# Patient Record
Sex: MALE | Race: WHITE | NOT HISPANIC OR LATINO | Employment: OTHER | URBAN - METROPOLITAN AREA
[De-identification: names, ages, dates, MRNs, and addresses within clinical notes are randomized per-mention and may not be internally consistent; named-entity substitution may affect disease eponyms.]

---

## 2017-02-09 ENCOUNTER — HOSPITAL ENCOUNTER (OUTPATIENT)
Dept: CARDIOLOGY | Facility: HOSPITAL | Age: 77
Discharge: HOME | End: 2017-02-09
Admitting: INTERNAL MEDICINE

## 2017-02-09 ENCOUNTER — OFFICE VISIT (OUTPATIENT)
Dept: CARDIOLOGY | Facility: CLINIC | Age: 77
End: 2017-02-09

## 2017-02-09 VITALS
HEART RATE: 59 BPM | DIASTOLIC BLOOD PRESSURE: 84 MMHG | SYSTOLIC BLOOD PRESSURE: 130 MMHG | WEIGHT: 148 LBS | BODY MASS INDEX: 20.05 KG/M2 | HEIGHT: 72 IN

## 2017-02-09 DIAGNOSIS — I48.0 PAROXYSMAL ATRIAL FIBRILLATION (HCC): Primary | ICD-10-CM

## 2017-02-09 DIAGNOSIS — Z79.01 ANTICOAGULATED: ICD-10-CM

## 2017-02-09 DIAGNOSIS — I10 ESSENTIAL HYPERTENSION: ICD-10-CM

## 2017-02-09 DIAGNOSIS — I71.40 ABDOMINAL AORTIC ANEURYSM (AAA) WITHOUT RUPTURE (HCC): ICD-10-CM

## 2017-02-09 DIAGNOSIS — I25.10 CHRONIC CORONARY ARTERY DISEASE: Primary | ICD-10-CM

## 2017-02-09 DIAGNOSIS — E78.5 HYPERLIPIDEMIA, UNSPECIFIED HYPERLIPIDEMIA TYPE: ICD-10-CM

## 2017-02-09 PROCEDURE — 99214 OFFICE O/P EST MOD 30 MIN: CPT | Performed by: INTERNAL MEDICINE

## 2017-02-09 PROCEDURE — 85610 PROTHROMBIN TIME: CPT | Performed by: INTERNAL MEDICINE

## 2017-02-09 RX ORDER — NEBIVOLOL HYDROCHLORIDE 10 MG/1
10 TABLET ORAL DAILY
Status: ON HOLD | COMMUNITY
Start: 2016-11-15 | End: 2017-08-28 | Stop reason: SDUPTHER

## 2017-02-09 NOTE — PROGRESS NOTES
Subjective:       David Comer is a 76 y.o. male who here for follow up    CC  Yearly follow-up  HPI  76-year-old white male with known history of coronary artery disease, essential arterial hypertension, as well as hyperlipidemia and ascending aneurysm here for the follow-up patient also has been anticoagulated    Patient denies any chest pains or tightness in chest     Problem List Items Addressed This Visit        Cardiovascular and Mediastinum    Chronic coronary artery disease - Primary    Relevant Medications    BYSTOLIC 10 MG tablet    Abdominal aortic aneurysm    Hypertension    Relevant Medications    BYSTOLIC 10 MG tablet    Hyperlipidemia       Other    Anticoagulated        Previous treatments/evaluations include: ASA and beta blocker. Cardiac risk factors: advanced age (older than 55 for men, 65 for women) and hypertension.    The following portions of the patient's history were reviewed and updated as appropriate: allergies, current medications, past family history, past medical history, past social history, past surgical history and problem list.    Past Medical History   Diagnosis Date   • AAA (abdominal aortic aneurysm)    • Bradycardia    • Chest pain    • Coronary artery disease    • DJD (degenerative joint disease)    • GERD (gastroesophageal reflux disease)    • Hyperlipidemia    • Hypertension    • Hypogonadism in male    • Hypothyroidism    • PAF (paroxysmal atrial fibrillation)    • Vitamin D deficiency     reports that he has never smoked. He has never used smokeless tobacco. He reports that he does not drink alcohol or use illicit drugs.History reviewed. No pertinent family history.    Review of Systems  Constitutional: No wt loss, fever, fatigue  Gastrointestinal: No nausea, abdominal pain  Behavioral/Psych: No insomnia or anxiety   Cardiovascular no chest pains or tightness in chest  Objective:       Physical Exam             Physical Exam  Visit Vitals   • /84 (BP Location: Right  "arm)   • Pulse 59   • Ht 72\" (182.9 cm)   • Wt 148 lb (67.1 kg)   • BMI 20.07 kg/m2       General appearance: NAD, conversant   Eyes: anicteric sclerae, moist conjunctivae; no lid-lag; PERRLA   HENT: Atraumatic; oropharynx clear with moist mucous membranes and no mucosal ulcerations;  normal hard and soft palate   Neck: Trachea midline; FROM, supple, no thyromegaly or lymphadenopathy   Lungs: CTA, with normal respiratory effort and no intercostal retractions   CV: S1-S2 regular, no murmurs, no rub, no gallop   Abdomen: Soft, non-tender; no masses or HSM   Extremities: No peripheral edema or extremity lymphadenopathy  Skin: Normal temperature, turgor and texture; no rash, ulcers or subcutaneous nodules   Psych: Appropriate affect, alert and oriented to person, place and time           Cardiographics  @Procedures    Echocardiogram:    Conclusions  The study quality is adequate.   The left ventricular chamber size is normal.  The estimated ejection fraction is 60-65%.   There is a trace of aortic regurgitation.  There is mild mitral regurgitation.   There is mild tricuspid regurgitation.  There is no pericardial effusion.    Current Outpatient Prescriptions:   •  amLODIPine (NORVASC) 5 MG tablet, Take 1 tablet by mouth daily., Disp: 90 tablet, Rfl: 3  •  atorvastatin (LIPITOR) 20 MG tablet, Take 1 tablet by mouth Daily., Disp: 90 tablet, Rfl: 0  •  BYSTOLIC 10 MG tablet, , Disp: , Rfl:   •  doxazosin (CARDURA) 2 MG tablet, Take  by mouth daily., Disp: , Rfl:   •  esomeprazole (NEXIUM) 40 MG capsule, Take  by mouth., Disp: , Rfl:   •  finasteride (PROSCAR) 5 MG tablet, Take  by mouth daily., Disp: , Rfl:   •  isosorbide mononitrate (IMDUR) 30 MG 24 hr tablet, Take  by mouth daily., Disp: , Rfl:   •  levothyroxine (SYNTHROID) 112 MCG tablet, Take  by mouth daily., Disp: , Rfl:   •  nitroglycerin (NITROSTAT) 0.4 MG SL tablet, Place  under the tongue., Disp: , Rfl:   •  olmesartan-hydrochlorothiazide (BENICAR HCT) 40-25 MG " per tablet, Take  by mouth daily., Disp: , Rfl:   •  warfarin (COUMADIN) 7.5 MG tablet, Take 7.5 mg by mouth daily., Disp: , Rfl:    Assessment:        Patient Active Problem List   Diagnosis   • Chronic coronary artery disease   • Abdominal aortic aneurysm   • Paroxysmal atrial fibrillation   • Gastroesophageal reflux disease   • Hypertension   • Hyperlipidemia   • Hypogonadism in male   • Hypothyroidism   • Osteoarthritis of spine   • Vitamin D deficiency     IMPRESSION OF HEART CATHETERIZATION:    1. Normal left main coronary artery.  2. Normal left anterior descending artery and its branches.  3. Normal left circumflex artery and its branches.  4. Normal right coronary artery proximal spasm relieved with sublingual   nitroglycerin  5. Normal left ventricular systolic function.  LVEDP 10 mmHg.  Ejection   fraction 60%.    RECOMMENDATION:  Considering nonsignificant coronary artery disease   patient be treated medically.          Plan:       Clinically no signs and symptoms of angina or chf, no side effects with current meds,.    Continue current meds and treatment.    Importance of controlling hypertension and blood pressure  checkup on the regular basis has been explained  Hypertension as a silent killer has been discussed  Risk reduction of the weight and regular exercises to control the hypertension has been explained    Risk of the hyperlipidemia, importance of the treatment has been explained    Pros and cons of the statins has been explained    Regular blood workup as well as side effects including the liver failure, myelopathy death has been explained            ICD-10-CM ICD-9-CM   1. Chronic coronary artery disease I25.10 414.00   2. Abdominal aortic aneurysm (AAA) without rupture I71.4 441.4   3. Essential hypertension I10 401.9   4. Hyperlipidemia, unspecified hyperlipidemia type E78.5 272.4     Needs ct  Chest for ascending aneurysm    Needs FLP/ CMP      WILL PROCEED WITH LEXISCAN DUE TO ANEURYSM AND  ECHO    SEE US 2-4 WKS    CHECK PT  COUNSELING:    David Jones was given to patient for the following topics: diagnostic results, risk factor reductions, impressions, risks and benefits of treatment options and importance of treatment compliance .       SMOKING COUNSELING:    Counseling given: Not Answered      EMR Dragon/Transcription disclaimer:   Much of this encounter note is an electronic transcription/translation of spoken language to printed text. The electronic translation of spoken language may permit erroneous, or at times, nonsensical words or phrases to be inadvertently transcribed; Although I have reviewed the note for such errors, some may still exist.

## 2017-02-13 RX ORDER — WARFARIN SODIUM 7.5 MG/1
7.5 TABLET ORAL DAILY
Qty: 90 TABLET | Refills: 1 | Status: ON HOLD | OUTPATIENT
Start: 2017-02-13 | End: 2017-06-02

## 2017-02-14 ENCOUNTER — HOSPITAL ENCOUNTER (OUTPATIENT)
Dept: CARDIOLOGY | Facility: HOSPITAL | Age: 77
Discharge: HOME | End: 2017-02-14
Admitting: INTERNAL MEDICINE

## 2017-02-14 DIAGNOSIS — R07.2 PRECORDIAL PAIN: Primary | ICD-10-CM

## 2017-02-14 DIAGNOSIS — I71.40 ABDOMINAL AORTIC ANEURYSM (AAA) WITHOUT RUPTURE (HCC): ICD-10-CM

## 2017-02-14 DIAGNOSIS — I25.10 CHRONIC CORONARY ARTERY DISEASE: ICD-10-CM

## 2017-02-14 DIAGNOSIS — R06.02 SOB (SHORTNESS OF BREATH): ICD-10-CM

## 2017-02-14 DIAGNOSIS — I48.0 PAROXYSMAL ATRIAL FIBRILLATION (HCC): ICD-10-CM

## 2017-02-14 DIAGNOSIS — E78.5 HYPERLIPIDEMIA, UNSPECIFIED HYPERLIPIDEMIA TYPE: Primary | ICD-10-CM

## 2017-02-14 DIAGNOSIS — I10 ESSENTIAL HYPERTENSION: ICD-10-CM

## 2017-02-14 PROCEDURE — 85610 PROTHROMBIN TIME: CPT | Performed by: INTERNAL MEDICINE

## 2017-02-20 ENCOUNTER — HOSPITAL ENCOUNTER (OUTPATIENT)
Dept: CT IMAGING | Facility: HOSPITAL | Age: 77
Discharge: HOME | End: 2017-02-20
Attending: INTERNAL MEDICINE | Admitting: INTERNAL MEDICINE

## 2017-02-20 DIAGNOSIS — I48.0 PAROXYSMAL ATRIAL FIBRILLATION (HCC): ICD-10-CM

## 2017-02-20 DIAGNOSIS — I10 ESSENTIAL HYPERTENSION: ICD-10-CM

## 2017-02-20 DIAGNOSIS — I71.40 ABDOMINAL AORTIC ANEURYSM (AAA) WITHOUT RUPTURE (HCC): ICD-10-CM

## 2017-02-20 DIAGNOSIS — I25.10 CHRONIC CORONARY ARTERY DISEASE: ICD-10-CM

## 2017-02-20 DIAGNOSIS — R07.2 PRECORDIAL PAIN: ICD-10-CM

## 2017-02-20 DIAGNOSIS — R06.02 SOB (SHORTNESS OF BREATH): ICD-10-CM

## 2017-02-20 LAB — CREAT BLDA-MCNC: 1 MG/DL (ref 0.6–1.3)

## 2017-02-20 PROCEDURE — 0 IOPAMIDOL 61 % SOLUTION: Performed by: INTERNAL MEDICINE

## 2017-02-20 PROCEDURE — 71260 CT THORAX DX C+: CPT

## 2017-02-20 PROCEDURE — 82565 ASSAY OF CREATININE: CPT

## 2017-02-20 RX ADMIN — IOPAMIDOL 75 ML: 612 INJECTION, SOLUTION INTRAVENOUS at 08:35

## 2017-02-21 ENCOUNTER — HOSPITAL ENCOUNTER (OUTPATIENT)
Dept: CARDIOLOGY | Facility: HOSPITAL | Age: 77
Discharge: HOME | End: 2017-02-21
Admitting: INTERNAL MEDICINE

## 2017-02-21 PROCEDURE — 85610 PROTHROMBIN TIME: CPT | Performed by: INTERNAL MEDICINE

## 2017-03-13 ENCOUNTER — HOSPITAL ENCOUNTER (OUTPATIENT)
Dept: CARDIOLOGY | Facility: HOSPITAL | Age: 77
Discharge: HOME | End: 2017-03-13
Attending: INTERNAL MEDICINE

## 2017-03-13 ENCOUNTER — HOSPITAL ENCOUNTER (OUTPATIENT)
Dept: CARDIOLOGY | Facility: HOSPITAL | Age: 77
Discharge: HOME | End: 2017-03-13
Attending: INTERNAL MEDICINE | Admitting: INTERNAL MEDICINE

## 2017-03-13 VITALS
DIASTOLIC BLOOD PRESSURE: 84 MMHG | BODY MASS INDEX: 28.44 KG/M2 | WEIGHT: 210 LBS | HEIGHT: 72 IN | SYSTOLIC BLOOD PRESSURE: 130 MMHG

## 2017-03-13 VITALS — HEART RATE: 64 BPM | DIASTOLIC BLOOD PRESSURE: 70 MMHG | SYSTOLIC BLOOD PRESSURE: 105 MMHG

## 2017-03-13 DIAGNOSIS — I10 ESSENTIAL HYPERTENSION: ICD-10-CM

## 2017-03-13 DIAGNOSIS — I71.40 ABDOMINAL AORTIC ANEURYSM (AAA) WITHOUT RUPTURE (HCC): ICD-10-CM

## 2017-03-13 DIAGNOSIS — I48.0 PAROXYSMAL ATRIAL FIBRILLATION (HCC): ICD-10-CM

## 2017-03-13 DIAGNOSIS — I25.10 CHRONIC CORONARY ARTERY DISEASE: ICD-10-CM

## 2017-03-13 DIAGNOSIS — R06.02 SOB (SHORTNESS OF BREATH): ICD-10-CM

## 2017-03-13 DIAGNOSIS — R07.2 PRECORDIAL PAIN: ICD-10-CM

## 2017-03-13 LAB
BH CV ECHO MEAS - ACS: 2.2 CM
BH CV ECHO MEAS - AO MAX PG (FULL): 6 MMHG
BH CV ECHO MEAS - AO MAX PG: 9.1 MMHG
BH CV ECHO MEAS - AO MEAN PG (FULL): 3 MMHG
BH CV ECHO MEAS - AO MEAN PG: 4 MMHG
BH CV ECHO MEAS - AO ROOT AREA (BSA CORRECTED): 1.9
BH CV ECHO MEAS - AO ROOT AREA: 13.2 CM^2
BH CV ECHO MEAS - AO ROOT DIAM: 4.1 CM
BH CV ECHO MEAS - AO V2 MAX: 151 CM/SEC
BH CV ECHO MEAS - AO V2 MEAN: 85.5 CM/SEC
BH CV ECHO MEAS - AO V2 VTI: 30.9 CM
BH CV ECHO MEAS - AVA(I,A): 3 CM^2
BH CV ECHO MEAS - AVA(I,D): 3 CM^2
BH CV ECHO MEAS - AVA(V,A): 2.7 CM^2
BH CV ECHO MEAS - AVA(V,D): 2.7 CM^2
BH CV ECHO MEAS - BSA(HAYCOCK): 2.2 M^2
BH CV ECHO MEAS - BSA: 2.2 M^2
BH CV ECHO MEAS - BZI_BMI: 28.5 KILOGRAMS/M^2
BH CV ECHO MEAS - BZI_METRIC_HEIGHT: 182.9 CM
BH CV ECHO MEAS - BZI_METRIC_WEIGHT: 95.3 KG
BH CV ECHO MEAS - CONTRAST EF 4CH: 57.4 ML/M^2
BH CV ECHO MEAS - EDV(CUBED): 68.9 ML
BH CV ECHO MEAS - EDV(MOD-SP4): 108 ML
BH CV ECHO MEAS - EDV(TEICH): 74.2 ML
BH CV ECHO MEAS - EF(CUBED): 60.8 %
BH CV ECHO MEAS - EF(MOD-SP4): 57.4 %
BH CV ECHO MEAS - EF(TEICH): 52.8 %
BH CV ECHO MEAS - ESV(CUBED): 27 ML
BH CV ECHO MEAS - ESV(MOD-SP4): 46 ML
BH CV ECHO MEAS - ESV(TEICH): 35 ML
BH CV ECHO MEAS - FS: 26.8 %
BH CV ECHO MEAS - IVS/LVPW: 1.2
BH CV ECHO MEAS - IVSD: 1.3 CM
BH CV ECHO MEAS - LA DIMENSION: 3.9 CM
BH CV ECHO MEAS - LA/AO: 0.95
BH CV ECHO MEAS - LAT PEAK E' VEL: 11 CM/SEC
BH CV ECHO MEAS - LV DIASTOLIC VOL/BSA (35-75): 49.7 ML/M^2
BH CV ECHO MEAS - LV MASS(C)D: 171.7 GRAMS
BH CV ECHO MEAS - LV MASS(C)DI: 79 GRAMS/M^2
BH CV ECHO MEAS - LV MAX PG: 3.1 MMHG
BH CV ECHO MEAS - LV MEAN PG: 1 MMHG
BH CV ECHO MEAS - LV SYSTOLIC VOL/BSA (12-30): 21.1 ML/M^2
BH CV ECHO MEAS - LV V1 MAX: 88.7 CM/SEC
BH CV ECHO MEAS - LV V1 MEAN: 55.3 CM/SEC
BH CV ECHO MEAS - LV V1 VTI: 20.6 CM
BH CV ECHO MEAS - LVIDD: 4.1 CM
BH CV ECHO MEAS - LVIDS: 3 CM
BH CV ECHO MEAS - LVLD AP4: 8 CM
BH CV ECHO MEAS - LVLS AP4: 6.9 CM
BH CV ECHO MEAS - LVOT AREA (M): 4.5 CM^2
BH CV ECHO MEAS - LVOT AREA: 4.5 CM^2
BH CV ECHO MEAS - LVOT DIAM: 2.4 CM
BH CV ECHO MEAS - LVPWD: 1.1 CM
BH CV ECHO MEAS - MED PEAK E' VEL: 6 CM/SEC
BH CV ECHO MEAS - MR MAX PG: 96.8 MMHG
BH CV ECHO MEAS - MR MAX VEL: 492 CM/SEC
BH CV ECHO MEAS - MV A DUR: 0.19 SEC
BH CV ECHO MEAS - MV A MAX VEL: 78.6 CM/SEC
BH CV ECHO MEAS - MV DEC SLOPE: 257 CM/SEC^2
BH CV ECHO MEAS - MV DEC TIME: 0.26 SEC
BH CV ECHO MEAS - MV E MAX VEL: 68.4 CM/SEC
BH CV ECHO MEAS - MV E/A: 0.87
BH CV ECHO MEAS - MV MAX PG: 2.6 MMHG
BH CV ECHO MEAS - MV MEAN PG: 1 MMHG
BH CV ECHO MEAS - MV P1/2T MAX VEL: 67.4 CM/SEC
BH CV ECHO MEAS - MV P1/2T: 76.8 MSEC
BH CV ECHO MEAS - MV V2 MAX: 80.5 CM/SEC
BH CV ECHO MEAS - MV V2 MEAN: 43.5 CM/SEC
BH CV ECHO MEAS - MV V2 VTI: 22.6 CM
BH CV ECHO MEAS - MVA P1/2T LCG: 3.3 CM^2
BH CV ECHO MEAS - MVA(P1/2T): 2.9 CM^2
BH CV ECHO MEAS - MVA(VTI): 4.1 CM^2
BH CV ECHO MEAS - PA MAX PG (FULL): 2.8 MMHG
BH CV ECHO MEAS - PA MAX PG: 4.5 MMHG
BH CV ECHO MEAS - PA V2 MAX: 106 CM/SEC
BH CV ECHO MEAS - PULM A REVS DUR: 0.12 SEC
BH CV ECHO MEAS - PULM A REVS VEL: 27.9 CM/SEC
BH CV ECHO MEAS - PULM DIAS VEL: 40.3 CM/SEC
BH CV ECHO MEAS - PULM S/D: 1.2
BH CV ECHO MEAS - PULM SYS VEL: 47.9 CM/SEC
BH CV ECHO MEAS - PVA(V,A): 2.8 CM^2
BH CV ECHO MEAS - PVA(V,D): 2.8 CM^2
BH CV ECHO MEAS - QP/QS: 0.9
BH CV ECHO MEAS - RAP SYSTOLE: 10 MMHG
BH CV ECHO MEAS - RV MAX PG: 1.7 MMHG
BH CV ECHO MEAS - RV MEAN PG: 1 MMHG
BH CV ECHO MEAS - RV V1 MAX: 65 CM/SEC
BH CV ECHO MEAS - RV V1 MEAN: 49.5 CM/SEC
BH CV ECHO MEAS - RV V1 VTI: 18.5 CM
BH CV ECHO MEAS - RVDD: 2.5 CM
BH CV ECHO MEAS - RVOT AREA: 4.5 CM^2
BH CV ECHO MEAS - RVOT DIAM: 2.4 CM
BH CV ECHO MEAS - RVSP: 47.5 MMHG
BH CV ECHO MEAS - SI(AO): 187.6 ML/M^2
BH CV ECHO MEAS - SI(CUBED): 19.3 ML/M^2
BH CV ECHO MEAS - SI(LVOT): 42.8 ML/M^2
BH CV ECHO MEAS - SI(MOD-SP4): 28.5 ML/M^2
BH CV ECHO MEAS - SI(TEICH): 18 ML/M^2
BH CV ECHO MEAS - SV(AO): 408 ML
BH CV ECHO MEAS - SV(CUBED): 41.9 ML
BH CV ECHO MEAS - SV(LVOT): 93.2 ML
BH CV ECHO MEAS - SV(MOD-SP4): 62 ML
BH CV ECHO MEAS - SV(RVOT): 83.7 ML
BH CV ECHO MEAS - SV(TEICH): 39.2 ML
BH CV ECHO MEAS - TAPSE (>1.6): 1.9 CM2
BH CV ECHO MEAS - TR MAX VEL: 306 CM/SEC
BH CV XLRA - RV BASE: 4.2 CM
BH CV XLRA - RV LENGTH: 6.7 CM
BH CV XLRA - RV MID: 2.8 CM
BH CV XLRA - TDI S': 15.1 CM/SEC
LEFT ATRIUM VOLUME INDEX: 45.9 ML/M2

## 2017-03-13 PROCEDURE — 93018 CV STRESS TEST I&R ONLY: CPT | Performed by: INTERNAL MEDICINE

## 2017-03-13 PROCEDURE — A9500 TC99M SESTAMIBI: HCPCS | Performed by: INTERNAL MEDICINE

## 2017-03-13 PROCEDURE — 78452 HT MUSCLE IMAGE SPECT MULT: CPT

## 2017-03-13 PROCEDURE — 0399T ADULT TRANSTHORACIC ECHO COMPLETE: CPT | Performed by: INTERNAL MEDICINE

## 2017-03-13 PROCEDURE — 93306 TTE W/DOPPLER COMPLETE: CPT | Performed by: INTERNAL MEDICINE

## 2017-03-13 PROCEDURE — 93016 CV STRESS TEST SUPVJ ONLY: CPT | Performed by: INTERNAL MEDICINE

## 2017-03-13 PROCEDURE — 78452 HT MUSCLE IMAGE SPECT MULT: CPT | Performed by: INTERNAL MEDICINE

## 2017-03-13 PROCEDURE — 0 TECHNETIUM SESTAMIBI: Performed by: INTERNAL MEDICINE

## 2017-03-13 PROCEDURE — 93017 CV STRESS TEST TRACING ONLY: CPT

## 2017-03-13 PROCEDURE — 0399T HC MYOCARDL STRAIN IMAG QUAN ASSMT PER SESS: CPT

## 2017-03-13 PROCEDURE — 93306 TTE W/DOPPLER COMPLETE: CPT

## 2017-03-13 PROCEDURE — 25010000002 REGADENOSON 0.4 MG/5ML SOLUTION: Performed by: INTERNAL MEDICINE

## 2017-03-13 RX ADMIN — Medication 1 DOSE: at 07:18

## 2017-03-13 RX ADMIN — Medication 1 DOSE: at 09:48

## 2017-03-13 RX ADMIN — REGADENOSON 0.4 MG: 0.08 INJECTION, SOLUTION INTRAVENOUS at 09:48

## 2017-03-15 LAB
BH CV STRESS BP STAGE 1: NORMAL
BH CV STRESS COMMENTS STAGE 1: NORMAL
BH CV STRESS DOSE REGADENOSON STAGE 1: 0.4
BH CV STRESS DURATION MIN STAGE 1: 0
BH CV STRESS DURATION SEC STAGE 1: 15
BH CV STRESS HR STAGE 1: 92
BH CV STRESS PROTOCOL 1: NORMAL
BH CV STRESS RECOVERY BP: NORMAL MMHG
BH CV STRESS RECOVERY HR: 76 BPM
BH CV STRESS STAGE 1: 1
LV EF NUC BP: 58 %
MAXIMAL PREDICTED HEART RATE: 144 BPM
PERCENT MAX PREDICTED HR: 63.89 %
STRESS BASELINE BP: NORMAL MMHG
STRESS BASELINE HR: 64 BPM
STRESS PERCENT HR: 75 %
STRESS POST ESTIMATED WORKLOAD: 1 METS
STRESS POST EXERCISE DUR MIN: 0 MIN
STRESS POST EXERCISE DUR SEC: 0 SEC
STRESS POST PEAK BP: NORMAL MMHG
STRESS POST PEAK HR: 92 BPM
STRESS TARGET HR: 122 BPM

## 2017-03-16 ENCOUNTER — HOSPITAL ENCOUNTER (OUTPATIENT)
Dept: CARDIOLOGY | Facility: HOSPITAL | Age: 77
Discharge: HOME | End: 2017-03-16
Admitting: FAMILY MEDICINE

## 2017-03-16 ENCOUNTER — OFFICE VISIT (OUTPATIENT)
Dept: CARDIOLOGY | Facility: CLINIC | Age: 77
End: 2017-03-16

## 2017-03-16 VITALS
HEIGHT: 72 IN | DIASTOLIC BLOOD PRESSURE: 74 MMHG | BODY MASS INDEX: 28.44 KG/M2 | WEIGHT: 210 LBS | HEART RATE: 59 BPM | SYSTOLIC BLOOD PRESSURE: 114 MMHG

## 2017-03-16 DIAGNOSIS — E78.5 HYPERLIPIDEMIA, UNSPECIFIED HYPERLIPIDEMIA TYPE: ICD-10-CM

## 2017-03-16 DIAGNOSIS — I25.10 CHRONIC CORONARY ARTERY DISEASE: Primary | ICD-10-CM

## 2017-03-16 DIAGNOSIS — I48.0 PAROXYSMAL ATRIAL FIBRILLATION (HCC): ICD-10-CM

## 2017-03-16 DIAGNOSIS — I10 ESSENTIAL HYPERTENSION: ICD-10-CM

## 2017-03-16 PROCEDURE — 99213 OFFICE O/P EST LOW 20 MIN: CPT | Performed by: INTERNAL MEDICINE

## 2017-03-16 PROCEDURE — 85610 PROTHROMBIN TIME: CPT | Performed by: INTERNAL MEDICINE

## 2017-03-16 NOTE — PROGRESS NOTES
Subjective:       David Comer is a 77 y.o. male who here for follow up    CC  Follow-up after the stress test as well as echocardiogram and abnormal CT scan  HPI  77 his old white male with known history of aneurysm, abnormal EKG, chest pains and coronary artery disease here for the follow-up after the stress test echo and CT scan done last month    Patient has abnormal CT of the chest, aneurysm remains stable     Problem List Items Addressed This Visit        Cardiovascular and Mediastinum    Chronic coronary artery disease - Primary    Paroxysmal atrial fibrillation    Hypertension    Hyperlipidemia        Previous treatments/evaluations include: ASA. Cardiac risk factors: advanced age (older than 55 for men, 65 for women).    The following portions of the patient's history were reviewed and updated as appropriate: allergies, current medications, past family history, past medical history, past social history, past surgical history and problem list.    Past Medical History   Diagnosis Date   • AAA (abdominal aortic aneurysm)    • AAA (abdominal aortic aneurysm) without rupture      CT performed on 2/20/17   • Abnormal ECG    • Bradycardia    • Chest pain    • Coronary artery disease    • DJD (degenerative joint disease)    • GERD (gastroesophageal reflux disease)    • Hyperlipidemia    • Hypertension    • Hypogonadism in male    • Hypothyroidism    • PAF (paroxysmal atrial fibrillation)    • Vitamin D deficiency     reports that he has never smoked. He has never used smokeless tobacco. He reports that he does not drink alcohol or use illicit drugs.No family history on file.    Review of Systems  Constitutional: No wt loss, fever, fatigue  Gastrointestinal: No nausea, abdominal pain  Behavioral/Psych: No insomnia or anxiety   Cardiovascular no chest pains or tightness in chest  Objective:       Physical Exam             Physical Exam  Visit Vitals   • /74 (BP Location: Left arm, Patient Position: Sitting)   •  "Pulse 59   • Ht 72\" (182.9 cm)   • Wt 210 lb (95.3 kg)   • BMI 28.48 kg/m2       General appearance: NAD, conversant   Eyes: anicteric sclerae, moist conjunctivae; no lid-lag; PERRLA   HENT: Atraumatic; oropharynx clear with moist mucous membranes and no mucosal ulcerations;  normal hard and soft palate   Neck: Trachea midline; FROM, supple, no thyromegaly or lymphadenopathy   Lungs: CTA, with normal respiratory effort and no intercostal retractions   CV: S1-S2 regular, no murmurs, no rub, no gallop   Abdomen: Soft, non-tender; no masses or HSM   Extremities: No peripheral edema or extremity lymphadenopathy  Skin: Normal temperature, turgor and texture; no rash, ulcers or subcutaneous nodules   Psych: Appropriate affect, alert and oriented to person, place and time           Cardiographics  @Procedures    Echocardiogram:        Current Outpatient Prescriptions:   •  amLODIPine (NORVASC) 5 MG tablet, Take 1 tablet by mouth daily., Disp: 90 tablet, Rfl: 3  •  atorvastatin (LIPITOR) 20 MG tablet, Take 1 tablet by mouth Daily., Disp: 90 tablet, Rfl: 0  •  BYSTOLIC 10 MG tablet, , Disp: , Rfl:   •  doxazosin (CARDURA) 2 MG tablet, Take  by mouth daily., Disp: , Rfl:   •  esomeprazole (NEXIUM) 40 MG capsule, Take  by mouth., Disp: , Rfl:   •  finasteride (PROSCAR) 5 MG tablet, Take  by mouth daily., Disp: , Rfl:   •  isosorbide mononitrate (IMDUR) 30 MG 24 hr tablet, Take  by mouth daily., Disp: , Rfl:   •  levothyroxine (SYNTHROID) 112 MCG tablet, Take  by mouth daily., Disp: , Rfl:   •  nitroglycerin (NITROSTAT) 0.4 MG SL tablet, Place  under the tongue., Disp: , Rfl:   •  olmesartan-hydrochlorothiazide (BENICAR HCT) 40-25 MG per tablet, Take  by mouth daily., Disp: , Rfl:   •  warfarin (COUMADIN) 7.5 MG tablet, Take 1 tablet by mouth Daily., Disp: 90 tablet, Rfl: 1   Assessment:        Patient Active Problem List   Diagnosis   • Chronic coronary artery disease   • Abdominal aortic aneurysm   • Paroxysmal atrial " fibrillation   • Gastroesophageal reflux disease   • Hypertension   • Hyperlipidemia   • Hypogonadism in male   • Hypothyroidism   • Osteoarthritis of spine   • Vitamin D deficiency   • Anticoagulated   Interpretation Summary   · Findings consistent with an equivocal ECG stress test.  · Left ventricular ejection fraction is normal (Calculated EF = 58%).  · Myocardial perfusion imaging indicates a normal myocardial perfusion study with no evidence of ischemia.  · Impressions are consistent with a low risk study.     Interpretation Summary   · Global strain normal -21  · Left atrial cavity size is mildly dilated.  · Mild mitral valve regurgitation is present  · Mild tricuspid valve regurgitation is present.  · Left Ventricle: Calculated EF = 57.4%.  · There is no evidence of pericardial effusion       IMPRESSION:  Dilated ascending thoracic aorta measuring up to 4.5 cm in  diameter. Solitary tiny noncalcified pulmonary nodule in the left upper  lobe. Comparison with previous CT scans of the chest would be helpful if  these exist. Otherwise I recommend a follow-up CT scan of the chest in  approximately 6 months. Moderate-sized hiatal hernia. Otherwise  unremarkable CT scan of the chest.          Plan:       aneurysm remained stable at 4.5 cm      ICD-10-CM ICD-9-CM   1. Chronic coronary artery disease I25.10 414.00   2. Paroxysmal atrial fibrillation I48.0 427.31   3. Essential hypertension I10 401.9   4. Hyperlipidemia, unspecified hyperlipidemia type E78.5 272.4     Abnormal  CT OF CHEST, with the lung nodule patient has been referred to the pulmonary    Stress test is negative    Specificity and sensitivity of the stress test has been explained. Pt has been explained if  Symptoms continue please go to ER, and further w/p will be required.    Blood pressure well-controlled    SEE US 1 YR  WITH CT OF CHEST  COUNSELING:    David Jones was given to patient for the following topics: diagnostic results, risk  factor reductions, impressions, risks and benefits of treatment options and importance of treatment compliance .       SMOKING COUNSELING:    Counseling given: Not Answered      EMR Dragon/Transcription disclaimer:   Much of this encounter note is an electronic transcription/translation of spoken language to printed text. The electronic translation of spoken language may permit erroneous, or at times, nonsensical words or phrases to be inadvertently transcribed; Although I have reviewed the note for such errors, some may still exist.

## 2017-03-21 RX ORDER — ISOSORBIDE MONONITRATE 30 MG/1
TABLET, EXTENDED RELEASE ORAL
Qty: 90 TABLET | Refills: 0 | Status: SHIPPED | OUTPATIENT
Start: 2017-03-21 | End: 2017-06-02 | Stop reason: HOSPADM

## 2017-04-14 RX ORDER — OLMESARTAN MEDOXOMIL AND HYDROCHLOROTHIAZIDE 40/25 40; 25 MG/1; MG/1
1 TABLET ORAL DAILY
Qty: 90 TABLET | Refills: 2 | Status: SHIPPED | OUTPATIENT
Start: 2017-04-14 | End: 2017-08-30 | Stop reason: HOSPADM

## 2017-05-30 ENCOUNTER — APPOINTMENT (OUTPATIENT)
Dept: CT IMAGING | Facility: HOSPITAL | Age: 77
End: 2017-05-30

## 2017-05-30 ENCOUNTER — HOSPITAL ENCOUNTER (INPATIENT)
Facility: HOSPITAL | Age: 77
LOS: 3 days | Discharge: HOME | End: 2017-06-02
Attending: EMERGENCY MEDICINE | Admitting: INTERNAL MEDICINE

## 2017-05-30 ENCOUNTER — APPOINTMENT (OUTPATIENT)
Dept: GENERAL RADIOLOGY | Facility: HOSPITAL | Age: 77
End: 2017-05-30

## 2017-05-30 DIAGNOSIS — R93.0 ABNORMAL CT SCAN OF HEAD: ICD-10-CM

## 2017-05-30 DIAGNOSIS — I48.91 ATRIAL FIBRILLATION, RAPID (HCC): Primary | ICD-10-CM

## 2017-05-30 LAB
ALBUMIN SERPL-MCNC: 4 G/DL (ref 3.5–5.2)
ALBUMIN/GLOB SERPL: 1.3 G/DL
ALP SERPL-CCNC: 53 U/L (ref 39–117)
ALT SERPL W P-5'-P-CCNC: 17 U/L (ref 1–41)
ANION GAP SERPL CALCULATED.3IONS-SCNC: 13.5 MMOL/L
AST SERPL-CCNC: 13 U/L (ref 1–40)
BASOPHILS # BLD AUTO: 0.03 10*3/MM3 (ref 0–0.2)
BASOPHILS NFR BLD AUTO: 0.3 % (ref 0–1.5)
BILIRUB SERPL-MCNC: 0.3 MG/DL (ref 0.1–1.2)
BILIRUB UR QL STRIP: NEGATIVE
BUN BLD-MCNC: 30 MG/DL (ref 8–23)
BUN/CREAT SERPL: 22.1 (ref 7–25)
CALCIUM SPEC-SCNC: 9.6 MG/DL (ref 8.6–10.5)
CHLORIDE SERPL-SCNC: 100 MMOL/L (ref 98–107)
CLARITY UR: CLEAR
CO2 SERPL-SCNC: 23.5 MMOL/L (ref 22–29)
COLOR UR: YELLOW
CREAT BLD-MCNC: 1.36 MG/DL (ref 0.76–1.27)
DEPRECATED RDW RBC AUTO: 45.5 FL (ref 37–54)
EOSINOPHIL # BLD AUTO: 0.2 10*3/MM3 (ref 0–0.7)
EOSINOPHIL NFR BLD AUTO: 2.2 % (ref 0.3–6.2)
ERYTHROCYTE [DISTWIDTH] IN BLOOD BY AUTOMATED COUNT: 13.2 % (ref 11.5–14.5)
GFR SERPL CREATININE-BSD FRML MDRD: 51 ML/MIN/1.73
GLOBULIN UR ELPH-MCNC: 3 GM/DL
GLUCOSE BLD-MCNC: 139 MG/DL (ref 65–99)
GLUCOSE UR STRIP-MCNC: NEGATIVE MG/DL
HCT VFR BLD AUTO: 41 % (ref 40.4–52.2)
HGB BLD-MCNC: 13.9 G/DL (ref 13.7–17.6)
HGB UR QL STRIP.AUTO: NEGATIVE
HOLD SPECIMEN: NORMAL
IMM GRANULOCYTES # BLD: 0.03 10*3/MM3 (ref 0–0.03)
IMM GRANULOCYTES NFR BLD: 0.3 % (ref 0–0.5)
INR PPP: 2.21 (ref 0.9–1.1)
KETONES UR QL STRIP: ABNORMAL
LEUKOCYTE ESTERASE UR QL STRIP.AUTO: NEGATIVE
LYMPHOCYTES # BLD AUTO: 1.66 10*3/MM3 (ref 0.9–4.8)
LYMPHOCYTES NFR BLD AUTO: 18.2 % (ref 19.6–45.3)
MAGNESIUM SERPL-MCNC: 1.7 MG/DL (ref 1.6–2.4)
MCH RBC QN AUTO: 32.6 PG (ref 27–32.7)
MCHC RBC AUTO-ENTMCNC: 33.9 G/DL (ref 32.6–36.4)
MCV RBC AUTO: 96 FL (ref 79.8–96.2)
MONOCYTES # BLD AUTO: 0.51 10*3/MM3 (ref 0.2–1.2)
MONOCYTES NFR BLD AUTO: 5.6 % (ref 5–12)
NEUTROPHILS # BLD AUTO: 6.7 10*3/MM3 (ref 1.9–8.1)
NEUTROPHILS NFR BLD AUTO: 73.4 % (ref 42.7–76)
NITRITE UR QL STRIP: NEGATIVE
PH UR STRIP.AUTO: 5.5 [PH] (ref 5–8)
PLATELET # BLD AUTO: 205 10*3/MM3 (ref 140–500)
PMV BLD AUTO: 11.8 FL (ref 6–12)
POTASSIUM BLD-SCNC: 3.8 MMOL/L (ref 3.5–5.2)
PROT SERPL-MCNC: 7 G/DL (ref 6–8.5)
PROT UR QL STRIP: NEGATIVE
PROTHROMBIN TIME: 23.8 SECONDS (ref 11.7–14.2)
RBC # BLD AUTO: 4.27 10*6/MM3 (ref 4.6–6)
SODIUM BLD-SCNC: 137 MMOL/L (ref 136–145)
SP GR UR STRIP: 1.02 (ref 1–1.03)
T4 FREE SERPL-MCNC: 1.43 NG/DL (ref 0.93–1.7)
TROPONIN T SERPL-MCNC: <0.01 NG/ML (ref 0–0.03)
TSH SERPL DL<=0.05 MIU/L-ACNC: 2.26 MIU/ML (ref 0.27–4.2)
UROBILINOGEN UR QL STRIP: ABNORMAL
WBC NRBC COR # BLD: 9.13 10*3/MM3 (ref 4.5–10.7)

## 2017-05-30 PROCEDURE — 36415 COLL VENOUS BLD VENIPUNCTURE: CPT | Performed by: EMERGENCY MEDICINE

## 2017-05-30 PROCEDURE — 70450 CT HEAD/BRAIN W/O DYE: CPT

## 2017-05-30 PROCEDURE — 80053 COMPREHEN METABOLIC PANEL: CPT | Performed by: EMERGENCY MEDICINE

## 2017-05-30 PROCEDURE — 85610 PROTHROMBIN TIME: CPT | Performed by: EMERGENCY MEDICINE

## 2017-05-30 PROCEDURE — 84443 ASSAY THYROID STIM HORMONE: CPT | Performed by: EMERGENCY MEDICINE

## 2017-05-30 PROCEDURE — 71020 HC CHEST PA AND LATERAL: CPT

## 2017-05-30 PROCEDURE — 93005 ELECTROCARDIOGRAM TRACING: CPT

## 2017-05-30 PROCEDURE — 81003 URINALYSIS AUTO W/O SCOPE: CPT | Performed by: EMERGENCY MEDICINE

## 2017-05-30 PROCEDURE — 83735 ASSAY OF MAGNESIUM: CPT | Performed by: EMERGENCY MEDICINE

## 2017-05-30 PROCEDURE — 84484 ASSAY OF TROPONIN QUANT: CPT | Performed by: EMERGENCY MEDICINE

## 2017-05-30 PROCEDURE — 85025 COMPLETE CBC W/AUTO DIFF WBC: CPT | Performed by: EMERGENCY MEDICINE

## 2017-05-30 PROCEDURE — 99285 EMERGENCY DEPT VISIT HI MDM: CPT

## 2017-05-30 PROCEDURE — 84439 ASSAY OF FREE THYROXINE: CPT | Performed by: EMERGENCY MEDICINE

## 2017-05-30 PROCEDURE — 93010 ELECTROCARDIOGRAM REPORT: CPT | Performed by: INTERNAL MEDICINE

## 2017-05-30 RX ORDER — ONDANSETRON 2 MG/ML
4 INJECTION INTRAMUSCULAR; INTRAVENOUS ONCE
Status: DISCONTINUED | OUTPATIENT
Start: 2017-05-30 | End: 2017-05-30

## 2017-05-30 RX ORDER — SODIUM CHLORIDE 0.9 % (FLUSH) 0.9 %
10 SYRINGE (ML) INJECTION AS NEEDED
Status: DISCONTINUED | OUTPATIENT
Start: 2017-05-30 | End: 2017-06-02 | Stop reason: HOSPADM

## 2017-05-30 RX ORDER — ISOSORBIDE MONONITRATE 30 MG/1
30 TABLET, EXTENDED RELEASE ORAL DAILY
Status: DISCONTINUED | OUTPATIENT
Start: 2017-05-30 | End: 2017-06-02 | Stop reason: HOSPADM

## 2017-05-30 RX ORDER — LEVOTHYROXINE SODIUM 0.1 MG/1
100 TABLET ORAL
Status: DISCONTINUED | OUTPATIENT
Start: 2017-05-31 | End: 2017-06-02 | Stop reason: HOSPADM

## 2017-05-30 RX ORDER — AMLODIPINE BESYLATE 5 MG/1
5 TABLET ORAL DAILY
Status: DISCONTINUED | OUTPATIENT
Start: 2017-05-30 | End: 2017-06-02 | Stop reason: HOSPADM

## 2017-05-30 RX ORDER — ATORVASTATIN CALCIUM 20 MG/1
20 TABLET, FILM COATED ORAL DAILY
Status: DISCONTINUED | OUTPATIENT
Start: 2017-05-30 | End: 2017-06-02 | Stop reason: HOSPADM

## 2017-05-30 RX ORDER — PANTOPRAZOLE SODIUM 40 MG/1
40 TABLET, DELAYED RELEASE ORAL EVERY MORNING
Status: DISCONTINUED | OUTPATIENT
Start: 2017-05-31 | End: 2017-06-02 | Stop reason: HOSPADM

## 2017-05-30 RX ORDER — HYDROCHLOROTHIAZIDE 25 MG/1
25 TABLET ORAL DAILY
Status: DISCONTINUED | OUTPATIENT
Start: 2017-05-30 | End: 2017-06-02 | Stop reason: HOSPADM

## 2017-05-30 RX ORDER — NEBIVOLOL 10 MG/1
10 TABLET ORAL
Status: DISCONTINUED | OUTPATIENT
Start: 2017-05-30 | End: 2017-06-02 | Stop reason: HOSPADM

## 2017-05-30 RX ORDER — FINASTERIDE 5 MG/1
5 TABLET, FILM COATED ORAL DAILY
Status: DISCONTINUED | OUTPATIENT
Start: 2017-05-30 | End: 2017-06-02 | Stop reason: HOSPADM

## 2017-05-30 RX ORDER — WARFARIN SODIUM 7.5 MG/1
7.5 TABLET ORAL DAILY
Status: DISCONTINUED | OUTPATIENT
Start: 2017-05-30 | End: 2017-05-31

## 2017-05-30 RX ORDER — LOSARTAN POTASSIUM 50 MG/1
100 TABLET ORAL DAILY
Status: DISCONTINUED | OUTPATIENT
Start: 2017-05-30 | End: 2017-06-02 | Stop reason: HOSPADM

## 2017-05-30 RX ORDER — OLMESARTAN MEDOXOMIL AND HYDROCHLOROTHIAZIDE 40/25 40; 25 MG/1; MG/1
1 TABLET ORAL DAILY
Status: DISCONTINUED | OUTPATIENT
Start: 2017-05-30 | End: 2017-05-30 | Stop reason: CLARIF

## 2017-05-30 RX ORDER — SODIUM CHLORIDE 0.9 % (FLUSH) 0.9 %
1-10 SYRINGE (ML) INJECTION AS NEEDED
Status: DISCONTINUED | OUTPATIENT
Start: 2017-05-30 | End: 2017-06-02 | Stop reason: HOSPADM

## 2017-05-30 RX ORDER — NITROGLYCERIN 0.4 MG/1
0.4 TABLET SUBLINGUAL
Status: DISCONTINUED | OUTPATIENT
Start: 2017-05-30 | End: 2017-06-02 | Stop reason: HOSPADM

## 2017-05-30 RX ADMIN — WARFARIN SODIUM 7.5 MG: 7.5 TABLET ORAL at 21:05

## 2017-05-31 ENCOUNTER — APPOINTMENT (OUTPATIENT)
Dept: CARDIOLOGY | Facility: HOSPITAL | Age: 77
End: 2017-05-31
Attending: INTERNAL MEDICINE

## 2017-05-31 ENCOUNTER — TELEPHONE (OUTPATIENT)
Dept: CARDIOLOGY | Facility: CLINIC | Age: 77
End: 2017-05-31

## 2017-05-31 DIAGNOSIS — I48.19 PERSISTENT ATRIAL FIBRILLATION (HCC): Primary | ICD-10-CM

## 2017-05-31 LAB
BH CV ECHO MEAS - BSA(HAYCOCK): 2.3 M^2
BH CV ECHO MEAS - BSA: 2.2 M^2
BH CV ECHO MEAS - BZI_BMI: 30.4 KILOGRAMS/M^2
BH CV ECHO MEAS - BZI_METRIC_HEIGHT: 180.3 CM
BH CV ECHO MEAS - BZI_METRIC_WEIGHT: 98.9 KG
BH CV ECHO MEAS - CONTRAST EF (2CH): 61.3 ML/M^2
BH CV ECHO MEAS - CONTRAST EF 4CH: 60.4 ML/M^2
BH CV ECHO MEAS - EDV(CUBED): 74.1 ML
BH CV ECHO MEAS - EDV(MOD-SP2): 80 ML
BH CV ECHO MEAS - EDV(MOD-SP4): 91 ML
BH CV ECHO MEAS - EDV(TEICH): 78.6 ML
BH CV ECHO MEAS - EF(CUBED): 73.4 %
BH CV ECHO MEAS - EF(MOD-SP2): 61.3 %
BH CV ECHO MEAS - EF(MOD-SP4): 60.4 %
BH CV ECHO MEAS - EF(TEICH): 65.6 %
BH CV ECHO MEAS - ESV(CUBED): 19.7 ML
BH CV ECHO MEAS - ESV(MOD-SP2): 31 ML
BH CV ECHO MEAS - ESV(MOD-SP4): 36 ML
BH CV ECHO MEAS - ESV(TEICH): 27 ML
BH CV ECHO MEAS - FS: 35.7 %
BH CV ECHO MEAS - LV DIASTOLIC VOL/BSA (35-75): 41.6 ML/M^2
BH CV ECHO MEAS - LV SYSTOLIC VOL/BSA (12-30): 16.5 ML/M^2
BH CV ECHO MEAS - LVIDD: 4.2 CM
BH CV ECHO MEAS - LVIDS: 2.7 CM
BH CV ECHO MEAS - LVLD AP2: 7.7 CM
BH CV ECHO MEAS - LVLD AP4: 7.5 CM
BH CV ECHO MEAS - LVLS AP2: 6.3 CM
BH CV ECHO MEAS - LVLS AP4: 7.1 CM
BH CV ECHO MEAS - SI(CUBED): 24.9 ML/M^2
BH CV ECHO MEAS - SI(MOD-SP2): 22.4 ML/M^2
BH CV ECHO MEAS - SI(MOD-SP4): 25.1 ML/M^2
BH CV ECHO MEAS - SI(TEICH): 23.6 ML/M^2
BH CV ECHO MEAS - SV(CUBED): 54.4 ML
BH CV ECHO MEAS - SV(MOD-SP2): 49 ML
BH CV ECHO MEAS - SV(MOD-SP4): 55 ML
BH CV ECHO MEAS - SV(TEICH): 51.6 ML
BH CV VAS BP RIGHT ARM: NORMAL MMHG
INR PPP: 2.5 (ref 0.9–1.1)
PROTHROMBIN TIME: 26.2 SECONDS (ref 11.7–14.2)

## 2017-05-31 PROCEDURE — 25010000002 PERFLUTREN (DEFINITY) 8.476 MG IN SODIUM CHLORIDE 10 ML INJECTION: Performed by: INTERNAL MEDICINE

## 2017-05-31 PROCEDURE — 25010000002 AMIODARONE IN DEXTROSE 5% 360-4.14 MG/200ML-% SOLUTION: Performed by: INTERNAL MEDICINE

## 2017-05-31 PROCEDURE — 85610 PROTHROMBIN TIME: CPT | Performed by: INTERNAL MEDICINE

## 2017-05-31 PROCEDURE — 99222 1ST HOSP IP/OBS MODERATE 55: CPT | Performed by: INTERNAL MEDICINE

## 2017-05-31 PROCEDURE — 93308 TTE F-UP OR LMTD: CPT | Performed by: INTERNAL MEDICINE

## 2017-05-31 PROCEDURE — 25010000002 AMIODARONE IN DEXTROSE 5% 150-4.21 MG/100ML-% SOLUTION: Performed by: INTERNAL MEDICINE

## 2017-05-31 PROCEDURE — C8924 2D TTE W OR W/O FOL W/CON,FU: HCPCS

## 2017-05-31 PROCEDURE — 25010000002 ENOXAPARIN PER 10 MG: Performed by: INTERNAL MEDICINE

## 2017-05-31 RX ORDER — WARFARIN SODIUM 7.5 MG/1
7.5 TABLET ORAL DAILY
Status: DISCONTINUED | OUTPATIENT
Start: 2017-05-31 | End: 2017-06-02 | Stop reason: HOSPADM

## 2017-05-31 RX ADMIN — LEVOTHYROXINE SODIUM 100 MCG: 100 TABLET ORAL at 06:14

## 2017-05-31 RX ADMIN — NEBIVOLOL HYDROCHLORIDE 10 MG: 10 TABLET ORAL at 09:02

## 2017-05-31 RX ADMIN — ISOSORBIDE MONONITRATE 30 MG: 30 TABLET ORAL at 09:02

## 2017-05-31 RX ADMIN — AMLODIPINE BESYLATE 5 MG: 5 TABLET ORAL at 09:02

## 2017-05-31 RX ADMIN — WARFARIN SODIUM 7.5 MG: 7.5 TABLET ORAL at 18:09

## 2017-05-31 RX ADMIN — PERFLUTREN 2 ML: 6.52 INJECTION, SUSPENSION INTRAVENOUS at 14:07

## 2017-05-31 RX ADMIN — FINASTERIDE 5 MG: 5 TABLET, FILM COATED ORAL at 09:01

## 2017-05-31 RX ADMIN — ENOXAPARIN SODIUM 40 MG: 40 INJECTION SUBCUTANEOUS at 09:01

## 2017-05-31 RX ADMIN — LOSARTAN POTASSIUM 100 MG: 50 TABLET, FILM COATED ORAL at 09:02

## 2017-05-31 RX ADMIN — PANTOPRAZOLE SODIUM 40 MG: 40 TABLET, DELAYED RELEASE ORAL at 06:14

## 2017-05-31 RX ADMIN — AMIODARONE HYDROCHLORIDE 0.5 MG/MIN: 1.8 INJECTION, SOLUTION INTRAVENOUS at 16:25

## 2017-05-31 RX ADMIN — AMIODARONE HYDROCHLORIDE 150 MG: 1.5 INJECTION, SOLUTION INTRAVENOUS at 10:05

## 2017-05-31 RX ADMIN — HYDROCHLOROTHIAZIDE 25 MG: 25 TABLET ORAL at 09:02

## 2017-05-31 RX ADMIN — AMIODARONE HYDROCHLORIDE 1 MG/MIN: 1.8 INJECTION, SOLUTION INTRAVENOUS at 10:20

## 2017-06-01 ENCOUNTER — HOSPITAL ENCOUNTER (INPATIENT)
Dept: POSTOP/PACU | Facility: HOSPITAL | Age: 77
Discharge: HOME | End: 2017-06-01
Attending: INTERNAL MEDICINE

## 2017-06-01 ENCOUNTER — ANESTHESIA (OUTPATIENT)
Dept: POSTOP/PACU | Facility: HOSPITAL | Age: 77
End: 2017-06-01

## 2017-06-01 ENCOUNTER — ANESTHESIA EVENT (OUTPATIENT)
Dept: POSTOP/PACU | Facility: HOSPITAL | Age: 77
End: 2017-06-01

## 2017-06-01 ENCOUNTER — APPOINTMENT (OUTPATIENT)
Dept: POSTOP/PACU | Facility: HOSPITAL | Age: 77
End: 2017-06-01
Attending: INTERNAL MEDICINE

## 2017-06-01 LAB
INR PPP: 2.24 (ref 0.9–1.1)
POTASSIUM BLD-SCNC: 3.7 MMOL/L (ref 3.5–5.2)
PROTHROMBIN TIME: 24 SECONDS (ref 11.7–14.2)

## 2017-06-01 PROCEDURE — 93010 ELECTROCARDIOGRAM REPORT: CPT | Performed by: INTERNAL MEDICINE

## 2017-06-01 PROCEDURE — 92960 CARDIOVERSION ELECTRIC EXT: CPT

## 2017-06-01 PROCEDURE — 25010000002 PROPOFOL 10 MG/ML EMULSION: Performed by: ANESTHESIOLOGY

## 2017-06-01 PROCEDURE — 85610 PROTHROMBIN TIME: CPT | Performed by: INTERNAL MEDICINE

## 2017-06-01 PROCEDURE — 25010000002 AMIODARONE IN DEXTROSE 5% 360-4.14 MG/200ML-% SOLUTION: Performed by: INTERNAL MEDICINE

## 2017-06-01 PROCEDURE — 93005 ELECTROCARDIOGRAM TRACING: CPT | Performed by: INTERNAL MEDICINE

## 2017-06-01 PROCEDURE — 92960 CARDIOVERSION ELECTRIC EXT: CPT | Performed by: INTERNAL MEDICINE

## 2017-06-01 PROCEDURE — 84132 ASSAY OF SERUM POTASSIUM: CPT | Performed by: INTERNAL MEDICINE

## 2017-06-01 RX ORDER — SODIUM CHLORIDE, SODIUM LACTATE, POTASSIUM CHLORIDE, CALCIUM CHLORIDE 600; 310; 30; 20 MG/100ML; MG/100ML; MG/100ML; MG/100ML
9 INJECTION, SOLUTION INTRAVENOUS CONTINUOUS
Status: CANCELLED | OUTPATIENT
Start: 2017-06-01

## 2017-06-01 RX ORDER — AMIODARONE HYDROCHLORIDE 200 MG/1
200 TABLET ORAL 2 TIMES DAILY
Status: DISCONTINUED | OUTPATIENT
Start: 2017-06-01 | End: 2017-06-02 | Stop reason: HOSPADM

## 2017-06-01 RX ORDER — PROPOFOL 10 MG/ML
VIAL (ML) INTRAVENOUS AS NEEDED
Status: DISCONTINUED | OUTPATIENT
Start: 2017-06-01 | End: 2017-06-01 | Stop reason: SURG

## 2017-06-01 RX ORDER — SODIUM CHLORIDE 0.9 % (FLUSH) 0.9 %
1-10 SYRINGE (ML) INJECTION AS NEEDED
Status: CANCELLED | OUTPATIENT
Start: 2017-06-01

## 2017-06-01 RX ORDER — FAMOTIDINE 10 MG/ML
20 INJECTION, SOLUTION INTRAVENOUS ONCE
Status: CANCELLED | OUTPATIENT
Start: 2017-06-01 | End: 2017-06-01

## 2017-06-01 RX ORDER — SODIUM CHLORIDE, SODIUM LACTATE, POTASSIUM CHLORIDE, CALCIUM CHLORIDE 600; 310; 30; 20 MG/100ML; MG/100ML; MG/100ML; MG/100ML
INJECTION, SOLUTION INTRAVENOUS CONTINUOUS PRN
Status: DISCONTINUED | OUTPATIENT
Start: 2017-06-01 | End: 2017-06-01 | Stop reason: SURG

## 2017-06-01 RX ADMIN — AMIODARONE HYDROCHLORIDE 0.5 MG/MIN: 1.8 INJECTION, SOLUTION INTRAVENOUS at 09:49

## 2017-06-01 RX ADMIN — WARFARIN SODIUM 7.5 MG: 7.5 TABLET ORAL at 17:32

## 2017-06-01 RX ADMIN — LOSARTAN POTASSIUM 100 MG: 50 TABLET, FILM COATED ORAL at 11:17

## 2017-06-01 RX ADMIN — AMIODARONE HYDROCHLORIDE 200 MG: 200 TABLET ORAL at 15:01

## 2017-06-01 RX ADMIN — PROPOFOL 150 MG: 10 INJECTION, EMULSION INTRAVENOUS at 09:25

## 2017-06-01 RX ADMIN — PANTOPRAZOLE SODIUM 40 MG: 40 TABLET, DELAYED RELEASE ORAL at 11:21

## 2017-06-01 RX ADMIN — ISOSORBIDE MONONITRATE 30 MG: 30 TABLET ORAL at 11:17

## 2017-06-01 RX ADMIN — AMLODIPINE BESYLATE 5 MG: 5 TABLET ORAL at 11:17

## 2017-06-01 RX ADMIN — FINASTERIDE 5 MG: 5 TABLET, FILM COATED ORAL at 11:18

## 2017-06-01 RX ADMIN — AMIODARONE HYDROCHLORIDE 200 MG: 200 TABLET ORAL at 21:43

## 2017-06-01 RX ADMIN — NEBIVOLOL HYDROCHLORIDE 10 MG: 10 TABLET ORAL at 11:17

## 2017-06-01 RX ADMIN — LEVOTHYROXINE SODIUM 100 MCG: 100 TABLET ORAL at 11:18

## 2017-06-01 RX ADMIN — HYDROCHLOROTHIAZIDE 25 MG: 25 TABLET ORAL at 11:17

## 2017-06-01 RX ADMIN — SODIUM CHLORIDE, POTASSIUM CHLORIDE, SODIUM LACTATE AND CALCIUM CHLORIDE: 600; 310; 30; 20 INJECTION, SOLUTION INTRAVENOUS at 09:29

## 2017-06-01 NOTE — PLAN OF CARE
Problem: Patient Care Overview (Adult)  Goal: Plan of Care Review  Outcome: Ongoing (interventions implemented as appropriate)    06/01/17 4500   Coping/Psychosocial Response Interventions   Plan Of Care Reviewed With patient   Patient Care Overview   Progress improving   Outcome Evaluation   Outcome Summary/Follow up Plan VSS, still in SR 60's-70's.

## 2017-06-01 NOTE — ANESTHESIA POSTPROCEDURE EVALUATION
Patient: David KHAN Age Sr.    Procedure Summary     Date Anesthesia Start Anesthesia Stop Room / Location    06/01/17 0923 0939 [facility] PACU       Procedure Diagnosis Scheduled Providers Provider    [facility] (afib)  Rodger Parker MD          Anesthesia Type: MAC  Last vitals  BP      Temp      Pulse     Resp      SpO2        Post Anesthesia Care and Evaluation    Patient location during evaluation: PACU  Patient participation: complete - patient participated  Level of consciousness: awake and alert  Pain management: adequate  Airway patency: patent  Anesthetic complications: No anesthetic complications  PONV Status: none  Cardiovascular status: acceptable  Respiratory status: acceptable  Hydration status: acceptable

## 2017-06-01 NOTE — ANESTHESIA PREPROCEDURE EVALUATION
Anesthesia Evaluation     NPO Solid Status: > 8 hours  NPO Liquid Status: > 8 hours     Airway   Mallampati: II  TM distance: >3 FB  Neck ROM: full  no difficulty expected  Dental      Comment: All caps except front 4 lower teeth    Pulmonary - normal exam   (-) not a smoker  Cardiovascular     Rhythm: irregular    (+) hypertension, CAD, dysrhythmias Atrial Fib, PVD, hyperlipidemia  (-) cardiac stents, CABG      Neuro/Psych  (+) dizziness/light headedness,    GI/Hepatic/Renal/Endo    (+)  GERD, hypothyroidism,     Musculoskeletal     Abdominal    Substance History      OB/GYN          Other   (+) arthritis                                     Anesthesia Plan    ASA 3     MAC     intravenous induction   Anesthetic plan and risks discussed with patient.

## 2017-06-01 NOTE — PLAN OF CARE
Problem: Patient Care Overview (Adult)  Goal: Plan of Care Review  Outcome: Ongoing (interventions implemented as appropriate)    06/01/17 0135   Coping/Psychosocial Response Interventions   Plan Of Care Reviewed With patient   Outcome Evaluation   Outcome Summary/Follow up Plan remains afib. vss. amiodarone gtt as ordered. continue to monitor       Goal: Adult Individualization and Mutuality  Outcome: Ongoing (interventions implemented as appropriate)    06/01/17 0135   Individualization   Patient Specific Interventions pt states md mentioned possible cardioversion in am. pt npo after midnight       Goal: Discharge Needs Assessment  Outcome: Ongoing (interventions implemented as appropriate)    Problem: Pain, Acute (Adult)  Goal: Acceptable Pain Control/Comfort Level  Outcome: Ongoing (interventions implemented as appropriate)    Problem: Cardiac Output, Decreased (Adult)  Goal: Adequate Cardiac Output/Effective Tissue Perfusion  Outcome: Ongoing (interventions implemented as appropriate)    Problem: Fall Risk (Adult)  Goal: Absence of Falls  Outcome: Ongoing (interventions implemented as appropriate)

## 2017-06-02 VITALS
BODY MASS INDEX: 30 KG/M2 | TEMPERATURE: 98 F | WEIGHT: 214.3 LBS | HEIGHT: 71 IN | DIASTOLIC BLOOD PRESSURE: 92 MMHG | SYSTOLIC BLOOD PRESSURE: 116 MMHG | OXYGEN SATURATION: 94 % | HEART RATE: 65 BPM | RESPIRATION RATE: 17 BRPM

## 2017-06-02 LAB
INR PPP: 2.07 (ref 0.9–1.1)
PROTHROMBIN TIME: 22.6 SECONDS (ref 11.7–14.2)

## 2017-06-02 PROCEDURE — 99238 HOSP IP/OBS DSCHRG MGMT 30/<: CPT | Performed by: INTERNAL MEDICINE

## 2017-06-02 PROCEDURE — 85610 PROTHROMBIN TIME: CPT | Performed by: INTERNAL MEDICINE

## 2017-06-02 RX ORDER — AMIODARONE HYDROCHLORIDE 200 MG/1
200 TABLET ORAL DAILY
Qty: 30 TABLET | Refills: 2 | Status: ON HOLD | OUTPATIENT
Start: 2017-06-09 | End: 2017-08-30

## 2017-06-02 RX ORDER — AMIODARONE HYDROCHLORIDE 200 MG/1
200 TABLET ORAL 2 TIMES DAILY
Qty: 12 TABLET | Refills: 0 | Status: SHIPPED | OUTPATIENT
Start: 2017-06-02 | End: 2021-11-15 | Stop reason: SDUPTHER

## 2017-06-02 RX ORDER — WARFARIN SODIUM 7.5 MG/1
7.5 TABLET ORAL DAILY
Qty: 90 TABLET | Refills: 1 | Status: ON HOLD | OUTPATIENT
Start: 2017-06-02 | End: 2017-08-30

## 2017-06-02 RX ADMIN — NEBIVOLOL HYDROCHLORIDE 10 MG: 10 TABLET ORAL at 08:41

## 2017-06-02 RX ADMIN — LOSARTAN POTASSIUM 100 MG: 50 TABLET, FILM COATED ORAL at 08:41

## 2017-06-02 RX ADMIN — PANTOPRAZOLE SODIUM 40 MG: 40 TABLET, DELAYED RELEASE ORAL at 08:24

## 2017-06-02 RX ADMIN — FINASTERIDE 5 MG: 5 TABLET, FILM COATED ORAL at 08:41

## 2017-06-02 RX ADMIN — ISOSORBIDE MONONITRATE 30 MG: 30 TABLET ORAL at 08:41

## 2017-06-02 RX ADMIN — LEVOTHYROXINE SODIUM 100 MCG: 100 TABLET ORAL at 08:24

## 2017-06-02 RX ADMIN — AMLODIPINE BESYLATE 5 MG: 5 TABLET ORAL at 08:41

## 2017-06-02 RX ADMIN — AMIODARONE HYDROCHLORIDE 200 MG: 200 TABLET ORAL at 08:41

## 2017-06-02 RX ADMIN — HYDROCHLOROTHIAZIDE 25 MG: 25 TABLET ORAL at 08:41

## 2017-06-02 NOTE — DISCHARGE SUMMARY
[facility]  Physician Discharge Summary    Patient Identification:  Name: David Comer Sr.  Age: 77 y.o.  Sex: male  :  1940  MRN: [id number]    Admit date: 2017    Discharge date and time:  2017    Admitting Physician: Harlan Downing MD     Discharge Physician:  Dr Downing    Discharge Diagnoses:   - Paroxysmal A. fib with RVR  - s/p successful cardioversion, initiation of Amiodarone  - HTN  - Dyslipidemia -?myalgia from Lipitor        Discharged Condition: Stable    Hospital Course:   This patient was admitted for treatment of symptomatic paroxysmal atrial fibrillation with RVR.  He had already been adequately anticoagulated on Coumadin.  He had no complaints of chest pain.  Cardiac enzyme, electrolytes, LFTs and TFTs were within normal limits.  He was started on amiodarone and underwent successful cardioversion.  He tolerated the procedure without complications.    On postprocedure day #1, patient's awake, alert ambulating ad sanjiv. on room air without difficulty.  He denies any shortness of breath, dyspnea on exertion, weakness, palpitations, dizziness, lightheadedness, chest pain or pressure.  He is maintaining saturations of greater than 93% on room air.  Telemetry shows a regular sinus rhythm.  He has been seen and evaluated and cleared for discharge home.    I reviewed changes to his home medications-specifically the initiation of Amiodarone, decrease in his normal dose of Coumadin and need for follow-up lab with the patient.  He has been scheduled to have his INR checked on 17 with further adjustments to dose as needed to maintain target INR of 2.0-3.0 range.  I reviewed with him that while patient is taking Amiodarone, yearly eye exams, thyroid function studies, liver function studies and chest x-ray are recommended.  He has been advised to stay off of his Lipitor until he is seen in follow-up on  at which time his reports of myalgia will be reevaluated.   All questions were answered.  He voiced understanding and agreement with treatment plan            Consults:   IP CONSULT TO CARDIOLOGY    Discharge Exam:  General: Awake, alert sitting up at bedside.  Appears in no acute distress   Skin: Warm and dry, no diaphoresis noted   EYES: PERTL   HEENT: external ear and nose normal; oral mucosa moist   Neck:  no JVD   Heart: S1S2 regular rate and rhythm;  no gallop appreciated   Pulmonary: Respirations regular, unlabored at rest, bilateral breath sounds have good air entry throughout all lung fields; no crackles or wheezes auscultated.     GI: Soft, non-tender, non-distended, positive bowel sounds   Extremities: Bilateral lower extremities have no pre-tibial pitting edema;   Neurological: Alert and oriented x 3; CN II-XII grossly intact. TORRES x4  no neuro deficits      Tele: SR 60s, no ectopy or pauses     LABS:      Results from last 7 days  Lab Units 06/01/17  0424 05/30/17  1638   SODIUM mmol/L  --  137   POTASSIUM mmol/L 3.7 3.8   BUN mg/dL  --  30*   CREATININE mg/dL  --  1.36*   CALCIUM mg/dL  --  9.6       Results from last 7 days  Lab Units 06/02/17  0303 06/01/17  0424 05/31/17  1026   INR  2.07* 2.24* 2.50*         Disposition:  home    Discharge Medications:    David Comer Sr.   Home Medication Instructions GARTH:[id number]    Printed on:06/02/17 4800   Medication Information                      amiodarone (PACERONE) 200 MG tablet  Take 1 tablet by mouth 2 (Two) Times a Day for 12 doses.             amiodarone (PACERONE) 200 MG tablet  Take 1 tablet by mouth Daily Starting 6-9-2017             amLODIPine (NORVASC) 5 MG tablet  Take 1 tablet by mouth daily.             BYSTOLIC 10 MG tablet  10 mg Daily.             doxazosin (CARDURA) 2 MG tablet  Take  by mouth daily.             esomeprazole (NEXIUM) 40 MG capsule  Take  by mouth.             finasteride (PROSCAR) 5 MG tablet  Take  by mouth daily.             isosorbide mononitrate (IMDUR) 30 MG 24 hr  "tablet  Take  by mouth daily.             levothyroxine (SYNTHROID) 112 MCG tablet  Take  by mouth daily.             nitroglycerin (NITROSTAT) 0.4 MG SL tablet  Place  under the tongue.             olmesartan-hydrochlorothiazide (BENICAR HCT) 40-25 MG per tablet  Take 1 tablet by mouth Daily.             warfarin (COUMADIN) 7.5 MG tablet  Take 1 tablet by mouth Daily.                   Discharge Home Instructions:  1. Return to ER for any symptoms including dizziness, lightheadedness, palpitations, near syncope or syncope  2.  INR to be checked on 6-6-2017.  Dose to maintain target INR 2.0-3.0 range  3.  Follow-up with PCP 7-10 days for ongoing medical care including management of hypothyroidism. While patient is taking Amiodarone,   yearly eye exams, thyroid function studies, liver function studies and chest x-ray are recommended.  4.  Follow up with Dr Downing at the [facility] office on June 26 2:30 PM at which time we'll discuss resuming statin (reported myalgia on Lipitor)                I reviewed the patient's new clinical results, discharge treatments, medications and follow up with Dr Downing. I personally viewed and interpreted the patient's EKG/Telemetry data  Signed:  Harlan Downing MD  6/2/2017  11:33 AM      EMR Dragon/Transcription:   \"Dictated utilizing Dragon dictation\".     "

## 2017-06-02 NOTE — PLAN OF CARE
Problem: Patient Care Overview (Adult)  Goal: Plan of Care Review  Outcome: Ongoing (interventions implemented as appropriate)    06/02/17 0324   Outcome Evaluation   Outcome Summary/Follow up Plan remains SR, sleeping well. hopeful for dc in am       Goal: Adult Individualization and Mutuality  Outcome: Ongoing (interventions implemented as appropriate)  Goal: Discharge Needs Assessment  Outcome: Ongoing (interventions implemented as appropriate)    Problem: Pain, Acute (Adult)  Goal: Acceptable Pain Control/Comfort Level  Outcome: Ongoing (interventions implemented as appropriate)    Problem: Cardiac Output, Decreased (Adult)  Goal: Adequate Cardiac Output/Effective Tissue Perfusion  Outcome: Ongoing (interventions implemented as appropriate)

## 2017-06-29 RX ORDER — ISOSORBIDE MONONITRATE 30 MG/1
TABLET, EXTENDED RELEASE ORAL
Qty: 90 TABLET | Refills: 0 | Status: ON HOLD | OUTPATIENT
Start: 2017-06-29 | End: 2017-08-28

## 2017-08-28 ENCOUNTER — APPOINTMENT (OUTPATIENT)
Dept: GENERAL RADIOLOGY | Facility: HOSPITAL | Age: 77
End: 2017-08-28

## 2017-08-28 ENCOUNTER — APPOINTMENT (OUTPATIENT)
Dept: CARDIOLOGY | Facility: HOSPITAL | Age: 77
End: 2017-08-28

## 2017-08-28 ENCOUNTER — HOSPITAL ENCOUNTER (INPATIENT)
Facility: HOSPITAL | Age: 77
LOS: 2 days | Discharge: HOME | End: 2017-08-30
Attending: EMERGENCY MEDICINE | Admitting: INTERNAL MEDICINE

## 2017-08-28 DIAGNOSIS — I95.1 ORTHOSTATIC HYPOTENSION: ICD-10-CM

## 2017-08-28 DIAGNOSIS — R53.1 GENERALIZED WEAKNESS: ICD-10-CM

## 2017-08-28 DIAGNOSIS — J18.9 PNEUMONIA OF RIGHT UPPER LOBE DUE TO INFECTIOUS ORGANISM: ICD-10-CM

## 2017-08-28 DIAGNOSIS — I48.0 PAROXYSMAL ATRIAL FIBRILLATION (HCC): ICD-10-CM

## 2017-08-28 DIAGNOSIS — R55 SYNCOPE AND COLLAPSE: Primary | ICD-10-CM

## 2017-08-28 PROBLEM — R07.9 CHEST PAIN: Status: ACTIVE | Noted: 2017-08-28

## 2017-08-28 LAB
ALBUMIN SERPL-MCNC: 4.4 G/DL (ref 3.5–5.2)
ALBUMIN/GLOB SERPL: 1.4 G/DL
ALP SERPL-CCNC: 56 U/L (ref 39–117)
ALT SERPL W P-5'-P-CCNC: 23 U/L (ref 1–41)
ANION GAP SERPL CALCULATED.3IONS-SCNC: 16.2 MMOL/L
AST SERPL-CCNC: 20 U/L (ref 1–40)
B PERT DNA SPEC QL NAA+PROBE: NOT DETECTED
BASOPHILS # BLD AUTO: 0.01 10*3/MM3 (ref 0–0.2)
BASOPHILS NFR BLD AUTO: 0.1 % (ref 0–1.5)
BILIRUB SERPL-MCNC: 0.6 MG/DL (ref 0.1–1.2)
BILIRUB UR QL STRIP: NEGATIVE
BUN BLD-MCNC: 24 MG/DL (ref 8–23)
BUN/CREAT SERPL: 23.1 (ref 7–25)
C PNEUM DNA NPH QL NAA+NON-PROBE: NOT DETECTED
CALCIUM SPEC-SCNC: 9.2 MG/DL (ref 8.6–10.5)
CHLORIDE SERPL-SCNC: 101 MMOL/L (ref 98–107)
CK SERPL-CCNC: 117 U/L (ref 20–200)
CLARITY UR: CLEAR
CO2 SERPL-SCNC: 25.8 MMOL/L (ref 22–29)
COLOR UR: YELLOW
CREAT BLD-MCNC: 1.04 MG/DL (ref 0.76–1.27)
D-LACTATE SERPL-SCNC: 1.6 MMOL/L (ref 0.5–2)
DEPRECATED RDW RBC AUTO: 44.6 FL (ref 37–54)
EOSINOPHIL # BLD AUTO: 0.06 10*3/MM3 (ref 0–0.7)
EOSINOPHIL NFR BLD AUTO: 0.5 % (ref 0.3–6.2)
ERYTHROCYTE [DISTWIDTH] IN BLOOD BY AUTOMATED COUNT: 12.6 % (ref 11.5–14.5)
FLUAV H1 2009 PAND RNA NPH QL NAA+PROBE: NOT DETECTED
FLUAV H1 HA GENE NPH QL NAA+PROBE: NOT DETECTED
FLUAV H3 RNA NPH QL NAA+PROBE: NOT DETECTED
FLUAV SUBTYP SPEC NAA+PROBE: NOT DETECTED
FLUBV RNA ISLT QL NAA+PROBE: NOT DETECTED
GFR SERPL CREATININE-BSD FRML MDRD: 69 ML/MIN/1.73
GLOBULIN UR ELPH-MCNC: 3.1 GM/DL
GLUCOSE BLD-MCNC: 128 MG/DL (ref 65–99)
GLUCOSE UR STRIP-MCNC: NEGATIVE MG/DL
HADV DNA SPEC NAA+PROBE: NOT DETECTED
HCOV 229E RNA SPEC QL NAA+PROBE: NOT DETECTED
HCOV HKU1 RNA SPEC QL NAA+PROBE: NOT DETECTED
HCOV NL63 RNA SPEC QL NAA+PROBE: NOT DETECTED
HCOV OC43 RNA SPEC QL NAA+PROBE: NOT DETECTED
HCT VFR BLD AUTO: 45.7 % (ref 40.4–52.2)
HGB BLD-MCNC: 15.4 G/DL (ref 13.7–17.6)
HGB UR QL STRIP.AUTO: NEGATIVE
HMPV RNA NPH QL NAA+NON-PROBE: NOT DETECTED
HOLD SPECIMEN: NORMAL
HOLD SPECIMEN: NORMAL
HPIV1 RNA SPEC QL NAA+PROBE: NOT DETECTED
HPIV2 RNA SPEC QL NAA+PROBE: NOT DETECTED
HPIV3 RNA NPH QL NAA+PROBE: NOT DETECTED
HPIV4 P GENE NPH QL NAA+PROBE: NOT DETECTED
IMM GRANULOCYTES # BLD: 0.03 10*3/MM3 (ref 0–0.03)
IMM GRANULOCYTES NFR BLD: 0.2 % (ref 0–0.5)
INR PPP: 2.12 (ref 0.9–1.1)
KETONES UR QL STRIP: ABNORMAL
LEUKOCYTE ESTERASE UR QL STRIP.AUTO: NEGATIVE
LYMPHOCYTES # BLD AUTO: 0.57 10*3/MM3 (ref 0.9–4.8)
LYMPHOCYTES NFR BLD AUTO: 4.6 % (ref 19.6–45.3)
M PNEUMO IGG SER IA-ACNC: NOT DETECTED
MCH RBC QN AUTO: 32.5 PG (ref 27–32.7)
MCHC RBC AUTO-ENTMCNC: 33.7 G/DL (ref 32.6–36.4)
MCV RBC AUTO: 96.4 FL (ref 79.8–96.2)
MONOCYTES # BLD AUTO: 0.45 10*3/MM3 (ref 0.2–1.2)
MONOCYTES NFR BLD AUTO: 3.7 % (ref 5–12)
NEUTROPHILS # BLD AUTO: 11.17 10*3/MM3 (ref 1.9–8.1)
NEUTROPHILS NFR BLD AUTO: 90.9 % (ref 42.7–76)
NITRITE UR QL STRIP: NEGATIVE
PH UR STRIP.AUTO: 6 [PH] (ref 5–8)
PLATELET # BLD AUTO: 200 10*3/MM3 (ref 140–500)
PMV BLD AUTO: 12.2 FL (ref 6–12)
POTASSIUM BLD-SCNC: 3.6 MMOL/L (ref 3.5–5.2)
PROT SERPL-MCNC: 7.5 G/DL (ref 6–8.5)
PROT UR QL STRIP: NEGATIVE
PROTHROMBIN TIME: 23 SECONDS (ref 11.7–14.2)
RBC # BLD AUTO: 4.74 10*6/MM3 (ref 4.6–6)
RHINOVIRUS RNA SPEC NAA+PROBE: NOT DETECTED
RSV RNA NPH QL NAA+NON-PROBE: NOT DETECTED
SODIUM BLD-SCNC: 143 MMOL/L (ref 136–145)
SP GR UR STRIP: 1.02 (ref 1–1.03)
TROPONIN T SERPL-MCNC: <0.01 NG/ML (ref 0–0.03)
TROPONIN T SERPL-MCNC: <0.01 NG/ML (ref 0–0.03)
UROBILINOGEN UR QL STRIP: ABNORMAL
WBC NRBC COR # BLD: 12.29 10*3/MM3 (ref 4.5–10.7)
WHOLE BLOOD HOLD SPECIMEN: NORMAL
WHOLE BLOOD HOLD SPECIMEN: NORMAL

## 2017-08-28 PROCEDURE — 25010000002 CEFTRIAXONE PER 250 MG: Performed by: INTERNAL MEDICINE

## 2017-08-28 PROCEDURE — 87798 DETECT AGENT NOS DNA AMP: CPT | Performed by: INTERNAL MEDICINE

## 2017-08-28 PROCEDURE — 93225 XTRNL ECG REC<48 HRS REC: CPT

## 2017-08-28 PROCEDURE — 81003 URINALYSIS AUTO W/O SCOPE: CPT | Performed by: EMERGENCY MEDICINE

## 2017-08-28 PROCEDURE — 82550 ASSAY OF CK (CPK): CPT | Performed by: EMERGENCY MEDICINE

## 2017-08-28 PROCEDURE — 87581 M.PNEUMON DNA AMP PROBE: CPT | Performed by: INTERNAL MEDICINE

## 2017-08-28 PROCEDURE — 83605 ASSAY OF LACTIC ACID: CPT | Performed by: EMERGENCY MEDICINE

## 2017-08-28 PROCEDURE — 87633 RESP VIRUS 12-25 TARGETS: CPT | Performed by: INTERNAL MEDICINE

## 2017-08-28 PROCEDURE — 25010000003 CEFTRIAXONE PER 250 MG: Performed by: EMERGENCY MEDICINE

## 2017-08-28 PROCEDURE — 87040 BLOOD CULTURE FOR BACTERIA: CPT | Performed by: EMERGENCY MEDICINE

## 2017-08-28 PROCEDURE — 99285 EMERGENCY DEPT VISIT HI MDM: CPT

## 2017-08-28 PROCEDURE — 87486 CHLMYD PNEUM DNA AMP PROBE: CPT | Performed by: INTERNAL MEDICINE

## 2017-08-28 PROCEDURE — 87040 BLOOD CULTURE FOR BACTERIA: CPT | Performed by: INTERNAL MEDICINE

## 2017-08-28 PROCEDURE — 71010 HC CHEST PA OR AP: CPT

## 2017-08-28 PROCEDURE — 85025 COMPLETE CBC W/AUTO DIFF WBC: CPT | Performed by: EMERGENCY MEDICINE

## 2017-08-28 PROCEDURE — 25010000002 ONDANSETRON PER 1 MG

## 2017-08-28 PROCEDURE — 93005 ELECTROCARDIOGRAM TRACING: CPT

## 2017-08-28 PROCEDURE — 80053 COMPREHEN METABOLIC PANEL: CPT | Performed by: EMERGENCY MEDICINE

## 2017-08-28 PROCEDURE — 84484 ASSAY OF TROPONIN QUANT: CPT | Performed by: INTERNAL MEDICINE

## 2017-08-28 PROCEDURE — 93226 XTRNL ECG REC<48 HR SCAN A/R: CPT

## 2017-08-28 PROCEDURE — 85610 PROTHROMBIN TIME: CPT | Performed by: EMERGENCY MEDICINE

## 2017-08-28 PROCEDURE — 93010 ELECTROCARDIOGRAM REPORT: CPT | Performed by: INTERNAL MEDICINE

## 2017-08-28 PROCEDURE — 84484 ASSAY OF TROPONIN QUANT: CPT | Performed by: EMERGENCY MEDICINE

## 2017-08-28 RX ORDER — LEVOTHYROXINE SODIUM 112 UG/1
112 TABLET ORAL
Status: DISCONTINUED | OUTPATIENT
Start: 2017-08-29 | End: 2017-08-30 | Stop reason: HOSPADM

## 2017-08-28 RX ORDER — AMIODARONE HYDROCHLORIDE 200 MG/1
200 TABLET ORAL
Status: DISCONTINUED | OUTPATIENT
Start: 2017-08-28 | End: 2017-08-30 | Stop reason: HOSPADM

## 2017-08-28 RX ORDER — HYDROCHLOROTHIAZIDE 25 MG/1
25 TABLET ORAL DAILY
Status: DISCONTINUED | OUTPATIENT
Start: 2017-08-28 | End: 2017-08-28

## 2017-08-28 RX ORDER — SODIUM CHLORIDE 9 MG/ML
125 INJECTION, SOLUTION INTRAVENOUS CONTINUOUS
Status: DISCONTINUED | OUTPATIENT
Start: 2017-08-28 | End: 2017-08-29

## 2017-08-28 RX ORDER — ONDANSETRON 2 MG/ML
4 INJECTION INTRAMUSCULAR; INTRAVENOUS ONCE
Status: COMPLETED | OUTPATIENT
Start: 2017-08-28 | End: 2017-08-28

## 2017-08-28 RX ORDER — NEBIVOLOL 10 MG/1
5 TABLET ORAL DAILY
COMMUNITY
End: 2017-08-30 | Stop reason: HOSPADM

## 2017-08-28 RX ORDER — OLMESARTAN MEDOXOMIL AND HYDROCHLOROTHIAZIDE 40/25 40; 25 MG/1; MG/1
1 TABLET ORAL DAILY
Status: DISCONTINUED | OUTPATIENT
Start: 2017-08-28 | End: 2017-08-28 | Stop reason: CLARIF

## 2017-08-28 RX ORDER — HYDROCODONE BITARTRATE AND ACETAMINOPHEN 5; 325 MG/1; MG/1
1 TABLET ORAL EVERY 4 HOURS PRN
Status: DISCONTINUED | OUTPATIENT
Start: 2017-08-28 | End: 2017-08-30 | Stop reason: HOSPADM

## 2017-08-28 RX ORDER — LOSARTAN POTASSIUM 50 MG/1
100 TABLET ORAL
Status: DISCONTINUED | OUTPATIENT
Start: 2017-08-28 | End: 2017-08-28

## 2017-08-28 RX ORDER — SACCHAROMYCES BOULARDII 250 MG
250 CAPSULE ORAL 2 TIMES DAILY
Status: DISCONTINUED | OUTPATIENT
Start: 2017-08-28 | End: 2017-08-30 | Stop reason: HOSPADM

## 2017-08-28 RX ORDER — SODIUM CHLORIDE 0.9 % (FLUSH) 0.9 %
10 SYRINGE (ML) INJECTION AS NEEDED
Status: DISCONTINUED | OUTPATIENT
Start: 2017-08-28 | End: 2017-08-30 | Stop reason: HOSPADM

## 2017-08-28 RX ORDER — WARFARIN SODIUM 5 MG/1
5 TABLET ORAL
Status: DISCONTINUED | OUTPATIENT
Start: 2017-08-29 | End: 2017-08-30 | Stop reason: HOSPADM

## 2017-08-28 RX ORDER — CEFTRIAXONE SODIUM 1 G/50ML
1 INJECTION, SOLUTION INTRAVENOUS EVERY 24 HOURS
Status: DISCONTINUED | OUTPATIENT
Start: 2017-08-28 | End: 2017-08-30

## 2017-08-28 RX ORDER — SODIUM CHLORIDE 0.9 % (FLUSH) 0.9 %
1-10 SYRINGE (ML) INJECTION AS NEEDED
Status: DISCONTINUED | OUTPATIENT
Start: 2017-08-28 | End: 2017-08-30 | Stop reason: HOSPADM

## 2017-08-28 RX ORDER — ASPIRIN 81 MG/1
81 TABLET ORAL DAILY
Status: DISCONTINUED | OUTPATIENT
Start: 2017-08-28 | End: 2017-08-30 | Stop reason: HOSPADM

## 2017-08-28 RX ORDER — FINASTERIDE 5 MG/1
5 TABLET, FILM COATED ORAL DAILY
Status: DISCONTINUED | OUTPATIENT
Start: 2017-08-28 | End: 2017-08-30 | Stop reason: HOSPADM

## 2017-08-28 RX ORDER — ONDANSETRON 2 MG/ML
4 INJECTION INTRAMUSCULAR; INTRAVENOUS EVERY 6 HOURS PRN
Status: DISCONTINUED | OUTPATIENT
Start: 2017-08-28 | End: 2017-08-30 | Stop reason: HOSPADM

## 2017-08-28 RX ORDER — NEBIVOLOL 10 MG/1
10 TABLET ORAL
Status: DISCONTINUED | OUTPATIENT
Start: 2017-08-28 | End: 2017-08-28

## 2017-08-28 RX ORDER — ONDANSETRON 4 MG/1
4 TABLET, ORALLY DISINTEGRATING ORAL EVERY 6 HOURS PRN
Status: DISCONTINUED | OUTPATIENT
Start: 2017-08-28 | End: 2017-08-30 | Stop reason: HOSPADM

## 2017-08-28 RX ORDER — ASPIRIN 325 MG
325 TABLET ORAL ONCE
Status: DISCONTINUED | OUTPATIENT
Start: 2017-08-28 | End: 2017-08-28

## 2017-08-28 RX ORDER — CEFTRIAXONE SODIUM 2 G/50ML
2 INJECTION, SOLUTION INTRAVENOUS ONCE
Status: COMPLETED | OUTPATIENT
Start: 2017-08-28 | End: 2017-08-28

## 2017-08-28 RX ORDER — WARFARIN SODIUM 7.5 MG/1
7.5 TABLET ORAL
Status: DISCONTINUED | OUTPATIENT
Start: 2017-08-28 | End: 2017-08-28

## 2017-08-28 RX ORDER — ONDANSETRON 2 MG/ML
INJECTION INTRAMUSCULAR; INTRAVENOUS
Status: COMPLETED
Start: 2017-08-28 | End: 2017-08-28

## 2017-08-28 RX ORDER — NEBIVOLOL 5 MG/1
5 TABLET ORAL
Status: DISCONTINUED | OUTPATIENT
Start: 2017-08-29 | End: 2017-08-28

## 2017-08-28 RX ORDER — TERAZOSIN 2 MG/1
2 CAPSULE ORAL NIGHTLY
Status: DISCONTINUED | OUTPATIENT
Start: 2017-08-28 | End: 2017-08-30 | Stop reason: HOSPADM

## 2017-08-28 RX ORDER — WARFARIN SODIUM 7.5 MG/1
11.25 TABLET ORAL
Status: DISCONTINUED | OUTPATIENT
Start: 2017-08-31 | End: 2017-08-28

## 2017-08-28 RX ORDER — ACETAMINOPHEN 325 MG/1
650 TABLET ORAL EVERY 4 HOURS PRN
Status: DISCONTINUED | OUTPATIENT
Start: 2017-08-28 | End: 2017-08-30 | Stop reason: HOSPADM

## 2017-08-28 RX ORDER — ISOSORBIDE MONONITRATE 30 MG/1
30 TABLET, EXTENDED RELEASE ORAL
Status: DISCONTINUED | OUTPATIENT
Start: 2017-08-28 | End: 2017-08-30 | Stop reason: HOSPADM

## 2017-08-28 RX ORDER — ONDANSETRON 4 MG/1
4 TABLET, FILM COATED ORAL EVERY 6 HOURS PRN
Status: DISCONTINUED | OUTPATIENT
Start: 2017-08-28 | End: 2017-08-30 | Stop reason: HOSPADM

## 2017-08-28 RX ORDER — PANTOPRAZOLE SODIUM 40 MG/1
40 TABLET, DELAYED RELEASE ORAL EVERY MORNING
Status: DISCONTINUED | OUTPATIENT
Start: 2017-08-29 | End: 2017-08-30 | Stop reason: HOSPADM

## 2017-08-28 RX ORDER — NITROGLYCERIN 0.4 MG/1
0.4 TABLET SUBLINGUAL
Status: DISCONTINUED | OUTPATIENT
Start: 2017-08-28 | End: 2017-08-30 | Stop reason: HOSPADM

## 2017-08-28 RX ADMIN — ASPIRIN 81 MG: 81 TABLET ORAL at 16:17

## 2017-08-28 RX ADMIN — ONDANSETRON 4 MG: 2 INJECTION INTRAMUSCULAR; INTRAVENOUS at 07:05

## 2017-08-28 RX ADMIN — TERAZOSIN HYDROCHLORIDE 2 MG: 2 CAPSULE ORAL at 20:45

## 2017-08-28 RX ADMIN — CEFTRIAXONE SODIUM 2 G: 2 INJECTION, SOLUTION INTRAVENOUS at 12:13

## 2017-08-28 RX ADMIN — Medication 250 MG: at 17:21

## 2017-08-28 RX ADMIN — SODIUM CHLORIDE 125 ML/HR: 9 INJECTION, SOLUTION INTRAVENOUS at 16:00

## 2017-08-28 RX ADMIN — SODIUM CHLORIDE 1000 ML: 9 INJECTION, SOLUTION INTRAVENOUS at 08:06

## 2017-08-28 RX ADMIN — ISOSORBIDE MONONITRATE 30 MG: 30 TABLET ORAL at 16:16

## 2017-08-28 RX ADMIN — SODIUM CHLORIDE 125 ML/HR: 9 INJECTION, SOLUTION INTRAVENOUS at 08:06

## 2017-08-28 RX ADMIN — AMIODARONE HYDROCHLORIDE 200 MG: 200 TABLET ORAL at 21:49

## 2017-08-28 RX ADMIN — FINASTERIDE 5 MG: 5 TABLET, FILM COATED ORAL at 17:21

## 2017-08-28 RX ADMIN — WARFARIN SODIUM 7.5 MG: 7.5 TABLET ORAL at 17:21

## 2017-08-28 RX ADMIN — DOXYCYCLINE 100 MG: 100 INJECTION, POWDER, LYOPHILIZED, FOR SOLUTION INTRAVENOUS at 12:46

## 2017-08-28 RX ADMIN — NEBIVOLOL HYDROCHLORIDE 10 MG: 10 TABLET ORAL at 16:17

## 2017-08-29 LAB
ANION GAP SERPL CALCULATED.3IONS-SCNC: 9.7 MMOL/L
BASOPHILS # BLD AUTO: 0.02 10*3/MM3 (ref 0–0.2)
BASOPHILS NFR BLD AUTO: 0.3 % (ref 0–1.5)
BUN BLD-MCNC: 17 MG/DL (ref 8–23)
BUN/CREAT SERPL: 20.7 (ref 7–25)
C DIFF TOX GENS STL QL NAA+PROBE: NEGATIVE
CALCIUM SPEC-SCNC: 7.6 MG/DL (ref 8.6–10.5)
CHLORIDE SERPL-SCNC: 109 MMOL/L (ref 98–107)
CO2 SERPL-SCNC: 23.3 MMOL/L (ref 22–29)
CREAT BLD-MCNC: 0.82 MG/DL (ref 0.76–1.27)
DEPRECATED RDW RBC AUTO: 46.4 FL (ref 37–54)
EOSINOPHIL # BLD AUTO: 0.19 10*3/MM3 (ref 0–0.7)
EOSINOPHIL NFR BLD AUTO: 2.7 % (ref 0.3–6.2)
ERYTHROCYTE [DISTWIDTH] IN BLOOD BY AUTOMATED COUNT: 12.7 % (ref 11.5–14.5)
GFR SERPL CREATININE-BSD FRML MDRD: 91 ML/MIN/1.73
GLUCOSE BLD-MCNC: 90 MG/DL (ref 65–99)
HCT VFR BLD AUTO: 30.2 % (ref 40.4–52.2)
HGB BLD-MCNC: 10.1 G/DL (ref 13.7–17.6)
IMM GRANULOCYTES # BLD: 0 10*3/MM3 (ref 0–0.03)
IMM GRANULOCYTES NFR BLD: 0 % (ref 0–0.5)
INR PPP: 2.46 (ref 0.9–1.1)
LYMPHOCYTES # BLD AUTO: 1.02 10*3/MM3 (ref 0.9–4.8)
LYMPHOCYTES NFR BLD AUTO: 14.7 % (ref 19.6–45.3)
MAGNESIUM SERPL-MCNC: 1.5 MG/DL (ref 1.6–2.4)
MCH RBC QN AUTO: 33 PG (ref 27–32.7)
MCHC RBC AUTO-ENTMCNC: 33.4 G/DL (ref 32.6–36.4)
MCV RBC AUTO: 98.7 FL (ref 79.8–96.2)
MONOCYTES # BLD AUTO: 0.43 10*3/MM3 (ref 0.2–1.2)
MONOCYTES NFR BLD AUTO: 6.2 % (ref 5–12)
NEUTROPHILS # BLD AUTO: 5.26 10*3/MM3 (ref 1.9–8.1)
NEUTROPHILS NFR BLD AUTO: 76.1 % (ref 42.7–76)
NT-PROBNP SERPL-MCNC: 104.7 PG/ML (ref 0–1800)
PLATELET # BLD AUTO: 143 10*3/MM3 (ref 140–500)
PMV BLD AUTO: 11.9 FL (ref 6–12)
POTASSIUM BLD-SCNC: 3.8 MMOL/L (ref 3.5–5.2)
PROCALCITONIN SERPL-MCNC: 0.17 NG/ML (ref 0.1–0.25)
PROTHROMBIN TIME: 25.9 SECONDS (ref 11.7–14.2)
RBC # BLD AUTO: 3.06 10*6/MM3 (ref 4.6–6)
SODIUM BLD-SCNC: 142 MMOL/L (ref 136–145)
TROPONIN T SERPL-MCNC: <0.01 NG/ML (ref 0–0.03)
TROPONIN T SERPL-MCNC: <0.01 NG/ML (ref 0–0.03)
WBC NRBC COR # BLD: 6.92 10*3/MM3 (ref 4.5–10.7)

## 2017-08-29 PROCEDURE — 85025 COMPLETE CBC W/AUTO DIFF WBC: CPT | Performed by: INTERNAL MEDICINE

## 2017-08-29 PROCEDURE — 84484 ASSAY OF TROPONIN QUANT: CPT | Performed by: INTERNAL MEDICINE

## 2017-08-29 PROCEDURE — 87493 C DIFF AMPLIFIED PROBE: CPT | Performed by: INTERNAL MEDICINE

## 2017-08-29 PROCEDURE — 84145 PROCALCITONIN (PCT): CPT | Performed by: INTERNAL MEDICINE

## 2017-08-29 PROCEDURE — 80048 BASIC METABOLIC PNL TOTAL CA: CPT | Performed by: INTERNAL MEDICINE

## 2017-08-29 PROCEDURE — 99221 1ST HOSP IP/OBS SF/LOW 40: CPT | Performed by: INTERNAL MEDICINE

## 2017-08-29 PROCEDURE — 83735 ASSAY OF MAGNESIUM: CPT | Performed by: INTERNAL MEDICINE

## 2017-08-29 PROCEDURE — 25010000002 MAGNESIUM SULFATE 2 GM/50ML SOLUTION: Performed by: INTERNAL MEDICINE

## 2017-08-29 PROCEDURE — 25010000002 CEFTRIAXONE PER 250 MG: Performed by: INTERNAL MEDICINE

## 2017-08-29 PROCEDURE — 97110 THERAPEUTIC EXERCISES: CPT

## 2017-08-29 PROCEDURE — 83880 ASSAY OF NATRIURETIC PEPTIDE: CPT | Performed by: INTERNAL MEDICINE

## 2017-08-29 PROCEDURE — 97161 PT EVAL LOW COMPLEX 20 MIN: CPT

## 2017-08-29 PROCEDURE — 85610 PROTHROMBIN TIME: CPT | Performed by: INTERNAL MEDICINE

## 2017-08-29 RX ORDER — MAGNESIUM SULFATE HEPTAHYDRATE 40 MG/ML
2 INJECTION, SOLUTION INTRAVENOUS AS NEEDED
Status: DISCONTINUED | OUTPATIENT
Start: 2017-08-29 | End: 2017-08-30 | Stop reason: HOSPADM

## 2017-08-29 RX ORDER — MAGNESIUM SULFATE HEPTAHYDRATE 40 MG/ML
4 INJECTION, SOLUTION INTRAVENOUS AS NEEDED
Status: DISCONTINUED | OUTPATIENT
Start: 2017-08-29 | End: 2017-08-30 | Stop reason: HOSPADM

## 2017-08-29 RX ADMIN — ISOSORBIDE MONONITRATE 30 MG: 30 TABLET ORAL at 11:54

## 2017-08-29 RX ADMIN — PANTOPRAZOLE SODIUM 40 MG: 40 TABLET, DELAYED RELEASE ORAL at 06:36

## 2017-08-29 RX ADMIN — TERAZOSIN HYDROCHLORIDE 2 MG: 2 CAPSULE ORAL at 21:19

## 2017-08-29 RX ADMIN — LEVOTHYROXINE SODIUM 112 MCG: 112 TABLET ORAL at 06:36

## 2017-08-29 RX ADMIN — FINASTERIDE 5 MG: 5 TABLET, FILM COATED ORAL at 11:55

## 2017-08-29 RX ADMIN — MAGNESIUM SULFATE HEPTAHYDRATE 2 G: 40 INJECTION, SOLUTION INTRAVENOUS at 21:20

## 2017-08-29 RX ADMIN — ASPIRIN 81 MG: 81 TABLET ORAL at 08:44

## 2017-08-29 RX ADMIN — Medication 250 MG: at 08:44

## 2017-08-29 RX ADMIN — MAGNESIUM SULFATE HEPTAHYDRATE 2 G: 40 INJECTION, SOLUTION INTRAVENOUS at 17:13

## 2017-08-29 RX ADMIN — Medication 250 MG: at 17:13

## 2017-08-29 RX ADMIN — DOXYCYCLINE 100 MG: 100 INJECTION, POWDER, LYOPHILIZED, FOR SOLUTION INTRAVENOUS at 15:42

## 2017-08-29 RX ADMIN — CEFTRIAXONE SODIUM 1 G: 1 INJECTION, SOLUTION INTRAVENOUS at 11:55

## 2017-08-29 RX ADMIN — MAGNESIUM SULFATE HEPTAHYDRATE 2 G: 40 INJECTION, SOLUTION INTRAVENOUS at 18:53

## 2017-08-29 RX ADMIN — AMIODARONE HYDROCHLORIDE 200 MG: 200 TABLET ORAL at 11:54

## 2017-08-29 RX ADMIN — DOXYCYCLINE 100 MG: 100 INJECTION, POWDER, LYOPHILIZED, FOR SOLUTION INTRAVENOUS at 03:54

## 2017-08-29 RX ADMIN — SODIUM CHLORIDE 125 ML/HR: 9 INJECTION, SOLUTION INTRAVENOUS at 03:54

## 2017-08-29 RX ADMIN — WARFARIN SODIUM 5 MG: 5 TABLET ORAL at 17:13

## 2017-08-29 RX ADMIN — SODIUM CHLORIDE 125 ML/HR: 9 INJECTION, SOLUTION INTRAVENOUS at 11:13

## 2017-08-30 VITALS
OXYGEN SATURATION: 94 % | WEIGHT: 207 LBS | DIASTOLIC BLOOD PRESSURE: 75 MMHG | HEIGHT: 71 IN | SYSTOLIC BLOOD PRESSURE: 123 MMHG | RESPIRATION RATE: 18 BRPM | HEART RATE: 64 BPM | TEMPERATURE: 97.1 F | BODY MASS INDEX: 28.98 KG/M2

## 2017-08-30 LAB
ANION GAP SERPL CALCULATED.3IONS-SCNC: 11.7 MMOL/L
BUN BLD-MCNC: 11 MG/DL (ref 8–23)
BUN/CREAT SERPL: 15.1 (ref 7–25)
CALCIUM SPEC-SCNC: 8.2 MG/DL (ref 8.6–10.5)
CHLORIDE SERPL-SCNC: 109 MMOL/L (ref 98–107)
CO2 SERPL-SCNC: 23.3 MMOL/L (ref 22–29)
CREAT BLD-MCNC: 0.73 MG/DL (ref 0.76–1.27)
DEPRECATED RDW RBC AUTO: 44.2 FL (ref 37–54)
ERYTHROCYTE [DISTWIDTH] IN BLOOD BY AUTOMATED COUNT: 12.5 % (ref 11.5–14.5)
GFR SERPL CREATININE-BSD FRML MDRD: 104 ML/MIN/1.73
GLUCOSE BLD-MCNC: 94 MG/DL (ref 65–99)
HCT VFR BLD AUTO: 31.9 % (ref 40.4–52.2)
HGB BLD-MCNC: 10.5 G/DL (ref 13.7–17.6)
INR PPP: 2.09 (ref 0.9–1.1)
MAGNESIUM SERPL-MCNC: 2.5 MG/DL (ref 1.6–2.4)
MCH RBC QN AUTO: 31.9 PG (ref 27–32.7)
MCHC RBC AUTO-ENTMCNC: 32.9 G/DL (ref 32.6–36.4)
MCV RBC AUTO: 97 FL (ref 79.8–96.2)
PLATELET # BLD AUTO: 158 10*3/MM3 (ref 140–500)
PMV BLD AUTO: 11.9 FL (ref 6–12)
POTASSIUM BLD-SCNC: 3.8 MMOL/L (ref 3.5–5.2)
PROTHROMBIN TIME: 22.7 SECONDS (ref 11.7–14.2)
RBC # BLD AUTO: 3.29 10*6/MM3 (ref 4.6–6)
SODIUM BLD-SCNC: 144 MMOL/L (ref 136–145)
WBC NRBC COR # BLD: 5.71 10*3/MM3 (ref 4.5–10.7)

## 2017-08-30 PROCEDURE — 85027 COMPLETE CBC AUTOMATED: CPT | Performed by: INTERNAL MEDICINE

## 2017-08-30 PROCEDURE — 85610 PROTHROMBIN TIME: CPT | Performed by: INTERNAL MEDICINE

## 2017-08-30 PROCEDURE — 93227 XTRNL ECG REC<48 HR R&I: CPT | Performed by: INTERNAL MEDICINE

## 2017-08-30 PROCEDURE — 80048 BASIC METABOLIC PNL TOTAL CA: CPT | Performed by: INTERNAL MEDICINE

## 2017-08-30 PROCEDURE — 97110 THERAPEUTIC EXERCISES: CPT

## 2017-08-30 PROCEDURE — 83735 ASSAY OF MAGNESIUM: CPT | Performed by: INTERNAL MEDICINE

## 2017-08-30 RX ORDER — DOXYCYCLINE 100 MG/1
100 CAPSULE ORAL EVERY 12 HOURS SCHEDULED
Status: DISCONTINUED | OUTPATIENT
Start: 2017-08-30 | End: 2017-08-30 | Stop reason: HOSPADM

## 2017-08-30 RX ORDER — WARFARIN SODIUM 5 MG/1
5 TABLET ORAL DAILY
Qty: 30 TABLET | Refills: 0 | Status: SHIPPED | OUTPATIENT
Start: 2017-08-30 | End: 2018-01-29 | Stop reason: SDUPTHER

## 2017-08-30 RX ORDER — DOXYCYCLINE 100 MG/1
100 CAPSULE ORAL 2 TIMES DAILY
Qty: 16 CAPSULE | Refills: 0 | Status: SHIPPED | OUTPATIENT
Start: 2017-08-30 | End: 2017-09-07

## 2017-08-30 RX ORDER — SACCHAROMYCES BOULARDII 250 MG
250 CAPSULE ORAL 2 TIMES DAILY
Qty: 16 CAPSULE | Refills: 0 | Status: SHIPPED | OUTPATIENT
Start: 2017-08-30 | End: 2019-05-10

## 2017-08-30 RX ORDER — AMIODARONE HYDROCHLORIDE 200 MG/1
200 TABLET ORAL DAILY
Qty: 30 TABLET | Refills: 0 | Status: SHIPPED | OUTPATIENT
Start: 2017-08-30 | End: 2017-09-28 | Stop reason: SDUPTHER

## 2017-08-30 RX ADMIN — DOXYCYCLINE 100 MG: 100 CAPSULE ORAL at 15:01

## 2017-08-30 RX ADMIN — PANTOPRAZOLE SODIUM 40 MG: 40 TABLET, DELAYED RELEASE ORAL at 06:36

## 2017-08-30 RX ADMIN — Medication 10 ML: at 03:43

## 2017-08-30 RX ADMIN — ISOSORBIDE MONONITRATE 30 MG: 30 TABLET ORAL at 09:58

## 2017-08-30 RX ADMIN — DOXYCYCLINE 100 MG: 100 INJECTION, POWDER, LYOPHILIZED, FOR SOLUTION INTRAVENOUS at 03:43

## 2017-08-30 RX ADMIN — ASPIRIN 81 MG: 81 TABLET ORAL at 09:58

## 2017-08-30 RX ADMIN — LEVOTHYROXINE SODIUM 112 MCG: 112 TABLET ORAL at 06:36

## 2017-08-30 RX ADMIN — Medication 250 MG: at 09:58

## 2017-08-30 RX ADMIN — FINASTERIDE 5 MG: 5 TABLET, FILM COATED ORAL at 09:58

## 2017-08-30 RX ADMIN — AMIODARONE HYDROCHLORIDE 200 MG: 200 TABLET ORAL at 09:58

## 2017-09-02 LAB
BACTERIA SPEC AEROBE CULT: NORMAL

## 2017-09-08 ENCOUNTER — TELEPHONE (OUTPATIENT)
Dept: CARDIOLOGY | Facility: CLINIC | Age: 77
End: 2017-09-08

## 2017-09-08 NOTE — TELEPHONE ENCOUNTER
Mr jurado was discharged from hospital on 8/30/17 was told to f/u with office for INR check. Mr Jurado has been noncompliant with coumadin management. He has not been in office for INR check since March 2017. Instructed Mr Jurado that he will need to agree to have INR checked weekly or bi-weekly as needed for INR management. He did not agree to this. Said that he cannot make it in often because of his job. I explained that he will need to agree to have warfarin management as specified. He agreed and appt made.

## 2017-09-15 ENCOUNTER — HOSPITAL ENCOUNTER (OUTPATIENT)
Dept: CARDIOLOGY | Facility: HOSPITAL | Age: 77
Discharge: HOME | End: 2017-09-15
Admitting: INTERNAL MEDICINE

## 2017-09-15 ENCOUNTER — ANTICOAGULATION VISIT (OUTPATIENT)
Dept: CARDIOLOGY | Facility: CLINIC | Age: 77
End: 2017-09-15

## 2017-09-15 LAB — INR PPP: 1.1

## 2017-09-15 PROCEDURE — 85610 PROTHROMBIN TIME: CPT | Performed by: INTERNAL MEDICINE

## 2017-09-15 NOTE — PATIENT INSTRUCTIONS
Resume your normal doage of 11.25 on sun/thur  7.5mg all other days  Recheck in office on 9/28 with you ov appt with Dr SAMUELS

## 2017-09-26 RX ORDER — AMLODIPINE BESYLATE 5 MG/1
TABLET ORAL
Qty: 90 TABLET | Refills: 1 | Status: SHIPPED | OUTPATIENT
Start: 2017-09-26 | End: 2018-01-29 | Stop reason: SDUPTHER

## 2017-09-28 ENCOUNTER — TELEPHONE (OUTPATIENT)
Dept: CARDIOLOGY | Facility: CLINIC | Age: 77
End: 2017-09-28

## 2017-09-28 ENCOUNTER — HOSPITAL ENCOUNTER (OUTPATIENT)
Dept: CARDIOLOGY | Facility: HOSPITAL | Age: 77
Discharge: HOME | End: 2017-09-28
Admitting: INTERNAL MEDICINE

## 2017-09-28 ENCOUNTER — OFFICE VISIT (OUTPATIENT)
Dept: CARDIOLOGY | Facility: CLINIC | Age: 77
End: 2017-09-28

## 2017-09-28 ENCOUNTER — ANTICOAGULATION VISIT (OUTPATIENT)
Dept: CARDIOLOGY | Facility: CLINIC | Age: 77
End: 2017-09-28

## 2017-09-28 VITALS
DIASTOLIC BLOOD PRESSURE: 79 MMHG | BODY MASS INDEX: 29.54 KG/M2 | HEIGHT: 71 IN | WEIGHT: 211 LBS | SYSTOLIC BLOOD PRESSURE: 111 MMHG | HEART RATE: 63 BPM

## 2017-09-28 DIAGNOSIS — Z79.01 ANTICOAGULATED: ICD-10-CM

## 2017-09-28 DIAGNOSIS — I10 ESSENTIAL HYPERTENSION: ICD-10-CM

## 2017-09-28 DIAGNOSIS — E78.5 HYPERLIPIDEMIA, UNSPECIFIED HYPERLIPIDEMIA TYPE: ICD-10-CM

## 2017-09-28 DIAGNOSIS — I25.10 CHRONIC CORONARY ARTERY DISEASE: Primary | ICD-10-CM

## 2017-09-28 DIAGNOSIS — I71.40 ABDOMINAL AORTIC ANEURYSM (AAA) WITHOUT RUPTURE (HCC): ICD-10-CM

## 2017-09-28 DIAGNOSIS — Z78.9 STATIN INTOLERANCE: ICD-10-CM

## 2017-09-28 LAB — INR PPP: 1.8

## 2017-09-28 PROCEDURE — 99214 OFFICE O/P EST MOD 30 MIN: CPT | Performed by: INTERNAL MEDICINE

## 2017-09-28 PROCEDURE — 93000 ELECTROCARDIOGRAM COMPLETE: CPT | Performed by: INTERNAL MEDICINE

## 2017-09-28 PROCEDURE — 85610 PROTHROMBIN TIME: CPT | Performed by: INTERNAL MEDICINE

## 2017-09-28 RX ORDER — AMIODARONE HYDROCHLORIDE 200 MG/1
100 TABLET ORAL DAILY
Qty: 30 TABLET | Refills: 0 | Status: SHIPPED | OUTPATIENT
Start: 2017-09-28 | End: 2019-03-15 | Stop reason: SDUPTHER

## 2017-09-28 NOTE — TELEPHONE ENCOUNTER
----- Message from Delicia Wu sent at 9/28/2017  9:51 AM EDT -----  Regarding: DRUG INTERACTION- AMIODARONE AND WARFARIN  [facility] [phone]      Dr Downing is aware

## 2017-09-28 NOTE — PROGRESS NOTES
Subjective:       David Comer Sr. is a 77 y.o. male who here for follow up      Hospital follow-up  HPI  77 his old male recently discharged from the hospital because of atypical chest pain as well as a pneumonia along with a history of hypertension and on anticoagulation here for the follow-up, also says after stopping the Lipitor patient has markedly improved prior to that patient was having significant leg cramps    Patient denies any chest pains or tightness in chest with no heaviness with a pressure sensation    Active Hospital Problems (** Indicates Principal Problem)     Diagnosis Date Noted   • **Syncope and collapse [R55] 08/28/2017   • Chest pain [R07.9] 08/28/2017   • Pneumonia due to infectious organism [J18.9] 08/28/2017   • Anticoagulated [Z79.01] 02/09/2017   • Essential hypertension [I10] 10/18/2016   • Gastroesophageal reflux disease [K21.9] 10/18/2016   • Paroxysmal atrial fibrillation             Problem List Items Addressed This Visit        Cardiovascular and Mediastinum    Chronic coronary artery disease - Primary    Abdominal aortic aneurysm    Essential hypertension    Hyperlipidemia       Other    Anticoagulated        .    The following portions of the patient's history were reviewed and updated as appropriate: allergies, current medications, past family history, past medical history, past social history, past surgical history and problem list.    Past Medical History:   Diagnosis Date   • AAA (abdominal aortic aneurysm)    • AAA (abdominal aortic aneurysm) without rupture     CT performed on 2/20/17   • Abnormal ECG    • Atrial fibrillation    • Bradycardia    • Chest pain    • Coronary artery disease    • DJD (degenerative joint disease)    • GERD (gastroesophageal reflux disease)    • Hyperlipidemia    • Hypertension    • Hypogonadism in male    • Hypothyroidism    • PAF (paroxysmal atrial fibrillation)    • Vitamin D deficiency     reports that he has never smoked. He has never used  "smokeless tobacco. He reports that he does not drink alcohol or use illicit drugs.  Family History   Problem Relation Age of Onset   • Hypertension Father    • Heart attack Father    • Heart attack Brother    • Hypertension Paternal Grandmother    • Hypertension Paternal Grandfather        Review of Systems  Constitutional: No wt loss, fever, fatigue  Gastrointestinal: No nausea, abdominal pain  Behavioral/Psych: No insomnia or anxiety   Cardiovascular No chest pains or tightness in the chest  Objective:       Physical Exam             Physical Exam  /79  Pulse 63  Ht 71\" (180.3 cm)  Wt 211 lb (95.7 kg)  BMI 29.43 kg/m2    General appearance: NAD, conversant   Eyes: anicteric sclerae, moist conjunctivae; no lid-lag; PERRLA   HENT: Atraumatic; oropharynx clear with moist mucous membranes and no mucosal ulcerations;  normal hard and soft palate   Neck: Trachea midline; FROM, supple, no thyromegaly or lymphadenopathy   Lungs: CTA, with normal respiratory effort and no intercostal retractions   CV: S1-S2 regular, sys murmurs, no rub, no gallop   Abdomen: Soft, non-tender; no masses or HSM   Extremities: No peripheral edema or extremity lymphadenopathy  Skin: Normal temperature, turgor and texture; no rash, ulcers or subcutaneous nodules   Psych: Appropriate affect, alert and oriented to person, place and time           Cardiographics  @  ECG 12 Lead  Date/Time: 9/28/2017 9:33 AM  Performed by: NIHARIKA RIVERS  Authorized by: NIHARIKA RIVERS   Comparison: compared with previous ECG   Similar to previous ECG  Rhythm: sinus rhythm  Clinical impression: normal ECG          Interpretation Summary   · Findings consistent with an equivocal ECG stress test.  · Left ventricular ejection fraction is normal (Calculated EF = 58%).  · Myocardial perfusion imaging indicates a normal myocardial perfusion study with no evidence of ischemia.  · Impressions are consistent with a low risk study.       Echocardiogram: "        Current Outpatient Prescriptions:   •  amiodarone (PACERONE) 200 MG tablet, Take 1 tablet by mouth Daily., Disp: 30 tablet, Rfl: 0  •  amLODIPine (NORVASC) 5 MG tablet, TAKE ONE TABLET BY MOUTH ONCE DAILY, Disp: 90 tablet, Rfl: 1  •  aspirin 81 MG tablet, Take 81 mg by mouth Daily., Disp: , Rfl:   •  doxazosin (CARDURA) 2 MG tablet, Take 2 mg by mouth Daily., Disp: , Rfl:   •  esomeprazole (NEXIUM) 40 MG capsule, Take 40 mg by mouth Every Morning Before Breakfast., Disp: , Rfl:   •  finasteride (PROSCAR) 5 MG tablet, Take 5 mg by mouth Daily., Disp: , Rfl:   •  isosorbide mononitrate (IMDUR) 30 MG 24 hr tablet, Take 30 mg by mouth Daily., Disp: , Rfl:   •  levothyroxine (SYNTHROID) 112 MCG tablet, Take 112 mcg by mouth Daily., Disp: , Rfl:   •  nitroglycerin (NITROSTAT) 0.4 MG SL tablet, Place 0.4 mg under the tongue Every 5 (Five) Minutes As Needed. Doesn't take, Disp: , Rfl:   •  saccharomyces boulardii (FLORASTOR) 250 MG capsule, Take 1 capsule by mouth 2 (Two) Times a Day., Disp: 16 capsule, Rfl: 0  •  warfarin (COUMADIN) 5 MG tablet, Take 1 tablet by mouth Daily., Disp: 30 tablet, Rfl: 0   Assessment:        Patient Active Problem List   Diagnosis   • Chronic coronary artery disease   • Abdominal aortic aneurysm   • Paroxysmal atrial fibrillation   • Gastroesophageal reflux disease   • Essential hypertension   • Hyperlipidemia   • Hypogonadism in male   • Hypothyroidism   • Osteoarthritis of spine   • Vitamin D deficiency   • Anticoagulated   • Atrial fibrillation, rapid   • Syncope and collapse   • Chest pain   • Pneumonia due to infectious organism               Plan:            ICD-10-CM ICD-9-CM   1. Chronic coronary artery disease I25.10 414.00   2. Abdominal aortic aneurysm (AAA) without rupture I71.4 441.4   3. Essential hypertension I10 401.9   4. Hyperlipidemia, unspecified hyperlipidemia type E78.5 272.4   5. Anticoagulated Z79.01 V58.61   6. Statin intolerance Z78.9 995.27     1. Chronic  coronary artery disease  David KHAN Age Sr. with coronary artery disease has no angina pectoris    Risk reduction for the coronary artery disease, controlling the blood pressure, blood sugar management, cholesterol management, exercise, stress management, and proper compliance with medications and follow-up has been discussed    - ECG 12 Lead  - Comprehensive Metabolic Panel  - Lipid Panel    2. Abdominal aortic aneurysm (AAA) without rupture    - Comprehensive Metabolic Panel  - Lipid Panel    3. Essential hypertension  Blood pressure well-controlled  - Comprehensive Metabolic Panel  - Lipid Panel    4. Hyperlipidemia, unspecified hyperlipidemia type  Counseling has been done  - Comprehensive Metabolic Panel  - Lipid Panel    5. Anticoagulated  Pros and cons as well as indication of the anticoagulation has been explained to the patient in detail    There are no obvious complications at this stage    Risk of  the bleedings has been explained    Need for the regular blood workup and adjust the dose has been explained    Need for proper follow-up on anticoagulation also has been explained    - Comprehensive Metabolic Panel  - Lipid Panel    6. Statin intolerance  Patient had significant leg cramps with the Lipitor at this point patient has been off of the anticoagulation     Off the lipitor due to sever cramps    rtc 2 wks with labs    Next 6 months he will need ct of chest abdoman, amio check  COUNSELING:    David KHAN Age Sr.Counseling was given to patient for the following topics: diagnostic results, risk factor reductions, impressions, risks and benefits of treatment options and importance of treatment compliance .       SMOKING COUNSELING:    Counseling given: Not Answered      EMR Dragon/Transcription disclaimer:   Much of this encounter note is an electronic transcription/translation of spoken language to printed text. The electronic translation of spoken language may permit erroneous, or at times, nonsensical words or  phrases to be inadvertently transcribed; Although I have reviewed the note for such errors, some may still exist.

## 2017-10-06 RX ORDER — ISOSORBIDE MONONITRATE 30 MG/1
TABLET, EXTENDED RELEASE ORAL
Qty: 90 TABLET | Refills: 1 | Status: SHIPPED | OUTPATIENT
Start: 2017-10-06 | End: 2018-01-29 | Stop reason: SDUPTHER

## 2017-10-13 ENCOUNTER — HOSPITAL ENCOUNTER (OUTPATIENT)
Dept: CARDIOLOGY | Facility: HOSPITAL | Age: 77
Discharge: HOME | End: 2017-10-13
Admitting: INTERNAL MEDICINE

## 2017-10-13 ENCOUNTER — ANTICOAGULATION VISIT (OUTPATIENT)
Dept: CARDIOLOGY | Facility: CLINIC | Age: 77
End: 2017-10-13

## 2017-10-13 LAB
ALBUMIN SERPL-MCNC: 4.1 G/DL (ref 3.5–5.2)
ALBUMIN/GLOB SERPL: 1.4 G/DL
ALP SERPL-CCNC: 60 U/L (ref 39–117)
ALT SERPL W P-5'-P-CCNC: 19 U/L (ref 1–41)
ANION GAP SERPL CALCULATED.3IONS-SCNC: 12.7 MMOL/L
AST SERPL-CCNC: 19 U/L (ref 1–40)
BILIRUB SERPL-MCNC: 0.6 MG/DL (ref 0.1–1.2)
BUN BLD-MCNC: 18 MG/DL (ref 8–23)
BUN/CREAT SERPL: 17.6 (ref 7–25)
CALCIUM SPEC-SCNC: 9.6 MG/DL (ref 8.6–10.5)
CHLORIDE SERPL-SCNC: 101 MMOL/L (ref 98–107)
CHOLEST SERPL-MCNC: 210 MG/DL (ref 0–200)
CO2 SERPL-SCNC: 27.3 MMOL/L (ref 22–29)
CREAT BLD-MCNC: 1.02 MG/DL (ref 0.76–1.27)
GFR SERPL CREATININE-BSD FRML MDRD: 71 ML/MIN/1.73
GLOBULIN UR ELPH-MCNC: 3 GM/DL
GLUCOSE BLD-MCNC: 94 MG/DL (ref 65–99)
HDLC SERPL-MCNC: 49 MG/DL (ref 40–60)
INR PPP: 3.8
LDLC SERPL CALC-MCNC: 121 MG/DL (ref 0–100)
LDLC/HDLC SERPL: 2.47 {RATIO}
POTASSIUM BLD-SCNC: 4.5 MMOL/L (ref 3.5–5.2)
PROT SERPL-MCNC: 7.1 G/DL (ref 6–8.5)
SODIUM BLD-SCNC: 141 MMOL/L (ref 136–145)
TRIGL SERPL-MCNC: 199 MG/DL (ref 0–150)
VLDLC SERPL-MCNC: 39.8 MG/DL (ref 5–40)

## 2017-10-13 PROCEDURE — 80053 COMPREHEN METABOLIC PANEL: CPT | Performed by: INTERNAL MEDICINE

## 2017-10-13 PROCEDURE — 80061 LIPID PANEL: CPT | Performed by: INTERNAL MEDICINE

## 2017-10-13 PROCEDURE — 36415 COLL VENOUS BLD VENIPUNCTURE: CPT | Performed by: INTERNAL MEDICINE

## 2017-10-13 PROCEDURE — 85610 PROTHROMBIN TIME: CPT | Performed by: INTERNAL MEDICINE

## 2017-10-17 ENCOUNTER — ANTICOAGULATION VISIT (OUTPATIENT)
Dept: CARDIOLOGY | Facility: CLINIC | Age: 77
End: 2017-10-17

## 2017-10-17 ENCOUNTER — OFFICE VISIT (OUTPATIENT)
Dept: CARDIOLOGY | Facility: CLINIC | Age: 77
End: 2017-10-17

## 2017-10-17 ENCOUNTER — HOSPITAL ENCOUNTER (OUTPATIENT)
Dept: CARDIOLOGY | Facility: HOSPITAL | Age: 77
Discharge: HOME | End: 2017-10-17
Admitting: INTERNAL MEDICINE

## 2017-10-17 VITALS — HEART RATE: 48 BPM | HEIGHT: 71 IN | SYSTOLIC BLOOD PRESSURE: 147 MMHG | DIASTOLIC BLOOD PRESSURE: 92 MMHG

## 2017-10-17 DIAGNOSIS — Z79.01 ANTICOAGULATED: ICD-10-CM

## 2017-10-17 DIAGNOSIS — I25.10 CHRONIC CORONARY ARTERY DISEASE: ICD-10-CM

## 2017-10-17 DIAGNOSIS — Z78.9 STATIN INTOLERANCE: Primary | ICD-10-CM

## 2017-10-17 LAB — INR PPP: 2.5

## 2017-10-17 PROCEDURE — 85610 PROTHROMBIN TIME: CPT | Performed by: INTERNAL MEDICINE

## 2017-10-17 PROCEDURE — 99214 OFFICE O/P EST MOD 30 MIN: CPT | Performed by: INTERNAL MEDICINE

## 2017-10-17 RX ORDER — ROSUVASTATIN CALCIUM 5 MG/1
5 TABLET, COATED ORAL DAILY
Qty: 90 TABLET | Refills: 3 | Status: SHIPPED | OUTPATIENT
Start: 2017-10-17 | End: 2018-11-20 | Stop reason: SDUPTHER

## 2017-10-17 RX ORDER — NEBIVOLOL HYDROCHLORIDE 10 MG/1
5 TABLET ORAL
COMMUNITY
Start: 2017-10-09 | End: 2017-12-19 | Stop reason: ALTCHOICE

## 2017-10-17 NOTE — PROGRESS NOTES
Subjective:       David Comer Sr. is a 77 y.o. male who here for follow up    CC  Follow-up for the coronary artery disease as well as anticoagulation and hyperlipidemia  HPI  77 his old male with a known history of benign essential arterial hypertension and anticoagulation as well as a hyperlipidemia with a history of the paroxysmal atrial fibrillation has significantly improved since the patient has been taken off of the Lipitor denies any chest pains or tightness in chest     Problem List Items Addressed This Visit        Cardiovascular and Mediastinum    Chronic coronary artery disease    Relevant Medications    BYSTOLIC 10 MG tablet    Other Relevant Orders    Lipid Panel    Comprehensive Metabolic Panel       Other    Statin intolerance - Primary    Relevant Orders    Lipid Panel    Comprehensive Metabolic Panel        .    The following portions of the patient's history were reviewed and updated as appropriate: allergies, current medications, past family history, past medical history, past social history, past surgical history and problem list.    Past Medical History:   Diagnosis Date   • AAA (abdominal aortic aneurysm)    • AAA (abdominal aortic aneurysm) without rupture     CT performed on 2/20/17   • Abnormal ECG    • Atrial fibrillation    • Bradycardia    • Chest pain    • Coronary artery disease    • DJD (degenerative joint disease)    • GERD (gastroesophageal reflux disease)    • Hyperlipidemia    • Hypertension    • Hypogonadism in male    • Hypothyroidism    • PAF (paroxysmal atrial fibrillation)    • Vitamin D deficiency     reports that he has never smoked. He has never used smokeless tobacco. He reports that he does not drink alcohol or use illicit drugs.  Family History   Problem Relation Age of Onset   • Hypertension Father    • Heart attack Father    • Heart attack Brother    • Hypertension Paternal Grandmother    • Hypertension Paternal Grandfather        Review of Systems  Constitutional: No  "wt loss, fever, fatigue  Gastrointestinal: No nausea, abdominal pain  Behavioral/Psych: No insomnia or anxiety   Cardiovascular No chest pains or tightness in chest  Objective:       Physical Exam             Physical Exam  /92  Pulse (!) 48  Ht 71\" (180.3 cm)    General appearance: NAD, conversant   Eyes: anicteric sclerae, moist conjunctivae; no lid-lag; PERRLA   HENT: Atraumatic; oropharynx clear with moist mucous membranes and no mucosal ulcerations;  normal hard and soft palate   Neck: Trachea midline; FROM, supple, no thyromegaly or lymphadenopathy   Lungs: CTA, with normal respiratory effort and no intercostal retractions   CV: S1-S2 regular, no murmurs, no rub, no gallop   Abdomen: Soft, non-tender; no masses or HSM   Extremities: No peripheral edema or extremity lymphadenopathy  Skin: Normal temperature, turgor and texture; no rash, ulcers or subcutaneous nodules   Psych: Appropriate affect, alert and oriented to person, place and time           Cardiographics  @Procedures    Echocardiogram:        Current Outpatient Prescriptions:   •  amiodarone (PACERONE) 200 MG tablet, Take 0.5 tablets by mouth Daily., Disp: 30 tablet, Rfl: 0  •  amLODIPine (NORVASC) 5 MG tablet, TAKE ONE TABLET BY MOUTH ONCE DAILY, Disp: 90 tablet, Rfl: 1  •  aspirin 81 MG tablet, Take 81 mg by mouth Daily., Disp: , Rfl:   •  BYSTOLIC 10 MG tablet, 5 mg., Disp: , Rfl:   •  doxazosin (CARDURA) 2 MG tablet, Take 2 mg by mouth Daily., Disp: , Rfl:   •  esomeprazole (NEXIUM) 40 MG capsule, Take 40 mg by mouth Every Morning Before Breakfast., Disp: , Rfl:   •  finasteride (PROSCAR) 5 MG tablet, Take 5 mg by mouth Daily., Disp: , Rfl:   •  isosorbide mononitrate (IMDUR) 30 MG 24 hr tablet, TAKE ONE TABLET BY MOUTH ONCE DAILY, Disp: 90 tablet, Rfl: 1  •  levothyroxine (SYNTHROID) 112 MCG tablet, Take 112 mcg by mouth Daily., Disp: , Rfl:   •  nitroglycerin (NITROSTAT) 0.4 MG SL tablet, Place 0.4 mg under the tongue Every 5 (Five) " Minutes As Needed. Doesn't take, Disp: , Rfl:   •  saccharomyces boulardii (FLORASTOR) 250 MG capsule, Take 1 capsule by mouth 2 (Two) Times a Day., Disp: 16 capsule, Rfl: 0  •  warfarin (COUMADIN) 5 MG tablet, Take 1 tablet by mouth Daily., Disp: 30 tablet, Rfl: 0   Assessment:        Patient Active Problem List   Diagnosis   • Chronic coronary artery disease   • Abdominal aortic aneurysm   • Paroxysmal atrial fibrillation   • Gastroesophageal reflux disease   • Essential hypertension   • Hyperlipidemia   • Hypogonadism in male   • Hypothyroidism   • Osteoarthritis of spine   • Vitamin D deficiency   • Anticoagulated   • Atrial fibrillation, rapid   • Syncope and collapse   • Chest pain   • Pneumonia due to infectious organism   • Statin intolerance     Total Cholesterol 0 - 200 mg/dL 210 (H)   Triglycerides 0 - 150 mg/dL 199 (H)   HDL Cholesterol 40 - 60 mg/dL 49   LDL Cholesterol  0 - 100 mg/dL 121 (H)   VLDL Cholesterol 5 - 40 mg/dL 39.8   LDL/HDL Ratio  2.47               Plan:            ICD-10-CM ICD-9-CM   1. Statin intolerance Z78.9 995.27   2. Chronic coronary artery disease I25.10 414.00   3. Anticoagulated Z79.01 V58.61     1. Statin intolerance  We'll start the Crestor patient is intolerant Repatha will be considered  - Lipid Panel  - Comprehensive Metabolic Panel    2. Chronic coronary artery disease  Asymptomatic  - Lipid Panel  - Comprehensive Metabolic Panel    3. Anticoagulated  Pros and cons as well as indication of the anticoagulation has been explained to the patient in detail    There are no obvious complications at this stage    Risk of  the bleedings has been explained    Need for the regular blood workup and adjust the dose has been explained    Need for proper follow-up on anticoagulation also has been explained       See in 2 months with labs    Start crestor 5 mg po daily    Pros and cons of this new medication has been expended the patient    Possible side effects has been  explained    Associated need of the blood  Work has been explained    Need for the compliance of the medication has been explained      COUNSELING:    David Comer Sr.Counseling was given to patient for the following topics: diagnostic results, risk factor reductions, impressions, risks and benefits of treatment options and importance of treatment compliance .       SMOKING COUNSELING:    Counseling given: Not Answered      EMR Dragon/Transcription disclaimer:   Much of this encounter note is an electronic transcription/translation of spoken language to printed text. The electronic translation of spoken language may permit erroneous, or at times, nonsensical words or phrases to be inadvertently transcribed; Although I have reviewed the note for such errors, some may still exist.

## 2017-10-17 NOTE — PATIENT INSTRUCTIONS
Continue 11.5 mg Thur and Sun, 7.5 mg All other Days     Pt leaving town for month will recheck 11/17/17

## 2017-12-15 ENCOUNTER — ANTICOAGULATION VISIT (OUTPATIENT)
Dept: CARDIOLOGY | Facility: CLINIC | Age: 77
End: 2017-12-15

## 2017-12-15 ENCOUNTER — HOSPITAL ENCOUNTER (OUTPATIENT)
Dept: CARDIOLOGY | Facility: HOSPITAL | Age: 77
Discharge: HOME | End: 2017-12-15
Admitting: INTERNAL MEDICINE

## 2017-12-15 DIAGNOSIS — Z78.9 STATIN INTOLERANCE: ICD-10-CM

## 2017-12-15 DIAGNOSIS — I25.10 CHRONIC CORONARY ARTERY DISEASE: ICD-10-CM

## 2017-12-15 DIAGNOSIS — Z79.01 ANTICOAGULATED: ICD-10-CM

## 2017-12-15 LAB
ALBUMIN SERPL-MCNC: 4.1 G/DL (ref 3.5–5.2)
ALBUMIN/GLOB SERPL: 1.5 G/DL
ALP SERPL-CCNC: 53 U/L (ref 39–117)
ALT SERPL W P-5'-P-CCNC: 17 U/L (ref 1–41)
ANION GAP SERPL CALCULATED.3IONS-SCNC: 13.9 MMOL/L
AST SERPL-CCNC: 18 U/L (ref 1–40)
BILIRUB SERPL-MCNC: 0.3 MG/DL (ref 0.1–1.2)
BUN BLD-MCNC: 19 MG/DL (ref 8–23)
BUN/CREAT SERPL: 15.1 (ref 7–25)
CALCIUM SPEC-SCNC: 9.2 MG/DL (ref 8.6–10.5)
CHLORIDE SERPL-SCNC: 107 MMOL/L (ref 98–107)
CHOLEST SERPL-MCNC: 133 MG/DL (ref 0–200)
CO2 SERPL-SCNC: 24.1 MMOL/L (ref 22–29)
CREAT BLD-MCNC: 1.26 MG/DL (ref 0.76–1.27)
GFR SERPL CREATININE-BSD FRML MDRD: 56 ML/MIN/1.73
GLOBULIN UR ELPH-MCNC: 2.8 GM/DL
GLUCOSE BLD-MCNC: 80 MG/DL (ref 65–99)
HDLC SERPL-MCNC: 54 MG/DL (ref 40–60)
INR PPP: 2.5
LDLC SERPL CALC-MCNC: 38 MG/DL (ref 0–100)
LDLC/HDLC SERPL: 0.71 {RATIO}
POTASSIUM BLD-SCNC: 4 MMOL/L (ref 3.5–5.2)
PROT SERPL-MCNC: 6.9 G/DL (ref 6–8.5)
SODIUM BLD-SCNC: 145 MMOL/L (ref 136–145)
TRIGL SERPL-MCNC: 204 MG/DL (ref 0–150)
VLDLC SERPL-MCNC: 40.8 MG/DL (ref 5–40)

## 2017-12-15 PROCEDURE — 80061 LIPID PANEL: CPT | Performed by: INTERNAL MEDICINE

## 2017-12-15 PROCEDURE — 85610 PROTHROMBIN TIME: CPT | Performed by: INTERNAL MEDICINE

## 2017-12-15 PROCEDURE — 80053 COMPREHEN METABOLIC PANEL: CPT | Performed by: INTERNAL MEDICINE

## 2017-12-15 PROCEDURE — 36415 COLL VENOUS BLD VENIPUNCTURE: CPT | Performed by: INTERNAL MEDICINE

## 2017-12-19 ENCOUNTER — OFFICE VISIT (OUTPATIENT)
Dept: CARDIOLOGY | Facility: CLINIC | Age: 77
End: 2017-12-19

## 2017-12-19 VITALS
BODY MASS INDEX: 32.34 KG/M2 | WEIGHT: 231 LBS | HEIGHT: 71 IN | DIASTOLIC BLOOD PRESSURE: 91 MMHG | HEART RATE: 56 BPM | SYSTOLIC BLOOD PRESSURE: 157 MMHG

## 2017-12-19 DIAGNOSIS — I48.0 PAROXYSMAL ATRIAL FIBRILLATION (HCC): ICD-10-CM

## 2017-12-19 DIAGNOSIS — I71.40 ABDOMINAL AORTIC ANEURYSM (AAA) WITHOUT RUPTURE (HCC): ICD-10-CM

## 2017-12-19 DIAGNOSIS — I25.10 CHRONIC CORONARY ARTERY DISEASE: Primary | ICD-10-CM

## 2017-12-19 DIAGNOSIS — E78.5 HYPERLIPIDEMIA, UNSPECIFIED HYPERLIPIDEMIA TYPE: ICD-10-CM

## 2017-12-19 DIAGNOSIS — I10 ESSENTIAL HYPERTENSION: ICD-10-CM

## 2017-12-19 PROCEDURE — 99213 OFFICE O/P EST LOW 20 MIN: CPT | Performed by: INTERNAL MEDICINE

## 2017-12-19 RX ORDER — OLMESARTAN MEDOXOMIL AND HYDROCHLOROTHIAZIDE 40/25 40; 25 MG/1; MG/1
1 TABLET ORAL DAILY
COMMUNITY
End: 2017-12-19 | Stop reason: SDUPTHER

## 2017-12-19 RX ORDER — OLMESARTAN MEDOXOMIL AND HYDROCHLOROTHIAZIDE 40/25 40; 25 MG/1; MG/1
1 TABLET ORAL DAILY
Qty: 90 TABLET | Refills: 3 | Status: SHIPPED | OUTPATIENT
Start: 2017-12-19 | End: 2018-03-19 | Stop reason: SDUPTHER

## 2017-12-30 ENCOUNTER — APPOINTMENT (OUTPATIENT)
Dept: CT IMAGING | Facility: HOSPITAL | Age: 77
End: 2017-12-30

## 2017-12-30 ENCOUNTER — HOSPITAL ENCOUNTER (EMERGENCY)
Facility: HOSPITAL | Age: 77
Discharge: HOME | End: 2017-12-30
Attending: EMERGENCY MEDICINE | Admitting: EMERGENCY MEDICINE

## 2017-12-30 VITALS
SYSTOLIC BLOOD PRESSURE: 117 MMHG | HEIGHT: 71 IN | HEART RATE: 67 BPM | DIASTOLIC BLOOD PRESSURE: 70 MMHG | WEIGHT: 220 LBS | OXYGEN SATURATION: 97 % | BODY MASS INDEX: 30.8 KG/M2 | RESPIRATION RATE: 18 BRPM | TEMPERATURE: 97.9 F

## 2017-12-30 DIAGNOSIS — M54.2 CERVICALGIA: Primary | ICD-10-CM

## 2017-12-30 LAB
ALBUMIN SERPL-MCNC: 4.2 G/DL (ref 3.5–5.2)
ALBUMIN/GLOB SERPL: 1.2 G/DL
ALP SERPL-CCNC: 64 U/L (ref 39–117)
ALT SERPL W P-5'-P-CCNC: 20 U/L (ref 1–41)
ANION GAP SERPL CALCULATED.3IONS-SCNC: 16.4 MMOL/L
AST SERPL-CCNC: 16 U/L (ref 1–40)
BASOPHILS # BLD AUTO: 0.02 10*3/MM3 (ref 0–0.2)
BASOPHILS NFR BLD AUTO: 0.2 % (ref 0–1.5)
BILIRUB SERPL-MCNC: 0.6 MG/DL (ref 0.1–1.2)
BUN BLD-MCNC: 19 MG/DL (ref 8–23)
BUN/CREAT SERPL: 17.9 (ref 7–25)
CALCIUM SPEC-SCNC: 9.2 MG/DL (ref 8.6–10.5)
CHLORIDE SERPL-SCNC: 100 MMOL/L (ref 98–107)
CO2 SERPL-SCNC: 25.6 MMOL/L (ref 22–29)
CREAT BLD-MCNC: 1.06 MG/DL (ref 0.76–1.27)
DEPRECATED RDW RBC AUTO: 45.5 FL (ref 37–54)
EOSINOPHIL # BLD AUTO: 0.03 10*3/MM3 (ref 0–0.7)
EOSINOPHIL NFR BLD AUTO: 0.3 % (ref 0.3–6.2)
ERYTHROCYTE [DISTWIDTH] IN BLOOD BY AUTOMATED COUNT: 12.7 % (ref 11.5–14.5)
GFR SERPL CREATININE-BSD FRML MDRD: 68 ML/MIN/1.73
GLOBULIN UR ELPH-MCNC: 3.5 GM/DL
GLUCOSE BLD-MCNC: 92 MG/DL (ref 65–99)
HCT VFR BLD AUTO: 42.3 % (ref 40.4–52.2)
HGB BLD-MCNC: 14.1 G/DL (ref 13.7–17.6)
HOLD SPECIMEN: NORMAL
HOLD SPECIMEN: NORMAL
IMM GRANULOCYTES # BLD: 0 10*3/MM3 (ref 0–0.03)
IMM GRANULOCYTES NFR BLD: 0 % (ref 0–0.5)
INR PPP: 2.78 (ref 0.9–1.1)
LYMPHOCYTES # BLD AUTO: 0.96 10*3/MM3 (ref 0.9–4.8)
LYMPHOCYTES NFR BLD AUTO: 9.2 % (ref 19.6–45.3)
MCH RBC QN AUTO: 32.6 PG (ref 27–32.7)
MCHC RBC AUTO-ENTMCNC: 33.3 G/DL (ref 32.6–36.4)
MCV RBC AUTO: 97.9 FL (ref 79.8–96.2)
MONOCYTES # BLD AUTO: 0.72 10*3/MM3 (ref 0.2–1.2)
MONOCYTES NFR BLD AUTO: 6.9 % (ref 5–12)
NEUTROPHILS # BLD AUTO: 8.69 10*3/MM3 (ref 1.9–8.1)
NEUTROPHILS NFR BLD AUTO: 83.4 % (ref 42.7–76)
NT-PROBNP SERPL-MCNC: 50.3 PG/ML (ref 0–1800)
PLATELET # BLD AUTO: 202 10*3/MM3 (ref 140–500)
PMV BLD AUTO: 12.2 FL (ref 6–12)
POTASSIUM BLD-SCNC: 3.8 MMOL/L (ref 3.5–5.2)
PROT SERPL-MCNC: 7.7 G/DL (ref 6–8.5)
PROTHROMBIN TIME: 28.4 SECONDS (ref 11.7–14.2)
RBC # BLD AUTO: 4.32 10*6/MM3 (ref 4.6–6)
SODIUM BLD-SCNC: 142 MMOL/L (ref 136–145)
TROPONIN T SERPL-MCNC: <0.01 NG/ML (ref 0–0.03)
WBC NRBC COR # BLD: 10.42 10*3/MM3 (ref 4.5–10.7)
WHOLE BLOOD HOLD SPECIMEN: NORMAL
WHOLE BLOOD HOLD SPECIMEN: NORMAL

## 2017-12-30 PROCEDURE — 93005 ELECTROCARDIOGRAM TRACING: CPT

## 2017-12-30 PROCEDURE — 99284 EMERGENCY DEPT VISIT MOD MDM: CPT

## 2017-12-30 PROCEDURE — 0 IOPAMIDOL PER 1 ML: Performed by: EMERGENCY MEDICINE

## 2017-12-30 PROCEDURE — 83880 ASSAY OF NATRIURETIC PEPTIDE: CPT | Performed by: EMERGENCY MEDICINE

## 2017-12-30 PROCEDURE — 85610 PROTHROMBIN TIME: CPT | Performed by: EMERGENCY MEDICINE

## 2017-12-30 PROCEDURE — 85025 COMPLETE CBC W/AUTO DIFF WBC: CPT | Performed by: EMERGENCY MEDICINE

## 2017-12-30 PROCEDURE — 93010 ELECTROCARDIOGRAM REPORT: CPT | Performed by: INTERNAL MEDICINE

## 2017-12-30 PROCEDURE — 80053 COMPREHEN METABOLIC PANEL: CPT | Performed by: EMERGENCY MEDICINE

## 2017-12-30 PROCEDURE — 84484 ASSAY OF TROPONIN QUANT: CPT | Performed by: EMERGENCY MEDICINE

## 2017-12-30 PROCEDURE — 71275 CT ANGIOGRAPHY CHEST: CPT

## 2017-12-30 PROCEDURE — 93005 ELECTROCARDIOGRAM TRACING: CPT | Performed by: EMERGENCY MEDICINE

## 2017-12-30 RX ORDER — CYCLOBENZAPRINE HCL 10 MG
10 TABLET ORAL 3 TIMES DAILY PRN
Qty: 30 TABLET | Refills: 0 | Status: SHIPPED | OUTPATIENT
Start: 2017-12-30 | End: 2018-02-02 | Stop reason: SDUPTHER

## 2017-12-30 RX ORDER — HYDROCODONE BITARTRATE AND ACETAMINOPHEN 5; 325 MG/1; MG/1
1 TABLET ORAL EVERY 6 HOURS PRN
Qty: 10 TABLET | Refills: 0 | Status: SHIPPED | OUTPATIENT
Start: 2017-12-30 | End: 2018-01-29

## 2017-12-30 RX ORDER — SODIUM CHLORIDE 0.9 % (FLUSH) 0.9 %
10 SYRINGE (ML) INJECTION AS NEEDED
Status: DISCONTINUED | OUTPATIENT
Start: 2017-12-30 | End: 2017-12-30 | Stop reason: HOSPADM

## 2017-12-30 RX ADMIN — IOPAMIDOL 100 ML: 755 INJECTION, SOLUTION INTRAVENOUS at 19:16

## 2018-01-29 ENCOUNTER — HOSPITAL ENCOUNTER (OUTPATIENT)
Facility: HOSPITAL | Age: 78
Setting detail: OBSERVATION
LOS: 2 days | Discharge: HOME | End: 2018-01-31
Attending: EMERGENCY MEDICINE | Admitting: INTERNAL MEDICINE

## 2018-01-29 ENCOUNTER — APPOINTMENT (OUTPATIENT)
Dept: CT IMAGING | Facility: HOSPITAL | Age: 78
End: 2018-01-29

## 2018-01-29 DIAGNOSIS — I48.0 PAROXYSMAL ATRIAL FIBRILLATION (HCC): ICD-10-CM

## 2018-01-29 DIAGNOSIS — R31.0 GROSS HEMATURIA: Primary | ICD-10-CM

## 2018-01-29 DIAGNOSIS — Z79.01 ANTICOAGULATED: ICD-10-CM

## 2018-01-29 DIAGNOSIS — R79.1 SUPRATHERAPEUTIC INR: ICD-10-CM

## 2018-01-29 PROBLEM — T45.511A COUMADIN TOXICITY: Status: ACTIVE | Noted: 2018-01-29

## 2018-01-29 LAB
ABO GROUP BLD: NORMAL
ALBUMIN SERPL-MCNC: 4.2 G/DL (ref 3.5–5.2)
ALBUMIN/GLOB SERPL: 1.3 G/DL
ALP SERPL-CCNC: 56 U/L (ref 39–117)
ALT SERPL W P-5'-P-CCNC: 10 U/L (ref 1–41)
ANION GAP SERPL CALCULATED.3IONS-SCNC: 13.4 MMOL/L
AST SERPL-CCNC: 15 U/L (ref 1–40)
BACTERIA UR QL AUTO: ABNORMAL /HPF
BASOPHILS # BLD AUTO: 0.02 10*3/MM3 (ref 0–0.2)
BASOPHILS NFR BLD AUTO: 0.3 % (ref 0–1.5)
BILIRUB SERPL-MCNC: 0.3 MG/DL (ref 0.1–1.2)
BILIRUB UR QL STRIP: NEGATIVE
BLD GP AB SCN SERPL QL: NEGATIVE
BUN BLD-MCNC: 23 MG/DL (ref 8–23)
BUN/CREAT SERPL: 20.7 (ref 7–25)
CALCIUM SPEC-SCNC: 9.4 MG/DL (ref 8.6–10.5)
CHLORIDE SERPL-SCNC: 100 MMOL/L (ref 98–107)
CLARITY UR: CLEAR
CO2 SERPL-SCNC: 26.6 MMOL/L (ref 22–29)
COLOR UR: ABNORMAL
CREAT BLD-MCNC: 1.11 MG/DL (ref 0.76–1.27)
DEPRECATED RDW RBC AUTO: 44.2 FL (ref 37–54)
EOSINOPHIL # BLD AUTO: 0.14 10*3/MM3 (ref 0–0.7)
EOSINOPHIL NFR BLD AUTO: 1.9 % (ref 0.3–6.2)
ERYTHROCYTE [DISTWIDTH] IN BLOOD BY AUTOMATED COUNT: 12.7 % (ref 11.5–14.5)
GFR SERPL CREATININE-BSD FRML MDRD: 64 ML/MIN/1.73
GLOBULIN UR ELPH-MCNC: 3.2 GM/DL
GLUCOSE BLD-MCNC: 95 MG/DL (ref 65–99)
GLUCOSE UR STRIP-MCNC: NEGATIVE MG/DL
HCT VFR BLD AUTO: 40.8 % (ref 40.4–52.2)
HGB BLD-MCNC: 13.3 G/DL (ref 13.7–17.6)
HGB UR QL STRIP.AUTO: ABNORMAL
HOLD SPECIMEN: NORMAL
HOLD SPECIMEN: NORMAL
HYALINE CASTS UR QL AUTO: ABNORMAL /LPF
IMM GRANULOCYTES # BLD: 0 10*3/MM3 (ref 0–0.03)
IMM GRANULOCYTES NFR BLD: 0 % (ref 0–0.5)
INR PPP: 5.52 (ref 0.9–1.1)
KETONES UR QL STRIP: NEGATIVE
LEUKOCYTE ESTERASE UR QL STRIP.AUTO: ABNORMAL
LYMPHOCYTES # BLD AUTO: 1.08 10*3/MM3 (ref 0.9–4.8)
LYMPHOCYTES NFR BLD AUTO: 15 % (ref 19.6–45.3)
MCH RBC QN AUTO: 31.4 PG (ref 27–32.7)
MCHC RBC AUTO-ENTMCNC: 32.6 G/DL (ref 32.6–36.4)
MCV RBC AUTO: 96.2 FL (ref 79.8–96.2)
MONOCYTES # BLD AUTO: 0.48 10*3/MM3 (ref 0.2–1.2)
MONOCYTES NFR BLD AUTO: 6.7 % (ref 5–12)
NEUTROPHILS # BLD AUTO: 5.47 10*3/MM3 (ref 1.9–8.1)
NEUTROPHILS NFR BLD AUTO: 76.1 % (ref 42.7–76)
NITRITE UR QL STRIP: NEGATIVE
NRBC BLD MANUAL-RTO: 0 /100 WBC (ref 0–0)
PH UR STRIP.AUTO: 7 [PH] (ref 5–8)
PLATELET # BLD AUTO: 167 10*3/MM3 (ref 140–500)
PMV BLD AUTO: 12.8 FL (ref 6–12)
POTASSIUM BLD-SCNC: 4.7 MMOL/L (ref 3.5–5.2)
PROT SERPL-MCNC: 7.4 G/DL (ref 6–8.5)
PROT UR QL STRIP: ABNORMAL
PROTHROMBIN TIME: 48.6 SECONDS (ref 11.7–14.2)
RBC # BLD AUTO: 4.24 10*6/MM3 (ref 4.6–6)
RBC # UR: ABNORMAL /HPF
REF LAB TEST METHOD: ABNORMAL
RH BLD: NEGATIVE
SODIUM BLD-SCNC: 140 MMOL/L (ref 136–145)
SP GR UR STRIP: 1.02 (ref 1–1.03)
SQUAMOUS #/AREA URNS HPF: ABNORMAL /HPF
UROBILINOGEN UR QL STRIP: ABNORMAL
WBC NRBC COR # BLD: 7.19 10*3/MM3 (ref 4.5–10.7)
WBC UR QL AUTO: ABNORMAL /HPF
WHOLE BLOOD HOLD SPECIMEN: NORMAL
WHOLE BLOOD HOLD SPECIMEN: NORMAL

## 2018-01-29 PROCEDURE — 36430 TRANSFUSION BLD/BLD COMPNT: CPT

## 2018-01-29 PROCEDURE — 86927 PLASMA FRESH FROZEN: CPT

## 2018-01-29 PROCEDURE — 86900 BLOOD TYPING SEROLOGIC ABO: CPT | Performed by: PHYSICIAN ASSISTANT

## 2018-01-29 PROCEDURE — P9017 PLASMA 1 DONOR FRZ W/IN 8 HR: HCPCS

## 2018-01-29 PROCEDURE — 86901 BLOOD TYPING SEROLOGIC RH(D): CPT | Performed by: PHYSICIAN ASSISTANT

## 2018-01-29 PROCEDURE — 80053 COMPREHEN METABOLIC PANEL: CPT | Performed by: PHYSICIAN ASSISTANT

## 2018-01-29 PROCEDURE — 99284 EMERGENCY DEPT VISIT MOD MDM: CPT

## 2018-01-29 PROCEDURE — 74176 CT ABD & PELVIS W/O CONTRAST: CPT

## 2018-01-29 PROCEDURE — 85025 COMPLETE CBC W/AUTO DIFF WBC: CPT | Performed by: PHYSICIAN ASSISTANT

## 2018-01-29 PROCEDURE — 81001 URINALYSIS AUTO W/SCOPE: CPT | Performed by: EMERGENCY MEDICINE

## 2018-01-29 PROCEDURE — 25010000002 VITAMIN K1 PER 1 MG: Performed by: PHYSICIAN ASSISTANT

## 2018-01-29 PROCEDURE — 86850 RBC ANTIBODY SCREEN: CPT | Performed by: PHYSICIAN ASSISTANT

## 2018-01-29 PROCEDURE — 96365 THER/PROPH/DIAG IV INF INIT: CPT

## 2018-01-29 PROCEDURE — 36415 COLL VENOUS BLD VENIPUNCTURE: CPT | Performed by: EMERGENCY MEDICINE

## 2018-01-29 PROCEDURE — 85610 PROTHROMBIN TIME: CPT | Performed by: EMERGENCY MEDICINE

## 2018-01-29 RX ORDER — ONDANSETRON 4 MG/1
4 TABLET, FILM COATED ORAL EVERY 6 HOURS PRN
Status: DISCONTINUED | OUTPATIENT
Start: 2018-01-29 | End: 2018-01-31 | Stop reason: HOSPADM

## 2018-01-29 RX ORDER — ACETAMINOPHEN 325 MG/1
650 TABLET ORAL EVERY 4 HOURS PRN
Status: DISCONTINUED | OUTPATIENT
Start: 2018-01-29 | End: 2018-01-31 | Stop reason: HOSPADM

## 2018-01-29 RX ORDER — WARFARIN SODIUM 7.5 MG/1
7.5 TABLET ORAL SEE ADMIN INSTRUCTIONS
COMMUNITY
End: 2018-01-31 | Stop reason: HOSPADM

## 2018-01-29 RX ORDER — AMLODIPINE BESYLATE 5 MG/1
5 TABLET ORAL DAILY
COMMUNITY
End: 2018-01-31 | Stop reason: HOSPADM

## 2018-01-29 RX ORDER — ISOSORBIDE MONONITRATE 30 MG/1
30 TABLET, EXTENDED RELEASE ORAL DAILY
COMMUNITY
End: 2018-11-20 | Stop reason: SDUPTHER

## 2018-01-29 RX ORDER — ONDANSETRON 2 MG/ML
4 INJECTION INTRAMUSCULAR; INTRAVENOUS EVERY 6 HOURS PRN
Status: DISCONTINUED | OUTPATIENT
Start: 2018-01-29 | End: 2018-01-31 | Stop reason: HOSPADM

## 2018-01-29 RX ORDER — ALUMINA, MAGNESIA, AND SIMETHICONE 2400; 2400; 240 MG/30ML; MG/30ML; MG/30ML
15 SUSPENSION ORAL EVERY 6 HOURS PRN
Status: DISCONTINUED | OUTPATIENT
Start: 2018-01-29 | End: 2018-01-31 | Stop reason: HOSPADM

## 2018-01-29 RX ORDER — DOCUSATE SODIUM 100 MG/1
100 CAPSULE, LIQUID FILLED ORAL 2 TIMES DAILY
Status: DISCONTINUED | OUTPATIENT
Start: 2018-01-29 | End: 2018-01-31 | Stop reason: HOSPADM

## 2018-01-29 RX ORDER — BISACODYL 5 MG/1
5 TABLET, DELAYED RELEASE ORAL DAILY PRN
Status: DISCONTINUED | OUTPATIENT
Start: 2018-01-29 | End: 2018-01-31 | Stop reason: HOSPADM

## 2018-01-29 RX ORDER — SODIUM CHLORIDE 0.9 % (FLUSH) 0.9 %
10 SYRINGE (ML) INJECTION AS NEEDED
Status: DISCONTINUED | OUTPATIENT
Start: 2018-01-29 | End: 2018-01-31 | Stop reason: HOSPADM

## 2018-01-29 RX ORDER — ONDANSETRON 4 MG/1
4 TABLET, ORALLY DISINTEGRATING ORAL EVERY 6 HOURS PRN
Status: DISCONTINUED | OUTPATIENT
Start: 2018-01-29 | End: 2018-01-31 | Stop reason: HOSPADM

## 2018-01-29 RX ADMIN — PHYTONADIONE 10 MG: 10 INJECTION, EMULSION INTRAMUSCULAR; INTRAVENOUS; SUBCUTANEOUS at 18:58

## 2018-01-29 RX ADMIN — LIDOCAINE HYDROCHLORIDE: 20 JELLY TOPICAL at 19:37

## 2018-01-30 LAB
ABO + RH BLD: NORMAL
ABO + RH BLD: NORMAL
ANION GAP SERPL CALCULATED.3IONS-SCNC: 14.9 MMOL/L
BASOPHILS # BLD AUTO: 0.02 10*3/MM3 (ref 0–0.2)
BASOPHILS NFR BLD AUTO: 0.4 % (ref 0–1.5)
BH BB BLOOD EXPIRATION DATE: NORMAL
BH BB BLOOD EXPIRATION DATE: NORMAL
BH BB BLOOD TYPE BARCODE: 600
BH BB BLOOD TYPE BARCODE: 6200
BH BB DISPENSE STATUS: NORMAL
BH BB DISPENSE STATUS: NORMAL
BH BB PRODUCT CODE: NORMAL
BH BB PRODUCT CODE: NORMAL
BH BB UNIT NUMBER: NORMAL
BH BB UNIT NUMBER: NORMAL
BUN BLD-MCNC: 18 MG/DL (ref 8–23)
BUN/CREAT SERPL: 20.7 (ref 7–25)
CALCIUM SPEC-SCNC: 9.2 MG/DL (ref 8.6–10.5)
CHLORIDE SERPL-SCNC: 101 MMOL/L (ref 98–107)
CO2 SERPL-SCNC: 26.1 MMOL/L (ref 22–29)
CREAT BLD-MCNC: 0.87 MG/DL (ref 0.76–1.27)
DEPRECATED RDW RBC AUTO: 43.6 FL (ref 37–54)
EOSINOPHIL # BLD AUTO: 0.14 10*3/MM3 (ref 0–0.7)
EOSINOPHIL NFR BLD AUTO: 2.5 % (ref 0.3–6.2)
ERYTHROCYTE [DISTWIDTH] IN BLOOD BY AUTOMATED COUNT: 12.5 % (ref 11.5–14.5)
GFR SERPL CREATININE-BSD FRML MDRD: 85 ML/MIN/1.73
GLUCOSE BLD-MCNC: 72 MG/DL (ref 65–99)
HCT VFR BLD AUTO: 34.2 % (ref 40.4–52.2)
HGB BLD-MCNC: 11.3 G/DL (ref 13.7–17.6)
IMM GRANULOCYTES # BLD: 0 10*3/MM3 (ref 0–0.03)
IMM GRANULOCYTES NFR BLD: 0 % (ref 0–0.5)
INR PPP: 1.37 (ref 0.9–1.1)
LYMPHOCYTES # BLD AUTO: 0.84 10*3/MM3 (ref 0.9–4.8)
LYMPHOCYTES NFR BLD AUTO: 15.2 % (ref 19.6–45.3)
MCH RBC QN AUTO: 31.7 PG (ref 27–32.7)
MCHC RBC AUTO-ENTMCNC: 33 G/DL (ref 32.6–36.4)
MCV RBC AUTO: 95.8 FL (ref 79.8–96.2)
MONOCYTES # BLD AUTO: 0.5 10*3/MM3 (ref 0.2–1.2)
MONOCYTES NFR BLD AUTO: 9.1 % (ref 5–12)
NEUTROPHILS # BLD AUTO: 4.02 10*3/MM3 (ref 1.9–8.1)
NEUTROPHILS NFR BLD AUTO: 72.8 % (ref 42.7–76)
PLATELET # BLD AUTO: 135 10*3/MM3 (ref 140–500)
PMV BLD AUTO: 12.5 FL (ref 6–12)
POTASSIUM BLD-SCNC: 3.8 MMOL/L (ref 3.5–5.2)
PROTHROMBIN TIME: 16.4 SECONDS (ref 11.7–14.2)
RBC # BLD AUTO: 3.57 10*6/MM3 (ref 4.6–6)
SODIUM BLD-SCNC: 142 MMOL/L (ref 136–145)
UNIT  ABO: NORMAL
UNIT  ABO: NORMAL
UNIT  RH: NORMAL
UNIT  RH: NORMAL
WBC NRBC COR # BLD: 5.52 10*3/MM3 (ref 4.5–10.7)

## 2018-01-30 PROCEDURE — 85610 PROTHROMBIN TIME: CPT | Performed by: INTERNAL MEDICINE

## 2018-01-30 PROCEDURE — 85025 COMPLETE CBC W/AUTO DIFF WBC: CPT | Performed by: INTERNAL MEDICINE

## 2018-01-30 PROCEDURE — 86927 PLASMA FRESH FROZEN: CPT

## 2018-01-30 PROCEDURE — 80048 BASIC METABOLIC PNL TOTAL CA: CPT | Performed by: INTERNAL MEDICINE

## 2018-01-30 PROCEDURE — P9017 PLASMA 1 DONOR FRZ W/IN 8 HR: HCPCS

## 2018-01-30 PROCEDURE — 36430 TRANSFUSION BLD/BLD COMPNT: CPT

## 2018-01-30 RX ORDER — AMIODARONE HYDROCHLORIDE 200 MG/1
100 TABLET ORAL DAILY
Status: DISCONTINUED | OUTPATIENT
Start: 2018-01-30 | End: 2018-01-31 | Stop reason: HOSPADM

## 2018-01-30 RX ORDER — AMLODIPINE BESYLATE 5 MG/1
5 TABLET ORAL DAILY
Status: DISCONTINUED | OUTPATIENT
Start: 2018-01-30 | End: 2018-01-31 | Stop reason: HOSPADM

## 2018-01-30 RX ORDER — TERAZOSIN 2 MG/1
2 CAPSULE ORAL NIGHTLY
Status: DISCONTINUED | OUTPATIENT
Start: 2018-01-30 | End: 2018-01-31 | Stop reason: HOSPADM

## 2018-01-30 RX ORDER — ASPIRIN 81 MG/1
81 TABLET ORAL DAILY
Status: DISCONTINUED | OUTPATIENT
Start: 2018-01-30 | End: 2018-01-30

## 2018-01-30 RX ORDER — CYCLOBENZAPRINE HCL 10 MG
10 TABLET ORAL 3 TIMES DAILY PRN
Status: DISCONTINUED | OUTPATIENT
Start: 2018-01-30 | End: 2018-01-31 | Stop reason: HOSPADM

## 2018-01-30 RX ORDER — SACCHAROMYCES BOULARDII 250 MG
250 CAPSULE ORAL 2 TIMES DAILY
Status: DISCONTINUED | OUTPATIENT
Start: 2018-01-30 | End: 2018-01-31 | Stop reason: HOSPADM

## 2018-01-30 RX ORDER — ROSUVASTATIN CALCIUM 5 MG/1
5 TABLET, COATED ORAL DAILY
Status: DISCONTINUED | OUTPATIENT
Start: 2018-01-30 | End: 2018-01-31 | Stop reason: HOSPADM

## 2018-01-30 RX ORDER — ISOSORBIDE MONONITRATE 30 MG/1
30 TABLET, EXTENDED RELEASE ORAL DAILY
Status: DISCONTINUED | OUTPATIENT
Start: 2018-01-30 | End: 2018-01-31 | Stop reason: HOSPADM

## 2018-01-30 RX ORDER — VALSARTAN 160 MG/1
160 TABLET ORAL
Status: DISCONTINUED | OUTPATIENT
Start: 2018-01-30 | End: 2018-01-30

## 2018-01-30 RX ORDER — FINASTERIDE 5 MG/1
5 TABLET, FILM COATED ORAL DAILY
Status: DISCONTINUED | OUTPATIENT
Start: 2018-01-30 | End: 2018-01-31 | Stop reason: HOSPADM

## 2018-01-30 RX ORDER — PANTOPRAZOLE SODIUM 40 MG/1
40 TABLET, DELAYED RELEASE ORAL EVERY MORNING
Status: DISCONTINUED | OUTPATIENT
Start: 2018-01-30 | End: 2018-01-31 | Stop reason: HOSPADM

## 2018-01-30 RX ORDER — LEVOTHYROXINE SODIUM 112 UG/1
112 TABLET ORAL DAILY
Status: DISCONTINUED | OUTPATIENT
Start: 2018-01-30 | End: 2018-01-31 | Stop reason: HOSPADM

## 2018-01-30 RX ADMIN — DOCUSATE SODIUM 100 MG: 100 CAPSULE ORAL at 05:34

## 2018-01-30 RX ADMIN — FINASTERIDE 5 MG: 5 TABLET, FILM COATED ORAL at 09:56

## 2018-01-30 RX ADMIN — Medication 250 MG: at 21:23

## 2018-01-30 RX ADMIN — DOCUSATE SODIUM 100 MG: 100 CAPSULE ORAL at 21:23

## 2018-01-30 RX ADMIN — TERAZOSIN HYDROCHLORIDE 2 MG: 2 CAPSULE ORAL at 21:23

## 2018-01-30 RX ADMIN — ROSUVASTATIN CALCIUM 5 MG: 5 TABLET, FILM COATED ORAL at 09:59

## 2018-01-30 RX ADMIN — Medication 250 MG: at 09:56

## 2018-01-30 RX ADMIN — AMIODARONE HYDROCHLORIDE 100 MG: 200 TABLET ORAL at 09:58

## 2018-01-30 RX ADMIN — DOCUSATE SODIUM 100 MG: 100 CAPSULE ORAL at 09:58

## 2018-01-30 RX ADMIN — ISOSORBIDE MONONITRATE 30 MG: 30 TABLET ORAL at 09:56

## 2018-01-30 RX ADMIN — TERAZOSIN HYDROCHLORIDE 2 MG: 2 CAPSULE ORAL at 05:30

## 2018-01-30 RX ADMIN — LEVOTHYROXINE SODIUM 112 MCG: 112 TABLET ORAL at 09:58

## 2018-01-30 RX ADMIN — AMLODIPINE BESYLATE 5 MG: 5 TABLET ORAL at 09:56

## 2018-01-31 VITALS
WEIGHT: 209.6 LBS | BODY MASS INDEX: 29.34 KG/M2 | HEART RATE: 70 BPM | OXYGEN SATURATION: 93 % | SYSTOLIC BLOOD PRESSURE: 104 MMHG | DIASTOLIC BLOOD PRESSURE: 72 MMHG | TEMPERATURE: 97.8 F | HEIGHT: 71 IN | RESPIRATION RATE: 20 BRPM

## 2018-01-31 LAB
ANION GAP SERPL CALCULATED.3IONS-SCNC: 11.8 MMOL/L
BASOPHILS # BLD AUTO: 0.02 10*3/MM3 (ref 0–0.2)
BASOPHILS NFR BLD AUTO: 0.3 % (ref 0–1.5)
BUN BLD-MCNC: 18 MG/DL (ref 8–23)
BUN/CREAT SERPL: 17.3 (ref 7–25)
CALCIUM SPEC-SCNC: 8.8 MG/DL (ref 8.6–10.5)
CHLORIDE SERPL-SCNC: 101 MMOL/L (ref 98–107)
CO2 SERPL-SCNC: 25.2 MMOL/L (ref 22–29)
CREAT BLD-MCNC: 1.04 MG/DL (ref 0.76–1.27)
DEPRECATED RDW RBC AUTO: 43.7 FL (ref 37–54)
EOSINOPHIL # BLD AUTO: 0.2 10*3/MM3 (ref 0–0.7)
EOSINOPHIL NFR BLD AUTO: 3.3 % (ref 0.3–6.2)
ERYTHROCYTE [DISTWIDTH] IN BLOOD BY AUTOMATED COUNT: 12.4 % (ref 11.5–14.5)
GFR SERPL CREATININE-BSD FRML MDRD: 69 ML/MIN/1.73
GLUCOSE BLD-MCNC: 80 MG/DL (ref 65–99)
HCT VFR BLD AUTO: 34.3 % (ref 40.4–52.2)
HGB BLD-MCNC: 11.1 G/DL (ref 13.7–17.6)
IMM GRANULOCYTES # BLD: 0 10*3/MM3 (ref 0–0.03)
IMM GRANULOCYTES NFR BLD: 0 % (ref 0–0.5)
INR PPP: 1.28 (ref 0.9–1.1)
LYMPHOCYTES # BLD AUTO: 1.04 10*3/MM3 (ref 0.9–4.8)
LYMPHOCYTES NFR BLD AUTO: 17 % (ref 19.6–45.3)
MCH RBC QN AUTO: 31.3 PG (ref 27–32.7)
MCHC RBC AUTO-ENTMCNC: 32.4 G/DL (ref 32.6–36.4)
MCV RBC AUTO: 96.6 FL (ref 79.8–96.2)
MONOCYTES # BLD AUTO: 0.6 10*3/MM3 (ref 0.2–1.2)
MONOCYTES NFR BLD AUTO: 9.8 % (ref 5–12)
NEUTROPHILS # BLD AUTO: 4.25 10*3/MM3 (ref 1.9–8.1)
NEUTROPHILS NFR BLD AUTO: 69.6 % (ref 42.7–76)
PLATELET # BLD AUTO: 145 10*3/MM3 (ref 140–500)
PMV BLD AUTO: 12 FL (ref 6–12)
POTASSIUM BLD-SCNC: 3.5 MMOL/L (ref 3.5–5.2)
PROTHROMBIN TIME: 15.5 SECONDS (ref 11.7–14.2)
RBC # BLD AUTO: 3.55 10*6/MM3 (ref 4.6–6)
SODIUM BLD-SCNC: 138 MMOL/L (ref 136–145)
WBC NRBC COR # BLD: 6.11 10*3/MM3 (ref 4.5–10.7)

## 2018-01-31 PROCEDURE — G0378 HOSPITAL OBSERVATION PER HR: HCPCS

## 2018-01-31 PROCEDURE — 36415 COLL VENOUS BLD VENIPUNCTURE: CPT | Performed by: INTERNAL MEDICINE

## 2018-01-31 PROCEDURE — 80048 BASIC METABOLIC PNL TOTAL CA: CPT | Performed by: INTERNAL MEDICINE

## 2018-01-31 PROCEDURE — 85610 PROTHROMBIN TIME: CPT | Performed by: INTERNAL MEDICINE

## 2018-01-31 PROCEDURE — 85025 COMPLETE CBC W/AUTO DIFF WBC: CPT | Performed by: INTERNAL MEDICINE

## 2018-01-31 RX ADMIN — PANTOPRAZOLE SODIUM 40 MG: 40 TABLET, DELAYED RELEASE ORAL at 06:35

## 2018-01-31 RX ADMIN — AMIODARONE HYDROCHLORIDE 100 MG: 200 TABLET ORAL at 08:12

## 2018-01-31 RX ADMIN — LEVOTHYROXINE SODIUM 112 MCG: 112 TABLET ORAL at 08:12

## 2018-01-31 RX ADMIN — ROSUVASTATIN CALCIUM 5 MG: 5 TABLET, FILM COATED ORAL at 08:13

## 2018-01-31 RX ADMIN — FINASTERIDE 5 MG: 5 TABLET, FILM COATED ORAL at 08:12

## 2018-01-31 RX ADMIN — Medication 250 MG: at 08:13

## 2018-01-31 RX ADMIN — AMLODIPINE BESYLATE 5 MG: 5 TABLET ORAL at 08:12

## 2018-01-31 RX ADMIN — ISOSORBIDE MONONITRATE 30 MG: 30 TABLET ORAL at 08:13

## 2018-02-02 ENCOUNTER — APPOINTMENT (OUTPATIENT)
Dept: GENERAL RADIOLOGY | Facility: HOSPITAL | Age: 78
End: 2018-02-02

## 2018-02-02 ENCOUNTER — TELEPHONE (OUTPATIENT)
Dept: CARDIOLOGY | Facility: CLINIC | Age: 78
End: 2018-02-02

## 2018-02-02 ENCOUNTER — HOSPITAL ENCOUNTER (INPATIENT)
Facility: HOSPITAL | Age: 78
LOS: 3 days | Discharge: HOME | End: 2018-02-05
Attending: EMERGENCY MEDICINE | Admitting: INTERNAL MEDICINE

## 2018-02-02 DIAGNOSIS — A41.9 SEPSIS, DUE TO UNSPECIFIED ORGANISM: Primary | ICD-10-CM

## 2018-02-02 LAB
ALBUMIN SERPL-MCNC: 3.7 G/DL (ref 3.5–5.2)
ALBUMIN/GLOB SERPL: 1.2 G/DL
ALP SERPL-CCNC: 52 U/L (ref 39–117)
ALT SERPL W P-5'-P-CCNC: 14 U/L (ref 1–41)
ANION GAP SERPL CALCULATED.3IONS-SCNC: 14.9 MMOL/L
APTT PPP: 27.8 SECONDS (ref 22.7–35.4)
AST SERPL-CCNC: 12 U/L (ref 1–40)
BACTERIA UR QL AUTO: ABNORMAL /HPF
BASOPHILS # BLD AUTO: 0.01 10*3/MM3 (ref 0–0.2)
BASOPHILS NFR BLD AUTO: 0 % (ref 0–1.5)
BILIRUB SERPL-MCNC: 1 MG/DL (ref 0.1–1.2)
BILIRUB UR QL STRIP: NEGATIVE
BUN BLD-MCNC: 24 MG/DL (ref 8–23)
BUN/CREAT SERPL: 19.7 (ref 7–25)
CALCIUM SPEC-SCNC: 9.4 MG/DL (ref 8.6–10.5)
CHLORIDE SERPL-SCNC: 97 MMOL/L (ref 98–107)
CLARITY UR: CLEAR
CO2 SERPL-SCNC: 26.1 MMOL/L (ref 22–29)
COLOR UR: YELLOW
CREAT BLD-MCNC: 1.22 MG/DL (ref 0.76–1.27)
D-LACTATE SERPL-SCNC: 1.9 MMOL/L (ref 0.5–2)
DEPRECATED RDW RBC AUTO: 43.1 FL (ref 37–54)
EOSINOPHIL # BLD AUTO: 0 10*3/MM3 (ref 0–0.7)
EOSINOPHIL NFR BLD AUTO: 0 % (ref 0.3–6.2)
ERYTHROCYTE [DISTWIDTH] IN BLOOD BY AUTOMATED COUNT: 12.4 % (ref 11.5–14.5)
FLUAV AG NPH QL: NEGATIVE
FLUBV AG NPH QL IA: NEGATIVE
GFR SERPL CREATININE-BSD FRML MDRD: 58 ML/MIN/1.73
GLOBULIN UR ELPH-MCNC: 3.1 GM/DL
GLUCOSE BLD-MCNC: 139 MG/DL (ref 65–99)
GLUCOSE BLDC GLUCOMTR-MCNC: 113 MG/DL (ref 70–130)
GLUCOSE UR STRIP-MCNC: NEGATIVE MG/DL
HCT VFR BLD AUTO: 36.1 % (ref 40.4–52.2)
HGB BLD-MCNC: 11.8 G/DL (ref 13.7–17.6)
HGB UR QL STRIP.AUTO: ABNORMAL
HOLD SPECIMEN: NORMAL
HOLD SPECIMEN: NORMAL
HYALINE CASTS UR QL AUTO: ABNORMAL /LPF
IMM GRANULOCYTES # BLD: 0.07 10*3/MM3 (ref 0–0.03)
IMM GRANULOCYTES NFR BLD: 0.3 % (ref 0–0.5)
INR PPP: 1.34 (ref 0.9–1.1)
KETONES UR QL STRIP: ABNORMAL
LEUKOCYTE ESTERASE UR QL STRIP.AUTO: ABNORMAL
LYMPHOCYTES # BLD AUTO: 0.2 10*3/MM3 (ref 0.9–4.8)
LYMPHOCYTES NFR BLD AUTO: 1 % (ref 19.6–45.3)
MCH RBC QN AUTO: 31.4 PG (ref 27–32.7)
MCHC RBC AUTO-ENTMCNC: 32.7 G/DL (ref 32.6–36.4)
MCV RBC AUTO: 96 FL (ref 79.8–96.2)
MONOCYTES # BLD AUTO: 0.63 10*3/MM3 (ref 0.2–1.2)
MONOCYTES NFR BLD AUTO: 3.1 % (ref 5–12)
NEUTROPHILS # BLD AUTO: 19.24 10*3/MM3 (ref 1.9–8.1)
NEUTROPHILS NFR BLD AUTO: 95.6 % (ref 42.7–76)
NITRITE UR QL STRIP: NEGATIVE
NRBC BLD MANUAL-RTO: 0 /100 WBC (ref 0–0)
PH UR STRIP.AUTO: 5.5 [PH] (ref 5–8)
PLATELET # BLD AUTO: 163 10*3/MM3 (ref 140–500)
PMV BLD AUTO: 11.4 FL (ref 6–12)
POTASSIUM BLD-SCNC: 3 MMOL/L (ref 3.5–5.2)
PROCALCITONIN SERPL-MCNC: 22.76 NG/ML (ref 0.1–0.25)
PROT SERPL-MCNC: 6.8 G/DL (ref 6–8.5)
PROT UR QL STRIP: ABNORMAL
PROTHROMBIN TIME: 16.1 SECONDS (ref 11.7–14.2)
RBC # BLD AUTO: 3.76 10*6/MM3 (ref 4.6–6)
RBC # UR: ABNORMAL /HPF
REF LAB TEST METHOD: ABNORMAL
SODIUM BLD-SCNC: 138 MMOL/L (ref 136–145)
SP GR UR STRIP: 1.02 (ref 1–1.03)
SQUAMOUS #/AREA URNS HPF: ABNORMAL /HPF
UROBILINOGEN UR QL STRIP: ABNORMAL
WBC NRBC COR # BLD: 20.15 10*3/MM3 (ref 4.5–10.7)
WBC UR QL AUTO: ABNORMAL /HPF
WHOLE BLOOD HOLD SPECIMEN: NORMAL
WHOLE BLOOD HOLD SPECIMEN: NORMAL

## 2018-02-02 PROCEDURE — 85025 COMPLETE CBC W/AUTO DIFF WBC: CPT | Performed by: EMERGENCY MEDICINE

## 2018-02-02 PROCEDURE — 81001 URINALYSIS AUTO W/SCOPE: CPT | Performed by: EMERGENCY MEDICINE

## 2018-02-02 PROCEDURE — 80053 COMPREHEN METABOLIC PANEL: CPT | Performed by: EMERGENCY MEDICINE

## 2018-02-02 PROCEDURE — 93010 ELECTROCARDIOGRAM REPORT: CPT | Performed by: INTERNAL MEDICINE

## 2018-02-02 PROCEDURE — 93005 ELECTROCARDIOGRAM TRACING: CPT | Performed by: EMERGENCY MEDICINE

## 2018-02-02 PROCEDURE — 71046 X-RAY EXAM CHEST 2 VIEWS: CPT

## 2018-02-02 PROCEDURE — 99285 EMERGENCY DEPT VISIT HI MDM: CPT

## 2018-02-02 PROCEDURE — 25010000002 PIPERACILLIN SOD-TAZOBACTAM PER 1 G: Performed by: INTERNAL MEDICINE

## 2018-02-02 PROCEDURE — 25010000002 VANCOMYCIN 10 G RECONSTITUTED SOLUTION: Performed by: EMERGENCY MEDICINE

## 2018-02-02 PROCEDURE — 87040 BLOOD CULTURE FOR BACTERIA: CPT | Performed by: EMERGENCY MEDICINE

## 2018-02-02 PROCEDURE — 87804 INFLUENZA ASSAY W/OPTIC: CPT | Performed by: EMERGENCY MEDICINE

## 2018-02-02 PROCEDURE — 36415 COLL VENOUS BLD VENIPUNCTURE: CPT

## 2018-02-02 PROCEDURE — 87086 URINE CULTURE/COLONY COUNT: CPT | Performed by: EMERGENCY MEDICINE

## 2018-02-02 PROCEDURE — 85730 THROMBOPLASTIN TIME PARTIAL: CPT | Performed by: EMERGENCY MEDICINE

## 2018-02-02 PROCEDURE — 85610 PROTHROMBIN TIME: CPT | Performed by: EMERGENCY MEDICINE

## 2018-02-02 PROCEDURE — 84145 PROCALCITONIN (PCT): CPT | Performed by: EMERGENCY MEDICINE

## 2018-02-02 PROCEDURE — 83605 ASSAY OF LACTIC ACID: CPT | Performed by: EMERGENCY MEDICINE

## 2018-02-02 PROCEDURE — 25010000002 VANCOMYCIN 10 G RECONSTITUTED SOLUTION: Performed by: INTERNAL MEDICINE

## 2018-02-02 PROCEDURE — 87186 SC STD MICRODIL/AGAR DIL: CPT | Performed by: EMERGENCY MEDICINE

## 2018-02-02 PROCEDURE — 25010000002 PIPERACILLIN SOD-TAZOBACTAM PER 1 G: Performed by: EMERGENCY MEDICINE

## 2018-02-02 PROCEDURE — 87077 CULTURE AEROBIC IDENTIFY: CPT | Performed by: EMERGENCY MEDICINE

## 2018-02-02 PROCEDURE — 87150 DNA/RNA AMPLIFIED PROBE: CPT | Performed by: EMERGENCY MEDICINE

## 2018-02-02 PROCEDURE — 82962 GLUCOSE BLOOD TEST: CPT

## 2018-02-02 RX ORDER — FINASTERIDE 5 MG/1
5 TABLET, FILM COATED ORAL DAILY
Status: DISCONTINUED | OUTPATIENT
Start: 2018-02-02 | End: 2018-02-05 | Stop reason: HOSPADM

## 2018-02-02 RX ORDER — ROSUVASTATIN CALCIUM 5 MG/1
5 TABLET, COATED ORAL DAILY
Status: DISCONTINUED | OUTPATIENT
Start: 2018-02-02 | End: 2018-02-05 | Stop reason: HOSPADM

## 2018-02-02 RX ORDER — SODIUM CHLORIDE 9 MG/ML
100 INJECTION, SOLUTION INTRAVENOUS CONTINUOUS
Status: DISCONTINUED | OUTPATIENT
Start: 2018-02-02 | End: 2018-02-03

## 2018-02-02 RX ORDER — FAMOTIDINE 10 MG/ML
20 INJECTION, SOLUTION INTRAVENOUS EVERY 12 HOURS SCHEDULED
Status: DISCONTINUED | OUTPATIENT
Start: 2018-02-02 | End: 2018-02-05 | Stop reason: CLARIF

## 2018-02-02 RX ORDER — NEBIVOLOL 10 MG/1
5 TABLET ORAL DAILY
COMMUNITY
End: 2019-07-11 | Stop reason: SDUPTHER

## 2018-02-02 RX ORDER — SODIUM CHLORIDE 0.9 % (FLUSH) 0.9 %
10 SYRINGE (ML) INJECTION AS NEEDED
Status: DISCONTINUED | OUTPATIENT
Start: 2018-02-02 | End: 2018-02-05 | Stop reason: HOSPADM

## 2018-02-02 RX ORDER — PANTOPRAZOLE SODIUM 40 MG/1
40 TABLET, DELAYED RELEASE ORAL EVERY MORNING
Status: DISCONTINUED | OUTPATIENT
Start: 2018-02-03 | End: 2018-02-05 | Stop reason: HOSPADM

## 2018-02-02 RX ORDER — AMIODARONE HYDROCHLORIDE 200 MG/1
100 TABLET ORAL DAILY
Status: DISCONTINUED | OUTPATIENT
Start: 2018-02-02 | End: 2018-02-05

## 2018-02-02 RX ORDER — LEVOTHYROXINE SODIUM 112 UG/1
112 TABLET ORAL EVERY MORNING
Status: DISCONTINUED | OUTPATIENT
Start: 2018-02-03 | End: 2018-02-05 | Stop reason: HOSPADM

## 2018-02-02 RX ORDER — CYCLOBENZAPRINE HCL 10 MG
10 TABLET ORAL 2 TIMES DAILY PRN
COMMUNITY
End: 2019-05-10

## 2018-02-02 RX ORDER — SODIUM CHLORIDE 9 MG/ML
125 INJECTION, SOLUTION INTRAVENOUS CONTINUOUS
Status: DISCONTINUED | OUTPATIENT
Start: 2018-02-02 | End: 2018-02-03

## 2018-02-02 RX ORDER — ONDANSETRON 2 MG/ML
4 INJECTION INTRAMUSCULAR; INTRAVENOUS EVERY 6 HOURS PRN
Status: DISCONTINUED | OUTPATIENT
Start: 2018-02-02 | End: 2018-02-05

## 2018-02-02 RX ORDER — ISOSORBIDE MONONITRATE 30 MG/1
30 TABLET, EXTENDED RELEASE ORAL DAILY
Status: DISCONTINUED | OUTPATIENT
Start: 2018-02-02 | End: 2018-02-05 | Stop reason: HOSPADM

## 2018-02-02 RX ADMIN — SODIUM CHLORIDE 125 ML/HR: 9 INJECTION, SOLUTION INTRAVENOUS at 12:18

## 2018-02-02 RX ADMIN — VANCOMYCIN HYDROCHLORIDE 1750 MG: 10 INJECTION, POWDER, LYOPHILIZED, FOR SOLUTION INTRAVENOUS at 12:42

## 2018-02-02 RX ADMIN — ISOSORBIDE MONONITRATE 30 MG: 30 TABLET ORAL at 20:07

## 2018-02-02 RX ADMIN — AMIODARONE HYDROCHLORIDE 100 MG: 200 TABLET ORAL at 20:07

## 2018-02-02 RX ADMIN — TAZOBACTAM SODIUM AND PIPERACILLIN SODIUM 3.38 G: 375; 3 INJECTION, SOLUTION INTRAVENOUS at 20:06

## 2018-02-02 RX ADMIN — SODIUM CHLORIDE 1000 ML: 9 INJECTION, SOLUTION INTRAVENOUS at 11:57

## 2018-02-02 RX ADMIN — VANCOMYCIN HYDROCHLORIDE 1250 MG: 10 INJECTION, POWDER, LYOPHILIZED, FOR SOLUTION INTRAVENOUS at 21:47

## 2018-02-02 RX ADMIN — TAZOBACTAM SODIUM AND PIPERACILLIN SODIUM 3.38 G: 375; 3 INJECTION, SOLUTION INTRAVENOUS at 12:15

## 2018-02-02 RX ADMIN — SODIUM CHLORIDE 200 ML/HR: 9 INJECTION, SOLUTION INTRAVENOUS at 21:47

## 2018-02-02 RX ADMIN — SODIUM CHLORIDE 1000 ML: 9 INJECTION, SOLUTION INTRAVENOUS at 11:05

## 2018-02-02 RX ADMIN — FAMOTIDINE 20 MG: 10 INJECTION INTRAVENOUS at 20:06

## 2018-02-02 RX ADMIN — SODIUM CHLORIDE 750 ML: 9 INJECTION, SOLUTION INTRAVENOUS at 13:45

## 2018-02-02 RX ADMIN — Medication 0.02 MCG/KG/MIN: at 22:49

## 2018-02-02 RX ADMIN — ROSUVASTATIN CALCIUM 5 MG: 5 TABLET, FILM COATED ORAL at 20:07

## 2018-02-02 NOTE — TELEPHONE ENCOUNTER
Mr Nahomi's daughter called and states that he is too weak to come in today to get INR checked. He was admitted DCd from hospital on 1/31/18 after coumadin toxicity. He was to come in today to get instructions. Daughter states that there is no way he can come today. I instructed that if he is that weak and having other symptoms he needs to be seen in ER. She verbalized understanding-ap

## 2018-02-03 ENCOUNTER — APPOINTMENT (OUTPATIENT)
Dept: CARDIOLOGY | Facility: HOSPITAL | Age: 78
End: 2018-02-03
Attending: INTERNAL MEDICINE

## 2018-02-03 LAB
ANION GAP SERPL CALCULATED.3IONS-SCNC: 10.3 MMOL/L
AORTIC ARCH: 2.9 CM
ASCENDING AORTA: 3.1 CM
BASOPHILS # BLD AUTO: 0.02 10*3/MM3 (ref 0–0.2)
BASOPHILS NFR BLD AUTO: 0.1 % (ref 0–1.5)
BH CV ECHO MEAS - ACS: 2.2 CM
BH CV ECHO MEAS - AO MEAN PG (FULL): 2 MMHG
BH CV ECHO MEAS - AO MEAN PG: 4 MMHG
BH CV ECHO MEAS - AO ROOT AREA (BSA CORRECTED): 1.6
BH CV ECHO MEAS - AO ROOT AREA: 9.6 CM^2
BH CV ECHO MEAS - AO ROOT DIAM: 3.5 CM
BH CV ECHO MEAS - AO V2 MAX: 139 CM/SEC
BH CV ECHO MEAS - AO V2 MEAN: 98.8 CM/SEC
BH CV ECHO MEAS - AO V2 VTI: 27.4 CM
BH CV ECHO MEAS - ASC AORTA: 3.1 CM
BH CV ECHO MEAS - AVA(I,A): 2.6 CM^2
BH CV ECHO MEAS - AVA(I,D): 2.6 CM^2
BH CV ECHO MEAS - BSA(HAYCOCK): 2.2 M^2
BH CV ECHO MEAS - BSA: 2.2 M^2
BH CV ECHO MEAS - BZI_BMI: 30.1 KILOGRAMS/M^2
BH CV ECHO MEAS - BZI_METRIC_HEIGHT: 180.3 CM
BH CV ECHO MEAS - BZI_METRIC_WEIGHT: 98 KG
BH CV ECHO MEAS - CONTRAST EF (2CH): 53.3 ML/M^2
BH CV ECHO MEAS - CONTRAST EF 4CH: 52.3 ML/M^2
BH CV ECHO MEAS - EDV(CUBED): 74.1 ML
BH CV ECHO MEAS - EDV(MOD-SP2): 60 ML
BH CV ECHO MEAS - EDV(MOD-SP4): 88 ML
BH CV ECHO MEAS - EDV(TEICH): 78.6 ML
BH CV ECHO MEAS - EF(CUBED): 63.6 %
BH CV ECHO MEAS - EF(MOD-SP2): 53.3 %
BH CV ECHO MEAS - EF(MOD-SP4): 52.3 %
BH CV ECHO MEAS - EF(TEICH): 55.5 %
BH CV ECHO MEAS - ESV(CUBED): 27 ML
BH CV ECHO MEAS - ESV(MOD-SP2): 28 ML
BH CV ECHO MEAS - ESV(MOD-SP4): 42 ML
BH CV ECHO MEAS - ESV(TEICH): 35 ML
BH CV ECHO MEAS - FS: 28.6 %
BH CV ECHO MEAS - IVS/LVPW: 1.1
BH CV ECHO MEAS - IVSD: 1.3 CM
BH CV ECHO MEAS - LV DIASTOLIC VOL/BSA (35-75): 40.4 ML/M^2
BH CV ECHO MEAS - LV MASS(C)D: 189.2 GRAMS
BH CV ECHO MEAS - LV MASS(C)DI: 86.8 GRAMS/M^2
BH CV ECHO MEAS - LV MEAN PG: 2 MMHG
BH CV ECHO MEAS - LV SYSTOLIC VOL/BSA (12-30): 19.3 ML/M^2
BH CV ECHO MEAS - LV V1 MAX: 89 CM/SEC
BH CV ECHO MEAS - LV V1 MEAN: 65.9 CM/SEC
BH CV ECHO MEAS - LV V1 VTI: 16.9 CM
BH CV ECHO MEAS - LVIDD: 4.2 CM
BH CV ECHO MEAS - LVIDS: 3 CM
BH CV ECHO MEAS - LVLD AP2: 6.1 CM
BH CV ECHO MEAS - LVLD AP4: 7.1 CM
BH CV ECHO MEAS - LVLS AP2: 5.6 CM
BH CV ECHO MEAS - LVLS AP4: 5.6 CM
BH CV ECHO MEAS - LVOT AREA (M): 4.2 CM^2
BH CV ECHO MEAS - LVOT AREA: 4.2 CM^2
BH CV ECHO MEAS - LVOT DIAM: 2.3 CM
BH CV ECHO MEAS - LVPWD: 1.2 CM
BH CV ECHO MEAS - MED PEAK E' VEL: 8 CM/SEC
BH CV ECHO MEAS - MV DEC SLOPE: 847 CM/SEC^2
BH CV ECHO MEAS - MV DEC TIME: 0.18 SEC
BH CV ECHO MEAS - MV E MAX VEL: 86.1 CM/SEC
BH CV ECHO MEAS - MV MEAN PG: 3 MMHG
BH CV ECHO MEAS - MV P1/2T MAX VEL: 122 CM/SEC
BH CV ECHO MEAS - MV P1/2T: 42.2 MSEC
BH CV ECHO MEAS - MV V2 MEAN: 75.3 CM/SEC
BH CV ECHO MEAS - MV V2 VTI: 17.7 CM
BH CV ECHO MEAS - MVA P1/2T LCG: 1.8 CM^2
BH CV ECHO MEAS - MVA(P1/2T): 5.2 CM^2
BH CV ECHO MEAS - MVA(VTI): 4 CM^2
BH CV ECHO MEAS - PA ACC SLOPE: 0 CM/SEC^2
BH CV ECHO MEAS - PA ACC TIME: 0.07 SEC
BH CV ECHO MEAS - PA MAX PG (FULL): 2 MMHG
BH CV ECHO MEAS - PA MAX PG: 4.9 MMHG
BH CV ECHO MEAS - PA PR(ACCEL): 47.5 MMHG
BH CV ECHO MEAS - PA V2 MAX: 111 CM/SEC
BH CV ECHO MEAS - PULM A REVS DUR: 0.07 SEC
BH CV ECHO MEAS - PULM A REVS VEL: 50.2 CM/SEC
BH CV ECHO MEAS - PULM DIAS VEL: 65.8 CM/SEC
BH CV ECHO MEAS - PULM S/D: 0.62
BH CV ECHO MEAS - PULM SYS VEL: 40.7 CM/SEC
BH CV ECHO MEAS - PVA(V,A): 2.2 CM^2
BH CV ECHO MEAS - PVA(V,D): 2.2 CM^2
BH CV ECHO MEAS - QP/QS: 0.75
BH CV ECHO MEAS - RAP SYSTOLE: 3 MMHG
BH CV ECHO MEAS - RV MAX PG: 2.9 MMHG
BH CV ECHO MEAS - RV MEAN PG: 2 MMHG
BH CV ECHO MEAS - RV V1 MAX: 85.2 CM/SEC
BH CV ECHO MEAS - RV V1 MEAN: 59.9 CM/SEC
BH CV ECHO MEAS - RV V1 VTI: 18.5 CM
BH CV ECHO MEAS - RVOT AREA: 2.8 CM^2
BH CV ECHO MEAS - RVOT DIAM: 1.9 CM
BH CV ECHO MEAS - RVSP: 28 MMHG
BH CV ECHO MEAS - SI(AO): 121 ML/M^2
BH CV ECHO MEAS - SI(CUBED): 21.6 ML/M^2
BH CV ECHO MEAS - SI(LVOT): 32.2 ML/M^2
BH CV ECHO MEAS - SI(MOD-SP2): 14.7 ML/M^2
BH CV ECHO MEAS - SI(MOD-SP4): 21.1 ML/M^2
BH CV ECHO MEAS - SI(TEICH): 20 ML/M^2
BH CV ECHO MEAS - SV(AO): 263.6 ML
BH CV ECHO MEAS - SV(CUBED): 47.1 ML
BH CV ECHO MEAS - SV(LVOT): 70.2 ML
BH CV ECHO MEAS - SV(MOD-SP2): 32 ML
BH CV ECHO MEAS - SV(MOD-SP4): 46 ML
BH CV ECHO MEAS - SV(RVOT): 52.5 ML
BH CV ECHO MEAS - SV(TEICH): 43.6 ML
BH CV ECHO MEAS - TAPSE (>1.6): 2.2 CM2
BH CV ECHO MEAS - TR MAX VEL: 248 CM/SEC
BH CV VAS BP RIGHT ARM: NORMAL MMHG
BH CV XLRA - RV BASE: 3.8 CM
BH CV XLRA - TDI S': 13 CM/SEC
BUN BLD-MCNC: 22 MG/DL (ref 8–23)
BUN/CREAT SERPL: 26.2 (ref 7–25)
CALCIUM SPEC-SCNC: 7.1 MG/DL (ref 8.6–10.5)
CHLORIDE SERPL-SCNC: 110 MMOL/L (ref 98–107)
CO2 SERPL-SCNC: 22.7 MMOL/L (ref 22–29)
CREAT BLD-MCNC: 0.84 MG/DL (ref 0.76–1.27)
DEPRECATED RDW RBC AUTO: 45.6 FL (ref 37–54)
EOSINOPHIL # BLD AUTO: 0.04 10*3/MM3 (ref 0–0.7)
EOSINOPHIL NFR BLD AUTO: 0.3 % (ref 0.3–6.2)
ERYTHROCYTE [DISTWIDTH] IN BLOOD BY AUTOMATED COUNT: 12.9 % (ref 11.5–14.5)
GFR SERPL CREATININE-BSD FRML MDRD: 89 ML/MIN/1.73
GLUCOSE BLD-MCNC: 157 MG/DL (ref 65–99)
HCT VFR BLD AUTO: 28 % (ref 40.4–52.2)
HGB BLD-MCNC: 9 G/DL (ref 13.7–17.6)
IMM GRANULOCYTES # BLD: 0.02 10*3/MM3 (ref 0–0.03)
IMM GRANULOCYTES NFR BLD: 0.1 % (ref 0–0.5)
LEFT ATRIUM VOLUME INDEX: 37 ML/M2
LV EF 2D ECHO EST: 52 %
LYMPHOCYTES # BLD AUTO: 0.54 10*3/MM3 (ref 0.9–4.8)
LYMPHOCYTES NFR BLD AUTO: 4 % (ref 19.6–45.3)
MAGNESIUM SERPL-MCNC: 1.6 MG/DL (ref 1.6–2.4)
MAXIMAL PREDICTED HEART RATE: 144 BPM
MCH RBC QN AUTO: 31.4 PG (ref 27–32.7)
MCHC RBC AUTO-ENTMCNC: 32.1 G/DL (ref 32.6–36.4)
MCV RBC AUTO: 97.6 FL (ref 79.8–96.2)
MONOCYTES # BLD AUTO: 0.72 10*3/MM3 (ref 0.2–1.2)
MONOCYTES NFR BLD AUTO: 5.4 % (ref 5–12)
NEUTROPHILS # BLD AUTO: 12.01 10*3/MM3 (ref 1.9–8.1)
NEUTROPHILS NFR BLD AUTO: 90.1 % (ref 42.7–76)
PLATELET # BLD AUTO: 138 10*3/MM3 (ref 140–500)
PMV BLD AUTO: 11.3 FL (ref 6–12)
POTASSIUM BLD-SCNC: 2.9 MMOL/L (ref 3.5–5.2)
POTASSIUM BLD-SCNC: 4.1 MMOL/L (ref 3.5–5.2)
PROCALCITONIN SERPL-MCNC: 19.52 NG/ML (ref 0.1–0.25)
RBC # BLD AUTO: 2.87 10*6/MM3 (ref 4.6–6)
SODIUM BLD-SCNC: 143 MMOL/L (ref 136–145)
STRESS TARGET HR: 122 BPM
WBC NRBC COR # BLD: 13.35 10*3/MM3 (ref 4.5–10.7)

## 2018-02-03 PROCEDURE — 25010000002 VANCOMYCIN 10 G RECONSTITUTED SOLUTION: Performed by: INTERNAL MEDICINE

## 2018-02-03 PROCEDURE — 93306 TTE W/DOPPLER COMPLETE: CPT

## 2018-02-03 PROCEDURE — 93005 ELECTROCARDIOGRAM TRACING: CPT | Performed by: INTERNAL MEDICINE

## 2018-02-03 PROCEDURE — 84145 PROCALCITONIN (PCT): CPT | Performed by: INTERNAL MEDICINE

## 2018-02-03 PROCEDURE — 84132 ASSAY OF SERUM POTASSIUM: CPT | Performed by: INTERNAL MEDICINE

## 2018-02-03 PROCEDURE — 80048 BASIC METABOLIC PNL TOTAL CA: CPT | Performed by: INTERNAL MEDICINE

## 2018-02-03 PROCEDURE — 93010 ELECTROCARDIOGRAM REPORT: CPT | Performed by: INTERNAL MEDICINE

## 2018-02-03 PROCEDURE — 93306 TTE W/DOPPLER COMPLETE: CPT | Performed by: INTERNAL MEDICINE

## 2018-02-03 PROCEDURE — 25010000002 PIPERACILLIN SOD-TAZOBACTAM PER 1 G: Performed by: INTERNAL MEDICINE

## 2018-02-03 PROCEDURE — 83735 ASSAY OF MAGNESIUM: CPT | Performed by: INTERNAL MEDICINE

## 2018-02-03 PROCEDURE — 25010000002 AMIODARONE IN DEXTROSE 5% 150-4.21 MG/100ML-% SOLUTION: Performed by: INTERNAL MEDICINE

## 2018-02-03 PROCEDURE — 94799 UNLISTED PULMONARY SVC/PX: CPT

## 2018-02-03 PROCEDURE — 85025 COMPLETE CBC W/AUTO DIFF WBC: CPT | Performed by: INTERNAL MEDICINE

## 2018-02-03 PROCEDURE — 99222 1ST HOSP IP/OBS MODERATE 55: CPT | Performed by: INTERNAL MEDICINE

## 2018-02-03 RX ORDER — POTASSIUM CHLORIDE 1.5 G/1.77G
40 POWDER, FOR SOLUTION ORAL AS NEEDED
Status: DISCONTINUED | OUTPATIENT
Start: 2018-02-03 | End: 2018-02-05 | Stop reason: HOSPADM

## 2018-02-03 RX ORDER — POTASSIUM CHLORIDE 750 MG/1
40 CAPSULE, EXTENDED RELEASE ORAL AS NEEDED
Status: DISCONTINUED | OUTPATIENT
Start: 2018-02-03 | End: 2018-02-05 | Stop reason: HOSPADM

## 2018-02-03 RX ADMIN — LEVOTHYROXINE SODIUM 112 MCG: 112 TABLET ORAL at 06:46

## 2018-02-03 RX ADMIN — PANTOPRAZOLE SODIUM 40 MG: 40 TABLET, DELAYED RELEASE ORAL at 06:46

## 2018-02-03 RX ADMIN — SODIUM CHLORIDE 200 ML/HR: 9 INJECTION, SOLUTION INTRAVENOUS at 08:19

## 2018-02-03 RX ADMIN — FAMOTIDINE 20 MG: 10 INJECTION INTRAVENOUS at 08:50

## 2018-02-03 RX ADMIN — ROSUVASTATIN CALCIUM 5 MG: 5 TABLET, FILM COATED ORAL at 08:49

## 2018-02-03 RX ADMIN — VANCOMYCIN HYDROCHLORIDE 1250 MG: 10 INJECTION, POWDER, LYOPHILIZED, FOR SOLUTION INTRAVENOUS at 21:50

## 2018-02-03 RX ADMIN — TAZOBACTAM SODIUM AND PIPERACILLIN SODIUM 3.38 G: 375; 3 INJECTION, SOLUTION INTRAVENOUS at 04:22

## 2018-02-03 RX ADMIN — AMIODARONE HYDROCHLORIDE 150 MG: 1.5 INJECTION, SOLUTION INTRAVENOUS at 10:32

## 2018-02-03 RX ADMIN — TAZOBACTAM SODIUM AND PIPERACILLIN SODIUM 3.38 G: 375; 3 INJECTION, SOLUTION INTRAVENOUS at 12:32

## 2018-02-03 RX ADMIN — TAZOBACTAM SODIUM AND PIPERACILLIN SODIUM 3.38 G: 375; 3 INJECTION, SOLUTION INTRAVENOUS at 20:11

## 2018-02-03 RX ADMIN — VANCOMYCIN HYDROCHLORIDE 1250 MG: 10 INJECTION, POWDER, LYOPHILIZED, FOR SOLUTION INTRAVENOUS at 09:42

## 2018-02-03 RX ADMIN — POTASSIUM CHLORIDE 40 MEQ: 750 CAPSULE, EXTENDED RELEASE ORAL at 06:46

## 2018-02-03 RX ADMIN — FAMOTIDINE 20 MG: 10 INJECTION INTRAVENOUS at 20:11

## 2018-02-03 RX ADMIN — AMIODARONE HYDROCHLORIDE 100 MG: 200 TABLET ORAL at 08:49

## 2018-02-03 RX ADMIN — SODIUM CHLORIDE 200 ML/HR: 9 INJECTION, SOLUTION INTRAVENOUS at 04:22

## 2018-02-03 RX ADMIN — FINASTERIDE 5 MG: 5 TABLET, FILM COATED ORAL at 08:50

## 2018-02-03 RX ADMIN — POTASSIUM CHLORIDE 40 MEQ: 750 CAPSULE, EXTENDED RELEASE ORAL at 09:42

## 2018-02-03 RX ADMIN — POTASSIUM CHLORIDE 40 MEQ: 750 CAPSULE, EXTENDED RELEASE ORAL at 16:53

## 2018-02-04 ENCOUNTER — APPOINTMENT (OUTPATIENT)
Dept: CT IMAGING | Facility: HOSPITAL | Age: 78
End: 2018-02-04

## 2018-02-04 LAB
ANION GAP SERPL CALCULATED.3IONS-SCNC: 6.4 MMOL/L
BACTERIA SPEC AEROBE CULT: ABNORMAL
BUN BLD-MCNC: 15 MG/DL (ref 8–23)
BUN/CREAT SERPL: 16 (ref 7–25)
CALCIUM SPEC-SCNC: 7.8 MG/DL (ref 8.6–10.5)
CHLORIDE SERPL-SCNC: 111 MMOL/L (ref 98–107)
CO2 SERPL-SCNC: 26.6 MMOL/L (ref 22–29)
CREAT BLD-MCNC: 0.94 MG/DL (ref 0.76–1.27)
DEPRECATED RDW RBC AUTO: 45.8 FL (ref 37–54)
ERYTHROCYTE [DISTWIDTH] IN BLOOD BY AUTOMATED COUNT: 13 % (ref 11.5–14.5)
GFR SERPL CREATININE-BSD FRML MDRD: 78 ML/MIN/1.73
GLUCOSE BLD-MCNC: 133 MG/DL (ref 65–99)
HCT VFR BLD AUTO: 32.6 % (ref 40.4–52.2)
HGB BLD-MCNC: 10.6 G/DL (ref 13.7–17.6)
INR PPP: 1.2 (ref 0.9–1.1)
MCH RBC QN AUTO: 31.5 PG (ref 27–32.7)
MCHC RBC AUTO-ENTMCNC: 32.5 G/DL (ref 32.6–36.4)
MCV RBC AUTO: 97 FL (ref 79.8–96.2)
PLATELET # BLD AUTO: 155 10*3/MM3 (ref 140–500)
PMV BLD AUTO: 11.7 FL (ref 6–12)
POTASSIUM BLD-SCNC: 4.2 MMOL/L (ref 3.5–5.2)
PROTHROMBIN TIME: 14.7 SECONDS (ref 11.7–14.2)
RBC # BLD AUTO: 3.36 10*6/MM3 (ref 4.6–6)
SODIUM BLD-SCNC: 144 MMOL/L (ref 136–145)
TROPONIN T SERPL-MCNC: <0.01 NG/ML (ref 0–0.03)
VANCOMYCIN TROUGH SERPL-MCNC: 12.3 MCG/ML (ref 5–20)
WBC NRBC COR # BLD: 7.53 10*3/MM3 (ref 4.5–10.7)

## 2018-02-04 PROCEDURE — 84484 ASSAY OF TROPONIN QUANT: CPT | Performed by: INTERNAL MEDICINE

## 2018-02-04 PROCEDURE — 25010000002 AMIODARONE IN DEXTROSE 5% 150-4.21 MG/100ML-% SOLUTION: Performed by: INTERNAL MEDICINE

## 2018-02-04 PROCEDURE — 25010000002 PIPERACILLIN SOD-TAZOBACTAM PER 1 G: Performed by: INTERNAL MEDICINE

## 2018-02-04 PROCEDURE — 80048 BASIC METABOLIC PNL TOTAL CA: CPT | Performed by: INTERNAL MEDICINE

## 2018-02-04 PROCEDURE — 99232 SBSQ HOSP IP/OBS MODERATE 35: CPT | Performed by: INTERNAL MEDICINE

## 2018-02-04 PROCEDURE — 85027 COMPLETE CBC AUTOMATED: CPT | Performed by: INTERNAL MEDICINE

## 2018-02-04 PROCEDURE — 85610 PROTHROMBIN TIME: CPT | Performed by: INTERNAL MEDICINE

## 2018-02-04 PROCEDURE — 0 IOPAMIDOL 61 % SOLUTION: Performed by: INTERNAL MEDICINE

## 2018-02-04 PROCEDURE — 74178 CT ABD&PLV WO CNTR FLWD CNTR: CPT

## 2018-02-04 PROCEDURE — 80202 ASSAY OF VANCOMYCIN: CPT | Performed by: INTERNAL MEDICINE

## 2018-02-04 RX ORDER — WARFARIN SODIUM 10 MG/1
10 TABLET ORAL
Status: COMPLETED | OUTPATIENT
Start: 2018-02-04 | End: 2018-02-04

## 2018-02-04 RX ADMIN — AMIODARONE HYDROCHLORIDE 100 MG: 200 TABLET ORAL at 09:32

## 2018-02-04 RX ADMIN — TAZOBACTAM SODIUM AND PIPERACILLIN SODIUM 3.38 G: 375; 3 INJECTION, SOLUTION INTRAVENOUS at 20:42

## 2018-02-04 RX ADMIN — TAZOBACTAM SODIUM AND PIPERACILLIN SODIUM 3.38 G: 375; 3 INJECTION, SOLUTION INTRAVENOUS at 12:32

## 2018-02-04 RX ADMIN — FAMOTIDINE 20 MG: 10 INJECTION INTRAVENOUS at 20:42

## 2018-02-04 RX ADMIN — PANTOPRAZOLE SODIUM 40 MG: 40 TABLET, DELAYED RELEASE ORAL at 06:59

## 2018-02-04 RX ADMIN — AMIODARONE HYDROCHLORIDE 150 MG: 1.5 INJECTION, SOLUTION INTRAVENOUS at 17:23

## 2018-02-04 RX ADMIN — ROSUVASTATIN CALCIUM 5 MG: 5 TABLET, FILM COATED ORAL at 09:32

## 2018-02-04 RX ADMIN — LEVOTHYROXINE SODIUM 112 MCG: 112 TABLET ORAL at 06:59

## 2018-02-04 RX ADMIN — FINASTERIDE 5 MG: 5 TABLET, FILM COATED ORAL at 09:32

## 2018-02-04 RX ADMIN — WARFARIN SODIUM 10 MG: 10 TABLET ORAL at 17:54

## 2018-02-04 RX ADMIN — TAZOBACTAM SODIUM AND PIPERACILLIN SODIUM 3.38 G: 375; 3 INJECTION, SOLUTION INTRAVENOUS at 04:21

## 2018-02-04 RX ADMIN — IOPAMIDOL 85 ML: 612 INJECTION, SOLUTION INTRAVENOUS at 09:00

## 2018-02-04 RX ADMIN — ISOSORBIDE MONONITRATE 30 MG: 30 TABLET ORAL at 09:32

## 2018-02-04 RX ADMIN — FAMOTIDINE 20 MG: 10 INJECTION INTRAVENOUS at 09:32

## 2018-02-05 VITALS
SYSTOLIC BLOOD PRESSURE: 157 MMHG | OXYGEN SATURATION: 95 % | TEMPERATURE: 98 F | HEART RATE: 65 BPM | RESPIRATION RATE: 19 BRPM | DIASTOLIC BLOOD PRESSURE: 104 MMHG | BODY MASS INDEX: 30.09 KG/M2 | HEIGHT: 71 IN | WEIGHT: 214.9 LBS

## 2018-02-05 LAB
DEPRECATED RDW RBC AUTO: 43.9 FL (ref 37–54)
ERYTHROCYTE [DISTWIDTH] IN BLOOD BY AUTOMATED COUNT: 12.6 % (ref 11.5–14.5)
HCT VFR BLD AUTO: 34.3 % (ref 40.4–52.2)
HGB BLD-MCNC: 11.3 G/DL (ref 13.7–17.6)
INR PPP: 1.1 (ref 0.9–1.1)
MCH RBC QN AUTO: 31.3 PG (ref 27–32.7)
MCHC RBC AUTO-ENTMCNC: 32.9 G/DL (ref 32.6–36.4)
MCV RBC AUTO: 95 FL (ref 79.8–96.2)
PLATELET # BLD AUTO: 188 10*3/MM3 (ref 140–500)
PMV BLD AUTO: 11.1 FL (ref 6–12)
PROTHROMBIN TIME: 13.8 SECONDS (ref 11.7–14.2)
RBC # BLD AUTO: 3.61 10*6/MM3 (ref 4.6–6)
WBC NRBC COR # BLD: 6.95 10*3/MM3 (ref 4.5–10.7)

## 2018-02-05 PROCEDURE — 99232 SBSQ HOSP IP/OBS MODERATE 35: CPT | Performed by: INTERNAL MEDICINE

## 2018-02-05 PROCEDURE — 85027 COMPLETE CBC AUTOMATED: CPT | Performed by: INTERNAL MEDICINE

## 2018-02-05 PROCEDURE — 25010000002 PIPERACILLIN SOD-TAZOBACTAM PER 1 G: Performed by: INTERNAL MEDICINE

## 2018-02-05 PROCEDURE — 85610 PROTHROMBIN TIME: CPT | Performed by: INTERNAL MEDICINE

## 2018-02-05 RX ORDER — AMIODARONE HYDROCHLORIDE 200 MG/1
200 TABLET ORAL DAILY
Status: DISCONTINUED | OUTPATIENT
Start: 2018-02-06 | End: 2018-02-05 | Stop reason: HOSPADM

## 2018-02-05 RX ORDER — FAMOTIDINE 20 MG/1
20 TABLET, FILM COATED ORAL EVERY 12 HOURS SCHEDULED
Status: DISCONTINUED | OUTPATIENT
Start: 2018-02-05 | End: 2018-02-05 | Stop reason: HOSPADM

## 2018-02-05 RX ORDER — NEBIVOLOL 5 MG/1
5 TABLET ORAL
Status: DISCONTINUED | OUTPATIENT
Start: 2018-02-05 | End: 2018-02-05 | Stop reason: HOSPADM

## 2018-02-05 RX ORDER — WARFARIN SODIUM 10 MG/1
10 TABLET ORAL
Status: DISCONTINUED | OUTPATIENT
Start: 2018-02-05 | End: 2018-02-05 | Stop reason: HOSPADM

## 2018-02-05 RX ORDER — AMOXICILLIN AND CLAVULANATE POTASSIUM 500; 125 MG/1; MG/1
1 TABLET, FILM COATED ORAL 3 TIMES DAILY
Qty: 4 TABLET | Refills: 0 | Status: SHIPPED | OUTPATIENT
Start: 2018-02-05 | End: 2018-02-07

## 2018-02-05 RX ORDER — AMOXICILLIN AND CLAVULANATE POTASSIUM 500; 125 MG/1; MG/1
1 TABLET, FILM COATED ORAL EVERY 8 HOURS SCHEDULED
Status: DISCONTINUED | OUTPATIENT
Start: 2018-02-05 | End: 2018-02-05 | Stop reason: HOSPADM

## 2018-02-05 RX ADMIN — TAZOBACTAM SODIUM AND PIPERACILLIN SODIUM 3.38 G: 375; 3 INJECTION, SOLUTION INTRAVENOUS at 03:53

## 2018-02-05 RX ADMIN — LEVOTHYROXINE SODIUM 112 MCG: 112 TABLET ORAL at 06:51

## 2018-02-05 RX ADMIN — AMOXICILLIN AND CLAVULANATE POTASSIUM 500 MG: 500; 125 TABLET, FILM COATED ORAL at 16:52

## 2018-02-05 RX ADMIN — FINASTERIDE 5 MG: 5 TABLET, FILM COATED ORAL at 08:15

## 2018-02-05 RX ADMIN — ISOSORBIDE MONONITRATE 30 MG: 30 TABLET ORAL at 08:16

## 2018-02-05 RX ADMIN — AMIODARONE HYDROCHLORIDE 100 MG: 200 TABLET ORAL at 08:15

## 2018-02-05 RX ADMIN — PANTOPRAZOLE SODIUM 40 MG: 40 TABLET, DELAYED RELEASE ORAL at 06:13

## 2018-02-05 RX ADMIN — ROSUVASTATIN CALCIUM 5 MG: 5 TABLET, FILM COATED ORAL at 08:15

## 2018-02-05 RX ADMIN — TAZOBACTAM SODIUM AND PIPERACILLIN SODIUM 3.38 G: 375; 3 INJECTION, SOLUTION INTRAVENOUS at 12:29

## 2018-02-07 LAB
BACTERIA BLD CULT: ABNORMAL
BACTERIA SPEC AEROBE CULT: NORMAL

## 2018-02-09 ENCOUNTER — ANTICOAGULATION VISIT (OUTPATIENT)
Dept: CARDIOLOGY | Facility: CLINIC | Age: 78
End: 2018-02-09

## 2018-02-09 ENCOUNTER — HOSPITAL ENCOUNTER (OUTPATIENT)
Dept: CARDIOLOGY | Facility: HOSPITAL | Age: 78
Discharge: HOME | End: 2018-02-09
Admitting: INTERNAL MEDICINE

## 2018-02-09 LAB
BACTERIA SPEC AEROBE CULT: ABNORMAL
BACTERIA SPEC AEROBE CULT: ABNORMAL
GRAM STN SPEC: ABNORMAL
INR PPP: 1.2

## 2018-02-09 PROCEDURE — 85610 PROTHROMBIN TIME: CPT | Performed by: INTERNAL MEDICINE

## 2018-02-09 NOTE — PROGRESS NOTES
Lab and found the patient's blood cultures positive for enterococcus.  I called and discussed with patient.  Currently he feels fine with no fever chills or urinary symptoms.  At this time patient earlier she could come and was 2/11/18 at 8:00 in the morning.  I have scheduled patient to have 2 sets of blood cultures done and if negative no further management will be done.  I gave patient my cell phone number.

## 2018-02-11 ENCOUNTER — LAB REQUISITION (OUTPATIENT)
Dept: LAB | Facility: HOSPITAL | Age: 78
End: 2018-02-11

## 2018-02-11 DIAGNOSIS — Z00.00 ENCOUNTER FOR GENERAL ADULT MEDICAL EXAMINATION WITHOUT ABNORMAL FINDINGS: ICD-10-CM

## 2018-02-11 PROCEDURE — 87040 BLOOD CULTURE FOR BACTERIA: CPT | Performed by: INTERNAL MEDICINE

## 2018-02-12 RX ORDER — AMIODARONE HYDROCHLORIDE 200 MG/1
TABLET ORAL
Qty: 30 TABLET | Refills: 1 | Status: SHIPPED | OUTPATIENT
Start: 2018-02-12 | End: 2018-03-19 | Stop reason: DRUGHIGH

## 2018-02-16 ENCOUNTER — ANTICOAGULATION VISIT (OUTPATIENT)
Dept: CARDIOLOGY | Facility: CLINIC | Age: 78
End: 2018-02-16

## 2018-02-16 ENCOUNTER — HOSPITAL ENCOUNTER (OUTPATIENT)
Dept: CARDIOLOGY | Facility: HOSPITAL | Age: 78
Discharge: HOME | End: 2018-02-16
Admitting: INTERNAL MEDICINE

## 2018-02-16 LAB
BACTERIA SPEC AEROBE CULT: NORMAL
BACTERIA SPEC AEROBE CULT: NORMAL
INR PPP: 3.2

## 2018-02-16 PROCEDURE — 85610 PROTHROMBIN TIME: CPT | Performed by: INTERNAL MEDICINE

## 2018-02-22 RX ORDER — OLMESARTAN MEDOXOMIL AND HYDROCHLOROTHIAZIDE 40/25 40; 25 MG/1; MG/1
TABLET ORAL
Qty: 90 TABLET | Refills: 0 | Status: SHIPPED | OUTPATIENT
Start: 2018-02-22 | End: 2019-03-15 | Stop reason: SDUPTHER

## 2018-02-27 ENCOUNTER — HOSPITAL ENCOUNTER (OUTPATIENT)
Dept: CARDIOLOGY | Facility: HOSPITAL | Age: 78
Discharge: HOME | End: 2018-02-27
Admitting: INTERNAL MEDICINE

## 2018-02-27 ENCOUNTER — ANTICOAGULATION VISIT (OUTPATIENT)
Dept: CARDIOLOGY | Facility: CLINIC | Age: 78
End: 2018-02-27

## 2018-02-27 LAB — INR PPP: 4

## 2018-02-27 PROCEDURE — 85610 PROTHROMBIN TIME: CPT | Performed by: INTERNAL MEDICINE

## 2018-03-06 ENCOUNTER — ANTICOAGULATION VISIT (OUTPATIENT)
Dept: CARDIOLOGY | Facility: CLINIC | Age: 78
End: 2018-03-06

## 2018-03-06 ENCOUNTER — HOSPITAL ENCOUNTER (OUTPATIENT)
Dept: CARDIOLOGY | Facility: HOSPITAL | Age: 78
Discharge: HOME | End: 2018-03-06
Admitting: INTERNAL MEDICINE

## 2018-03-06 LAB — INR PPP: 1.9

## 2018-03-06 PROCEDURE — 85610 PROTHROMBIN TIME: CPT | Performed by: INTERNAL MEDICINE

## 2018-03-07 RX ORDER — WARFARIN SODIUM 7.5 MG/1
TABLET ORAL
Qty: 90 TABLET | Refills: 1 | Status: SHIPPED | OUTPATIENT
Start: 2018-03-07 | End: 2018-09-25 | Stop reason: SDUPTHER

## 2018-03-19 ENCOUNTER — OFFICE VISIT (OUTPATIENT)
Dept: CARDIOLOGY | Facility: CLINIC | Age: 78
End: 2018-03-19

## 2018-03-19 ENCOUNTER — TELEPHONE (OUTPATIENT)
Dept: CARDIOLOGY | Facility: CLINIC | Age: 78
End: 2018-03-19

## 2018-03-19 VITALS
DIASTOLIC BLOOD PRESSURE: 81 MMHG | SYSTOLIC BLOOD PRESSURE: 125 MMHG | HEART RATE: 67 BPM | BODY MASS INDEX: 31.64 KG/M2 | WEIGHT: 226 LBS | HEIGHT: 71 IN

## 2018-03-19 DIAGNOSIS — E78.5 HYPERLIPIDEMIA, UNSPECIFIED HYPERLIPIDEMIA TYPE: ICD-10-CM

## 2018-03-19 DIAGNOSIS — I25.10 CHRONIC CORONARY ARTERY DISEASE: Primary | ICD-10-CM

## 2018-03-19 DIAGNOSIS — I48.0 PAROXYSMAL ATRIAL FIBRILLATION (HCC): ICD-10-CM

## 2018-03-19 DIAGNOSIS — I10 ESSENTIAL HYPERTENSION: ICD-10-CM

## 2018-03-19 PROCEDURE — 99213 OFFICE O/P EST LOW 20 MIN: CPT | Performed by: INTERNAL MEDICINE

## 2018-03-19 PROCEDURE — 93000 ELECTROCARDIOGRAM COMPLETE: CPT | Performed by: INTERNAL MEDICINE

## 2018-03-19 NOTE — TELEPHONE ENCOUNTER
PATIENT IS TAKING IMDUR -IS WANTING TO GET VIAGRA BUT CAN NOT TAKE IT WHILE  ON THIS.-HE WANTS TO KNOW DOES HE REALLY NEED IT AND IF SO IS THERE SOMETHING ELSE HE CAN USE .        PATIENT RETURNED CALL -I EXPLAINED TO HIM ABOUT STOPPING IMDUR AND THAT HE CAN USE NITRO FOR CHEST PAIN HOWEVER HE CAN NOT TAKE THE NITRO 24 HR PRIOR/ 24 HR FTER USING VIAGRA. PATIENT UNDERSTOOD AND AGREED . SAID HE WILL LET US KNOW WHAT HE DECIDES

## 2018-03-19 NOTE — PROGRESS NOTES
Subjective:       David Comer Sr. is a 78 y.o. male who here for follow up    CC  The hospital follow-up  HPI    78-year-old male recently discharged from the hospital with the following diagnosis    Discharge Diagnoses:Active Problems:    Sepsis   Sepsis with septic shock source due to UTI  Recent gross hematuria now resolved  Paroxysmal atrial fibrillation Coumadin held due to above  History of coronary artery disease  Hypertension     Problem List Items Addressed This Visit        Cardiovascular and Mediastinum    Chronic coronary artery disease - Primary    Paroxysmal atrial fibrillation    Essential hypertension    Hyperlipidemia      Other Visit Diagnoses    None.       .    The following portions of the patient's history were reviewed and updated as appropriate: allergies, current medications, past family history, past medical history, past social history, past surgical history and problem list.    Past Medical History:   Diagnosis Date   • AAA (abdominal aortic aneurysm) without rupture     CT performed on 2/20/17 (this is actually a THORACIC aortic aneurysm)   • Abnormal ECG    • Bradycardia    • Chest pain    • Coronary artery disease    • DJD (degenerative joint disease)    • GERD (gastroesophageal reflux disease)    • Hyperlipidemia    • Hypertension    • Hypogonadism in male    • Hypothyroidism    • PAF (paroxysmal atrial fibrillation)    • Vitamin D deficiency     reports that he has never smoked. He has never used smokeless tobacco. He reports that he does not drink alcohol or use drugs.  Family History   Problem Relation Age of Onset   • Hypertension Father    • Heart attack Father    • Heart attack Brother    • Hypertension Paternal Grandmother    • Hypertension Paternal Grandfather        Review of Systems  Constitutional: No wt loss, fever, fatigue  Gastrointestinal: No nausea, abdominal pain  Behavioral/Psych: No insomnia or anxiety   Cardiovascular No chest pains or tightness in  "chest  Objective:       Physical Exam           Physical Exam  /81   Pulse 67   Ht 180.3 cm (71\")   Wt 103 kg (226 lb)   BMI 31.52 kg/m²     General appearance: NAD, conversant   Eyes: anicteric sclerae, moist conjunctivae; no lid-lag; PERRLA   HENT: Atraumatic; oropharynx clear with moist mucous membranes and no mucosal ulcerations;  normal hard and soft palate   Neck: Trachea midline; FROM, supple, no thyromegaly or lymphadenopathy   Lungs: CTA, with normal respiratory effort and no intercostal retractions   CV: S1-S2 regular, no murmurs, no rub, no gallop   Abdomen: Soft, non-tender; no masses or HSM   Extremities: No peripheral edema or extremity lymphadenopathy  Skin: Normal temperature, turgor and texture; no rash, ulcers or subcutaneous nodules   Psych: Appropriate affect, alert and oriented to person, place and time           Cardiographics  @  ECG 12 Lead  Date/Time: 3/19/2018 10:39 AM  Performed by: NIHARIKA RIVERS  Authorized by: NIHARIKA RIVERS   Comparison: compared with previous ECG   Similar to previous ECG  Rhythm: sinus rhythm  ST Flattening: all  Clinical impression: non-specific ECG            Echocardiogram:        Current Outpatient Prescriptions:   •  amiodarone (PACERONE) 200 MG tablet, Take 0.5 tablets by mouth Daily., Disp: 30 tablet, Rfl: 0  •  aspirin 81 MG tablet, Take 81 mg by mouth Daily., Disp: , Rfl:   •  cyclobenzaprine (FLEXERIL) 10 MG tablet, Take 10 mg by mouth 2 (Two) Times a Day As Needed for Muscle Spasms., Disp: , Rfl:   •  esomeprazole (NEXIUM) 40 MG capsule, Take 20 mg by mouth Every Morning Before Breakfast., Disp: , Rfl:   •  finasteride (PROSCAR) 5 MG tablet, Take 5 mg by mouth Daily., Disp: , Rfl:   •  Glucosamine HCl (GLUCOSAMINE PO), Take 2,000 mg by mouth Daily., Disp: , Rfl:   •  isosorbide mononitrate (IMDUR) 30 MG 24 hr tablet, Take 30 mg by mouth Daily., Disp: , Rfl:   •  levothyroxine (SYNTHROID) 112 MCG tablet, Take 112 mcg by mouth Daily., " Disp: , Rfl:   •  nebivolol (BYSTOLIC) 10 MG tablet, Take 5 mg by mouth Daily., Disp: , Rfl:   •  olmesartan-hydrochlorothiazide (BENICAR HCT) 40-25 MG per tablet, TAKE ONE TABLET BY MOUTH ONCE DAILY, Disp: 90 tablet, Rfl: 0  •  rosuvastatin (CRESTOR) 5 MG tablet, Take 1 tablet by mouth Daily., Disp: 90 tablet, Rfl: 3  •  warfarin (COUMADIN) 7.5 MG tablet, TAKE ONE TABLET BY MOUTH ONCE DAILY, Disp: 90 tablet, Rfl: 1  •  doxazosin (CARDURA) 2 MG tablet, Take 2 mg by mouth Daily., Disp: , Rfl:   •  nitroglycerin (NITROSTAT) 0.4 MG SL tablet, Place 0.4 mg under the tongue Every 5 (Five) Minutes As Needed. Doesn't take, Disp: , Rfl:   •  saccharomyces boulardii (FLORASTOR) 250 MG capsule, Take 1 capsule by mouth 2 (Two) Times a Day., Disp: 16 capsule, Rfl: 0   Assessment:        Patient Active Problem List   Diagnosis   • Chronic coronary artery disease   • Abdominal aortic aneurysm   • Paroxysmal atrial fibrillation   • Gastroesophageal reflux disease   • Essential hypertension   • Hyperlipidemia   • Hypogonadism in male   • Hypothyroidism   • Osteoarthritis of spine   • Vitamin D deficiency   • Anticoagulated   • Atrial fibrillation, rapid   • Syncope and collapse   • Chest pain   • Pneumonia due to infectious organism   • Statin intolerance   • Gross hematuria   • Coumadin toxicity   • Sepsis               Plan:            ICD-10-CM ICD-9-CM   1. Chronic coronary artery disease I25.10 414.00   2. Paroxysmal atrial fibrillation I48.0 427.31   3. Essential hypertension I10 401.9   4. Hyperlipidemia, unspecified hyperlipidemia type E78.5 272.4     1. Chronic coronary artery disease  No angina pectoralis    2. Paroxysmal atrial fibrillation  Atrial fibrillation undercontrolled    3. Essential hypertension  Blood pressure under control with current medications    4. Hyperlipidemia, unspecified hyperlipidemia type  Risk of the hyperlipidemia, importance of the treatment has been explained    Pros and cons of the statins  has been explained    Regular blood workup as well as side effects including the liver failure, myelopathy death has been explained             cv stable    See us 6 months  COUNSELING:    David KHAN Age Sr.Counseling was given to patient for the following topics: diagnostic results, risk factor reductions, impressions, risks and benefits of treatment options and importance of treatment compliance .       SMOKING COUNSELING:    Counseling given: Not Answered      EMR Dragon/Transcription disclaimer:   Much of this encounter note is an electronic transcription/translation of spoken language to printed text. The electronic translation of spoken language may permit erroneous, or at times, nonsensical words or phrases to be inadvertently transcribed; Although I have reviewed the note for such errors, some may still exist.

## 2018-03-21 NOTE — TELEPHONE ENCOUNTER
Per Dr Downing pt to DC Imdur if chooses to. There is no other medication that we can give in its place

## 2018-04-02 ENCOUNTER — TELEPHONE (OUTPATIENT)
Dept: CARDIOLOGY | Facility: CLINIC | Age: 78
End: 2018-04-02

## 2018-04-02 NOTE — TELEPHONE ENCOUNTER
Mr Age did not show up for INR appt scheduled 4/2/18. LVM to call and reschedule as soon as possible.

## 2018-04-21 ENCOUNTER — HOSPITAL ENCOUNTER (EMERGENCY)
Facility: HOSPITAL | Age: 78
Discharge: HOME | End: 2018-04-21
Attending: EMERGENCY MEDICINE | Admitting: EMERGENCY MEDICINE

## 2018-04-21 ENCOUNTER — APPOINTMENT (OUTPATIENT)
Dept: GENERAL RADIOLOGY | Facility: HOSPITAL | Age: 78
End: 2018-04-21

## 2018-04-21 VITALS
RESPIRATION RATE: 16 BRPM | DIASTOLIC BLOOD PRESSURE: 84 MMHG | SYSTOLIC BLOOD PRESSURE: 140 MMHG | WEIGHT: 220 LBS | TEMPERATURE: 98.6 F | HEIGHT: 71 IN | OXYGEN SATURATION: 100 % | HEART RATE: 75 BPM | BODY MASS INDEX: 30.8 KG/M2

## 2018-04-21 DIAGNOSIS — M77.8 FOREARM TENDONITIS: Primary | ICD-10-CM

## 2018-04-21 PROCEDURE — 99282 EMERGENCY DEPT VISIT SF MDM: CPT

## 2018-04-21 PROCEDURE — 73090 X-RAY EXAM OF FOREARM: CPT

## 2018-04-21 NOTE — ED PROVIDER NOTES
EMERGENCY DEPARTMENT ENCOUNTER    CHIEF COMPLAINT  Chief Complaint: Arm pain  History given by: patient  History limited by: none  Room Number: 46/46  PMD: Chacha Pineda MD      HPI:  Pt is a 78 y.o. male who presents complaining of L forearm pain that began this morning. Pain is exacerbated by pronation of the forearm but not to touch. Pt denies any injury.     Duration: Began this morning  Onset: Gradual  Timing: Constant  Location: L forearm  Radiation: None  Intensity/Severity: Moderate  Progression: Unchanged  Associated Symptoms: None  Aggravating Factors: Pain exacerbated by pronation of forearm.  Previous Episodes: None  Treatment before arrival: None    PAST MEDICAL HISTORY  Active Ambulatory Problems     Diagnosis Date Noted   • Chronic coronary artery disease 10/18/2016   • Abdominal aortic aneurysm 10/18/2016   • Paroxysmal atrial fibrillation 10/18/2016   • Gastroesophageal reflux disease 10/18/2016   • Essential hypertension 10/18/2016   • Hyperlipidemia 10/18/2016   • Hypogonadism in male 10/18/2016   • Hypothyroidism 10/18/2016   • Osteoarthritis of spine 03/01/2016   • Vitamin D deficiency 10/18/2016   • Anticoagulated 02/09/2017   • Atrial fibrillation, rapid 05/30/2017   • Syncope and collapse 08/28/2017   • Chest pain 08/28/2017   • Pneumonia due to infectious organism 08/28/2017   • Statin intolerance 09/28/2017   • Gross hematuria 01/29/2018   • Coumadin toxicity 01/29/2018   • Sepsis 02/02/2018     Resolved Ambulatory Problems     Diagnosis Date Noted   • No Resolved Ambulatory Problems     Past Medical History:   Diagnosis Date   • AAA (abdominal aortic aneurysm) without rupture    • Abnormal ECG    • Bradycardia    • Chest pain    • Coronary artery disease    • DJD (degenerative joint disease)    • GERD (gastroesophageal reflux disease)    • Hyperlipidemia    • Hypertension    • Hypogonadism in male    • Hypothyroidism    • PAF (paroxysmal atrial fibrillation)    • Vitamin D deficiency         PAST SURGICAL HISTORY  Past Surgical History:   Procedure Laterality Date   • ANKLE FUSION     • CARDIAC ABLATION     • HERNIA REPAIR     • JOINT REPLACEMENT     • KNEE SURGERY     • SHOULDER SURGERY         FAMILY HISTORY  Family History   Problem Relation Age of Onset   • Hypertension Father    • Heart attack Father    • Heart attack Brother    • Hypertension Paternal Grandmother    • Hypertension Paternal Grandfather        SOCIAL HISTORY  Social History     Social History   • Marital status:      Spouse name: N/A   • Number of children: N/A   • Years of education: N/A     Occupational History   • Not on file.     Social History Main Topics   • Smoking status: Never Smoker   • Smokeless tobacco: Never Used   • Alcohol use No   • Drug use: No   • Sexual activity: Defer     Other Topics Concern   • Not on file     Social History Narrative   • No narrative on file       ALLERGIES  Review of patient's allergies indicates no known allergies.    REVIEW OF SYSTEMS  Review of Systems   Constitutional: Negative for activity change, appetite change and fever.   HENT: Negative for congestion and sore throat.    Eyes: Negative.    Respiratory: Negative for cough and shortness of breath.    Cardiovascular: Negative for chest pain and leg swelling.   Gastrointestinal: Negative for abdominal pain, diarrhea and vomiting.   Endocrine: Negative.    Genitourinary: Negative for decreased urine volume and dysuria.   Musculoskeletal: Positive for myalgias (L forearm). Negative for neck pain.   Skin: Negative for rash and wound.   Allergic/Immunologic: Negative.    Neurological: Negative for weakness, numbness and headaches.   Hematological: Negative.    Psychiatric/Behavioral: Negative.    All other systems reviewed and are negative.      PHYSICAL EXAM  ED Triage Vitals   Temp Heart Rate Resp BP SpO2   04/21/18 1504 04/21/18 1504 04/21/18 1504 04/21/18 1523 04/21/18 1504   98.6 °F (37 °C) 87 16 151/74 97 %      Temp src  Heart Rate Source Patient Position BP Location FiO2 (%)   04/21/18 1504 -- -- -- --   Tympanic           Physical Exam   Constitutional: He is oriented to person, place, and time. No distress.   HENT:   Head: Normocephalic and atraumatic.   Eyes: EOM are normal.   Neck: Normal range of motion.   Pulmonary/Chest: No respiratory distress.   Abdominal: There is no tenderness.   Musculoskeletal: He exhibits no edema or tenderness (Soft tissue).   Pt has acute pain from pronation of forearm. Pain is distal to radial head.   Neurological: He is alert and oriented to person, place, and time.   Skin: Skin is warm and dry.   Nursing note and vitals reviewed.    RADIOLOGY  XR Forearm 2 View Left    (Results Pending)        I ordered the above noted radiological studies. Interpreted by radiologist. Reviewed by me in PACS.       PROCEDURES  Procedures      PROGRESS AND CONSULTS  ED Course   1535 Ordered XR Forearm for further evaluation.    1548 Rechecked with pt and discussed no acute abnormalities found in XR Forearm. Plan to discharge. Pt understands and agrees with the plan, all questions answered.    MEDICAL DECISION MAKING  Results were reviewed/discussed with the patient and they were also made aware of online access. Pt also made aware that some labs, such as cultures, will not be resulted during ER visit and follow up with PMD is necessary.     MDM  Number of Diagnoses or Management Options  Forearm tendonitis:      Amount and/or Complexity of Data Reviewed  Tests in the radiology section of CPT®: ordered and reviewed (XR Forearm shows no acute abnormalities.)    Patient Progress  Patient progress: stable         DIAGNOSIS  Final diagnoses:   Forearm tendonitis       DISPOSITION  DISCHARGE    Patient discharged in stable condition.    Reviewed implications of results, diagnosis, meds, responsibility to follow up, warning signs and symptoms of possible worsening, potential complications and reasons to return to ER,  including new or worsening sxs.    Patient/Family voiced understanding of above instructions.    Discussed plan for discharge, as there is no emergent indication for admission. Patient referred to primary care provider for BP management due to today's BP. Pt/family is agreeable and understands need for follow up and repeat testing.  Pt is aware that discharge does not mean that nothing is wrong but it indicates no emergency is present that requires admission and they must continue care with follow-up as given below or physician of their choice.     FOLLOW-UP  Chacha Pineda MD  [address] [facility] [address] [phone]    Call            Medication List      No changes were made to your prescriptions during this visit.         Latest Documented Vital Signs:  As of 4:10 PM  BP- 140/84 HR- 75 Temp- 98.6 °F (37 °C) (Tympanic) O2 sat- 100%    --  Documentation assistance provided by gold Wiley for Dr. Barrios.  Information recorded by the scribe was done at my direction and has been verified and validated by me.          Vernell Wiley  04/21/18 1610       Corby Barrios MD  04/21/18 [id number]

## 2018-05-01 ENCOUNTER — HOSPITAL ENCOUNTER (OUTPATIENT)
Dept: CARDIOLOGY | Facility: HOSPITAL | Age: 78
Discharge: HOME | End: 2018-05-01
Admitting: INTERNAL MEDICINE

## 2018-05-01 ENCOUNTER — ANTICOAGULATION VISIT (OUTPATIENT)
Dept: CARDIOLOGY | Facility: CLINIC | Age: 78
End: 2018-05-01

## 2018-05-01 LAB — INR PPP: 3

## 2018-05-01 PROCEDURE — 85610 PROTHROMBIN TIME: CPT | Performed by: INTERNAL MEDICINE

## 2018-06-01 DIAGNOSIS — I48.0 PAROXYSMAL ATRIAL FIBRILLATION (HCC): ICD-10-CM

## 2018-06-01 DIAGNOSIS — Z79.01 ANTICOAGULATED: Primary | ICD-10-CM

## 2018-06-05 ENCOUNTER — HOSPITAL ENCOUNTER (OUTPATIENT)
Dept: CARDIOLOGY | Facility: HOSPITAL | Age: 78
Discharge: HOME | End: 2018-06-05
Admitting: INTERNAL MEDICINE

## 2018-06-05 ENCOUNTER — ANTICOAGULATION VISIT (OUTPATIENT)
Dept: CARDIOLOGY | Facility: CLINIC | Age: 78
End: 2018-06-05

## 2018-06-05 LAB — INR PPP: 2.5

## 2018-06-05 PROCEDURE — 85610 PROTHROMBIN TIME: CPT | Performed by: INTERNAL MEDICINE

## 2018-06-25 RX ORDER — AMIODARONE HYDROCHLORIDE 200 MG/1
TABLET ORAL
Qty: 30 TABLET | Refills: 1 | Status: SHIPPED | OUTPATIENT
Start: 2018-06-25 | End: 2018-09-17 | Stop reason: ALTCHOICE

## 2018-07-10 ENCOUNTER — ANTICOAGULATION VISIT (OUTPATIENT)
Dept: CARDIOLOGY | Facility: CLINIC | Age: 78
End: 2018-07-10

## 2018-07-10 ENCOUNTER — HOSPITAL ENCOUNTER (OUTPATIENT)
Dept: CARDIOLOGY | Facility: HOSPITAL | Age: 78
Discharge: HOME | End: 2018-07-10

## 2018-07-10 LAB — INR PPP: 3.2

## 2018-07-24 ENCOUNTER — HOSPITAL ENCOUNTER (EMERGENCY)
Facility: HOSPITAL | Age: 78
Discharge: HOME | End: 2018-07-24
Attending: EMERGENCY MEDICINE | Admitting: EMERGENCY MEDICINE

## 2018-07-24 VITALS
OXYGEN SATURATION: 94 % | HEIGHT: 72 IN | BODY MASS INDEX: 28.85 KG/M2 | TEMPERATURE: 96.2 F | WEIGHT: 213 LBS | RESPIRATION RATE: 18 BRPM | HEART RATE: 66 BPM | DIASTOLIC BLOOD PRESSURE: 86 MMHG | SYSTOLIC BLOOD PRESSURE: 113 MMHG

## 2018-07-24 DIAGNOSIS — A09 INFECTIOUS DIARRHEA: ICD-10-CM

## 2018-07-24 DIAGNOSIS — E86.0 DEHYDRATION: Primary | ICD-10-CM

## 2018-07-24 LAB
ALBUMIN SERPL-MCNC: 4.1 G/DL (ref 3.5–5.2)
ALBUMIN/GLOB SERPL: 1.4 G/DL
ALP SERPL-CCNC: 63 U/L (ref 39–117)
ALT SERPL W P-5'-P-CCNC: 22 U/L (ref 1–41)
ANION GAP SERPL CALCULATED.3IONS-SCNC: 11.6 MMOL/L
AST SERPL-CCNC: 17 U/L (ref 1–40)
BASOPHILS # BLD AUTO: 0.02 10*3/MM3 (ref 0–0.2)
BASOPHILS NFR BLD AUTO: 0.2 % (ref 0–1.5)
BILIRUB SERPL-MCNC: 0.4 MG/DL (ref 0.1–1.2)
BILIRUB UR QL STRIP: NEGATIVE
BUN BLD-MCNC: 25 MG/DL (ref 8–23)
BUN/CREAT SERPL: 16.7 (ref 7–25)
C DIFF TOX GENS STL QL NAA+PROBE: NEGATIVE
CALCIUM SPEC-SCNC: 8.9 MG/DL (ref 8.6–10.5)
CHLORIDE SERPL-SCNC: 101 MMOL/L (ref 98–107)
CLARITY UR: CLEAR
CO2 SERPL-SCNC: 22.4 MMOL/L (ref 22–29)
COLOR UR: YELLOW
CREAT BLD-MCNC: 1.5 MG/DL (ref 0.76–1.27)
DEPRECATED RDW RBC AUTO: 44.9 FL (ref 37–54)
EOSINOPHIL # BLD AUTO: 0.23 10*3/MM3 (ref 0–0.7)
EOSINOPHIL NFR BLD AUTO: 2.7 % (ref 0.3–6.2)
ERYTHROCYTE [DISTWIDTH] IN BLOOD BY AUTOMATED COUNT: 12.8 % (ref 11.5–14.5)
GFR SERPL CREATININE-BSD FRML MDRD: 45 ML/MIN/1.73
GLOBULIN UR ELPH-MCNC: 3 GM/DL
GLUCOSE BLD-MCNC: 127 MG/DL (ref 65–99)
GLUCOSE UR STRIP-MCNC: NEGATIVE MG/DL
HCT VFR BLD AUTO: 41.4 % (ref 40.4–52.2)
HGB BLD-MCNC: 13.4 G/DL (ref 13.7–17.6)
HGB UR QL STRIP.AUTO: NEGATIVE
IMM GRANULOCYTES # BLD: 0 10*3/MM3 (ref 0–0.03)
IMM GRANULOCYTES NFR BLD: 0 % (ref 0–0.5)
INR PPP: 2.51 (ref 0.9–1.1)
KETONES UR QL STRIP: ABNORMAL
LACTOFERRIN STL QL LA: POSITIVE
LEUKOCYTE ESTERASE UR QL STRIP.AUTO: NEGATIVE
LIPASE SERPL-CCNC: 16 U/L (ref 13–60)
LYMPHOCYTES # BLD AUTO: 1.14 10*3/MM3 (ref 0.9–4.8)
LYMPHOCYTES NFR BLD AUTO: 13.6 % (ref 19.6–45.3)
MCH RBC QN AUTO: 31.3 PG (ref 27–32.7)
MCHC RBC AUTO-ENTMCNC: 32.4 G/DL (ref 32.6–36.4)
MCV RBC AUTO: 96.7 FL (ref 79.8–96.2)
MONOCYTES # BLD AUTO: 0.39 10*3/MM3 (ref 0.2–1.2)
MONOCYTES NFR BLD AUTO: 4.6 % (ref 5–12)
NEUTROPHILS # BLD AUTO: 6.62 10*3/MM3 (ref 1.9–8.1)
NEUTROPHILS NFR BLD AUTO: 78.9 % (ref 42.7–76)
NITRITE UR QL STRIP: NEGATIVE
PH UR STRIP.AUTO: <=5 [PH] (ref 5–8)
PLATELET # BLD AUTO: 193 10*3/MM3 (ref 140–500)
PMV BLD AUTO: 11.4 FL (ref 6–12)
POTASSIUM BLD-SCNC: 3.3 MMOL/L (ref 3.5–5.2)
PROT SERPL-MCNC: 7.1 G/DL (ref 6–8.5)
PROT UR QL STRIP: ABNORMAL
PROTHROMBIN TIME: 26.7 SECONDS (ref 11.7–14.2)
RBC # BLD AUTO: 4.28 10*6/MM3 (ref 4.6–6)
RV AG STL QL IA: NEGATIVE
SODIUM BLD-SCNC: 135 MMOL/L (ref 136–145)
SP GR UR STRIP: 1.02 (ref 1–1.03)
TROPONIN T SERPL-MCNC: <0.01 NG/ML (ref 0–0.03)
UROBILINOGEN UR QL STRIP: ABNORMAL
WBC NRBC COR # BLD: 8.4 10*3/MM3 (ref 4.5–10.7)

## 2018-07-24 PROCEDURE — 80053 COMPREHEN METABOLIC PANEL: CPT | Performed by: EMERGENCY MEDICINE

## 2018-07-24 PROCEDURE — 87425 ROTAVIRUS AG IA: CPT | Performed by: EMERGENCY MEDICINE

## 2018-07-24 PROCEDURE — 87493 C DIFF AMPLIFIED PROBE: CPT | Performed by: EMERGENCY MEDICINE

## 2018-07-24 PROCEDURE — 93005 ELECTROCARDIOGRAM TRACING: CPT | Performed by: EMERGENCY MEDICINE

## 2018-07-24 PROCEDURE — 83690 ASSAY OF LIPASE: CPT | Performed by: EMERGENCY MEDICINE

## 2018-07-24 PROCEDURE — 87046 STOOL CULTR AEROBIC BACT EA: CPT | Performed by: EMERGENCY MEDICINE

## 2018-07-24 PROCEDURE — 84484 ASSAY OF TROPONIN QUANT: CPT | Performed by: EMERGENCY MEDICINE

## 2018-07-24 PROCEDURE — 87045 FECES CULTURE AEROBIC BACT: CPT | Performed by: EMERGENCY MEDICINE

## 2018-07-24 PROCEDURE — 87899 AGENT NOS ASSAY W/OPTIC: CPT | Performed by: EMERGENCY MEDICINE

## 2018-07-24 PROCEDURE — 93010 ELECTROCARDIOGRAM REPORT: CPT | Performed by: INTERNAL MEDICINE

## 2018-07-24 PROCEDURE — 96361 HYDRATE IV INFUSION ADD-ON: CPT

## 2018-07-24 PROCEDURE — 93005 ELECTROCARDIOGRAM TRACING: CPT

## 2018-07-24 PROCEDURE — 99284 EMERGENCY DEPT VISIT MOD MDM: CPT

## 2018-07-24 PROCEDURE — 85025 COMPLETE CBC W/AUTO DIFF WBC: CPT | Performed by: EMERGENCY MEDICINE

## 2018-07-24 PROCEDURE — 85610 PROTHROMBIN TIME: CPT | Performed by: EMERGENCY MEDICINE

## 2018-07-24 PROCEDURE — 81003 URINALYSIS AUTO W/O SCOPE: CPT | Performed by: EMERGENCY MEDICINE

## 2018-07-24 PROCEDURE — 83631 LACTOFERRIN FECAL (QUANT): CPT | Performed by: EMERGENCY MEDICINE

## 2018-07-24 PROCEDURE — 96360 HYDRATION IV INFUSION INIT: CPT

## 2018-07-24 RX ORDER — POTASSIUM CHLORIDE 750 MG/1
40 CAPSULE, EXTENDED RELEASE ORAL ONCE
Status: COMPLETED | OUTPATIENT
Start: 2018-07-24 | End: 2018-07-24

## 2018-07-24 RX ORDER — SODIUM CHLORIDE 9 MG/ML
125 INJECTION, SOLUTION INTRAVENOUS CONTINUOUS
Status: DISCONTINUED | OUTPATIENT
Start: 2018-07-24 | End: 2018-07-24 | Stop reason: HOSPADM

## 2018-07-24 RX ORDER — ONDANSETRON 4 MG/1
4 TABLET, FILM COATED ORAL EVERY 6 HOURS PRN
Qty: 10 TABLET | Refills: 0 | Status: SHIPPED | OUTPATIENT
Start: 2018-07-24 | End: 2019-05-10

## 2018-07-24 RX ORDER — METRONIDAZOLE 500 MG/1
500 TABLET ORAL 3 TIMES DAILY
Qty: 21 TABLET | Refills: 0 | Status: SHIPPED | OUTPATIENT
Start: 2018-07-24 | End: 2018-08-05

## 2018-07-24 RX ADMIN — SODIUM CHLORIDE 125 ML/HR: 9 INJECTION, SOLUTION INTRAVENOUS at 10:11

## 2018-07-24 RX ADMIN — SODIUM CHLORIDE 1000 ML: 9 INJECTION, SOLUTION INTRAVENOUS at 09:24

## 2018-07-24 RX ADMIN — POTASSIUM CHLORIDE 40 MEQ: 750 CAPSULE, EXTENDED RELEASE ORAL at 10:11

## 2018-07-26 LAB
BACTERIA SPEC AEROBE CULT: NORMAL
BACTERIA SPEC AEROBE CULT: NORMAL

## 2018-08-05 ENCOUNTER — APPOINTMENT (OUTPATIENT)
Dept: GENERAL RADIOLOGY | Facility: HOSPITAL | Age: 78
End: 2018-08-05

## 2018-08-05 ENCOUNTER — HOSPITAL ENCOUNTER (EMERGENCY)
Facility: HOSPITAL | Age: 78
Discharge: HOME | End: 2018-08-05
Attending: EMERGENCY MEDICINE | Admitting: EMERGENCY MEDICINE

## 2018-08-05 VITALS
SYSTOLIC BLOOD PRESSURE: 105 MMHG | OXYGEN SATURATION: 98 % | DIASTOLIC BLOOD PRESSURE: 61 MMHG | RESPIRATION RATE: 18 BRPM | WEIGHT: 220 LBS | HEART RATE: 78 BPM | TEMPERATURE: 97.9 F | BODY MASS INDEX: 29.8 KG/M2 | HEIGHT: 72 IN

## 2018-08-05 DIAGNOSIS — S96.911A STRAIN OF RIGHT FOOT, INITIAL ENCOUNTER: Primary | ICD-10-CM

## 2018-08-05 DIAGNOSIS — R79.1 SUPRATHERAPEUTIC INR: ICD-10-CM

## 2018-08-05 LAB
INR PPP: 3.28 (ref 0.9–1.1)
PROTHROMBIN TIME: 32.9 SECONDS (ref 11.7–14.2)

## 2018-08-05 PROCEDURE — 73630 X-RAY EXAM OF FOOT: CPT

## 2018-08-05 PROCEDURE — 99284 EMERGENCY DEPT VISIT MOD MDM: CPT

## 2018-08-05 PROCEDURE — 85610 PROTHROMBIN TIME: CPT | Performed by: EMERGENCY MEDICINE

## 2018-08-05 RX ORDER — HYDROCODONE BITARTRATE AND ACETAMINOPHEN 5; 325 MG/1; MG/1
1 TABLET ORAL EVERY 6 HOURS PRN
Qty: 12 TABLET | Refills: 0 | Status: SHIPPED | OUTPATIENT
Start: 2018-08-05 | End: 2019-05-10

## 2018-08-05 RX ORDER — HYDROCODONE BITARTRATE AND ACETAMINOPHEN 7.5; 325 MG/1; MG/1
1 TABLET ORAL ONCE
Status: COMPLETED | OUTPATIENT
Start: 2018-08-05 | End: 2018-08-05

## 2018-08-05 RX ADMIN — HYDROCODONE BITARTRATE AND ACETAMINOPHEN 1 TABLET: 7.5; 325 TABLET ORAL at 16:20

## 2018-08-05 NOTE — ED PROVIDER NOTES
EMERGENCY DEPARTMENT ENCOUNTER    CHIEF COMPLAINT  Chief Complaint: foot pain  History given by: patient  History limited by: nothing  Room Number: 37/37  PMD: Chacha Pineda MD  Cardiologist- Dr. Downing    HPI:  Pt is a 78 y.o. male who presents complaining of right foot swelling and pain for the last 2 days. Pt states that his pain is worse with ambulation. Pt denies fever, CP, SOA, abd pain, cough, epistaxis, N/V/D, urinary symptoms, similar symptoms previously, recent injury or recent bleeding/bruising or increase in activity level. Pt states that he generally develops mild BLE edema at the end of the day but that it will resolve after elevating his legs at night. Pt is on coumadin for a-fib.    Duration:  2 days  Onset: gradual  Timing: constant  Location: right foot and ankle  Radiation: none  Quality: stinging  Intensity/Severity: moderate  Progression: worsening  Associated Symptoms: none  Aggravating Factors: ambulation  Alleviating Factors: none  Previous Episodes: Pt denies having a similar symptoms previously.  Treatment before arrival: none    PAST MEDICAL HISTORY  Active Ambulatory Problems     Diagnosis Date Noted   • Chronic coronary artery disease 10/18/2016   • Abdominal aortic aneurysm (CMS/HCC) 10/18/2016   • Paroxysmal atrial fibrillation (CMS/HCC) 10/18/2016   • Gastroesophageal reflux disease 10/18/2016   • Essential hypertension 10/18/2016   • Hyperlipidemia 10/18/2016   • Hypogonadism in male 10/18/2016   • Hypothyroidism 10/18/2016   • Osteoarthritis of spine 03/01/2016   • Vitamin D deficiency 10/18/2016   • Anticoagulated 02/09/2017   • Atrial fibrillation, rapid (CMS/HCC) 05/30/2017   • Syncope and collapse 08/28/2017   • Chest pain 08/28/2017   • Pneumonia due to infectious organism 08/28/2017   • Statin intolerance 09/28/2017   • Gross hematuria 01/29/2018   • Coumadin toxicity 01/29/2018   • Sepsis (CMS/HCC) 02/02/2018     Resolved Ambulatory Problems     Diagnosis Date  Noted   • No Resolved Ambulatory Problems     Past Medical History:   Diagnosis Date   • AAA (abdominal aortic aneurysm) without rupture (CMS/[facility])    • Abnormal ECG    • Bradycardia    • Chest pain    • Coronary artery disease    • DJD (degenerative joint disease)    • GERD (gastroesophageal reflux disease)    • Hyperlipidemia    • Hypertension    • Hypogonadism in male    • Hypothyroidism    • PAF (paroxysmal atrial fibrillation) (CMS/[facility])    • Vitamin D deficiency        PAST SURGICAL HISTORY  Past Surgical History:   Procedure Laterality Date   • ANKLE FUSION     • CARDIAC ABLATION     • HERNIA REPAIR     • JOINT REPLACEMENT     • KNEE SURGERY     • SHOULDER SURGERY         FAMILY HISTORY  Family History   Problem Relation Age of Onset   • Hypertension Father    • Heart attack Father    • Heart attack Brother    • Hypertension Paternal Grandmother    • Hypertension Paternal Grandfather        SOCIAL HISTORY  Social History     Social History   • Marital status:      Spouse name: N/A   • Number of children: N/A   • Years of education: N/A     Occupational History   • Not on file.     Social History Main Topics   • Smoking status: Never Smoker   • Smokeless tobacco: Never Used   • Alcohol use No   • Drug use: No   • Sexual activity: Defer     Other Topics Concern   • Not on file     Social History Narrative   • No narrative on file       ALLERGIES  Patient has no known allergies.    REVIEW OF SYSTEMS  Review of Systems   Constitutional: Negative for chills and fever.   HENT: Negative for sore throat and trouble swallowing.    Eyes: Negative for visual disturbance.   Respiratory: Negative for cough and shortness of breath.    Cardiovascular: Positive for leg swelling (R foot and ankle). Negative for chest pain.   Gastrointestinal: Negative for abdominal pain, diarrhea and vomiting.   Endocrine: Negative.    Genitourinary: Negative for decreased urine volume and frequency.   Musculoskeletal: Positive for  arthralgias (R foot and ankle). Negative for neck pain.   Skin: Negative for rash.   Allergic/Immunologic: Negative.    Neurological: Negative for weakness and numbness.   Hematological: Negative.    Psychiatric/Behavioral: Negative.    All other systems reviewed and are negative.      PHYSICAL EXAM  ED Triage Vitals   Temp Heart Rate Resp BP SpO2   08/05/18 1438 08/05/18 1438 08/05/18 1438 08/05/18 1450 08/05/18 1438   98.9 °F (37.2 °C) 88 16 127/83 98 %      Temp src Heart Rate Source Patient Position BP Location FiO2 (%)   08/05/18 1438 -- -- -- --   Tympanic           Physical Exam   Constitutional: He is oriented to person, place, and time. He appears distressed.   HENT:   Head: Normocephalic and atraumatic.   Eyes: EOM are normal.   Neck: Normal range of motion.   Cardiovascular: Normal rate, regular rhythm and normal heart sounds.    No murmur heard.  Pulses:       Dorsalis pedis pulses are 1+ on the right side, and 2+ on the left side.   Pulmonary/Chest: Effort normal and breath sounds normal. No respiratory distress. He has no wheezes.   Abdominal: Soft. Bowel sounds are normal. There is no tenderness. There is no rebound and no guarding.   Musculoskeletal: Normal range of motion. He exhibits edema (right forefoot, mild).   No calf tenderness. Tenderness along the dorsum and arch of the right foot. No erythema or warmth. No R ankle tendenress   Neurological: He is alert and oriented to person, place, and time.   Skin: Skin is warm and dry. No erythema.   Psychiatric: Affect normal.   Nursing note and vitals reviewed.      LAB RESULTS  Lab Results (last 24 hours)     Procedure Component Value Units Date/Time    Protime-INR [[id number]]  (Abnormal) Collected:  08/05/18 1543    Specimen:  Blood Updated:  08/05/18 1646     Protime 32.9 (H) Seconds      INR 3.28 (H)          I ordered the above labs and reviewed the results    RADIOLOGY  XR Foot 3+ View Right   Final Result         R foot XR shows chronic changes  but nothing acute.    I ordered the above noted radiological studies. Interpreted by radiologist. Reviewed by me in PACS.       PROCEDURES  Procedures  Splint Application:  Splint Type: post-op shoe  Indication: right foot strain  Splint placed by ERT  Post splint application:   1) neurovascularly intact   2) good position  Discussed splint care with patient  Discussed PMD/orthopedic follow up      PROGRESS AND CONSULTS     1532- Discussed the plan to order lab work and a R foot XR for further evaluation. D/w pt that I am not inclined to believe that the pt has gout or cellulitis based on his exam. Pt understands and agrees with the plan, all questions answered.    1537- Ordered PT with INR and R foot XR for further evaluation.    1602- Ordered Norco for pain.    1716- Rechecked pt. Pt is resting comfortably. Notified pt of his unremarkable imaging results and his supratherapeutic INR. D/w pt that he could have a stress fracture that is not seen on an initial XR. Discussed the plan to discharge the pt home with prescriptions for pain medication. I warned the pt to not drive, operate machinery or work at heights while taking pain medication. I instructed the pt hold one days dose of coumadin and to follow up with orthopedic surgery if his pain persists. Pt understands and agrees with the plan, all questions answered.      MEDICAL DECISION MAKING  Results were reviewed/discussed with the patient and they were also made aware of online access. Pt also made aware that some labs, such as cultures, will not be resulted during ER visit and follow up with PMD is necessary.     MDM  Number of Diagnoses or Management Options   Strain of right foot, initial encounter:    Supratherapeutic INR:      Amount and/or Complexity of Data Reviewed  Clinical lab tests: ordered and reviewed (INR=3.28)  Tests in the radiology section of CPT®:  ordered and reviewed (R foot XR shows nothing acute)  Decide to obtain previous medical records  or to obtain history from someone other than the patient: yes   Review and summarize past medical records: yes (Pt was last seen in the ED on 7/24/18 for dehydration and infectious diarrhea. Pt was discharged home with a prescription for Flagyl at that time.)  Independent visualization of images, tracings, or specimens: yes    Patient Progress  Patient progress: stable         DIAGNOSIS  Final diagnoses:   Strain of right foot, initial encounter   Supratherapeutic INR       DISPOSITION  DISCHARGE    Patient discharged in stable condition.    Reviewed implications of results, diagnosis, meds, responsibility to follow up, warning signs and symptoms of possible worsening, potential complications and reasons to return to ER, including fever, worsening pain or any concerns.    Patient/Family voiced understanding of above instructions.    Discussed plan for discharge, as there is no emergent indication for admission. Patient referred to primary care provider for BP management due to today's BP. Pt/family is agreeable and understands need for follow up and repeat testing.  Pt is aware that discharge does not mean that nothing is wrong but it indicates no emergency is present that requires admission and they must continue care with follow-up as given below or physician of their choice.     FOLLOW-UP  Chacha Pineda MD  [address] [facility] [address]  [phone]    Call in 3 days  for follow up    Harlan Downing MD  [address]  [facility] [id number] [phone]    Call in 2 days  to notify him that you had your INR drawn today and it was slightly higher than therapeutic    Dinesh Soto MD  [address] [facility]  [address]  [facility] [address]  [phone]    Schedule an appointment as soon as possible for a visit in 1 week  As needed for persistent pain         Medication List      New Prescriptions    HYDROcodone-acetaminophen 5-325 MG per tablet  Commonly known as:  NORCO  Take 1 tablet by  mouth Every 6 (Six) Hours As Needed for Moderate Pain .        Stop    metroNIDAZOLE 500 MG tablet  Commonly known as:  FLAGYL              Latest Documented Vital Signs:  As of 5:20 PM  BP- 105/61 HR- 88 Temp- 98.9 °F (37.2 °C) (Tympanic) O2 sat- 96%    --  Documentation assistance provided by gold Stringer for Dr. Lilly.  Information recorded by the scribe was done at my direction and has been verified and validated by me.    Lisa Stringer  08/05/18 1721    Gerri Lilly MD  08/08/18 [id number]

## 2018-08-05 NOTE — DISCHARGE INSTRUCTIONS
You are advised to follow closely with your Dr Downing in 2-3 days and Dr Soto or podiatrist of your choice in 1-2 weeks for persistent pain for recheck, final results of lab work and imaging testing, and further testing/treatment as needed.    Wear supportive shoes or po9st op shoe  Crutches as needed   Do not drive/operate machinery, work at heights while taking lortab  You may skip one day of coumadin dosing then resume regular dosing routine    Please return to the emergency department immediately with chest pain different than usual for you, shortness of air, abdominal pain, persistent vomiting/fever, blood in emesis or stool, lightheadedness/fainting, problems with speech, one sided weakness/numbness, new incontinence, problems with vision, trouble ambulating, increased redness, increased pain, increased swelling or for worsening of symptoms or other concerns.

## 2018-08-07 ENCOUNTER — APPOINTMENT (OUTPATIENT)
Dept: CARDIOLOGY | Facility: HOSPITAL | Age: 78
End: 2018-08-07

## 2018-08-08 ENCOUNTER — ANTICOAGULATION VISIT (OUTPATIENT)
Dept: CARDIOLOGY | Facility: CLINIC | Age: 78
End: 2018-08-08

## 2018-09-04 ENCOUNTER — ANTICOAGULATION VISIT (OUTPATIENT)
Dept: CARDIOLOGY | Facility: CLINIC | Age: 78
End: 2018-09-04

## 2018-09-04 ENCOUNTER — HOSPITAL ENCOUNTER (OUTPATIENT)
Dept: CARDIOLOGY | Facility: HOSPITAL | Age: 78
Discharge: HOME | End: 2018-09-04
Admitting: INTERNAL MEDICINE

## 2018-09-04 LAB — INR PPP: 5.5

## 2018-09-04 PROCEDURE — 85610 PROTHROMBIN TIME: CPT | Performed by: INTERNAL MEDICINE

## 2018-09-17 ENCOUNTER — OFFICE VISIT (OUTPATIENT)
Dept: CARDIOLOGY | Facility: CLINIC | Age: 78
End: 2018-09-17

## 2018-09-17 ENCOUNTER — HOSPITAL ENCOUNTER (OUTPATIENT)
Dept: CARDIOLOGY | Facility: HOSPITAL | Age: 78
Discharge: HOME | End: 2018-09-17
Admitting: INTERNAL MEDICINE

## 2018-09-17 ENCOUNTER — ANTICOAGULATION VISIT (OUTPATIENT)
Dept: CARDIOLOGY | Facility: CLINIC | Age: 78
End: 2018-09-17

## 2018-09-17 VITALS
HEIGHT: 72 IN | SYSTOLIC BLOOD PRESSURE: 133 MMHG | DIASTOLIC BLOOD PRESSURE: 84 MMHG | WEIGHT: 229 LBS | BODY MASS INDEX: 31.02 KG/M2 | HEART RATE: 68 BPM

## 2018-09-17 DIAGNOSIS — Z79.01 ANTICOAGULATED: ICD-10-CM

## 2018-09-17 DIAGNOSIS — Z92.29 H/O AMIODARONE THERAPY: ICD-10-CM

## 2018-09-17 DIAGNOSIS — I10 ESSENTIAL HYPERTENSION: ICD-10-CM

## 2018-09-17 DIAGNOSIS — I48.0 PAROXYSMAL ATRIAL FIBRILLATION (HCC): ICD-10-CM

## 2018-09-17 DIAGNOSIS — I71.21 ASCENDING AORTIC ANEURYSM (HCC): Primary | ICD-10-CM

## 2018-09-17 DIAGNOSIS — E78.5 HYPERLIPIDEMIA, UNSPECIFIED HYPERLIPIDEMIA TYPE: ICD-10-CM

## 2018-09-17 LAB — INR PPP: 2.2

## 2018-09-17 PROCEDURE — 85610 PROTHROMBIN TIME: CPT | Performed by: INTERNAL MEDICINE

## 2018-09-17 PROCEDURE — 99213 OFFICE O/P EST LOW 20 MIN: CPT | Performed by: INTERNAL MEDICINE

## 2018-09-17 PROCEDURE — 93000 ELECTROCARDIOGRAM COMPLETE: CPT | Performed by: INTERNAL MEDICINE

## 2018-09-17 NOTE — PROGRESS NOTES
Subjective:       David Comer Sr. is a 78 y.o. male who here for follow up    CC  The follow-up for the atrial fibrillation  HPI  78-year-old male with known history of the atrial fibrillation, hypertension and hyperlipidemia on chronic anticoagulation here for the follow-up    David Comer Sr.  here for follow up with no complaints of chest pain, sob, palpitation, syncope, near syncope  No side effects with current meds  No pnd, orthopnea       Problem List Items Addressed This Visit        Cardiovascular and Mediastinum    Paroxysmal atrial fibrillation (CMS/HCC)    Relevant Orders    Comprehensive Metabolic Panel    Thyroid Panel With TSH    XR Chest 2 View    Essential hypertension    Relevant Orders    Comprehensive Metabolic Panel    Thyroid Panel With TSH    XR Chest 2 View    Hyperlipidemia       Other    Anticoagulated    Relevant Orders    Comprehensive Metabolic Panel    Thyroid Panel With TSH    XR Chest 2 View    H/O amiodarone therapy - Primary    Relevant Orders    Comprehensive Metabolic Panel    Thyroid Panel With TSH    XR Chest 2 View        .    The following portions of the patient's history were reviewed and updated as appropriate: allergies, current medications, past family history, past medical history, past social history, past surgical history and problem list.    Past Medical History:   Diagnosis Date   • AAA (abdominal aortic aneurysm) without rupture (CMS/HCC)     CT performed on 2/20/17 (this is actually a THORACIC aortic aneurysm)   • Abnormal ECG    • Bradycardia    • Chest pain    • Coronary artery disease    • DJD (degenerative joint disease)    • GERD (gastroesophageal reflux disease)    • Hyperlipidemia    • Hypertension    • Hypogonadism in male    • Hypothyroidism    • PAF (paroxysmal atrial fibrillation) (CMS/HCC)    • Vitamin D deficiency     reports that he has never smoked. He has never used smokeless tobacco. He reports that he does not drink alcohol or use drugs.  Family  "History   Problem Relation Age of Onset   • Hypertension Father    • Heart attack Father    • Heart attack Brother    • Hypertension Paternal Grandmother    • Hypertension Paternal Grandfather        Review of Systems  Constitutional: No wt loss, fever, fatigue  Gastrointestinal: No nausea, abdominal pain  Behavioral/Psych: No insomnia or anxiety   Cardiovascular no chest pains or tightness in chest  Objective:       Physical Exam           Physical Exam  /84 (BP Location: Left arm, Patient Position: Sitting)   Pulse 68   Ht 182.9 cm (72\")   Wt 104 kg (229 lb)   BMI 31.06 kg/m²     General appearance: NAD, conversant   Eyes: anicteric sclerae, moist conjunctivae; no lid-lag; PERRLA   HENT: Atraumatic; oropharynx clear with moist mucous membranes and no mucosal ulcerations;  normal hard and soft palate   Neck: Trachea midline; FROM, supple, no thyromegaly or lymphadenopathy   Lungs: CTA, with normal respiratory effort and no intercostal retractions   CV: S1-S2 regular, no murmurs, no rub, no gallop   Abdomen: Soft, non-tender; no masses or HSM   Extremities: No peripheral edema or extremity lymphadenopathy  Skin: Normal temperature, turgor and texture; no rash, ulcers or subcutaneous nodules   Psych: Appropriate affect, alert and oriented to person, place and time           Cardiographics  @  ECG 12 Lead  Date/Time: 9/17/2018 11:58 AM  Performed by: NIHARIKA RIVERS  Authorized by: NIHARIKA RIVERS   Comparison: compared with previous ECG   Similar to previous ECG  Rhythm: sinus rhythm  ST Flattening: all  Clinical impression: non-specific ECG            Echocardiogram:        Current Outpatient Prescriptions:   •  amiodarone (PACERONE) 200 MG tablet, Take 0.5 tablets by mouth Daily., Disp: 30 tablet, Rfl: 0  •  aspirin 81 MG tablet, Take 81 mg by mouth Daily., Disp: , Rfl:   •  cyclobenzaprine (FLEXERIL) 10 MG tablet, Take 10 mg by mouth 2 (Two) Times a Day As Needed for Muscle Spasms., Disp: , " Rfl:   •  doxazosin (CARDURA) 2 MG tablet, Take 2 mg by mouth Daily., Disp: , Rfl:   •  esomeprazole (NEXIUM) 40 MG capsule, Take 20 mg by mouth Every Morning Before Breakfast., Disp: , Rfl:   •  finasteride (PROSCAR) 5 MG tablet, Take 5 mg by mouth Daily., Disp: , Rfl:   •  Glucosamine HCl (GLUCOSAMINE PO), Take 2,000 mg by mouth Daily., Disp: , Rfl:   •  HYDROcodone-acetaminophen (NORCO) 5-325 MG per tablet, Take 1 tablet by mouth Every 6 (Six) Hours As Needed for Moderate Pain ., Disp: 12 tablet, Rfl: 0  •  isosorbide mononitrate (IMDUR) 30 MG 24 hr tablet, Take 30 mg by mouth Daily., Disp: , Rfl:   •  levothyroxine (SYNTHROID) 112 MCG tablet, Take 112 mcg by mouth Daily., Disp: , Rfl:   •  nebivolol (BYSTOLIC) 10 MG tablet, Take 5 mg by mouth Daily., Disp: , Rfl:   •  nitroglycerin (NITROSTAT) 0.4 MG SL tablet, Place 0.4 mg under the tongue Every 5 (Five) Minutes As Needed. Doesn't take, Disp: , Rfl:   •  olmesartan-hydrochlorothiazide (BENICAR HCT) 40-25 MG per tablet, TAKE ONE TABLET BY MOUTH ONCE DAILY, Disp: 90 tablet, Rfl: 0  •  ondansetron (ZOFRAN) 4 MG tablet, Take 1 tablet by mouth Every 6 (Six) Hours As Needed for Nausea or Vomiting., Disp: 10 tablet, Rfl: 0  •  rosuvastatin (CRESTOR) 5 MG tablet, Take 1 tablet by mouth Daily., Disp: 90 tablet, Rfl: 3  •  saccharomyces boulardii (FLORASTOR) 250 MG capsule, Take 1 capsule by mouth 2 (Two) Times a Day., Disp: 16 capsule, Rfl: 0  •  warfarin (COUMADIN) 7.5 MG tablet, TAKE ONE TABLET BY MOUTH ONCE DAILY, Disp: 90 tablet, Rfl: 1   Assessment:        Patient Active Problem List   Diagnosis   • Chronic coronary artery disease   • Abdominal aortic aneurysm (CMS/HCC)   • Paroxysmal atrial fibrillation (CMS/HCC)   • Gastroesophageal reflux disease   • Essential hypertension   • Hyperlipidemia   • Hypogonadism in male   • Hypothyroidism   • Osteoarthritis of spine   • Vitamin D deficiency   • Anticoagulated   • Atrial fibrillation, rapid (CMS/HCC)   • Syncope and  collapse   • Chest pain   • Pneumonia due to infectious organism   • Statin intolerance   • Gross hematuria   • Coumadin toxicity   • Sepsis (CMS/HCC)               Plan:            ICD-10-CM ICD-9-CM   1. H/O amiodarone therapy Z92.29 V87.49   2. Paroxysmal atrial fibrillation (CMS/HCC) I48.0 427.31   3. Essential hypertension I10 401.9   4. Anticoagulated Z79.01 V58.61   5. Hyperlipidemia, unspecified hyperlipidemia type E78.5 272.4     1. Paroxysmal atrial fibrillation (CMS/HCC)  Now in normal sinus rhythm  - Comprehensive Metabolic Panel; Future  - Thyroid Panel With TSH; Future  - XR Chest 2 View; Future    2. Essential hypertension  Blood pressure under control  - Comprehensive Metabolic Panel; Future  - Thyroid Panel With TSH; Future  - XR Chest 2 View; Future    3. Anticoagulated  Counseling has been done  - Comprehensive Metabolic Panel; Future  - Thyroid Panel With TSH; Future  - XR Chest 2 View; Future    4. H/O amiodarone therapy  David KHAN Age Sr. IS ON AMIODARONE,   Significant side effects associated with this medication has been explained     Pros and cons of the medications has been discussed, decision has been to continue the medication   6 months to yearly checkup for eye examination, thyroid function test, chest x-ray and liver function test has been advised    Patient understands well    - Comprehensive Metabolic Panel; Future  - Thyroid Panel With TSH; Future  - XR Chest 2 View; Future    5. Hyperlipidemia, unspecified hyperlipidemia type  Risk of the hyperlipidemia, importance of the treatment has been explained    Pros and cons of the antilipemic drugs has been explained    Regular blood workup as well as side effects including the liver failure, myelopathy death has been explained        6 MONTHS WITH AMIO check  COUNSELING:    David KHAN Age Sr.Counseling was given to patient for the following topics: diagnostic results, risk factor reductions, impressions, risks and benefits of treatment options  and importance of treatment compliance .       SMOKING COUNSELING:    Counseling given: Not Answered      EMR Dragon/Transcription disclaimer:   Much of this encounter note is an electronic transcription/translation of spoken language to printed text. The electronic translation of spoken language may permit erroneous, or at times, nonsensical words or phrases to be inadvertently transcribed; Although I have reviewed the note for such errors, some may still exist.

## 2018-09-25 RX ORDER — WARFARIN SODIUM 7.5 MG/1
7.5 TABLET ORAL DAILY
Qty: 90 TABLET | Refills: 1 | Status: SHIPPED | OUTPATIENT
Start: 2018-09-25 | End: 2019-03-21 | Stop reason: SDUPTHER

## 2018-11-12 RX ORDER — AMIODARONE HYDROCHLORIDE 200 MG/1
100 TABLET ORAL DAILY
Qty: 30 TABLET | Refills: 1 | Status: SHIPPED | OUTPATIENT
Start: 2018-11-12 | End: 2019-03-13 | Stop reason: SDUPTHER

## 2018-11-21 RX ORDER — ROSUVASTATIN CALCIUM 5 MG/1
TABLET, COATED ORAL
Qty: 90 TABLET | Refills: 3 | Status: SHIPPED | OUTPATIENT
Start: 2018-11-21 | End: 2019-12-02 | Stop reason: SDUPTHER

## 2018-11-21 RX ORDER — ISOSORBIDE MONONITRATE 30 MG/1
TABLET, EXTENDED RELEASE ORAL
Qty: 90 TABLET | Refills: 1 | Status: SHIPPED | OUTPATIENT
Start: 2018-11-21 | End: 2019-05-30 | Stop reason: SDUPTHER

## 2018-12-12 ENCOUNTER — TELEPHONE (OUTPATIENT)
Dept: CARDIOLOGY | Facility: CLINIC | Age: 78
End: 2018-12-12

## 2018-12-18 ENCOUNTER — HOSPITAL ENCOUNTER (OUTPATIENT)
Dept: CARDIOLOGY | Facility: HOSPITAL | Age: 78
Discharge: HOME | End: 2018-12-18
Admitting: INTERNAL MEDICINE

## 2018-12-18 ENCOUNTER — ANTICOAGULATION VISIT (OUTPATIENT)
Dept: CARDIOLOGY | Facility: HOSPITAL | Age: 78
End: 2018-12-18

## 2018-12-18 LAB — INR PPP: 2.7

## 2018-12-18 PROCEDURE — 85610 PROTHROMBIN TIME: CPT | Performed by: INTERNAL MEDICINE

## 2018-12-18 NOTE — PATIENT INSTRUCTIONS
Follow instructions on the calender for coumadin dose.  Return for your follow- up appointment on : 01/11/2019 at 0845 am

## 2019-03-15 ENCOUNTER — TELEPHONE (OUTPATIENT)
Dept: CARDIOLOGY | Facility: CLINIC | Age: 79
End: 2019-03-15

## 2019-03-15 ENCOUNTER — ANTICOAGULATION VISIT (OUTPATIENT)
Dept: CARDIOLOGY | Facility: CLINIC | Age: 79
End: 2019-03-15

## 2019-03-15 ENCOUNTER — HOSPITAL ENCOUNTER (OUTPATIENT)
Dept: CARDIOLOGY | Facility: HOSPITAL | Age: 79
Discharge: HOME | End: 2019-03-15
Admitting: INTERNAL MEDICINE

## 2019-03-15 DIAGNOSIS — I10 ESSENTIAL HYPERTENSION: ICD-10-CM

## 2019-03-15 DIAGNOSIS — Z92.29 H/O AMIODARONE THERAPY: ICD-10-CM

## 2019-03-15 DIAGNOSIS — I48.0 PAROXYSMAL ATRIAL FIBRILLATION (HCC): ICD-10-CM

## 2019-03-15 DIAGNOSIS — Z79.01 ANTICOAGULATED: ICD-10-CM

## 2019-03-15 LAB
ALBUMIN SERPL-MCNC: 4 G/DL (ref 3.5–5.2)
ALBUMIN/GLOB SERPL: 1.6 G/DL
ALP SERPL-CCNC: 47 U/L (ref 39–117)
ALT SERPL W P-5'-P-CCNC: 24 U/L (ref 1–41)
ANION GAP SERPL CALCULATED.3IONS-SCNC: 10.1 MMOL/L
AST SERPL-CCNC: 16 U/L (ref 1–40)
BILIRUB SERPL-MCNC: 0.5 MG/DL (ref 0.1–1.2)
BUN BLD-MCNC: 21 MG/DL (ref 8–23)
BUN/CREAT SERPL: 20.8 (ref 7–25)
CALCIUM SPEC-SCNC: 9.1 MG/DL (ref 8.6–10.5)
CHLORIDE SERPL-SCNC: 104 MMOL/L (ref 98–107)
CO2 SERPL-SCNC: 26.9 MMOL/L (ref 22–29)
CREAT BLD-MCNC: 1.01 MG/DL (ref 0.76–1.27)
GFR SERPL CREATININE-BSD FRML MDRD: 71 ML/MIN/1.73
GLOBULIN UR ELPH-MCNC: 2.5 GM/DL
GLUCOSE BLD-MCNC: 108 MG/DL (ref 65–99)
INR PPP: 2.5
POTASSIUM BLD-SCNC: 4 MMOL/L (ref 3.5–5.2)
PROT SERPL-MCNC: 6.5 G/DL (ref 6–8.5)
SODIUM BLD-SCNC: 141 MMOL/L (ref 136–145)
T-UPTAKE NFR SERPL: 1.04 TBI (ref 0.8–1.3)
T4 SERPL-MCNC: 8.37 MCG/DL (ref 4.5–11.7)
TSH SERPL DL<=0.05 MIU/L-ACNC: 4.31 MIU/ML (ref 0.27–4.2)

## 2019-03-15 PROCEDURE — 84436 ASSAY OF TOTAL THYROXINE: CPT | Performed by: INTERNAL MEDICINE

## 2019-03-15 PROCEDURE — 80053 COMPREHEN METABOLIC PANEL: CPT | Performed by: INTERNAL MEDICINE

## 2019-03-15 PROCEDURE — 84479 ASSAY OF THYROID (T3 OR T4): CPT | Performed by: INTERNAL MEDICINE

## 2019-03-15 PROCEDURE — 85610 PROTHROMBIN TIME: CPT | Performed by: INTERNAL MEDICINE

## 2019-03-15 PROCEDURE — 36415 COLL VENOUS BLD VENIPUNCTURE: CPT | Performed by: INTERNAL MEDICINE

## 2019-03-15 PROCEDURE — 84443 ASSAY THYROID STIM HORMONE: CPT | Performed by: INTERNAL MEDICINE

## 2019-03-15 RX ORDER — AMIODARONE HYDROCHLORIDE 200 MG/1
100 TABLET ORAL DAILY
Qty: 30 TABLET | Refills: 5 | Status: SHIPPED | OUTPATIENT
Start: 2019-03-15 | End: 2020-03-26

## 2019-03-15 RX ORDER — OLMESARTAN MEDOXOMIL AND HYDROCHLOROTHIAZIDE 40/25 40; 25 MG/1; MG/1
1 TABLET ORAL DAILY
Qty: 90 TABLET | Refills: 5 | Status: SHIPPED | OUTPATIENT
Start: 2019-03-15 | End: 2019-06-16 | Stop reason: SDUPTHER

## 2019-03-15 NOTE — TELEPHONE ENCOUNTER
PATIENT IS HAVING A CT OF CHEST ON 4/24 HE IS ALSO DUE  CXR FOR AMIODARONE .. IS CXR STILL NEEDED IF A CT IS BEING DONE.     [phone]

## 2019-03-18 RX ORDER — OLMESARTAN MEDOXOMIL AND HYDROCHLOROTHIAZIDE 40/25 40; 25 MG/1; MG/1
TABLET ORAL
Qty: 90 TABLET | Refills: 2 | OUTPATIENT
Start: 2019-03-18

## 2019-03-18 RX ORDER — AMIODARONE HYDROCHLORIDE 200 MG/1
TABLET ORAL
Qty: 30 TABLET | Refills: 1 | Status: SHIPPED | OUTPATIENT
Start: 2019-03-18 | End: 2019-05-10 | Stop reason: SDUPTHER

## 2019-03-22 RX ORDER — WARFARIN SODIUM 7.5 MG/1
TABLET ORAL
Qty: 90 TABLET | Refills: 1 | Status: SHIPPED | OUTPATIENT
Start: 2019-03-22 | End: 2019-09-25 | Stop reason: SDUPTHER

## 2019-04-24 ENCOUNTER — APPOINTMENT (OUTPATIENT)
Dept: CT IMAGING | Facility: HOSPITAL | Age: 79
End: 2019-04-24

## 2019-04-26 ENCOUNTER — APPOINTMENT (OUTPATIENT)
Dept: CARDIOLOGY | Facility: HOSPITAL | Age: 79
End: 2019-04-26

## 2019-05-01 ENCOUNTER — HOSPITAL ENCOUNTER (OUTPATIENT)
Dept: CT IMAGING | Facility: HOSPITAL | Age: 79
Discharge: HOME | End: 2019-05-01
Admitting: INTERNAL MEDICINE

## 2019-05-01 ENCOUNTER — HOSPITAL ENCOUNTER (OUTPATIENT)
Dept: GENERAL RADIOLOGY | Facility: HOSPITAL | Age: 79
Discharge: HOME | End: 2019-05-01

## 2019-05-01 DIAGNOSIS — I10 ESSENTIAL HYPERTENSION: ICD-10-CM

## 2019-05-01 DIAGNOSIS — Z79.01 ANTICOAGULATED: ICD-10-CM

## 2019-05-01 DIAGNOSIS — Z92.29 H/O AMIODARONE THERAPY: ICD-10-CM

## 2019-05-01 DIAGNOSIS — I48.0 PAROXYSMAL ATRIAL FIBRILLATION (HCC): ICD-10-CM

## 2019-05-01 DIAGNOSIS — I71.21 ASCENDING AORTIC ANEURYSM (HCC): ICD-10-CM

## 2019-05-01 LAB — CREAT BLDA-MCNC: 1.3 MG/DL (ref 0.6–1.3)

## 2019-05-01 PROCEDURE — 25010000002 IOPAMIDOL 61 % SOLUTION: Performed by: INTERNAL MEDICINE

## 2019-05-01 PROCEDURE — 71260 CT THORAX DX C+: CPT

## 2019-05-01 PROCEDURE — 82565 ASSAY OF CREATININE: CPT

## 2019-05-01 PROCEDURE — 71046 X-RAY EXAM CHEST 2 VIEWS: CPT

## 2019-05-01 RX ADMIN — IOPAMIDOL 75 ML: 612 INJECTION, SOLUTION INTRAVENOUS at 10:49

## 2019-05-10 ENCOUNTER — ANTICOAGULATION VISIT (OUTPATIENT)
Dept: CARDIOLOGY | Facility: CLINIC | Age: 79
End: 2019-05-10

## 2019-05-10 ENCOUNTER — HOSPITAL ENCOUNTER (OUTPATIENT)
Dept: CARDIOLOGY | Facility: HOSPITAL | Age: 79
Discharge: HOME | End: 2019-05-10
Admitting: INTERNAL MEDICINE

## 2019-05-10 ENCOUNTER — OFFICE VISIT (OUTPATIENT)
Dept: CARDIOLOGY | Facility: CLINIC | Age: 79
End: 2019-05-10

## 2019-05-10 VITALS
SYSTOLIC BLOOD PRESSURE: 125 MMHG | HEART RATE: 63 BPM | HEIGHT: 72 IN | DIASTOLIC BLOOD PRESSURE: 79 MMHG | BODY MASS INDEX: 30.07 KG/M2 | WEIGHT: 222 LBS

## 2019-05-10 DIAGNOSIS — Z92.29 H/O AMIODARONE THERAPY: Primary | ICD-10-CM

## 2019-05-10 DIAGNOSIS — I48.0 PAROXYSMAL ATRIAL FIBRILLATION (HCC): ICD-10-CM

## 2019-05-10 DIAGNOSIS — I71.40 ABDOMINAL AORTIC ANEURYSM (AAA) WITHOUT RUPTURE (HCC): ICD-10-CM

## 2019-05-10 DIAGNOSIS — I10 ESSENTIAL HYPERTENSION: ICD-10-CM

## 2019-05-10 DIAGNOSIS — Z79.01 ANTICOAGULATED: ICD-10-CM

## 2019-05-10 DIAGNOSIS — E78.5 HYPERLIPIDEMIA, UNSPECIFIED HYPERLIPIDEMIA TYPE: ICD-10-CM

## 2019-05-10 LAB — INR PPP: 2

## 2019-05-10 PROCEDURE — 93000 ELECTROCARDIOGRAM COMPLETE: CPT | Performed by: NURSE PRACTITIONER

## 2019-05-10 PROCEDURE — 85610 PROTHROMBIN TIME: CPT | Performed by: INTERNAL MEDICINE

## 2019-05-10 PROCEDURE — 99212 OFFICE O/P EST SF 10 MIN: CPT | Performed by: NURSE PRACTITIONER

## 2019-05-10 NOTE — PROGRESS NOTES
Subjective:        David Comer Sr. is a 79 y.o. male who here for follow up    Chief Complaint   Patient presents with   • Coronary Artery Disease       HPI   Overall H is a 79-year-old male, who is new to me.  Is a history of AAA, bradycardia, CAD, GERD, DJD, hyperlipidemia, hypertension, hypo-tone and is MML, hypothyroidism, and PAF.    CT of chest showed maximum transverse diameter at the mid ascending thoracic aortic remained stable at 4.5 cm the aortic arch is normal in caliber at 2.7 cm mid descending is normal at 2.8 cm no aortic dissection.    CT of chest on 5/1/2019  CONCLUSION:  1. Stable dilatation of the ascending thoracic aorta measuring 4.5 cm.  2. Hiatal hernia.  3. Within the left upper lobe there is a reticular nodular infiltrate as  well as an infrahilar area of peribronchial interstitial thickening  (bronchitis) and associated minimal infiltrate.  4. Gallstone and left renal calculus.     Echo on 2/3/2018 showed LV wall thickness is consistent with mild concentric hypertrophy, LV systolic function is normal EF 52.  L AC size is mildly dilated, mild mitral valve regurgitation is present, mild to moderate tricuspid valve regurgitation is present calculated right ventricle systolic pressure from tricuspid regurgitation is 28 mmHg.      The following portions of the patient's history were reviewed and updated as appropriate: allergies, current medications, past family history, past medical history, past social history, past surgical history and problem list.    Past Medical History:   Diagnosis Date   • AAA (abdominal aortic aneurysm) without rupture (CMS/HCC)     CT performed on 2/20/17 (this is actually a THORACIC aortic aneurysm)   • Abnormal ECG    • Bradycardia    • Chest pain    • Coronary artery disease    • DJD (degenerative joint disease)    • GERD (gastroesophageal reflux disease)    • Hyperlipidemia    • Hypertension    • Hypogonadism in male    • Hypothyroidism    • PAF (paroxysmal atrial  fibrillation) (CMS/HCC)    • Vitamin D deficiency          reports that he has never smoked. He has never used smokeless tobacco. He reports that he does not drink alcohol or use drugs.     Family History   Problem Relation Age of Onset   • Hypertension Father    • Heart attack Father    • Heart attack Brother    • Hypertension Paternal Grandmother    • Hypertension Paternal Grandfather        ROS     Review of Systems  Constitutional: No wt loss, fever, fatigue  Gastrointestinal: No nausea, abdominal pain  Behavioral/Psych: No insomnia or anxiety  Cardiovascular: Denies shortness of breath, chest pain and palpitations      Objective:           Physical Exam   Constitutional: He is oriented to person, place, and time. He appears well-developed and well-nourished.   HENT:   Head: Normocephalic.   Right Ear: External ear normal.   Left Ear: External ear normal.   Eyes: EOM are normal.   Neck: Normal range of motion. No JVD present.   Cardiovascular: Normal rate, regular rhythm, normal heart sounds and intact distal pulses. Exam reveals no gallop and no friction rub.   No murmur heard.  Pulmonary/Chest: Effort normal and breath sounds normal. No stridor. No respiratory distress. He has no rales.   Abdominal: Soft. Bowel sounds are normal. He exhibits no distension. There is no tenderness. There is no guarding.   Musculoskeletal: Normal range of motion. He exhibits no edema or tenderness.   Neurological: He is alert and oriented to person, place, and time. He has normal reflexes.   Skin: Skin is warm.   Psychiatric: He has a normal mood and affect. Judgment normal.   Nursing note and vitals reviewed.        ECG 12 Lead  Date/Time: 5/10/2019 10:59 AM  Performed by: Angeles Delvalle APRN  Authorized by: Angeles Delvalle APRN   Comparison: compared with previous ECG from 4/19/2018  Rhythm: sinus rhythm    Clinical impression: non-specific ECG                 Current Outpatient Medications:   •  amiodarone  (PACERONE) 200 MG tablet, Take 0.5 tablets by mouth Daily., Disp: 30 tablet, Rfl: 5  •  aspirin 81 MG tablet, Take 81 mg by mouth Daily., Disp: , Rfl:   •  doxazosin (CARDURA) 2 MG tablet, Take 2 mg by mouth Daily., Disp: , Rfl:   •  esomeprazole (NEXIUM) 40 MG capsule, Take 20 mg by mouth Every Morning Before Breakfast., Disp: , Rfl:   •  isosorbide mononitrate (IMDUR) 30 MG 24 hr tablet, TAKE ONE TABLET BY MOUTH ONCE DAILY, Disp: 90 tablet, Rfl: 1  •  levothyroxine (SYNTHROID) 112 MCG tablet, Take 112 mcg by mouth Daily., Disp: , Rfl:   •  nebivolol (BYSTOLIC) 10 MG tablet, Take 5 mg by mouth Daily., Disp: , Rfl:   •  olmesartan-hydrochlorothiazide (BENICAR HCT) 40-25 MG per tablet, Take 1 tablet by mouth Daily., Disp: 90 tablet, Rfl: 5  •  rosuvastatin (CRESTOR) 5 MG tablet, TAKE ONE TABLET BY MOUTH ONCE DAILY, Disp: 90 tablet, Rfl: 3  •  warfarin (COUMADIN) 7.5 MG tablet, TAKE 1 TABLET BY MOUTH ONCE DAILY, Disp: 90 tablet, Rfl: 1  •  nitroglycerin (NITROSTAT) 0.4 MG SL tablet, Place 0.4 mg under the tongue Every 5 (Five) Minutes As Needed. Doesn't take, Disp: , Rfl:      Assessment:        Patient Active Problem List   Diagnosis   • Chronic coronary artery disease   • Abdominal aortic aneurysm (CMS/HCC)   • Paroxysmal atrial fibrillation (CMS/HCC)   • Gastroesophageal reflux disease   • Essential hypertension   • Hyperlipidemia   • Hypogonadism in male   • Hypothyroidism   • Osteoarthritis of spine   • Vitamin D deficiency   • Anticoagulated   • Atrial fibrillation, rapid (CMS/HCC)   • Syncope and collapse   • Chest pain   • Pneumonia due to infectious organism   • Statin intolerance   • Gross hematuria   • Coumadin toxicity   • Sepsis (CMS/HCC)   • H/O amiodarone therapy               Plan:   1.  Amiodarone therapy: Currently in sinus rhythm. While patient is taking Amiodarone, yearly eye exams, thyroid function studies, liver function studies and chest x-ray are recommended    2.  Paradoxical atrial  fibrillation: Currently in sinus rhythm.  Anticoagulated, with Coumadin.    3.  Essential hypertension: Blood Pressure stable on current medications.Importance of controlling hypertension and blood pressure  checkup on the regular basis has been explained. Hypertension as a silent killer has been discussed. Risk reduction of the weight and regular exercises to control the hypertension has been explained.    4.  Anticoagulated: On Coumadin.Pros and cons as well as indication of the anticoagulation has been explained to the patient in detail. There are no obvious complications at this stage. Risk of  the bleedings has been explained. Need for the regular blood workup and adjust the dose has been explained. Need for proper follow-up on anticoagulation also has been explained.    5.  Hyperlipidemia: Currently on Crestor.  Continue current medications    6. AAA: CT of chest on 5/1/2019 showed maximum transverse diameter at the mid ascending thoracic aortic remained stable at 4.5 cm the aortic arch is normal in caliber at 2.7 cm mid descending is normal at 2.8 cm no aortic dissection.     No diagnosis found.    There are no diagnoses linked to this encounter.    COUNSELING:    David Comer Sr.Counseling was given to patient for the following topics: diagnostic results, risk factor reductions, impressions, risks and benefits of treatment options and importance of treatment compliance .       SMOKING COUNSELING:    Counseling given: Not Answered    Will return in 3 months to see Dr. Downing, unless he needs to be seen sooner.     Sincerely,   ALVA Saunders  [facility]  05/10/19  10:44 AM

## 2019-05-30 RX ORDER — ISOSORBIDE MONONITRATE 30 MG/1
TABLET, EXTENDED RELEASE ORAL
Qty: 90 TABLET | Refills: 1 | Status: SHIPPED | OUTPATIENT
Start: 2019-05-30 | End: 2020-01-13 | Stop reason: SDUPTHER

## 2019-06-18 RX ORDER — OLMESARTAN MEDOXOMIL AND HYDROCHLOROTHIAZIDE 40/25 40; 25 MG/1; MG/1
TABLET ORAL
Qty: 90 TABLET | Refills: 0 | Status: SHIPPED | OUTPATIENT
Start: 2019-06-18 | End: 2019-09-17 | Stop reason: SDUPTHER

## 2019-07-11 ENCOUNTER — TELEPHONE (OUTPATIENT)
Dept: OTHER | Facility: OTHER | Age: 79
End: 2019-07-11

## 2019-07-11 DIAGNOSIS — I10 ESSENTIAL HYPERTENSION: Primary | ICD-10-CM

## 2019-07-11 RX ORDER — NEBIVOLOL 10 MG/1
5 TABLET ORAL DAILY
Qty: 30 TABLET | Refills: 1 | Status: SHIPPED | OUTPATIENT
Start: 2019-07-11 | End: 2019-11-02 | Stop reason: SDUPTHER

## 2019-08-26 ENCOUNTER — HOSPITAL ENCOUNTER (EMERGENCY)
Facility: HOSPITAL | Age: 79
Discharge: LEFT BEFORE DISCHARGE | End: 2019-08-26

## 2019-08-26 VITALS — HEART RATE: 79 BPM | RESPIRATION RATE: 16 BRPM | TEMPERATURE: 97.9 F | OXYGEN SATURATION: 97 %

## 2019-09-03 RX ORDER — AMIODARONE HYDROCHLORIDE 200 MG/1
TABLET ORAL
Qty: 45 TABLET | Refills: 1 | Status: SHIPPED | OUTPATIENT
Start: 2019-09-03 | End: 2019-11-27 | Stop reason: HOSPADM

## 2019-09-10 RX ORDER — DOXAZOSIN 2 MG/1
2 TABLET ORAL DAILY
Qty: 90 TABLET | Refills: 0 | Status: SHIPPED | OUTPATIENT
Start: 2019-09-10 | End: 2019-09-12 | Stop reason: SDUPTHER

## 2019-09-10 RX ORDER — LEVOTHYROXINE SODIUM 112 UG/1
112 TABLET ORAL DAILY
Qty: 90 TABLET | Refills: 0 | Status: SHIPPED | OUTPATIENT
Start: 2019-09-10 | End: 2019-09-12 | Stop reason: SDUPTHER

## 2019-09-10 NOTE — TELEPHONE ENCOUNTER
Patient states that the pharmacy has been faxing over requests for a week. States that he is out of his medications. Confirmed pharmacy on file. States that he does a 90 day supply of the levothyroxine

## 2019-09-12 RX ORDER — DOXAZOSIN 2 MG/1
2 TABLET ORAL DAILY
Qty: 90 TABLET | Refills: 0 | Status: SHIPPED | OUTPATIENT
Start: 2019-09-12 | End: 2019-12-09 | Stop reason: SDUPTHER

## 2019-09-12 RX ORDER — LEVOTHYROXINE SODIUM 112 UG/1
112 TABLET ORAL DAILY
Qty: 90 TABLET | Refills: 0 | Status: SHIPPED | OUTPATIENT
Start: 2019-09-12 | End: 2019-12-26

## 2019-09-17 RX ORDER — OLMESARTAN MEDOXOMIL AND HYDROCHLOROTHIAZIDE 40/25 40; 25 MG/1; MG/1
TABLET ORAL
Qty: 90 TABLET | Refills: 0 | Status: SHIPPED | OUTPATIENT
Start: 2019-09-17 | End: 2019-09-24 | Stop reason: SDUPTHER

## 2019-09-19 ENCOUNTER — HOSPITAL ENCOUNTER (EMERGENCY)
Facility: HOSPITAL | Age: 79
Discharge: HOME | End: 2019-09-19
Attending: EMERGENCY MEDICINE | Admitting: EMERGENCY MEDICINE

## 2019-09-19 VITALS
RESPIRATION RATE: 16 BRPM | DIASTOLIC BLOOD PRESSURE: 70 MMHG | HEART RATE: 52 BPM | TEMPERATURE: 97.3 F | HEIGHT: 72 IN | OXYGEN SATURATION: 96 % | WEIGHT: 242 LBS | SYSTOLIC BLOOD PRESSURE: 103 MMHG | BODY MASS INDEX: 32.78 KG/M2

## 2019-09-19 DIAGNOSIS — N17.9 ACUTE RENAL FAILURE, UNSPECIFIED ACUTE RENAL FAILURE TYPE (HCC): ICD-10-CM

## 2019-09-19 DIAGNOSIS — I95.2 HYPOTENSION DUE TO DRUGS: Primary | ICD-10-CM

## 2019-09-19 DIAGNOSIS — R53.1 GENERALIZED WEAKNESS: ICD-10-CM

## 2019-09-19 LAB
ALBUMIN SERPL-MCNC: 4 G/DL (ref 3.5–5.2)
ALBUMIN/GLOB SERPL: 1.4 G/DL
ALP SERPL-CCNC: 59 U/L (ref 39–117)
ALT SERPL W P-5'-P-CCNC: 20 U/L (ref 1–41)
ANION GAP SERPL CALCULATED.3IONS-SCNC: 12.6 MMOL/L (ref 5–15)
AST SERPL-CCNC: 16 U/L (ref 1–40)
BASOPHILS # BLD AUTO: 0.03 10*3/MM3 (ref 0–0.2)
BASOPHILS NFR BLD AUTO: 0.3 % (ref 0–1.5)
BILIRUB SERPL-MCNC: 0.8 MG/DL (ref 0.2–1.2)
BUN BLD-MCNC: 26 MG/DL (ref 8–23)
BUN/CREAT SERPL: 13.9 (ref 7–25)
CALCIUM SPEC-SCNC: 8.9 MG/DL (ref 8.6–10.5)
CHLORIDE SERPL-SCNC: 99 MMOL/L (ref 98–107)
CO2 SERPL-SCNC: 25.4 MMOL/L (ref 22–29)
CREAT BLD-MCNC: 1.87 MG/DL (ref 0.76–1.27)
D-LACTATE SERPL-SCNC: 1.2 MMOL/L (ref 0.5–2)
DEPRECATED RDW RBC AUTO: 43.1 FL (ref 37–54)
EOSINOPHIL # BLD AUTO: 0.08 10*3/MM3 (ref 0–0.4)
EOSINOPHIL NFR BLD AUTO: 0.8 % (ref 0.3–6.2)
ERYTHROCYTE [DISTWIDTH] IN BLOOD BY AUTOMATED COUNT: 12.1 % (ref 12.3–15.4)
GFR SERPL CREATININE-BSD FRML MDRD: 35 ML/MIN/1.73
GLOBULIN UR ELPH-MCNC: 2.9 GM/DL
GLUCOSE BLD-MCNC: 98 MG/DL (ref 65–99)
HCT VFR BLD AUTO: 37.4 % (ref 37.5–51)
HGB BLD-MCNC: 12.6 G/DL (ref 13–17.7)
HOLD SPECIMEN: NORMAL
HOLD SPECIMEN: NORMAL
IMM GRANULOCYTES # BLD AUTO: 0.05 10*3/MM3 (ref 0–0.05)
IMM GRANULOCYTES NFR BLD AUTO: 0.5 % (ref 0–0.5)
INR PPP: 2.32 (ref 0.9–1.1)
LYMPHOCYTES # BLD AUTO: 0.9 10*3/MM3 (ref 0.7–3.1)
LYMPHOCYTES NFR BLD AUTO: 8.6 % (ref 19.6–45.3)
MCH RBC QN AUTO: 32.6 PG (ref 26.6–33)
MCHC RBC AUTO-ENTMCNC: 33.7 G/DL (ref 31.5–35.7)
MCV RBC AUTO: 96.6 FL (ref 79–97)
MONOCYTES # BLD AUTO: 0.8 10*3/MM3 (ref 0.1–0.9)
MONOCYTES NFR BLD AUTO: 7.6 % (ref 5–12)
NEUTROPHILS # BLD AUTO: 8.66 10*3/MM3 (ref 1.7–7)
NEUTROPHILS NFR BLD AUTO: 82.2 % (ref 42.7–76)
NRBC BLD AUTO-RTO: 0 /100 WBC (ref 0–0.2)
PLATELET # BLD AUTO: 167 10*3/MM3 (ref 140–450)
PMV BLD AUTO: 11.9 FL (ref 6–12)
POTASSIUM BLD-SCNC: 4.8 MMOL/L (ref 3.5–5.2)
PROCALCITONIN SERPL-MCNC: 0.07 NG/ML (ref 0.1–0.25)
PROT SERPL-MCNC: 6.9 G/DL (ref 6–8.5)
PROTHROMBIN TIME: 25.2 SECONDS (ref 11.7–14.2)
RBC # BLD AUTO: 3.87 10*6/MM3 (ref 4.14–5.8)
SODIUM BLD-SCNC: 137 MMOL/L (ref 136–145)
TROPONIN T SERPL-MCNC: <0.01 NG/ML (ref 0–0.03)
WBC NRBC COR # BLD: 10.52 10*3/MM3 (ref 3.4–10.8)
WHOLE BLOOD HOLD SPECIMEN: NORMAL
WHOLE BLOOD HOLD SPECIMEN: NORMAL

## 2019-09-19 PROCEDURE — 93005 ELECTROCARDIOGRAM TRACING: CPT | Performed by: EMERGENCY MEDICINE

## 2019-09-19 PROCEDURE — 84145 PROCALCITONIN (PCT): CPT | Performed by: EMERGENCY MEDICINE

## 2019-09-19 PROCEDURE — 80053 COMPREHEN METABOLIC PANEL: CPT | Performed by: EMERGENCY MEDICINE

## 2019-09-19 PROCEDURE — 85610 PROTHROMBIN TIME: CPT | Performed by: EMERGENCY MEDICINE

## 2019-09-19 PROCEDURE — 99284 EMERGENCY DEPT VISIT MOD MDM: CPT

## 2019-09-19 PROCEDURE — 93005 ELECTROCARDIOGRAM TRACING: CPT

## 2019-09-19 PROCEDURE — 83605 ASSAY OF LACTIC ACID: CPT | Performed by: EMERGENCY MEDICINE

## 2019-09-19 PROCEDURE — 36415 COLL VENOUS BLD VENIPUNCTURE: CPT

## 2019-09-19 PROCEDURE — 93010 ELECTROCARDIOGRAM REPORT: CPT | Performed by: INTERNAL MEDICINE

## 2019-09-19 PROCEDURE — 87040 BLOOD CULTURE FOR BACTERIA: CPT | Performed by: EMERGENCY MEDICINE

## 2019-09-19 PROCEDURE — 84484 ASSAY OF TROPONIN QUANT: CPT | Performed by: EMERGENCY MEDICINE

## 2019-09-19 PROCEDURE — 85025 COMPLETE CBC W/AUTO DIFF WBC: CPT | Performed by: EMERGENCY MEDICINE

## 2019-09-19 RX ORDER — SODIUM CHLORIDE 0.9 % (FLUSH) 0.9 %
10 SYRINGE (ML) INJECTION AS NEEDED
Status: DISCONTINUED | OUTPATIENT
Start: 2019-09-19 | End: 2019-09-19 | Stop reason: HOSPADM

## 2019-09-19 RX ADMIN — SODIUM CHLORIDE 500 ML: 9 INJECTION, SOLUTION INTRAVENOUS at 18:14

## 2019-09-19 RX ADMIN — SODIUM CHLORIDE 1000 ML: 9 INJECTION, SOLUTION INTRAVENOUS at 16:02

## 2019-09-19 NOTE — DISCHARGE INSTRUCTIONS
Drink plenty of fluids, avoid overheating.  Do not take your Benicar/HCT if systolic blood pressure is less than 110.  Please check BP daily for 5 days and share with your MD.

## 2019-09-19 NOTE — ED PROVIDER NOTES
EMERGENCY DEPARTMENT ENCOUNTER    Room Number:  12/12  Date of encounter:  9/19/2019  PCP: Chacha Pineda MD  Historian: Patient      HPI:  Chief Complaint: Blurry vision, weakness  A complete HPI/ROS/PMH/PSH/SH/FH are unobtainable due to: None    Context: David Comer Sr. is a 79 y.o. male who presents to the ED c/o blurry vision off and on today.  Patient states her symptoms began around 8 AM with bilateral blurry vision which persisted until around 1:00 and has been somewhat better since then.  He is also had generalized weakness.  He describes his symptoms is currently mild or moderate earlier.  Nothing tends to make it better or worse.  Patient was able to drive himself here despite the weakness and blurry vision.  He denies any chest pain or trouble breathing.  He denies having prior symptoms in the past that he can recall.  I have reviewed his old records and notes he was hospitalized in February with urosepsis.  Patient does not routinely check his blood pressure and is not checked it in about 2 weeks.      PAST MEDICAL HISTORY  Active Ambulatory Problems     Diagnosis Date Noted   • Chronic coronary artery disease 10/18/2016   • Abdominal aortic aneurysm (CMS/HCC) 10/18/2016   • Paroxysmal atrial fibrillation (CMS/HCC) 10/18/2016   • Gastroesophageal reflux disease 10/18/2016   • Essential hypertension 10/18/2016   • Hyperlipidemia 10/18/2016   • Hypogonadism in male 10/18/2016   • Hypothyroidism 10/18/2016   • Osteoarthritis of spine 03/01/2016   • Vitamin D deficiency 10/18/2016   • Anticoagulated 02/09/2017   • Atrial fibrillation, rapid (CMS/HCC) 05/30/2017   • Syncope and collapse 08/28/2017   • Chest pain 08/28/2017   • Pneumonia due to infectious organism 08/28/2017   • Statin intolerance 09/28/2017   • Gross hematuria 01/29/2018   • Coumadin toxicity 01/29/2018   • Sepsis (CMS/HCC) 02/02/2018   • H/O amiodarone therapy 09/17/2018     Resolved Ambulatory Problems     Diagnosis Date Noted   • No  Resolved Ambulatory Problems     Past Medical History:   Diagnosis Date   • AAA (abdominal aortic aneurysm) without rupture (CMS/[facility])    • Abnormal ECG    • Bradycardia    • Chest pain    • Coronary artery disease    • DJD (degenerative joint disease)    • GERD (gastroesophageal reflux disease)    • Hyperlipidemia    • Hypertension    • Hypogonadism in male    • Hypothyroidism    • PAF (paroxysmal atrial fibrillation) (CMS/[facility])    • Vitamin D deficiency          PAST SURGICAL HISTORY  Past Surgical History:   Procedure Laterality Date   • ANKLE FUSION     • CARDIAC ABLATION     • HERNIA REPAIR     • JOINT REPLACEMENT     • KNEE SURGERY     • SHOULDER SURGERY           FAMILY HISTORY  Family History   Problem Relation Age of Onset   • Hypertension Father    • Heart attack Father    • Heart attack Brother    • Hypertension Paternal Grandmother    • Hypertension Paternal Grandfather          SOCIAL HISTORY  Social History     Socioeconomic History   • Marital status:      Spouse name: Not on file   • Number of children: Not on file   • Years of education: Not on file   • Highest education level: Not on file   Tobacco Use   • Smoking status: Never Smoker   • Smokeless tobacco: Never Used   Substance and Sexual Activity   • Alcohol use: No   • Drug use: No   • Sexual activity: Defer         ALLERGIES  Patient has no known allergies.        REVIEW OF SYSTEMS  Review of Systems   Constitutional: Negative for fever.   HENT: Negative.  Negative for sore throat.    Eyes: Negative.    Respiratory: Negative.  Negative for cough.    Cardiovascular: Negative.  Negative for chest pain.   Gastrointestinal: Negative.    Genitourinary: Negative.  Negative for dysuria.   Musculoskeletal: Negative.  Negative for back pain.   Skin: Negative.  Negative for rash.   Neurological: Positive for weakness (Mild generalized, no focal). Negative for headaches.   All other systems reviewed and are negative.       PHYSICAL EXAM    I have  reviewed the triage vital signs and nursing notes.    ED Triage Vitals   Temp Heart Rate Resp BP SpO2   09/19/19 1504 09/19/19 1504 09/19/19 1504 09/19/19 1548 09/19/19 1504   97.3 °F (36.3 °C) 79 18 (!) 89/59 94 %      Temp src Heart Rate Source Patient Position BP Location FiO2 (%)   -- -- 09/19/19 1548 09/19/19 1548 --     Lying Right arm        Physical Exam  GENERAL: not distressed, well-appearing  HENT: nares patent  EYES: no scleral icterus  CV: regular rhythm, regular rate  RESPIRATORY: normal effort  ABDOMEN: soft nondistended  MUSCULOSKELETAL: no deformity  NEURO: alert, moves all extremities, follows commands, strength is grossly normal in all extremities.  Vision is grossly intact  SKIN: warm, dry        LAB RESULTS  Recent Results (from the past 24 hour(s))   Light Blue Top    Collection Time: 09/19/19  3:54 PM   Result Value Ref Range    Extra Tube hold for add-on    Green Top (Gel)    Collection Time: 09/19/19  3:54 PM   Result Value Ref Range    Extra Tube Hold for add-ons.    Lavender Top    Collection Time: 09/19/19  3:54 PM   Result Value Ref Range    Extra Tube hold for add-on    Gold Top - SST    Collection Time: 09/19/19  3:54 PM   Result Value Ref Range    Extra Tube Hold for add-ons.    Comprehensive Metabolic Panel    Collection Time: 09/19/19  3:54 PM   Result Value Ref Range    Glucose 98 65 - 99 mg/dL    BUN 26 (H) 8 - 23 mg/dL    Creatinine 1.87 (H) 0.76 - 1.27 mg/dL    Sodium 137 136 - 145 mmol/L    Potassium 4.8 3.5 - 5.2 mmol/L    Chloride 99 98 - 107 mmol/L    CO2 25.4 22.0 - 29.0 mmol/L    Calcium 8.9 8.6 - 10.5 mg/dL    Total Protein 6.9 6.0 - 8.5 g/dL    Albumin 4.00 3.50 - 5.20 g/dL    ALT (SGPT) 20 1 - 41 U/L    AST (SGOT) 16 1 - 40 U/L    Alkaline Phosphatase 59 39 - 117 U/L    Total Bilirubin 0.8 0.2 - 1.2 mg/dL    eGFR Non African Amer 35 (L) >60 mL/min/1.73    Globulin 2.9 gm/dL    A/G Ratio 1.4 g/dL    BUN/Creatinine Ratio 13.9 7.0 - 25.0    Anion Gap 12.6 5.0 - 15.0 mmol/L    Protime-INR    Collection Time: 09/19/19  3:54 PM   Result Value Ref Range    Protime 25.2 (H) 11.7 - 14.2 Seconds    INR 2.32 (H) 0.90 - 1.10   Troponin    Collection Time: 09/19/19  3:54 PM   Result Value Ref Range    Troponin T <0.010 0.000 - 0.030 ng/mL   Procalcitonin    Collection Time: 09/19/19  3:54 PM   Result Value Ref Range    Procalcitonin 0.07 (L) 0.10 - 0.25 ng/mL   CBC Auto Differential    Collection Time: 09/19/19  3:54 PM   Result Value Ref Range    WBC 10.52 3.40 - 10.80 10*3/mm3    RBC 3.87 (L) 4.14 - 5.80 10*6/mm3    Hemoglobin 12.6 (L) 13.0 - 17.7 g/dL    Hematocrit 37.4 (L) 37.5 - 51.0 %    MCV 96.6 79.0 - 97.0 fL    MCH 32.6 26.6 - 33.0 pg    MCHC 33.7 31.5 - 35.7 g/dL    RDW 12.1 (L) 12.3 - 15.4 %    RDW-SD 43.1 37.0 - 54.0 fl    MPV 11.9 6.0 - 12.0 fL    Platelets 167 140 - 450 10*3/mm3    Neutrophil % 82.2 (H) 42.7 - 76.0 %    Lymphocyte % 8.6 (L) 19.6 - 45.3 %    Monocyte % 7.6 5.0 - 12.0 %    Eosinophil % 0.8 0.3 - 6.2 %    Basophil % 0.3 0.0 - 1.5 %    Immature Grans % 0.5 0.0 - 0.5 %    Neutrophils, Absolute 8.66 (H) 1.70 - 7.00 10*3/mm3    Lymphocytes, Absolute 0.90 0.70 - 3.10 10*3/mm3    Monocytes, Absolute 0.80 0.10 - 0.90 10*3/mm3    Eosinophils, Absolute 0.08 0.00 - 0.40 10*3/mm3    Basophils, Absolute 0.03 0.00 - 0.20 10*3/mm3    Immature Grans, Absolute 0.05 0.00 - 0.05 10*3/mm3    nRBC 0.0 0.0 - 0.2 /100 WBC   Lactic Acid, Plasma    Collection Time: 09/19/19  4:23 PM   Result Value Ref Range    Lactate 1.2 0.5 - 2.0 mmol/L       Ordered the above labs and independently reviewed the results.        RADIOLOGY  No Radiology Exams Resulted Within Past 24 Hours    I ordered the above noted radiological studies. Reviewed by me and discussed with radiologist.  See dictation for official radiology interpretation.      PROCEDURES    Procedures      MEDICATIONS GIVEN IN ER    Medications   sodium chloride 0.9 % flush 10 mL (not administered)   sodium chloride 0.9 % flush 10 mL (not  administered)   sodium chloride 0.9 % bolus 1,000 mL (0 mL Intravenous Stopped 9/19/19 1702)   sodium chloride 0.9 % bolus 500 mL (500 mL Intravenous New Bag 9/19/19 1814)         PROGRESS, DATA ANALYSIS, CONSULTS, AND MEDICAL DECISION MAKING    All labs have been independently reviewed by me.  All radiology studies have been reviewed by me and discussed with radiologist dictating the report.   EKG's independently viewed and interpreted by me.  Discussion below represents my analysis of pertinent findings related to patient's condition, differential diagnosis, treatment plan and final disposition.      ED Course as of Sep 19 1915   Thu Sep 19, 2019   1601 I reviewed patient's prior medical records including admission in February of this year for urosepsis.  [DB]   1614 Patient noted to be fairly hypotensive in the 80s over 50s.  Neuro exam is unremarkable with an NIH of 0, so he is not a TPA candidate.  I suspect patient's symptoms are due to hypotension.  The etiology of hypotension is not clear at this point.  [DB]   1615 EKG          EKG time: 1506  Rhythm/Rate: Sinus 62  P waves and DC: Normal P waves and DC intervals  QRS, axis: Indeterminate axis, IVCD  ST and T waves: Unremarkable    Interpreted Contemporaneously by me, independently viewed  Not significantly changed compared to prior       [DB]   1807 Been back to the bedside to reassess this patient.  He is feeling much better after 1 L of normal saline.  Blood pressure is running better in the 90s over 60s.  Patient has been out in the heat a good bit and he has been taking his Benicar/HCTZ.  I believe his symptoms are due to hypotension related to mild dehydration in the setting of diuretic usage.  My plan would be to give another 500 cc of normal saline and watch for another 30 to 60 minutes.  If patient continues to feel well and have blood pressure is greater than 90 I will DC him home but have him hold his Benicar/HCTZ until systolic blood pressures  are running greater than 110.    [DB]   1909 Patient feeling much better after IV fluids.  Repeat blood pressure in the room 103/74.  He is able to stand and walk without having symptoms.  I will discharge home and have him hold his Benicar/HCT if systolic blood pressure is less than 110.  [DB]      ED Course User Index  [DB] Charles Hanson MD       AS OF 7:15 PM VITALS:    BP - 102/59  HR - 53  TEMP - 97.3 °F (36.3 °C)  02 SATS - 95%        DIAGNOSIS  Final diagnoses:   Hypotension due to drugs   Acute renal failure, unspecified acute renal failure type (CMS/[facility])   Generalized weakness         DISPOSITION  DISCHARGE    Patient discharged in stable condition.    Reviewed implications of results, diagnosis, meds, responsibility to follow up, warning signs and symptoms of possible worsening, potential complications and reasons to return to ER, including chest pain, shortness of breath or as needed.    Patient/Family voiced understanding of above instructions.    Discussed plan for discharge, as there is no emergent indication for admission. Patient referred to primary care provider for BP management due to today's BP. Pt/family is agreeable and understands need for follow up and repeat testing.  Pt is aware that discharge does not mean that nothing is wrong but it indicates no emergency is present that requires admission and they must continue care with follow-up as given below or physician of their choice.     FOLLOW-UP  Chacha Pineda MD  [id number] [facility] [address]  [facility] [address] [phone]    In 3 days  If Not Better         Medication List      No changes were made to your prescriptions during this visit.                Charles Hanson MD  09/19/19 1911

## 2019-09-19 NOTE — ED TRIAGE NOTES
Pt complains of loss of vision today in both eyes, off and on. States he was driving and was having trouble seeing car ahead of him.  States he also had a episode of chest tightness.    Currently vision is normal per patient

## 2019-09-23 NOTE — PROGRESS NOTES
ER Follow up for hypotension    Subjective   David Comer Sr. is a 79 y.o. male.     History of Present Illness   David Comer Sr. is a 79 y.o. male who presents to the ED c/o blurry vision off and on today.  Patient states her symptoms began around 8 AM with bilateral blurry vision which persisted until around 1:00 and has been somewhat better since then.  He is also had generalized weakness.  He describes his symptoms is currently mild or moderate earlier.  Nothing tends to make it better or worse.  Patient was able to drive himself here despite the weakness and blurry vision.  He denies any chest pain or trouble breathing.  He denies having prior symptoms in the past that he can recall.  I have reviewed his old records and notes he was hospitalized in February with urosepsis.  Patient does not routinely check his blood pressure and is not checked it in about 2 weeks.  He was dx with hypotension, acute renal failure and generalized weakness.  He states he feels better now and stopped the medication for a day and now is back to normal.  He states his BP has been normal.    No CP, HA, or blurred vision or SOB.  He was given fluids in the hospital as well.    Hypertension-no chest pain, blurry vision, headaches or palpitations.  Hyperlipidemia- No myalgia.  No Complaints.  Stable.  Hypothyroidism- Stable and No significant weight change.  Denies heat or cold intolerance.  No palpitations.        The following portions of the patient's history were reviewed and updated as appropriate: allergies, current medications, past family history, past medical history, past social history, past surgical history and problem list.    Review of Systems   Constitutional: Negative for fatigue.   Eyes: Negative for blurred vision.   Respiratory: Negative for cough, chest tightness and shortness of breath.    Cardiovascular: Negative for chest pain, palpitations and leg swelling.   Neurological: Negative for dizziness, light-headedness  and headache.       Patient Active Problem List   Diagnosis   • Chronic coronary artery disease   • Abdominal aortic aneurysm (CMS/HCC)   • Paroxysmal atrial fibrillation (CMS/HCC)   • Gastroesophageal reflux disease   • Essential hypertension   • Hyperlipidemia   • Hypogonadism in male   • Hypothyroidism   • Osteoarthritis of spine   • Vitamin D deficiency   • Anticoagulated   • Atrial fibrillation, rapid (CMS/HCC)   • Syncope and collapse   • Chest pain   • Pneumonia due to infectious organism   • Statin intolerance   • Gross hematuria   • Coumadin toxicity   • Sepsis (CMS/HCC)   • H/O amiodarone therapy   • Dehydration   • Renal insufficiency       No Known Allergies      Current Outpatient Medications:   •  amiodarone (PACERONE) 200 MG tablet, Take 0.5 tablets by mouth Daily., Disp: 30 tablet, Rfl: 5  •  amiodarone (PACERONE) 200 MG tablet, TAKE 1/2 (ONE-HALF) TABLET BY MOUTH ONCE DAILY, Disp: 45 tablet, Rfl: 1  •  aspirin 81 MG tablet, Take 81 mg by mouth Daily., Disp: , Rfl:   •  doxazosin (CARDURA) 2 MG tablet, Take 1 tablet by mouth Daily., Disp: 90 tablet, Rfl: 0  •  esomeprazole (NEXIUM) 40 MG capsule, Take 20 mg by mouth Every Morning Before Breakfast., Disp: , Rfl:   •  isosorbide mononitrate (IMDUR) 30 MG 24 hr tablet, TAKE 1 TABLET BY MOUTH ONCE DAILY, Disp: 90 tablet, Rfl: 1  •  levothyroxine (SYNTHROID) 112 MCG tablet, Take 1 tablet by mouth Daily., Disp: 90 tablet, Rfl: 0  •  nebivolol (BYSTOLIC) 10 MG tablet, Take 0.5 tablets by mouth Daily., Disp: 30 tablet, Rfl: 1  •  nitroglycerin (NITROSTAT) 0.4 MG SL tablet, Place 0.4 mg under the tongue Every 5 (Five) Minutes As Needed. Doesn't take, Disp: , Rfl:   •  olmesartan-hydrochlorothiazide (BENICAR HCT) 40-25 MG per tablet, Take 1 tablet by mouth Daily., Disp: 90 tablet, Rfl: 0  •  rosuvastatin (CRESTOR) 5 MG tablet, TAKE ONE TABLET BY MOUTH ONCE DAILY, Disp: 90 tablet, Rfl: 3  •  warfarin (COUMADIN) 7.5 MG tablet, TAKE 1 TABLET BY MOUTH ONCE DAILY,  Disp: 90 tablet, Rfl: 1    Past Medical History:   Diagnosis Date   • AAA (abdominal aortic aneurysm) without rupture (CMS/HCC)     CT performed on 2/20/17 (this is actually a THORACIC aortic aneurysm)   • Abnormal ECG    • Acute bronchitis    • Acute low back pain    • Ankle swelling    • Anticoagulated on warfarin    • Arthritis    • Atrial fibrillation (CMS/HCC)    • BPH (benign prostatic hyperplasia)    • Bradycardia    • Chest pain    • Chronic lumbar pain    • Coronary artery disease    • Cough    • Degenerative arthritis of lumbar spine    • Degenerative cervical disc    • Dehydration    • Diarrhea    • DJD (degenerative joint disease)    • Elevated rheumatoid factor    • Esophageal reflux    • GERD (gastroesophageal reflux disease)    • Hematuria, gross    • Hyperlipidemia    • Hypogonadism in male    • Hypokalemia    • Hypothyroidism    • Inguinal hernia    • Kidney stone    • Melena    • Myalgia    • Myositis    • PAF (paroxysmal atrial fibrillation) (CMS/HCC)    • Refused influenza vaccine    • Right hip pain    • Right leg swelling    • Scoliosis    • Sepsis (CMS/HCC)    • Thoracic aortic aneurysm (CMS/HCC)     Thoracic Aneurysm   • Vertebral compression fracture (CMS/HCC)    • Vitamin D deficiency    • Wheezing        Past Surgical History:   Procedure Laterality Date   • ANKLE FUSION     • CARDIAC ABLATION     • FOOT SURGERY     • INGUINAL HERNIA REPAIR     • KNEE SURGERY     • REPLACEMENT TOTAL KNEE     • SHOULDER SURGERY      Rotator cuff x 2   • VASECTOMY         Family History   Problem Relation Age of Onset   • Hypertension Father    • Heart attack Father    • Heart attack Brother    • Alcohol abuse Brother    • Hypertension Brother    • Hypertension Paternal Grandmother    • Hypertension Paternal Grandfather    • Anemia Mother    • Arthritis Mother    • Hypertension Mother    • Hypertension Maternal Grandmother    • Heart disease Other         FH in males before age 55       Social History  "    Tobacco Use   • Smoking status: Never Smoker   • Smokeless tobacco: Never Used   Substance Use Topics   • Alcohol use: No            Objective     Visit Vitals  /78   Pulse 78   Temp 97.8 °F (36.6 °C)   Ht 182.9 cm (72\")   Wt 104 kg (230 lb)   SpO2 97%   BMI 31.19 kg/m²       Physical Exam   Constitutional: He appears well-developed. No distress.   Eyes: Conjunctivae and lids are normal.   Neck: Trachea normal. Carotid bruit is not present. No thyroid mass and no thyromegaly present.   Cardiovascular: Normal rate, regular rhythm and normal heart sounds.   Pulmonary/Chest: Effort normal and breath sounds normal.   Lymphadenopathy:     He has no cervical adenopathy.   Neurological: He is alert. He is not disoriented.   Skin: Skin is warm and dry.   Psychiatric: He has a normal mood and affect. His speech is normal and behavior is normal. He is attentive.       Lab Results   Component Value Date    GLUCOSE 98 09/19/2019    BUN 26 (H) 09/19/2019    CREATININE 1.87 (H) 09/19/2019    EGFRIFNONA 35 (L) 09/19/2019    BCR 13.9 09/19/2019    K 4.8 09/19/2019    CO2 25.4 09/19/2019    CALCIUM 8.9 09/19/2019    ALBUMIN 4.00 09/19/2019    AST 16 09/19/2019    ALT 20 09/19/2019       WBC   Date Value Ref Range Status   09/19/2019 10.52 3.40 - 10.80 10*3/mm3 Final     RBC   Date Value Ref Range Status   09/19/2019 3.87 (L) 4.14 - 5.80 10*6/mm3 Final     Hemoglobin   Date Value Ref Range Status   09/19/2019 12.6 (L) 13.0 - 17.7 g/dL Final     Hematocrit   Date Value Ref Range Status   09/19/2019 37.4 (L) 37.5 - 51.0 % Final     MCV   Date Value Ref Range Status   09/19/2019 96.6 79.0 - 97.0 fL Final     MCH   Date Value Ref Range Status   09/19/2019 32.6 26.6 - 33.0 pg Final     MCHC   Date Value Ref Range Status   09/19/2019 33.7 31.5 - 35.7 g/dL Final     RDW   Date Value Ref Range Status   09/19/2019 12.1 (L) 12.3 - 15.4 % Final     RDW-SD   Date Value Ref Range Status   09/19/2019 43.1 37.0 - 54.0 fl Final     MPV "   Date Value Ref Range Status   09/19/2019 11.9 6.0 - 12.0 fL Final     Platelets   Date Value Ref Range Status   09/19/2019 167 140 - 450 10*3/mm3 Final     Neutrophil %   Date Value Ref Range Status   09/19/2019 82.2 (H) 42.7 - 76.0 % Final     Lymphocyte %   Date Value Ref Range Status   09/19/2019 8.6 (L) 19.6 - 45.3 % Final     Monocyte %   Date Value Ref Range Status   09/19/2019 7.6 5.0 - 12.0 % Final     Eosinophil %   Date Value Ref Range Status   09/19/2019 0.8 0.3 - 6.2 % Final     Basophil %   Date Value Ref Range Status   09/19/2019 0.3 0.0 - 1.5 % Final     Immature Grans %   Date Value Ref Range Status   09/19/2019 0.5 0.0 - 0.5 % Final     Neutrophils, Absolute   Date Value Ref Range Status   09/19/2019 8.66 (H) 1.70 - 7.00 10*3/mm3 Final     Lymphocytes, Absolute   Date Value Ref Range Status   09/19/2019 0.90 0.70 - 3.10 10*3/mm3 Final     Monocytes, Absolute   Date Value Ref Range Status   09/19/2019 0.80 0.10 - 0.90 10*3/mm3 Final     Eosinophils, Absolute   Date Value Ref Range Status   09/19/2019 0.08 0.00 - 0.40 10*3/mm3 Final     Basophils, Absolute   Date Value Ref Range Status   09/19/2019 0.03 0.00 - 0.20 10*3/mm3 Final     Immature Grans, Absolute   Date Value Ref Range Status   09/19/2019 0.05 0.00 - 0.05 10*3/mm3 Final     nRBC   Date Value Ref Range Status   09/19/2019 0.0 0.0 - 0.2 /100 WBC Final       No results found for: HGBA1C    No results found for: VLQGTEPD58    TSH   Date Value Ref Range Status   03/15/2019 4.310 (H) 0.270 - 4.200 mIU/mL Final       Lab Results   Component Value Date    CHOL 133 12/15/2017     Lab Results   Component Value Date    TRIG 204 (H) 12/15/2017     Lab Results   Component Value Date    HDL 54 12/15/2017     Lab Results   Component Value Date    LDL 38 12/15/2017     Lab Results   Component Value Date    VLDL 40.8 (H) 12/15/2017     Lab Results   Component Value Date    LDLHDL 0.71 12/15/2017         Procedures    Assessment/Plan   Problems Addressed  this Visit        Cardiovascular and Mediastinum    Essential hypertension - Primary    Relevant Medications    olmesartan-hydrochlorothiazide (BENICAR HCT) 40-25 MG per tablet    Other Relevant Orders    Basic Metabolic Panel    Hyperlipidemia       Genitourinary    Renal insufficiency    Relevant Orders    Basic Metabolic Panel       Other    Dehydration          Orders Placed This Encounter   Procedures   • Basic Metabolic Panel       Current Outpatient Medications   Medication Sig Dispense Refill   • amiodarone (PACERONE) 200 MG tablet Take 0.5 tablets by mouth Daily. 30 tablet 5   • amiodarone (PACERONE) 200 MG tablet TAKE 1/2 (ONE-HALF) TABLET BY MOUTH ONCE DAILY 45 tablet 1   • aspirin 81 MG tablet Take 81 mg by mouth Daily.     • doxazosin (CARDURA) 2 MG tablet Take 1 tablet by mouth Daily. 90 tablet 0   • esomeprazole (NEXIUM) 40 MG capsule Take 20 mg by mouth Every Morning Before Breakfast.     • isosorbide mononitrate (IMDUR) 30 MG 24 hr tablet TAKE 1 TABLET BY MOUTH ONCE DAILY 90 tablet 1   • levothyroxine (SYNTHROID) 112 MCG tablet Take 1 tablet by mouth Daily. 90 tablet 0   • nebivolol (BYSTOLIC) 10 MG tablet Take 0.5 tablets by mouth Daily. 30 tablet 1   • nitroglycerin (NITROSTAT) 0.4 MG SL tablet Place 0.4 mg under the tongue Every 5 (Five) Minutes As Needed. Doesn't take     • olmesartan-hydrochlorothiazide (BENICAR HCT) 40-25 MG per tablet Take 1 tablet by mouth Daily. 90 tablet 0   • rosuvastatin (CRESTOR) 5 MG tablet TAKE ONE TABLET BY MOUTH ONCE DAILY 90 tablet 3   • warfarin (COUMADIN) 7.5 MG tablet TAKE 1 TABLET BY MOUTH ONCE DAILY 90 tablet 1     No current facility-administered medications for this visit.      I reviewed labs and records with pt and pt has restarted his bp meds with no issues.      No Follow-up on file.    There are no Patient Instructions on file for this visit.

## 2019-09-24 ENCOUNTER — OFFICE VISIT (OUTPATIENT)
Dept: FAMILY MEDICINE CLINIC | Facility: CLINIC | Age: 79
End: 2019-09-24

## 2019-09-24 VITALS
HEIGHT: 72 IN | OXYGEN SATURATION: 97 % | WEIGHT: 230 LBS | DIASTOLIC BLOOD PRESSURE: 78 MMHG | BODY MASS INDEX: 31.15 KG/M2 | HEART RATE: 78 BPM | TEMPERATURE: 97.8 F | SYSTOLIC BLOOD PRESSURE: 112 MMHG

## 2019-09-24 DIAGNOSIS — E78.2 MIXED HYPERLIPIDEMIA: ICD-10-CM

## 2019-09-24 DIAGNOSIS — I10 ESSENTIAL HYPERTENSION: Primary | ICD-10-CM

## 2019-09-24 DIAGNOSIS — N28.9 RENAL INSUFFICIENCY: ICD-10-CM

## 2019-09-24 DIAGNOSIS — E86.0 DEHYDRATION: ICD-10-CM

## 2019-09-24 LAB
BACTERIA SPEC AEROBE CULT: NORMAL
BACTERIA SPEC AEROBE CULT: NORMAL

## 2019-09-24 PROCEDURE — 99214 OFFICE O/P EST MOD 30 MIN: CPT | Performed by: FAMILY MEDICINE

## 2019-09-24 RX ORDER — OLMESARTAN MEDOXOMIL AND HYDROCHLOROTHIAZIDE 40/25 40; 25 MG/1; MG/1
1 TABLET ORAL DAILY
Qty: 90 TABLET | Refills: 0 | Status: SHIPPED | OUTPATIENT
Start: 2019-09-24 | End: 2019-12-26

## 2019-09-25 LAB
BUN SERPL-MCNC: 22 MG/DL (ref 8–23)
BUN/CREAT SERPL: 19.5 (ref 7–25)
CALCIUM SERPL-MCNC: 9.2 MG/DL (ref 8.6–10.5)
CHLORIDE SERPL-SCNC: 100 MMOL/L (ref 98–107)
CO2 SERPL-SCNC: 28 MMOL/L (ref 22–29)
CREAT SERPL-MCNC: 1.13 MG/DL (ref 0.76–1.27)
GLUCOSE SERPL-MCNC: 94 MG/DL (ref 65–99)
POTASSIUM SERPL-SCNC: 4.3 MMOL/L (ref 3.5–5.2)
SODIUM SERPL-SCNC: 142 MMOL/L (ref 136–145)

## 2019-09-26 RX ORDER — WARFARIN SODIUM 7.5 MG/1
TABLET ORAL
Qty: 90 TABLET | Refills: 1 | Status: SHIPPED | OUTPATIENT
Start: 2019-09-26 | End: 2020-03-30 | Stop reason: SDUPTHER

## 2019-11-02 DIAGNOSIS — I10 ESSENTIAL HYPERTENSION: ICD-10-CM

## 2019-11-04 RX ORDER — NEBIVOLOL HYDROCHLORIDE 10 MG/1
TABLET ORAL
Qty: 30 TABLET | Refills: 1 | Status: SHIPPED | OUTPATIENT
Start: 2019-11-04 | End: 2020-03-24

## 2019-11-18 ENCOUNTER — OFFICE VISIT (OUTPATIENT)
Dept: CARDIOLOGY | Facility: CLINIC | Age: 79
End: 2019-11-18

## 2019-11-18 VITALS
WEIGHT: 230 LBS | SYSTOLIC BLOOD PRESSURE: 126 MMHG | BODY MASS INDEX: 31.15 KG/M2 | HEART RATE: 60 BPM | HEIGHT: 72 IN | DIASTOLIC BLOOD PRESSURE: 85 MMHG

## 2019-11-18 DIAGNOSIS — Z92.29 H/O AMIODARONE THERAPY: ICD-10-CM

## 2019-11-18 DIAGNOSIS — E78.2 MIXED HYPERLIPIDEMIA: ICD-10-CM

## 2019-11-18 DIAGNOSIS — I71.20 THORACIC AORTIC ANEURYSM WITHOUT RUPTURE (HCC): Primary | ICD-10-CM

## 2019-11-18 DIAGNOSIS — I48.0 PAROXYSMAL ATRIAL FIBRILLATION (HCC): ICD-10-CM

## 2019-11-18 DIAGNOSIS — I10 ESSENTIAL HYPERTENSION: ICD-10-CM

## 2019-11-18 DIAGNOSIS — Z79.01 ANTICOAGULATED: ICD-10-CM

## 2019-11-18 DIAGNOSIS — I25.10 CHRONIC CORONARY ARTERY DISEASE: ICD-10-CM

## 2019-11-18 PROCEDURE — 99213 OFFICE O/P EST LOW 20 MIN: CPT | Performed by: INTERNAL MEDICINE

## 2019-11-18 PROCEDURE — 93000 ELECTROCARDIOGRAM COMPLETE: CPT | Performed by: INTERNAL MEDICINE

## 2019-11-18 NOTE — PROGRESS NOTES
Subjective:        David Comer Sr. is a 79 y.o. male who here for follow up    CC  Follow-up for the coronary artery disease aneurysm  HPI  79-year-old male with known history of the coronary artery disease, paroxysmal atrial fibrillation, benign essential arterial hypertension as well as hyperlipidemia recently underwent a CT scan of the chest which showed possible aneurysm aneurysm is a stable     Problem List Items Addressed This Visit        Cardiovascular and Mediastinum    Chronic coronary artery disease    Paroxysmal atrial fibrillation (CMS/HCC)    Relevant Orders    CT Chest With Contrast    Essential hypertension    Relevant Orders    CT Chest With Contrast    Hyperlipidemia    Relevant Orders    CT Chest With Contrast    Thoracic aortic aneurysm without rupture (CMS/HCC) - Primary    Relevant Orders    CT Chest With Contrast       Other    Anticoagulated    Relevant Orders    CT Chest With Contrast    H/O amiodarone therapy    Relevant Orders    CT Chest With Contrast        .    The following portions of the patient's history were reviewed and updated as appropriate: allergies, current medications, past family history, past medical history, past social history, past surgical history and problem list.    Past Medical History:   Diagnosis Date   • AAA (abdominal aortic aneurysm) without rupture (CMS/HCC)     CT performed on 2/20/17 (this is actually a THORACIC aortic aneurysm)   • Abnormal ECG    • Acute bronchitis    • Acute low back pain    • Ankle swelling    • Anticoagulated on warfarin    • Arthritis    • Atrial fibrillation (CMS/HCC)    • BPH (benign prostatic hyperplasia)    • Bradycardia    • Chest pain    • Chronic lumbar pain    • Coronary artery disease    • Cough    • Degenerative arthritis of lumbar spine    • Degenerative cervical disc    • Dehydration    • Diarrhea    • DJD (degenerative joint disease)    • Elevated rheumatoid factor    • Esophageal reflux    • GERD (gastroesophageal reflux  "disease)    • Hematuria, gross    • Hyperlipidemia    • Hypogonadism in male    • Hypokalemia    • Hypothyroidism    • Inguinal hernia    • Kidney stone    • Melena    • Myalgia    • Myositis    • PAF (paroxysmal atrial fibrillation) (CMS/HCC)    • Refused influenza vaccine    • Right hip pain    • Right leg swelling    • Scoliosis    • Sepsis (CMS/HCC)    • Thoracic aortic aneurysm (CMS/HCC)     Thoracic Aneurysm   • Vertebral compression fracture (CMS/HCC)    • Vitamin D deficiency    • Wheezing      reports that he has never smoked. He has never used smokeless tobacco. He reports that he does not drink alcohol or use drugs.   Family History   Problem Relation Age of Onset   • Hypertension Father    • Heart attack Father    • Heart attack Brother    • Alcohol abuse Brother    • Hypertension Brother    • Hypertension Paternal Grandmother    • Hypertension Paternal Grandfather    • Anemia Mother    • Arthritis Mother    • Hypertension Mother    • Hypertension Maternal Grandmother    • Heart disease Other         FH in males before age 55       Review of Systems  Constitutional: No wt loss, fever, fatigue  Gastrointestinal: No nausea, abdominal pain  Behavioral/Psych: No insomnia or anxiety   Cardiovascular no chest pains or tightness no chest  Objective:       Physical Exam  /85   Pulse 60   Ht 182.9 cm (72\")   Wt 104 kg (230 lb)   BMI 31.19 kg/m²   General appearance: No acute changes   Neck: Trachea midline; NECK, supple, no thyromegaly or lymphadenopathy   Lungs: Normal size and shape, normal breath sounds, equal distribution of air, no rales and rhonchi   CV: S1-S2 regular, no murmurs, no rub, no gallop   Abdomen: Soft, non-tender; no masses , no abnormal abdominal sounds   Extremities: No deformity , normal color , no peripheral edema   Skin: Normal temperature, turgor and texture; no rash, ulcers            ECG 12 Lead  Date/Time: 11/18/2019 11:00 AM  Performed by: Harlan Downing, " MD  Authorized by: Harlan Downing MD   Comparison: compared with previous ECG   Similar to previous ECG  Rhythm: sinus rhythm    Clinical impression: non-specific ECG          CONCLUSION:  1. Stable dilatation of the ascending thoracic aorta measuring 4.5 cm.  2. Hiatal hernia.  3. Within the left upper lobe there is a reticular nodular infiltrate as  well as an infrahilar area of peribronchial interstitial thickening  (bronchitis) and associated minimal infiltrate.  4. Gallstone and left renal calculus.         Echocardiogram:        Current Outpatient Medications:   •  amiodarone (PACERONE) 200 MG tablet, Take 0.5 tablets by mouth Daily., Disp: 30 tablet, Rfl: 5  •  amiodarone (PACERONE) 200 MG tablet, TAKE 1/2 (ONE-HALF) TABLET BY MOUTH ONCE DAILY, Disp: 45 tablet, Rfl: 1  •  aspirin 81 MG tablet, Take 81 mg by mouth Daily., Disp: , Rfl:   •  BYSTOLIC 10 MG tablet, TAKE 1/2 (ONE-HALF) TABLET BY MOUTH ONCE DAILY, Disp: 30 tablet, Rfl: 1  •  doxazosin (CARDURA) 2 MG tablet, Take 1 tablet by mouth Daily., Disp: 90 tablet, Rfl: 0  •  esomeprazole (NEXIUM) 40 MG capsule, Take 20 mg by mouth Every Morning Before Breakfast., Disp: , Rfl:   •  isosorbide mononitrate (IMDUR) 30 MG 24 hr tablet, TAKE 1 TABLET BY MOUTH ONCE DAILY, Disp: 90 tablet, Rfl: 1  •  levothyroxine (SYNTHROID) 112 MCG tablet, Take 1 tablet by mouth Daily., Disp: 90 tablet, Rfl: 0  •  olmesartan-hydrochlorothiazide (BENICAR HCT) 40-25 MG per tablet, Take 1 tablet by mouth Daily., Disp: 90 tablet, Rfl: 0  •  rosuvastatin (CRESTOR) 5 MG tablet, TAKE ONE TABLET BY MOUTH ONCE DAILY, Disp: 90 tablet, Rfl: 3  •  warfarin (COUMADIN) 7.5 MG tablet, TAKE 1 TABLET BY MOUTH ONCE DAILY, Disp: 90 tablet, Rfl: 1  •  nitroglycerin (NITROSTAT) 0.4 MG SL tablet, Place 0.4 mg under the tongue Every 5 (Five) Minutes As Needed. Doesn't take, Disp: , Rfl:    Assessment:        Patient Active Problem List   Diagnosis   • Chronic coronary artery disease   •  Abdominal aortic aneurysm (CMS/HCC)   • Paroxysmal atrial fibrillation (CMS/HCC)   • Gastroesophageal reflux disease   • Essential hypertension   • Hyperlipidemia   • Hypogonadism in male   • Hypothyroidism   • Osteoarthritis of spine   • Vitamin D deficiency   • Anticoagulated   • Atrial fibrillation, rapid (CMS/HCC)   • Syncope and collapse   • Chest pain   • Pneumonia due to infectious organism   • Statin intolerance   • Gross hematuria   • Coumadin toxicity   • Sepsis (CMS/HCC)   • H/O amiodarone therapy   • Dehydration   • Renal insufficiency               Plan:            ICD-10-CM ICD-9-CM   1. Thoracic aortic aneurysm without rupture (CMS/HCC) I71.2 441.2   2. Paroxysmal atrial fibrillation (CMS/HCC) I48.0 427.31   3. Mixed hyperlipidemia E78.2 272.2   4. Essential hypertension I10 401.9   5. H/O amiodarone therapy Z92.29 V87.49   6. Anticoagulated Z79.01 V58.61     1. Paroxysmal atrial fibrillation (CMS/HCC)  controlled  - CT Chest With Contrast; Future    2. Mixed hyperlipidemia  Risk of the hyperlipidemia, importance of the treatment has been explained    Pros and cons of the antilipemic drugs has been explained    Regular blood workup as well as side effects including the liver failure, myelopathy death has been explained        - CT Chest With Contrast; Future    3. Essential hypertension  Blood pressure controlled  - CT Chest With Contrast; Future    4. H/O amiodarone therapy  David L Age Sr. IS ON AMIODARONE,   Significant side effects associated with this medication has been explained     Pros and cons of the medications has been discussed, decision has been to continue the medication   6 months to yearly checkup for eye examination, thyroid function test, chest x-ray and liver function test has been advised    Patient understands well    - CT Chest With Contrast; Future    5. Anticoagulated  Pros and cons as well as indication of the anticoagulation has been explained to the patient in  detail    There are no obvious complications at this stage    Risk of  the bleedings has been explained    Need for the regular blood workup and adjust the dose has been explained    Need for proper follow-up on anticoagulation also has been explained    - CT Chest With Contrast; Future    6. Thoracic aortic aneurysm without rupture (CMS/HCC)    - CT Chest With Contrast; Future       See in 6 months with ct of chest  COUNSELING:    David Comer Sr.Counseling was given to patient for the following topics: diagnostic results, risk factor reductions, impressions, risks and benefits of treatment options and importance of treatment compliance .       SMOKING COUNSELING:    [unfilled]    Dictated using Dragon dictation

## 2019-11-27 ENCOUNTER — APPOINTMENT (OUTPATIENT)
Dept: CARDIOLOGY | Facility: HOSPITAL | Age: 79
End: 2019-11-27

## 2019-11-27 ENCOUNTER — APPOINTMENT (OUTPATIENT)
Dept: NUCLEAR MEDICINE | Facility: HOSPITAL | Age: 79
End: 2019-11-27

## 2019-11-27 ENCOUNTER — APPOINTMENT (OUTPATIENT)
Dept: CT IMAGING | Facility: HOSPITAL | Age: 79
End: 2019-11-27

## 2019-11-27 ENCOUNTER — APPOINTMENT (OUTPATIENT)
Dept: GENERAL RADIOLOGY | Facility: HOSPITAL | Age: 79
End: 2019-11-27

## 2019-11-27 ENCOUNTER — HOSPITAL ENCOUNTER (OUTPATIENT)
Facility: HOSPITAL | Age: 79
Setting detail: OBSERVATION
LOS: 1 days | Discharge: HOME | End: 2019-11-27
Attending: EMERGENCY MEDICINE | Admitting: INTERNAL MEDICINE

## 2019-11-27 VITALS
RESPIRATION RATE: 16 BRPM | HEART RATE: 53 BPM | BODY MASS INDEX: 31.95 KG/M2 | OXYGEN SATURATION: 96 % | DIASTOLIC BLOOD PRESSURE: 80 MMHG | WEIGHT: 235.89 LBS | SYSTOLIC BLOOD PRESSURE: 154 MMHG | TEMPERATURE: 97.5 F | HEIGHT: 72 IN

## 2019-11-27 DIAGNOSIS — R07.2 PRECORDIAL PAIN: Primary | ICD-10-CM

## 2019-11-27 LAB
ALBUMIN SERPL-MCNC: 4.2 G/DL (ref 3.5–5.2)
ALBUMIN/GLOB SERPL: 1.6 G/DL
ALP SERPL-CCNC: 54 U/L (ref 39–117)
ALT SERPL W P-5'-P-CCNC: 24 U/L (ref 1–41)
ANION GAP SERPL CALCULATED.3IONS-SCNC: 13.9 MMOL/L (ref 5–15)
AORTIC DIMENSIONLESS INDEX: 0.7 (DI)
AST SERPL-CCNC: 18 U/L (ref 1–40)
BASOPHILS # BLD AUTO: 0.05 10*3/MM3 (ref 0–0.2)
BASOPHILS NFR BLD AUTO: 0.6 % (ref 0–1.5)
BH CV ECHO MEAS - AO MAX PG (FULL): 3 MMHG
BH CV ECHO MEAS - AO MAX PG: 8 MMHG
BH CV ECHO MEAS - AO MEAN PG (FULL): 2 MMHG
BH CV ECHO MEAS - AO MEAN PG: 4 MMHG
BH CV ECHO MEAS - AO V2 MAX: 141 CM/SEC
BH CV ECHO MEAS - AO V2 MEAN: 97.3 CM/SEC
BH CV ECHO MEAS - AO V2 VTI: 35.4 CM
BH CV ECHO MEAS - AVA(I,A): 2.5 CM^2
BH CV ECHO MEAS - AVA(I,D): 2.5 CM^2
BH CV ECHO MEAS - AVA(V,A): 2.7 CM^2
BH CV ECHO MEAS - AVA(V,D): 2.7 CM^2
BH CV ECHO MEAS - BSA(HAYCOCK): 2.4 M^2
BH CV ECHO MEAS - BSA: 2.3 M^2
BH CV ECHO MEAS - BZI_BMI: 32 KILOGRAMS/M^2
BH CV ECHO MEAS - BZI_METRIC_HEIGHT: 182.9 CM
BH CV ECHO MEAS - BZI_METRIC_WEIGHT: 107.1 KG
BH CV ECHO MEAS - EDV(CUBED): 59.3 ML
BH CV ECHO MEAS - EDV(MOD-SP2): 77 ML
BH CV ECHO MEAS - EDV(MOD-SP4): 92 ML
BH CV ECHO MEAS - EDV(TEICH): 65.9 ML
BH CV ECHO MEAS - EF(CUBED): 58.9 %
BH CV ECHO MEAS - EF(MOD-BP): 57 %
BH CV ECHO MEAS - EF(MOD-SP2): 59.7 %
BH CV ECHO MEAS - EF(MOD-SP4): 56.5 %
BH CV ECHO MEAS - EF(TEICH): 51.1 %
BH CV ECHO MEAS - ESV(CUBED): 24.4 ML
BH CV ECHO MEAS - ESV(MOD-SP2): 31 ML
BH CV ECHO MEAS - ESV(MOD-SP4): 40 ML
BH CV ECHO MEAS - ESV(TEICH): 32.2 ML
BH CV ECHO MEAS - FS: 25.6 %
BH CV ECHO MEAS - IVS/LVPW: 1
BH CV ECHO MEAS - IVSD: 1.2 CM
BH CV ECHO MEAS - LAT PEAK E' VEL: 9.6 CM/SEC
BH CV ECHO MEAS - LV DIASTOLIC VOL/BSA (35-75): 40.3 ML/M^2
BH CV ECHO MEAS - LV MASS(C)D: 159.3 GRAMS
BH CV ECHO MEAS - LV MASS(C)DI: 69.7 GRAMS/M^2
BH CV ECHO MEAS - LV MAX PG: 4.9 MMHG
BH CV ECHO MEAS - LV MEAN PG: 2 MMHG
BH CV ECHO MEAS - LV SYSTOLIC VOL/BSA (12-30): 17.5 ML/M^2
BH CV ECHO MEAS - LV V1 MAX: 111 CM/SEC
BH CV ECHO MEAS - LV V1 MEAN: 71.4 CM/SEC
BH CV ECHO MEAS - LV V1 VTI: 25.1 CM
BH CV ECHO MEAS - LVIDD: 3.9 CM
BH CV ECHO MEAS - LVIDS: 2.9 CM
BH CV ECHO MEAS - LVLD AP2: 7.5 CM
BH CV ECHO MEAS - LVLD AP4: 7.2 CM
BH CV ECHO MEAS - LVLS AP2: 6.5 CM
BH CV ECHO MEAS - LVLS AP4: 5.9 CM
BH CV ECHO MEAS - LVOT AREA (M): 3.5 CM^2
BH CV ECHO MEAS - LVOT AREA: 3.5 CM^2
BH CV ECHO MEAS - LVOT DIAM: 2.1 CM
BH CV ECHO MEAS - LVPWD: 1.2 CM
BH CV ECHO MEAS - MED PEAK E' VEL: 6.71 CM/SEC
BH CV ECHO MEAS - MV A DUR: 195 SEC
BH CV ECHO MEAS - MV A MAX VEL: 84.4 CM/SEC
BH CV ECHO MEAS - MV DEC SLOPE: 539 CM/SEC^2
BH CV ECHO MEAS - MV DEC TIME: 280 SEC
BH CV ECHO MEAS - MV E MAX VEL: 67.4 CM/SEC
BH CV ECHO MEAS - MV E/A: 0.8
BH CV ECHO MEAS - MV MAX PG: 3.2 MMHG
BH CV ECHO MEAS - MV MEAN PG: 1 MMHG
BH CV ECHO MEAS - MV P1/2T MAX VEL: 92.3 CM/SEC
BH CV ECHO MEAS - MV P1/2T: 50.2 MSEC
BH CV ECHO MEAS - MV V2 MAX: 88.9 CM/SEC
BH CV ECHO MEAS - MV V2 MEAN: 44.9 CM/SEC
BH CV ECHO MEAS - MV V2 VTI: 35.3 CM
BH CV ECHO MEAS - MVA P1/2T LCG: 2.4 CM^2
BH CV ECHO MEAS - MVA(P1/2T): 4.4 CM^2
BH CV ECHO MEAS - MVA(VTI): 2.5 CM^2
BH CV ECHO MEAS - PA ACC TIME: 0.09 SEC
BH CV ECHO MEAS - PA MAX PG (FULL): 1.9 MMHG
BH CV ECHO MEAS - PA MAX PG: 3.5 MMHG
BH CV ECHO MEAS - PA PR(ACCEL): 37.6 MMHG
BH CV ECHO MEAS - PA V2 MAX: 93.9 CM/SEC
BH CV ECHO MEAS - PULM A REVS DUR: 0.13 SEC
BH CV ECHO MEAS - PULM A REVS VEL: 29 CM/SEC
BH CV ECHO MEAS - PULM DIAS VEL: 49.5 CM/SEC
BH CV ECHO MEAS - PULM S/D: 0.83
BH CV ECHO MEAS - PULM SYS VEL: 40.9 CM/SEC
BH CV ECHO MEAS - PVA(V,A): 1.9 CM^2
BH CV ECHO MEAS - PVA(V,D): 1.9 CM^2
BH CV ECHO MEAS - QP/QS: 0.38
BH CV ECHO MEAS - RAP SYSTOLE: 8 MMHG
BH CV ECHO MEAS - RV MAX PG: 1.7 MMHG
BH CV ECHO MEAS - RV MEAN PG: 1 MMHG
BH CV ECHO MEAS - RV V1 MAX: 64.3 CM/SEC
BH CV ECHO MEAS - RV V1 MEAN: 36.7 CM/SEC
BH CV ECHO MEAS - RV V1 VTI: 11.7 CM
BH CV ECHO MEAS - RVOT AREA: 2.8 CM^2
BH CV ECHO MEAS - RVOT DIAM: 1.9 CM
BH CV ECHO MEAS - RVSP: 43 MMHG
BH CV ECHO MEAS - SI(CUBED): 15.3 ML/M^2
BH CV ECHO MEAS - SI(LVOT): 38 ML/M^2
BH CV ECHO MEAS - SI(MOD-SP2): 20.1 ML/M^2
BH CV ECHO MEAS - SI(MOD-SP4): 22.8 ML/M^2
BH CV ECHO MEAS - SI(TEICH): 14.7 ML/M^2
BH CV ECHO MEAS - SV(CUBED): 34.9 ML
BH CV ECHO MEAS - SV(LVOT): 86.9 ML
BH CV ECHO MEAS - SV(MOD-SP2): 46 ML
BH CV ECHO MEAS - SV(MOD-SP4): 52 ML
BH CV ECHO MEAS - SV(RVOT): 33.2 ML
BH CV ECHO MEAS - SV(TEICH): 33.7 ML
BH CV ECHO MEAS - TAPSE (>1.6): 2.8 CM
BH CV ECHO MEAS - TR MAX PG: 36
BH CV ECHO MEAS - TR MAX VEL: 299 CM/SEC
BH CV ECHO MEASUREMENTS AVERAGE E/E' RATIO: 8.26
BH CV STRESS COMMENTS STAGE 1: NORMAL
BH CV STRESS DOSE REGADENOSON STAGE 1: 0.4
BH CV STRESS DURATION MIN STAGE 1: 0
BH CV STRESS DURATION SEC STAGE 1: 10
BH CV STRESS PROTOCOL 1: NORMAL
BH CV STRESS RECOVERY BP: NORMAL MMHG
BH CV STRESS RECOVERY HR: 61 BPM
BH CV STRESS STAGE 1: 1
BH CV XLRA - RV BASE: 3.14 CM
BH CV XLRA - TDI S': 11.7 CM/SEC
BILIRUB SERPL-MCNC: 0.2 MG/DL (ref 0.2–1.2)
BUN BLD-MCNC: 22 MG/DL (ref 8–23)
BUN/CREAT SERPL: 18.3 (ref 7–25)
CALCIUM SPEC-SCNC: 9.3 MG/DL (ref 8.6–10.5)
CHLORIDE SERPL-SCNC: 102 MMOL/L (ref 98–107)
CO2 SERPL-SCNC: 24.1 MMOL/L (ref 22–29)
CREAT BLD-MCNC: 1.2 MG/DL (ref 0.76–1.27)
DEPRECATED RDW RBC AUTO: 43.7 FL (ref 37–54)
EOSINOPHIL # BLD AUTO: 0.24 10*3/MM3 (ref 0–0.4)
EOSINOPHIL NFR BLD AUTO: 2.9 % (ref 0.3–6.2)
ERYTHROCYTE [DISTWIDTH] IN BLOOD BY AUTOMATED COUNT: 12.1 % (ref 12.3–15.4)
GFR SERPL CREATININE-BSD FRML MDRD: 59 ML/MIN/1.73
GLOBULIN UR ELPH-MCNC: 2.7 GM/DL
GLUCOSE BLD-MCNC: 109 MG/DL (ref 65–99)
HCT VFR BLD AUTO: 37.8 % (ref 37.5–51)
HGB BLD-MCNC: 12.8 G/DL (ref 13–17.7)
HOLD SPECIMEN: NORMAL
HOLD SPECIMEN: NORMAL
IMM GRANULOCYTES # BLD AUTO: 0.02 10*3/MM3 (ref 0–0.05)
IMM GRANULOCYTES NFR BLD AUTO: 0.2 % (ref 0–0.5)
INR PPP: 1.67 (ref 0.9–1.1)
INR PPP: 1.8 (ref 0.9–1.1)
LEFT ATRIUM VOLUME INDEX: 41 ML/M2
LV EF NUC BP: 60 %
LYMPHOCYTES # BLD AUTO: 1.54 10*3/MM3 (ref 0.7–3.1)
LYMPHOCYTES NFR BLD AUTO: 18.4 % (ref 19.6–45.3)
MAXIMAL PREDICTED HEART RATE: 142 BPM
MCH RBC QN AUTO: 32.9 PG (ref 26.6–33)
MCHC RBC AUTO-ENTMCNC: 33.9 G/DL (ref 31.5–35.7)
MCV RBC AUTO: 97.2 FL (ref 79–97)
MONOCYTES # BLD AUTO: 0.7 10*3/MM3 (ref 0.1–0.9)
MONOCYTES NFR BLD AUTO: 8.4 % (ref 5–12)
NEUTROPHILS # BLD AUTO: 5.8 10*3/MM3 (ref 1.7–7)
NEUTROPHILS NFR BLD AUTO: 69.5 % (ref 42.7–76)
NRBC BLD AUTO-RTO: 0 /100 WBC (ref 0–0.2)
PERCENT MAX PREDICTED HR: 50.7 %
PLATELET # BLD AUTO: 169 10*3/MM3 (ref 140–450)
PMV BLD AUTO: 11.7 FL (ref 6–12)
POTASSIUM BLD-SCNC: 3.9 MMOL/L (ref 3.5–5.2)
PROT SERPL-MCNC: 6.9 G/DL (ref 6–8.5)
PROTHROMBIN TIME: 19.4 SECONDS (ref 11.7–14.2)
PROTHROMBIN TIME: 20.5 SECONDS (ref 11.7–14.2)
RBC # BLD AUTO: 3.89 10*6/MM3 (ref 4.14–5.8)
SODIUM BLD-SCNC: 140 MMOL/L (ref 136–145)
STRESS BASELINE BP: NORMAL MMHG
STRESS BASELINE HR: 55 BPM
STRESS PERCENT HR: 60 %
STRESS POST PEAK BP: NORMAL MMHG
STRESS POST PEAK HR: 72 BPM
STRESS TARGET HR: 121 BPM
TROPONIN T SERPL-MCNC: <0.01 NG/ML (ref 0–0.03)
WBC NRBC COR # BLD: 8.35 10*3/MM3 (ref 3.4–10.8)
WHOLE BLOOD HOLD SPECIMEN: NORMAL
WHOLE BLOOD HOLD SPECIMEN: NORMAL

## 2019-11-27 PROCEDURE — 80053 COMPREHEN METABOLIC PANEL: CPT | Performed by: EMERGENCY MEDICINE

## 2019-11-27 PROCEDURE — 85610 PROTHROMBIN TIME: CPT | Performed by: EMERGENCY MEDICINE

## 2019-11-27 PROCEDURE — 25010000002 REGADENOSON 0.4 MG/5ML SOLUTION: Performed by: INTERNAL MEDICINE

## 2019-11-27 PROCEDURE — 78452 HT MUSCLE IMAGE SPECT MULT: CPT

## 2019-11-27 PROCEDURE — 84484 ASSAY OF TROPONIN QUANT: CPT | Performed by: EMERGENCY MEDICINE

## 2019-11-27 PROCEDURE — 36415 COLL VENOUS BLD VENIPUNCTURE: CPT | Performed by: EMERGENCY MEDICINE

## 2019-11-27 PROCEDURE — 0 IOPAMIDOL PER 1 ML: Performed by: EMERGENCY MEDICINE

## 2019-11-27 PROCEDURE — G0378 HOSPITAL OBSERVATION PER HR: HCPCS

## 2019-11-27 PROCEDURE — 25010000002 ONDANSETRON PER 1 MG: Performed by: EMERGENCY MEDICINE

## 2019-11-27 PROCEDURE — 85610 PROTHROMBIN TIME: CPT | Performed by: NURSE PRACTITIONER

## 2019-11-27 PROCEDURE — 78452 HT MUSCLE IMAGE SPECT MULT: CPT | Performed by: INTERNAL MEDICINE

## 2019-11-27 PROCEDURE — 93017 CV STRESS TEST TRACING ONLY: CPT

## 2019-11-27 PROCEDURE — 71275 CT ANGIOGRAPHY CHEST: CPT

## 2019-11-27 PROCEDURE — A9500 TC99M SESTAMIBI: HCPCS | Performed by: INTERNAL MEDICINE

## 2019-11-27 PROCEDURE — 93306 TTE W/DOPPLER COMPLETE: CPT

## 2019-11-27 PROCEDURE — 93005 ELECTROCARDIOGRAM TRACING: CPT

## 2019-11-27 PROCEDURE — 85025 COMPLETE CBC W/AUTO DIFF WBC: CPT | Performed by: EMERGENCY MEDICINE

## 2019-11-27 PROCEDURE — 93018 CV STRESS TEST I&R ONLY: CPT | Performed by: INTERNAL MEDICINE

## 2019-11-27 PROCEDURE — 93010 ELECTROCARDIOGRAM REPORT: CPT | Performed by: INTERNAL MEDICINE

## 2019-11-27 PROCEDURE — 93005 ELECTROCARDIOGRAM TRACING: CPT | Performed by: EMERGENCY MEDICINE

## 2019-11-27 PROCEDURE — 25010000002 MORPHINE PER 10 MG: Performed by: EMERGENCY MEDICINE

## 2019-11-27 PROCEDURE — 93306 TTE W/DOPPLER COMPLETE: CPT | Performed by: INTERNAL MEDICINE

## 2019-11-27 PROCEDURE — 96374 THER/PROPH/DIAG INJ IV PUSH: CPT

## 2019-11-27 PROCEDURE — 25010000002 PERFLUTREN (DEFINITY) 8.476 MG IN SODIUM CHLORIDE 0.9 % 10 ML INJECTION: Performed by: INTERNAL MEDICINE

## 2019-11-27 PROCEDURE — 71046 X-RAY EXAM CHEST 2 VIEWS: CPT

## 2019-11-27 PROCEDURE — 0 TECHNETIUM SESTAMIBI: Performed by: INTERNAL MEDICINE

## 2019-11-27 PROCEDURE — 99235 HOSP IP/OBS SAME DATE MOD 70: CPT | Performed by: NURSE PRACTITIONER

## 2019-11-27 PROCEDURE — 99285 EMERGENCY DEPT VISIT HI MDM: CPT

## 2019-11-27 PROCEDURE — 96375 TX/PRO/DX INJ NEW DRUG ADDON: CPT

## 2019-11-27 RX ORDER — MORPHINE SULFATE 2 MG/ML
4 INJECTION, SOLUTION INTRAMUSCULAR; INTRAVENOUS ONCE
Status: DISCONTINUED | OUTPATIENT
Start: 2019-11-27 | End: 2019-11-27 | Stop reason: HOSPADM

## 2019-11-27 RX ORDER — WARFARIN SODIUM 10 MG/1
10 TABLET ORAL
Status: DISCONTINUED | OUTPATIENT
Start: 2019-11-27 | End: 2019-11-27 | Stop reason: HOSPADM

## 2019-11-27 RX ORDER — NEBIVOLOL 5 MG/1
5 TABLET ORAL DAILY
Status: DISCONTINUED | OUTPATIENT
Start: 2019-11-27 | End: 2019-11-27 | Stop reason: HOSPADM

## 2019-11-27 RX ORDER — SODIUM CHLORIDE 0.9 % (FLUSH) 0.9 %
10 SYRINGE (ML) INJECTION EVERY 12 HOURS SCHEDULED
Status: DISCONTINUED | OUTPATIENT
Start: 2019-11-27 | End: 2019-11-27 | Stop reason: HOSPADM

## 2019-11-27 RX ORDER — SODIUM CHLORIDE 0.9 % (FLUSH) 0.9 %
10 SYRINGE (ML) INJECTION AS NEEDED
Status: DISCONTINUED | OUTPATIENT
Start: 2019-11-27 | End: 2019-11-27 | Stop reason: HOSPADM

## 2019-11-27 RX ORDER — AMIODARONE HYDROCHLORIDE 200 MG/1
100 TABLET ORAL DAILY
Status: DISCONTINUED | OUTPATIENT
Start: 2019-11-27 | End: 2019-11-27 | Stop reason: HOSPADM

## 2019-11-27 RX ORDER — PANTOPRAZOLE SODIUM 40 MG/1
40 TABLET, DELAYED RELEASE ORAL EVERY MORNING
Status: DISCONTINUED | OUTPATIENT
Start: 2019-11-27 | End: 2019-11-27 | Stop reason: HOSPADM

## 2019-11-27 RX ORDER — WARFARIN SODIUM 7.5 MG/1
7.5 TABLET ORAL
Status: DISCONTINUED | OUTPATIENT
Start: 2019-11-28 | End: 2019-11-27 | Stop reason: HOSPADM

## 2019-11-27 RX ORDER — ISOSORBIDE MONONITRATE 30 MG/1
30 TABLET, EXTENDED RELEASE ORAL DAILY
Status: DISCONTINUED | OUTPATIENT
Start: 2019-11-27 | End: 2019-11-27 | Stop reason: HOSPADM

## 2019-11-27 RX ORDER — MORPHINE SULFATE 2 MG/ML
4 INJECTION, SOLUTION INTRAMUSCULAR; INTRAVENOUS ONCE
Status: COMPLETED | OUTPATIENT
Start: 2019-11-27 | End: 2019-11-27

## 2019-11-27 RX ORDER — ROSUVASTATIN CALCIUM 5 MG/1
5 TABLET, COATED ORAL DAILY
Status: DISCONTINUED | OUTPATIENT
Start: 2019-11-27 | End: 2019-11-27 | Stop reason: HOSPADM

## 2019-11-27 RX ORDER — NITROGLYCERIN 0.4 MG/1
0.4 TABLET SUBLINGUAL
Status: DISCONTINUED | OUTPATIENT
Start: 2019-11-27 | End: 2019-11-27 | Stop reason: HOSPADM

## 2019-11-27 RX ORDER — ACETAMINOPHEN 325 MG/1
650 TABLET ORAL EVERY 6 HOURS PRN
Status: DISCONTINUED | OUTPATIENT
Start: 2019-11-27 | End: 2019-11-27 | Stop reason: HOSPADM

## 2019-11-27 RX ORDER — LEVOTHYROXINE SODIUM 112 UG/1
112 TABLET ORAL DAILY
Status: DISCONTINUED | OUTPATIENT
Start: 2019-11-27 | End: 2019-11-27 | Stop reason: HOSPADM

## 2019-11-27 RX ORDER — ASPIRIN 325 MG
325 TABLET ORAL ONCE
Status: COMPLETED | OUTPATIENT
Start: 2019-11-27 | End: 2019-11-27

## 2019-11-27 RX ORDER — ONDANSETRON 2 MG/ML
4 INJECTION INTRAMUSCULAR; INTRAVENOUS ONCE
Status: COMPLETED | OUTPATIENT
Start: 2019-11-27 | End: 2019-11-27

## 2019-11-27 RX ORDER — TERAZOSIN 2 MG/1
2 CAPSULE ORAL NIGHTLY
Status: DISCONTINUED | OUTPATIENT
Start: 2019-11-27 | End: 2019-11-27 | Stop reason: HOSPADM

## 2019-11-27 RX ADMIN — NITROGLYCERIN 1 INCH: 20 OINTMENT TOPICAL at 02:21

## 2019-11-27 RX ADMIN — TECHNETIUM TC 99M SESTAMIBI 1 DOSE: 1 INJECTION INTRAVENOUS at 12:45

## 2019-11-27 RX ADMIN — REGADENOSON 0.4 MG: 0.08 INJECTION, SOLUTION INTRAVENOUS at 12:45

## 2019-11-27 RX ADMIN — PERFLUTREN 2 ML: 6.52 INJECTION, SUSPENSION INTRAVENOUS at 14:40

## 2019-11-27 RX ADMIN — TECHNETIUM TC 99M SESTAMIBI 1 DOSE: 1 INJECTION INTRAVENOUS at 10:20

## 2019-11-27 RX ADMIN — SODIUM CHLORIDE 500 ML: 9 INJECTION, SOLUTION INTRAVENOUS at 05:15

## 2019-11-27 RX ADMIN — SODIUM CHLORIDE 500 ML: 9 INJECTION, SOLUTION INTRAVENOUS at 04:29

## 2019-11-27 RX ADMIN — IOPAMIDOL 95 ML: 755 INJECTION, SOLUTION INTRAVENOUS at 03:42

## 2019-11-27 RX ADMIN — ONDANSETRON 4 MG: 2 INJECTION INTRAMUSCULAR; INTRAVENOUS at 02:21

## 2019-11-27 RX ADMIN — ASPIRIN 325 MG: 325 TABLET ORAL at 02:21

## 2019-11-27 RX ADMIN — MORPHINE SULFATE 4 MG: 2 INJECTION, SOLUTION INTRAMUSCULAR; INTRAVENOUS at 02:21

## 2019-12-02 RX ORDER — ROSUVASTATIN CALCIUM 5 MG/1
TABLET, COATED ORAL
Qty: 90 TABLET | Refills: 3 | Status: SHIPPED | OUTPATIENT
Start: 2019-12-02 | End: 2020-12-14

## 2019-12-09 RX ORDER — DOXAZOSIN 2 MG/1
TABLET ORAL
Qty: 90 TABLET | Refills: 0 | Status: SHIPPED | OUTPATIENT
Start: 2019-12-09 | End: 2020-03-10 | Stop reason: SDUPTHER

## 2019-12-20 PROBLEM — Z28.21 REFUSED INFLUENZA VACCINE: Status: ACTIVE | Noted: 2019-12-20

## 2019-12-25 DIAGNOSIS — I10 ESSENTIAL HYPERTENSION: ICD-10-CM

## 2019-12-26 RX ORDER — LEVOTHYROXINE SODIUM 112 UG/1
TABLET ORAL
Qty: 90 TABLET | Refills: 0 | Status: SHIPPED | OUTPATIENT
Start: 2019-12-26 | End: 2020-03-24

## 2019-12-26 RX ORDER — OLMESARTAN MEDOXOMIL AND HYDROCHLOROTHIAZIDE 40/25 40; 25 MG/1; MG/1
TABLET ORAL
Qty: 90 TABLET | Refills: 0 | Status: SHIPPED | OUTPATIENT
Start: 2019-12-26 | End: 2020-03-31

## 2020-01-06 ENCOUNTER — OFFICE VISIT (OUTPATIENT)
Dept: FAMILY MEDICINE CLINIC | Facility: CLINIC | Age: 80
End: 2020-01-06

## 2020-01-06 VITALS
SYSTOLIC BLOOD PRESSURE: 128 MMHG | BODY MASS INDEX: 31.97 KG/M2 | HEART RATE: 79 BPM | OXYGEN SATURATION: 97 % | TEMPERATURE: 96.9 F | WEIGHT: 236 LBS | HEIGHT: 72 IN | DIASTOLIC BLOOD PRESSURE: 74 MMHG

## 2020-01-06 DIAGNOSIS — I10 ESSENTIAL HYPERTENSION: ICD-10-CM

## 2020-01-06 DIAGNOSIS — E03.9 ACQUIRED HYPOTHYROIDISM: Primary | ICD-10-CM

## 2020-01-06 DIAGNOSIS — E78.2 MIXED HYPERLIPIDEMIA: ICD-10-CM

## 2020-01-06 DIAGNOSIS — Z00.00 MEDICARE ANNUAL WELLNESS VISIT, SUBSEQUENT: ICD-10-CM

## 2020-01-06 DIAGNOSIS — I48.0 PAROXYSMAL ATRIAL FIBRILLATION (HCC): ICD-10-CM

## 2020-01-06 PROBLEM — N40.0 BPH WITHOUT OBSTRUCTION/LOWER URINARY TRACT SYMPTOMS: Status: ACTIVE | Noted: 2020-01-06

## 2020-01-06 PROCEDURE — 99214 OFFICE O/P EST MOD 30 MIN: CPT | Performed by: FAMILY MEDICINE

## 2020-01-06 PROCEDURE — G0439 PPPS, SUBSEQ VISIT: HCPCS | Performed by: FAMILY MEDICINE

## 2020-01-06 NOTE — PROGRESS NOTES
No chief complaint on file.      Noemi Comer Sr. is a 79 y.o. male.     History of Present Illness   PT is here for follow up and recent hospital stay at  [facility] from 12-17-19 thru 12-19-19 with a discharge dx of RLL Pneumonia, Sepsis, hypoxia, A Fib, and HTN.  He presented with moderate cough, chills that had worsened over past few days.  Improved on rest and worsened with activity.  He was started on abx and continued to improve.  He was sent home on ceftin and azithromycin  The following portions of the patient's history were reviewed and updated as appropriate: allergies, current medications, past family history, past medical history, past social history, past surgical history and problem list.    Review of Systems    Patient Active Problem List   Diagnosis   • Chronic coronary artery disease   • Abdominal aortic aneurysm (CMS/HCC)   • Paroxysmal atrial fibrillation (CMS/HCC)   • Gastroesophageal reflux disease   • Essential hypertension   • Mixed hyperlipidemia   • Hypogonadism in male   • Acquired hypothyroidism   • Osteoarthritis of spine   • Vitamin D deficiency   • Anticoagulated   • Atrial fibrillation, rapid (CMS/HCC)   • Syncope and collapse   • Chest pain   • Pneumonia due to infectious organism   • Statin intolerance   • Gross hematuria   • Coumadin toxicity   • Sepsis (CMS/HCC)   • H/O amiodarone therapy   • Dehydration   • Renal insufficiency   • Thoracic aortic aneurysm without rupture (CMS/HCC)   • Refused influenza vaccine       No Known Allergies      Current Outpatient Medications:   •  amiodarone (PACERONE) 200 MG tablet, Take 0.5 tablets by mouth Daily., Disp: 30 tablet, Rfl: 5  •  aspirin 81 MG tablet, Take 81 mg by mouth Daily., Disp: , Rfl:   •  BYSTOLIC 10 MG tablet, TAKE 1/2 (ONE-HALF) TABLET BY MOUTH ONCE DAILY, Disp: 30 tablet, Rfl: 1  •  doxazosin (CARDURA) 2 MG tablet, TAKE 1 TABLET BY MOUTH ONCE DAILY, Disp: 90 tablet, Rfl: 0  •  esomeprazole (NEXIUM) 40 MG  capsule, Take 20 mg by mouth Every Morning Before Breakfast., Disp: , Rfl:   •  isosorbide mononitrate (IMDUR) 30 MG 24 hr tablet, TAKE 1 TABLET BY MOUTH ONCE DAILY, Disp: 90 tablet, Rfl: 1  •  levothyroxine (SYNTHROID, LEVOTHROID) 112 MCG tablet, TAKE 1 TABLET BY MOUTH ONCE DAILY, Disp: 90 tablet, Rfl: 0  •  nitroglycerin (NITROSTAT) 0.4 MG SL tablet, Place 0.4 mg under the tongue Every 5 (Five) Minutes As Needed. Doesn't take, Disp: , Rfl:   •  olmesartan-hydrochlorothiazide (BENICAR HCT) 40-25 MG per tablet, TAKE ONE TABLET BY MOUTH DAILY, Disp: 90 tablet, Rfl: 0  •  rosuvastatin (CRESTOR) 5 MG tablet, TAKE 1 TABLET BY MOUTH ONCE DAILY, Disp: 90 tablet, Rfl: 3  •  warfarin (COUMADIN) 7.5 MG tablet, TAKE 1 TABLET BY MOUTH ONCE DAILY, Disp: 90 tablet, Rfl: 1    Past Medical History:   Diagnosis Date   • AAA (abdominal aortic aneurysm) without rupture (CMS/HCC)     CT performed on 2/20/17 (this is actually a THORACIC aortic aneurysm)   • Abnormal ECG    • Acute bronchitis    • Acute low back pain    • Ankle swelling    • Anticoagulated on warfarin    • Arthritis    • Atrial fibrillation (CMS/HCC)    • BPH (benign prostatic hyperplasia)    • Bradycardia    • Chest pain    • Chronic lumbar pain    • Coronary artery disease    • Cough    • Degenerative arthritis of lumbar spine    • Degenerative cervical disc    • Dehydration    • Diarrhea    • DJD (degenerative joint disease)    • Elevated rheumatoid factor    • Esophageal reflux    • GERD (gastroesophageal reflux disease)    • Hematuria, gross    • Hypogonadism in male    • Hypokalemia    • Inguinal hernia    • Kidney stone    • Melena    • Myalgia    • Myositis    • PAF (paroxysmal atrial fibrillation) (CMS/HCC)    • Refused influenza vaccine    • Right hip pain    • Right leg swelling    • Scoliosis    • Sepsis (CMS/HCC)    • Thoracic aortic aneurysm (CMS/HCC)     Thoracic Aneurysm   • Vertebral compression fracture (CMS/HCC)    • Vitamin D deficiency    • Wheezing         Past Surgical History:   Procedure Laterality Date   • ANKLE FUSION     • CARDIAC ABLATION     • FOOT SURGERY     • INGUINAL HERNIA REPAIR     • KNEE SURGERY     • REPLACEMENT TOTAL KNEE     • SHOULDER SURGERY      Rotator cuff x 2   • VASECTOMY         Family History   Problem Relation Age of Onset   • Hypertension Father    • Heart attack Father    • Heart attack Brother    • Alcohol abuse Brother    • Hypertension Brother    • Hypertension Paternal Grandmother    • Hypertension Paternal Grandfather    • Anemia Mother    • Arthritis Mother    • Hypertension Mother    • Hypertension Maternal Grandmother    • Heart disease Other         FH in males before age 55       Social History     Tobacco Use   • Smoking status: Never Smoker   • Smokeless tobacco: Never Used   Substance Use Topics   • Alcohol use: No            Objective     There were no vitals taken for this visit.    Physical Exam    Lab Results   Component Value Date    GLUCOSE 109 (H) 11/27/2019    BUN 22 11/27/2019    CREATININE 1.20 11/27/2019    EGFRIFNONA 59 (L) 11/27/2019    EGFRIFAFRI 76 09/24/2019    BCR 18.3 11/27/2019    K 3.9 11/27/2019    CO2 24.1 11/27/2019    CALCIUM 9.3 11/27/2019    ALBUMIN 4.20 11/27/2019    AST 18 11/27/2019    ALT 24 11/27/2019       WBC   Date Value Ref Range Status   11/27/2019 8.35 3.40 - 10.80 10*3/mm3 Final     RBC   Date Value Ref Range Status   11/27/2019 3.89 (L) 4.14 - 5.80 10*6/mm3 Final     Hemoglobin   Date Value Ref Range Status   11/27/2019 12.8 (L) 13.0 - 17.7 g/dL Final     Hematocrit   Date Value Ref Range Status   11/27/2019 37.8 37.5 - 51.0 % Final     MCV   Date Value Ref Range Status   11/27/2019 97.2 (H) 79.0 - 97.0 fL Final     MCH   Date Value Ref Range Status   11/27/2019 32.9 26.6 - 33.0 pg Final     MCHC   Date Value Ref Range Status   11/27/2019 33.9 31.5 - 35.7 g/dL Final     RDW   Date Value Ref Range Status   11/27/2019 12.1 (L) 12.3 - 15.4 % Final     RDW-SD   Date Value Ref Range  Status   11/27/2019 43.7 37.0 - 54.0 fl Final     MPV   Date Value Ref Range Status   11/27/2019 11.7 6.0 - 12.0 fL Final     Platelets   Date Value Ref Range Status   11/27/2019 169 140 - 450 10*3/mm3 Final     Neutrophil %   Date Value Ref Range Status   11/27/2019 69.5 42.7 - 76.0 % Final     Lymphocyte %   Date Value Ref Range Status   11/27/2019 18.4 (L) 19.6 - 45.3 % Final     Monocyte %   Date Value Ref Range Status   11/27/2019 8.4 5.0 - 12.0 % Final     Eosinophil %   Date Value Ref Range Status   11/27/2019 2.9 0.3 - 6.2 % Final     Basophil %   Date Value Ref Range Status   11/27/2019 0.6 0.0 - 1.5 % Final     Immature Grans %   Date Value Ref Range Status   11/27/2019 0.2 0.0 - 0.5 % Final     Neutrophils, Absolute   Date Value Ref Range Status   11/27/2019 5.80 1.70 - 7.00 10*3/mm3 Final     Lymphocytes, Absolute   Date Value Ref Range Status   11/27/2019 1.54 0.70 - 3.10 10*3/mm3 Final     Monocytes, Absolute   Date Value Ref Range Status   11/27/2019 0.70 0.10 - 0.90 10*3/mm3 Final     Eosinophils, Absolute   Date Value Ref Range Status   11/27/2019 0.24 0.00 - 0.40 10*3/mm3 Final     Basophils, Absolute   Date Value Ref Range Status   11/27/2019 0.05 0.00 - 0.20 10*3/mm3 Final     Immature Grans, Absolute   Date Value Ref Range Status   11/27/2019 0.02 0.00 - 0.05 10*3/mm3 Final     nRBC   Date Value Ref Range Status   11/27/2019 0.0 0.0 - 0.2 /100 WBC Final       No results found for: HGBA1C    No results found for: [id number]    TSH   Date Value Ref Range Status   03/15/2019 4.310 (H) 0.270 - 4.200 mIU/mL Final       Lab Results   Component Value Date    CHOL 133 12/15/2017     Lab Results   Component Value Date    TRIG 204 (H) 12/15/2017     Lab Results   Component Value Date    HDL 54 12/15/2017     Lab Results   Component Value Date    LDL 38 12/15/2017     Lab Results   Component Value Date    VLDL 40.8 (H) 12/15/2017     Lab Results   Component Value Date    LDLHDL 0.71 12/15/2017          Procedures    Assessment/Plan   Problems Addressed this Visit     None          No orders of the defined types were placed in this encounter.      Current Outpatient Medications   Medication Sig Dispense Refill   • amiodarone (PACERONE) 200 MG tablet Take 0.5 tablets by mouth Daily. 30 tablet 5   • aspirin 81 MG tablet Take 81 mg by mouth Daily.     • BYSTOLIC 10 MG tablet TAKE 1/2 (ONE-HALF) TABLET BY MOUTH ONCE DAILY 30 tablet 1   • doxazosin (CARDURA) 2 MG tablet TAKE 1 TABLET BY MOUTH ONCE DAILY 90 tablet 0   • esomeprazole (NEXIUM) 40 MG capsule Take 20 mg by mouth Every Morning Before Breakfast.     • isosorbide mononitrate (IMDUR) 30 MG 24 hr tablet TAKE 1 TABLET BY MOUTH ONCE DAILY 90 tablet 1   • levothyroxine (SYNTHROID, LEVOTHROID) 112 MCG tablet TAKE 1 TABLET BY MOUTH ONCE DAILY 90 tablet 0   • nitroglycerin (NITROSTAT) 0.4 MG SL tablet Place 0.4 mg under the tongue Every 5 (Five) Minutes As Needed. Doesn't take     • olmesartan-hydrochlorothiazide (BENICAR HCT) 40-25 MG per tablet TAKE ONE TABLET BY MOUTH DAILY 90 tablet 0   • rosuvastatin (CRESTOR) 5 MG tablet TAKE 1 TABLET BY MOUTH ONCE DAILY 90 tablet 3   • warfarin (COUMADIN) 7.5 MG tablet TAKE 1 TABLET BY MOUTH ONCE DAILY 90 tablet 1     No current facility-administered medications for this visit.        No follow-ups on file.    There are no Patient Instructions on file for this visit.

## 2020-01-06 NOTE — PROGRESS NOTES
The ABCs of the Annual Wellness Visit  Subsequent Medicare Wellness Visit    Chief Complaint   Patient presents with   • Pneumonia     Pt is here to follow up from hospital stay for pneumonia        Subjective   History of Present Illness:  David Comer Sr. is a 79 y.o. male who presents for a Subsequent Medicare Wellness Visit and    PT is here for follow up and recent hospital stay at  [facility] from 12-17-19 thru 12-19-19 with a discharge dx of RLL Pneumonia, Sepsis, hypoxia, A Fib, and HTN.  He presented with moderate cough, chills that had worsened over past few days.  Improved on rest and worsened with activity.  He was started on abx and continued to improve.  He was sent home on ceftin and azithromycin.  He currently feels fine and has no SOB, wheezing, or fatigue or fever or chills.  Hypothyroidism- Stable and No significant weight change.  Denies heat or cold intolerance.  No palpitations.  Hypertension-no chest pain, blurry vision, headaches or palpitations..  Hyperlipidemia- No myalgia.  No Complaints.  Stable.    HEALTH RISK ASSESSMENT    Recent Hospitalizations:  Recently treated at the following:  Other: [facility]    Current Medical Providers:  Patient Care Team:  Chacha Pineda MD as PCP - General (Family Medicine)    Smoking Status:  Social History     Tobacco Use   Smoking Status Never Smoker   Smokeless Tobacco Never Used       Alcohol Consumption:  Social History     Substance and Sexual Activity   Alcohol Use No       Depression Screen:   PHQ-2/PHQ-9 Depression Screening 1/6/2020   Little interest or pleasure in doing things 0   Feeling down, depressed, or hopeless 0   Total Score 0       Fall Risk Screen:  STEADI Fall Risk Assessment has not been completed.    Health Habits and Functional and Cognitive Screening:  Functional & Cognitive Status 1/6/2020   Do you have difficulty preparing food and eating? No   Do you have difficulty bathing yourself, getting dressed or  grooming yourself? No   Do you have difficulty using the toilet? No   Do you have difficulty moving around from place to place? No   Do you have trouble with steps or getting out of a bed or a chair? No   Current Diet Well Balanced Diet   Dental Exam Up to date   Eye Exam Up to date   Exercise (times per week) 2 times per week   Current Exercise Activities Include Aerobics   Do you need help using the phone?  No   Are you deaf or do you have serious difficulty hearing?  No   Do you need help with transportation? No   Do you need help shopping? No   Do you need help preparing meals?  No   Do you need help with housework?  No   Do you need help with laundry? No   Do you need help taking your medications? No   Do you need help managing money? No   Do you ever drive or ride in a car without wearing a seat belt? No   Have you felt unusual stress, anger or loneliness in the last month? No   Who do you live with? Alone   If you need help, do you have trouble finding someone available to you? No   Have you been bothered in the last four weeks by sexual problems? No   Do you have difficulty concentrating, remembering or making decisions? No         Does the patient have evidence of cognitive impairment? No    Asprin use counseling:Taking ASA appropriately as indicated    Age-appropriate Screening Schedule:  Refer to the list below for future screening recommendations based on patient's age, sex and/or medical conditions. Orders for these recommended tests are listed in the plan section. The patient has been provided with a written plan.    Health Maintenance   Topic Date Due   • TDAP/TD VACCINES (1 - Tdap) 03/12/1951   • ZOSTER VACCINE (1 of 2) 03/12/1991   • PNEUMOCOCCAL VACCINE (65+ HIGH RISK) (2 of 2 - PCV13) 09/04/2014   • COLONOSCOPY  04/14/2017   • LIPID PANEL  12/15/2018   • INFLUENZA VACCINE  Discontinued          The following portions of the patient's history were reviewed and updated as appropriate: allergies,  current medications, past family history, past medical history, past social history, past surgical history and problem list.    Outpatient Medications Prior to Visit   Medication Sig Dispense Refill   • amiodarone (PACERONE) 200 MG tablet Take 0.5 tablets by mouth Daily. 30 tablet 5   • aspirin 81 MG tablet Take 81 mg by mouth Daily.     • BYSTOLIC 10 MG tablet TAKE 1/2 (ONE-HALF) TABLET BY MOUTH ONCE DAILY 30 tablet 1   • doxazosin (CARDURA) 2 MG tablet TAKE 1 TABLET BY MOUTH ONCE DAILY 90 tablet 0   • esomeprazole (NEXIUM) 40 MG capsule Take 20 mg by mouth Every Morning Before Breakfast.     • isosorbide mononitrate (IMDUR) 30 MG 24 hr tablet TAKE 1 TABLET BY MOUTH ONCE DAILY 90 tablet 1   • levothyroxine (SYNTHROID, LEVOTHROID) 112 MCG tablet TAKE 1 TABLET BY MOUTH ONCE DAILY 90 tablet 0   • nitroglycerin (NITROSTAT) 0.4 MG SL tablet Place 0.4 mg under the tongue Every 5 (Five) Minutes As Needed. Doesn't take     • olmesartan-hydrochlorothiazide (BENICAR HCT) 40-25 MG per tablet TAKE ONE TABLET BY MOUTH DAILY 90 tablet 0   • rosuvastatin (CRESTOR) 5 MG tablet TAKE 1 TABLET BY MOUTH ONCE DAILY 90 tablet 3   • warfarin (COUMADIN) 7.5 MG tablet TAKE 1 TABLET BY MOUTH ONCE DAILY 90 tablet 1     No facility-administered medications prior to visit.        Patient Active Problem List   Diagnosis   • Chronic coronary artery disease   • Abdominal aortic aneurysm (CMS/HCC)   • Paroxysmal atrial fibrillation (CMS/HCC)   • Gastroesophageal reflux disease   • Essential hypertension   • Mixed hyperlipidemia   • Hypogonadism in male   • Acquired hypothyroidism   • Osteoarthritis of spine   • Vitamin D deficiency   • Anticoagulated   • Atrial fibrillation, rapid (CMS/HCC)   • Syncope and collapse   • Chest pain   • Statin intolerance   • Gross hematuria   • Coumadin toxicity   • Sepsis (CMS/HCC)   • H/O amiodarone therapy   • Dehydration   • Renal insufficiency   • Thoracic aortic aneurysm without rupture (CMS/HCC)   • Refused  "influenza vaccine   • Medicare annual wellness visit, subsequent   • BPH without obstruction/lower urinary tract symptoms   • Thoracic aortic aneurysm (CMS/HCC)   • Kidney stone       Advanced Care Planning:  Patient has an advance directive - a copy has not been provided. Have asked the patient to send this to us to add to record    Review of Systems   All other systems reviewed and are negative.      Compared to one year ago, the patient feels his physical health is the same.  Compared to one year ago, the patient feels his mental health is the same.    Reviewed chart for potential of high risk medication in the elderly: yes  Reviewed chart for potential of harmful drug interactions in the elderly:yes    Objective         Vitals:    01/06/20 0949   BP: 128/74   Pulse: 79   Temp: 96.9 °F (36.1 °C)   SpO2: 97%   Weight: 107 kg (236 lb)   Height: 182 cm (71.65\")       Body mass index is 32.32 kg/m².  Discussed the patient's BMI with him. The BMI is above average; BMI management plan is completed.    Physical Exam   Constitutional: He is oriented to person, place, and time. He appears well-developed and well-nourished.   Neck: Normal range of motion. Neck supple.   Cardiovascular: Normal rate, regular rhythm and normal heart sounds. Exam reveals no friction rub.   No murmur heard.  Pulmonary/Chest: Effort normal and breath sounds normal. No stridor. No respiratory distress. He has no wheezes.   Neurological: He is alert and oriented to person, place, and time.   Nursing note and vitals reviewed.            Assessment/Plan   Medicare Risks and Personalized Health Plan  CMS Preventative Services Quick Reference  Cardiovascular risk    The above risks/problems have been discussed with the patient.  Pertinent information has been shared with the patient in the After Visit Summary.  Follow up plans and orders are seen below in the Assessment/Plan Section.    Diagnoses and all orders for this visit:    1. Acquired " hypothyroidism (Primary)    2. Medicare annual wellness visit, subsequent    3. Paroxysmal atrial fibrillation (CMS/HCC)    4. Essential hypertension    5. Mixed hyperlipidemia      Follow Up:  Return in about 3 months (around 4/6/2020).     An After Visit Summary and PPPS were given to the patient.    MWE done and patient to work on diet and exercise for Preventive Counseling.    Reviewed records with pt.

## 2020-01-13 RX ORDER — ISOSORBIDE MONONITRATE 30 MG/1
30 TABLET, EXTENDED RELEASE ORAL DAILY
Qty: 90 TABLET | Refills: 1 | Status: SHIPPED | OUTPATIENT
Start: 2020-01-13 | End: 2020-07-29

## 2020-01-18 ENCOUNTER — HOSPITAL ENCOUNTER (EMERGENCY)
Facility: HOSPITAL | Age: 80
Discharge: HOME | End: 2020-01-18
Attending: EMERGENCY MEDICINE | Admitting: EMERGENCY MEDICINE

## 2020-01-18 ENCOUNTER — APPOINTMENT (OUTPATIENT)
Dept: CT IMAGING | Facility: HOSPITAL | Age: 80
End: 2020-01-18

## 2020-01-18 VITALS
RESPIRATION RATE: 18 BRPM | WEIGHT: 235.4 LBS | SYSTOLIC BLOOD PRESSURE: 126 MMHG | TEMPERATURE: 97.7 F | HEIGHT: 70 IN | BODY MASS INDEX: 33.7 KG/M2 | DIASTOLIC BLOOD PRESSURE: 92 MMHG | HEART RATE: 90 BPM | OXYGEN SATURATION: 95 %

## 2020-01-18 DIAGNOSIS — Z79.01 CHRONIC ANTICOAGULATION: ICD-10-CM

## 2020-01-18 DIAGNOSIS — R31.0 GROSS HEMATURIA: Primary | ICD-10-CM

## 2020-01-18 LAB
ANION GAP SERPL CALCULATED.3IONS-SCNC: 15.7 MMOL/L (ref 5–15)
BACTERIA UR QL AUTO: ABNORMAL /HPF
BASOPHILS # BLD AUTO: 0.03 10*3/MM3 (ref 0–0.2)
BASOPHILS NFR BLD AUTO: 0.4 % (ref 0–1.5)
BILIRUB UR QL STRIP: NEGATIVE
BUN BLD-MCNC: 20 MG/DL (ref 8–23)
BUN/CREAT SERPL: 14.9 (ref 7–25)
CALCIUM SPEC-SCNC: 9.3 MG/DL (ref 8.6–10.5)
CHLORIDE SERPL-SCNC: 101 MMOL/L (ref 98–107)
CLARITY UR: ABNORMAL
CO2 SERPL-SCNC: 22.3 MMOL/L (ref 22–29)
COLOR UR: ABNORMAL
CREAT BLD-MCNC: 1.34 MG/DL (ref 0.76–1.27)
DEPRECATED RDW RBC AUTO: 43.5 FL (ref 37–54)
EOSINOPHIL # BLD AUTO: 0.1 10*3/MM3 (ref 0–0.4)
EOSINOPHIL NFR BLD AUTO: 1.3 % (ref 0.3–6.2)
ERYTHROCYTE [DISTWIDTH] IN BLOOD BY AUTOMATED COUNT: 12.5 % (ref 12.3–15.4)
GFR SERPL CREATININE-BSD FRML MDRD: 52 ML/MIN/1.73
GLUCOSE BLD-MCNC: 162 MG/DL (ref 65–99)
GLUCOSE UR STRIP-MCNC: NEGATIVE MG/DL
HCT VFR BLD AUTO: 40.2 % (ref 37.5–51)
HGB BLD-MCNC: 13.4 G/DL (ref 13–17.7)
HGB UR QL STRIP.AUTO: ABNORMAL
HYALINE CASTS UR QL AUTO: ABNORMAL /LPF
IMM GRANULOCYTES # BLD AUTO: 0.01 10*3/MM3 (ref 0–0.05)
IMM GRANULOCYTES NFR BLD AUTO: 0.1 % (ref 0–0.5)
INR PPP: 2.41 (ref 0.9–1.1)
KETONES UR QL STRIP: ABNORMAL
LEUKOCYTE ESTERASE UR QL STRIP.AUTO: NEGATIVE
LYMPHOCYTES # BLD AUTO: 1.26 10*3/MM3 (ref 0.7–3.1)
LYMPHOCYTES NFR BLD AUTO: 16.9 % (ref 19.6–45.3)
MCH RBC QN AUTO: 31.5 PG (ref 26.6–33)
MCHC RBC AUTO-ENTMCNC: 33.3 G/DL (ref 31.5–35.7)
MCV RBC AUTO: 94.6 FL (ref 79–97)
MONOCYTES # BLD AUTO: 0.46 10*3/MM3 (ref 0.1–0.9)
MONOCYTES NFR BLD AUTO: 6.2 % (ref 5–12)
NEUTROPHILS # BLD AUTO: 5.58 10*3/MM3 (ref 1.7–7)
NEUTROPHILS NFR BLD AUTO: 75.1 % (ref 42.7–76)
NITRITE UR QL STRIP: NEGATIVE
NRBC BLD AUTO-RTO: 0 /100 WBC (ref 0–0.2)
PH UR STRIP.AUTO: 6 [PH] (ref 5–8)
PLATELET # BLD AUTO: 159 10*3/MM3 (ref 140–450)
PMV BLD AUTO: 11.8 FL (ref 6–12)
POTASSIUM BLD-SCNC: 3.8 MMOL/L (ref 3.5–5.2)
PROT UR QL STRIP: ABNORMAL
PROTHROMBIN TIME: 25.9 SECONDS (ref 11.7–14.2)
RBC # BLD AUTO: 4.25 10*6/MM3 (ref 4.14–5.8)
RBC # UR: ABNORMAL /HPF
REF LAB TEST METHOD: ABNORMAL
SODIUM BLD-SCNC: 139 MMOL/L (ref 136–145)
SP GR UR STRIP: 1.02 (ref 1–1.03)
SQUAMOUS #/AREA URNS HPF: ABNORMAL /HPF
UROBILINOGEN UR QL STRIP: ABNORMAL
WBC NRBC COR # BLD: 7.44 10*3/MM3 (ref 3.4–10.8)
WBC UR QL AUTO: ABNORMAL /HPF

## 2020-01-18 PROCEDURE — 80048 BASIC METABOLIC PNL TOTAL CA: CPT | Performed by: EMERGENCY MEDICINE

## 2020-01-18 PROCEDURE — 81001 URINALYSIS AUTO W/SCOPE: CPT | Performed by: EMERGENCY MEDICINE

## 2020-01-18 PROCEDURE — 85610 PROTHROMBIN TIME: CPT | Performed by: EMERGENCY MEDICINE

## 2020-01-18 PROCEDURE — 25010000002 IOPAMIDOL 61 % SOLUTION: Performed by: EMERGENCY MEDICINE

## 2020-01-18 PROCEDURE — 99283 EMERGENCY DEPT VISIT LOW MDM: CPT

## 2020-01-18 PROCEDURE — 85025 COMPLETE CBC W/AUTO DIFF WBC: CPT | Performed by: EMERGENCY MEDICINE

## 2020-01-18 PROCEDURE — 74177 CT ABD & PELVIS W/CONTRAST: CPT

## 2020-01-18 RX ADMIN — SODIUM CHLORIDE 500 ML: 9 INJECTION, SOLUTION INTRAVENOUS at 17:22

## 2020-01-18 RX ADMIN — IOPAMIDOL 85 ML: 612 INJECTION, SOLUTION INTRAVENOUS at 18:19

## 2020-01-18 NOTE — ED PROVIDER NOTES
EMERGENCY DEPARTMENT ENCOUNTER    Room Number:  32/32  Date of encounter:  1/18/2020  PCP: Chacha Pineda MD  Historian: Patient      HPI:  Chief Complaint: Gross hematuria  A complete HPI/ROS/PMH/PSH/SH/FH are unobtainable due to: None    Context: David Comer Sr. is a 79 y.o. male who presents to the ED c/o acute onset of blood in his urine that started earlier today, has had multiple episodes.  Has not had any specific dysuria or difficulty urinating, but states that he is on Coumadin and was concerned with the new bleeding.  States that he has had this happen before and had a urology follow-up at that time that did not show any concerning abnormalities.  Was recently hospitalized for pneumonia back in December at [facility] and was sent home on Ceftin and azithromycin.  Patient denies any abdominal pains, nausea, vomiting, diarrhea, bloody stools, dizziness, lightheadedness, chest pain, shortness of breath.  States that he takes 7.5 mg of Coumadin on a daily basis with no recent dosing changes.    PAST MEDICAL HISTORY  Active Ambulatory Problems     Diagnosis Date Noted   • Chronic coronary artery disease 10/18/2016   • Abdominal aortic aneurysm (CMS/HCC) 10/18/2016   • Paroxysmal atrial fibrillation (CMS/HCC) 10/18/2016   • Gastroesophageal reflux disease 10/18/2016   • Essential hypertension 10/18/2016   • Mixed hyperlipidemia 10/18/2016   • Hypogonadism in male 10/18/2016   • Acquired hypothyroidism 10/18/2016   • Osteoarthritis of spine 03/01/2016   • Vitamin D deficiency 10/18/2016   • Anticoagulated 02/09/2017   • Atrial fibrillation, rapid (CMS/HCC) 05/30/2017   • Syncope and collapse 08/28/2017   • Chest pain 08/28/2017   • Statin intolerance 09/28/2017   • Gross hematuria 01/29/2018   • Coumadin toxicity 01/29/2018   • Sepsis (CMS/HCC) 02/02/2018   • H/O amiodarone therapy 09/17/2018   • Dehydration 09/24/2019   • Renal insufficiency 09/24/2019   • Thoracic aortic aneurysm without rupture  (CMS/[facility]) 11/18/2019   • Refused influenza vaccine 12/20/2019   • Medicare annual wellness visit, subsequent    • BPH without obstruction/lower urinary tract symptoms 01/06/2020   • Thoracic aortic aneurysm (CMS/[facility])    • Kidney stone      Resolved Ambulatory Problems     Diagnosis Date Noted   • No Resolved Ambulatory Problems     Past Medical History:   Diagnosis Date   • AAA (abdominal aortic aneurysm) without rupture (CMS/[facility])    • Abnormal ECG    • Ankle swelling    • Anticoagulated on warfarin    • Arthritis    • BPH (benign prostatic hyperplasia)    • Bradycardia    • Chronic lumbar pain    • Coronary artery disease    • Degenerative arthritis of lumbar spine    • Degenerative cervical disc    • DJD (degenerative joint disease)    • Elevated rheumatoid factor    • Hematuria, gross    • Inguinal hernia    • Melena    • Right hip pain    • Scoliosis    • Vertebral compression fracture (CMS/[facility])          PAST SURGICAL HISTORY  Past Surgical History:   Procedure Laterality Date   • ANKLE FUSION     • CARDIAC ABLATION     • FOOT SURGERY     • INGUINAL HERNIA REPAIR     • KNEE SURGERY     • REPLACEMENT TOTAL KNEE     • SHOULDER SURGERY      Rotator cuff x 2   • VASECTOMY           FAMILY HISTORY  Family History   Problem Relation Age of Onset   • Hypertension Father    • Heart attack Father    • Heart attack Brother    • Alcohol abuse Brother    • Hypertension Brother    • Hypertension Paternal Grandmother    • Hypertension Paternal Grandfather    • Anemia Mother    • Arthritis Mother    • Hypertension Mother    • Hypertension Maternal Grandmother    • Heart disease Other         FH in males before age 55         SOCIAL HISTORY  Social History     Socioeconomic History   • Marital status:      Spouse name: Not on file   • Number of children: Not on file   • Years of education: Not on file   • Highest education level: Not on file   Tobacco Use   • Smoking status: Never Smoker   • Smokeless tobacco: Never Used    Substance and Sexual Activity   • Alcohol use: No   • Drug use: No   • Sexual activity: Defer         ALLERGIES  Patient has no known allergies.        REVIEW OF SYSTEMS  Review of Systems     All systems reviewed and negative except for those discussed in HPI.       PHYSICAL EXAM    I have reviewed the triage vital signs and nursing notes.    ED Triage Vitals [01/18/20 1652]   Temp Heart Rate Resp BP SpO2   97.7 °F (36.5 °C) 96 15 -- 97 %      Temp src Heart Rate Source Patient Position BP Location FiO2 (%)   -- -- -- -- --       Physical Exam   Constitutional: He is oriented to person, place, and time and well-developed, well-nourished, and in no distress. No distress.   HENT:   Head: Normocephalic.   Mouth/Throat: Mucous membranes are normal.   Eyes: Conjunctivae and EOM are normal.   Neck: Normal range of motion.   Cardiovascular: Normal rate, regular rhythm and intact distal pulses. Exam reveals no gallop and no friction rub.   No murmur heard.  Pulmonary/Chest: Effort normal. No respiratory distress. He has no wheezes. He has no rhonchi. He has no rales.   Abdominal: Soft. He exhibits no distension. There is no tenderness. There is no rebound and no guarding.   Musculoskeletal: Normal range of motion. He exhibits no deformity.   Neurological: He is alert and oriented to person, place, and time. GCS score is 15.   moving all extremities, no focal deficits   Skin: Skin is warm and dry. He is not diaphoretic.   Psychiatric:   Calm, cooperative   Nursing note and vitals reviewed.            LAB RESULTS  Recent Results (from the past 24 hour(s))   Urinalysis With Microscopic If Indicated (No Culture) - Urine, Clean Catch    Collection Time: 01/18/20  5:09 PM   Result Value Ref Range    Color, UA Red (A) Yellow, Straw    Appearance, UA Turbid (A) Clear    pH, UA 6.0 5.0 - 8.0    Specific Gravity, UA 1.025 1.005 - 1.030    Glucose, UA Negative Negative    Ketones, UA Trace (A) Negative    Bilirubin, UA Negative  Negative    Blood, UA Large (3+) (A) Negative    Protein, UA >=300 mg/dL (3+) (A) Negative    Leuk Esterase, UA Negative Negative    Nitrite, UA Negative Negative    Urobilinogen, UA 0.2 E.U./dL 0.2 - 1.0 E.U./dL   Urinalysis, Microscopic Only - Urine, Clean Catch    Collection Time: 01/18/20  5:09 PM   Result Value Ref Range    RBC, UA Too Numerous to Count (A) None Seen, 0-2 /HPF    WBC, UA Unable to determine due to loaded field (A) None Seen, 0-2 /HPF    Bacteria, UA Unable to determine due to loaded field (A) None Seen /HPF    Squamous Epithelial Cells, UA Unable to determine due to loaded field (A) None Seen, 0-2 /HPF    Hyaline Casts, UA Unable to determine due to loaded field None Seen /LPF    Methodology Manual Light Microscopy    Basic Metabolic Panel    Collection Time: 01/18/20  5:10 PM   Result Value Ref Range    Glucose 162 (H) 65 - 99 mg/dL    BUN 20 8 - 23 mg/dL    Creatinine 1.34 (H) 0.76 - 1.27 mg/dL    Sodium 139 136 - 145 mmol/L    Potassium 3.8 3.5 - 5.2 mmol/L    Chloride 101 98 - 107 mmol/L    CO2 22.3 22.0 - 29.0 mmol/L    Calcium 9.3 8.6 - 10.5 mg/dL    eGFR Non African Amer 52 (L) >60 mL/min/1.73    BUN/Creatinine Ratio 14.9 7.0 - 25.0    Anion Gap 15.7 (H) 5.0 - 15.0 mmol/L   Protime-INR    Collection Time: 01/18/20  5:10 PM   Result Value Ref Range    Protime 25.9 (H) 11.7 - 14.2 Seconds    INR 2.41 (H) 0.90 - 1.10   CBC Auto Differential    Collection Time: 01/18/20  5:10 PM   Result Value Ref Range    WBC 7.44 3.40 - 10.80 10*3/mm3    RBC 4.25 4.14 - 5.80 10*6/mm3    Hemoglobin 13.4 13.0 - 17.7 g/dL    Hematocrit 40.2 37.5 - 51.0 %    MCV 94.6 79.0 - 97.0 fL    MCH 31.5 26.6 - 33.0 pg    MCHC 33.3 31.5 - 35.7 g/dL    RDW 12.5 12.3 - 15.4 %    RDW-SD 43.5 37.0 - 54.0 fl    MPV 11.8 6.0 - 12.0 fL    Platelets 159 140 - 450 10*3/mm3    Neutrophil % 75.1 42.7 - 76.0 %    Lymphocyte % 16.9 (L) 19.6 - 45.3 %    Monocyte % 6.2 5.0 - 12.0 %    Eosinophil % 1.3 0.3 - 6.2 %    Basophil % 0.4 0.0  - 1.5 %    Immature Grans % 0.1 0.0 - 0.5 %    Neutrophils, Absolute 5.58 1.70 - 7.00 10*3/mm3    Lymphocytes, Absolute 1.26 0.70 - 3.10 10*3/mm3    Monocytes, Absolute 0.46 0.10 - 0.90 10*3/mm3    Eosinophils, Absolute 0.10 0.00 - 0.40 10*3/mm3    Basophils, Absolute 0.03 0.00 - 0.20 10*3/mm3    Immature Grans, Absolute 0.01 0.00 - 0.05 10*3/mm3    nRBC 0.0 0.0 - 0.2 /100 WBC       Ordered the above labs and independently reviewed the results.        RADIOLOGY  Ct Abdomen Pelvis With Contrast    Result Date: 1/18/2020  CT OF THE ABDOMEN AND PELVIS WITH CONTRAST  HISTORY: Gross hematuria. Lower abdominal pressure.  COMPARISON: 03/04/2018  TECHNIQUE: Axial CT imaging was obtained from the dome the diaphragm to the symphysis pubis. IV contrast administered.  FINDINGS: Images through the lung bases demonstrate some dependent atelectasis. There is a small hiatal hernia. The duodenum appears unremarkable, as are the adrenal glands. No focal hepatic lesions are seen. Gallbladder is unremarkable. Granulomas are seen within the spleen. Pancreas is normal. The kidneys enhance symmetrically. There is no hydronephrosis. However, the patient does have a nonobstructing stone within the left kidney. This measures up to 6 mm in size. There is a small right renal cyst. There is calcification of the abdominal aorta. The prostate gland is enlarged and heterogeneous. Urinary bladder appears somewhat thick-walled. There are 2 small bladder diverticula arising from the right side of the bladder. No free fluid or adenopathy seen within the pelvis. There is no evidence of mechanical bowel obstruction. I'm not convinced I can see the appendix, but I don't see any evidence of appendicitis. No acute osseous abnormalities are seen. There is discogenic degenerative disease of the spine. There is lumbar scoliosis, with convexity to the left.       1. Nonobstructing left renal stone. 2. Somewhat thick-walled appearance to the urinary bladder.  This may be related to chronic bladder outlet obstruction by the patient's enlarged prostate gland. Patient is also noted to have some bladder diverticula arising from the right side of the bladder. No perivesical stranding is seen, but correlation with urinalysis and cultures is recommended.  Radiation dose reduction techniques were utilized, including automated exposure control and exposure modulation based on body size.  This report was finalized on 1/18/2020 7:09 PM by Dr. Jamaica Tilley M.D.        I ordered the above noted radiological studies. Reviewed by me and discussed with radiologist.  See dictation for official radiology interpretation.      PROCEDURES    Procedures      MEDICATIONS GIVEN IN ER    Medications   sodium chloride 0.9 % bolus 500 mL (500 mL Intravenous New Bag 1/18/20 1722)   iopamidol (ISOVUE-300) 61 % injection 100 mL (85 mL Intravenous Given by Other 1/18/20 1819)         PROGRESS, DATA ANALYSIS, CONSULTS, AND MEDICAL DECISION MAKING    All labs have been independently reviewed by me.  All radiology studies have been reviewed by me and discussed with radiologist dictating the report.   EKG's independently viewed and interpreted by me.  Discussion below represents my analysis of pertinent findings related to patient's condition, differential diagnosis, treatment plan and final disposition.        ED Course as of Jan 18 1919   Sat Jan 18, 2020 1914 Patient comes in today for evaluation of gross, painless hematuria. INR 2.4, no other acute emergent abnormalities noted on labs. I suspect this is just hematuria due to being on anticoagulation, and in conjunction with a CT scan that does not show any acute emergent findings (6mm non-obstructing left intrarenal stone, unlikely to be contributing - thick walled bladder which radiology suspects may be secondary to enlarged prostate, without UTI), will advise not taking his Coumadin tomorrow, restarting on Monday, plenty of water intake, PCP  and Urology follow up, and return to the ED for worsening symptoms as needed.     [DC]      ED Course User Index  [DC] Michael Acevedo MD       AS OF 7:19 PM VITALS:    BP - 102/63  HR - 96  TEMP - 97.7 °F (36.5 °C)  02 SATS - 97%        DIAGNOSIS  Final diagnoses:   Gross hematuria   Chronic anticoagulation         DISPOSITION  DISCHARGE    Patient discharged in stable condition.    Reviewed implications of results, diagnosis, meds, responsibility to follow up, warning signs and symptoms of possible worsening, potential complications and reasons to return to ER.    Patient/Family voiced understanding of above instructions.    Discussed plan for discharge, as there is no emergent indication for admission. Patient referred to primary care provider for BP management due to today's BP. Pt/family is agreeable and understands need for follow up and repeat testing.  Pt is aware that discharge does not mean that nothing is wrong but it indicates no emergency is present that requires admission and they must continue care with follow-up as given below or physician of their choice.     FOLLOW-UP  [facility] Emergency Department  [address]  [facility] [id number] [phone]    As needed, If symptoms worsen    Chacha Pineda MD  [address]  [facility] [address]  [phone]    Schedule an appointment as soon as possible for a visit   for recheck next week    [facility]  [address]  [facility] [address]  [phone]  Schedule an appointment as soon as possible for a visit   for recheck if bleeding does not improve/resolve, 6mm left intra-renal kidney stone noted         Medication List      No changes were made to your prescriptions during this visit.                  Michael Acevedo MD  01/18/20 8555

## 2020-01-19 NOTE — DISCHARGE INSTRUCTIONS
Please do not take your dose of Coumadin tomorrow, restart it on Monday. Drink plenty of water. Follow up with your PCP next week for recheck, follow up with Urology for the bleeding and left kidney stone. Return to the ED for worsening symptoms as needed.

## 2020-01-22 ENCOUNTER — ANTICOAGULATION VISIT (OUTPATIENT)
Dept: CARDIOLOGY | Facility: CLINIC | Age: 80
End: 2020-01-22

## 2020-01-22 DIAGNOSIS — I48.0 PAROXYSMAL ATRIAL FIBRILLATION (HCC): Primary | ICD-10-CM

## 2020-01-22 DIAGNOSIS — Z79.01 LONG TERM (CURRENT) USE OF ANTICOAGULANTS: ICD-10-CM

## 2020-03-10 RX ORDER — DOXAZOSIN 2 MG/1
2 TABLET ORAL DAILY
Qty: 90 TABLET | Refills: 0 | Status: SHIPPED | OUTPATIENT
Start: 2020-03-10 | End: 2020-06-15

## 2020-03-24 DIAGNOSIS — I10 ESSENTIAL HYPERTENSION: ICD-10-CM

## 2020-03-24 RX ORDER — LEVOTHYROXINE SODIUM 112 UG/1
TABLET ORAL
Qty: 90 TABLET | Refills: 0 | Status: SHIPPED | OUTPATIENT
Start: 2020-03-24 | End: 2020-06-25

## 2020-03-24 RX ORDER — NEBIVOLOL HYDROCHLORIDE 10 MG/1
TABLET ORAL
Qty: 30 TABLET | Refills: 0 | Status: SHIPPED | OUTPATIENT
Start: 2020-03-24 | End: 2020-05-21

## 2020-03-26 RX ORDER — AMIODARONE HYDROCHLORIDE 200 MG/1
TABLET ORAL
Qty: 45 TABLET | Refills: 0 | Status: SHIPPED | OUTPATIENT
Start: 2020-03-26 | End: 2020-04-23 | Stop reason: SDUPTHER

## 2020-03-30 ENCOUNTER — TELEPHONE (OUTPATIENT)
Dept: FAMILY MEDICINE CLINIC | Facility: CLINIC | Age: 80
End: 2020-03-30

## 2020-03-30 RX ORDER — WARFARIN SODIUM 7.5 MG/1
7.5 TABLET ORAL DAILY
Qty: 90 TABLET | Refills: 0 | Status: SHIPPED | OUTPATIENT
Start: 2020-03-30 | End: 2020-06-30

## 2020-03-30 NOTE — TELEPHONE ENCOUNTER
[place] [facility] called wanting to confirm you'd be taking over the patient's Warfarin rx. The patient was needing a refill and asked that they send over a fax requesting you take over the rx. Fax hasn't been received as of yet.     Pharmacy # [phone]

## 2020-03-31 DIAGNOSIS — I10 ESSENTIAL HYPERTENSION: ICD-10-CM

## 2020-03-31 RX ORDER — OLMESARTAN MEDOXOMIL AND HYDROCHLOROTHIAZIDE 40/25 40; 25 MG/1; MG/1
TABLET ORAL
Qty: 90 TABLET | Refills: 0 | Status: SHIPPED | OUTPATIENT
Start: 2020-03-31 | End: 2020-07-06

## 2020-03-31 NOTE — TELEPHONE ENCOUNTER
I think cardiology is still managing this.  He has to have regular INR checks and that is being done by the cardiologist, not us.

## 2020-03-31 NOTE — TELEPHONE ENCOUNTER
I spoke to pharmacist at [facility] and patient cardiologist filled his Coumadin.

## 2020-04-23 RX ORDER — AMIODARONE HYDROCHLORIDE 200 MG/1
100 TABLET ORAL DAILY
Qty: 60 TABLET | Refills: 0 | Status: SHIPPED | OUTPATIENT
Start: 2020-04-23 | End: 2020-07-29 | Stop reason: SDUPTHER

## 2020-05-21 ENCOUNTER — APPOINTMENT (OUTPATIENT)
Dept: CT IMAGING | Facility: HOSPITAL | Age: 80
End: 2020-05-21

## 2020-05-21 DIAGNOSIS — I10 ESSENTIAL HYPERTENSION: ICD-10-CM

## 2020-05-21 RX ORDER — NEBIVOLOL HYDROCHLORIDE 10 MG/1
TABLET ORAL
Qty: 30 TABLET | Refills: 0 | Status: SHIPPED | OUTPATIENT
Start: 2020-05-21 | End: 2020-06-08

## 2020-05-22 NOTE — PROGRESS NOTES
Subjective:        David Comer Sr. is a 80 y.o. male who here for follow up    No chief complaint on file.    He is here for a 6 months follow-up for hypertension    HPI     David Comer is a 80-year-old male, who is current with this provider.  He currently has a history of AAA, CAD, GERD, DJD, hyperlipidemia, hypertension, bradycardia, hypothyroidism and PAF on Coumadin.  Most recent echo on 11/2019 revealed EF 57%, LAC size is mild to moderately dilated, mild TV regurgitation is present and no evidence of pericardial effusion.  Stress test on 11/27/2019 indicated a normal myocardial perfusion study with no evidence of ischemia.  It was a low risk study.  Small septal defect of questionable significance.  Holter monitor 2017 was a normal monitor study.  Cardioversion in 2017 was a successful.  Cardiac cath at [facility] in 2015 revealed EF 60%, and recommendations considering nonsignificant coronary artery disease patient was treated medically.     Previous CT in 2019 CT of chest showed maximum transverse diameter at the mid ascending thoracic aortic remained stable at 4.5 cm the aortic arch is normal in caliber at 2.7 cm mid descending is normal at 2.8 cm no aortic dissection.      He denies chest pain, shortness of breath, syncope and near syncope.    The following portions of the patient's history were reviewed and updated as appropriate: allergies, current medications, past family history, past medical history, past social history, past surgical history and problem list.    Past Medical History:   Diagnosis Date   • AAA (abdominal aortic aneurysm) without rupture (CMS/[facility])     CT performed on 2/20/17 (this is actually a THORACIC aortic aneurysm)   • Abnormal ECG    • Ankle swelling    • Anticoagulated on warfarin    • Arthritis    • BPH (benign prostatic hyperplasia)    • Bradycardia    • Chronic lumbar pain    • Coronary artery disease    • Degenerative arthritis of lumbar spine    • Degenerative cervical disc    •  DJD (degenerative joint disease)    • Elevated rheumatoid factor    • Hematuria, gross    • Hypogonadism in male    • Inguinal hernia    • Kidney stone    • Medicare annual wellness visit, subsequent    • Melena    • Refused influenza vaccine    • Right hip pain    • Scoliosis    • Sepsis (CMS/HCC)    • Thoracic aortic aneurysm (CMS/HCC)     Thoracic Aneurysm   • Vertebral compression fracture (CMS/HCC)    • Vitamin D deficiency          reports that he has never smoked. He has never used smokeless tobacco. He reports that he does not drink alcohol or use drugs.     Family History   Problem Relation Age of Onset   • Hypertension Father    • Heart attack Father    • Heart attack Brother    • Alcohol abuse Brother    • Hypertension Brother    • Hypertension Paternal Grandmother    • Hypertension Paternal Grandfather    • Anemia Mother    • Arthritis Mother    • Hypertension Mother    • Hypertension Maternal Grandmother    • Heart disease Other         FH in males before age 55       ROS       Review of Systems   Constitutional: Negative for no wt loss, fever, fatigue  Gastrointestinal: Negative for nausea, abdominal pain  Behavioral/Psych: Negative for insomnia or anxiety  Cardiovascular: Negative for shortness of breath, chest pain and palpitations.      Objective:           Physical Exam   Constitutional: He is oriented to person, place, and time. He appears well-developed and well-nourished.   HENT:   Head: Normocephalic.   Right Ear: External ear normal.   Left Ear: External ear normal.   Eyes: EOM are normal.   Neck: Normal range of motion. No JVD present.   Cardiovascular: Normal rate, regular rhythm, normal heart sounds and intact distal pulses. Exam reveals no gallop and no friction rub.   No murmur heard.  Pulmonary/Chest: Effort normal and breath sounds normal. No stridor. No respiratory distress. He has no rales.   Abdominal: Soft. Bowel sounds are normal. He exhibits no distension. There is no tenderness.  There is no guarding.   Musculoskeletal: Normal range of motion. He exhibits no edema or tenderness.   Neurological: He is alert and oriented to person, place, and time. He has normal reflexes.   Skin: Skin is warm.   Psychiatric: He has a normal mood and affect. Judgment normal.   Nursing note and vitals reviewed.        ECG 12 Lead  Date/Time: 5/26/2020 10:10 AM  Performed by: Angeles Delvalle APRN  Authorized by: Angeles Delvalle APRN   Comparison: compared with previous ECG from 11/27/2019  Similar to previous ECG  Rhythm: sinus rhythm  Rate: normal  Conduction: non-specific intraventricular conduction delay             2019   Interpretation Summary     · Left atrial cavity size is mild-to-moderately dilated.  · Mild tricuspid valve regurgitation is present.  · Calculated EF = 57.0%.  · There is no evidence of pericardial effusion.     Interpretation Summary     · Findings consistent with a normal ECG stress test.  · Left ventricular ejection fraction is normal (Calculated EF = 60%).  · Myocardial perfusion imaging indicates a normal myocardial perfusion study with no evidence of ischemia.  · Impressions are consistent with a low risk study.  · SMALL SEPTAL DEFECT OF QUESTIONABLE SIG     2017  Interpretation Summary     · Post cardioversion the patient displayed a sinus rhythm.  · The cardioversion was successful.         Interpretation Summary     · A normal monitor study.  · NSR WITH RARE PAC AND RARE PVC          IMPRESSION OF HEART CATHETERIZATION:    1. Normal left main coronary artery.  2. Normal left anterior descending artery and its branches.  3. Normal left circumflex artery and its branches.  4. Normal right coronary artery proximal spasm relieved with sublingual   nitroglycerin  5. Normal left ventricular systolic function.  LVEDP 10 mmHg.  Ejection   fraction 60%.    RECOMMENDATION:  Considering nonsignificant coronary artery disease   patient be treated medically.      CT of chest on  5/1/2019  CONCLUSION:  1. Stable dilatation of the ascending thoracic aorta measuring 4.5 cm.  2. Hiatal hernia.  3. Within the left upper lobe there is a reticular nodular infiltrate as  well as an infrahilar area of peribronchial interstitial thickening  (bronchitis) and associated minimal infiltrate.  4. Gallstone and left renal calculus.      Current Outpatient Medications:   •  amiodarone (PACERONE) 200 MG tablet, Take 0.5 tablets by mouth Daily., Disp: 60 tablet, Rfl: 0  •  aspirin 81 MG tablet, Take 81 mg by mouth Daily., Disp: , Rfl:   •  BYSTOLIC 10 MG tablet, Take 1/2 (one-half) tablet by mouth once daily, Disp: 30 tablet, Rfl: 0  •  doxazosin (CARDURA) 2 MG tablet, Take 1 tablet by mouth Daily., Disp: 90 tablet, Rfl: 0  •  esomeprazole (NEXIUM) 40 MG capsule, Take 20 mg by mouth Every Morning Before Breakfast., Disp: , Rfl:   •  isosorbide mononitrate (IMDUR) 30 MG 24 hr tablet, Take 1 tablet by mouth Daily., Disp: 90 tablet, Rfl: 1  •  levothyroxine (SYNTHROID, LEVOTHROID) 112 MCG tablet, Take 1 tablet by mouth once daily, Disp: 90 tablet, Rfl: 0  •  nitroglycerin (NITROSTAT) 0.4 MG SL tablet, Place 0.4 mg under the tongue Every 5 (Five) Minutes As Needed. Doesn't take, Disp: , Rfl:   •  olmesartan-hydrochlorothiazide (BENICAR HCT) 40-25 MG per tablet, TAKE ONE TABLET BY MOUTH DAILY, Disp: 90 tablet, Rfl: 0  •  rosuvastatin (CRESTOR) 5 MG tablet, TAKE 1 TABLET BY MOUTH ONCE DAILY, Disp: 90 tablet, Rfl: 3  •  warfarin (COUMADIN) 7.5 MG tablet, Take 1 tablet by mouth Daily., Disp: 90 tablet, Rfl: 0     Assessment:        Patient Active Problem List   Diagnosis   • Chronic coronary artery disease   • Abdominal aortic aneurysm (CMS/HCC)   • Paroxysmal atrial fibrillation (CMS/HCC)   • Gastroesophageal reflux disease   • Essential hypertension   • Mixed hyperlipidemia   • Hypogonadism in male   • Acquired hypothyroidism   • Osteoarthritis of spine   • Vitamin D deficiency   • Anticoagulated   • Atrial  fibrillation, rapid (CMS/HCC)   • Syncope and collapse   • Chest pain   • Statin intolerance   • Gross hematuria   • Coumadin toxicity   • Sepsis (CMS/HCC)   • H/O amiodarone therapy   • Dehydration   • Renal insufficiency   • Thoracic aortic aneurysm without rupture (CMS/HCC)   • Refused influenza vaccine   • Medicare annual wellness visit, subsequent   • BPH without obstruction/lower urinary tract symptoms   • Thoracic aortic aneurysm (CMS/HCC)   • Kidney stone               Plan:   1.  Thoracic aortic aneurysm without rupture: Previous CT in 2019 stated stable dilation of the ascending thoracic aortic measuring 4.5 cm.  He is waiting on them to call but will call the hospital to get his CT. Then he will follow up for results.    2.  Amiodarone therapy: Currently in sinus rhythm.  Amiodarone therapy: Currently in sinus rhythm.  QTC on .  His chest x-ray in 2019 revealed no acute findings.   Will do TSH today.     David Comer Sr. IS ON AMIODARONE,   Significant side effects associated with this medication has been explained. Pros and cons of the medications has been discussed, decision has been to continue the medication   6 months to yearly checkup for eye examination, thyroid function test, chest x-ray and liver function test has been advised. Patient understands well.    3.  Paroxysmal atrial fibrillation: Currently in sinus rhythm. Anticoagulated with Coumadin.     Pros and cons as well as indication of the anticoagulation has been explained to the patient in detail. There are no obvious complications at this stage. Risk of  the bleedings has been explained. Need for the regular blood workup and adjust the dose has been explained. Need for proper follow-up on anticoagulation also has been explained.     4.  Hypertension: Blood pressure stable on current medications. He states at home his sys is 110's. He will continue to monitor.    Educated patient on exercising for at least 30 minutes a day for 2 to 3  days a week. Importance of controlling hypertension and blood pressure checkup on the regular basis has been explained. Hypertension as a silent killer has been discussed. Risk reduction of the weight and regular exercises to control the hypertension has been explained.     4.  Anticoagulated: Currently on Coumadin.      Pros and cons as well as indication of the anticoagulation has been explained to the patient in detail. There are no obvious complications at this stage. Risk of  the bleedings has been explained. Need for the regular blood workup and adjust the dose has been explained. Need for proper follow-up on anticoagulation also has been explained.      5.  Hyperlipidemia: Lipid profile in 2017 revealed , HDL 54, LDL 38, and triglycerides 204.  Currently on Crestor.  Continue.  We will do a lipid profile and CMP.    Risk of the hyperlipidemia, importance of the treatment has been explained. Pros and cons of the statins has been explained. Regular blood workup as well as side effects including the liver failure, myelopathy death has been explained.        No diagnosis found.    There are no diagnoses linked to this encounter.    COUNSELING:Blaze Comer Sr.Counseling was given to patient for the following topics: diagnostic results, risk factor reductions, impressions, risks and benefits of treatment options and importance of treatment compliance .       SMOKING COUNSELING: Denies  Counseling given: Not Answered    BMP, and lipid profile, and TSH today. His CT has been ordered, he will get his CT of chest with contrast.  He will follow up for results.    Sincerely,   ALVA Saunders  [facility]  05/26/20   10:07    EMR Dragon/Transcription disclaimer:   Much of this encounter note is an electronic transcription/translation of spoken language to printed text. The electronic translation of spoken language may permit erroneous, or at times, nonsensical words or phrases to be  inadvertently transcribed; Although I have reviewed the note for such errors, some may still exist.

## 2020-05-26 ENCOUNTER — HOSPITAL ENCOUNTER (OUTPATIENT)
Dept: CARDIOLOGY | Facility: HOSPITAL | Age: 80
Discharge: HOME | End: 2020-05-26
Admitting: NURSE PRACTITIONER

## 2020-05-26 ENCOUNTER — OFFICE VISIT (OUTPATIENT)
Dept: CARDIOLOGY | Facility: CLINIC | Age: 80
End: 2020-05-26

## 2020-05-26 ENCOUNTER — ANTICOAGULATION VISIT (OUTPATIENT)
Dept: CARDIOLOGY | Facility: CLINIC | Age: 80
End: 2020-05-26

## 2020-05-26 VITALS
HEART RATE: 71 BPM | HEIGHT: 70 IN | SYSTOLIC BLOOD PRESSURE: 143 MMHG | DIASTOLIC BLOOD PRESSURE: 94 MMHG | BODY MASS INDEX: 33.64 KG/M2 | WEIGHT: 235 LBS

## 2020-05-26 DIAGNOSIS — Z79.01 LONG TERM (CURRENT) USE OF ANTICOAGULANTS: ICD-10-CM

## 2020-05-26 DIAGNOSIS — Z92.29 H/O AMIODARONE THERAPY: ICD-10-CM

## 2020-05-26 DIAGNOSIS — I10 ESSENTIAL HYPERTENSION: ICD-10-CM

## 2020-05-26 DIAGNOSIS — I71.20 THORACIC AORTIC ANEURYSM WITHOUT RUPTURE (HCC): Primary | ICD-10-CM

## 2020-05-26 DIAGNOSIS — E78.2 MIXED HYPERLIPIDEMIA: ICD-10-CM

## 2020-05-26 DIAGNOSIS — I48.0 PAROXYSMAL ATRIAL FIBRILLATION (HCC): ICD-10-CM

## 2020-05-26 LAB
ALBUMIN SERPL-MCNC: 4.3 G/DL (ref 3.5–5.2)
ALBUMIN/GLOB SERPL: 1.5 G/DL
ALP SERPL-CCNC: 51 U/L (ref 39–117)
ALT SERPL W P-5'-P-CCNC: 20 U/L (ref 1–41)
ANION GAP SERPL CALCULATED.3IONS-SCNC: 10.6 MMOL/L (ref 5–15)
AST SERPL-CCNC: 21 U/L (ref 1–40)
BILIRUB SERPL-MCNC: 0.3 MG/DL (ref 0.2–1.2)
BUN BLD-MCNC: 26 MG/DL (ref 8–23)
BUN/CREAT SERPL: 24.3 (ref 7–25)
CALCIUM SPEC-SCNC: 9.3 MG/DL (ref 8.6–10.5)
CHLORIDE SERPL-SCNC: 104 MMOL/L (ref 98–107)
CHOLEST SERPL-MCNC: 128 MG/DL (ref 0–200)
CO2 SERPL-SCNC: 22.4 MMOL/L (ref 22–29)
CREAT BLD-MCNC: 1.07 MG/DL (ref 0.76–1.27)
GFR SERPL CREATININE-BSD FRML MDRD: 67 ML/MIN/1.73
GLOBULIN UR ELPH-MCNC: 2.9 GM/DL
GLUCOSE BLD-MCNC: 104 MG/DL (ref 65–99)
HDLC SERPL-MCNC: 50 MG/DL (ref 40–60)
INR PPP: 2.9
LDLC SERPL CALC-MCNC: 59 MG/DL (ref 0–100)
LDLC/HDLC SERPL: 1.18 {RATIO}
POTASSIUM BLD-SCNC: 4.3 MMOL/L (ref 3.5–5.2)
PROT SERPL-MCNC: 7.2 G/DL (ref 6–8.5)
SODIUM BLD-SCNC: 137 MMOL/L (ref 136–145)
T-UPTAKE NFR SERPL: 1.03 TBI (ref 0.8–1.3)
T4 SERPL-MCNC: 10.4 MCG/DL (ref 4.5–11.7)
TRIGL SERPL-MCNC: 95 MG/DL (ref 0–150)
TSH SERPL DL<=0.05 MIU/L-ACNC: 2.36 UIU/ML (ref 0.27–4.2)
VLDLC SERPL-MCNC: 19 MG/DL (ref 5–40)

## 2020-05-26 PROCEDURE — 80053 COMPREHEN METABOLIC PANEL: CPT | Performed by: NURSE PRACTITIONER

## 2020-05-26 PROCEDURE — 84479 ASSAY OF THYROID (T3 OR T4): CPT | Performed by: NURSE PRACTITIONER

## 2020-05-26 PROCEDURE — 85610 PROTHROMBIN TIME: CPT | Performed by: INTERNAL MEDICINE

## 2020-05-26 PROCEDURE — 80061 LIPID PANEL: CPT | Performed by: NURSE PRACTITIONER

## 2020-05-26 PROCEDURE — 84436 ASSAY OF TOTAL THYROXINE: CPT | Performed by: NURSE PRACTITIONER

## 2020-05-26 PROCEDURE — 84443 ASSAY THYROID STIM HORMONE: CPT | Performed by: NURSE PRACTITIONER

## 2020-05-26 PROCEDURE — 99213 OFFICE O/P EST LOW 20 MIN: CPT | Performed by: NURSE PRACTITIONER

## 2020-05-26 PROCEDURE — 93000 ELECTROCARDIOGRAM COMPLETE: CPT | Performed by: NURSE PRACTITIONER

## 2020-05-26 PROCEDURE — 36415 COLL VENOUS BLD VENIPUNCTURE: CPT | Performed by: NURSE PRACTITIONER

## 2020-05-27 ENCOUNTER — TELEPHONE (OUTPATIENT)
Dept: CARDIOLOGY | Facility: CLINIC | Age: 80
End: 2020-05-27

## 2020-05-27 NOTE — TELEPHONE ENCOUNTER
----- Message from ALVA Saunders sent at 5/27/2020  8:33 AM EDT -----  Please let him know that his TSH was within normal limits as well as his cholesterol.  He will need to follow-up with his primary care physician for all of his lab work.  Thank you Naveed

## 2020-06-02 ENCOUNTER — OFFICE VISIT (OUTPATIENT)
Dept: FAMILY MEDICINE CLINIC | Facility: CLINIC | Age: 80
End: 2020-06-02

## 2020-06-02 DIAGNOSIS — E03.9 ACQUIRED HYPOTHYROIDISM: ICD-10-CM

## 2020-06-02 DIAGNOSIS — I10 ESSENTIAL HYPERTENSION: Primary | ICD-10-CM

## 2020-06-02 DIAGNOSIS — E78.2 MIXED HYPERLIPIDEMIA: ICD-10-CM

## 2020-06-02 DIAGNOSIS — I48.91 ATRIAL FIBRILLATION, RAPID (HCC): ICD-10-CM

## 2020-06-02 PROCEDURE — 99443 PR PHYS/QHP TELEPHONE EVALUATION 21-30 MIN: CPT | Performed by: FAMILY MEDICINE

## 2020-06-02 NOTE — PROGRESS NOTES
CC: HTN, HLD    Subjective   David L Age Sr. is a 80 y.o. male.     History of Present Illness   The patient presents today for follow-up via a phone visit due to the COVID-19 pandemic for  Hypertension-no chest pain, blurry vision, headaches or palpitations.  Hyperlipidemia- No myalgia.  No Complaints.  Stable.   A fib- no SOB  ;  He saw cardiology last week and had labs done and I have reviewed them.    He is currently staying at [facility] and is staying active but staying away from people due to COVID.  He still does yard work and mowing lawn.  Hypothyroidism- Stable and No significant weight change.  Denies heat or cold intolerance.  No palpitations.               The following portions of the patient's history were reviewed and updated as appropriate: allergies, current medications, past family history, past medical history, past social history, past surgical history and problem list.    Review of Systems   Constitutional: Negative for fatigue.   Eyes: Negative for blurred vision.   Respiratory: Negative for cough, chest tightness and shortness of breath.    Cardiovascular: Negative for chest pain, palpitations and leg swelling.   Neurological: Negative for dizziness, light-headedness and headache.       Patient Active Problem List   Diagnosis   • Chronic coronary artery disease   • Abdominal aortic aneurysm (CMS/HCC)   • Paroxysmal atrial fibrillation (CMS/HCC)   • Gastroesophageal reflux disease   • Essential hypertension   • Mixed hyperlipidemia   • Hypogonadism in male   • Acquired hypothyroidism   • Osteoarthritis of spine   • Vitamin D deficiency   • Anticoagulated   • Atrial fibrillation, rapid (CMS/HCC)   • Syncope and collapse   • Chest pain   • Statin intolerance   • Gross hematuria   • Coumadin toxicity   • Sepsis (CMS/HCC)   • H/O amiodarone therapy   • Dehydration   • Renal insufficiency   • Thoracic aortic aneurysm without rupture (CMS/HCC)   • Refused influenza vaccine   • Medicare annual  wellness visit, subsequent   • BPH without obstruction/lower urinary tract symptoms   • Thoracic aortic aneurysm (CMS/HCC)   • Kidney stone       No Known Allergies      Current Outpatient Medications:   •  amiodarone (PACERONE) 200 MG tablet, Take 0.5 tablets by mouth Daily., Disp: 60 tablet, Rfl: 0  •  aspirin 81 MG tablet, Take 81 mg by mouth Daily., Disp: , Rfl:   •  BYSTOLIC 10 MG tablet, Take 1/2 (one-half) tablet by mouth once daily, Disp: 30 tablet, Rfl: 0  •  doxazosin (CARDURA) 2 MG tablet, Take 1 tablet by mouth Daily., Disp: 90 tablet, Rfl: 0  •  esomeprazole (NEXIUM) 40 MG capsule, Take 20 mg by mouth Every Morning Before Breakfast., Disp: , Rfl:   •  isosorbide mononitrate (IMDUR) 30 MG 24 hr tablet, Take 1 tablet by mouth Daily., Disp: 90 tablet, Rfl: 1  •  levothyroxine (SYNTHROID, LEVOTHROID) 112 MCG tablet, Take 1 tablet by mouth once daily, Disp: 90 tablet, Rfl: 0  •  nitroglycerin (NITROSTAT) 0.4 MG SL tablet, Place 0.4 mg under the tongue Every 5 (Five) Minutes As Needed. Doesn't take, Disp: , Rfl:   •  olmesartan-hydrochlorothiazide (BENICAR HCT) 40-25 MG per tablet, TAKE ONE TABLET BY MOUTH DAILY, Disp: 90 tablet, Rfl: 0  •  rosuvastatin (CRESTOR) 5 MG tablet, TAKE 1 TABLET BY MOUTH ONCE DAILY, Disp: 90 tablet, Rfl: 3  •  warfarin (COUMADIN) 7.5 MG tablet, Take 1 tablet by mouth Daily., Disp: 90 tablet, Rfl: 0    Past Medical History:   Diagnosis Date   • AAA (abdominal aortic aneurysm) without rupture (CMS/HCC)     CT performed on 2/20/17 (this is actually a THORACIC aortic aneurysm)   • Abnormal ECG    • Ankle swelling    • Anticoagulated on warfarin    • Arthritis    • BPH (benign prostatic hyperplasia)    • Bradycardia    • Chronic lumbar pain    • Coronary artery disease    • Degenerative arthritis of lumbar spine    • Degenerative cervical disc    • DJD (degenerative joint disease)    • Elevated rheumatoid factor    • Hematuria, gross    • Hypogonadism in male    • Inguinal hernia    •  Kidney stone    • Medicare annual wellness visit, subsequent    • Melena    • Refused influenza vaccine    • Right hip pain    • Scoliosis    • Sepsis (CMS/HCC)    • Thoracic aortic aneurysm (CMS/HCC)     Thoracic Aneurysm   • Vertebral compression fracture (CMS/HCC)    • Vitamin D deficiency        Past Surgical History:   Procedure Laterality Date   • ANKLE FUSION     • CARDIAC ABLATION     • FOOT SURGERY     • INGUINAL HERNIA REPAIR     • KNEE SURGERY     • REPLACEMENT TOTAL KNEE     • SHOULDER SURGERY      Rotator cuff x 2   • VASECTOMY         Family History   Problem Relation Age of Onset   • Hypertension Father    • Heart attack Father    • Heart attack Brother    • Alcohol abuse Brother    • Hypertension Brother    • Hypertension Paternal Grandmother    • Hypertension Paternal Grandfather    • Anemia Mother    • Arthritis Mother    • Hypertension Mother    • Hypertension Maternal Grandmother    • Heart disease Other         FH in males before age 55       Social History     Tobacco Use   • Smoking status: Never Smoker   • Smokeless tobacco: Never Used   Substance Use Topics   • Alcohol use: No            Objective     There were no vitals taken for this visit. Video Visit    Physical Exam  NAD AAOx3  No labored Breathing.    Lab Results   Component Value Date    GLUCOSE 104 (H) 05/26/2020    BUN 26 (H) 05/26/2020    CREATININE 1.07 05/26/2020    EGFRIFNONA 67 05/26/2020    EGFRIFAFRI 76 09/24/2019    BCR 24.3 05/26/2020    K 4.3 05/26/2020    CO2 22.4 05/26/2020    CALCIUM 9.3 05/26/2020    ALBUMIN 4.30 05/26/2020    AST 21 05/26/2020    ALT 20 05/26/2020       WBC   Date Value Ref Range Status   01/18/2020 7.44 3.40 - 10.80 10*3/mm3 Final     RBC   Date Value Ref Range Status   01/18/2020 4.25 4.14 - 5.80 10*6/mm3 Final     Hemoglobin   Date Value Ref Range Status   01/18/2020 13.4 13.0 - 17.7 g/dL Final     Hematocrit   Date Value Ref Range Status   01/18/2020 40.2 37.5 - 51.0 % Final     MCV   Date Value  Ref Range Status   01/18/2020 94.6 79.0 - 97.0 fL Final     MCH   Date Value Ref Range Status   01/18/2020 31.5 26.6 - 33.0 pg Final     MCHC   Date Value Ref Range Status   01/18/2020 33.3 31.5 - 35.7 g/dL Final     RDW   Date Value Ref Range Status   01/18/2020 12.5 12.3 - 15.4 % Final     RDW-SD   Date Value Ref Range Status   01/18/2020 43.5 37.0 - 54.0 fl Final     MPV   Date Value Ref Range Status   01/18/2020 11.8 6.0 - 12.0 fL Final     Platelets   Date Value Ref Range Status   01/18/2020 159 140 - 450 10*3/mm3 Final     Neutrophil %   Date Value Ref Range Status   01/18/2020 75.1 42.7 - 76.0 % Final     Lymphocyte %   Date Value Ref Range Status   01/18/2020 16.9 (L) 19.6 - 45.3 % Final     Monocyte %   Date Value Ref Range Status   01/18/2020 6.2 5.0 - 12.0 % Final     Eosinophil %   Date Value Ref Range Status   01/18/2020 1.3 0.3 - 6.2 % Final     Basophil %   Date Value Ref Range Status   01/18/2020 0.4 0.0 - 1.5 % Final     Immature Grans %   Date Value Ref Range Status   01/18/2020 0.1 0.0 - 0.5 % Final     Neutrophils, Absolute   Date Value Ref Range Status   01/18/2020 5.58 1.70 - 7.00 10*3/mm3 Final     Lymphocytes, Absolute   Date Value Ref Range Status   01/18/2020 1.26 0.70 - 3.10 10*3/mm3 Final     Monocytes, Absolute   Date Value Ref Range Status   01/18/2020 0.46 0.10 - 0.90 10*3/mm3 Final     Eosinophils, Absolute   Date Value Ref Range Status   01/18/2020 0.10 0.00 - 0.40 10*3/mm3 Final     Basophils, Absolute   Date Value Ref Range Status   01/18/2020 0.03 0.00 - 0.20 10*3/mm3 Final     Immature Grans, Absolute   Date Value Ref Range Status   01/18/2020 0.01 0.00 - 0.05 10*3/mm3 Final     nRBC   Date Value Ref Range Status   01/18/2020 0.0 0.0 - 0.2 /100 WBC Final       No results found for: HGBA1C    No results found for: [id number]    TSH   Date Value Ref Range Status   05/26/2020 2.360 0.270 - 4.200 uIU/mL Final     Comment:     TSH results may be falsely decreased if patient taking  Biotin.       Lab Results   Component Value Date    CHOL 128 05/26/2020     Lab Results   Component Value Date    TRIG 95 05/26/2020     Lab Results   Component Value Date    HDL 50 05/26/2020     Lab Results   Component Value Date    LDL 59 05/26/2020     Lab Results   Component Value Date    VLDL 19 05/26/2020     Lab Results   Component Value Date    LDLHDL 1.18 05/26/2020         Procedures    Assessment/Plan   Problems Addressed this Visit        Cardiovascular and Mediastinum    Essential hypertension - Primary    Mixed hyperlipidemia    Atrial fibrillation, rapid (CMS/HCC)       Endocrine    Acquired hypothyroidism          No orders of the defined types were placed in this encounter.      Current Outpatient Medications   Medication Sig Dispense Refill   • amiodarone (PACERONE) 200 MG tablet Take 0.5 tablets by mouth Daily. 60 tablet 0   • aspirin 81 MG tablet Take 81 mg by mouth Daily.     • BYSTOLIC 10 MG tablet Take 1/2 (one-half) tablet by mouth once daily 30 tablet 0   • doxazosin (CARDURA) 2 MG tablet Take 1 tablet by mouth Daily. 90 tablet 0   • esomeprazole (NEXIUM) 40 MG capsule Take 20 mg by mouth Every Morning Before Breakfast.     • isosorbide mononitrate (IMDUR) 30 MG 24 hr tablet Take 1 tablet by mouth Daily. 90 tablet 1   • levothyroxine (SYNTHROID, LEVOTHROID) 112 MCG tablet Take 1 tablet by mouth once daily 90 tablet 0   • nitroglycerin (NITROSTAT) 0.4 MG SL tablet Place 0.4 mg under the tongue Every 5 (Five) Minutes As Needed. Doesn't take     • olmesartan-hydrochlorothiazide (BENICAR HCT) 40-25 MG per tablet TAKE ONE TABLET BY MOUTH DAILY 90 tablet 0   • rosuvastatin (CRESTOR) 5 MG tablet TAKE 1 TABLET BY MOUTH ONCE DAILY 90 tablet 3   • warfarin (COUMADIN) 7.5 MG tablet Take 1 tablet by mouth Daily. 90 tablet 0     No current facility-administered medications for this visit.        Return in about 3 months (around 9/2/2020) for hypertension, hyperlipidema.    Patient Instructions    Continue to work on exercise.         Refills as needed  Reviewed labs with pt.  Time spent on visit:  21   minutes.  This patient has consented to a telehealth visit via phone. The visit was scheduled as a phone visit to comply with patient safety concerns in accordance with CDC recommendations.  All vitals recorded within this visit are reported by the patient.

## 2020-06-06 DIAGNOSIS — I10 ESSENTIAL HYPERTENSION: ICD-10-CM

## 2020-06-08 RX ORDER — NEBIVOLOL HYDROCHLORIDE 10 MG/1
TABLET ORAL
Qty: 30 TABLET | Refills: 0 | Status: SHIPPED | OUTPATIENT
Start: 2020-06-08 | End: 2020-07-29

## 2020-06-15 RX ORDER — DOXAZOSIN 2 MG/1
TABLET ORAL
Qty: 90 TABLET | Refills: 0 | Status: SHIPPED | OUTPATIENT
Start: 2020-06-15 | End: 2020-09-16

## 2020-06-25 RX ORDER — LEVOTHYROXINE SODIUM 112 UG/1
TABLET ORAL
Qty: 90 TABLET | Refills: 0 | Status: SHIPPED | OUTPATIENT
Start: 2020-06-25 | End: 2020-09-22

## 2020-06-30 RX ORDER — WARFARIN SODIUM 7.5 MG/1
TABLET ORAL
Qty: 90 TABLET | Refills: 0 | Status: SHIPPED | OUTPATIENT
Start: 2020-06-30 | End: 2020-09-29 | Stop reason: SDUPTHER

## 2020-07-06 DIAGNOSIS — I10 ESSENTIAL HYPERTENSION: ICD-10-CM

## 2020-07-06 RX ORDER — OLMESARTAN MEDOXOMIL AND HYDROCHLOROTHIAZIDE 40/25 40; 25 MG/1; MG/1
TABLET ORAL
Qty: 90 TABLET | Refills: 0 | Status: SHIPPED | OUTPATIENT
Start: 2020-07-06 | End: 2020-10-01

## 2020-07-16 ENCOUNTER — TELEMEDICINE (OUTPATIENT)
Dept: FAMILY MEDICINE CLINIC | Facility: CLINIC | Age: 80
End: 2020-07-16

## 2020-07-16 DIAGNOSIS — E86.0 DEHYDRATION: ICD-10-CM

## 2020-07-16 DIAGNOSIS — Z09 HOSPITAL DISCHARGE FOLLOW-UP: Primary | ICD-10-CM

## 2020-07-16 DIAGNOSIS — J00 ACUTE NASOPHARYNGITIS: ICD-10-CM

## 2020-07-16 PROCEDURE — 99495 TRANSJ CARE MGMT MOD F2F 14D: CPT | Performed by: FAMILY MEDICINE

## 2020-07-16 NOTE — PROGRESS NOTES
This is a video visit that pt has consented to.        Transitional Care Follow Up Visit  Subjective     David Comer Sr. is a 80 y.o. male who presents for a transitional care management visit.      Within 48 business hours after discharge our office contacted him via telephone to coordinate his care and needs.      I reviewed and discussed the details of that call along with the discharge summary, hospital problems, inpatient lab results, inpatient diagnostic studies, and consultation reports with David.     Current outpatient and discharge medications have been reconciled for the patient.  Reviewed by: Chacha Pineda MD    Currently pt is doing well and has no chest pains or palpitations or HA or blurry vision or SOB.  No NVCD.  No body aches or wheezing.  He is almost done with his antibiotics.    No body aches or loss of taste or smell.    No flowsheet data found.  Risk for Readmission (LACE) No data recorded    History of Present Illness   Course During Hospital Stay:  PT is here for a hospital follow up.  He was in [facility] from July 10-11 with a discharge dx of pneumonia, fever.  He was admitted with fever, cough and suspected pneumonia and started on IV levaquin after which his sx improved significantly.  He was sent home the next day with no SOB or chest pains or fever.  He was sent home on [facility] and told to follow up with his pcp.       The following portions of the patient's history were reviewed and updated as appropriate: allergies, current medications, past family history, past medical history, past social history, past surgical history and problem list.    Review of Systems   All other systems reviewed and are negative.      Objective   Physical Exam  NAD  AAOx3  No labored breathing.      Assessment/Plan   Diagnoses and all orders for this visit:    Hospital discharge follow-up    Dehydration    Acute nasopharyngitis        All symptoms have resolved and pt will follow up as scheduled  for future visits.  Push fluids and sx tx.  Finish antibiotics.  I have reviewed all records, labs, and diagnostics from the hospital with patient.

## 2020-07-29 DIAGNOSIS — I10 ESSENTIAL HYPERTENSION: ICD-10-CM

## 2020-07-29 RX ORDER — NEBIVOLOL HYDROCHLORIDE 10 MG/1
TABLET ORAL
Qty: 30 TABLET | Refills: 0 | Status: SHIPPED | OUTPATIENT
Start: 2020-07-29 | End: 2020-09-22

## 2020-07-29 RX ORDER — AMIODARONE HYDROCHLORIDE 200 MG/1
100 TABLET ORAL DAILY
Qty: 90 TABLET | Refills: 3 | Status: SHIPPED | OUTPATIENT
Start: 2020-07-29 | End: 2021-11-15

## 2020-09-03 ENCOUNTER — TELEMEDICINE (OUTPATIENT)
Dept: FAMILY MEDICINE CLINIC | Facility: CLINIC | Age: 80
End: 2020-09-03

## 2020-09-03 DIAGNOSIS — I48.0 PAROXYSMAL ATRIAL FIBRILLATION (HCC): ICD-10-CM

## 2020-09-03 DIAGNOSIS — E03.9 ACQUIRED HYPOTHYROIDISM: ICD-10-CM

## 2020-09-03 DIAGNOSIS — I10 ESSENTIAL HYPERTENSION: Primary | ICD-10-CM

## 2020-09-03 DIAGNOSIS — E78.2 MIXED HYPERLIPIDEMIA: ICD-10-CM

## 2020-09-03 PROCEDURE — 99214 OFFICE O/P EST MOD 30 MIN: CPT | Performed by: FAMILY MEDICINE

## 2020-09-03 NOTE — PROGRESS NOTES
CC: HTN, HLD    Subjective   David L Age Sr. is a 80 y.o. male.     History of Present Illness   The patient presents today for follow-up via a video visit due to the COVID-19 pandemic for  Hypertension-no chest pain, blurry vision, headaches or palpitations.   Hyperlipidemia- No myalgia.  No Complaints.  Stable.   CAD and A fib- no SOB and last saw cardio 6 months ago.   Hypothyroidism- Stable and No significant weight change.  Denies heat or cold intolerance.  No palpitations.            The following portions of the patient's history were reviewed and updated as appropriate: allergies, current medications, past family history, past medical history, past social history, past surgical history and problem list.    Review of Systems   Constitutional: Negative for fatigue.   Eyes: Negative for blurred vision.   Respiratory: Negative for cough, chest tightness and shortness of breath.    Cardiovascular: Negative for chest pain, palpitations and leg swelling.   Neurological: Negative for dizziness, light-headedness and headache.       Patient Active Problem List   Diagnosis   • Chronic coronary artery disease   • Abdominal aortic aneurysm (CMS/HCC)   • Paroxysmal atrial fibrillation (CMS/HCC)   • Gastroesophageal reflux disease   • Essential hypertension   • Mixed hyperlipidemia   • Hypogonadism in male   • Acquired hypothyroidism   • Osteoarthritis of spine   • Vitamin D deficiency   • Anticoagulated   • Atrial fibrillation, rapid (CMS/HCC)   • Syncope and collapse   • Chest pain   • Statin intolerance   • Gross hematuria   • Coumadin toxicity   • Sepsis (CMS/HCC)   • H/O amiodarone therapy   • Dehydration   • Renal insufficiency   • Thoracic aortic aneurysm without rupture (CMS/HCC)   • Refused influenza vaccine   • Medicare annual wellness visit, subsequent   • BPH without obstruction/lower urinary tract symptoms   • Thoracic aortic aneurysm (CMS/HCC)   • Kidney stone   • Hospital discharge follow-up   • Acute  nasopharyngitis       No Known Allergies      Current Outpatient Medications:   •  amiodarone (PACERONE) 200 MG tablet, Take 0.5 tablets by mouth Daily., Disp: 90 tablet, Rfl: 3  •  aspirin 81 MG tablet, Take 81 mg by mouth Daily., Disp: , Rfl:   •  BYSTOLIC 10 MG tablet, Take 1/2 (one-half) tablet by mouth once daily, Disp: 30 tablet, Rfl: 0  •  doxazosin (CARDURA) 2 MG tablet, Take 1 tablet by mouth once daily, Disp: 90 tablet, Rfl: 0  •  esomeprazole (NEXIUM) 40 MG capsule, Take 20 mg by mouth Every Morning Before Breakfast., Disp: , Rfl:   •  EUTHYROX 112 MCG tablet, Take 1 tablet by mouth once daily, Disp: 90 tablet, Rfl: 0  •  isosorbide mononitrate (IMDUR) 30 MG 24 hr tablet, Take 1 tablet by mouth once daily, Disp: 90 tablet, Rfl: 0  •  nitroglycerin (NITROSTAT) 0.4 MG SL tablet, Place 0.4 mg under the tongue Every 5 (Five) Minutes As Needed. Doesn't take, Disp: , Rfl:   •  olmesartan-hydrochlorothiazide (BENICAR HCT) 40-25 MG per tablet, TAKE ONE TABLET BY MOUTH DAILY, Disp: 90 tablet, Rfl: 0  •  rosuvastatin (CRESTOR) 5 MG tablet, TAKE 1 TABLET BY MOUTH ONCE DAILY, Disp: 90 tablet, Rfl: 3  •  warfarin (COUMADIN) 7.5 MG tablet, Take 1 tablet by mouth once daily, Disp: 90 tablet, Rfl: 0    Past Medical History:   Diagnosis Date   • AAA (abdominal aortic aneurysm) without rupture (CMS/HCC)     CT performed on 2/20/17 (this is actually a THORACIC aortic aneurysm)   • Abnormal ECG    • Ankle swelling    • Anticoagulated on warfarin    • Arthritis    • BPH (benign prostatic hyperplasia)    • Bradycardia    • Chronic lumbar pain    • Coronary artery disease    • Degenerative arthritis of lumbar spine    • Degenerative cervical disc    • DJD (degenerative joint disease)    • Elevated rheumatoid factor    • Hematuria, gross    • Hypogonadism in male    • Inguinal hernia    • Kidney stone    • Medicare annual wellness visit, subsequent    • Melena    • Refused influenza vaccine    • Right hip pain    • Scoliosis     • Sepsis (CMS/HCC)    • Thoracic aortic aneurysm (CMS/HCC)     Thoracic Aneurysm   • Vertebral compression fracture (CMS/HCC)    • Vitamin D deficiency        Past Surgical History:   Procedure Laterality Date   • ANKLE FUSION     • CARDIAC ABLATION     • FOOT SURGERY     • INGUINAL HERNIA REPAIR     • KNEE SURGERY     • REPLACEMENT TOTAL KNEE     • SHOULDER SURGERY      Rotator cuff x 2   • VASECTOMY         Family History   Problem Relation Age of Onset   • Hypertension Father    • Heart attack Father    • Heart attack Brother    • Alcohol abuse Brother    • Hypertension Brother    • Hypertension Paternal Grandmother    • Hypertension Paternal Grandfather    • Anemia Mother    • Arthritis Mother    • Hypertension Mother    • Hypertension Maternal Grandmother    • Heart disease Other         FH in males before age 55       Social History     Tobacco Use   • Smoking status: Never Smoker   • Smokeless tobacco: Never Used   Substance Use Topics   • Alcohol use: No            Objective     There were no vitals taken for this visit. Video Visit    Physical Exam  NAD  AAOx3  No labored breathing.  EOMI   PERRLA      Lab Results   Component Value Date    GLUCOSE 104 (H) 05/26/2020    BUN 26 (H) 05/26/2020    CREATININE 1.07 05/26/2020    EGFRIFNONA 67 05/26/2020    EGFRIFAFRI 76 09/24/2019    BCR 24.3 05/26/2020    K 4.3 05/26/2020    CO2 22.4 05/26/2020    CALCIUM 9.3 05/26/2020    ALBUMIN 4.30 05/26/2020    AST 21 05/26/2020    ALT 20 05/26/2020       WBC   Date Value Ref Range Status   01/18/2020 7.44 3.40 - 10.80 10*3/mm3 Final     RBC   Date Value Ref Range Status   01/18/2020 4.25 4.14 - 5.80 10*6/mm3 Final     Hemoglobin   Date Value Ref Range Status   01/18/2020 13.4 13.0 - 17.7 g/dL Final     Hematocrit   Date Value Ref Range Status   01/18/2020 40.2 37.5 - 51.0 % Final     MCV   Date Value Ref Range Status   01/18/2020 94.6 79.0 - 97.0 fL Final     MCH   Date Value Ref Range Status   01/18/2020 31.5 26.6 -  33.0 pg Final     MCHC   Date Value Ref Range Status   01/18/2020 33.3 31.5 - 35.7 g/dL Final     RDW   Date Value Ref Range Status   01/18/2020 12.5 12.3 - 15.4 % Final     RDW-SD   Date Value Ref Range Status   01/18/2020 43.5 37.0 - 54.0 fl Final     MPV   Date Value Ref Range Status   01/18/2020 11.8 6.0 - 12.0 fL Final     Platelets   Date Value Ref Range Status   01/18/2020 159 140 - 450 10*3/mm3 Final     Neutrophil %   Date Value Ref Range Status   01/18/2020 75.1 42.7 - 76.0 % Final     Lymphocyte %   Date Value Ref Range Status   01/18/2020 16.9 (L) 19.6 - 45.3 % Final     Monocyte %   Date Value Ref Range Status   01/18/2020 6.2 5.0 - 12.0 % Final     Eosinophil %   Date Value Ref Range Status   01/18/2020 1.3 0.3 - 6.2 % Final     Basophil %   Date Value Ref Range Status   01/18/2020 0.4 0.0 - 1.5 % Final     Immature Grans %   Date Value Ref Range Status   01/18/2020 0.1 0.0 - 0.5 % Final     Neutrophils, Absolute   Date Value Ref Range Status   01/18/2020 5.58 1.70 - 7.00 10*3/mm3 Final     Lymphocytes, Absolute   Date Value Ref Range Status   01/18/2020 1.26 0.70 - 3.10 10*3/mm3 Final     Monocytes, Absolute   Date Value Ref Range Status   01/18/2020 0.46 0.10 - 0.90 10*3/mm3 Final     Eosinophils, Absolute   Date Value Ref Range Status   01/18/2020 0.10 0.00 - 0.40 10*3/mm3 Final     Basophils, Absolute   Date Value Ref Range Status   01/18/2020 0.03 0.00 - 0.20 10*3/mm3 Final     Immature Grans, Absolute   Date Value Ref Range Status   01/18/2020 0.01 0.00 - 0.05 10*3/mm3 Final     nRBC   Date Value Ref Range Status   01/18/2020 0.0 0.0 - 0.2 /100 WBC Final       No results found for: HGBA1C    No results found for: [id number]    TSH   Date Value Ref Range Status   05/26/2020 2.360 0.270 - 4.200 uIU/mL Final     Comment:     TSH results may be falsely decreased if patient taking Biotin.       Lab Results   Component Value Date    CHOL 128 05/26/2020     Lab Results   Component Value Date    TRIG 95  05/26/2020     Lab Results   Component Value Date    HDL 50 05/26/2020     Lab Results   Component Value Date    LDL 59 05/26/2020     Lab Results   Component Value Date    VLDL 19 05/26/2020     Lab Results   Component Value Date    LDLHDL 1.18 05/26/2020         Procedures    Assessment/Plan   Problems Addressed this Visit        Cardiovascular and Mediastinum    Paroxysmal atrial fibrillation (CMS/HCC)    Essential hypertension - Primary    Mixed hyperlipidemia       Endocrine    Acquired hypothyroidism          No orders of the defined types were placed in this encounter.      Current Outpatient Medications   Medication Sig Dispense Refill   • amiodarone (PACERONE) 200 MG tablet Take 0.5 tablets by mouth Daily. 90 tablet 3   • aspirin 81 MG tablet Take 81 mg by mouth Daily.     • BYSTOLIC 10 MG tablet Take 1/2 (one-half) tablet by mouth once daily 30 tablet 0   • doxazosin (CARDURA) 2 MG tablet Take 1 tablet by mouth once daily 90 tablet 0   • esomeprazole (NEXIUM) 40 MG capsule Take 20 mg by mouth Every Morning Before Breakfast.     • EUTHYROX 112 MCG tablet Take 1 tablet by mouth once daily 90 tablet 0   • isosorbide mononitrate (IMDUR) 30 MG 24 hr tablet Take 1 tablet by mouth once daily 90 tablet 0   • nitroglycerin (NITROSTAT) 0.4 MG SL tablet Place 0.4 mg under the tongue Every 5 (Five) Minutes As Needed. Doesn't take     • olmesartan-hydrochlorothiazide (BENICAR HCT) 40-25 MG per tablet TAKE ONE TABLET BY MOUTH DAILY 90 tablet 0   • rosuvastatin (CRESTOR) 5 MG tablet TAKE 1 TABLET BY MOUTH ONCE DAILY 90 tablet 3   • warfarin (COUMADIN) 7.5 MG tablet Take 1 tablet by mouth once daily 90 tablet 0     No current facility-administered medications for this visit.        Return after 1/6 for check up and mwe, for hypertension, hyperlipidema.    Patient Instructions   Continue diet and exercise.         I reviewed labs from last visit.   Time spent on visit:  22   minutes.  This patient has consented to a  telehealth visit via video. The visit was scheduled as a video visit to comply with patient safety concerns in accordance with CDC recommendations.  All vitals recorded within this visit are reported by the patient.

## 2020-09-16 RX ORDER — DOXAZOSIN 2 MG/1
TABLET ORAL
Qty: 90 TABLET | Refills: 0 | Status: SHIPPED | OUTPATIENT
Start: 2020-09-16 | End: 2021-01-06

## 2020-09-22 DIAGNOSIS — I10 ESSENTIAL HYPERTENSION: ICD-10-CM

## 2020-09-22 RX ORDER — NEBIVOLOL HYDROCHLORIDE 10 MG/1
TABLET ORAL
Qty: 30 TABLET | Refills: 0 | Status: SHIPPED | OUTPATIENT
Start: 2020-09-22 | End: 2020-12-09

## 2020-09-22 RX ORDER — LEVOTHYROXINE SODIUM 112 UG/1
TABLET ORAL
Qty: 90 TABLET | Refills: 0 | Status: SHIPPED | OUTPATIENT
Start: 2020-09-22 | End: 2021-01-05

## 2020-09-29 RX ORDER — WARFARIN SODIUM 7.5 MG/1
7.5 TABLET ORAL DAILY
Qty: 90 TABLET | Refills: 1 | Status: SHIPPED | OUTPATIENT
Start: 2020-09-29 | End: 2021-03-30

## 2020-09-30 DIAGNOSIS — I10 ESSENTIAL HYPERTENSION: ICD-10-CM

## 2020-10-01 RX ORDER — OLMESARTAN MEDOXOMIL AND HYDROCHLOROTHIAZIDE 40/25 40; 25 MG/1; MG/1
TABLET ORAL
Qty: 90 TABLET | Refills: 0 | Status: SHIPPED | OUTPATIENT
Start: 2020-10-01 | End: 2020-11-27 | Stop reason: HOSPADM

## 2020-10-22 RX ORDER — ISOSORBIDE MONONITRATE 30 MG/1
TABLET, EXTENDED RELEASE ORAL
Qty: 90 TABLET | Refills: 1 | Status: SHIPPED | OUTPATIENT
Start: 2020-10-22 | End: 2021-02-04

## 2020-11-09 ENCOUNTER — TELEPHONE (OUTPATIENT)
Dept: FAMILY MEDICINE CLINIC | Facility: CLINIC | Age: 80
End: 2020-11-09

## 2020-11-09 NOTE — TELEPHONE ENCOUNTER
PATIENT CALLED STATING THAT HE WOULD LIKE TO TALK TO DR. LIMON OR CLINICAL REGARDING HIS BLOOD PRESSURE.    WHEN THE PATIENT WAKES UP HIS BLOOD PRESSURE IS USUALLY HIGH RUNNING IN /100 RANGE, USUALLY EVENING OUT AFTER TAKING HIS MEDS. WHEN HE EXERTS HIMSELF (RIDING , ETC.) HIS BLOOD PRESSURE DROPS TO 80/56.    THE PATIENT NORMAL SITS AROUND 110/72.    PLEASE ADVISE  [phone]

## 2020-11-10 ENCOUNTER — TELEMEDICINE (OUTPATIENT)
Dept: FAMILY MEDICINE CLINIC | Facility: CLINIC | Age: 80
End: 2020-11-10

## 2020-11-10 DIAGNOSIS — I10 ESSENTIAL HYPERTENSION: Primary | ICD-10-CM

## 2020-11-10 PROCEDURE — 99442 PR PHYS/QHP TELEPHONE EVALUATION 11-20 MIN: CPT | Performed by: FAMILY MEDICINE

## 2020-11-10 NOTE — PROGRESS NOTES
CC: HTN issues     Subjective   David L Age Sr. is a 80 y.o. male.     History of Present Illness   The patient presents today for follow-up via a phone visit due to the COVID-19 pandemic for  Last week mowing leaves and felt tired and his bp was less than 100/70s.  In the mornings it is 150s/80s.  He takes BP med around 10 am. No chest pains or palpitations or HA or blurred vision.  No SOB.  He has had other low readings of BP during the mid day as well.            The following portions of the patient's history were reviewed and updated as appropriate: allergies, current medications, past family history, past medical history, past social history, past surgical history and problem list.    Review of Systems: see above.    Patient Active Problem List   Diagnosis   • Chronic coronary artery disease   • Abdominal aortic aneurysm (CMS/HCC)   • Paroxysmal atrial fibrillation (CMS/HCC)   • Gastroesophageal reflux disease   • Essential hypertension   • Mixed hyperlipidemia   • Hypogonadism in male   • Acquired hypothyroidism   • Osteoarthritis of spine   • Vitamin D deficiency   • Anticoagulated   • Atrial fibrillation, rapid (CMS/HCC)   • Syncope and collapse   • Chest pain   • Statin intolerance   • Gross hematuria   • Coumadin toxicity   • Sepsis (CMS/HCC)   • H/O amiodarone therapy   • Dehydration   • Renal insufficiency   • Thoracic aortic aneurysm without rupture (CMS/HCC)   • Refused influenza vaccine   • Medicare annual wellness visit, subsequent   • BPH without obstruction/lower urinary tract symptoms   • Thoracic aortic aneurysm (CMS/HCC)   • Kidney stone   • Hospital discharge follow-up   • Acute nasopharyngitis       No Known Allergies      Current Outpatient Medications:   •  amiodarone (PACERONE) 200 MG tablet, Take 0.5 tablets by mouth Daily., Disp: 90 tablet, Rfl: 3  •  aspirin 81 MG tablet, Take 81 mg by mouth Daily., Disp: , Rfl:   •  Bystolic 10 MG tablet, Take 1/2 (one-half) tablet by mouth once  daily, Disp: 30 tablet, Rfl: 0  •  doxazosin (CARDURA) 2 MG tablet, Take 1 tablet by mouth once daily, Disp: 90 tablet, Rfl: 0  •  esomeprazole (NEXIUM) 40 MG capsule, Take 20 mg by mouth Every Morning Before Breakfast., Disp: , Rfl:   •  isosorbide mononitrate (IMDUR) 30 MG 24 hr tablet, Take 1 tablet by mouth once daily, Disp: 90 tablet, Rfl: 1  •  levothyroxine (SYNTHROID, LEVOTHROID) 112 MCG tablet, Take 1 tablet by mouth once daily, Disp: 90 tablet, Rfl: 0  •  nitroglycerin (NITROSTAT) 0.4 MG SL tablet, Place 0.4 mg under the tongue Every 5 (Five) Minutes As Needed. Doesn't take, Disp: , Rfl:   •  olmesartan-hydrochlorothiazide (BENICAR HCT) 40-25 MG per tablet, TAKE ONE TABLET BY MOUTH DAILY, Disp: 90 tablet, Rfl: 0  •  rosuvastatin (CRESTOR) 5 MG tablet, TAKE 1 TABLET BY MOUTH ONCE DAILY, Disp: 90 tablet, Rfl: 3  •  warfarin (COUMADIN) 7.5 MG tablet, Take 1 tablet by mouth Daily., Disp: 90 tablet, Rfl: 1    Past Medical History:   Diagnosis Date   • AAA (abdominal aortic aneurysm) without rupture (CMS/HCC)     CT performed on 2/20/17 (this is actually a THORACIC aortic aneurysm)   • Abnormal ECG    • Ankle swelling    • Anticoagulated on warfarin    • Arthritis    • BPH (benign prostatic hyperplasia)    • Bradycardia    • Chronic lumbar pain    • Coronary artery disease    • Degenerative arthritis of lumbar spine    • Degenerative cervical disc    • DJD (degenerative joint disease)    • Elevated rheumatoid factor    • Hematuria, gross    • Hypogonadism in male    • Inguinal hernia    • Kidney stone    • Medicare annual wellness visit, subsequent    • Melena    • Refused influenza vaccine    • Right hip pain    • Scoliosis    • Sepsis (CMS/HCC)    • Thoracic aortic aneurysm (CMS/HCC)     Thoracic Aneurysm   • Vertebral compression fracture (CMS/HCC)    • Vitamin D deficiency        Past Surgical History:   Procedure Laterality Date   • ANKLE FUSION     • CARDIAC ABLATION     • FOOT SURGERY     • INGUINAL  HERNIA REPAIR     • KNEE SURGERY     • REPLACEMENT TOTAL KNEE     • SHOULDER SURGERY      Rotator cuff x 2   • VASECTOMY         Family History   Problem Relation Age of Onset   • Hypertension Father    • Heart attack Father    • Heart attack Brother    • Alcohol abuse Brother    • Hypertension Brother    • Hypertension Paternal Grandmother    • Hypertension Paternal Grandfather    • Anemia Mother    • Arthritis Mother    • Hypertension Mother    • Hypertension Maternal Grandmother    • Heart disease Other         FH in males before age 55       Social History     Tobacco Use   • Smoking status: Never Smoker   • Smokeless tobacco: Never Used   Substance Use Topics   • Alcohol use: No            Objective     There were no vitals taken for this visit. Video Visit    Physical Exam  NAD AAOx3  No labored Breathing.    Lab Results   Component Value Date    GLUCOSE 104 (H) 05/26/2020    BUN 26 (H) 05/26/2020    CREATININE 1.07 05/26/2020    EGFRIFNONA 67 05/26/2020    EGFRIFAFRI 76 09/24/2019    BCR 24.3 05/26/2020    K 4.3 05/26/2020    CO2 22.4 05/26/2020    CALCIUM 9.3 05/26/2020    ALBUMIN 4.30 05/26/2020    AST 21 05/26/2020    ALT 20 05/26/2020       WBC   Date Value Ref Range Status   01/18/2020 7.44 3.40 - 10.80 10*3/mm3 Final     RBC   Date Value Ref Range Status   01/18/2020 4.25 4.14 - 5.80 10*6/mm3 Final     Hemoglobin   Date Value Ref Range Status   01/18/2020 13.4 13.0 - 17.7 g/dL Final     Hematocrit   Date Value Ref Range Status   01/18/2020 40.2 37.5 - 51.0 % Final     MCV   Date Value Ref Range Status   01/18/2020 94.6 79.0 - 97.0 fL Final     MCH   Date Value Ref Range Status   01/18/2020 31.5 26.6 - 33.0 pg Final     MCHC   Date Value Ref Range Status   01/18/2020 33.3 31.5 - 35.7 g/dL Final     RDW   Date Value Ref Range Status   01/18/2020 12.5 12.3 - 15.4 % Final     RDW-SD   Date Value Ref Range Status   01/18/2020 43.5 37.0 - 54.0 fl Final     MPV   Date Value Ref Range Status   01/18/2020 11.8  6.0 - 12.0 fL Final     Platelets   Date Value Ref Range Status   01/18/2020 159 140 - 450 10*3/mm3 Final     Neutrophil %   Date Value Ref Range Status   01/18/2020 75.1 42.7 - 76.0 % Final     Lymphocyte %   Date Value Ref Range Status   01/18/2020 16.9 (L) 19.6 - 45.3 % Final     Monocyte %   Date Value Ref Range Status   01/18/2020 6.2 5.0 - 12.0 % Final     Eosinophil %   Date Value Ref Range Status   01/18/2020 1.3 0.3 - 6.2 % Final     Basophil %   Date Value Ref Range Status   01/18/2020 0.4 0.0 - 1.5 % Final     Immature Grans %   Date Value Ref Range Status   01/18/2020 0.1 0.0 - 0.5 % Final     Neutrophils, Absolute   Date Value Ref Range Status   01/18/2020 5.58 1.70 - 7.00 10*3/mm3 Final     Lymphocytes, Absolute   Date Value Ref Range Status   01/18/2020 1.26 0.70 - 3.10 10*3/mm3 Final     Monocytes, Absolute   Date Value Ref Range Status   01/18/2020 0.46 0.10 - 0.90 10*3/mm3 Final     Eosinophils, Absolute   Date Value Ref Range Status   01/18/2020 0.10 0.00 - 0.40 10*3/mm3 Final     Basophils, Absolute   Date Value Ref Range Status   01/18/2020 0.03 0.00 - 0.20 10*3/mm3 Final     Immature Grans, Absolute   Date Value Ref Range Status   01/18/2020 0.01 0.00 - 0.05 10*3/mm3 Final     nRBC   Date Value Ref Range Status   01/18/2020 0.0 0.0 - 0.2 /100 WBC Final       No results found for: HGBA1C    No results found for: [id number]    TSH   Date Value Ref Range Status   05/26/2020 2.360 0.270 - 4.200 uIU/mL Final     Comment:     TSH results may be falsely decreased if patient taking Biotin.       Lab Results   Component Value Date    CHOL 128 05/26/2020     Lab Results   Component Value Date    TRIG 95 05/26/2020     Lab Results   Component Value Date    HDL 50 05/26/2020     Lab Results   Component Value Date    LDL 59 05/26/2020     Lab Results   Component Value Date    VLDL 19 05/26/2020     Lab Results   Component Value Date    LDLHDL 1.18 05/26/2020         Procedures    Assessment/Plan   Problems  Addressed this Visit        Cardiovascular and Mediastinum    Essential hypertension - Primary      Diagnoses       Codes Comments    Essential hypertension    -  Primary ICD-10-CM: I10  ICD-9-CM: 401.9           No orders of the defined types were placed in this encounter.      Current Outpatient Medications   Medication Sig Dispense Refill   • amiodarone (PACERONE) 200 MG tablet Take 0.5 tablets by mouth Daily. 90 tablet 3   • aspirin 81 MG tablet Take 81 mg by mouth Daily.     • Bystolic 10 MG tablet Take 1/2 (one-half) tablet by mouth once daily 30 tablet 0   • doxazosin (CARDURA) 2 MG tablet Take 1 tablet by mouth once daily 90 tablet 0   • esomeprazole (NEXIUM) 40 MG capsule Take 20 mg by mouth Every Morning Before Breakfast.     • isosorbide mononitrate (IMDUR) 30 MG 24 hr tablet Take 1 tablet by mouth once daily 90 tablet 1   • levothyroxine (SYNTHROID, LEVOTHROID) 112 MCG tablet Take 1 tablet by mouth once daily 90 tablet 0   • nitroglycerin (NITROSTAT) 0.4 MG SL tablet Place 0.4 mg under the tongue Every 5 (Five) Minutes As Needed. Doesn't take     • olmesartan-hydrochlorothiazide (BENICAR HCT) 40-25 MG per tablet TAKE ONE TABLET BY MOUTH DAILY 90 tablet 0   • rosuvastatin (CRESTOR) 5 MG tablet TAKE 1 TABLET BY MOUTH ONCE DAILY 90 tablet 3   • warfarin (COUMADIN) 7.5 MG tablet Take 1 tablet by mouth Daily. 90 tablet 1     No current facility-administered medications for this visit.        No follow-ups on file.    There are no Patient Instructions on file for this visit.        Benicar HCTZ to night. Pt to let me know readings in about 4 days.  Time spent on visit:  12   minutes.  This patient has consented to a telehealth visit via phone. The visit was scheduled as a phone visit to comply with patient safety concerns in accordance with CDC recommendations.  All vitals recorded within this visit are reported by the patient.

## 2020-11-19 ENCOUNTER — TELEPHONE (OUTPATIENT)
Dept: FAMILY MEDICINE CLINIC | Facility: CLINIC | Age: 80
End: 2020-11-19

## 2020-11-19 DIAGNOSIS — M79.643 PAIN OF HAND, UNSPECIFIED LATERALITY: Primary | ICD-10-CM

## 2020-11-19 RX ORDER — ACETAMINOPHEN AND CODEINE PHOSPHATE 300; 30 MG/1; MG/1
1 TABLET ORAL 3 TIMES DAILY
Qty: 15 TABLET | Refills: 0 | Status: ON HOLD | OUTPATIENT
Start: 2020-11-19 | End: 2020-11-27 | Stop reason: SDUPTHER

## 2020-11-23 ENCOUNTER — TELEPHONE (OUTPATIENT)
Dept: FAMILY MEDICINE CLINIC | Facility: CLINIC | Age: 80
End: 2020-11-23

## 2020-11-23 NOTE — TELEPHONE ENCOUNTER
Patient called and had a fall. ER advises he sees an orthopedic and a hand dr. He busted his hand open and he has blood under his knee cap. Er also advises he sees a skin dr for a spot on his eye to check for cancer. Please call back and advise at [phone]. Pt was seen at the ER in [facility] at [facility].

## 2020-11-24 ENCOUNTER — APPOINTMENT (OUTPATIENT)
Dept: CT IMAGING | Facility: HOSPITAL | Age: 80
End: 2020-11-24

## 2020-11-24 ENCOUNTER — HOSPITAL ENCOUNTER (OUTPATIENT)
Facility: HOSPITAL | Age: 80
Setting detail: OBSERVATION
Discharge: NURSING, REHAB OR LONG-TERM CARE | End: 2020-11-27
Attending: EMERGENCY MEDICINE | Admitting: INTERNAL MEDICINE

## 2020-11-24 ENCOUNTER — APPOINTMENT (OUTPATIENT)
Dept: GENERAL RADIOLOGY | Facility: HOSPITAL | Age: 80
End: 2020-11-24

## 2020-11-24 DIAGNOSIS — N17.9 ACUTE KIDNEY INJURY (HCC): Primary | ICD-10-CM

## 2020-11-24 DIAGNOSIS — M79.643 PAIN OF HAND, UNSPECIFIED LATERALITY: ICD-10-CM

## 2020-11-24 DIAGNOSIS — Z74.09 IMMOBILITY: ICD-10-CM

## 2020-11-24 DIAGNOSIS — Z79.01 CHRONIC ANTICOAGULATION: ICD-10-CM

## 2020-11-24 DIAGNOSIS — R53.1 GENERALIZED WEAKNESS: ICD-10-CM

## 2020-11-24 DIAGNOSIS — M25.569 ACUTE KNEE PAIN, UNSPECIFIED LATERALITY: Primary | ICD-10-CM

## 2020-11-24 DIAGNOSIS — S80.01XA HEMATOMA OF RIGHT KNEE REGION: ICD-10-CM

## 2020-11-24 PROBLEM — S69.90XA HAND INJURY: Status: ACTIVE | Noted: 2020-11-24

## 2020-11-24 LAB
ALBUMIN SERPL-MCNC: 3.7 G/DL (ref 3.5–5.2)
ALBUMIN/GLOB SERPL: 1.2 G/DL
ALP SERPL-CCNC: 49 U/L (ref 39–117)
ALT SERPL W P-5'-P-CCNC: 18 U/L (ref 1–41)
ANION GAP SERPL CALCULATED.3IONS-SCNC: 11.8 MMOL/L (ref 5–15)
AST SERPL-CCNC: 17 U/L (ref 1–40)
BACTERIA UR QL AUTO: ABNORMAL /HPF
BASOPHILS # BLD AUTO: 0.05 10*3/MM3 (ref 0–0.2)
BASOPHILS NFR BLD AUTO: 0.4 % (ref 0–1.5)
BILIRUB SERPL-MCNC: 0.7 MG/DL (ref 0–1.2)
BILIRUB UR QL STRIP: NEGATIVE
BUN SERPL-MCNC: 27 MG/DL (ref 8–23)
BUN/CREAT SERPL: 15.3 (ref 7–25)
CALCIUM SPEC-SCNC: 9.2 MG/DL (ref 8.6–10.5)
CHLORIDE SERPL-SCNC: 101 MMOL/L (ref 98–107)
CLARITY UR: ABNORMAL
CO2 SERPL-SCNC: 24.2 MMOL/L (ref 22–29)
COLOR UR: ABNORMAL
CREAT SERPL-MCNC: 1.77 MG/DL (ref 0.76–1.27)
DEPRECATED RDW RBC AUTO: 41.9 FL (ref 37–54)
EOSINOPHIL # BLD AUTO: 0.14 10*3/MM3 (ref 0–0.4)
EOSINOPHIL NFR BLD AUTO: 1 % (ref 0.3–6.2)
ERYTHROCYTE [DISTWIDTH] IN BLOOD BY AUTOMATED COUNT: 11.9 % (ref 12.3–15.4)
GFR SERPL CREATININE-BSD FRML MDRD: 37 ML/MIN/1.73
GLOBULIN UR ELPH-MCNC: 3 GM/DL
GLUCOSE SERPL-MCNC: 130 MG/DL (ref 65–99)
GLUCOSE UR STRIP-MCNC: NEGATIVE MG/DL
HCT VFR BLD AUTO: 32.4 % (ref 37.5–51)
HGB BLD-MCNC: 10.7 G/DL (ref 13–17.7)
HGB UR QL STRIP.AUTO: ABNORMAL
HOLD SPECIMEN: NORMAL
HYALINE CASTS UR QL AUTO: ABNORMAL /LPF
IMM GRANULOCYTES # BLD AUTO: 0.1 10*3/MM3 (ref 0–0.05)
IMM GRANULOCYTES NFR BLD AUTO: 0.7 % (ref 0–0.5)
INR PPP: 2.69 (ref 0.9–1.1)
KETONES UR QL STRIP: NEGATIVE
LEUKOCYTE ESTERASE UR QL STRIP.AUTO: ABNORMAL
LYMPHOCYTES # BLD AUTO: 1.17 10*3/MM3 (ref 0.7–3.1)
LYMPHOCYTES NFR BLD AUTO: 8.5 % (ref 19.6–45.3)
MCH RBC QN AUTO: 31.9 PG (ref 26.6–33)
MCHC RBC AUTO-ENTMCNC: 33 G/DL (ref 31.5–35.7)
MCV RBC AUTO: 96.7 FL (ref 79–97)
MONOCYTES # BLD AUTO: 1.08 10*3/MM3 (ref 0.1–0.9)
MONOCYTES NFR BLD AUTO: 7.8 % (ref 5–12)
NEUTROPHILS NFR BLD AUTO: 11.25 10*3/MM3 (ref 1.7–7)
NEUTROPHILS NFR BLD AUTO: 81.6 % (ref 42.7–76)
NITRITE UR QL STRIP: NEGATIVE
NRBC BLD AUTO-RTO: 0 /100 WBC (ref 0–0.2)
NT-PROBNP SERPL-MCNC: 63.6 PG/ML (ref 0–1800)
PH UR STRIP.AUTO: 7.5 [PH] (ref 5–8)
PLATELET # BLD AUTO: 279 10*3/MM3 (ref 140–450)
PMV BLD AUTO: 10.9 FL (ref 6–12)
POTASSIUM SERPL-SCNC: 4.1 MMOL/L (ref 3.5–5.2)
PROT SERPL-MCNC: 6.7 G/DL (ref 6–8.5)
PROT UR QL STRIP: ABNORMAL
PROTHROMBIN TIME: 28.3 SECONDS (ref 11.7–14.2)
RBC # BLD AUTO: 3.35 10*6/MM3 (ref 4.14–5.8)
RBC # UR: ABNORMAL /HPF
REF LAB TEST METHOD: ABNORMAL
SODIUM SERPL-SCNC: 137 MMOL/L (ref 136–145)
SP GR UR STRIP: 1.02 (ref 1–1.03)
SQUAMOUS #/AREA URNS HPF: ABNORMAL /HPF
TROPONIN T SERPL-MCNC: <0.01 NG/ML (ref 0–0.03)
UROBILINOGEN UR QL STRIP: ABNORMAL
WBC # BLD AUTO: 13.79 10*3/MM3 (ref 3.4–10.8)
WBC UR QL AUTO: ABNORMAL /HPF
WHOLE BLOOD HOLD SPECIMEN: NORMAL
WHOLE BLOOD HOLD SPECIMEN: NORMAL

## 2020-11-24 PROCEDURE — 85610 PROTHROMBIN TIME: CPT | Performed by: EMERGENCY MEDICINE

## 2020-11-24 PROCEDURE — 80053 COMPREHEN METABOLIC PANEL: CPT | Performed by: EMERGENCY MEDICINE

## 2020-11-24 PROCEDURE — 99284 EMERGENCY DEPT VISIT MOD MDM: CPT

## 2020-11-24 PROCEDURE — 85025 COMPLETE CBC W/AUTO DIFF WBC: CPT | Performed by: EMERGENCY MEDICINE

## 2020-11-24 PROCEDURE — 25010000002 IOPAMIDOL 61 % SOLUTION: Performed by: EMERGENCY MEDICINE

## 2020-11-24 PROCEDURE — 74177 CT ABD & PELVIS W/CONTRAST: CPT

## 2020-11-24 PROCEDURE — 81001 URINALYSIS AUTO W/SCOPE: CPT | Performed by: EMERGENCY MEDICINE

## 2020-11-24 PROCEDURE — 73700 CT LOWER EXTREMITY W/O DYE: CPT

## 2020-11-24 PROCEDURE — 84484 ASSAY OF TROPONIN QUANT: CPT | Performed by: EMERGENCY MEDICINE

## 2020-11-24 PROCEDURE — 73562 X-RAY EXAM OF KNEE 3: CPT

## 2020-11-24 PROCEDURE — G0378 HOSPITAL OBSERVATION PER HR: HCPCS

## 2020-11-24 PROCEDURE — 83880 ASSAY OF NATRIURETIC PEPTIDE: CPT | Performed by: EMERGENCY MEDICINE

## 2020-11-24 PROCEDURE — 70450 CT HEAD/BRAIN W/O DYE: CPT

## 2020-11-24 PROCEDURE — 71260 CT THORAX DX C+: CPT

## 2020-11-24 RX ADMIN — IOPAMIDOL 85 ML: 612 INJECTION, SOLUTION INTRAVENOUS at 21:38

## 2020-11-24 RX ADMIN — SODIUM CHLORIDE 1000 ML: 9 INJECTION, SOLUTION INTRAVENOUS at 19:31

## 2020-11-25 PROBLEM — D68.318 HEMORRHAGIC DISORDER DUE TO CIRCULATING ANTICOAGULANTS: Status: ACTIVE | Noted: 2020-11-25

## 2020-11-25 LAB
ANION GAP SERPL CALCULATED.3IONS-SCNC: 9.9 MMOL/L (ref 5–15)
B PARAPERT DNA SPEC QL NAA+PROBE: NOT DETECTED
B PERT DNA SPEC QL NAA+PROBE: NOT DETECTED
BUN SERPL-MCNC: 31 MG/DL (ref 8–23)
BUN/CREAT SERPL: 19.5 (ref 7–25)
C PNEUM DNA NPH QL NAA+NON-PROBE: NOT DETECTED
CALCIUM SPEC-SCNC: 8.6 MG/DL (ref 8.6–10.5)
CHLORIDE SERPL-SCNC: 103 MMOL/L (ref 98–107)
CO2 SERPL-SCNC: 26.1 MMOL/L (ref 22–29)
CREAT SERPL-MCNC: 1.59 MG/DL (ref 0.76–1.27)
DEPRECATED RDW RBC AUTO: 42.3 FL (ref 37–54)
ERYTHROCYTE [DISTWIDTH] IN BLOOD BY AUTOMATED COUNT: 11.9 % (ref 12.3–15.4)
FLUAV SUBTYP SPEC NAA+PROBE: NOT DETECTED
FLUBV RNA ISLT QL NAA+PROBE: NOT DETECTED
GFR SERPL CREATININE-BSD FRML MDRD: 42 ML/MIN/1.73
GLUCOSE SERPL-MCNC: 161 MG/DL (ref 65–99)
HADV DNA SPEC NAA+PROBE: NOT DETECTED
HCOV 229E RNA SPEC QL NAA+PROBE: NOT DETECTED
HCOV HKU1 RNA SPEC QL NAA+PROBE: NOT DETECTED
HCOV NL63 RNA SPEC QL NAA+PROBE: NOT DETECTED
HCOV OC43 RNA SPEC QL NAA+PROBE: NOT DETECTED
HCT VFR BLD AUTO: 28.3 % (ref 37.5–51)
HGB BLD-MCNC: 9.2 G/DL (ref 13–17.7)
HMPV RNA NPH QL NAA+NON-PROBE: NOT DETECTED
HPIV1 RNA SPEC QL NAA+PROBE: NOT DETECTED
HPIV2 RNA SPEC QL NAA+PROBE: NOT DETECTED
HPIV3 RNA NPH QL NAA+PROBE: NOT DETECTED
HPIV4 P GENE NPH QL NAA+PROBE: NOT DETECTED
INR PPP: 2.5 (ref 0.9–1.1)
M PNEUMO IGG SER IA-ACNC: NOT DETECTED
MCH RBC QN AUTO: 31.6 PG (ref 26.6–33)
MCHC RBC AUTO-ENTMCNC: 32.5 G/DL (ref 31.5–35.7)
MCV RBC AUTO: 97.3 FL (ref 79–97)
PLATELET # BLD AUTO: 224 10*3/MM3 (ref 140–450)
PMV BLD AUTO: 10.8 FL (ref 6–12)
POTASSIUM SERPL-SCNC: 3.7 MMOL/L (ref 3.5–5.2)
PROTHROMBIN TIME: 26.8 SECONDS (ref 11.7–14.2)
RBC # BLD AUTO: 2.91 10*6/MM3 (ref 4.14–5.8)
RHINOVIRUS RNA SPEC NAA+PROBE: NOT DETECTED
RSV RNA NPH QL NAA+NON-PROBE: NOT DETECTED
SARS-COV-2 RNA NPH QL NAA+NON-PROBE: NOT DETECTED
SODIUM SERPL-SCNC: 139 MMOL/L (ref 136–145)
WBC # BLD AUTO: 8.94 10*3/MM3 (ref 3.4–10.8)

## 2020-11-25 PROCEDURE — 96361 HYDRATE IV INFUSION ADD-ON: CPT

## 2020-11-25 PROCEDURE — 97162 PT EVAL MOD COMPLEX 30 MIN: CPT

## 2020-11-25 PROCEDURE — 80048 BASIC METABOLIC PNL TOTAL CA: CPT | Performed by: NURSE PRACTITIONER

## 2020-11-25 PROCEDURE — 85610 PROTHROMBIN TIME: CPT | Performed by: NURSE PRACTITIONER

## 2020-11-25 PROCEDURE — G0378 HOSPITAL OBSERVATION PER HR: HCPCS

## 2020-11-25 PROCEDURE — 85027 COMPLETE CBC AUTOMATED: CPT | Performed by: NURSE PRACTITIONER

## 2020-11-25 PROCEDURE — 0202U NFCT DS 22 TRGT SARS-COV-2: CPT | Performed by: EMERGENCY MEDICINE

## 2020-11-25 PROCEDURE — 97110 THERAPEUTIC EXERCISES: CPT

## 2020-11-25 PROCEDURE — 96360 HYDRATION IV INFUSION INIT: CPT

## 2020-11-25 PROCEDURE — 36415 COLL VENOUS BLD VENIPUNCTURE: CPT | Performed by: NURSE PRACTITIONER

## 2020-11-25 RX ORDER — ACETAMINOPHEN AND CODEINE PHOSPHATE 300; 30 MG/1; MG/1
1 TABLET ORAL 3 TIMES DAILY PRN
Status: DISCONTINUED | OUTPATIENT
Start: 2020-11-25 | End: 2020-11-27 | Stop reason: HOSPADM

## 2020-11-25 RX ORDER — BISACODYL 10 MG
10 SUPPOSITORY, RECTAL RECTAL DAILY PRN
Status: DISCONTINUED | OUTPATIENT
Start: 2020-11-25 | End: 2020-11-27 | Stop reason: HOSPADM

## 2020-11-25 RX ORDER — WARFARIN SODIUM 7.5 MG/1
7.5 TABLET ORAL NIGHTLY
Status: DISCONTINUED | OUTPATIENT
Start: 2020-11-25 | End: 2020-11-25

## 2020-11-25 RX ORDER — ASPIRIN 81 MG/1
81 TABLET ORAL DAILY
Status: DISCONTINUED | OUTPATIENT
Start: 2020-11-25 | End: 2020-11-27 | Stop reason: HOSPADM

## 2020-11-25 RX ORDER — ROSUVASTATIN CALCIUM 5 MG/1
5 TABLET, COATED ORAL DAILY
Status: DISCONTINUED | OUTPATIENT
Start: 2020-11-25 | End: 2020-11-27 | Stop reason: HOSPADM

## 2020-11-25 RX ORDER — ACETAMINOPHEN 325 MG/1
650 TABLET ORAL EVERY 4 HOURS PRN
Status: DISCONTINUED | OUTPATIENT
Start: 2020-11-25 | End: 2020-11-27 | Stop reason: HOSPADM

## 2020-11-25 RX ORDER — NITROGLYCERIN 0.4 MG/1
0.4 TABLET SUBLINGUAL
Status: DISCONTINUED | OUTPATIENT
Start: 2020-11-25 | End: 2020-11-27 | Stop reason: HOSPADM

## 2020-11-25 RX ORDER — CEPHALEXIN 500 MG/1
500 CAPSULE ORAL 3 TIMES DAILY
Status: DISPENSED | OUTPATIENT
Start: 2020-11-25 | End: 2020-11-26

## 2020-11-25 RX ORDER — PANTOPRAZOLE SODIUM 40 MG/1
40 TABLET, DELAYED RELEASE ORAL EVERY MORNING
Status: DISCONTINUED | OUTPATIENT
Start: 2020-11-25 | End: 2020-11-27 | Stop reason: HOSPADM

## 2020-11-25 RX ORDER — NEBIVOLOL 5 MG/1
5 TABLET ORAL DAILY
Status: DISCONTINUED | OUTPATIENT
Start: 2020-11-25 | End: 2020-11-27 | Stop reason: HOSPADM

## 2020-11-25 RX ORDER — LEVOTHYROXINE SODIUM 112 UG/1
112 TABLET ORAL DAILY
Status: DISCONTINUED | OUTPATIENT
Start: 2020-11-25 | End: 2020-11-27 | Stop reason: HOSPADM

## 2020-11-25 RX ORDER — SODIUM CHLORIDE 0.9 % (FLUSH) 0.9 %
10 SYRINGE (ML) INJECTION AS NEEDED
Status: DISCONTINUED | OUTPATIENT
Start: 2020-11-25 | End: 2020-11-27 | Stop reason: HOSPADM

## 2020-11-25 RX ORDER — ONDANSETRON 2 MG/ML
4 INJECTION INTRAMUSCULAR; INTRAVENOUS EVERY 6 HOURS PRN
Status: DISCONTINUED | OUTPATIENT
Start: 2020-11-25 | End: 2020-11-27 | Stop reason: HOSPADM

## 2020-11-25 RX ORDER — BISACODYL 5 MG/1
5 TABLET, DELAYED RELEASE ORAL DAILY PRN
Status: DISCONTINUED | OUTPATIENT
Start: 2020-11-25 | End: 2020-11-27 | Stop reason: HOSPADM

## 2020-11-25 RX ORDER — SODIUM CHLORIDE 9 MG/ML
100 INJECTION, SOLUTION INTRAVENOUS CONTINUOUS
Status: DISCONTINUED | OUTPATIENT
Start: 2020-11-25 | End: 2020-11-25

## 2020-11-25 RX ORDER — CEPHALEXIN 500 MG/1
500 CAPSULE ORAL 3 TIMES DAILY
COMMUNITY
End: 2020-11-27 | Stop reason: HOSPADM

## 2020-11-25 RX ORDER — MULTIPLE VITAMINS W/ MINERALS TAB 9MG-400MCG
1 TAB ORAL DAILY
COMMUNITY
End: 2021-09-21 | Stop reason: HOSPADM

## 2020-11-25 RX ORDER — ONDANSETRON 4 MG/1
4 TABLET, FILM COATED ORAL EVERY 6 HOURS PRN
Status: DISCONTINUED | OUTPATIENT
Start: 2020-11-25 | End: 2020-11-27 | Stop reason: HOSPADM

## 2020-11-25 RX ORDER — ALUMINA, MAGNESIA, AND SIMETHICONE 2400; 2400; 240 MG/30ML; MG/30ML; MG/30ML
15 SUSPENSION ORAL EVERY 6 HOURS PRN
Status: DISCONTINUED | OUTPATIENT
Start: 2020-11-25 | End: 2020-11-27 | Stop reason: HOSPADM

## 2020-11-25 RX ORDER — ACETAMINOPHEN AND CODEINE PHOSPHATE 300; 30 MG/1; MG/1
1 TABLET ORAL 3 TIMES DAILY
Status: DISCONTINUED | OUTPATIENT
Start: 2020-11-25 | End: 2020-11-25

## 2020-11-25 RX ORDER — AMIODARONE HYDROCHLORIDE 200 MG/1
100 TABLET ORAL DAILY
Status: DISCONTINUED | OUTPATIENT
Start: 2020-11-25 | End: 2020-11-27 | Stop reason: HOSPADM

## 2020-11-25 RX ADMIN — ROSUVASTATIN CALCIUM 5 MG: 5 TABLET, FILM COATED ORAL at 13:02

## 2020-11-25 RX ADMIN — SODIUM CHLORIDE 100 ML/HR: 9 INJECTION, SOLUTION INTRAVENOUS at 02:38

## 2020-11-25 RX ADMIN — ACETAMINOPHEN AND CODEINE PHOSPHATE 1 TABLET: 300; 30 TABLET ORAL at 19:39

## 2020-11-25 RX ADMIN — AMIODARONE HYDROCHLORIDE 100 MG: 200 TABLET ORAL at 13:01

## 2020-11-25 RX ADMIN — CEPHALEXIN 500 MG: 500 CAPSULE ORAL at 13:01

## 2020-11-25 RX ADMIN — NEBIVOLOL HYDROCHLORIDE 5 MG: 5 TABLET ORAL at 13:01

## 2020-11-25 RX ADMIN — SODIUM CHLORIDE 100 ML/HR: 9 INJECTION, SOLUTION INTRAVENOUS at 12:23

## 2020-11-25 RX ADMIN — ASPIRIN 81 MG: 81 TABLET, COATED ORAL at 13:02

## 2020-11-25 RX ADMIN — PANTOPRAZOLE SODIUM 40 MG: 40 TABLET, DELAYED RELEASE ORAL at 13:01

## 2020-11-25 RX ADMIN — ACETAMINOPHEN AND CODEINE PHOSPHATE 1 TABLET: 300; 30 TABLET ORAL at 13:01

## 2020-11-25 RX ADMIN — CEPHALEXIN 500 MG: 500 CAPSULE ORAL at 21:13

## 2020-11-26 LAB
ANION GAP SERPL CALCULATED.3IONS-SCNC: 7.8 MMOL/L (ref 5–15)
BASOPHILS # BLD AUTO: 0.03 10*3/MM3 (ref 0–0.2)
BASOPHILS NFR BLD AUTO: 0.3 % (ref 0–1.5)
BUN SERPL-MCNC: 27 MG/DL (ref 8–23)
BUN/CREAT SERPL: 26.7 (ref 7–25)
CALCIUM SPEC-SCNC: 8.4 MG/DL (ref 8.6–10.5)
CHLORIDE SERPL-SCNC: 103 MMOL/L (ref 98–107)
CO2 SERPL-SCNC: 27.2 MMOL/L (ref 22–29)
CREAT SERPL-MCNC: 1.01 MG/DL (ref 0.76–1.27)
DEPRECATED RDW RBC AUTO: 41.6 FL (ref 37–54)
EOSINOPHIL # BLD AUTO: 0.22 10*3/MM3 (ref 0–0.4)
EOSINOPHIL NFR BLD AUTO: 2.3 % (ref 0.3–6.2)
ERYTHROCYTE [DISTWIDTH] IN BLOOD BY AUTOMATED COUNT: 11.9 % (ref 12.3–15.4)
GFR SERPL CREATININE-BSD FRML MDRD: 71 ML/MIN/1.73
GLUCOSE SERPL-MCNC: 91 MG/DL (ref 65–99)
HCT VFR BLD AUTO: 27.5 % (ref 37.5–51)
HGB BLD-MCNC: 9.2 G/DL (ref 13–17.7)
IMM GRANULOCYTES # BLD AUTO: 0.04 10*3/MM3 (ref 0–0.05)
IMM GRANULOCYTES NFR BLD AUTO: 0.4 % (ref 0–0.5)
INR PPP: 2.09 (ref 0.9–1.1)
LYMPHOCYTES # BLD AUTO: 0.98 10*3/MM3 (ref 0.7–3.1)
LYMPHOCYTES NFR BLD AUTO: 10.4 % (ref 19.6–45.3)
MCH RBC QN AUTO: 32.1 PG (ref 26.6–33)
MCHC RBC AUTO-ENTMCNC: 33.5 G/DL (ref 31.5–35.7)
MCV RBC AUTO: 95.8 FL (ref 79–97)
MONOCYTES # BLD AUTO: 0.83 10*3/MM3 (ref 0.1–0.9)
MONOCYTES NFR BLD AUTO: 8.8 % (ref 5–12)
NEUTROPHILS NFR BLD AUTO: 7.33 10*3/MM3 (ref 1.7–7)
NEUTROPHILS NFR BLD AUTO: 77.8 % (ref 42.7–76)
NRBC BLD AUTO-RTO: 0 /100 WBC (ref 0–0.2)
PLATELET # BLD AUTO: 236 10*3/MM3 (ref 140–450)
PMV BLD AUTO: 10.8 FL (ref 6–12)
POTASSIUM SERPL-SCNC: 3.8 MMOL/L (ref 3.5–5.2)
PROTHROMBIN TIME: 23.3 SECONDS (ref 11.7–14.2)
RBC # BLD AUTO: 2.87 10*6/MM3 (ref 4.14–5.8)
SODIUM SERPL-SCNC: 138 MMOL/L (ref 136–145)
WBC # BLD AUTO: 9.43 10*3/MM3 (ref 3.4–10.8)

## 2020-11-26 PROCEDURE — 85610 PROTHROMBIN TIME: CPT | Performed by: NURSE PRACTITIONER

## 2020-11-26 PROCEDURE — 85025 COMPLETE CBC W/AUTO DIFF WBC: CPT | Performed by: NURSE PRACTITIONER

## 2020-11-26 PROCEDURE — G0378 HOSPITAL OBSERVATION PER HR: HCPCS

## 2020-11-26 PROCEDURE — 80048 BASIC METABOLIC PNL TOTAL CA: CPT | Performed by: NURSE PRACTITIONER

## 2020-11-26 RX ORDER — WARFARIN SODIUM 7.5 MG/1
7.5 TABLET ORAL
Status: DISCONTINUED | OUTPATIENT
Start: 2020-11-26 | End: 2020-11-27 | Stop reason: HOSPADM

## 2020-11-26 RX ADMIN — WARFARIN 7.5 MG: 7.5 TABLET ORAL at 17:28

## 2020-11-26 RX ADMIN — ASPIRIN 81 MG: 81 TABLET, COATED ORAL at 08:53

## 2020-11-26 RX ADMIN — NEBIVOLOL HYDROCHLORIDE 5 MG: 5 TABLET ORAL at 08:02

## 2020-11-26 RX ADMIN — AMIODARONE HYDROCHLORIDE 100 MG: 200 TABLET ORAL at 08:02

## 2020-11-26 RX ADMIN — ROSUVASTATIN CALCIUM 5 MG: 5 TABLET, FILM COATED ORAL at 08:02

## 2020-11-26 RX ADMIN — SODIUM CHLORIDE, PRESERVATIVE FREE 10 ML: 5 INJECTION INTRAVENOUS at 08:02

## 2020-11-26 RX ADMIN — ACETAMINOPHEN AND CODEINE PHOSPHATE 1 TABLET: 300; 30 TABLET ORAL at 04:10

## 2020-11-26 RX ADMIN — ACETAMINOPHEN AND CODEINE PHOSPHATE 1 TABLET: 300; 30 TABLET ORAL at 22:26

## 2020-11-26 RX ADMIN — ACETAMINOPHEN AND CODEINE PHOSPHATE 1 TABLET: 300; 30 TABLET ORAL at 17:28

## 2020-11-26 RX ADMIN — LEVOTHYROXINE SODIUM 112 MCG: 112 TABLET ORAL at 20:30

## 2020-11-26 RX ADMIN — PANTOPRAZOLE SODIUM 40 MG: 40 TABLET, DELAYED RELEASE ORAL at 06:16

## 2020-11-27 ENCOUNTER — APPOINTMENT (OUTPATIENT)
Dept: GENERAL RADIOLOGY | Facility: HOSPITAL | Age: 80
End: 2020-11-27

## 2020-11-27 VITALS
SYSTOLIC BLOOD PRESSURE: 123 MMHG | DIASTOLIC BLOOD PRESSURE: 68 MMHG | HEART RATE: 81 BPM | RESPIRATION RATE: 18 BRPM | OXYGEN SATURATION: 95 % | TEMPERATURE: 97.9 F | HEIGHT: 72 IN | BODY MASS INDEX: 32.98 KG/M2 | WEIGHT: 243.5 LBS

## 2020-11-27 PROBLEM — S80.01XA TRAUMATIC HEMATOMA OF RIGHT KNEE: Status: ACTIVE | Noted: 2020-11-27

## 2020-11-27 LAB
INR PPP: 1.54 (ref 0.9–1.1)
PROTHROMBIN TIME: 18.3 SECONDS (ref 11.7–14.2)

## 2020-11-27 PROCEDURE — G0378 HOSPITAL OBSERVATION PER HR: HCPCS

## 2020-11-27 PROCEDURE — 85610 PROTHROMBIN TIME: CPT | Performed by: NURSE PRACTITIONER

## 2020-11-27 PROCEDURE — 73620 X-RAY EXAM OF FOOT: CPT

## 2020-11-27 PROCEDURE — 73600 X-RAY EXAM OF ANKLE: CPT

## 2020-11-27 RX ORDER — ACETAMINOPHEN 325 MG/1
650 TABLET ORAL EVERY 4 HOURS PRN
Start: 2020-11-27

## 2020-11-27 RX ORDER — ACETAMINOPHEN AND CODEINE PHOSPHATE 300; 30 MG/1; MG/1
1 TABLET ORAL EVERY 4 HOURS PRN
Qty: 15 TABLET | Refills: 0 | Status: SHIPPED | OUTPATIENT
Start: 2020-11-27 | End: 2021-05-26

## 2020-11-27 RX ADMIN — AMIODARONE HYDROCHLORIDE 100 MG: 200 TABLET ORAL at 09:48

## 2020-11-27 RX ADMIN — ACETAMINOPHEN AND CODEINE PHOSPHATE 1 TABLET: 300; 30 TABLET ORAL at 06:14

## 2020-11-27 RX ADMIN — ROSUVASTATIN CALCIUM 5 MG: 5 TABLET, FILM COATED ORAL at 09:49

## 2020-11-27 RX ADMIN — PANTOPRAZOLE SODIUM 40 MG: 40 TABLET, DELAYED RELEASE ORAL at 06:14

## 2020-11-27 RX ADMIN — NEBIVOLOL HYDROCHLORIDE 5 MG: 5 TABLET ORAL at 09:49

## 2020-11-27 RX ADMIN — ACETAMINOPHEN AND CODEINE PHOSPHATE 1 TABLET: 300; 30 TABLET ORAL at 14:02

## 2020-11-27 RX ADMIN — ASPIRIN 81 MG: 81 TABLET, COATED ORAL at 09:49

## 2020-12-08 DIAGNOSIS — I10 ESSENTIAL HYPERTENSION: ICD-10-CM

## 2020-12-09 ENCOUNTER — TELEPHONE (OUTPATIENT)
Dept: FAMILY MEDICINE CLINIC | Facility: CLINIC | Age: 80
End: 2020-12-09

## 2020-12-09 RX ORDER — NEBIVOLOL HYDROCHLORIDE 10 MG/1
TABLET ORAL
Qty: 30 TABLET | Refills: 0 | Status: SHIPPED | OUTPATIENT
Start: 2020-12-09 | End: 2021-02-04

## 2020-12-09 NOTE — TELEPHONE ENCOUNTER
Tangela with [employer] started working with pt yesterday. Need to know how often he should have his PT and INR checked. Please advise at [phone].

## 2020-12-10 ENCOUNTER — TELEPHONE (OUTPATIENT)
Dept: CARDIOLOGY | Facility: CLINIC | Age: 80
End: 2020-12-10

## 2020-12-11 LAB — INR PPP: 2.3

## 2020-12-14 ENCOUNTER — ANTICOAGULATION VISIT (OUTPATIENT)
Dept: CARDIOLOGY | Facility: CLINIC | Age: 80
End: 2020-12-14

## 2020-12-16 RX ORDER — ROSUVASTATIN CALCIUM 5 MG/1
TABLET, COATED ORAL
Qty: 90 TABLET | Refills: 1 | Status: SHIPPED | OUTPATIENT
Start: 2020-12-16 | End: 2021-06-24

## 2020-12-23 LAB — INR PPP: 2.9

## 2020-12-24 ENCOUNTER — ANTICOAGULATION VISIT (OUTPATIENT)
Dept: CARDIOLOGY | Facility: CLINIC | Age: 80
End: 2020-12-24

## 2020-12-29 LAB — INR PPP: 2.5

## 2020-12-30 ENCOUNTER — ANTICOAGULATION VISIT (OUTPATIENT)
Dept: CARDIOLOGY | Facility: CLINIC | Age: 80
End: 2020-12-30

## 2020-12-30 ENCOUNTER — TELEPHONE (OUTPATIENT)
Dept: FAMILY MEDICINE CLINIC | Facility: CLINIC | Age: 80
End: 2020-12-30

## 2021-01-04 ENCOUNTER — OFFICE VISIT (OUTPATIENT)
Dept: FAMILY MEDICINE CLINIC | Facility: CLINIC | Age: 81
End: 2021-01-04

## 2021-01-04 VITALS
HEIGHT: 72 IN | WEIGHT: 240 LBS | DIASTOLIC BLOOD PRESSURE: 82 MMHG | BODY MASS INDEX: 32.51 KG/M2 | SYSTOLIC BLOOD PRESSURE: 118 MMHG | HEART RATE: 82 BPM | OXYGEN SATURATION: 96 % | TEMPERATURE: 97.5 F

## 2021-01-04 DIAGNOSIS — S69.91XD INJURY OF RIGHT HAND, SUBSEQUENT ENCOUNTER: ICD-10-CM

## 2021-01-04 DIAGNOSIS — M19.90 ARTHRITIS: Primary | ICD-10-CM

## 2021-01-04 PROCEDURE — 96372 THER/PROPH/DIAG INJ SC/IM: CPT | Performed by: FAMILY MEDICINE

## 2021-01-04 PROCEDURE — 99213 OFFICE O/P EST LOW 20 MIN: CPT | Performed by: FAMILY MEDICINE

## 2021-01-04 RX ORDER — PREDNISONE 1 MG/1
TABLET ORAL
Qty: 21 TABLET | Refills: 0 | Status: SHIPPED | OUTPATIENT
Start: 2021-01-04 | End: 2021-06-16

## 2021-01-04 RX ORDER — METHYLPREDNISOLONE ACETATE 80 MG/ML
80 INJECTION, SUSPENSION INTRA-ARTICULAR; INTRALESIONAL; INTRAMUSCULAR; SOFT TISSUE ONCE
Status: COMPLETED | OUTPATIENT
Start: 2021-01-04 | End: 2021-01-04

## 2021-01-04 RX ADMIN — METHYLPREDNISOLONE ACETATE 80 MG: 80 INJECTION, SUSPENSION INTRA-ARTICULAR; INTRALESIONAL; INTRAMUSCULAR; SOFT TISSUE at 14:44

## 2021-01-04 NOTE — PROGRESS NOTES
"Chief Complaint  Hand Injury (Pt fell 7 weeks ago, and injured hand. He has lots of swelling and pain. He has seen a hand specialist. Asking for something for pain. )    Subjective          David Comer Sr. presents to [facility] for   History of Present Illness  PT is here for arthritis issues for his hand right and his back.  He injured his rt hand about 7 weeks ago and got stitches in his hand and still has swelling.  He states the hand doctor told him to follow up with me for inflammation.    Objective   Vital Signs:   /82   Pulse 82   Temp 97.5 °F (36.4 °C)   Ht 182.9 cm (72\")   Wt 109 kg (240 lb)   SpO2 96%   BMI 32.55 kg/m²     Physical Exam  Vitals signs reviewed.   Cardiovascular:      Rate and Rhythm: Normal rate and regular rhythm.      Heart sounds: Normal heart sounds.   Pulmonary:      Effort: Pulmonary effort is normal.      Breath sounds: Normal breath sounds.   Musculoskeletal:      Comments: Rt hand some swelling present but min pain at base of his thumb.          Result Review :                 Assessment and Plan    Problem List Items Addressed This Visit        Musculoskeletal and Injuries    Hand injury    Relevant Medications    predniSONE (DELTASONE) 5 MG tablet    Arthritis - Primary    Relevant Medications    predniSONE (DELTASONE) 5 MG tablet    methylPREDNISolone acetate (DEPO-medrol) injection 80 mg (Start on 1/4/2021  3:30 PM)          Follow Up   Return in about 3 months (around 4/4/2021).  Patient was given instructions and counseling regarding his condition or for health maintenance advice. Please see specific information pulled into the AVS if appropriate.   Start dose kavin tomorrow and will use tylenol prn.  Follow up if no better.    Depo 80 today    "

## 2021-01-05 RX ORDER — LEVOTHYROXINE SODIUM 112 UG/1
TABLET ORAL
Qty: 90 TABLET | Refills: 0 | Status: SHIPPED | OUTPATIENT
Start: 2021-01-05 | End: 2021-03-30

## 2021-01-06 RX ORDER — DOXAZOSIN 2 MG/1
TABLET ORAL
Qty: 90 TABLET | Refills: 0 | Status: SHIPPED | OUTPATIENT
Start: 2021-01-06 | End: 2021-03-30

## 2021-01-13 DIAGNOSIS — I10 ESSENTIAL HYPERTENSION: ICD-10-CM

## 2021-01-14 RX ORDER — OLMESARTAN MEDOXOMIL AND HYDROCHLOROTHIAZIDE 40/25 40; 25 MG/1; MG/1
1 TABLET ORAL DAILY
Qty: 90 TABLET | Refills: 1 | Status: SHIPPED | OUTPATIENT
Start: 2021-01-14 | End: 2021-07-15

## 2021-01-27 LAB — INR PPP: 2.6

## 2021-01-28 ENCOUNTER — ANTICOAGULATION VISIT (OUTPATIENT)
Dept: CARDIOLOGY | Facility: CLINIC | Age: 81
End: 2021-01-28

## 2021-02-03 DIAGNOSIS — I10 ESSENTIAL HYPERTENSION: ICD-10-CM

## 2021-02-04 RX ORDER — ISOSORBIDE MONONITRATE 30 MG/1
TABLET, EXTENDED RELEASE ORAL
Qty: 90 TABLET | Refills: 0 | Status: SHIPPED | OUTPATIENT
Start: 2021-02-04 | End: 2021-08-06

## 2021-02-04 RX ORDER — NEBIVOLOL HYDROCHLORIDE 10 MG/1
TABLET ORAL
Qty: 30 TABLET | Refills: 3 | Status: SHIPPED | OUTPATIENT
Start: 2021-02-04 | End: 2021-10-10

## 2021-03-30 RX ORDER — WARFARIN SODIUM 7.5 MG/1
7.5 TABLET ORAL NIGHTLY
Qty: 90 TABLET | Refills: 0 | Status: SHIPPED | OUTPATIENT
Start: 2021-03-30 | End: 2021-07-13

## 2021-03-30 RX ORDER — DOXAZOSIN 2 MG/1
TABLET ORAL
Qty: 90 TABLET | Refills: 0 | Status: SHIPPED | OUTPATIENT
Start: 2021-03-30 | End: 2021-07-07

## 2021-03-30 RX ORDER — LEVOTHYROXINE SODIUM 112 UG/1
TABLET ORAL
Qty: 90 TABLET | Refills: 0 | Status: SHIPPED | OUTPATIENT
Start: 2021-03-30 | End: 2021-06-21

## 2021-04-05 ENCOUNTER — OFFICE VISIT (OUTPATIENT)
Dept: FAMILY MEDICINE CLINIC | Facility: CLINIC | Age: 81
End: 2021-04-05

## 2021-04-05 DIAGNOSIS — E78.2 MIXED HYPERLIPIDEMIA: ICD-10-CM

## 2021-04-05 DIAGNOSIS — I10 ESSENTIAL HYPERTENSION: ICD-10-CM

## 2021-04-05 DIAGNOSIS — Z00.00 MEDICARE ANNUAL WELLNESS VISIT, SUBSEQUENT: Primary | ICD-10-CM

## 2021-04-05 DIAGNOSIS — Z79.01 ANTICOAGULATED: ICD-10-CM

## 2021-04-05 PROCEDURE — 99443 PR PHYS/QHP TELEPHONE EVALUATION 21-30 MIN: CPT | Performed by: FAMILY MEDICINE

## 2021-04-05 PROCEDURE — G0439 PPPS, SUBSEQ VISIT: HCPCS | Performed by: FAMILY MEDICINE

## 2021-04-05 NOTE — PROGRESS NOTES
The ABCs of the Annual Wellness Visit  Subsequent Medicare Wellness Visit    CC:MWE and HTN    Subjective   History of Present Illness:  David KHAN Age Sr. is a 81 y.o. male who presents for a Subsequent Medicare Wellness Visit and  Hypertension-no chest pain, blurry vision, headaches or palpitations.  Hyperlipidemia-   No Complaints.  Stable. I just refilled the med few days ago for him.  GERD- stable and no dysphagia.  He is still having some hip area pain since his fall and had an MRI and was told he has some tears on tendons and will take some time to heal but does not require surgery.      HEALTH RISK ASSESSMENT    Recent Hospitalizations:  Recently treated at the following:  [facility]    Current Medical Providers:  Patient Care Team:  Chacha Pineda MD as PCP - General (Family Medicine)    Smoking Status:  Social History     Tobacco Use   Smoking Status Never Smoker   Smokeless Tobacco Never Used       Alcohol Consumption:  Social History     Substance and Sexual Activity   Alcohol Use No       Depression Screen:   PHQ-2/PHQ-9 Depression Screening 1/4/2021   Little interest or pleasure in doing things 0   Feeling down, depressed, or hopeless 0   Total Score 0       Fall Risk Screen:  STEADI Fall Risk Assessment was completed, and patient is at HIGH risk for falls. Assessment completed on:1/4/2021    Health Habits and Functional and Cognitive Screening:  Functional & Cognitive Status 4/5/2021   Do you have difficulty preparing food and eating? No   Do you have difficulty bathing yourself, getting dressed or grooming yourself? No   Do you have difficulty using the toilet? No   Do you have difficulty moving around from place to place? No   Do you have trouble with steps or getting out of a bed or a chair? No   Current Diet Well Balanced Diet   Dental Exam Not up to date   Eye Exam Not up to date   Exercise (times per week) 3 times per week   Current Exercises Include Walking   Current Exercise  Activities Include -   Do you need help using the phone?  No   Are you deaf or do you have serious difficulty hearing?  No   Do you need help with transportation? No   Do you need help shopping? No   Do you need help preparing meals?  No   Do you need help with housework?  No   Do you need help with laundry? No   Do you need help taking your medications? No   Do you need help managing money? No   Do you ever drive or ride in a car without wearing a seat belt? No   Have you felt unusual stress, anger or loneliness in the last month? No   Who do you live with? Child   If you need help, do you have trouble finding someone available to you? No   Have you been bothered in the last four weeks by sexual problems? No   Do you have difficulty concentrating, remembering or making decisions? No         Does the patient have evidence of cognitive impairment? No    Asprin use counseling:Taking ASA appropriately as indicated    Age-appropriate Screening Schedule:  Refer to the list below for future screening recommendations based on patient's age, sex and/or medical conditions. Orders for these recommended tests are listed in the plan section. The patient has been provided with a written plan.    Health Maintenance   Topic Date Due   • COLONOSCOPY  Never done   • TDAP/TD VACCINES (1 - Tdap) Never done   • ZOSTER VACCINE (1 of 2) Never done   • LIPID PANEL  05/26/2021   • INFLUENZA VACCINE  Discontinued          The following portions of the patient's history were reviewed and updated as appropriate: allergies, current medications, past family history, past medical history, past social history, past surgical history and problem list.    Outpatient Medications Prior to Visit   Medication Sig Dispense Refill   • acetaminophen (TYLENOL) 325 MG tablet Take 2 tablets by mouth Every 4 (Four) Hours As Needed for Mild Pain .     • acetaminophen-codeine (TYLENOL #3) 300-30 MG per tablet Take 1 tablet by mouth Every 4 (Four) Hours As Needed  for Moderate Pain . 15 tablet 0   • amiodarone (PACERONE) 200 MG tablet Take 0.5 tablets by mouth Daily. 90 tablet 3   • aspirin 81 MG tablet Take 81 mg by mouth Daily.     • Bystolic 10 MG tablet Take 1/2 (one-half) tablet by mouth once daily 30 tablet 3   • doxazosin (CARDURA) 2 MG tablet Take 1 tablet by mouth once daily 90 tablet 0   • esomeprazole (NEXIUM) 40 MG capsule Take 20 mg by mouth Every Morning Before Breakfast.     • Euthyrox 112 MCG tablet Take 1 tablet by mouth once daily 90 tablet 0   • isosorbide mononitrate (IMDUR) 30 MG 24 hr tablet Take 1 tablet by mouth once daily 90 tablet 0   • multivitamin with minerals (MULTIVITAMIN ADULT PO) Take 1 tablet by mouth Daily.     • nitroglycerin (NITROSTAT) 0.4 MG SL tablet Place 0.4 mg under the tongue Every 5 (Five) Minutes As Needed. Doesn't take     • olmesartan-hydrochlorothiazide (BENICAR HCT) 40-25 MG per tablet Take 1 tablet by mouth Daily. 90 tablet 1   • predniSONE (DELTASONE) 5 MG tablet 6 pills day 1 5 pills day 2 4 pills day 3 3 pills day 4 2 pills day 5 1 pill day 6 21 tablet 0   • rosuvastatin (CRESTOR) 5 MG tablet Take 1 tablet by mouth once daily 90 tablet 1   • warfarin (COUMADIN) 7.5 MG tablet Take 1 tablet by mouth Every Night. 90 tablet 0     No facility-administered medications prior to visit.       Patient Active Problem List   Diagnosis   • Chronic coronary artery disease   • Abdominal aortic aneurysm (CMS/HCC)   • Paroxysmal atrial fibrillation (CMS/HCC)   • Gastroesophageal reflux disease   • Essential hypertension   • Mixed hyperlipidemia   • Hypogonadism in male   • Acquired hypothyroidism   • Osteoarthritis of spine   • Vitamin D deficiency   • Anticoagulated   • Atrial fibrillation, rapid (CMS/HCC)   • Syncope and collapse   • Chest pain   • Statin intolerance   • Gross hematuria   • Coumadin toxicity   • Sepsis (CMS/HCC)   • H/O amiodarone therapy   • Dehydration   • Renal insufficiency   • Thoracic aortic aneurysm without  rupture (CMS/[facility])   • Refused influenza vaccine   • Medicare annual wellness visit, subsequent   • BPH without obstruction/lower urinary tract symptoms   • Thoracic aortic aneurysm (CMS/HCC)   • Kidney stone   • Hand injury   • Acute kidney injury (CMS/HCC)   • 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 (CMS/[facility])   • Traumatic hematoma of right knee   • Arthritis       Advanced Care Planning:  ACP discussion was held with the patient during this visit. Patient has an advance directive (not in EMR), copy requested.    Review of Systems    Compared to one year ago, the patient feels his physical health is the same.  Compared to one year ago, the patient feels his mental health is the same.    Reviewed chart for potential of high risk medication in the elderly: yes  Reviewed chart for potential of harmful drug interactions in the elderly:yes    Objective       There were no vitals filed for this visit.    There is no height or weight on file to calculate BMI.  Discussed the patient's BMI with him. The BMI is in the acceptable range.    Physical Exam  NAD  AAOx3  No labored breathing.          Assessment/Plan   Medicare Risks and Personalized Health Plan  CMS Preventative Services Quick Reference  Cardiovascular risk    The above risks/problems have been discussed with the patient.  Pertinent information has been shared with the patient in the After Visit Summary.  Follow up plans and orders are seen below in the Assessment/Plan Section.    Diagnoses and all orders for this visit:    1. Medicare annual wellness visit, subsequent (Primary)    2. Essential hypertension    3. Mixed hyperlipidemia    4. Anticoagulated      Follow Up:  Return in about 3 months (around 7/5/2021) for hypertension, hyperlipidema.     An After Visit Summary and PPPS were given to the patient.       MWE done and patient to work on diet and exercise for Preventive Counseling.    Pt consented to a televisit for this visit due to covid  19.    This time includes time spent by me in the following activities:preparing for the visit, reviewing tests, obtaining and/or reviewing a separately obtained history, performing a medically appropriate examination and/or evaluation , counseling and educating the patient/family/caregiver, ordering medications, tests, or procedures, documenting information in the medical record, independently interpreting results and communicating that information with the patient/family/caregiver and care coordination  25 min spent in this visit .    He has had both covid vaccines.  Refills and will get labs in near future.

## 2021-04-14 ENCOUNTER — TELEPHONE (OUTPATIENT)
Dept: FAMILY MEDICINE CLINIC | Facility: CLINIC | Age: 81
End: 2021-04-14

## 2021-04-14 NOTE — TELEPHONE ENCOUNTER
Caller: David Comer Sr.    Relationship: Self    Best call back number:[phone]     What is the best time to reach you: ANYTIME     Who are you requesting to speak with  DOCTOR BRAXTON          What was the call regarding: HE WOULD LIKE CALLBACK TO DISCUSS THE RESULTS OF HIS MRI  AND HOW TO GO FORWARD FROM HERE.    Do you require a callback: YES

## 2021-04-16 NOTE — TELEPHONE ENCOUNTER
I spoke to the patient and he said he's not ok with what Dr. Rey's office has to say regarding the MRI. He was told the MRI doesn't show anything significant enough for repair. However the patient has been in pain since October and doesn't know that he agrees with Dr. Rey. He'd like to know what you think, if he needs a second opinion to get another MRI, or if you're able to order the MRI. Please advise.

## 2021-04-16 NOTE — TELEPHONE ENCOUNTER
We do not have the MRI, it was ordered by Dr. Rey and wasn't sent to us. I have called to get it faxed to us.

## 2021-05-18 ENCOUNTER — HOSPITAL ENCOUNTER (OUTPATIENT)
Dept: CARDIOLOGY | Facility: HOSPITAL | Age: 81
Discharge: HOME | End: 2021-05-18
Admitting: INTERNAL MEDICINE

## 2021-05-18 ENCOUNTER — ANTICOAGULATION VISIT (OUTPATIENT)
Dept: CARDIOLOGY | Facility: CLINIC | Age: 81
End: 2021-05-18

## 2021-05-18 DIAGNOSIS — Z79.01 LONG TERM (CURRENT) USE OF ANTICOAGULANTS: ICD-10-CM

## 2021-05-18 DIAGNOSIS — I48.0 PAROXYSMAL ATRIAL FIBRILLATION (HCC): Primary | ICD-10-CM

## 2021-05-18 LAB — INR PPP: 2.6

## 2021-05-18 PROCEDURE — 85610 PROTHROMBIN TIME: CPT | Performed by: INTERNAL MEDICINE

## 2021-05-21 ENCOUNTER — TELEPHONE (OUTPATIENT)
Dept: CARDIOLOGY | Facility: CLINIC | Age: 81
End: 2021-05-21

## 2021-05-21 NOTE — TELEPHONE ENCOUNTER
Patient called stating his BP is 170/100 HR 58 yesterday. Today BP is 185/107 HR 61. Patient states he is tired. No other symptoms.     Per Naveed CALLE Start new prescription of Hydralazine 25mg TID,Monitor BP follow up appointment.     Pt is aware and informed

## 2021-05-26 ENCOUNTER — HOSPITAL ENCOUNTER (OUTPATIENT)
Dept: CARDIOLOGY | Facility: HOSPITAL | Age: 81
Discharge: HOME | End: 2021-05-26
Admitting: NURSE PRACTITIONER

## 2021-05-26 ENCOUNTER — OFFICE VISIT (OUTPATIENT)
Dept: CARDIOLOGY | Facility: CLINIC | Age: 81
End: 2021-05-26

## 2021-05-26 VITALS
WEIGHT: 233 LBS | BODY MASS INDEX: 31.56 KG/M2 | SYSTOLIC BLOOD PRESSURE: 168 MMHG | HEART RATE: 60 BPM | HEIGHT: 72 IN | DIASTOLIC BLOOD PRESSURE: 100 MMHG

## 2021-05-26 DIAGNOSIS — I48.0 PAROXYSMAL ATRIAL FIBRILLATION (HCC): ICD-10-CM

## 2021-05-26 DIAGNOSIS — R06.02 SOB (SHORTNESS OF BREATH): Primary | ICD-10-CM

## 2021-05-26 DIAGNOSIS — I10 ESSENTIAL HYPERTENSION: ICD-10-CM

## 2021-05-26 DIAGNOSIS — Z79.01 LONG TERM (CURRENT) USE OF ANTICOAGULANTS: ICD-10-CM

## 2021-05-26 DIAGNOSIS — R93.1 ABNORMAL ECHOCARDIOGRAM: ICD-10-CM

## 2021-05-26 DIAGNOSIS — I25.10 CHRONIC CORONARY ARTERY DISEASE: ICD-10-CM

## 2021-05-26 DIAGNOSIS — E78.2 MIXED HYPERLIPIDEMIA: ICD-10-CM

## 2021-05-26 DIAGNOSIS — I71.20 THORACIC AORTIC ANEURYSM WITHOUT RUPTURE (HCC): ICD-10-CM

## 2021-05-26 DIAGNOSIS — R06.02 SOB (SHORTNESS OF BREATH): ICD-10-CM

## 2021-05-26 DIAGNOSIS — R53.83 FATIGUE, UNSPECIFIED TYPE: ICD-10-CM

## 2021-05-26 LAB
T-UPTAKE NFR SERPL: 0.97 TBI (ref 0.8–1.3)
T4 SERPL-MCNC: 9.73 MCG/DL (ref 4.5–11.7)
TSH SERPL DL<=0.05 MIU/L-ACNC: 2.5 UIU/ML (ref 0.27–4.2)

## 2021-05-26 PROCEDURE — 36415 COLL VENOUS BLD VENIPUNCTURE: CPT | Performed by: NURSE PRACTITIONER

## 2021-05-26 PROCEDURE — 84436 ASSAY OF TOTAL THYROXINE: CPT | Performed by: NURSE PRACTITIONER

## 2021-05-26 PROCEDURE — 99214 OFFICE O/P EST MOD 30 MIN: CPT | Performed by: NURSE PRACTITIONER

## 2021-05-26 PROCEDURE — 84443 ASSAY THYROID STIM HORMONE: CPT | Performed by: NURSE PRACTITIONER

## 2021-05-26 PROCEDURE — 93000 ELECTROCARDIOGRAM COMPLETE: CPT | Performed by: NURSE PRACTITIONER

## 2021-05-26 PROCEDURE — 84479 ASSAY OF THYROID (T3 OR T4): CPT | Performed by: NURSE PRACTITIONER

## 2021-05-26 RX ORDER — HYDRALAZINE HYDROCHLORIDE 25 MG/1
25 TABLET, FILM COATED ORAL 3 TIMES DAILY
COMMUNITY
Start: 2021-05-21 | End: 2021-08-26

## 2021-05-26 NOTE — PROGRESS NOTES
Subjective:        David Comer Sr. is a 81 y.o. male who here for follow up    No chief complaint on file.    He is here today for blood pressure problems.  HPI   David Comer is a 81-year-old male, who is current with me.  He has a history to include AAA, CAD, GERD, DJD, hyperlipidemia, hypertension, bradycardia, hypothyroidism and PAF on Coumadin.  His recent lipid panel on 5/26/2020 revealed , HDL 50, LDL 59, and triglycerides 95.  Echo on 2019 revealed EF 57%, LAC size is mild to moderately dilated, mild TV regurgitation is present and no evidence of pericardial effusion noted.  Rest test in 2019 indicated a normal myocardial perfusion study with no evidence of ischemia.  Small septal defect of questionable significance was noted.  Holter monitor in 2017 was a normal monitor study.  Cardioversion 2017 was a successful cardioversion.  Cardiac cath at [facility] in 2015 revealed EF 60%, and recommendations considering nonsignificant CAD patient was noted to be treated medically.       Previous CT in 11/2020  CT of chest showed maximum transverse diameter at the mid ascending thoracic aortic remained stable at 4.6.       The following portions of the patient's history were reviewed and updated as appropriate: allergies, current medications, past family history, past medical history, past social history, past surgical history and problem list.    Past Medical History:   Diagnosis Date   • AAA (abdominal aortic aneurysm) without rupture (CMS/HCC)     CT performed on 2/20/17 (this is actually a THORACIC aortic aneurysm)   • Abnormal ECG    • Arthritis    • BPH (benign prostatic hyperplasia)    • Chronic lumbar pain    • Degenerative arthritis of lumbar spine    • Degenerative cervical disc    • Elevated rheumatoid factor    • Hematuria, gross    • Hypogonadism in male    • Inguinal hernia    • Kidney stone    • Medicare annual wellness visit, subsequent    • Melena    • Refused influenza vaccine    • Scoliosis    •  Sepsis (CMS/HCC)    • Thoracic aortic aneurysm (CMS/HCC)     Thoracic Aneurysm   • Vertebral compression fracture (CMS/HCC)    • Vitamin D deficiency          reports that he has never smoked. He has never used smokeless tobacco. He reports that he does not drink alcohol and does not use drugs.     Family History   Problem Relation Age of Onset   • Hypertension Father    • Heart attack Father    • Heart attack Brother    • Alcohol abuse Brother    • Hypertension Brother    • Hypertension Paternal Grandmother    • Hypertension Paternal Grandfather    • Anemia Mother    • Arthritis Mother    • Hypertension Mother    • Hypertension Maternal Grandmother    • Heart disease Other         FH in males before age 55       ROS     Review of Systems   Constitutional: No for no wt loss, fever, +fatigue  Gastrointestinal: No for nausea, abdominal pain  Behavioral/Psych: Noative for insomnia or anxiety  Cardiovascular: + for shortness of breath, no chest pain and palpitations.      Objective:           Physical Exam  Vitals and nursing note reviewed.   Constitutional:       Appearance: He is well-developed.   HENT:      Head: Normocephalic.      Right Ear: External ear normal.      Left Ear: External ear normal.   Neck:      Vascular: No JVD.   Cardiovascular:      Rate and Rhythm: Normal rate and regular rhythm.      Pulses: Intact distal pulses.      Heart sounds: Normal heart sounds. No murmur heard.   No friction rub. No gallop.    Pulmonary:      Effort: Pulmonary effort is normal. No respiratory distress.      Breath sounds: Normal breath sounds. No stridor. No rales.   Abdominal:      General: Bowel sounds are normal. There is no distension.      Palpations: Abdomen is soft.      Tenderness: There is no abdominal tenderness. There is no guarding.   Musculoskeletal:         General: No tenderness. Normal range of motion.      Cervical back: Normal range of motion.   Skin:     General: Skin is warm.   Neurological:       Mental Status: He is alert and oriented to person, place, and time.      Deep Tendon Reflexes: Reflexes are normal and symmetric.   Psychiatric:         Judgment: Judgment normal.           ECG 12 Lead    Date/Time: 5/26/2021 9:45 AM  Performed by: Angeles Delvalle APRN  Authorized by: Angeles Delvalle APRN   Comparison: compared with previous ECG   Similar to previous ECG  Comparison to previous ECG: Nonspecific T waves  Rhythm: sinus rhythm  BPM: 59    Clinical impression: abnormal EKG             2019   Interpretation Summary     · Left atrial cavity size is mild-to-moderately dilated.  · Mild tricuspid valve regurgitation is present.  · Calculated EF = 57.0%.  · There is no evidence of pericardial effusion.     Interpretation Summary     · Findings consistent with a normal ECG stress test.  · Left ventricular ejection fraction is normal (Calculated EF = 60%).  · Myocardial perfusion imaging indicates a normal myocardial perfusion study with no evidence of ischemia.  · Impressions are consistent with a low risk study.  · SMALL SEPTAL DEFECT OF QUESTIONABLE SIG     2017  Interpretation Summary     · Post cardioversion the patient displayed a sinus rhythm.  · The cardioversion was successful.         Interpretation Summary     · A normal monitor study.  · NSR WITH RARE PAC AND RARE PVC          IMPRESSION OF HEART CATHETERIZATION:    1. Normal left main coronary artery.  2. Normal left anterior descending artery and its branches.  3. Normal left circumflex artery and its branches.  4. Normal right coronary artery proximal spasm relieved with sublingual   nitroglycerin  5. Normal left ventricular systolic function.  LVEDP 10 mmHg.  Ejection   fraction 60%.    RECOMMENDATION:  Considering nonsignificant coronary artery disease   patient be treated medically.      CT of chest on 5/1/2019  CONCLUSION:  1. Stable dilatation of the ascending thoracic aorta measuring 4.5 cm.  2. Hiatal hernia.  3. Within the left  upper lobe there is a reticular nodular infiltrate as  well as an infrahilar area of peribronchial interstitial thickening  (bronchitis) and associated minimal infiltrate.  4. Gallstone and left renal calculus.      Current Outpatient Medications:   •  acetaminophen (TYLENOL) 325 MG tablet, Take 2 tablets by mouth Every 4 (Four) Hours As Needed for Mild Pain ., Disp:  , Rfl:   •  amiodarone (PACERONE) 200 MG tablet, Take 0.5 tablets by mouth Daily., Disp: 90 tablet, Rfl: 3  •  aspirin 81 MG tablet, Take 81 mg by mouth Daily., Disp: , Rfl:   •  Bystolic 10 MG tablet, Take 1/2 (one-half) tablet by mouth once daily, Disp: 30 tablet, Rfl: 3  •  doxazosin (CARDURA) 2 MG tablet, Take 1 tablet by mouth once daily, Disp: 90 tablet, Rfl: 0  •  esomeprazole (NEXIUM) 40 MG capsule, Take 20 mg by mouth Every Morning Before Breakfast., Disp: , Rfl:   •  Euthyrox 112 MCG tablet, Take 1 tablet by mouth once daily, Disp: 90 tablet, Rfl: 0  •  hydrALAZINE (APRESOLINE) 25 MG tablet, Take 25 mg by mouth 3 (Three) Times a Day., Disp: , Rfl:   •  isosorbide mononitrate (IMDUR) 30 MG 24 hr tablet, Take 1 tablet by mouth once daily, Disp: 90 tablet, Rfl: 0  •  multivitamin with minerals (MULTIVITAMIN ADULT PO), Take 1 tablet by mouth Daily., Disp: , Rfl:   •  nitroglycerin (NITROSTAT) 0.4 MG SL tablet, Place 0.4 mg under the tongue Every 5 (Five) Minutes As Needed. Doesn't take, Disp: , Rfl:   •  olmesartan-hydrochlorothiazide (BENICAR HCT) 40-25 MG per tablet, Take 1 tablet by mouth Daily., Disp: 90 tablet, Rfl: 1  •  predniSONE (DELTASONE) 5 MG tablet, 6 pills day 1 5 pills day 2 4 pills day 3 3 pills day 4 2 pills day 5 1 pill day 6, Disp: 21 tablet, Rfl: 0  •  rosuvastatin (CRESTOR) 5 MG tablet, Take 1 tablet by mouth once daily, Disp: 90 tablet, Rfl: 1  •  warfarin (COUMADIN) 7.5 MG tablet, Take 1 tablet by mouth Every Night., Disp: 90 tablet, Rfl: 0     Assessment:        Patient Active Problem List   Diagnosis   • Chronic coronary  artery disease   • Abdominal aortic aneurysm (CMS/HCC)   • Paroxysmal atrial fibrillation (CMS/HCC)   • Gastroesophageal reflux disease   • Essential hypertension   • Mixed hyperlipidemia   • Hypogonadism in male   • Acquired hypothyroidism   • Osteoarthritis of spine   • Vitamin D deficiency   • Anticoagulated   • Atrial fibrillation, rapid (CMS/HCC)   • Syncope and collapse   • Chest pain   • Statin intolerance   • Gross hematuria   • Coumadin toxicity   • Sepsis (CMS/HCC)   • H/O amiodarone therapy   • Dehydration   • Renal insufficiency   • Thoracic aortic aneurysm without rupture (CMS/HCC)   • Refused influenza vaccine   • Medicare annual wellness visit, subsequent   • BPH without obstruction/lower urinary tract symptoms   • Thoracic aortic aneurysm (CMS/HCC)   • Kidney stone   • Hand injury   • Acute kidney injury (CMS/HCC)   • 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  Hypertension: He states his blood pressure has been elevated prior to taking his medication. He said his primary doctor had him switch his Benicar at night but he was unable to because he had to keep going to the bathroom.  He said at that time his blood pressure was a little bit better when he woke up in the morning. He will start taking his Bystolic at night.      Educated patient on exercising for at least 30 minutes a day for 2 to 3 days a week. Importance of controlling hypertension and blood pressure checkup on the regular basis has been explained. Hypertension as a silent killer has been discussed. Risk reduction of the weight and regular exercises to control the hypertension has been explained.     2.  Thoracic aortic aneurysm without rupture: Previous CT in 2020 stated stable dilation of the ascending thoracic aortic measuring 4.6 cm similar to previous CT.    3.  Amiodarone therapy: Previous chest x-ray revealed no acute findings.  We  will repeat his TSH.         David Comer Sr. IS ON AMIODARONE,   Significant side effects associated with this medication has been explained. Pros and cons of the medications has been discussed, decision has been to continue the medication   6 months to yearly checkup for eye examination, thyroid function test, chest x-ray and liver function test has been advised. Patient understands well.    4.  Paroxysmal atrial fibrillation: Anticoagulated with Coumadin.       Pros and cons as well as indication of the anticoagulation has been explained to the patient in detail. There are no obvious complications at this stage. Risk of  the bleedings has been explained. Need for the regular blood workup and adjust the dose has been explained. Need for proper follow-up on anticoagulation also has been explained.     5. Hyperlipidemia: Recent lipid panel 5/26/2020 revealed , HDL 50, LDL 59 and triglycerides 95.  Continue statin.    Risk of the hyperlipidemia, importance of the treatment has been explained. Pros and cons of the statins has been explained. Regular blood workup as well as side effects including the liver failure, myelopathy death has been explained.    6. shortness of breath: with exertion    7. Fatigue: all the time     8. Abnormal ecg: will do a stress  .     No diagnosis found.    There are no diagnoses linked to this encounter.    COUNSELING:Done    David Comer Sr.Counseling was given to patient for the following topics: diagnostic results, risk factor reductions, impressions, risks and benefits of treatment options and importance of treatment compliance .       SMOKING COUNSELING: denies  Counseling given: Not Answered    Tsh, echo, and lexiscan     Sincerely,   ALVA Saunders  [facility]  05/26/21   08:59 EDT    EMR Dragon/Transcription disclaimer:   Much of this encounter note is an electronic transcription/translation of spoken language to printed text. The electronic translation  of spoken language may permit erroneous, or at times, nonsensical words or phrases to be inadvertently transcribed; Although I have reviewed the note for such errors, some may still exist.

## 2021-05-28 ENCOUNTER — TELEPHONE (OUTPATIENT)
Dept: CARDIOLOGY | Facility: CLINIC | Age: 81
End: 2021-05-28

## 2021-05-28 NOTE — TELEPHONE ENCOUNTER
----- Message from ALVA Saunders sent at 5/27/2021  3:34 PM EDT -----  I sent him know TSH was within normal limits.  Thank you Naveed

## 2021-06-16 ENCOUNTER — HOSPITAL ENCOUNTER (OUTPATIENT)
Dept: CARDIOLOGY | Facility: HOSPITAL | Age: 81
Discharge: HOME | End: 2021-06-16

## 2021-06-16 ENCOUNTER — HOSPITAL ENCOUNTER (OUTPATIENT)
Dept: CARDIOLOGY | Facility: HOSPITAL | Age: 81
Discharge: HOME | End: 2021-06-16
Admitting: NURSE PRACTITIONER

## 2021-06-16 VITALS
SYSTOLIC BLOOD PRESSURE: 132 MMHG | DIASTOLIC BLOOD PRESSURE: 82 MMHG | WEIGHT: 233.69 LBS | HEIGHT: 72 IN | HEART RATE: 63 BPM | BODY MASS INDEX: 31.65 KG/M2

## 2021-06-16 VITALS
HEIGHT: 72 IN | DIASTOLIC BLOOD PRESSURE: 80 MMHG | BODY MASS INDEX: 31.15 KG/M2 | OXYGEN SATURATION: 90 % | RESPIRATION RATE: 18 BRPM | HEART RATE: 67 BPM | WEIGHT: 230 LBS | SYSTOLIC BLOOD PRESSURE: 128 MMHG

## 2021-06-16 LAB
AORTIC ARCH: 2.6 CM
AORTIC DIMENSIONLESS INDEX: 0.7 (DI)
ASCENDING AORTA: 3.6 CM
BH CV ECHO MEAS - ACS: 1.5 CM
BH CV ECHO MEAS - AO ACC TIME: 0.11 SEC
BH CV ECHO MEAS - AO MAX PG (FULL): 4.8 MMHG
BH CV ECHO MEAS - AO MAX PG: 9.8 MMHG
BH CV ECHO MEAS - AO MEAN PG (FULL): 2.8 MMHG
BH CV ECHO MEAS - AO MEAN PG: 5 MMHG
BH CV ECHO MEAS - AO ROOT AREA (BSA CORRECTED): 1.6
BH CV ECHO MEAS - AO ROOT AREA: 10.2 CM^2
BH CV ECHO MEAS - AO ROOT DIAM: 3.6 CM
BH CV ECHO MEAS - AO V2 MAX: 156.7 CM/SEC
BH CV ECHO MEAS - AO V2 MEAN: 104.6 CM/SEC
BH CV ECHO MEAS - AO V2 VTI: 43.5 CM
BH CV ECHO MEAS - ASC AORTA: 3.6 CM
BH CV ECHO MEAS - AVA(I,A): 2.3 CM^2
BH CV ECHO MEAS - AVA(I,D): 2.3 CM^2
BH CV ECHO MEAS - AVA(V,A): 2.3 CM^2
BH CV ECHO MEAS - AVA(V,D): 2.3 CM^2
BH CV ECHO MEAS - BSA(HAYCOCK): 2.3 M^2
BH CV ECHO MEAS - BSA: 2.3 M^2
BH CV ECHO MEAS - BZI_BMI: 32 KILOGRAMS/M^2
BH CV ECHO MEAS - BZI_METRIC_HEIGHT: 182 CM
BH CV ECHO MEAS - BZI_METRIC_WEIGHT: 106 KG
BH CV ECHO MEAS - CONTRAST EF (2CH): 63.9 CM2
BH CV ECHO MEAS - CONTRAST EF 4CH: 63.6 CM2
BH CV ECHO MEAS - EDV(CUBED): 216 ML
BH CV ECHO MEAS - EDV(MOD-SP2): 119 ML
BH CV ECHO MEAS - EDV(MOD-SP4): 121 ML
BH CV ECHO MEAS - EDV(TEICH): 180 ML
BH CV ECHO MEAS - EF(CUBED): 84.6 %
BH CV ECHO MEAS - EF(MOD-BP): 64.1 %
BH CV ECHO MEAS - EF(TEICH): 77 %
BH CV ECHO MEAS - ESV(CUBED): 33.2 ML
BH CV ECHO MEAS - ESV(MOD-SP2): 43 ML
BH CV ECHO MEAS - ESV(MOD-SP4): 44 ML
BH CV ECHO MEAS - ESV(TEICH): 41.4 ML
BH CV ECHO MEAS - FS: 46.4 %
BH CV ECHO MEAS - IVS/LVPW: 1.1
BH CV ECHO MEAS - IVSD: 1.4 CM
BH CV ECHO MEAS - LAT PEAK E' VEL: 9.1 CM/SEC
BH CV ECHO MEAS - LV DIASTOLIC VOL/BSA (35-75): 53.3 ML/M^2
BH CV ECHO MEAS - LV MASS(C)D: 368.7 GRAMS
BH CV ECHO MEAS - LV MASS(C)DI: 162.6 GRAMS/M^2
BH CV ECHO MEAS - LV MAX PG: 5 MMHG
BH CV ECHO MEAS - LV MEAN PG: 2.2 MMHG
BH CV ECHO MEAS - LV SYSTOLIC VOL/BSA (12-30): 19.4 ML/M^2
BH CV ECHO MEAS - LV V1 MAX: 111.6 CM/SEC
BH CV ECHO MEAS - LV V1 MEAN: 64 CM/SEC
BH CV ECHO MEAS - LV V1 VTI: 30.8 CM
BH CV ECHO MEAS - LVIDD: 6 CM
BH CV ECHO MEAS - LVIDS: 3.2 CM
BH CV ECHO MEAS - LVLD AP2: 7.8 CM
BH CV ECHO MEAS - LVLD AP4: 8 CM
BH CV ECHO MEAS - LVLS AP2: 6 CM
BH CV ECHO MEAS - LVLS AP4: 5.9 CM
BH CV ECHO MEAS - LVOT AREA (M): 3.1 CM^2
BH CV ECHO MEAS - LVOT AREA: 3.3 CM^2
BH CV ECHO MEAS - LVOT DIAM: 2 CM
BH CV ECHO MEAS - LVPWD: 1.3 CM
BH CV ECHO MEAS - MED PEAK E' VEL: 4.5 CM/SEC
BH CV ECHO MEAS - MR MAX PG: 70.3 MMHG
BH CV ECHO MEAS - MR MAX VEL: 419.1 CM/SEC
BH CV ECHO MEAS - MV A DUR: 0.16 SEC
BH CV ECHO MEAS - MV A MAX VEL: 78.2 CM/SEC
BH CV ECHO MEAS - MV DEC SLOPE: 224.7 CM/SEC^2
BH CV ECHO MEAS - MV DEC TIME: 227 SEC
BH CV ECHO MEAS - MV E MAX VEL: 59 CM/SEC
BH CV ECHO MEAS - MV E/A: 0.75
BH CV ECHO MEAS - MV MAX PG: 2.9 MMHG
BH CV ECHO MEAS - MV MEAN PG: 1.1 MMHG
BH CV ECHO MEAS - MV P1/2T MAX VEL: 68.4 CM/SEC
BH CV ECHO MEAS - MV P1/2T: 89.2 MSEC
BH CV ECHO MEAS - MV V2 MAX: 84.7 CM/SEC
BH CV ECHO MEAS - MV V2 MEAN: 48.9 CM/SEC
BH CV ECHO MEAS - MV V2 VTI: 32 CM
BH CV ECHO MEAS - MVA P1/2T LCG: 3.2 CM^2
BH CV ECHO MEAS - MVA(P1/2T): 2.5 CM^2
BH CV ECHO MEAS - MVA(VTI): 3.2 CM^2
BH CV ECHO MEAS - PA ACC TIME: 0.15 SEC
BH CV ECHO MEAS - PA MAX PG (FULL): 2.1 MMHG
BH CV ECHO MEAS - PA MAX PG: 3.6 MMHG
BH CV ECHO MEAS - PA PR(ACCEL): 12.5 MMHG
BH CV ECHO MEAS - PA V2 MAX: 94.6 CM/SEC
BH CV ECHO MEAS - PULM A REVS DUR: 0.13 SEC
BH CV ECHO MEAS - PULM A REVS VEL: 20.2 CM/SEC
BH CV ECHO MEAS - PULM DIAS VEL: 24.3 CM/SEC
BH CV ECHO MEAS - PULM S/D: 1.7
BH CV ECHO MEAS - PULM SYS VEL: 41.8 CM/SEC
BH CV ECHO MEAS - PVA(V,A): 2.2 CM^2
BH CV ECHO MEAS - PVA(V,D): 2.2 CM^2
BH CV ECHO MEAS - QP/QS: 0.58
BH CV ECHO MEAS - RAP SYSTOLE: 8 MMHG
BH CV ECHO MEAS - RV MAX PG: 1.5 MMHG
BH CV ECHO MEAS - RV MEAN PG: 0.89 MMHG
BH CV ECHO MEAS - RV V1 MAX: 61.6 CM/SEC
BH CV ECHO MEAS - RV V1 MEAN: 43.2 CM/SEC
BH CV ECHO MEAS - RV V1 VTI: 17.6 CM
BH CV ECHO MEAS - RVOT AREA: 3.3 CM^2
BH CV ECHO MEAS - RVOT DIAM: 2.1 CM
BH CV ECHO MEAS - RVSP: 36.1 MMHG
BH CV ECHO MEAS - SI(AO): 196.2 ML/M^2
BH CV ECHO MEAS - SI(CUBED): 80.6 ML/M^2
BH CV ECHO MEAS - SI(LVOT): 44.7 ML/M^2
BH CV ECHO MEAS - SI(MOD-SP2): 33.5 ML/M^2
BH CV ECHO MEAS - SI(MOD-SP4): 33.9 ML/M^2
BH CV ECHO MEAS - SI(TEICH): 61.1 ML/M^2
BH CV ECHO MEAS - SUP REN AO DIAM: 2.8 CM
BH CV ECHO MEAS - SV(AO): 445.1 ML
BH CV ECHO MEAS - SV(CUBED): 182.7 ML
BH CV ECHO MEAS - SV(LVOT): 101.5 ML
BH CV ECHO MEAS - SV(MOD-SP2): 76 ML
BH CV ECHO MEAS - SV(MOD-SP4): 77 ML
BH CV ECHO MEAS - SV(RVOT): 58.7 ML
BH CV ECHO MEAS - SV(TEICH): 138.6 ML
BH CV ECHO MEAS - TAPSE (>1.6): 2.4 CM
BH CV ECHO MEAS - TR MAX VEL: 265 CM/SEC
BH CV ECHO MEASUREMENTS AVERAGE E/E' RATIO: 8.68
BH CV VAS BP LEFT ARM: NORMAL MMHG
BH CV XLRA - RV BASE: 5.1 CM
BH CV XLRA - RV LENGTH: 7.1 CM
BH CV XLRA - RV MID: 4.5 CM
BH CV XLRA - TDI S': 12.6 CM/SEC
LEFT ATRIUM VOLUME INDEX: 49.3 ML/M2
SINUS: 3.6 CM
STJ: 3.3 CM

## 2021-06-16 PROCEDURE — 78452 HT MUSCLE IMAGE SPECT MULT: CPT | Performed by: INTERNAL MEDICINE

## 2021-06-16 PROCEDURE — 93306 TTE W/DOPPLER COMPLETE: CPT | Performed by: INTERNAL MEDICINE

## 2021-06-16 PROCEDURE — 0 TECHNETIUM SESTAMIBI: Performed by: INTERNAL MEDICINE

## 2021-06-16 PROCEDURE — 25010000002 PERFLUTREN (DEFINITY) 8.476 MG IN SODIUM CHLORIDE (PF) 0.9 % 10 ML INJECTION: Performed by: NURSE PRACTITIONER

## 2021-06-16 PROCEDURE — A9500 TC99M SESTAMIBI: HCPCS | Performed by: INTERNAL MEDICINE

## 2021-06-16 PROCEDURE — 78452 HT MUSCLE IMAGE SPECT MULT: CPT

## 2021-06-16 PROCEDURE — 93306 TTE W/DOPPLER COMPLETE: CPT

## 2021-06-16 PROCEDURE — 93018 CV STRESS TEST I&R ONLY: CPT | Performed by: INTERNAL MEDICINE

## 2021-06-16 PROCEDURE — 25010000002 REGADENOSON 0.4 MG/5ML SOLUTION: Performed by: INTERNAL MEDICINE

## 2021-06-16 PROCEDURE — 93017 CV STRESS TEST TRACING ONLY: CPT

## 2021-06-16 RX ORDER — TERBINAFINE HYDROCHLORIDE 250 MG/1
250 TABLET ORAL DAILY
COMMUNITY
Start: 2021-05-27 | End: 2021-09-17

## 2021-06-16 RX ADMIN — REGADENOSON 0.4 MG: 0.08 INJECTION, SOLUTION INTRAVENOUS at 09:09

## 2021-06-16 RX ADMIN — SODIUM CHLORIDE 2 ML: 9 INJECTION INTRAMUSCULAR; INTRAVENOUS; SUBCUTANEOUS at 08:10

## 2021-06-16 RX ADMIN — TECHNETIUM TC 99M SESTAMIBI 1 DOSE: 1 INJECTION INTRAVENOUS at 07:08

## 2021-06-16 RX ADMIN — TECHNETIUM TC 99M SESTAMIBI 1 DOSE: 1 INJECTION INTRAVENOUS at 09:09

## 2021-06-17 LAB
BH CV REST NUCLEAR ISOTOPE DOSE: 10.9 MCI
BH CV STRESS BP STAGE 1: NORMAL
BH CV STRESS COMMENTS STAGE 1: NORMAL
BH CV STRESS DOSE REGADENOSON STAGE 1: 0.4
BH CV STRESS DURATION MIN STAGE 1: 3
BH CV STRESS DURATION SEC STAGE 1: 6
BH CV STRESS GRADE STAGE 1: 0
BH CV STRESS HR STAGE 1: 81
BH CV STRESS METS STAGE 1: 1.5
BH CV STRESS NUCLEAR ISOTOPE DOSE: 32.1 MCI
BH CV STRESS PROTOCOL 1: NORMAL
BH CV STRESS RECOVERY BP: NORMAL MMHG
BH CV STRESS RECOVERY HR: 64 BPM
BH CV STRESS RECOVERY O2: 93 %
BH CV STRESS SPEED STAGE 1: 0.8
BH CV STRESS STAGE 1: 1
LV EF NUC BP: 60 %
MAXIMAL PREDICTED HEART RATE: 140 BPM
PERCENT MAX PREDICTED HR: 57.86 %
STRESS BASELINE BP: NORMAL MMHG
STRESS BASELINE HR: 67 BPM
STRESS O2 SAT REST: 90 %
STRESS PERCENT HR: 68 %
STRESS POST ESTIMATED WORKLOAD: 1.5 METS
STRESS POST EXERCISE DUR MIN: 3 MIN
STRESS POST EXERCISE DUR SEC: 6 SEC
STRESS POST PEAK BP: NORMAL MMHG
STRESS POST PEAK HR: 81 BPM
STRESS TARGET HR: 119 BPM

## 2021-06-21 RX ORDER — LEVOTHYROXINE SODIUM 112 UG/1
TABLET ORAL
Qty: 90 TABLET | Refills: 0 | Status: SHIPPED | OUTPATIENT
Start: 2021-06-21 | End: 2021-09-17

## 2021-06-23 ENCOUNTER — OFFICE VISIT (OUTPATIENT)
Dept: CARDIOLOGY | Facility: CLINIC | Age: 81
End: 2021-06-23

## 2021-06-23 VITALS
BODY MASS INDEX: 31.97 KG/M2 | DIASTOLIC BLOOD PRESSURE: 98 MMHG | SYSTOLIC BLOOD PRESSURE: 169 MMHG | HEART RATE: 61 BPM | HEIGHT: 72 IN | WEIGHT: 236 LBS

## 2021-06-23 DIAGNOSIS — I25.10 CHRONIC CORONARY ARTERY DISEASE: ICD-10-CM

## 2021-06-23 DIAGNOSIS — I10 ESSENTIAL HYPERTENSION: ICD-10-CM

## 2021-06-23 DIAGNOSIS — R53.83 FATIGUE, UNSPECIFIED TYPE: ICD-10-CM

## 2021-06-23 DIAGNOSIS — R06.02 SOB (SHORTNESS OF BREATH): ICD-10-CM

## 2021-06-23 DIAGNOSIS — R94.39 ABNORMAL NUCLEAR STRESS TEST: Primary | ICD-10-CM

## 2021-06-23 DIAGNOSIS — E78.2 MIXED HYPERLIPIDEMIA: ICD-10-CM

## 2021-06-23 PROCEDURE — 93000 ELECTROCARDIOGRAM COMPLETE: CPT | Performed by: NURSE PRACTITIONER

## 2021-06-23 PROCEDURE — 99215 OFFICE O/P EST HI 40 MIN: CPT | Performed by: NURSE PRACTITIONER

## 2021-06-23 NOTE — PROGRESS NOTES
Subjective:        David Comer Sr. is a 81 y.o. male who here for follow up    Chief Complaint   Patient presents with   • Results     stress/echo        HPI   He is an 81-year-old male, who is current with me.  He has a history to include AAA, CAD, GERD, DJD, hyperlipidemia, hypertension, bradycardia, hypothyroidism, and PAF on Coumadin.  His recent lipid panel on 5/26/2020 revealed , HDL 50, LDL 59, and triglycerides 95.  Holter monitor in 2017 was a normal monitor study.  Cardioversion 2017 was successful.  Cardiac cath at [facility] in 2015 revealed EF 60% and recommend stations considering nonsignificant CAD patient treated medically. Echo on June 16, 2021 revealed EF 64% mild dilatation of the left ventricular cavity, left ventricular wall segments hypokinetic: Apical focal anterior, apical lateral, apical inferior, apical septal, and apex hypokinetic.  Normal left ventricular diastolic function, right ventricular cavity borderline dilated, right atrial cavity borderline dilated, there is no pericardial effusion.  Myocardial perfusion stress test on 6/17/2021 imaging indicated a medium sized infarct located in the lateral wall and inferior wall with mild eddie-infarct ischemia.     Previous CT in 11/2020  CT of chest showed maximum transverse diameter at the mid ascending thoracic aortic remained stable at 4.6.       The following portions of the patient's history were reviewed and updated as appropriate: allergies, current medications, past family history, past medical history, past social history, past surgical history and problem list.    Past Medical History:   Diagnosis Date   • AAA (abdominal aortic aneurysm) without rupture (CMS/HCC)     CT performed on 2/20/17 (this is actually a THORACIC aortic aneurysm)   • Abnormal ECG    • Arthritis    • BPH (benign prostatic hyperplasia)    • Chronic lumbar pain    • Degenerative arthritis of lumbar spine    • Degenerative cervical disc    • Disease of thyroid  gland    • Elevated rheumatoid factor    • Hematuria, gross    • Hypertension    • Hypogonadism in male    • Inguinal hernia    • Medicare annual wellness visit, subsequent    • Melena    • Refused influenza vaccine    • Scoliosis    • Sepsis (CMS/HCC)    • Thoracic aortic aneurysm (CMS/HCC)     Thoracic Aneurysm   • Vertebral compression fracture (CMS/HCC)    • Vitamin D deficiency          reports that he has never smoked. He has never used smokeless tobacco. He reports that he does not drink alcohol and does not use drugs.     Family History   Problem Relation Age of Onset   • Hypertension Father    • Heart attack Father    • Heart attack Brother    • Alcohol abuse Brother    • Hypertension Brother    • Hypertension Paternal Grandmother    • Hypertension Paternal Grandfather    • Anemia Mother    • Arthritis Mother    • Hypertension Mother    • Hypertension Maternal Grandmother    • Heart disease Other         FH in males before age 55       ROS     Review of Systems   Constitutional: No wt loss, fever, +fatigue  Gastrointestinal: No for nausea, abdominal pain  Behavioral/Psych: Negative for insomnia or anxiety  Cardiovascular: + for shortness of breath, no chest pain and palpitations.      Objective:           Physical Exam  Vitals and nursing note reviewed.   Constitutional:       Appearance: He is well-developed.   HENT:      Head: Normocephalic.      Right Ear: External ear normal.      Left Ear: External ear normal.   Neck:      Vascular: No JVD.   Cardiovascular:      Rate and Rhythm: Normal rate and regular rhythm.      Pulses: Intact distal pulses.      Heart sounds: Normal heart sounds. No murmur heard.   No friction rub. No gallop.    Pulmonary:      Effort: Pulmonary effort is normal. No respiratory distress.      Breath sounds: Normal breath sounds. No stridor. No rales.   Abdominal:      General: Bowel sounds are normal. There is no distension.      Palpations: Abdomen is soft.      Tenderness: There  is no abdominal tenderness. There is no guarding.   Musculoskeletal:         General: No tenderness. Normal range of motion.      Cervical back: Normal range of motion.   Skin:     General: Skin is warm.   Neurological:      Mental Status: He is alert and oriented to person, place, and time.      Deep Tendon Reflexes: Reflexes are normal and symmetric.   Psychiatric:         Judgment: Judgment normal.           ECG 12 Lead    Date/Time: 6/23/2021 11:37 AM  Performed by: Angeles Delvalle APRN  Authorized by: Angeles Delvalle APRN   Comparison: compared with previous ECG from 5/26/2021  Similar to previous ECG  Rhythm: sinus rhythm  BPM: 61  Conduction: non-specific intraventricular conduction delay    Clinical impression: abnormal EKG             Interpretation Summary    · Saline test results are negative.  · The left ventricular cavity is mildly dilated.  · The following left ventricular wall segments are hypokinetic: apical anterior, apical lateral, apical inferior, apical septal and apex hypokinetic.  · Calculated left ventricular EF = 64.1% Estimated left ventricular EF was in agreement with the calculated left ventricular EF.  · Left ventricular diastolic function was normal.  · The right ventricular cavity is borderline dilated.  · The right atrial cavity is borderline dilated.  · There is no evidence of pericardial effusion. .            Interpretation Summary       · Findings consistent with a normal ECG stress test.  · Left ventricular ejection fraction is normal. (Calculated EF = 60%).  · Myocardial perfusion imaging indicates a medium-sized infarct located in the lateral wall and inferior wall with mild eddie-infarct ischemia.  · Impressions are consistent with an intermediate risk study.     Asymptomatic for chest pain. ECG is negative for ischemia.   Ectopy: rare PVC in recovery.  B/P: Appropriate for Beta-blocker therapy, Baseline 128/80, Peak (post Lexiscan) 120/74  Recovery:  114//78  Pharmacologic study due to Beta-blocker therapy. Able to participate in low level exercise and tolerance is good      Supervised by:  Angeles CALLE.             2017  Interpretation Summary     · Post cardioversion the patient displayed a sinus rhythm.  · The cardioversion was successful.         Interpretation Summary     · A normal monitor study.  · NSR WITH RARE PAC AND RARE PVC          IMPRESSION OF HEART CATHETERIZATION:    1. Normal left main coronary artery.  2. Normal left anterior descending artery and its branches.  3. Normal left circumflex artery and its branches.  4. Normal right coronary artery proximal spasm relieved with sublingual   nitroglycerin  5. Normal left ventricular systolic function.  LVEDP 10 mmHg.  Ejection   fraction 60%.    RECOMMENDATION:  Considering nonsignificant coronary artery disease   patient be treated medically.      CT of chest on 5/1/2019  CONCLUSION:  1. Stable dilatation of the ascending thoracic aorta measuring 4.5 cm.  2. Hiatal hernia.  3. Within the left upper lobe there is a reticular nodular infiltrate as  well as an infrahilar area of peribronchial interstitial thickening  (bronchitis) and associated minimal infiltrate.  4. Gallstone and left renal calculus.      Current Outpatient Medications:   •  acetaminophen (TYLENOL) 325 MG tablet, Take 2 tablets by mouth Every 4 (Four) Hours As Needed for Mild Pain ., Disp:  , Rfl:   •  amiodarone (PACERONE) 200 MG tablet, Take 0.5 tablets by mouth Daily., Disp: 90 tablet, Rfl: 3  •  aspirin 81 MG tablet, Take 81 mg by mouth Daily., Disp: , Rfl:   •  Bystolic 10 MG tablet, Take 1/2 (one-half) tablet by mouth once daily, Disp: 30 tablet, Rfl: 3  •  doxazosin (CARDURA) 2 MG tablet, Take 1 tablet by mouth once daily, Disp: 90 tablet, Rfl: 0  •  esomeprazole (NEXIUM) 40 MG capsule, Take 20 mg by mouth Every Morning Before Breakfast., Disp: , Rfl:   •  Euthyrox 112 MCG tablet, Take 1 tablet by mouth once  daily, Disp: 90 tablet, Rfl: 0  •  hydrALAZINE (APRESOLINE) 25 MG tablet, Take 25 mg by mouth 3 (Three) Times a Day., Disp: , Rfl:   •  isosorbide mononitrate (IMDUR) 30 MG 24 hr tablet, Take 1 tablet by mouth once daily, Disp: 90 tablet, Rfl: 0  •  multivitamin with minerals (MULTIVITAMIN ADULT PO), Take 1 tablet by mouth Daily., Disp: , Rfl:   •  nitroglycerin (NITROSTAT) 0.4 MG SL tablet, Place 0.4 mg under the tongue Every 5 (Five) Minutes As Needed. Doesn't take, Disp: , Rfl:   •  olmesartan-hydrochlorothiazide (BENICAR HCT) 40-25 MG per tablet, Take 1 tablet by mouth Daily., Disp: 90 tablet, Rfl: 1  •  rosuvastatin (CRESTOR) 5 MG tablet, Take 1 tablet by mouth once daily, Disp: 90 tablet, Rfl: 1  •  terbinafine (lamiSIL) 250 MG tablet, Take 250 mg by mouth Daily., Disp: , Rfl:   •  warfarin (COUMADIN) 7.5 MG tablet, Take 1 tablet by mouth Every Night., Disp: 90 tablet, Rfl: 0     Assessment:        Patient Active Problem List   Diagnosis   • Chronic coronary artery disease   • Abdominal aortic aneurysm (CMS/HCC)   • Paroxysmal atrial fibrillation (CMS/HCC)   • Gastroesophageal reflux disease   • Essential hypertension   • Mixed hyperlipidemia   • Hypogonadism in male   • Acquired hypothyroidism   • Osteoarthritis of spine   • Vitamin D deficiency   • Anticoagulated   • Atrial fibrillation, rapid (CMS/HCC)   • Syncope and collapse   • Chest pain   • Statin intolerance   • Gross hematuria   • Coumadin toxicity   • Sepsis (CMS/HCC)   • H/O amiodarone therapy   • Dehydration   • Renal insufficiency   • Thoracic aortic aneurysm without rupture (CMS/HCC)   • Refused influenza vaccine   • Medicare annual wellness visit, subsequent   • BPH without obstruction/lower urinary tract symptoms   • Thoracic aortic aneurysm (CMS/HCC)   • Kidney stone   • Hand injury   • Acute kidney injury (CMS/HCC)   • 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  Follow-up for results: Most recent stress test was abnormal.  Have a cardiac cath.  Risk and benefits have been discussed.    Diagnostic heart catheterization/PCI/stent procedure risks (including but not limited to: allergy,arrhythmia,bleeding,CVA,MI,renal dysfunction,emergent CABG and death) and options have been explained to patient/spouse/family/POA. All questions were answered. Patient/He/She/They voices understanding and agree to proceed with treatment plan.    2.  Hypertension: He states his blood pressures controlled at home.  Continue current management.      Educated patient on exercising for at least 30 minutes a day for 2 to 3 days a week. Importance of controlling hypertension and blood pressure checkup on the regular basis has been explained. Hypertension as a silent killer has been discussed. Risk reduction of the weight and regular exercises to control the hypertension has been explained.     3.  Thoracic aortic aneurysm without rupture: Previous CT   in 2020 stated stable dilation of the ascending thoracic aortic measuring 4.6 cm similar to previous CT.    4.  Amiodarone therapy: Yes chest x-ray revealed no acute findings.  TSH WNL.       David L Age Sr. IS ON AMIODARONE,   Significant side effects associated with this medication has been explained. Pros and cons of the medications has been discussed, decision has been to continue the medication   6 months to yearly checkup for eye examination, thyroid function test, chest x-ray and liver function test has been advised. Patient understands well.    5.  Paroxysmal atrial fibrillation: Anticoagulated with Coumadin.  Sinus rhythm on EKG with heart rate of 61.  .    Pros and cons as well as indication of the anticoagulation has been explained to the patient in detail. There are no obvious complications at this stage. Risk of  the bleedings has been explained. Need for the regular blood workup and adjust the dose has been  explained. Need for proper follow-up on anticoagulation also has been explained.     6.  Hyperlipidemia: Recent lipid panel on 5/26/2020 revealed , HDL 50, LDL 59, and triglycerides 95.  Continue statin.           Risk of the hyperlipidemia, importance of the treatment has been explained. Pros and cons of the statins has been explained. Regular blood workup as well as side effects including the liver failure, myelopathy death has been explained.    7. shortness of breath: with exertion    8. Fatigue: all the time        No diagnosis found.    There are no diagnoses linked to this encounter.    COUNSELING:Done    David Comer Sr.Counseling was given to patient for the following topics: diagnostic results, risk factor reductions, impressions, risks and benefits of treatment options and importance of treatment compliance .       SMOKING COUNSELING: denies  Counseling given: Not Answered    He will undergo a cardiac cath.      Sincerely,   ALVA Saunders  [facility]  06/23/21   11:25 EDT    EMR Dragon/Transcription disclaimer:   Much of this encounter note is an electronic transcription/translation of spoken language to printed text. The electronic translation of spoken language may permit erroneous, or at times, nonsensical words or phrases to be inadvertently transcribed; Although I have reviewed the note for such errors, some may still exist.

## 2021-06-24 RX ORDER — ROSUVASTATIN CALCIUM 5 MG/1
TABLET, COATED ORAL
Qty: 90 TABLET | Refills: 0 | Status: SHIPPED | OUTPATIENT
Start: 2021-06-24 | End: 2021-09-27

## 2021-06-30 ENCOUNTER — HOSPITAL ENCOUNTER (OUTPATIENT)
Dept: CARDIOLOGY | Facility: HOSPITAL | Age: 81
Discharge: HOME | End: 2021-06-30
Admitting: NURSE PRACTITIONER

## 2021-06-30 DIAGNOSIS — R94.39 ABNORMAL NUCLEAR STRESS TEST: ICD-10-CM

## 2021-06-30 DIAGNOSIS — R53.83 FATIGUE, UNSPECIFIED TYPE: ICD-10-CM

## 2021-06-30 DIAGNOSIS — I25.10 CHRONIC CORONARY ARTERY DISEASE: ICD-10-CM

## 2021-06-30 DIAGNOSIS — R06.02 SOB (SHORTNESS OF BREATH): ICD-10-CM

## 2021-06-30 DIAGNOSIS — I10 ESSENTIAL HYPERTENSION: ICD-10-CM

## 2021-06-30 DIAGNOSIS — E78.2 MIXED HYPERLIPIDEMIA: ICD-10-CM

## 2021-06-30 LAB
ANION GAP SERPL CALCULATED.3IONS-SCNC: 12.3 MMOL/L (ref 5–15)
APTT PPP: 33 SECONDS (ref 22.7–35.4)
BASOPHILS # BLD AUTO: 0.04 10*3/MM3 (ref 0–0.2)
BASOPHILS NFR BLD AUTO: 0.6 % (ref 0–1.5)
BUN SERPL-MCNC: 25 MG/DL (ref 8–23)
BUN/CREAT SERPL: 24 (ref 7–25)
CALCIUM SPEC-SCNC: 9.3 MG/DL (ref 8.6–10.5)
CHLORIDE SERPL-SCNC: 104 MMOL/L (ref 98–107)
CO2 SERPL-SCNC: 22.7 MMOL/L (ref 22–29)
CREAT SERPL-MCNC: 1.04 MG/DL (ref 0.76–1.27)
DEPRECATED RDW RBC AUTO: 41 FL (ref 37–54)
EOSINOPHIL # BLD AUTO: 0.15 10*3/MM3 (ref 0–0.4)
EOSINOPHIL NFR BLD AUTO: 2.2 % (ref 0.3–6.2)
ERYTHROCYTE [DISTWIDTH] IN BLOOD BY AUTOMATED COUNT: 11.6 % (ref 12.3–15.4)
GFR SERPL CREATININE-BSD FRML MDRD: 69 ML/MIN/1.73
GLUCOSE SERPL-MCNC: 86 MG/DL (ref 65–99)
HCT VFR BLD AUTO: 41.8 % (ref 37.5–51)
HGB BLD-MCNC: 14 G/DL (ref 13–17.7)
IMM GRANULOCYTES # BLD AUTO: 0.03 10*3/MM3 (ref 0–0.05)
IMM GRANULOCYTES NFR BLD AUTO: 0.4 % (ref 0–0.5)
INR PPP: 1.19 (ref 0.9–1.1)
LYMPHOCYTES # BLD AUTO: 1.18 10*3/MM3 (ref 0.7–3.1)
LYMPHOCYTES NFR BLD AUTO: 17 % (ref 19.6–45.3)
MCH RBC QN AUTO: 32.4 PG (ref 26.6–33)
MCHC RBC AUTO-ENTMCNC: 33.5 G/DL (ref 31.5–35.7)
MCV RBC AUTO: 96.8 FL (ref 79–97)
MONOCYTES # BLD AUTO: 0.56 10*3/MM3 (ref 0.1–0.9)
MONOCYTES NFR BLD AUTO: 8.1 % (ref 5–12)
NEUTROPHILS NFR BLD AUTO: 4.99 10*3/MM3 (ref 1.7–7)
NEUTROPHILS NFR BLD AUTO: 71.7 % (ref 42.7–76)
NRBC BLD AUTO-RTO: 0.1 /100 WBC (ref 0–0.2)
PLATELET # BLD AUTO: 185 10*3/MM3 (ref 140–450)
PMV BLD AUTO: 12.2 FL (ref 6–12)
POTASSIUM SERPL-SCNC: 4.6 MMOL/L (ref 3.5–5.2)
PROTHROMBIN TIME: 14.9 SECONDS (ref 11.7–14.2)
RBC # BLD AUTO: 4.32 10*6/MM3 (ref 4.14–5.8)
SODIUM SERPL-SCNC: 139 MMOL/L (ref 136–145)
WBC # BLD AUTO: 6.95 10*3/MM3 (ref 3.4–10.8)

## 2021-06-30 PROCEDURE — 80048 BASIC METABOLIC PNL TOTAL CA: CPT | Performed by: NURSE PRACTITIONER

## 2021-06-30 PROCEDURE — 85610 PROTHROMBIN TIME: CPT | Performed by: NURSE PRACTITIONER

## 2021-06-30 PROCEDURE — 85025 COMPLETE CBC W/AUTO DIFF WBC: CPT | Performed by: NURSE PRACTITIONER

## 2021-06-30 PROCEDURE — 85730 THROMBOPLASTIN TIME PARTIAL: CPT | Performed by: NURSE PRACTITIONER

## 2021-06-30 PROCEDURE — 36415 COLL VENOUS BLD VENIPUNCTURE: CPT | Performed by: INTERNAL MEDICINE

## 2021-07-02 ENCOUNTER — HOSPITAL ENCOUNTER (OUTPATIENT)
Facility: HOSPITAL | Age: 81
Setting detail: HOSPITAL OUTPATIENT SURGERY
Discharge: HOME | End: 2021-07-02
Attending: INTERNAL MEDICINE | Admitting: NURSE PRACTITIONER

## 2021-07-02 VITALS
TEMPERATURE: 98 F | WEIGHT: 230 LBS | SYSTOLIC BLOOD PRESSURE: 136 MMHG | OXYGEN SATURATION: 97 % | HEART RATE: 51 BPM | BODY MASS INDEX: 32.2 KG/M2 | RESPIRATION RATE: 18 BRPM | DIASTOLIC BLOOD PRESSURE: 89 MMHG | HEIGHT: 71 IN

## 2021-07-02 DIAGNOSIS — I25.10 CHRONIC CORONARY ARTERY DISEASE: ICD-10-CM

## 2021-07-02 DIAGNOSIS — I10 ESSENTIAL HYPERTENSION: ICD-10-CM

## 2021-07-02 DIAGNOSIS — R06.02 SOB (SHORTNESS OF BREATH): ICD-10-CM

## 2021-07-02 DIAGNOSIS — R94.39 ABNORMAL NUCLEAR STRESS TEST: ICD-10-CM

## 2021-07-02 DIAGNOSIS — E78.2 MIXED HYPERLIPIDEMIA: ICD-10-CM

## 2021-07-02 DIAGNOSIS — R53.83 FATIGUE, UNSPECIFIED TYPE: ICD-10-CM

## 2021-07-02 LAB
INR PPP: 1 (ref 0.8–1.2)
INR PPP: 1 (ref 0.9–1.1)
PROTHROMBIN TIME: 11.7 SECONDS
PROTHROMBIN TIME: 11.7 SECONDS (ref 12.8–15.2)

## 2021-07-02 PROCEDURE — 93458 L HRT ARTERY/VENTRICLE ANGIO: CPT | Performed by: INTERNAL MEDICINE

## 2021-07-02 PROCEDURE — C1760 CLOSURE DEV, VASC: HCPCS | Performed by: INTERNAL MEDICINE

## 2021-07-02 PROCEDURE — 0 IOPAMIDOL PER 1 ML: Performed by: INTERNAL MEDICINE

## 2021-07-02 PROCEDURE — C1894 INTRO/SHEATH, NON-LASER: HCPCS | Performed by: INTERNAL MEDICINE

## 2021-07-02 PROCEDURE — 99153 MOD SED SAME PHYS/QHP EA: CPT | Performed by: INTERNAL MEDICINE

## 2021-07-02 PROCEDURE — 99152 MOD SED SAME PHYS/QHP 5/>YRS: CPT | Performed by: INTERNAL MEDICINE

## 2021-07-02 PROCEDURE — 25010000002 FENTANYL CITRATE (PF) 50 MCG/ML SOLUTION: Performed by: INTERNAL MEDICINE

## 2021-07-02 PROCEDURE — C1769 GUIDE WIRE: HCPCS | Performed by: INTERNAL MEDICINE

## 2021-07-02 PROCEDURE — 85610 PROTHROMBIN TIME: CPT

## 2021-07-02 PROCEDURE — 25010000002 HEPARIN (PORCINE) PER 1000 UNITS: Performed by: INTERNAL MEDICINE

## 2021-07-02 PROCEDURE — 25010000002 MIDAZOLAM PER 1 MG: Performed by: INTERNAL MEDICINE

## 2021-07-02 RX ORDER — MIDAZOLAM HYDROCHLORIDE 1 MG/ML
INJECTION INTRAMUSCULAR; INTRAVENOUS AS NEEDED
Status: DISCONTINUED | OUTPATIENT
Start: 2021-07-02 | End: 2021-07-02 | Stop reason: HOSPADM

## 2021-07-02 RX ORDER — SODIUM CHLORIDE 9 MG/ML
75 INJECTION, SOLUTION INTRAVENOUS CONTINUOUS
Status: DISCONTINUED | OUTPATIENT
Start: 2021-07-02 | End: 2021-07-02 | Stop reason: HOSPADM

## 2021-07-02 RX ORDER — SODIUM CHLORIDE 9 MG/ML
250 INJECTION, SOLUTION INTRAVENOUS ONCE AS NEEDED
Status: DISCONTINUED | OUTPATIENT
Start: 2021-07-02 | End: 2021-07-02 | Stop reason: HOSPADM

## 2021-07-02 RX ORDER — SODIUM CHLORIDE 0.9 % (FLUSH) 0.9 %
10 SYRINGE (ML) INJECTION AS NEEDED
Status: DISCONTINUED | OUTPATIENT
Start: 2021-07-02 | End: 2021-07-02 | Stop reason: HOSPADM

## 2021-07-02 RX ORDER — SODIUM CHLORIDE 0.9 % (FLUSH) 0.9 %
3 SYRINGE (ML) INJECTION EVERY 12 HOURS SCHEDULED
Status: DISCONTINUED | OUTPATIENT
Start: 2021-07-02 | End: 2021-07-02 | Stop reason: HOSPADM

## 2021-07-02 RX ORDER — LIDOCAINE HYDROCHLORIDE 20 MG/ML
INJECTION, SOLUTION INFILTRATION; PERINEURAL AS NEEDED
Status: DISCONTINUED | OUTPATIENT
Start: 2021-07-02 | End: 2021-07-02 | Stop reason: HOSPADM

## 2021-07-02 RX ORDER — FENTANYL CITRATE 50 UG/ML
INJECTION, SOLUTION INTRAMUSCULAR; INTRAVENOUS AS NEEDED
Status: DISCONTINUED | OUTPATIENT
Start: 2021-07-02 | End: 2021-07-02 | Stop reason: HOSPADM

## 2021-07-02 RX ORDER — ACETAMINOPHEN 325 MG/1
650 TABLET ORAL EVERY 4 HOURS PRN
Status: CANCELLED | OUTPATIENT
Start: 2021-07-02

## 2021-07-02 RX ADMIN — SODIUM CHLORIDE 75 ML/HR: 9 INJECTION, SOLUTION INTRAVENOUS at 11:08

## 2021-07-02 NOTE — DISCHARGE INSTRUCTIONS
[facility]  [address] [place] [address]      Coronary Angiogram (Femoral Approach) After Care     Refer to this sheet in the next few weeks. These instructions provide you with information on caring for yourself after your procedure. Your health care provider may also give you more specific instructions. Your treatment has been planned according to current medical practices, but problems sometimes occur. Call your health care provider if you have any problems or questions after your procedure.      What to Expect After the Procedure:  After your procedure, it is typical to have the following sensations:  · Minor discomfort or tenderness and a small bump at the catheter insertion site. The bump should usually decrease in size and tenderness within 1 to 2 weeks.  · Any bruising will usually fade within 2 to 4 weeks.  Home Care Instructions:  · Do not apply powder or lotion to the site.  · Do not take baths, swim, or use a hot tub until your health care provider approves and the site is completely healed.  · Do not bend, squat, or lift anything over 20 lb (9 kg) or as directed by your health care provider. However, we recommend lifting nothing heavier than a gallon of milk.    · You may shower 24 hours after the procedure. Remove the bandage (dressing) and gently wash the site with plain soap and water. Gently pat the site dry. You may apply a band aid daily for 2 days if desired.    · Inspect the site at least twice daily.  · Increase your fluid intake for the next 2 days.    · Limit your activity for the first 48 hours. .    · Avoid strenuous activity for 1 week or as advised by your physician.    · Follow instructions about when you can drive or return to work as directed by your physician.    · Hold direct pressure over the site when you cough, sneeze, laugh or change positions.  Do this for the next 2 days.    · Do not operate machinery or power  tools for 24 hours.  · A responsible adult should be with you for the first 24 hours after you arrive home. Do not make any important legal decisions or sign legal papers for 24 hours.  Do not drink alcohol for 24 hours.  · Metformin or any medications containing Metformin should not be taken for 48 hours after your procedure.    Call Your Doctor If:  · You have drainage (other than a small amount of blood on the dressing).  · You have chills or a fever > 101.  · You have redness, warmth, swelling(larger than a walnut), or pain at the insertion site  · .You have heavy bleeding from the site. If this happens, hold pressure on the site and call 911.  · You develop chest pain or shortness of breath, feel faint, or pass out.  · You develop pain, discoloration, coldness, numbness, tingling, or severe bruising in the leg that held the catheter.  · You develop bleeding from any other place, such as the bowels.    Make Sure You:  · Understand these instructions.  · Will watch your condition.  · Will get help right away if you are not doing well or get worse.

## 2021-07-06 ENCOUNTER — OFFICE VISIT (OUTPATIENT)
Dept: FAMILY MEDICINE CLINIC | Facility: CLINIC | Age: 81
End: 2021-07-06

## 2021-07-06 DIAGNOSIS — E55.9 VITAMIN D DEFICIENCY: ICD-10-CM

## 2021-07-06 DIAGNOSIS — E03.9 ACQUIRED HYPOTHYROIDISM: ICD-10-CM

## 2021-07-06 DIAGNOSIS — I10 ESSENTIAL HYPERTENSION: Primary | ICD-10-CM

## 2021-07-06 DIAGNOSIS — E78.2 MIXED HYPERLIPIDEMIA: ICD-10-CM

## 2021-07-06 PROCEDURE — 99214 OFFICE O/P EST MOD 30 MIN: CPT | Performed by: FAMILY MEDICINE

## 2021-07-06 NOTE — PROGRESS NOTES
CC:  HTN, HLD    Subjective   David L Age Sr. is a 81 y.o. male.     History of Present Illness   The patient presents today for follow-up via a video visit due to the COVID-19 pandemic for  HTN- no CP, HA or blurred vision  CAD-recently saw cardio and was told all was good when they checked for blockage.    HLD- on med currently.  Needs labs soon.    hypothyrroidism- stable and had labs recently.   Vit d deficiency- stable    The following portions of the patient's history were reviewed and updated as appropriate: allergies, current medications, past family history, past medical history, past social history, past surgical history and problem list.    Review of Systems    Patient Active Problem List   Diagnosis   • Chronic coronary artery disease   • Abdominal aortic aneurysm (CMS/HCC)   • Paroxysmal atrial fibrillation (CMS/HCC)   • Gastroesophageal reflux disease   • Essential hypertension   • Mixed hyperlipidemia   • Hypogonadism in male   • Acquired hypothyroidism   • Osteoarthritis of spine   • Vitamin D deficiency   • Anticoagulated   • Atrial fibrillation, rapid (CMS/HCC)   • Syncope and collapse   • Chest pain   • Statin intolerance   • Gross hematuria   • Coumadin toxicity   • Sepsis (CMS/HCC)   • H/O amiodarone therapy   • Dehydration   • Renal insufficiency   • Thoracic aortic aneurysm without rupture (CMS/HCC)   • Refused influenza vaccine   • Medicare annual wellness visit, subsequent   • BPH without obstruction/lower urinary tract symptoms   • Thoracic aortic aneurysm (CMS/HCC)   • Kidney stone   • Hand injury   • Acute kidney injury (CMS/HCC)   • 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)   • Traumatic hematoma of right knee   • Arthritis   • SOB (shortness of breath)   • Fatigue   • Abnormal nuclear stress test       No Known Allergies      Current Outpatient Medications:   •  acetaminophen (TYLENOL) 325 MG tablet, Take 2 tablets by mouth Every 4 (Four) Hours As Needed for  Mild Pain ., Disp:  , Rfl:   •  amiodarone (PACERONE) 200 MG tablet, Take 0.5 tablets by mouth Daily., Disp: 90 tablet, Rfl: 3  •  aspirin 81 MG tablet, Take 81 mg by mouth Daily., Disp: , Rfl:   •  Bystolic 10 MG tablet, Take 1/2 (one-half) tablet by mouth once daily, Disp: 30 tablet, Rfl: 3  •  doxazosin (CARDURA) 2 MG tablet, Take 1 tablet by mouth once daily, Disp: 90 tablet, Rfl: 0  •  esomeprazole (NEXIUM) 40 MG capsule, Take 20 mg by mouth Every Morning Before Breakfast., Disp: , Rfl:   •  Euthyrox 112 MCG tablet, Take 1 tablet by mouth once daily, Disp: 90 tablet, Rfl: 0  •  hydrALAZINE (APRESOLINE) 25 MG tablet, Take 25 mg by mouth 3 (Three) Times a Day., Disp: , Rfl:   •  isosorbide mononitrate (IMDUR) 30 MG 24 hr tablet, Take 1 tablet by mouth once daily, Disp: 90 tablet, Rfl: 0  •  multivitamin with minerals (MULTIVITAMIN ADULT PO), Take 1 tablet by mouth Daily., Disp: , Rfl:   •  nitroglycerin (NITROSTAT) 0.4 MG SL tablet, Place 0.4 mg under the tongue Every 5 (Five) Minutes As Needed. Doesn't take, Disp: , Rfl:   •  olmesartan-hydrochlorothiazide (BENICAR HCT) 40-25 MG per tablet, Take 1 tablet by mouth Daily., Disp: 90 tablet, Rfl: 1  •  rosuvastatin (CRESTOR) 5 MG tablet, Take 1 tablet by mouth once daily, Disp: 90 tablet, Rfl: 0  •  terbinafine (lamiSIL) 250 MG tablet, Take 250 mg by mouth Daily., Disp: , Rfl:   •  warfarin (COUMADIN) 7.5 MG tablet, Take 1 tablet by mouth Every Night., Disp: 90 tablet, Rfl: 0    Past Medical History:   Diagnosis Date   • AAA (abdominal aortic aneurysm) without rupture (CMS/HCC)     CT performed on 2/20/17 (this is actually a THORACIC aortic aneurysm)   • Abnormal ECG    • Arthritis    • BPH (benign prostatic hyperplasia)    • Chronic lumbar pain    • Degenerative arthritis of lumbar spine    • Degenerative cervical disc    • Disease of thyroid gland    • Elevated rheumatoid factor    • Hematuria, gross    • Hypertension    • Hypogonadism in male    • Inguinal hernia     • Medicare annual wellness visit, subsequent    • Melena    • Refused influenza vaccine    • Scoliosis    • Sepsis (CMS/HCC)    • Thoracic aortic aneurysm (CMS/HCC)     Thoracic Aneurysm   • Vertebral compression fracture (CMS/HCC)    • Vitamin D deficiency        Past Surgical History:   Procedure Laterality Date   • ANKLE FUSION     • CARDIAC ABLATION     • CARDIAC CATHETERIZATION     • CARDIAC CATHETERIZATION N/A 7/2/2021    Procedure: Left Heart Cath;  Surgeon: Harlan Downing MD;  Location:  [facility];  Service: Cardiology;  Laterality: N/A;   • CARDIAC CATHETERIZATION N/A 7/2/2021    Procedure: Coronary angiography;  Surgeon: Harlan Downing MD;  Location:  [facility];  Service: Cardiology;  Laterality: N/A;   • CARDIAC CATHETERIZATION N/A 7/2/2021    Procedure: Left ventriculography;  Surgeon: Harlan Downing MD;  Location:  [facility];  Service: Cardiology;  Laterality: N/A;   • FOOT SURGERY     • INGUINAL HERNIA REPAIR     • KNEE SURGERY Bilateral    • REPLACEMENT TOTAL KNEE     • SHOULDER SURGERY Bilateral     Rotator cuff x 2   • SKIN BIOPSY     • VASECTOMY         Family History   Problem Relation Age of Onset   • Hypertension Father    • Heart attack Father    • Heart attack Brother    • Alcohol abuse Brother    • Hypertension Brother    • Hypertension Paternal Grandmother    • Hypertension Paternal Grandfather    • Anemia Mother    • Arthritis Mother    • Hypertension Mother    • Hypertension Maternal Grandmother    • Heart disease Other         FH in males before age 55       Social History     Tobacco Use   • Smoking status: Never Smoker   • Smokeless tobacco: Never Used   Substance Use Topics   • Alcohol use: No            Objective     There were no vitals taken for this visit. Video Visit    Physical Exam  NAD  AAOx3  No labored breathing.    Lab Results   Component Value Date    GLUCOSE 86 06/30/2021    BUN 25 (H)  06/30/2021    CREATININE 1.04 06/30/2021    EGFRIFNONA 69 06/30/2021    EGFRIFAFRI 76 09/24/2019    BCR 24.0 06/30/2021    K 4.6 06/30/2021    CO2 22.7 06/30/2021    CALCIUM 9.3 06/30/2021    ALBUMIN 3.70 11/24/2020    AST 17 11/24/2020    ALT 18 11/24/2020       WBC   Date Value Ref Range Status   06/30/2021 6.95 3.40 - 10.80 10*3/mm3 Final     RBC   Date Value Ref Range Status   06/30/2021 4.32 4.14 - 5.80 10*6/mm3 Final     Hemoglobin   Date Value Ref Range Status   06/30/2021 14.0 13.0 - 17.7 g/dL Final     Hematocrit   Date Value Ref Range Status   06/30/2021 41.8 37.5 - 51.0 % Final     MCV   Date Value Ref Range Status   06/30/2021 96.8 79.0 - 97.0 fL Final     MCH   Date Value Ref Range Status   06/30/2021 32.4 26.6 - 33.0 pg Final     MCHC   Date Value Ref Range Status   06/30/2021 33.5 31.5 - 35.7 g/dL Final     RDW   Date Value Ref Range Status   06/30/2021 11.6 (L) 12.3 - 15.4 % Final     RDW-SD   Date Value Ref Range Status   06/30/2021 41.0 37.0 - 54.0 fl Final     MPV   Date Value Ref Range Status   06/30/2021 12.2 (H) 6.0 - 12.0 fL Final     Platelets   Date Value Ref Range Status   06/30/2021 185 140 - 450 10*3/mm3 Final     Neutrophil %   Date Value Ref Range Status   06/30/2021 71.7 42.7 - 76.0 % Final     Lymphocyte %   Date Value Ref Range Status   06/30/2021 17.0 (L) 19.6 - 45.3 % Final     Monocyte %   Date Value Ref Range Status   06/30/2021 8.1 5.0 - 12.0 % Final     Eosinophil %   Date Value Ref Range Status   06/30/2021 2.2 0.3 - 6.2 % Final     Basophil %   Date Value Ref Range Status   06/30/2021 0.6 0.0 - 1.5 % Final     Immature Grans %   Date Value Ref Range Status   06/30/2021 0.4 0.0 - 0.5 % Final     Neutrophils, Absolute   Date Value Ref Range Status   06/30/2021 4.99 1.70 - 7.00 10*3/mm3 Final     Lymphocytes, Absolute   Date Value Ref Range Status   06/30/2021 1.18 0.70 - 3.10 10*3/mm3 Final     Monocytes, Absolute   Date Value Ref Range Status   06/30/2021 0.56 0.10 - 0.90  10*3/mm3 Final     Eosinophils, Absolute   Date Value Ref Range Status   06/30/2021 0.15 0.00 - 0.40 10*3/mm3 Final     Basophils, Absolute   Date Value Ref Range Status   06/30/2021 0.04 0.00 - 0.20 10*3/mm3 Final     Immature Grans, Absolute   Date Value Ref Range Status   06/30/2021 0.03 0.00 - 0.05 10*3/mm3 Final     nRBC   Date Value Ref Range Status   06/30/2021 0.1 0.0 - 0.2 /100 WBC Final       No results found for: HGBA1C    No results found for: YZQIJJXH64    TSH   Date Value Ref Range Status   05/26/2021 2.500 0.270 - 4.200 uIU/mL Final       Lab Results   Component Value Date    CHOL 128 05/26/2020     Lab Results   Component Value Date    TRIG 95 05/26/2020     Lab Results   Component Value Date    HDL 50 05/26/2020     Lab Results   Component Value Date    LDL 59 05/26/2020     Lab Results   Component Value Date    VLDL 19 05/26/2020     Lab Results   Component Value Date    LDLHDL 1.18 05/26/2020         Procedures    Assessment/Plan   Problems Addressed this Visit     Essential hypertension - Primary    Mixed hyperlipidemia    Relevant Orders    Lipid Panel    Comprehensive Metabolic Panel    Acquired hypothyroidism    Vitamin D deficiency      Diagnoses       Codes Comments    Essential hypertension    -  Primary ICD-10-CM: I10  ICD-9-CM: 401.9     Mixed hyperlipidemia     ICD-10-CM: E78.2  ICD-9-CM: 272.2     Acquired hypothyroidism     ICD-10-CM: E03.9  ICD-9-CM: 244.9     Vitamin D deficiency     ICD-10-CM: E55.9  ICD-9-CM: 268.9           Orders Placed This Encounter   Procedures   • Lipid Panel   • Comprehensive Metabolic Panel     Order Specific Question:   Release to patient     Answer:   Immediate       Current Outpatient Medications   Medication Sig Dispense Refill   • acetaminophen (TYLENOL) 325 MG tablet Take 2 tablets by mouth Every 4 (Four) Hours As Needed for Mild Pain .     • amiodarone (PACERONE) 200 MG tablet Take 0.5 tablets by mouth Daily. 90 tablet 3   • aspirin 81 MG tablet Take  81 mg by mouth Daily.     • Bystolic 10 MG tablet Take 1/2 (one-half) tablet by mouth once daily 30 tablet 3   • doxazosin (CARDURA) 2 MG tablet Take 1 tablet by mouth once daily 90 tablet 0   • esomeprazole (NEXIUM) 40 MG capsule Take 20 mg by mouth Every Morning Before Breakfast.     • Euthyrox 112 MCG tablet Take 1 tablet by mouth once daily 90 tablet 0   • hydrALAZINE (APRESOLINE) 25 MG tablet Take 25 mg by mouth 3 (Three) Times a Day.     • isosorbide mononitrate (IMDUR) 30 MG 24 hr tablet Take 1 tablet by mouth once daily 90 tablet 0   • multivitamin with minerals (MULTIVITAMIN ADULT PO) Take 1 tablet by mouth Daily.     • nitroglycerin (NITROSTAT) 0.4 MG SL tablet Place 0.4 mg under the tongue Every 5 (Five) Minutes As Needed. Doesn't take     • olmesartan-hydrochlorothiazide (BENICAR HCT) 40-25 MG per tablet Take 1 tablet by mouth Daily. 90 tablet 1   • rosuvastatin (CRESTOR) 5 MG tablet Take 1 tablet by mouth once daily 90 tablet 0   • terbinafine (lamiSIL) 250 MG tablet Take 250 mg by mouth Daily.     • warfarin (COUMADIN) 7.5 MG tablet Take 1 tablet by mouth Every Night. 90 tablet 0     No current facility-administered medications for this visit.       Return in about 3 months (around 10/6/2021) for hypertension, hyperlipidema, hypothyroidism.    There are no Patient Instructions on file for this visit.         Time spent on visit:  14   minutes.  This patient has consented to a telehealth visit via phone. The visit was scheduled as a video visit to comply with patient safety concerns in accordance with CDC recommendations.  All vitals recorded within this visit are reported by the patient.      Refills and will rto office for labs.

## 2021-07-07 ENCOUNTER — TELEPHONE (OUTPATIENT)
Dept: CARDIOLOGY | Facility: CLINIC | Age: 81
End: 2021-07-07

## 2021-07-07 RX ORDER — DOXAZOSIN 2 MG/1
TABLET ORAL
Qty: 90 TABLET | Refills: 0 | Status: SHIPPED | OUTPATIENT
Start: 2021-07-07 | End: 2021-10-10

## 2021-07-07 NOTE — TELEPHONE ENCOUNTER
----- Message from ALVA Saunders sent at 7/6/2021  4:59 PM EDT -----  Please make him an appointment for status post cardiac cath.  Thanks Naveed

## 2021-07-13 RX ORDER — WARFARIN SODIUM 7.5 MG/1
TABLET ORAL
Qty: 90 TABLET | Refills: 2 | Status: SHIPPED | OUTPATIENT
Start: 2021-07-13 | End: 2022-04-19 | Stop reason: SDUPTHER

## 2021-07-15 DIAGNOSIS — I10 ESSENTIAL HYPERTENSION: ICD-10-CM

## 2021-07-15 RX ORDER — OLMESARTAN MEDOXOMIL AND HYDROCHLOROTHIAZIDE 40/25 40; 25 MG/1; MG/1
TABLET ORAL
Qty: 90 TABLET | Refills: 0 | Status: SHIPPED | OUTPATIENT
Start: 2021-07-15 | End: 2021-10-18

## 2021-07-19 ENCOUNTER — ANTICOAGULATION VISIT (OUTPATIENT)
Dept: CARDIOLOGY | Facility: CLINIC | Age: 81
End: 2021-07-19

## 2021-07-19 ENCOUNTER — OFFICE VISIT (OUTPATIENT)
Dept: CARDIOLOGY | Facility: CLINIC | Age: 81
End: 2021-07-19

## 2021-07-19 ENCOUNTER — HOSPITAL ENCOUNTER (OUTPATIENT)
Dept: CARDIOLOGY | Facility: HOSPITAL | Age: 81
Discharge: HOME | End: 2021-07-19
Admitting: INTERNAL MEDICINE

## 2021-07-19 VITALS
SYSTOLIC BLOOD PRESSURE: 114 MMHG | HEART RATE: 71 BPM | DIASTOLIC BLOOD PRESSURE: 80 MMHG | WEIGHT: 240 LBS | BODY MASS INDEX: 33.6 KG/M2 | HEIGHT: 71 IN

## 2021-07-19 DIAGNOSIS — R06.02 SOB (SHORTNESS OF BREATH): ICD-10-CM

## 2021-07-19 DIAGNOSIS — I48.0 PAROXYSMAL ATRIAL FIBRILLATION (HCC): ICD-10-CM

## 2021-07-19 DIAGNOSIS — Z92.29 H/O AMIODARONE THERAPY: ICD-10-CM

## 2021-07-19 DIAGNOSIS — I71.40 ABDOMINAL AORTIC ANEURYSM (AAA) WITHOUT RUPTURE (HCC): Primary | ICD-10-CM

## 2021-07-19 LAB — INR PPP: 1.9

## 2021-07-19 PROCEDURE — 85610 PROTHROMBIN TIME: CPT | Performed by: INTERNAL MEDICINE

## 2021-07-19 PROCEDURE — 99213 OFFICE O/P EST LOW 20 MIN: CPT | Performed by: INTERNAL MEDICINE

## 2021-07-19 PROCEDURE — 93000 ELECTROCARDIOGRAM COMPLETE: CPT | Performed by: INTERNAL MEDICINE

## 2021-07-19 NOTE — PROGRESS NOTES
Cath follow up    Subjective:        David Comer Sr. is a 81 y.o. male who here for follow up    CC  Follow-up heart catheterization  HPI  81-year-old male with paroxysmal atrial fibrillation, on amiodarone therapy shortness of breath as well as aneurysm here for the follow-up no complaints of chest pain tightness heaviness or the pressure sensation     Problems Addressed this Visit        Cardiac and Vasculature    Abdominal aortic aneurysm (CMS/HCC) - Primary    Relevant Orders    CT Chest With Contrast    CT Abdomen With Contrast    Paroxysmal atrial fibrillation (CMS/HCC)    H/O amiodarone therapy       Pulmonary and Pneumonias    SOB (shortness of breath)      Diagnoses       Codes Comments    Abdominal aortic aneurysm (AAA) without rupture (CMS/HCC)    -  Primary ICD-10-CM: I71.4  ICD-9-CM: 441.4     SOB (shortness of breath)     ICD-10-CM: R06.02  ICD-9-CM: 786.05     H/O amiodarone therapy     ICD-10-CM: Z92.29  ICD-9-CM: V87.49     Paroxysmal atrial fibrillation (CMS/HCC)     ICD-10-CM: I48.0  ICD-9-CM: 427.31         .    The following portions of the patient's history were reviewed and updated as appropriate: allergies, current medications, past family history, past medical history, past social history, past surgical history and problem list.    Past Medical History:   Diagnosis Date   • AAA (abdominal aortic aneurysm) without rupture (CMS/HCC)     CT performed on 2/20/17 (this is actually a THORACIC aortic aneurysm)   • Abnormal ECG    • Arthritis    • BPH (benign prostatic hyperplasia)    • Chronic lumbar pain    • Degenerative arthritis of lumbar spine    • Degenerative cervical disc    • Disease of thyroid gland    • Elevated rheumatoid factor    • Hematuria, gross    • Hypertension    • Hypogonadism in male    • Inguinal hernia    • Medicare annual wellness visit, subsequent    • Melena    • Refused influenza vaccine    • Scoliosis    • Sepsis (CMS/HCC)    • Thoracic aortic aneurysm (CMS/HCC)      "Thoracic Aneurysm   • Vertebral compression fracture (CMS/HCC)    • Vitamin D deficiency      reports that he has never smoked. He has never used smokeless tobacco. He reports that he does not drink alcohol and does not use drugs.   Family History   Problem Relation Age of Onset   • Hypertension Father    • Heart attack Father    • Heart attack Brother    • Alcohol abuse Brother    • Hypertension Brother    • Hypertension Paternal Grandmother    • Hypertension Paternal Grandfather    • Anemia Mother    • Arthritis Mother    • Hypertension Mother    • Hypertension Maternal Grandmother    • Heart disease Other         FH in males before age 55       Review of Systems  Constitutional: No wt loss, fever, fatigue  Gastrointestinal: No nausea, abdominal pain  Behavioral/Psych: No insomnia or anxiety   Cardiovascular no chest pains or tightness in the chest  Objective:       Physical Exam  /80   Pulse 71   Ht 180.3 cm (71\")   Wt 109 kg (240 lb)   BMI 33.47 kg/m²   General appearance: No acute changes   Neck: Trachea midline; NECK, supple, no thyromegaly or lymphadenopathy   Lungs: Normal size and shape, normal breath sounds, equal distribution of air, no rales and rhonchi   CV: S1-S2 regular, no murmurs, no rub, no gallop   Abdomen: Soft, non-tender; no masses , no abnormal abdominal sounds   Extremities: No deformity , normal color , no peripheral edema   Skin: Normal temperature, turgor and texture; no rash, ulcers            ECG 12 Lead    Date/Time: 7/19/2021 2:30 PM  Performed by: Harlan Downing MD  Authorized by: Harlan Downing MD   Comparison: compared with previous ECG   Similar to previous ECG  Rhythm: sinus rhythm  ST Flattening: all    Clinical impression: non-specific ECG          1. Early atherosclerotic plaque otherwise normal coronary arteries  2. Normal LV gram     Recommendations:      1. Medical management      Echocardiogram:        Current Outpatient Medications:   •  " acetaminophen (TYLENOL) 325 MG tablet, Take 2 tablets by mouth Every 4 (Four) Hours As Needed for Mild Pain ., Disp:  , Rfl:   •  amiodarone (PACERONE) 200 MG tablet, Take 0.5 tablets by mouth Daily., Disp: 90 tablet, Rfl: 3  •  aspirin 81 MG tablet, Take 81 mg by mouth Daily., Disp: , Rfl:   •  Bystolic 10 MG tablet, Take 1/2 (one-half) tablet by mouth once daily, Disp: 30 tablet, Rfl: 3  •  doxazosin (CARDURA) 2 MG tablet, Take 1 tablet by mouth once daily, Disp: 90 tablet, Rfl: 0  •  esomeprazole (NEXIUM) 40 MG capsule, Take 20 mg by mouth Every Morning Before Breakfast., Disp: , Rfl:   •  Euthyrox 112 MCG tablet, Take 1 tablet by mouth once daily, Disp: 90 tablet, Rfl: 0  •  hydrALAZINE (APRESOLINE) 25 MG tablet, Take 25 mg by mouth 3 (Three) Times a Day., Disp: , Rfl:   •  isosorbide mononitrate (IMDUR) 30 MG 24 hr tablet, Take 1 tablet by mouth once daily, Disp: 90 tablet, Rfl: 0  •  multivitamin with minerals (MULTIVITAMIN ADULT PO), Take 1 tablet by mouth Daily., Disp: , Rfl:   •  nitroglycerin (NITROSTAT) 0.4 MG SL tablet, Place 0.4 mg under the tongue Every 5 (Five) Minutes As Needed. Doesn't take, Disp: , Rfl:   •  olmesartan-hydrochlorothiazide (BENICAR HCT) 40-25 MG per tablet, TAKE ONE TABLET BY MOUTH DAILY, Disp: 90 tablet, Rfl: 0  •  rosuvastatin (CRESTOR) 5 MG tablet, Take 1 tablet by mouth once daily, Disp: 90 tablet, Rfl: 0  •  terbinafine (lamiSIL) 250 MG tablet, Take 250 mg by mouth Daily., Disp: , Rfl:   •  warfarin (COUMADIN) 7.5 MG tablet, TAKE 1 TABLET BY MOUTH ONCE DAILY EVERY  NIGHT, Disp: 90 tablet, Rfl: 2   Assessment:        Patient Active Problem List   Diagnosis   • Chronic coronary artery disease   • Abdominal aortic aneurysm (CMS/HCC)   • Paroxysmal atrial fibrillation (CMS/HCC)   • Gastroesophageal reflux disease   • Essential hypertension   • Mixed hyperlipidemia   • Hypogonadism in male   • Acquired hypothyroidism   • Osteoarthritis of spine   • Vitamin D deficiency   •  Anticoagulated   • Atrial fibrillation, rapid (CMS/HCC)   • Syncope and collapse   • Chest pain   • Statin intolerance   • Gross hematuria   • Coumadin toxicity   • Sepsis (CMS/HCC)   • H/O amiodarone therapy   • Dehydration   • Renal insufficiency   • Thoracic aortic aneurysm without rupture (CMS/HCC)   • Refused influenza vaccine   • Medicare annual wellness visit, subsequent   • BPH without obstruction/lower urinary tract symptoms   • Thoracic aortic aneurysm (CMS/HCC)   • Kidney stone   • Hand injury   • Acute kidney injury (CMS/HCC)   • 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 Abdominal aortic aneurysm (AAA) without rupture (CMS/HCC)  I71.4 441.4   2. SOB (shortness of breath)  R06.02 786.05   3. H/O amiodarone therapy  Z92.29 V87.49   4. Paroxysmal atrial fibrillation (CMS/HCC)  I48.0 427.31     1. Abdominal aortic aneurysm (AAA) without rupture (CMS/HCC)    - CT Chest With Contrast; Future  - CT Abdomen With Contrast; Future    2. SOB (shortness of breath)  Multifactorial    3. H/O amiodarone therapy  David L Age Sr. IS ON AMIODARONE,   Significant side effects associated with this medication has been explained     Pros and cons of the medications has been discussed, decision has been to continue the medication   6 months to yearly checkup for eye examination, thyroid function test, chest x-ray and liver function test has been advised    Patient understands well      4. Paroxysmal atrial fibrillation (CMS/HCC)  Controlled       CT OF CHEST AND ABDOMEN    PHONE VISIT  COUNSELING:    David L Age Sr.Counseling was given to patient for the following topics: diagnostic results, risk factor reductions, impressions, risks and benefits of treatment options and importance of treatment compliance .       SMOKING  COUNSELING:    [unfilled]    Dictated using Dragon dictation

## 2021-08-06 RX ORDER — ISOSORBIDE MONONITRATE 30 MG/1
TABLET, EXTENDED RELEASE ORAL
Qty: 90 TABLET | Refills: 1 | Status: SHIPPED | OUTPATIENT
Start: 2021-08-06 | End: 2022-02-14

## 2021-08-13 ENCOUNTER — HOSPITAL ENCOUNTER (OUTPATIENT)
Dept: CT IMAGING | Facility: HOSPITAL | Age: 81
Discharge: HOME | End: 2021-08-13
Admitting: INTERNAL MEDICINE

## 2021-08-13 DIAGNOSIS — I71.40 ABDOMINAL AORTIC ANEURYSM (AAA) WITHOUT RUPTURE (HCC): ICD-10-CM

## 2021-08-13 LAB — CREAT BLDA-MCNC: 1.2 MG/DL (ref 0.6–1.3)

## 2021-08-13 PROCEDURE — 74160 CT ABDOMEN W/CONTRAST: CPT

## 2021-08-13 PROCEDURE — 25010000002 IOPAMIDOL 61 % SOLUTION: Performed by: INTERNAL MEDICINE

## 2021-08-13 PROCEDURE — 0 DIATRIZOATE MEGLUMINE & SODIUM PER 1 ML: Performed by: INTERNAL MEDICINE

## 2021-08-13 PROCEDURE — 71260 CT THORAX DX C+: CPT

## 2021-08-13 PROCEDURE — 82565 ASSAY OF CREATININE: CPT

## 2021-08-13 RX ADMIN — DIATRIZOATE MEGLUMINE AND DIATRIZOATE SODIUM 30 ML: 600; 100 SOLUTION ORAL; RECTAL at 15:00

## 2021-08-13 RX ADMIN — IOPAMIDOL 85 ML: 612 INJECTION, SOLUTION INTRAVENOUS at 15:40

## 2021-08-20 ENCOUNTER — TELEPHONE (OUTPATIENT)
Dept: CARDIOLOGY | Facility: CLINIC | Age: 81
End: 2021-08-20

## 2021-08-20 NOTE — TELEPHONE ENCOUNTER
Received fax from [facility]:   Surgeon: Anthony Bhatt MD   Patient is having surgery on 9- (eyelid  surgery) they are asking for patient to hold ASA 7 days prior to surgery and hold Coumadin 3-5 days prior to surgery.     Patient recently had a cath is July 2021.   Pt has Aortic Aneurysm.AFIB and CAD     Last OV 7/19/2021  Stress test 6/16/21  Echo 6/16/21      Per Dr. SAMUELS: patient is to hold Coumadin for 3 days and hold ASA for 5 days with a small risk.     Letter sent

## 2021-08-26 RX ORDER — HYDRALAZINE HYDROCHLORIDE 25 MG/1
TABLET, FILM COATED ORAL
Qty: 90 TABLET | Refills: 1 | Status: SHIPPED | OUTPATIENT
Start: 2021-08-26 | End: 2021-12-27

## 2021-08-30 ENCOUNTER — OFFICE VISIT (OUTPATIENT)
Dept: CARDIOLOGY | Facility: CLINIC | Age: 81
End: 2021-08-30

## 2021-08-30 VITALS
DIASTOLIC BLOOD PRESSURE: 100 MMHG | BODY MASS INDEX: 32.9 KG/M2 | HEART RATE: 60 BPM | HEIGHT: 71 IN | SYSTOLIC BLOOD PRESSURE: 157 MMHG | WEIGHT: 235 LBS

## 2021-08-30 DIAGNOSIS — Z92.29 H/O AMIODARONE THERAPY: ICD-10-CM

## 2021-08-30 DIAGNOSIS — I48.0 PAROXYSMAL ATRIAL FIBRILLATION (HCC): ICD-10-CM

## 2021-08-30 DIAGNOSIS — I10 ESSENTIAL HYPERTENSION: ICD-10-CM

## 2021-08-30 DIAGNOSIS — I71.40 ABDOMINAL AORTIC ANEURYSM (AAA) WITHOUT RUPTURE (HCC): Primary | ICD-10-CM

## 2021-08-30 PROCEDURE — 99213 OFFICE O/P EST LOW 20 MIN: CPT | Performed by: INTERNAL MEDICINE

## 2021-08-30 NOTE — PROGRESS NOTES
1 MO FOLLOW UP WITH TEST RESULTS  EYE SURGERY SCHEDULED FOR 9/21/2021. WILL NEED TO HOLD COUMADIN AND ASPIRIN PRIOR.   Subjective:        David KHAN Age Sr. is a 81 y.o. male who here for follow up    CC  Follow-up after the CT of the chest  HPI  81-year-old male with aneurysm, paroxysmal atrial fibrillation on amiodarone therapy here for the follow-up with no complaints of chest pains tightness heaviness or the pressure sensation     Problems Addressed this Visit        Cardiac and Vasculature    Abdominal aortic aneurysm (CMS/HCC) - Primary    Relevant Orders    CT Chest With Contrast    Paroxysmal atrial fibrillation (CMS/HCC)    Essential hypertension    H/O amiodarone therapy      Diagnoses       Codes Comments    Abdominal aortic aneurysm (AAA) without rupture (CMS/HCC)    -  Primary ICD-10-CM: I71.4  ICD-9-CM: 441.4     Paroxysmal atrial fibrillation (CMS/HCC)     ICD-10-CM: I48.0  ICD-9-CM: 427.31     H/O amiodarone therapy     ICD-10-CM: Z92.29  ICD-9-CM: V87.49     Essential hypertension     ICD-10-CM: I10  ICD-9-CM: 401.9         .    The following portions of the patient's history were reviewed and updated as appropriate: allergies, current medications, past family history, past medical history, past social history, past surgical history and problem list.    Past Medical History:   Diagnosis Date   • AAA (abdominal aortic aneurysm) without rupture (CMS/HCC)     CT performed on 2/20/17 (this is actually a THORACIC aortic aneurysm)   • Abnormal ECG    • Arthritis    • BPH (benign prostatic hyperplasia)    • Chronic lumbar pain    • Degenerative arthritis of lumbar spine    • Degenerative cervical disc    • Disease of thyroid gland    • Elevated rheumatoid factor    • Hematuria, gross    • Hypertension    • Hypogonadism in male    • Inguinal hernia    • Medicare annual wellness visit, subsequent    • Melena    • Refused influenza vaccine    • Scoliosis    • Sepsis (CMS/HCC)    • Thoracic aortic aneurysm  "(CMS/HCC)     Thoracic Aneurysm   • Vertebral compression fracture (CMS/HCC)    • Vitamin D deficiency      reports that he has never smoked. He has never used smokeless tobacco. He reports that he does not drink alcohol and does not use drugs.   Family History   Problem Relation Age of Onset   • Hypertension Father    • Heart attack Father    • Heart attack Brother    • Alcohol abuse Brother    • Hypertension Brother    • Hypertension Paternal Grandmother    • Hypertension Paternal Grandfather    • Anemia Mother    • Arthritis Mother    • Hypertension Mother    • Hypertension Maternal Grandmother    • Heart disease Other         FH in males before age 55       Review of Systems  Constitutional: No wt loss, fever, fatigue  Gastrointestinal: No nausea, abdominal pain  Behavioral/Psych: No insomnia or anxiety   Cardiovascular no chest pains or tightness in the chest  Objective:       Physical Exam  /100   Pulse 60   Ht 180.3 cm (71\")   Wt 107 kg (235 lb)   BMI 32.78 kg/m²   General appearance: No acute changes   Neck: Trachea midline; NECK, supple, no thyromegaly or lymphadenopathy   Lungs: Normal size and shape, normal breath sounds, equal distribution of air, no rales and rhonchi   CV: S1-S2 regular, no murmurs, no rub, no gallop   Abdomen: Soft, non-tender; no masses , no abnormal abdominal sounds   Extremities: No deformity , normal color , no peripheral edema   Skin: Normal temperature, turgor and texture; no rash, ulcers          Procedures      Echocardiogram:        Current Outpatient Medications:   •  acetaminophen (TYLENOL) 325 MG tablet, Take 2 tablets by mouth Every 4 (Four) Hours As Needed for Mild Pain ., Disp:  , Rfl:   •  amiodarone (PACERONE) 200 MG tablet, Take 0.5 tablets by mouth Daily., Disp: 90 tablet, Rfl: 3  •  aspirin 81 MG tablet, Take 81 mg by mouth Daily., Disp: , Rfl:   •  Bystolic 10 MG tablet, Take 1/2 (one-half) tablet by mouth once daily, Disp: 30 tablet, Rfl: 3  •  doxazosin " (CARDURA) 2 MG tablet, Take 1 tablet by mouth once daily, Disp: 90 tablet, Rfl: 0  •  esomeprazole (NEXIUM) 40 MG capsule, Take 20 mg by mouth Every Morning Before Breakfast., Disp: , Rfl:   •  Euthyrox 112 MCG tablet, Take 1 tablet by mouth once daily, Disp: 90 tablet, Rfl: 0  •  hydrALAZINE (APRESOLINE) 25 MG tablet, TAKE 1 TABLET BY MOUTH THREE TIMES DAILY, Disp: 90 tablet, Rfl: 1  •  isosorbide mononitrate (IMDUR) 30 MG 24 hr tablet, Take 1 tablet by mouth once daily, Disp: 90 tablet, Rfl: 1  •  multivitamin with minerals (MULTIVITAMIN ADULT PO), Take 1 tablet by mouth Daily., Disp: , Rfl:   •  nitroglycerin (NITROSTAT) 0.4 MG SL tablet, Place 0.4 mg under the tongue Every 5 (Five) Minutes As Needed. Doesn't take, Disp: , Rfl:   •  olmesartan-hydrochlorothiazide (BENICAR HCT) 40-25 MG per tablet, TAKE ONE TABLET BY MOUTH DAILY, Disp: 90 tablet, Rfl: 0  •  rosuvastatin (CRESTOR) 5 MG tablet, Take 1 tablet by mouth once daily, Disp: 90 tablet, Rfl: 0  •  terbinafine (lamiSIL) 250 MG tablet, Take 250 mg by mouth Daily., Disp: , Rfl:   •  warfarin (COUMADIN) 7.5 MG tablet, TAKE 1 TABLET BY MOUTH ONCE DAILY EVERY  NIGHT, Disp: 90 tablet, Rfl: 2   Assessment:        Patient Active Problem List   Diagnosis   • Chronic coronary artery disease   • Abdominal aortic aneurysm (CMS/HCC)   • Paroxysmal atrial fibrillation (CMS/HCC)   • Gastroesophageal reflux disease   • Essential hypertension   • Mixed hyperlipidemia   • Hypogonadism in male   • Acquired hypothyroidism   • Osteoarthritis of spine   • Vitamin D deficiency   • Anticoagulated   • Atrial fibrillation, rapid (CMS/HCC)   • Syncope and collapse   • Chest pain   • Statin intolerance   • Gross hematuria   • Coumadin toxicity   • Sepsis (CMS/HCC)   • H/O amiodarone therapy   • Dehydration   • Renal insufficiency   • Thoracic aortic aneurysm without rupture (CMS/HCC)   • Refused influenza vaccine   • Medicare annual wellness visit, subsequent   • BPH without  obstruction/lower urinary tract symptoms   • Thoracic aortic aneurysm (CMS/HCC)   • Kidney stone   • Hand injury   • Acute kidney injury (CMS/HCC)   • 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)   • Traumatic hematoma of right knee   • Arthritis   • SOB (shortness of breath)   • Fatigue   • Abnormal nuclear stress test               Plan:   IMPRESSION:  1. Stable aneurysmal dilatation of the ascending thoracic aorta. The  descending thoracic aorta is tortuous. No abdominal aortic aneurysm.  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  2. Noncalcified left upper lobe 6 mm nodule for which follow-up with CT  chest in 1 year is recommended.  3. CT findings of constipation.         ICD-10-CM ICD-9-CM   1. Abdominal aortic aneurysm (AAA) without rupture (CMS/HCC)  I71.4 441.4   2. Paroxysmal atrial fibrillation (CMS/HCC)  I48.0 427.31   3. H/O amiodarone therapy  Z92.29 V87.49   4. Essential hypertension  I10 401.9     1. Abdominal aortic aneurysm (AAA) without rupture (CMS/HCC)  Proceed with a CT of the chest  - CT Chest With Contrast; Future    2. Paroxysmal atrial fibrillation (CMS/HCC)  Controlled    3. H/O amiodarone therapy  David BILL Age Sr. IS ON AMIODARONE,   Significant side effects associated with this medication has been explained     Pros and cons of the medications has been discussed, decision has been to continue the medication   6 months to yearly checkup for eye examination, thyroid function test, chest x-ray and liver function test has been advised    Patient understands well      4. Essential hypertension  Blood pressure under control       1 YR WITH CT OF CHEST  COUNSELING:    David KHAN Age Sr.Counseling was given to patient for the following topics: diagnostic results, risk factor reductions, impressions, risks and benefits of treatment options and importance of treatment compliance .       SMOKING COUNSELING:    [unfilled]    Dictated using Dragon dictation

## 2021-09-13 ENCOUNTER — TRANSCRIBE ORDERS (OUTPATIENT)
Dept: PREADMISSION TESTING | Facility: HOSPITAL | Age: 81
End: 2021-09-13

## 2021-09-13 DIAGNOSIS — Z01.818 OTHER SPECIFIED PRE-OPERATIVE EXAMINATION: Primary | ICD-10-CM

## 2021-09-17 ENCOUNTER — OFFICE VISIT (OUTPATIENT)
Dept: FAMILY MEDICINE CLINIC | Facility: CLINIC | Age: 81
End: 2021-09-17

## 2021-09-17 ENCOUNTER — PRE-ADMISSION TESTING (OUTPATIENT)
Dept: PREADMISSION TESTING | Facility: HOSPITAL | Age: 81
End: 2021-09-17

## 2021-09-17 VITALS
RESPIRATION RATE: 16 BRPM | WEIGHT: 237.8 LBS | HEIGHT: 71 IN | SYSTOLIC BLOOD PRESSURE: 160 MMHG | HEART RATE: 68 BPM | TEMPERATURE: 98.4 F | DIASTOLIC BLOOD PRESSURE: 84 MMHG | BODY MASS INDEX: 33.29 KG/M2 | OXYGEN SATURATION: 97 %

## 2021-09-17 VITALS
DIASTOLIC BLOOD PRESSURE: 90 MMHG | BODY MASS INDEX: 33.04 KG/M2 | TEMPERATURE: 97.7 F | RESPIRATION RATE: 16 BRPM | WEIGHT: 236 LBS | HEART RATE: 88 BPM | OXYGEN SATURATION: 96 % | SYSTOLIC BLOOD PRESSURE: 141 MMHG | HEIGHT: 71 IN

## 2021-09-17 DIAGNOSIS — M54.50 ACUTE LEFT-SIDED LOW BACK PAIN WITHOUT SCIATICA: Primary | ICD-10-CM

## 2021-09-17 DIAGNOSIS — R10.32 LT GROIN PAIN: ICD-10-CM

## 2021-09-17 LAB
ANION GAP SERPL CALCULATED.3IONS-SCNC: 12.9 MMOL/L (ref 5–15)
BUN SERPL-MCNC: 24 MG/DL (ref 8–23)
BUN/CREAT SERPL: 25.8 (ref 7–25)
CALCIUM SPEC-SCNC: 9.7 MG/DL (ref 8.6–10.5)
CHLORIDE SERPL-SCNC: 103 MMOL/L (ref 98–107)
CO2 SERPL-SCNC: 26.1 MMOL/L (ref 22–29)
CREAT SERPL-MCNC: 0.93 MG/DL (ref 0.76–1.27)
DEPRECATED RDW RBC AUTO: 46.4 FL (ref 37–54)
ERYTHROCYTE [DISTWIDTH] IN BLOOD BY AUTOMATED COUNT: 12.4 % (ref 12.3–15.4)
GFR SERPL CREATININE-BSD FRML MDRD: 78 ML/MIN/1.73
GLUCOSE SERPL-MCNC: 102 MG/DL (ref 65–99)
HCT VFR BLD AUTO: 41.7 % (ref 37.5–51)
HGB BLD-MCNC: 13.5 G/DL (ref 13–17.7)
MCH RBC QN AUTO: 32.4 PG (ref 26.6–33)
MCHC RBC AUTO-ENTMCNC: 32.4 G/DL (ref 31.5–35.7)
MCV RBC AUTO: 100 FL (ref 79–97)
PLATELET # BLD AUTO: 172 10*3/MM3 (ref 140–450)
PMV BLD AUTO: 12 FL (ref 6–12)
POTASSIUM SERPL-SCNC: 4.3 MMOL/L (ref 3.5–5.2)
RBC # BLD AUTO: 4.17 10*6/MM3 (ref 4.14–5.8)
SODIUM SERPL-SCNC: 142 MMOL/L (ref 136–145)
WBC # BLD AUTO: 6.66 10*3/MM3 (ref 3.4–10.8)

## 2021-09-17 PROCEDURE — 36415 COLL VENOUS BLD VENIPUNCTURE: CPT

## 2021-09-17 PROCEDURE — 80048 BASIC METABOLIC PNL TOTAL CA: CPT

## 2021-09-17 PROCEDURE — 99213 OFFICE O/P EST LOW 20 MIN: CPT | Performed by: FAMILY MEDICINE

## 2021-09-17 PROCEDURE — 85027 COMPLETE CBC AUTOMATED: CPT

## 2021-09-17 RX ORDER — PREDNISONE 1 MG/1
TABLET ORAL
Qty: 21 TABLET | Refills: 0 | Status: SHIPPED | OUTPATIENT
Start: 2021-09-17 | End: 2021-10-13

## 2021-09-17 NOTE — PROGRESS NOTES
"Chief Complaint  Back Pain    Subjective          David KHAN Age Sr. presents to [facility]  History of Present Illness  PT was laying on a reclining chair by pool on Sept 10th and was getting up and pulled something and started having pain in left groin and low back.  He had a similar thing on right side about 16 years ago and was dx with a herniated disc.  He currently has no bowel or urine dysfunction.  The pain has gotten better but acts up when he moves in certain positions.  No scrotal or testicular pain.  Objective   Vital Signs:   /84 (BP Location: Left arm, Patient Position: Sitting, Cuff Size: Large Adult)   Pulse 68   Temp 98.4 °F (36.9 °C) (Temporal)   Resp 16   Ht 180.3 cm (71\")   Wt 108 kg (237 lb 12.8 oz)   SpO2 97%   BMI 33.17 kg/m²     Physical Exam  Cardiovascular:      Rate and Rhythm: Normal rate and regular rhythm.   Abdominal:      Tenderness: There is no abdominal tenderness.      Hernia: No hernia is present.   Genitourinary:     Penis: Normal.       Testes: Normal.        Result Review :                 Assessment and Plan    Diagnoses and all orders for this visit:    1. Acute left-sided low back pain without sciatica (Primary)  -     predniSONE (DELTASONE) 5 MG tablet; 6 pills day 1 5 pills day 2 4 pills day 3 3 pills day 4 2 pills day 5 1 pill day 6  Dispense: 21 tablet; Refill: 0    2. Lt groin pain  -     predniSONE (DELTASONE) 5 MG tablet; 6 pills day 1 5 pills day 2 4 pills day 3 3 pills day 4 2 pills day 5 1 pill day 6  Dispense: 21 tablet; Refill: 0        Follow Up   Return in about 4 months (around 1/17/2022) for hypertension.  Patient was given instructions and counseling regarding his condition or for health maintenance advice. Please see specific information pulled into the AVS if appropriate.       Use ice and heat and start dose kavin.  Unable to use nsaids due to his med hx.  Use tylenol as needed.    Given warning signs for stroke and " MI.

## 2021-09-17 NOTE — DISCHARGE INSTRUCTIONS
Take the following medications the morning of surgery:  DOXAZOSIN, ISOSORBIDE, PACERONE, HYDRALAZINE, AND NEXIUM     ARRIVAL TIME 0800 ON 9/21/21    FOLLOW MD INSTRUCTIONS REGARDING COUMADIN       If you are on prescription narcotic pain medication to control your pain you may also take that medication the morning of surgery.    General Instructions:  • Do not eat solid food after midnight the night before surgery.  • You may drink clear liquids day of surgery but must stop at least one hour before your hospital arrival time.  • It is beneficial for you to have a clear drink that contains carbohydrates the day of surgery.  We suggest a 12 to 20 ounce bottle of Gatorade or Powerade for non-diabetic patients or a 12 to 20 ounce bottle of G2 or Powerade Zero for diabetic patients. (Pediatric patients, are not advised to drink a 12 to 20 ounce carbohydrate drink)    Clear liquids are liquids you can see through.  Nothing red in color.     Plain water                               Sports drinks  Sodas                                   Gelatin (Jell-O)  Fruit juices without pulp such as white grape juice and apple juice  Popsicles that contain no fruit or yogurt  Tea or coffee (no cream or milk added)  Gatorade / Powerade  G2 / Powerade Zero    • Infants may have breast milk up to four hours before surgery.  • Infants drinking formula may drink formula up to six hours before surgery.   • Patients who avoid smoking, chewing tobacco and alcohol for 4 weeks prior to surgery have a reduced risk of post-operative complications.  Quit smoking as many days before surgery as you can.  • Do not smoke, use chewing tobacco or drink alcohol the day of surgery.   • If applicable bring your C-PAP/ BI-PAP machine.  • Bring any papers given to you in the doctor’s office.  • Wear clean comfortable clothes.  • Do not wear contact lenses, false eyelashes or make-up.  Bring a case for your glasses.   • Bring crutches or walker if  applicable.  • Remove all piercings.  Leave jewelry and any other valuables at home.  • Hair extensions with metal clips must be removed prior to surgery.  • The Pre-Admission Testing nurse will instruct you to bring medications if unable to obtain an accurate list in Pre-Admission Testing.            Preventing a Surgical Site Infection:  • For 2 to 3 days before surgery, avoid shaving with a razor because the razor can irritate skin and make it easier to develop an infection.    • Any areas of open skin can increase the risk of a post-operative wound infection by allowing bacteria to enter and travel throughout the body.  Notify your surgeon if you have any skin wounds / rashes even if it is not near the expected surgical site.  The area will need assessed to determine if surgery should be delayed until it is healed.  • The night prior to surgery shower using a fresh bar of anti-bacterial soap (such as Dial) and clean washcloth.  Sleep in a clean bed with clean clothing.  Do not allow pets to sleep with you.  • Shower on the morning of surgery using a fresh bar of anti-bacterial soap (such as Dial) and clean washcloth.  Dry with a clean towel and dress in clean clothing.  • Ask your surgeon if you will be receiving antibiotics prior to surgery.  • Make sure you, your family, and all healthcare providers clean their hands with soap and water or an alcohol based hand  before caring for you or your wound.    Day of surgery:  Your arrival time is approximately two hours before your scheduled surgery time.  Upon arrival, a Pre-op nurse and Anesthesiologist will review your health history, obtain vital signs, and answer questions you may have.  The only belongings needed at this time will be a list of your home medications and if applicable your C-PAP/BI-PAP machine.  A Pre-op nurse will start an IV and you may receive medication in preparation for surgery, including something to help you relax.     Please be  aware that surgery does come with discomfort.  We want to make every effort to control your discomfort so please discuss any uncontrolled symptoms with your nurse.   Your doctor will most likely have prescribed pain medications.      If you are going home after surgery you will receive individualized written care instructions before being discharged.  A responsible adult must drive you to and from the hospital on the day of your surgery and stay with you for 24 hours.  Discharge prescriptions can be filled by the hospital pharmacy during regular pharmacy hours.  If you are having surgery late in the day/evening your prescription may be e-prescribed to your pharmacy.  Please verify your pharmacy hours or chose a 24 hour pharmacy to avoid not having access to your prescription because your pharmacy has closed for the day.    If you are staying overnight following surgery, you will be transported to your hospital room following the recovery period.  [facility] has all private rooms.    If you have any questions please call Pre-Admission Testing at [phone].  Deductibles and co-payments are collected on the day of service. Please be prepared to pay the required co-pay, deductible or deposit on the day of service as defined by your plan.    Patient Education for Self-Quarantine Process    • Following your COVID testing, we strongly recommend that you wear a mask when you are with other people and practice social distancing.   • Limit your activities to only required outings.  • Wash your hands with soap and water frequently for at least 20 seconds.   • Avoid touching your eyes, nose and mouth with unwashed hands.  • Do not share anything - utensils, drinking glasses, food from the same bowl.   • Sanitize household surfaces daily. Include all high touch areas (door handles, light switches, phones, countertops, etc.)    Call your surgeon immediately if you experience any of the following  symptoms:  • Sore Throat  • Shortness of Breath or difficulty breathing  • Cough  • Chills  • Body soreness or muscle pain  • Headache  • Fever  • New loss of taste or smell  • Do not arrive for your surgery ill.  Your procedure will need to be rescheduled to another time.  You will need to call your physician before the day of surgery to avoid any unnecessary exposure to hospital staff as well as other patients.

## 2021-09-18 ENCOUNTER — LAB (OUTPATIENT)
Dept: LAB | Facility: HOSPITAL | Age: 81
End: 2021-09-18

## 2021-09-18 ENCOUNTER — APPOINTMENT (OUTPATIENT)
Dept: LAB | Facility: HOSPITAL | Age: 81
End: 2021-09-18

## 2021-09-18 DIAGNOSIS — Z01.818 OTHER SPECIFIED PRE-OPERATIVE EXAMINATION: ICD-10-CM

## 2021-09-18 LAB — SARS-COV-2 ORF1AB RESP QL NAA+PROBE: NOT DETECTED

## 2021-09-18 PROCEDURE — U0005 INFEC AGEN DETEC AMPLI PROBE: HCPCS

## 2021-09-18 PROCEDURE — C9803 HOPD COVID-19 SPEC COLLECT: HCPCS

## 2021-09-18 PROCEDURE — U0004 COV-19 TEST NON-CDC HGH THRU: HCPCS

## 2021-09-21 ENCOUNTER — ANESTHESIA EVENT (OUTPATIENT)
Dept: PERIOP | Facility: HOSPITAL | Age: 81
End: 2021-09-21

## 2021-09-21 ENCOUNTER — ANESTHESIA (OUTPATIENT)
Dept: PERIOP | Facility: HOSPITAL | Age: 81
End: 2021-09-21

## 2021-09-21 ENCOUNTER — HOSPITAL ENCOUNTER (OUTPATIENT)
Facility: HOSPITAL | Age: 81
Setting detail: HOSPITAL OUTPATIENT SURGERY
Discharge: HOME | End: 2021-09-21
Attending: OPHTHALMOLOGY | Admitting: OPHTHALMOLOGY

## 2021-09-21 VITALS
RESPIRATION RATE: 18 BRPM | SYSTOLIC BLOOD PRESSURE: 137 MMHG | DIASTOLIC BLOOD PRESSURE: 79 MMHG | OXYGEN SATURATION: 95 % | TEMPERATURE: 97.2 F | HEART RATE: 55 BPM

## 2021-09-21 DIAGNOSIS — H02.9 EYELID LESION: ICD-10-CM

## 2021-09-21 LAB
INR PPP: 1.16 (ref 0.9–1.1)
PROTHROMBIN TIME: 14.6 SECONDS (ref 11.7–14.2)

## 2021-09-21 PROCEDURE — 25010000002 FENTANYL CITRATE (PF) 50 MCG/ML SOLUTION: Performed by: NURSE ANESTHETIST, CERTIFIED REGISTERED

## 2021-09-21 PROCEDURE — 88331 PATH CONSLTJ SURG 1 BLK 1SPC: CPT | Performed by: OPHTHALMOLOGY

## 2021-09-21 PROCEDURE — C1889 IMPLANT/INSERT DEVICE, NOC: HCPCS | Performed by: OPHTHALMOLOGY

## 2021-09-21 PROCEDURE — A4263 PERMANENT TEAR DUCT PLUG: HCPCS | Performed by: OPHTHALMOLOGY

## 2021-09-21 PROCEDURE — 85610 PROTHROMBIN TIME: CPT | Performed by: OPHTHALMOLOGY

## 2021-09-21 PROCEDURE — 88305 TISSUE EXAM BY PATHOLOGIST: CPT | Performed by: OPHTHALMOLOGY

## 2021-09-21 PROCEDURE — 25010000002 DEXAMETHASONE PER 1 MG: Performed by: NURSE ANESTHETIST, CERTIFIED REGISTERED

## 2021-09-21 PROCEDURE — 25010000002 ONDANSETRON PER 1 MG: Performed by: NURSE ANESTHETIST, CERTIFIED REGISTERED

## 2021-09-21 PROCEDURE — 25010000002 PROPOFOL 10 MG/ML EMULSION: Performed by: NURSE ANESTHETIST, CERTIFIED REGISTERED

## 2021-09-21 PROCEDURE — 25010000002 NEOSTIGMINE 10 MG/10ML SOLUTION: Performed by: NURSE ANESTHETIST, CERTIFIED REGISTERED

## 2021-09-21 DEVICE — A STERILE, IMPLANTABLE, SINGLE-LUMEN TUBE INTENDED TO PROVIDE TEAR DRAINAGE FROM THE FRONT SURFACE OF THE EYE, TYPICALLY INTO THE NASAL CAVITY OR A PARANASAL SINUS, AS A DRAINAGE TREATMENT FOR LACRIMAL CANALICULAR PATHOLOGIES IN FUNCTIONAL OR OBSTRUCTIVE EPIPHORA; IT MAY ALSO BE INTENDED TO FACILITATE SALINE SOLUTION IRRIGATION TO A PARANASAL SINUS (E.G., ETHMOID SINUS) TO MANAGE CHRONIC RHINOSINUSITIS. ALSO REFERRED TO AS A LACRIMAL STENT, THE DEVICE MAY BE IMPLANTED AFTER SURGERY TO DILATE OR CREATE A SURGICAL PASSAGE [E.G., DACRYOCYSTOSTOMY/DACRYOCYSTORHINOSTOMY (DCR)], AND IS MADE OF GLASS OR SYNTHETIC POLYMER MATERIAL(S) [E.G., SILICONE].
Type: IMPLANTABLE DEVICE | Site: LACRIMAL DUCT | Status: FUNCTIONAL
Brand: LACRIMAL TUBE

## 2021-09-21 RX ORDER — FENTANYL CITRATE 50 UG/ML
INJECTION, SOLUTION INTRAMUSCULAR; INTRAVENOUS AS NEEDED
Status: DISCONTINUED | OUTPATIENT
Start: 2021-09-21 | End: 2021-09-21 | Stop reason: SURG

## 2021-09-21 RX ORDER — ONDANSETRON 2 MG/ML
4 INJECTION INTRAMUSCULAR; INTRAVENOUS ONCE AS NEEDED
Status: DISCONTINUED | OUTPATIENT
Start: 2021-09-21 | End: 2021-09-21 | Stop reason: HOSPADM

## 2021-09-21 RX ORDER — MAGNESIUM HYDROXIDE 1200 MG/15ML
LIQUID ORAL AS NEEDED
Status: DISCONTINUED | OUTPATIENT
Start: 2021-09-21 | End: 2021-09-21 | Stop reason: HOSPADM

## 2021-09-21 RX ORDER — FENTANYL CITRATE 50 UG/ML
50 INJECTION, SOLUTION INTRAMUSCULAR; INTRAVENOUS
Status: DISCONTINUED | OUTPATIENT
Start: 2021-09-21 | End: 2021-09-21 | Stop reason: HOSPADM

## 2021-09-21 RX ORDER — HYDROCODONE BITARTRATE AND ACETAMINOPHEN 5; 325 MG/1; MG/1
1 TABLET ORAL ONCE AS NEEDED
Status: COMPLETED | OUTPATIENT
Start: 2021-09-21 | End: 2021-09-21

## 2021-09-21 RX ORDER — ONDANSETRON 2 MG/ML
INJECTION INTRAMUSCULAR; INTRAVENOUS AS NEEDED
Status: DISCONTINUED | OUTPATIENT
Start: 2021-09-21 | End: 2021-09-21 | Stop reason: SURG

## 2021-09-21 RX ORDER — FENTANYL CITRATE 50 UG/ML
50 INJECTION, SOLUTION INTRAMUSCULAR; INTRAVENOUS ONCE
Status: DISCONTINUED | OUTPATIENT
Start: 2021-09-21 | End: 2021-09-21 | Stop reason: HOSPADM

## 2021-09-21 RX ORDER — EPHEDRINE SULFATE 50 MG/ML
INJECTION, SOLUTION INTRAVENOUS AS NEEDED
Status: DISCONTINUED | OUTPATIENT
Start: 2021-09-21 | End: 2021-09-21 | Stop reason: SURG

## 2021-09-21 RX ORDER — HYDROCODONE BITARTRATE AND ACETAMINOPHEN 5; 325 MG/1; MG/1
1 TABLET ORAL EVERY 6 HOURS PRN
Qty: 15 TABLET | Refills: 0 | Status: SHIPPED | OUTPATIENT
Start: 2021-09-21 | End: 2021-10-13

## 2021-09-21 RX ORDER — NEOSTIGMINE METHYLSULFATE 1 MG/ML
INJECTION, SOLUTION INTRAVENOUS AS NEEDED
Status: DISCONTINUED | OUTPATIENT
Start: 2021-09-21 | End: 2021-09-21 | Stop reason: SURG

## 2021-09-21 RX ORDER — ROCURONIUM BROMIDE 10 MG/ML
INJECTION, SOLUTION INTRAVENOUS AS NEEDED
Status: DISCONTINUED | OUTPATIENT
Start: 2021-09-21 | End: 2021-09-21 | Stop reason: SURG

## 2021-09-21 RX ORDER — GLYCOPYRROLATE 0.2 MG/ML
INJECTION INTRAMUSCULAR; INTRAVENOUS AS NEEDED
Status: DISCONTINUED | OUTPATIENT
Start: 2021-09-21 | End: 2021-09-21 | Stop reason: SURG

## 2021-09-21 RX ORDER — FLUMAZENIL 0.1 MG/ML
0.2 INJECTION INTRAVENOUS AS NEEDED
Status: DISCONTINUED | OUTPATIENT
Start: 2021-09-21 | End: 2021-09-21 | Stop reason: HOSPADM

## 2021-09-21 RX ORDER — HYDROCODONE BITARTRATE AND ACETAMINOPHEN 5; 325 MG/1; MG/1
1 TABLET ORAL EVERY 6 HOURS PRN
Qty: 15 TABLET | Refills: 0 | Status: SHIPPED | OUTPATIENT
Start: 2021-09-21 | End: 2021-09-21 | Stop reason: SDUPTHER

## 2021-09-21 RX ORDER — HYDROMORPHONE HYDROCHLORIDE 1 MG/ML
0.5 INJECTION, SOLUTION INTRAMUSCULAR; INTRAVENOUS; SUBCUTANEOUS
Status: DISCONTINUED | OUTPATIENT
Start: 2021-09-21 | End: 2021-09-21 | Stop reason: HOSPADM

## 2021-09-21 RX ORDER — MIDAZOLAM HYDROCHLORIDE 1 MG/ML
1 INJECTION INTRAMUSCULAR; INTRAVENOUS ONCE
Status: DISCONTINUED | OUTPATIENT
Start: 2021-09-21 | End: 2021-09-21 | Stop reason: HOSPADM

## 2021-09-21 RX ORDER — DEXAMETHASONE SODIUM PHOSPHATE 10 MG/ML
INJECTION INTRAMUSCULAR; INTRAVENOUS AS NEEDED
Status: DISCONTINUED | OUTPATIENT
Start: 2021-09-21 | End: 2021-09-21 | Stop reason: SURG

## 2021-09-21 RX ORDER — ERYTHROMYCIN 5 MG/G
OINTMENT OPHTHALMIC AS NEEDED
Status: DISCONTINUED | OUTPATIENT
Start: 2021-09-21 | End: 2021-09-21 | Stop reason: HOSPADM

## 2021-09-21 RX ORDER — EPHEDRINE SULFATE 50 MG/ML
5 INJECTION, SOLUTION INTRAVENOUS ONCE AS NEEDED
Status: DISCONTINUED | OUTPATIENT
Start: 2021-09-21 | End: 2021-09-21 | Stop reason: HOSPADM

## 2021-09-21 RX ORDER — SODIUM CHLORIDE, SODIUM LACTATE, POTASSIUM CHLORIDE, CALCIUM CHLORIDE 600; 310; 30; 20 MG/100ML; MG/100ML; MG/100ML; MG/100ML
9 INJECTION, SOLUTION INTRAVENOUS CONTINUOUS PRN
Status: DISCONTINUED | OUTPATIENT
Start: 2021-09-21 | End: 2021-09-21 | Stop reason: HOSPADM

## 2021-09-21 RX ORDER — HYDROCODONE BITARTRATE AND ACETAMINOPHEN 7.5; 325 MG/1; MG/1
1 TABLET ORAL EVERY 4 HOURS PRN
Status: DISCONTINUED | OUTPATIENT
Start: 2021-09-21 | End: 2021-09-21 | Stop reason: HOSPADM

## 2021-09-21 RX ORDER — LEVOTHYROXINE SODIUM 112 UG/1
TABLET ORAL
Qty: 90 TABLET | Refills: 0 | Status: SHIPPED | OUTPATIENT
Start: 2021-09-21 | End: 2021-12-14

## 2021-09-21 RX ORDER — SODIUM CHLORIDE 0.9 % (FLUSH) 0.9 %
10 SYRINGE (ML) INJECTION EVERY 12 HOURS SCHEDULED
Status: DISCONTINUED | OUTPATIENT
Start: 2021-09-21 | End: 2021-09-21 | Stop reason: HOSPADM

## 2021-09-21 RX ORDER — MIDAZOLAM HYDROCHLORIDE 1 MG/ML
0.5 INJECTION INTRAMUSCULAR; INTRAVENOUS
Status: DISCONTINUED | OUTPATIENT
Start: 2021-09-21 | End: 2021-09-21 | Stop reason: HOSPADM

## 2021-09-21 RX ORDER — SODIUM CHLORIDE 0.9 % (FLUSH) 0.9 %
10 SYRINGE (ML) INJECTION AS NEEDED
Status: DISCONTINUED | OUTPATIENT
Start: 2021-09-21 | End: 2021-09-21 | Stop reason: HOSPADM

## 2021-09-21 RX ORDER — PROPOFOL 10 MG/ML
VIAL (ML) INTRAVENOUS AS NEEDED
Status: DISCONTINUED | OUTPATIENT
Start: 2021-09-21 | End: 2021-09-21 | Stop reason: SURG

## 2021-09-21 RX ORDER — LIDOCAINE HYDROCHLORIDE 20 MG/ML
INJECTION, SOLUTION INFILTRATION; PERINEURAL AS NEEDED
Status: DISCONTINUED | OUTPATIENT
Start: 2021-09-21 | End: 2021-09-21 | Stop reason: SURG

## 2021-09-21 RX ORDER — OXYMETAZOLINE HYDROCHLORIDE 0.05 G/100ML
SPRAY NASAL AS NEEDED
Status: DISCONTINUED | OUTPATIENT
Start: 2021-09-21 | End: 2021-09-21 | Stop reason: HOSPADM

## 2021-09-21 RX ADMIN — EPHEDRINE SULFATE 10 MG: 50 INJECTION INTRAVENOUS at 10:49

## 2021-09-21 RX ADMIN — DEXAMETHASONE SODIUM PHOSPHATE 8 MG: 10 INJECTION INTRAMUSCULAR; INTRAVENOUS at 09:41

## 2021-09-21 RX ADMIN — PROPOFOL 200 MG: 10 INJECTION, EMULSION INTRAVENOUS at 09:29

## 2021-09-21 RX ADMIN — ROCURONIUM BROMIDE 20 MG: 50 INJECTION INTRAVENOUS at 10:55

## 2021-09-21 RX ADMIN — HYDROCODONE BITARTRATE AND ACETAMINOPHEN 1 TABLET: 5; 325 TABLET ORAL at 12:10

## 2021-09-21 RX ADMIN — SODIUM CHLORIDE, POTASSIUM CHLORIDE, SODIUM LACTATE AND CALCIUM CHLORIDE 9 ML/HR: 600; 310; 30; 20 INJECTION, SOLUTION INTRAVENOUS at 07:33

## 2021-09-21 RX ADMIN — FENTANYL CITRATE 50 MCG: 50 INJECTION INTRAMUSCULAR; INTRAVENOUS at 10:08

## 2021-09-21 RX ADMIN — ROCURONIUM BROMIDE 20 MG: 50 INJECTION INTRAVENOUS at 09:46

## 2021-09-21 RX ADMIN — ROCURONIUM BROMIDE 30 MG: 50 INJECTION INTRAVENOUS at 09:29

## 2021-09-21 RX ADMIN — ONDANSETRON 4 MG: 2 INJECTION INTRAMUSCULAR; INTRAVENOUS at 10:55

## 2021-09-21 RX ADMIN — EPHEDRINE SULFATE 10 MG: 50 INJECTION INTRAVENOUS at 09:56

## 2021-09-21 RX ADMIN — FENTANYL CITRATE 50 MCG: 50 INJECTION INTRAMUSCULAR; INTRAVENOUS at 10:15

## 2021-09-21 RX ADMIN — EPHEDRINE SULFATE 5 MG: 50 INJECTION INTRAVENOUS at 10:41

## 2021-09-21 RX ADMIN — NEOSTIGMINE METHYLSULFATE 4 MG: 1 INJECTION INTRAVENOUS at 11:31

## 2021-09-21 RX ADMIN — LIDOCAINE HYDROCHLORIDE 80 MG: 20 INJECTION, SOLUTION INFILTRATION; PERINEURAL at 09:29

## 2021-09-21 RX ADMIN — GLYCOPYRROLATE 0.4 MG: 0.2 INJECTION INTRAMUSCULAR; INTRAVENOUS at 11:31

## 2021-09-21 RX ADMIN — ROCURONIUM BROMIDE 10 MG: 50 INJECTION INTRAVENOUS at 10:28

## 2021-09-21 RX ADMIN — SODIUM CHLORIDE, POTASSIUM CHLORIDE, SODIUM LACTATE AND CALCIUM CHLORIDE: 600; 310; 30; 20 INJECTION, SOLUTION INTRAVENOUS at 11:03

## 2021-09-21 NOTE — OP NOTE
OPERATIVE NOTE    Patient Identification:  Name: David KHAN Age Sr.  Age: 81 y.o.  Sex: male  :  1940  MRN: [id number]                                                   Preoperative diagnosis: Right lower lid basal cell carcinoma approximately 8 mm Postoperative diagnosis: same  Procedure: 1.  Right lower eyelid excision of malignant lesion greater than one fourth 2.  Plastic repair of canaliculus with Ziegler tube insertion 3.  Verduzco tarsal conjunctival flap right lower eyelid 4.  Full-thickness skin graft right lower eyelid   Surgeon: Brian Bhatt MD who was present and scrubbed throughout all critical portions of the operation  Assistants: Kevin Blanco MD  Anesthesia: General  EBL: 75 cc  Specimens:  * No orders in the log *    Description of procedure: The patient was taken to the operating room and placed on the table in the supine position, where anesthesia was induced. 2% lidocaine with epinephrine and 0.5% marcaine in a 1:1 fashion was injected over the surgical site, and the patient was prepped and draped in the usual manner for orbitofacial surgery.     Corneal protectors was placed in the both eyes.     The lesion on the right lower eyelid was inspected closely and marked for excision.  15 Bard-Jair blade was used to make a skin incision and straight iris scissors were used to excise the lesion including the eyelid margin in its entirety.  The excision did involve the right lower eyelid punctum.  The specimen was sent for frozen section for margins.  Due to the involvement of the right lower eyelid tear duct a mono canalicular Ziegler tube was inserted into the canaliculus and passed through the nasolacrimal duct.  At this point the pathologist called and said there the lesion was in fact a basal cell carcinoma and that the lateral margin was still positive.  An additional lateral margin was sent which was eventually clear of any carcinoma.  However, once the lesion was fully excised  approximately two thirds of the lower eyelid was missing.  The decision was made to perform a Verduzco tarsal conjunctival flap to repair the lower eyelid defect    Attention was turned to the right upper lid.  The lid was everted with a Desmarres retractor and a tarsoconjunctival flap was taken from the central upper eyelid nearly the entire horizontal length of the lid. The ipsilateral corneal protector was removed and sutured to the lower eyelid defect inferiorly with 6-0 vicryl suture. A full thickness skin graft was then taken from the ipsilateral upper eyelid and the graft site was closed with 5-0 plain gut suture.    The skin graft was thinned to the appropriate thickness cut to fit the lower eyelid defect.  This skin graft was then sewn in place with 5-0 fast-absorbing gut suture in an interrupted and running fashion with care to keep the superior aspect 2mm above the margin.    Antibiotic ophthalmic ointment was used over the surgical site.  Moderate pressure patch was placed with a Telfa and 2 eye pads and held in place with benzoin and plastic tape.    The contralateral corneal protector was removed.    The patient was then awakened and taken from the operating room in good condition, having tolerated the procedure well. There were no complications, and the estimated blood loss was 75 cc.

## 2021-09-21 NOTE — NURSING NOTE
Dr compton called to clarify resuming coumadin. Ok to resume tonight no aspirin do not need to stop for recheck next week

## 2021-09-21 NOTE — ANESTHESIA PREPROCEDURE EVALUATION
Anesthesia Evaluation     NPO Solid Status: > 8 hours             Airway   Mallampati: II  TM distance: >3 FB  Neck ROM: full  no difficulty expected  Dental      Comment: All caps except front 4 lower teeth    Pulmonary - normal exam   (-) not a smoker  Cardiovascular     Rhythm: irregular    (+) hypertension, CAD, dysrhythmias Atrial Fib, PVD, hyperlipidemia,   (-) cardiac stents, CABG      Neuro/Psych  (+) dizziness/light headedness,     GI/Hepatic/Renal/Endo    (+)  GERD,  thyroid problem     Musculoskeletal     Abdominal    Substance History      OB/GYN          Other   arthritis,      ROS/Med Hx Other: Thoracic aneurysm  CDDD  LDDD                    Anesthesia Plan    ASA 3     general     intravenous induction     Anesthetic plan, all risks, benefits, and alternatives have been provided, discussed and informed consent has been obtained with: patient.

## 2021-09-21 NOTE — H&P
History & Physical       Patient: David KHAN Age Sr.    Date of Admission: 9/21/2021  6:48 AM    YOB: 1940    Medical Record Number: [id number]      Chief Complaints: Right lower lid basal cell carcinoma      History of Present Illness: 81 y.o. male presents with as above. No new meds/health problems since office visit      Allergies: No Known Allergies    10 point review of systems negative, except pertaining to the HPI    Medications:   Home Medications:  No current facility-administered medications on file prior to encounter.     Current Outpatient Medications on File Prior to Encounter   Medication Sig   • amiodarone (PACERONE) 200 MG tablet Take 0.5 tablets by mouth Daily.   • Bystolic 10 MG tablet Take 1/2 (one-half) tablet by mouth once daily (Patient taking differently: Take 5 mg by mouth Every Night.)   • doxazosin (CARDURA) 2 MG tablet Take 1 tablet by mouth once daily (Patient taking differently: Take 2 mg by mouth Daily.)   • hydrALAZINE (APRESOLINE) 25 MG tablet TAKE 1 TABLET BY MOUTH THREE TIMES DAILY (Patient taking differently: Take 25 mg by mouth 3 (Three) Times a Day.)   • isosorbide mononitrate (IMDUR) 30 MG 24 hr tablet Take 1 tablet by mouth once daily (Patient taking differently: Take 30 mg by mouth Daily.)   • olmesartan-hydrochlorothiazide (BENICAR HCT) 40-25 MG per tablet TAKE ONE TABLET BY MOUTH DAILY (Patient taking differently: Take 1 tablet by mouth Daily.)   • rosuvastatin (CRESTOR) 5 MG tablet Take 1 tablet by mouth once daily (Patient taking differently: Take 5 mg by mouth Daily.)   • acetaminophen (TYLENOL) 325 MG tablet Take 2 tablets by mouth Every 4 (Four) Hours As Needed for Mild Pain .   • aspirin 81 MG tablet Take 81 mg by mouth Daily. HELD FOR OR PER MD INSTRUCTIONS   • multivitamin with minerals (MULTIVITAMIN ADULT PO) Take 1 tablet by mouth Daily. HELD FOR OR   • warfarin (COUMADIN) 7.5 MG tablet TAKE 1 TABLET BY MOUTH ONCE DAILY EVERY  NIGHT (Patient taking  differently: Take 7.5 mg by mouth Every Night. TO HOLD 5 DAYS PRIOR TO OR PER MD INSTRUCTIONS)     Current Medications:  Scheduled Meds:fentaNYL citrate (PF), 50 mcg, Intravenous, Once  midazolam, 1 mg, Intravenous, Once  sodium chloride, 10 mL, Intravenous, Q12H      Continuous Infusions:lactated ringers, 9 mL/hr, Last Rate: 9 mL/hr (09/21/21 9544)      PRN Meds:.lactated ringers  •  midazolam  •  sodium chloride    Past Medical History:   Diagnosis Date   • Aneurysm of thoracic aorta (CMS/HCC)     CT CHEST 8/2021 IN [facility]    • Arthritis    • Basal cell carcinoma     RIGHT LOWER LID    • BPH (benign prostatic hyperplasia)    • Chronic lumbar pain    • Degenerative arthritis of lumbar spine    • Degenerative cervical disc    • Disease of thyroid gland    • Elevated rheumatoid factor    • Hematuria, gross    • History of atrial fibrillation    • Hyperlipidemia    • Hypertension    • Hypogonadism in male    • Muscle strain of left upper back     PRESCRIBED PREDNISONE DOSE PACK    • On anticoagulant therapy    • Pulmonary nodule     6 MM - REPEAT CT SCAN IN ONE YEAR, WATCHING    • Refused influenza vaccine    • Scoliosis    • Vitamin D deficiency         Past Surgical History:   Procedure Laterality Date   • ANKLE FUSION Right    • CARDIAC CATHETERIZATION     • CARDIAC CATHETERIZATION N/A 7/2/2021    Procedure: Left Heart Cath;  Surgeon: Harlan Downing MD;  Location:  [facility];  Service: Cardiology;  Laterality: N/A;   • CARDIAC CATHETERIZATION N/A 7/2/2021    Procedure: Coronary angiography;  Surgeon: Harlan Downing MD;  Location:  [facility];  Service: Cardiology;  Laterality: N/A;   • CARDIAC CATHETERIZATION N/A 7/2/2021    Procedure: Left ventriculography;  Surgeon: Harlan Downing MD;  Location:  [facility];  Service: Cardiology;  Laterality: N/A;   • INGUINAL HERNIA REPAIR Right    • REPLACEMENT TOTAL KNEE Bilateral 2000   •  ROTATOR CUFF REPAIR Left    • ROTATOR CUFF REPAIR Right    • SKIN BIOPSY     • VASECTOMY          Social History     Occupational History   • Not on file   Tobacco Use   • Smoking status: Never Smoker   • Smokeless tobacco: Never Used   Vaping Use   • Vaping Use: Never used   Substance and Sexual Activity   • Alcohol use: No   • Drug use: No   • Sexual activity: Defer      Social History     Social History Narrative   • Not on file        Family History   Problem Relation Age of Onset   • Hypertension Father    • Heart attack Father    • Heart attack Brother    • Alcohol abuse Brother    • Hypertension Brother    • Hypertension Paternal Grandmother    • Hypertension Paternal Grandfather    • Anemia Mother    • Arthritis Mother    • Hypertension Mother    • Hypertension Maternal Grandmother    • Heart disease Other         FH in males before age 55   • Malig Hyperthermia Neg Hx            Physical Exam   Constitutional: Alert, cooperative, in no acute distress    Head: Normocephalic.   Eyes:    Right lower eyelid lesion with ulceration and madarosis along central lid margin  Neck: Normal range of motion.   Cardiovascular: Normal rate.    Pulmonary/Chest: Effort normal.   Neurological: Alert.   Skin: Skin is warm.   Psychiatric: Normal mood and affect.       Assessment/Plan:  The patient voiced understanding of the risks, benefits, and alternative forms of treatment that were discussed and the patient consents to proceed with Right lower eyelid excision of malignant lesion with frozen sections, reconstruction of right lower eyelid with possible rotational flap and possible full-thickness skin graft.      Brian Bhatt MD

## 2021-09-21 NOTE — ANESTHESIA POSTPROCEDURE EVALUATION
Patient: David KHAN Age Sr.    Procedure Summary     Date: 09/21/21 [facility]    Anesthesia Start: 0924 Anesthesia Stop: 1148    Procedure: RIGHT LOWER LID EXCISION OF BASAL CELL CARCINOMA WITH FROZEN SECTION RIGHT LOWER LID RECONSTRUCTION WITH CAST FLAP, REPAIR OF CANULICULIS, AND FULL THICKNESS SKIN GRAFT (Right Eye) Diagnosis:     Surgeons: Brian Bhatt MD Provider: Robert Rosen MD    Anesthesia Type: general ASA Status: 3          Anesthesia Type: general    Vitals  Vitals Value Taken Time   /74 09/21/21 1216   Temp 36.2 °C (97.2 °F) 09/21/21 1144   Pulse 53 09/21/21 1228   Resp 16 09/21/21 1215   SpO2 91 % 09/21/21 1228   Vitals shown include unvalidated device data.        Post Anesthesia Care and Evaluation    Patient location during evaluation: bedside  Patient participation: complete - patient participated  Level of consciousness: awake  Pain management: adequate  Airway patency: patent  Anesthetic complications: No anesthetic complications  PONV Status: controlled  Cardiovascular status: acceptable  Respiratory status: acceptable  Hydration status: acceptable    Comments: --------------------            09/21/21               1236     --------------------   BP:       137/79     Pulse:               Resp:       18       Temp:                SpO2:      95%      --------------------

## 2021-09-21 NOTE — ANESTHESIA PROCEDURE NOTES
Airway  Urgency: elective    Date/Time: 9/21/2021 9:35 AM  Airway not difficult    General Information and Staff    Patient location during procedure: OR  CRNA: Aditi Rodriguez CRNA    Indications and Patient Condition  Indications for airway management: airway protection    Preoxygenated: yes  Mask difficulty assessment: 2 - vent by mask + OA or adjuvant +/- NMBA    Final Airway Details  Final airway type: endotracheal airway      Successful airway: ETT  Cuffed: yes   Successful intubation technique: direct laryngoscopy  Endotracheal tube insertion site: oral  Blade: Youssef  Blade size: 2  ETT size (mm): 7.5  Cormack-Lehane Classification: grade I - full view of glottis  Placement verified by: chest auscultation and capnometry   Cuff volume (mL): 8  Number of attempts at approach: 1  Assessment: lips, teeth, and gum same as pre-op and atraumatic intubation    Additional Comments  PreO2 with 100% O2;  FeO2 >85%;  sniff position; easy mask ventilation;  Intubated with no difficultly after eyes taped; Appears atraumatic;  Lips and teeth intact as preop condition;  Airway secured. Connected to ventilator.

## 2021-09-22 LAB
LAB AP CASE REPORT: NORMAL
Lab: NORMAL
PATH REPORT.FINAL DX SPEC: NORMAL
PATH REPORT.GROSS SPEC: NORMAL

## 2021-09-27 RX ORDER — ROSUVASTATIN CALCIUM 5 MG/1
TABLET, COATED ORAL
Qty: 90 TABLET | Refills: 1 | Status: SHIPPED | OUTPATIENT
Start: 2021-09-27 | End: 2022-03-31

## 2021-10-09 DIAGNOSIS — I10 ESSENTIAL HYPERTENSION: ICD-10-CM

## 2021-10-10 RX ORDER — DOXAZOSIN 2 MG/1
TABLET ORAL
Qty: 90 TABLET | Refills: 0 | Status: SHIPPED | OUTPATIENT
Start: 2021-10-10 | End: 2021-12-26

## 2021-10-10 RX ORDER — NEBIVOLOL 10 MG/1
TABLET ORAL
Qty: 30 TABLET | Refills: 0 | Status: SHIPPED | OUTPATIENT
Start: 2021-10-10 | End: 2021-10-18

## 2021-10-13 ENCOUNTER — TELEPHONE (OUTPATIENT)
Dept: FAMILY MEDICINE CLINIC | Facility: CLINIC | Age: 81
End: 2021-10-13

## 2021-10-13 ENCOUNTER — PRE-ADMISSION TESTING (OUTPATIENT)
Dept: PREADMISSION TESTING | Facility: HOSPITAL | Age: 81
End: 2021-10-13

## 2021-10-13 VITALS
SYSTOLIC BLOOD PRESSURE: 149 MMHG | RESPIRATION RATE: 18 BRPM | BODY MASS INDEX: 33.35 KG/M2 | HEIGHT: 72 IN | OXYGEN SATURATION: 97 % | DIASTOLIC BLOOD PRESSURE: 87 MMHG | TEMPERATURE: 97.8 F | WEIGHT: 246.2 LBS | HEART RATE: 62 BPM

## 2021-10-13 LAB
ANION GAP SERPL CALCULATED.3IONS-SCNC: 11.4 MMOL/L (ref 5–15)
BUN SERPL-MCNC: 21 MG/DL (ref 8–23)
BUN/CREAT SERPL: 22.1 (ref 7–25)
CALCIUM SPEC-SCNC: 9.4 MG/DL (ref 8.6–10.5)
CHLORIDE SERPL-SCNC: 102 MMOL/L (ref 98–107)
CO2 SERPL-SCNC: 25.6 MMOL/L (ref 22–29)
CREAT SERPL-MCNC: 0.95 MG/DL (ref 0.76–1.27)
DEPRECATED RDW RBC AUTO: 45.3 FL (ref 37–54)
ERYTHROCYTE [DISTWIDTH] IN BLOOD BY AUTOMATED COUNT: 12.3 % (ref 12.3–15.4)
GFR SERPL CREATININE-BSD FRML MDRD: 76 ML/MIN/1.73
GLUCOSE SERPL-MCNC: 105 MG/DL (ref 65–99)
HCT VFR BLD AUTO: 39.1 % (ref 37.5–51)
HGB BLD-MCNC: 12.7 G/DL (ref 13–17.7)
MCH RBC QN AUTO: 32.2 PG (ref 26.6–33)
MCHC RBC AUTO-ENTMCNC: 32.5 G/DL (ref 31.5–35.7)
MCV RBC AUTO: 99 FL (ref 79–97)
PLATELET # BLD AUTO: 172 10*3/MM3 (ref 140–450)
PMV BLD AUTO: 11.3 FL (ref 6–12)
POTASSIUM SERPL-SCNC: 4.1 MMOL/L (ref 3.5–5.2)
RBC # BLD AUTO: 3.95 10*6/MM3 (ref 4.14–5.8)
SODIUM SERPL-SCNC: 139 MMOL/L (ref 136–145)
WBC # BLD AUTO: 5.13 10*3/MM3 (ref 3.4–10.8)

## 2021-10-13 PROCEDURE — 36415 COLL VENOUS BLD VENIPUNCTURE: CPT

## 2021-10-13 PROCEDURE — 85027 COMPLETE CBC AUTOMATED: CPT

## 2021-10-13 PROCEDURE — 80048 BASIC METABOLIC PNL TOTAL CA: CPT

## 2021-10-13 RX ORDER — ASPIRIN 81 MG/1
81 TABLET ORAL DAILY
COMMUNITY
End: 2021-10-19 | Stop reason: HOSPADM

## 2021-10-13 NOTE — DISCHARGE INSTRUCTIONS
Arrive to hospital on your day of surgery at 1000AM    Take the following medications the morning of surgery: HYDRALAZINE, PACERONE, NEXIUM, ISORBIDE AND CARDURA      If you are on prescription narcotic pain medication to control your pain you may also take that medication the morning of surgery.    General Instructions:  • Do not eat solid food after midnight the night before surgery.  • You may drink clear liquids day of surgery but must stop at least one hour before your hospital arrival time.  • It is beneficial for you to have a clear drink that contains carbohydrates the day of surgery.  We suggest a 12 to 20 ounce bottle of Gatorade or Powerade for non-diabetic patients or a 12 to 20 ounce bottle of G2 or Powerade Zero for diabetic patients. (Pediatric patients, are not advised to drink a 12 to 20 ounce carbohydrate drink)    Clear liquids are liquids you can see through.  Nothing red in color.     Plain water                               Sports drinks  Sodas                                   Gelatin (Jell-O)  Fruit juices without pulp such as white grape juice and apple juice  Popsicles that contain no fruit or yogurt  Tea or coffee (no cream or milk added)  Gatorade / Powerade  G2 / Powerade Zero    • Infants may have breast milk up to four hours before surgery.  • Infants drinking formula may drink formula up to six hours before surgery.   • Patients who avoid smoking, chewing tobacco and alcohol for 4 weeks prior to surgery have a reduced risk of post-operative complications.  Quit smoking as many days before surgery as you can.  • Do not smoke, use chewing tobacco or drink alcohol the day of surgery.   • If applicable bring your C-PAP/ BI-PAP machine.  • Bring any papers given to you in the doctor’s office.  • Wear clean comfortable clothes.  • Do not wear contact lenses, false eyelashes or make-up.  Bring a case for your glasses.   • Bring crutches or walker if applicable.  • Remove all piercings.   Leave jewelry and any other valuables at home.  • Hair extensions with metal clips must be removed prior to surgery.  • The Pre-Admission Testing nurse will instruct you to bring medications if unable to obtain an accurate list in Pre-Admission Testing.        If you were given a blood bank ID arm band remember to bring it with you the day of surgery.    Preventing a Surgical Site Infection:  • For 2 to 3 days before surgery, avoid shaving with a razor because the razor can irritate skin and make it easier to develop an infection.    • Any areas of open skin can increase the risk of a post-operative wound infection by allowing bacteria to enter and travel throughout the body.  Notify your surgeon if you have any skin wounds / rashes even if it is not near the expected surgical site.  The area will need assessed to determine if surgery should be delayed until it is healed.  • The night prior to surgery shower using a fresh bar of anti-bacterial soap (such as Dial) and clean washcloth.  Sleep in a clean bed with clean clothing.  Do not allow pets to sleep with you.  • Shower on the morning of surgery using a fresh bar of anti-bacterial soap (such as Dial) and clean washcloth.  Dry with a clean towel and dress in clean clothing.  • Ask your surgeon if you will be receiving antibiotics prior to surgery.  • Make sure you, your family, and all healthcare providers clean their hands with soap and water or an alcohol based hand  before caring for you or your wound.    Day of surgery:  Your arrival time is approximately two hours before your scheduled surgery time.  Upon arrival, a Pre-op nurse and Anesthesiologist will review your health history, obtain vital signs, and answer questions you may have.  The only belongings needed at this time will be a list of your home medications and if applicable your C-PAP/BI-PAP machine.  A Pre-op nurse will start an IV and you may receive medication in preparation for surgery,  including something to help you relax.     Please be aware that surgery does come with discomfort.  We want to make every effort to control your discomfort so please discuss any uncontrolled symptoms with your nurse.   Your doctor will most likely have prescribed pain medications.      If you are going home after surgery you will receive individualized written care instructions before being discharged.  A responsible adult must drive you to and from the hospital on the day of your surgery and stay with you for 24 hours.  Discharge prescriptions can be filled by the hospital pharmacy during regular pharmacy hours.  If you are having surgery late in the day/evening your prescription may be e-prescribed to your pharmacy.  Please verify your pharmacy hours or chose a 24 hour pharmacy to avoid not having access to your prescription because your pharmacy has closed for the day.    If you are staying overnight following surgery, you will be transported to your hospital room following the recovery period.  [facility] has all private rooms.    If you have any questions please call Pre-Admission Testing at [phone].  Deductibles and co-payments are collected on the day of service. Please be prepared to pay the required co-pay, deductible or deposit on the day of service as defined by your plan.    Patient Education for Self-Quarantine Process    • Following your COVID testing, we strongly recommend that you wear a mask when you are with other people and practice social distancing.   • Limit your activities to only required outings.  • Wash your hands with soap and water frequently for at least 20 seconds.   • Avoid touching your eyes, nose and mouth with unwashed hands.  • Do not share anything - utensils, drinking glasses, food from the same bowl.   • Sanitize household surfaces daily. Include all high touch areas (door handles, light switches, phones, countertops, etc.)    Call your surgeon immediately  if you experience any of the following symptoms:  • Sore Throat  • Shortness of Breath or difficulty breathing  • Cough  • Chills  • Body soreness or muscle pain  • Headache  • Fever  • New loss of taste or smell  • Do not arrive for your surgery ill.  Your procedure will need to be rescheduled to another time.  You will need to call your physician before the day of surgery to avoid any unnecessary exposure to hospital staff as well as other patients.

## 2021-10-13 NOTE — TELEPHONE ENCOUNTER
Pharmacy Name:  [facility]    Pharmacy representative name: HOLLIE    Pharmacy representative phone number: [phone]  CASE ID NUMBER IS: [id number]    What medication are you calling in regards to: NEBIVOLOL    What question does the pharmacy have: THEY ARE WANTING TO KNOW SOME CLINICAL QUESTIONS.    Who is the provider that prescribed the medication: DOCTOR DONTRELL

## 2021-10-14 NOTE — TELEPHONE ENCOUNTER
Spoke with [facility] customer service and paperwork is being faxed over for Dr Harden to fill out and fax over.

## 2021-10-15 ENCOUNTER — TELEPHONE (OUTPATIENT)
Dept: CARDIOLOGY | Facility: CLINIC | Age: 81
End: 2021-10-15

## 2021-10-15 NOTE — TELEPHONE ENCOUNTER
Received a fax from [facility] eye specialist stating that the patient is having a second reconstruction eye lid surgery.     Pt was cleared on 9/21/2021     Per Naveed CALLE patient is to hold Coumadin for 3 days and ASA for 5 days with a small risk.     Patient informed and letter sent.

## 2021-10-16 ENCOUNTER — LAB (OUTPATIENT)
Dept: LAB | Facility: HOSPITAL | Age: 81
End: 2021-10-16

## 2021-10-16 DIAGNOSIS — I10 ESSENTIAL HYPERTENSION: ICD-10-CM

## 2021-10-16 PROCEDURE — U0005 INFEC AGEN DETEC AMPLI PROBE: HCPCS

## 2021-10-16 PROCEDURE — U0004 COV-19 TEST NON-CDC HGH THRU: HCPCS

## 2021-10-16 PROCEDURE — C9803 HOPD COVID-19 SPEC COLLECT: HCPCS

## 2021-10-17 LAB — SARS-COV-2 ORF1AB RESP QL NAA+PROBE: NOT DETECTED

## 2021-10-18 DIAGNOSIS — I10 ESSENTIAL HYPERTENSION: ICD-10-CM

## 2021-10-18 RX ORDER — OLMESARTAN MEDOXOMIL AND HYDROCHLOROTHIAZIDE 40/25 40; 25 MG/1; MG/1
1 TABLET ORAL DAILY
Qty: 90 TABLET | Refills: 0 | Status: SHIPPED | OUTPATIENT
Start: 2021-10-18 | End: 2021-12-14

## 2021-10-18 RX ORDER — NEBIVOLOL 10 MG/1
5 TABLET ORAL NIGHTLY
Qty: 30 TABLET | Refills: 0 | Status: SHIPPED | OUTPATIENT
Start: 2021-10-18 | End: 2021-12-16 | Stop reason: SDUPTHER

## 2021-10-18 NOTE — TELEPHONE ENCOUNTER
Rx Refill Note  Requested Prescriptions     Pending Prescriptions Disp Refills   • olmesartan-hydrochlorothiazide (BENICAR HCT) 40-25 MG per tablet [Pharmacy Med Name: OLMESARTAN-HCTZ 40-25 MG TAB] 90 tablet 0     Sig: Take 1 tablet by mouth Daily.      Last office visit with prescribing clinician: 9/17/2021      Next office visit with prescribing clinician: Visit date not found            Shyanne Dawn MA  10/18/21, 17:35 EDT

## 2021-10-18 NOTE — TELEPHONE ENCOUNTER
Rx Refill Note  Requested Prescriptions     Pending Prescriptions Disp Refills   • nebivolol (BYSTOLIC) 10 MG tablet [Pharmacy Med Name: NEBIVOLOL 10MG      TAB] 30 tablet 0     Sig: Take 1/2 (one-half) tablet by mouth once daily      Last office visit with prescribing clinician: 9/17/2021      Next office visit with prescribing clinician: Visit date not found            Shyanne Dawn MA  10/18/21, 11:03 EDT

## 2021-10-19 ENCOUNTER — ANESTHESIA EVENT (OUTPATIENT)
Dept: PERIOP | Facility: HOSPITAL | Age: 81
End: 2021-10-19

## 2021-10-19 ENCOUNTER — ANESTHESIA (OUTPATIENT)
Dept: PERIOP | Facility: HOSPITAL | Age: 81
End: 2021-10-19

## 2021-10-19 ENCOUNTER — HOSPITAL ENCOUNTER (OUTPATIENT)
Facility: HOSPITAL | Age: 81
Setting detail: HOSPITAL OUTPATIENT SURGERY
Discharge: HOME | End: 2021-10-19
Attending: OPHTHALMOLOGY | Admitting: OPHTHALMOLOGY

## 2021-10-19 VITALS
OXYGEN SATURATION: 93 % | SYSTOLIC BLOOD PRESSURE: 118 MMHG | DIASTOLIC BLOOD PRESSURE: 69 MMHG | TEMPERATURE: 97.6 F | HEART RATE: 59 BPM | RESPIRATION RATE: 17 BRPM

## 2021-10-19 LAB
INR PPP: 1.11 (ref 0.9–1.1)
PROTHROMBIN TIME: 14.1 SECONDS (ref 11.7–14.2)
QT INTERVAL: 471 MS

## 2021-10-19 PROCEDURE — 93010 ELECTROCARDIOGRAM REPORT: CPT | Performed by: INTERNAL MEDICINE

## 2021-10-19 PROCEDURE — 25010000002 FENTANYL CITRATE (PF) 50 MCG/ML SOLUTION: Performed by: NURSE ANESTHETIST, CERTIFIED REGISTERED

## 2021-10-19 PROCEDURE — 85610 PROTHROMBIN TIME: CPT | Performed by: ANESTHESIOLOGY

## 2021-10-19 PROCEDURE — 93005 ELECTROCARDIOGRAM TRACING: CPT | Performed by: ANESTHESIOLOGY

## 2021-10-19 PROCEDURE — 25010000002 PROPOFOL 10 MG/ML EMULSION: Performed by: NURSE ANESTHETIST, CERTIFIED REGISTERED

## 2021-10-19 PROCEDURE — 25010000002 ONDANSETRON PER 1 MG: Performed by: NURSE ANESTHETIST, CERTIFIED REGISTERED

## 2021-10-19 PROCEDURE — 25010000002 DEXAMETHASONE PER 1 MG: Performed by: NURSE ANESTHETIST, CERTIFIED REGISTERED

## 2021-10-19 RX ORDER — ONDANSETRON 2 MG/ML
INJECTION INTRAMUSCULAR; INTRAVENOUS AS NEEDED
Status: DISCONTINUED | OUTPATIENT
Start: 2021-10-19 | End: 2021-10-19 | Stop reason: SURG

## 2021-10-19 RX ORDER — PROPOFOL 10 MG/ML
VIAL (ML) INTRAVENOUS AS NEEDED
Status: DISCONTINUED | OUTPATIENT
Start: 2021-10-19 | End: 2021-10-19 | Stop reason: SURG

## 2021-10-19 RX ORDER — IBUPROFEN 600 MG/1
600 TABLET ORAL ONCE AS NEEDED
Status: DISCONTINUED | OUTPATIENT
Start: 2021-10-19 | End: 2021-10-19 | Stop reason: HOSPADM

## 2021-10-19 RX ORDER — SODIUM CHLORIDE, SODIUM LACTATE, POTASSIUM CHLORIDE, CALCIUM CHLORIDE 600; 310; 30; 20 MG/100ML; MG/100ML; MG/100ML; MG/100ML
9 INJECTION, SOLUTION INTRAVENOUS CONTINUOUS
Status: DISCONTINUED | OUTPATIENT
Start: 2021-10-19 | End: 2021-10-19 | Stop reason: HOSPADM

## 2021-10-19 RX ORDER — ONDANSETRON 2 MG/ML
4 INJECTION INTRAMUSCULAR; INTRAVENOUS ONCE AS NEEDED
Status: DISCONTINUED | OUTPATIENT
Start: 2021-10-19 | End: 2021-10-19 | Stop reason: HOSPADM

## 2021-10-19 RX ORDER — HYDROCODONE BITARTRATE AND ACETAMINOPHEN 5; 325 MG/1; MG/1
1 TABLET ORAL EVERY 6 HOURS PRN
Qty: 15 TABLET | Refills: 0 | Status: CANCELLED | OUTPATIENT
Start: 2021-10-19

## 2021-10-19 RX ORDER — MIDAZOLAM HYDROCHLORIDE 1 MG/ML
0.5 INJECTION INTRAMUSCULAR; INTRAVENOUS
Status: DISCONTINUED | OUTPATIENT
Start: 2021-10-19 | End: 2021-10-19 | Stop reason: HOSPADM

## 2021-10-19 RX ORDER — HYDROCODONE BITARTRATE AND ACETAMINOPHEN 7.5; 325 MG/1; MG/1
1 TABLET ORAL ONCE AS NEEDED
Status: COMPLETED | OUTPATIENT
Start: 2021-10-19 | End: 2021-10-19

## 2021-10-19 RX ORDER — OXYMETAZOLINE HYDROCHLORIDE 0.05 G/100ML
SPRAY NASAL AS NEEDED
Status: DISCONTINUED | OUTPATIENT
Start: 2021-10-19 | End: 2021-10-19 | Stop reason: HOSPADM

## 2021-10-19 RX ORDER — DIPHENHYDRAMINE HCL 25 MG
25 CAPSULE ORAL
Status: DISCONTINUED | OUTPATIENT
Start: 2021-10-19 | End: 2021-10-19 | Stop reason: HOSPADM

## 2021-10-19 RX ORDER — SODIUM CHLORIDE 0.9 % (FLUSH) 0.9 %
3 SYRINGE (ML) INJECTION EVERY 12 HOURS SCHEDULED
Status: DISCONTINUED | OUTPATIENT
Start: 2021-10-19 | End: 2021-10-19 | Stop reason: HOSPADM

## 2021-10-19 RX ORDER — ACETAMINOPHEN 500 MG
500 TABLET ORAL ONCE
Status: COMPLETED | OUTPATIENT
Start: 2021-10-19 | End: 2021-10-19

## 2021-10-19 RX ORDER — FENTANYL CITRATE 50 UG/ML
50 INJECTION, SOLUTION INTRAMUSCULAR; INTRAVENOUS
Status: DISCONTINUED | OUTPATIENT
Start: 2021-10-19 | End: 2021-10-19 | Stop reason: HOSPADM

## 2021-10-19 RX ORDER — ERYTHROMYCIN 5 MG/G
OINTMENT OPHTHALMIC 2 TIMES DAILY
Qty: 3.5 G | Refills: 1 | Status: SHIPPED | OUTPATIENT
Start: 2021-10-19 | End: 2022-09-14

## 2021-10-19 RX ORDER — MAGNESIUM HYDROXIDE 1200 MG/15ML
LIQUID ORAL AS NEEDED
Status: DISCONTINUED | OUTPATIENT
Start: 2021-10-19 | End: 2021-10-19 | Stop reason: HOSPADM

## 2021-10-19 RX ORDER — LABETALOL HYDROCHLORIDE 5 MG/ML
5 INJECTION, SOLUTION INTRAVENOUS
Status: DISCONTINUED | OUTPATIENT
Start: 2021-10-19 | End: 2021-10-19 | Stop reason: HOSPADM

## 2021-10-19 RX ORDER — FLUMAZENIL 0.1 MG/ML
0.2 INJECTION INTRAVENOUS AS NEEDED
Status: DISCONTINUED | OUTPATIENT
Start: 2021-10-19 | End: 2021-10-19 | Stop reason: HOSPADM

## 2021-10-19 RX ORDER — EPHEDRINE SULFATE 50 MG/ML
INJECTION, SOLUTION INTRAVENOUS AS NEEDED
Status: DISCONTINUED | OUTPATIENT
Start: 2021-10-19 | End: 2021-10-19 | Stop reason: SURG

## 2021-10-19 RX ORDER — HYDRALAZINE HYDROCHLORIDE 20 MG/ML
5 INJECTION INTRAMUSCULAR; INTRAVENOUS
Status: DISCONTINUED | OUTPATIENT
Start: 2021-10-19 | End: 2021-10-19 | Stop reason: HOSPADM

## 2021-10-19 RX ORDER — LIDOCAINE HYDROCHLORIDE 20 MG/ML
INJECTION, SOLUTION INFILTRATION; PERINEURAL AS NEEDED
Status: DISCONTINUED | OUTPATIENT
Start: 2021-10-19 | End: 2021-10-19 | Stop reason: SURG

## 2021-10-19 RX ORDER — ERYTHROMYCIN 5 MG/G
OINTMENT OPHTHALMIC AS NEEDED
Status: DISCONTINUED | OUTPATIENT
Start: 2021-10-19 | End: 2021-10-19 | Stop reason: HOSPADM

## 2021-10-19 RX ORDER — PROMETHAZINE HYDROCHLORIDE 25 MG/1
25 SUPPOSITORY RECTAL ONCE AS NEEDED
Status: DISCONTINUED | OUTPATIENT
Start: 2021-10-19 | End: 2021-10-19 | Stop reason: HOSPADM

## 2021-10-19 RX ORDER — HYDROMORPHONE HYDROCHLORIDE 1 MG/ML
0.5 INJECTION, SOLUTION INTRAMUSCULAR; INTRAVENOUS; SUBCUTANEOUS
Status: DISCONTINUED | OUTPATIENT
Start: 2021-10-19 | End: 2021-10-19 | Stop reason: HOSPADM

## 2021-10-19 RX ORDER — EPHEDRINE SULFATE 50 MG/ML
5 INJECTION, SOLUTION INTRAVENOUS ONCE AS NEEDED
Status: DISCONTINUED | OUTPATIENT
Start: 2021-10-19 | End: 2021-10-19 | Stop reason: HOSPADM

## 2021-10-19 RX ORDER — FENTANYL CITRATE 50 UG/ML
INJECTION, SOLUTION INTRAMUSCULAR; INTRAVENOUS AS NEEDED
Status: DISCONTINUED | OUTPATIENT
Start: 2021-10-19 | End: 2021-10-19 | Stop reason: SURG

## 2021-10-19 RX ORDER — NALOXONE HCL 0.4 MG/ML
0.2 VIAL (ML) INJECTION AS NEEDED
Status: DISCONTINUED | OUTPATIENT
Start: 2021-10-19 | End: 2021-10-19 | Stop reason: HOSPADM

## 2021-10-19 RX ORDER — DEXAMETHASONE SODIUM PHOSPHATE 10 MG/ML
INJECTION INTRAMUSCULAR; INTRAVENOUS AS NEEDED
Status: DISCONTINUED | OUTPATIENT
Start: 2021-10-19 | End: 2021-10-19 | Stop reason: SURG

## 2021-10-19 RX ORDER — PROMETHAZINE HYDROCHLORIDE 25 MG/1
25 TABLET ORAL ONCE AS NEEDED
Status: DISCONTINUED | OUTPATIENT
Start: 2021-10-19 | End: 2021-10-19 | Stop reason: HOSPADM

## 2021-10-19 RX ORDER — FAMOTIDINE 10 MG/ML
20 INJECTION, SOLUTION INTRAVENOUS ONCE
Status: COMPLETED | OUTPATIENT
Start: 2021-10-19 | End: 2021-10-19

## 2021-10-19 RX ORDER — GLYCOPYRROLATE 0.2 MG/ML
INJECTION INTRAMUSCULAR; INTRAVENOUS AS NEEDED
Status: DISCONTINUED | OUTPATIENT
Start: 2021-10-19 | End: 2021-10-19 | Stop reason: SURG

## 2021-10-19 RX ORDER — OXYCODONE AND ACETAMINOPHEN 10; 325 MG/1; MG/1
1 TABLET ORAL EVERY 4 HOURS PRN
Status: DISCONTINUED | OUTPATIENT
Start: 2021-10-19 | End: 2021-10-19 | Stop reason: HOSPADM

## 2021-10-19 RX ORDER — SODIUM CHLORIDE 0.9 % (FLUSH) 0.9 %
3-10 SYRINGE (ML) INJECTION AS NEEDED
Status: DISCONTINUED | OUTPATIENT
Start: 2021-10-19 | End: 2021-10-19 | Stop reason: HOSPADM

## 2021-10-19 RX ORDER — DIPHENHYDRAMINE HYDROCHLORIDE 50 MG/ML
12.5 INJECTION INTRAMUSCULAR; INTRAVENOUS
Status: DISCONTINUED | OUTPATIENT
Start: 2021-10-19 | End: 2021-10-19 | Stop reason: HOSPADM

## 2021-10-19 RX ADMIN — PROPOFOL 150 MG: 10 INJECTION, EMULSION INTRAVENOUS at 13:42

## 2021-10-19 RX ADMIN — EPHEDRINE SULFATE 10 MG: 50 INJECTION INTRAVENOUS at 14:02

## 2021-10-19 RX ADMIN — FENTANYL CITRATE 50 MCG: 50 INJECTION INTRAMUSCULAR; INTRAVENOUS at 14:35

## 2021-10-19 RX ADMIN — EPHEDRINE SULFATE 10 MG: 50 INJECTION INTRAVENOUS at 14:14

## 2021-10-19 RX ADMIN — ONDANSETRON 4 MG: 2 INJECTION INTRAMUSCULAR; INTRAVENOUS at 13:47

## 2021-10-19 RX ADMIN — EPHEDRINE SULFATE 10 MG: 50 INJECTION INTRAVENOUS at 13:50

## 2021-10-19 RX ADMIN — EPHEDRINE SULFATE 10 MG: 50 INJECTION INTRAVENOUS at 13:54

## 2021-10-19 RX ADMIN — ACETAMINOPHEN 500 MG: 500 TABLET, FILM COATED ORAL at 11:35

## 2021-10-19 RX ADMIN — DEXAMETHASONE SODIUM PHOSPHATE 4 MG: 10 INJECTION INTRAMUSCULAR; INTRAVENOUS at 13:45

## 2021-10-19 RX ADMIN — GLYCOPYRROLATE 0.1 MG: 0.2 INJECTION INTRAMUSCULAR; INTRAVENOUS at 13:55

## 2021-10-19 RX ADMIN — HYDROCODONE BITARTRATE AND ACETAMINOPHEN 1 TABLET: 7.5; 325 TABLET ORAL at 14:35

## 2021-10-19 RX ADMIN — FAMOTIDINE 20 MG: 10 INJECTION INTRAVENOUS at 11:37

## 2021-10-19 RX ADMIN — EPHEDRINE SULFATE 20 MG: 50 INJECTION INTRAVENOUS at 14:05

## 2021-10-19 RX ADMIN — FENTANYL CITRATE 25 MCG: 50 INJECTION INTRAMUSCULAR; INTRAVENOUS at 13:46

## 2021-10-19 RX ADMIN — SODIUM CHLORIDE, POTASSIUM CHLORIDE, SODIUM LACTATE AND CALCIUM CHLORIDE 9 ML/HR: 600; 310; 30; 20 INJECTION, SOLUTION INTRAVENOUS at 11:18

## 2021-10-19 RX ADMIN — FENTANYL CITRATE 25 MCG: 50 INJECTION INTRAMUSCULAR; INTRAVENOUS at 13:42

## 2021-10-19 RX ADMIN — LIDOCAINE HYDROCHLORIDE 100 MG: 20 INJECTION, SOLUTION INFILTRATION; PERINEURAL at 13:42

## 2021-10-19 NOTE — OP NOTE
OPERATIVE NOTE    Patient Identification:  Name: David KHAN Age Sr.  Age: 81 y.o.  Sex: male  :  1940  MRN: [id number]                                                Preoperative diagnosis: Right lower lid status post 1st stage verduzco procedure  Postoperative diagnosis: same  Procedure: 1.  Right second stage verduzco procedure 2.  Probing of right lacrimal duct  Surgeon: Brian Bhatt MD who was present and scrubbed throughout all critical portions of the operation  Assistants: Bren Khalil MD, Emery Pineda MD, Barry Rubio MD, Isabela Oconnor MS4  Anesthesia: General  EBL: less than 50cc  Specimens:  * No orders in the log *    Description of the procedure:     The patient was taken to the operating room and placed on the table in the supine position, where anesthesia was induced. 2% lidocaine with epinephrine and 0.5% marcaine in a 1:1 fashion was injected over the surgical site, and the patient was prepped and draped in the usual manner for orbitofacial surgery.     Corneal protector was placed in the fellow eye.    The right upper eyelid was everted over a cotton-tipped applicator. The tarsoconjunctival advancement flap was  from the upper eyelid with sharp dissection near the superior border of the tarsal plate. The levator was  from the tarsus and was relaxed into its original anatomic location with sharp dissection. The upper lid contour and position were examined and found to be satisfactory. The desired lower lid margin was marked with methylene blue, and excessive tissue was excised above the methylene blue je.  Small amount of Bovie electrocautery was applied to achieve hemostasis along the orbicularis.    Patient had primary repair of the canalicular laceration on his initial Verduzco flap procedure.  The Ziegler tube prolapsed and the patient cut it out himself.  Today there was an apparent punctum and a 0-00 lacrimal probe was used to probe the punctum in the  canaliculus.  The nasolacrimal system appeared patent so that a new Ziegler tube was not necessary.    The corneal protectors were removed and antibiotic ophthalmic ointment was placed over the surgical site.     The patient was then awakened and taken from the operating room in good condition, having tolerated the procedure well. There were no complications, and the estimated blood loss was less than 50 cc.

## 2021-10-19 NOTE — ANESTHESIA PREPROCEDURE EVALUATION
Anesthesia Evaluation     no history of anesthetic complications:  NPO Solid Status: > 8 hours  NPO Liquid Status: > 2 hours           Airway   Mallampati: II  Neck ROM: full  no difficulty expected  Dental - normal exam     Pulmonary - normal exam   (+) shortness of breath,   (-) COPD, asthma, sleep apnea, not a smoker    ROS comment: Pulmonary nodule--6mm, monitoring only at this time  PE comment: nonlabored  Cardiovascular - normal exam    Rhythm: regular  Rate: normal    (+) hypertension, CAD, dysrhythmias (h/o Afib w/ RVR) Paroxysmal Atrial Fib, BYRNES, PVD, hyperlipidemia,   (-) valvular problems/murmurs, past MI, angina    ROS comment: TAA & AAA--being monitored but no intervention yet needed;    Abnormal stress test-->cath normal except for early CAD    Neuro/Psych  (+) syncope (h/o syncope per chart but pt denies),     (-) seizures, TIA, CVA  GI/Hepatic/Renal/Endo    (+)  GERD well controlled,  renal disease (SAUL, CKD) stones, thyroid problem hypothyroidism  (-) liver disease, diabetes    ROS Comment: Hematuria     Musculoskeletal     (+) arthralgias,   Abdominal    Substance History      OB/GYN          Other   arthritis, blood dyscrasia (on coumadin),   history of cancer (basal cell skin cancer)                    Anesthesia Plan    ASA 3     general     intravenous induction     Anesthetic plan, all risks, benefits, and alternatives have been provided, discussed and informed consent has been obtained with: patient.

## 2021-10-19 NOTE — H&P
History & Physical       Patient: David KHAN Age Sr.    Date of Admission: No admission date for patient encounter.    YOB: 1940    Medical Record Number: [id number]      Chief Complaints: Status post right side Verduzco flap      History of Present Illness: 81 y.o. male presents with above. No new meds/health problems since office visit      Allergies: No Known Allergies    10 point review of systems negative, except pertaining to the HPI    Medications:   Home Medications:  No current facility-administered medications on file prior to encounter.     Current Outpatient Medications on File Prior to Encounter   Medication Sig   • acetaminophen (TYLENOL) 325 MG tablet Take 2 tablets by mouth Every 4 (Four) Hours As Needed for Mild Pain .   • amiodarone (PACERONE) 200 MG tablet Take 0.5 tablets by mouth Daily.   • doxazosin (CARDURA) 2 MG tablet Take 1 tablet by mouth once daily (Patient taking differently: Take 2 mg by mouth Daily.)   • esomeprazole (nexIUM) 20 MG capsule Take 20 mg by mouth Every Morning Before Breakfast.   • Euthyrox 112 MCG tablet Take 1 tablet by mouth once daily (Patient taking differently: Take 112 mcg by mouth Every Night.)   • hydrALAZINE (APRESOLINE) 25 MG tablet TAKE 1 TABLET BY MOUTH THREE TIMES DAILY (Patient taking differently: Take 25 mg by mouth 3 (Three) Times a Day.)   • isosorbide mononitrate (IMDUR) 30 MG 24 hr tablet Take 1 tablet by mouth once daily (Patient taking differently: Take 30 mg by mouth Daily.)   • rosuvastatin (CRESTOR) 5 MG tablet Take 1 tablet by mouth once daily (Patient taking differently: Take 5 mg by mouth Daily.)   • warfarin (COUMADIN) 7.5 MG tablet TAKE 1 TABLET BY MOUTH ONCE DAILY EVERY  NIGHT (Patient taking differently: Take 7.5 mg by mouth Every Night. TO HOLD 3 DAYS PRIOR TO OR PER MD GUIDELINES)     Current Medications:  Scheduled Meds:  Continuous Infusions:No current facility-administered medications for this encounter.    PRN Meds:.    Past  Medical History:   Diagnosis Date   • AAA (abdominal aortic aneurysm) without rupture (HCC)    • Aneurysm of thoracic aorta (HCC)     CT CHEST 8/2021 IN [facility]    • Arthritis    • Basal cell carcinoma     RIGHT LOWER LID    • BPH (benign prostatic hyperplasia)    • Chronic lumbar pain    • Degenerative arthritis of lumbar spine    • Degenerative cervical disc    • Disease of thyroid gland    • Elevated rheumatoid factor    • History of atrial fibrillation    • Hyperlipidemia    • Hypertension    • Hypogonadism in male    • Muscle strain of left upper back     PRESCRIBED PREDNISONE DOSE PACK    • On anticoagulant therapy    • Pulmonary nodule     6 MM - REPEAT CT SCAN IN ONE YEAR, WATCHING    • Refused influenza vaccine    • Scoliosis    • Vitamin D deficiency         Past Surgical History:   Procedure Laterality Date   • ANKLE FUSION Right    • CARDIAC CATHETERIZATION     • CARDIAC CATHETERIZATION N/A 7/2/2021    Procedure: Left Heart Cath;  Surgeon: Harlan Downing MD;  Location: [facility] CATH INVASIVE LOCATION;  Service: Cardiology;  Laterality: N/A;   • CARDIAC CATHETERIZATION N/A 7/2/2021    Procedure: Coronary angiography;  Surgeon: Harlan Downing MD;  Location: [facility] CATH INVASIVE LOCATION;  Service: Cardiology;  Laterality: N/A;   • CARDIAC CATHETERIZATION N/A 7/2/2021    Procedure: Left ventriculography;  Surgeon: Harlan Downing MD;  Location: [facility] CATH INVASIVE LOCATION;  Service: Cardiology;  Laterality: N/A;   • ENTROPIAN REPAIR Right 9/21/2021    Procedure: RIGHT LOWER LID EXCISION OF BASAL CELL CARCINOMA WITH FROZEN SECTION RIGHT LOWER LID RECONSTRUCTION WITH CAST FLAP, REPAIR OF CANULICULIS, AND FULL THICKNESS SKIN GRAFT;  Surgeon: Brian Bhatt MD;  Location: [facility] OR [facility];  Service: Ophthalmology;  Laterality: Right;   • INGUINAL HERNIA REPAIR Right    • REPLACEMENT TOTAL KNEE Bilateral 2000   • ROTATOR CUFF REPAIR Left    • ROTATOR CUFF REPAIR Right    •  SKIN BIOPSY     • VASECTOMY          Social History     Occupational History   • Not on file   Tobacco Use   • Smoking status: Never Smoker   • Smokeless tobacco: Never Used   Vaping Use   • Vaping Use: Never used   Substance and Sexual Activity   • Alcohol use: No   • Drug use: No   • Sexual activity: Defer      Social History     Social History Narrative   • Not on file        Family History   Problem Relation Age of Onset   • Hypertension Father    • Heart attack Father    • Heart attack Brother    • Alcohol abuse Brother    • Hypertension Brother    • Hypertension Paternal Grandmother    • Hypertension Paternal Grandfather    • Anemia Mother    • Arthritis Mother    • Hypertension Mother    • Hypertension Maternal Grandmother    • Heart disease Other         FH in males before age 55   • Malig Hyperthermia Neg Hx            Physical Exam   Constitutional: Alert, cooperative, in no acute distress    Head: Normocephalic.   Eyes:    Verduzco flap in place over right eye  Neck: Normal range of motion.   Cardiovascular: Normal rate.    Pulmonary/Chest: Effort normal.   Neurological: Alert.   Skin: Skin is warm.   Psychiatric: Normal mood and affect.       Assessment/Plan:  The patient voiced understanding of the risks, benefits, and alternative forms of treatment that were discussed and the patient consents to proceed with right second stage Verduzco takedown with reconstruction and possible Ziegler tube.      Bren Khalil MD

## 2021-10-19 NOTE — ANESTHESIA POSTPROCEDURE EVALUATION
Patient: David KHAN Age Sr.    Procedure Summary     Date: 10/19/21 Room / Location:  [facility]  [facility]    Anesthesia Start: 1337 Anesthesia Stop: 1432    Procedure: RIGHT SECOND STAGE CAST TAKEDOWN RECONSTRUCTION (Right Eye) Diagnosis:     Surgeons: Brian Bhatt MD Provider: Barry Arias MD    Anesthesia Type: general ASA Status: 3          Anesthesia Type: general    Vitals  Vitals Value Taken Time   /68 10/19/21 1501   Temp 36.7 °C (98 °F) 10/19/21 1425   Pulse 56 10/19/21 1507   Resp 18 10/19/21 1435   SpO2 95 % 10/19/21 1507   Vitals shown include unvalidated device data.        Post Anesthesia Care and Evaluation    Patient location during evaluation: bedside  Patient participation: complete - patient participated  Level of consciousness: awake  Pain management: adequate  Airway patency: patent  Anesthetic complications: No anesthetic complications    Cardiovascular status: acceptable  Respiratory status: acceptable  Hydration status: acceptable    Comments: */67 (BP Location: Left arm, Patient Position: Lying)   Pulse 66   Temp 36.7 °C (98 °F) (Temporal)   Resp 18   SpO2 96%

## 2021-10-19 NOTE — ANESTHESIA PROCEDURE NOTES
Airway  Urgency: elective    Date/Time: 10/19/2021 1:43 PM  Airway not difficult    General Information and Staff    Patient location during procedure: OR  Anesthesiologist: Barry Arias MD  CRNA: Magali Roberson CRNA    Indications and Patient Condition  Indications for airway management: airway protection    Preoxygenated: yes  MILS not maintained throughout  Mask difficulty assessment: 0 - not attempted    Final Airway Details  Final airway type: supraglottic airway      Successful airway: unique  Size 4    Number of attempts at approach: 1  Assessment: lips, teeth, and gum same as pre-op and atraumatic intubation

## 2021-10-27 ENCOUNTER — IMMUNIZATION (OUTPATIENT)
Dept: VACCINE CLINIC | Facility: HOSPITAL | Age: 81
End: 2021-10-27

## 2021-10-27 PROCEDURE — 0004A ADM SARSCOV2 30MCG/0.3ML BOOSTER: CPT | Performed by: INTERNAL MEDICINE

## 2021-10-27 PROCEDURE — 91300 HC SARSCOV02 VAC 30MCG/0.3ML IM: CPT | Performed by: INTERNAL MEDICINE

## 2021-11-12 NOTE — TELEPHONE ENCOUNTER
Requested Prescriptions     Pending Prescriptions Disp Refills   • Pacerone 200 MG tablet [Pharmacy Med Name: Pacerone 200 MG Oral Tablet] 45 tablet 0     Sig: Take 1/2 (one-half) tablet by mouth once daily

## 2021-11-15 RX ORDER — AMIODARONE HYDROCHLORIDE 200 MG/1
TABLET ORAL
Qty: 45 TABLET | Refills: 0 | Status: SHIPPED | OUTPATIENT
Start: 2021-11-15 | End: 2022-02-14

## 2021-12-14 DIAGNOSIS — I10 ESSENTIAL HYPERTENSION: ICD-10-CM

## 2021-12-14 RX ORDER — LEVOTHYROXINE SODIUM 112 UG/1
TABLET ORAL
Qty: 90 TABLET | Refills: 0 | Status: SHIPPED | OUTPATIENT
Start: 2021-12-14 | End: 2022-03-17

## 2021-12-14 RX ORDER — OLMESARTAN MEDOXOMIL AND HYDROCHLOROTHIAZIDE 40/25 40; 25 MG/1; MG/1
TABLET ORAL
Qty: 90 TABLET | Refills: 0 | Status: SHIPPED | OUTPATIENT
Start: 2021-12-14 | End: 2022-04-18

## 2021-12-14 NOTE — TELEPHONE ENCOUNTER
Rx Refill Note  Requested Prescriptions     Pending Prescriptions Disp Refills   • olmesartan-hydrochlorothiazide (BENICAR HCT) 40-25 MG per tablet [Pharmacy Med Name: OLMESARTAN-HCTZ 40-25 MG TAB] 90 tablet 0     Sig: TAKE ONE TABLET BY MOUTH DAILY      Last office visit with prescribing clinician: 9/17/2021      Next office visit with prescribing clinician: Visit date not found            Scooby Luna MA  12/14/21, 16:09 EST

## 2021-12-14 NOTE — TELEPHONE ENCOUNTER
Rx Refill Note  Requested Prescriptions     Pending Prescriptions Disp Refills   • Euthyrox 112 MCG tablet [Pharmacy Med Name: Euthyrox 112 MCG Oral Tablet] 90 tablet 0     Sig: Take 1 tablet by mouth once daily      Last office visit with prescribing clinician: 9/17/2021      Next office visit with prescribing clinician: 12/14/2021            Scooby Luna MA  12/14/21, 16:07 EST

## 2021-12-16 DIAGNOSIS — I10 ESSENTIAL HYPERTENSION: ICD-10-CM

## 2021-12-16 RX ORDER — NEBIVOLOL 10 MG/1
5 TABLET ORAL NIGHTLY
Qty: 30 TABLET | Refills: 0 | Status: SHIPPED | OUTPATIENT
Start: 2021-12-16 | End: 2022-02-14

## 2021-12-16 NOTE — TELEPHONE ENCOUNTER
Rx Refill Note  Requested Prescriptions     Pending Prescriptions Disp Refills   • nebivolol (BYSTOLIC) 10 MG tablet 30 tablet 0     Sig: Take 0.5 tablets by mouth Every Night.      Last office visit with prescribing clinician: 9/17/2021      Next office visit with prescribing clinician: Visit date not found            Scooby Luna MA  12/16/21, 16:27 EST

## 2021-12-26 RX ORDER — DOXAZOSIN 2 MG/1
2 TABLET ORAL DAILY
Qty: 90 TABLET | Refills: 2 | Status: SHIPPED | OUTPATIENT
Start: 2021-12-26 | End: 2022-10-14

## 2021-12-27 NOTE — TELEPHONE ENCOUNTER
Rx Refill Note  Requested Prescriptions     Pending Prescriptions Disp Refills   • hydrALAZINE (APRESOLINE) 25 MG tablet [Pharmacy Med Name: hydrALAZINE HCl 25 MG Oral Tablet] 90 tablet 2     Sig: TAKE 1 TABLET BY MOUTH THREE TIMES DAILY      Last office visit with prescribing clinician: 8/30/2021      Next office visit with prescribing clinician: Visit date not found            Jovita Tenorio, [facility]  12/27/21, 12:22 EST

## 2021-12-28 RX ORDER — HYDRALAZINE HYDROCHLORIDE 25 MG/1
TABLET, FILM COATED ORAL
Qty: 90 TABLET | Refills: 2 | Status: SHIPPED | OUTPATIENT
Start: 2021-12-28 | End: 2022-09-23

## 2022-02-12 DIAGNOSIS — I10 ESSENTIAL HYPERTENSION: ICD-10-CM

## 2022-02-14 ENCOUNTER — TELEPHONE (OUTPATIENT)
Dept: FAMILY MEDICINE CLINIC | Facility: CLINIC | Age: 82
End: 2022-02-14

## 2022-02-14 RX ORDER — NEBIVOLOL HYDROCHLORIDE 10 MG/1
TABLET ORAL
Qty: 14 TABLET | Refills: 0 | Status: SHIPPED | OUTPATIENT
Start: 2022-02-14 | End: 2022-02-15 | Stop reason: SDUPTHER

## 2022-02-14 RX ORDER — ISOSORBIDE MONONITRATE 30 MG/1
TABLET, EXTENDED RELEASE ORAL
Qty: 90 TABLET | Refills: 1 | Status: SHIPPED | OUTPATIENT
Start: 2022-02-14 | End: 2022-08-11

## 2022-02-14 RX ORDER — AMIODARONE HYDROCHLORIDE 200 MG/1
TABLET ORAL
Qty: 45 TABLET | Refills: 0 | Status: SHIPPED | OUTPATIENT
Start: 2022-02-14 | End: 2022-05-19

## 2022-02-14 NOTE — TELEPHONE ENCOUNTER
Rx Refill Note  Requested Prescriptions     Pending Prescriptions Disp Refills   • Bystolic 10 MG tablet [Pharmacy Med Name: Bystolic 10 MG Oral Tablet] 30 tablet 0     Sig: TAKE 1/2 (ONE-HALF) TABLET BY MOUTH ONCE DAILY AT NIGHT      Last office visit with prescribing clinician: 9/17/2021      Next office visit with prescribing clinician: Visit date not found            Scooby Luna MA  02/14/22, 07:53 EST

## 2022-02-14 NOTE — TELEPHONE ENCOUNTER
Caller: David Comer Sr.    Relationship: Self    Best call back number: [phone] (M)    Requested Prescriptions:   Bystolic 10 MG tablet     Pharmacy where request should be sent:  [address] [phone] [facility] [phone]         Additional details provided by patient: PATIENT CALLED TO ADVISE THAT THIS MEDICATION IS TOO EXPENSIVE AND WOULD LIKE TO KNOW IF THERE IS A GENERIC BRAND THAT HE CAN BE PRESCRIBE INSTEAD WITH A 90 DAY SUPPLY.     PATIENT CONTACT PATIENT TO ADVISE.     Does the patient have less than a 3 day supply:  [] Yes  [] No    Daniel Pelaez Rep   02/14/22 12:38 EST          THANKS

## 2022-02-14 NOTE — TELEPHONE ENCOUNTER
Like now is in generic form.  Using good Rx at [facility] he can get a 90-day supply for under $40.  If that is too expensive we can look in other options.  Such as metoprolol XR 50 mg daily.

## 2022-02-15 ENCOUNTER — TELEPHONE (OUTPATIENT)
Dept: CARDIOLOGY | Facility: CLINIC | Age: 82
End: 2022-02-15

## 2022-02-15 DIAGNOSIS — I10 ESSENTIAL HYPERTENSION: ICD-10-CM

## 2022-02-15 RX ORDER — NEBIVOLOL HYDROCHLORIDE 10 MG/1
5 TABLET ORAL NIGHTLY
Qty: 45 TABLET | Refills: 0 | Status: SHIPPED | OUTPATIENT
Start: 2022-02-15 | End: 2022-02-17

## 2022-02-15 NOTE — TELEPHONE ENCOUNTER
S/W MR. AGE.  HE IS AWARE THAT IT IS OK TO TAKE THE GENERIC FORM OF BYSTOLIC.  VERBALIZED UNDERSTANDING.

## 2022-02-15 NOTE — TELEPHONE ENCOUNTER
Rx Refill Note  Requested Prescriptions     Pending Prescriptions Disp Refills   • Bystolic 10 MG tablet 45 tablet 0     Sig: Take 0.5 tablets by mouth Every Night.      Last office visit with prescribing clinician: 9/17/2021      Next office visit with prescribing clinician: 2/17/2022            Scooby Luna MA  02/15/22, 09:29 EST

## 2022-02-15 NOTE — TELEPHONE ENCOUNTER
----- Message from Delicia Wu sent at 2/14/2022 12:49 PM EST -----  Regarding: OK TO TAKE GENERIC BYSTOLIC?  Contact: [phone]

## 2022-02-17 ENCOUNTER — TELEMEDICINE (OUTPATIENT)
Dept: FAMILY MEDICINE CLINIC | Facility: CLINIC | Age: 82
End: 2022-02-17

## 2022-02-17 DIAGNOSIS — R15.9 INCONTINENCE OF FECES, UNSPECIFIED FECAL INCONTINENCE TYPE: ICD-10-CM

## 2022-02-17 DIAGNOSIS — I10 ESSENTIAL HYPERTENSION: Primary | ICD-10-CM

## 2022-02-17 PROCEDURE — 99214 OFFICE O/P EST MOD 30 MIN: CPT | Performed by: FAMILY MEDICINE

## 2022-02-17 RX ORDER — METOPROLOL SUCCINATE 50 MG/1
50 TABLET, EXTENDED RELEASE ORAL DAILY
Qty: 90 TABLET | Refills: 1 | Status: SHIPPED | OUTPATIENT
Start: 2022-02-17 | End: 2022-08-16

## 2022-02-17 NOTE — PROGRESS NOTES
CC:  HTN    Subjective   David L Age Sr. is a 81 y.o. male.     History of Present Illness   The patient presents today for follow-up via a video visit due to the COVID-19 pandemic for  Hypertension-no chest pains or headaches, Bystolic is too expensive he wants to switch to some generic and it was okayed by his cardiologist.  We will switch him to metoprolol fifty XR  Patient has been having some anal leakage as of recent.  Oftentimes he is not realizing he is having it.  He has no issues with constipation nausea vomiting or diarrhea.          The following portions of the patient's history were reviewed and updated as appropriate: allergies, current medications, past family history, past medical history, past social history, past surgical history and problem list.    Review of Systems    Patient Active Problem List   Diagnosis   • Chronic coronary artery disease   • Abdominal aortic aneurysm (HCC)   • Paroxysmal atrial fibrillation (HCC)   • Gastroesophageal reflux disease   • Essential hypertension   • Mixed hyperlipidemia   • Hypogonadism in male   • Acquired hypothyroidism   • Osteoarthritis of spine   • Vitamin D deficiency   • Anticoagulated   • Atrial fibrillation, rapid (HCC)   • Syncope and collapse   • Chest pain   • Statin intolerance   • Gross hematuria   • Coumadin toxicity   • Sepsis (HCC)   • H/O amiodarone therapy   • Dehydration   • Renal insufficiency   • Thoracic aortic aneurysm without rupture (HCC)   • Refused influenza vaccine   • Medicare annual wellness visit, subsequent   • BPH without obstruction/lower urinary tract symptoms   • Thoracic aortic aneurysm (HCC)   • Kidney stone   • Hand injury   • Acute kidney injury (HCC)   • 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)   • Traumatic hematoma of right knee   • Arthritis   • SOB (shortness of breath)   • Fatigue   • Abnormal nuclear stress test   • Acute left-sided low back pain without sciatica   • Lt groin pain   • Stool  incontinence       No Known Allergies      Current Outpatient Medications:   •  acetaminophen (TYLENOL) 325 MG tablet, Take 2 tablets by mouth Every 4 (Four) Hours As Needed for Mild Pain ., Disp:  , Rfl:   •  amiodarone (PACERONE) 200 MG tablet, Take 1/2 (one-half) tablet by mouth once daily, Disp: 45 tablet, Rfl: 0  •  doxazosin (CARDURA) 2 MG tablet, Take 1 tablet by mouth Daily., Disp: 90 tablet, Rfl: 2  •  erythromycin (ROMYCIN) 5 MG/GM ophthalmic ointment, Apply  to eye(s) as directed by provider 2 (two) times a day. Apply to surgical site 2x/day. May use in eye for discomfort., Disp: 3.5 g, Rfl: 1  •  esomeprazole (nexIUM) 20 MG capsule, Take 20 mg by mouth Every Morning Before Breakfast., Disp: , Rfl:   •  Euthyrox 112 MCG tablet, Take 1 tablet by mouth once daily, Disp: 90 tablet, Rfl: 0  •  hydrALAZINE (APRESOLINE) 25 MG tablet, TAKE 1 TABLET BY MOUTH THREE TIMES DAILY, Disp: 90 tablet, Rfl: 2  •  isosorbide mononitrate (IMDUR) 30 MG 24 hr tablet, Take 1 tablet by mouth once daily, Disp: 90 tablet, Rfl: 1  •  metoprolol succinate XL (Toprol XL) 50 MG 24 hr tablet, Take 1 tablet by mouth Daily., Disp: 90 tablet, Rfl: 1  •  olmesartan-hydrochlorothiazide (BENICAR HCT) 40-25 MG per tablet, TAKE ONE TABLET BY MOUTH DAILY, Disp: 90 tablet, Rfl: 0  •  rosuvastatin (CRESTOR) 5 MG tablet, Take 1 tablet by mouth once daily (Patient taking differently: Take 5 mg by mouth Daily.), Disp: 90 tablet, Rfl: 1  •  warfarin (COUMADIN) 7.5 MG tablet, TAKE 1 TABLET BY MOUTH ONCE DAILY EVERY  NIGHT (Patient taking differently: Take 7.5 mg by mouth Every Night. TO HOLD 3 DAYS PRIOR TO OR PER MD GUIDELINES), Disp: 90 tablet, Rfl: 2    Past Medical History:   Diagnosis Date   • AAA (abdominal aortic aneurysm) without rupture (HCC)    • Aneurysm of thoracic aorta (HCC)     CT CHEST 8/2021 IN [facility]    • Arthritis    • Basal cell carcinoma     RIGHT LOWER LID    • BPH (benign prostatic hyperplasia)    • Chronic lumbar pain     • Degenerative arthritis of lumbar spine    • Degenerative cervical disc    • Disease of thyroid gland    • Elevated rheumatoid factor    • History of atrial fibrillation    • Hyperlipidemia    • Hypertension    • Hypogonadism in male    • Muscle strain of left upper back     PRESCRIBED PREDNISONE DOSE PACK    • On anticoagulant therapy    • Pulmonary nodule     6 MM - REPEAT CT SCAN IN ONE YEAR, WATCHING    • Refused influenza vaccine    • Scoliosis    • Vitamin D deficiency        Past Surgical History:   Procedure Laterality Date   • ANKLE FUSION Right    • CARDIAC CATHETERIZATION     • CARDIAC CATHETERIZATION N/A 7/2/2021    Procedure: Left Heart Cath;  Surgeon: Harlan Downing MD;  [facility];  Service: Cardiology;  Laterality: N/A;   • CARDIAC CATHETERIZATION N/A 7/2/2021    Procedure: Coronary angiography;  Surgeon: Harlan Downing MD;  [facility];  Service: Cardiology;  Laterality: N/A;   • CARDIAC CATHETERIZATION N/A 7/2/2021    Procedure: Left ventriculography;  Surgeon: Harlan Downing MD;  Location: [facility] CATH INVASIVE LOCATION;  Service: Cardiology;  Laterality: N/A;   • HOBBS TUBE INSERTION Right 10/19/2021    Procedure: RIGHT SECOND STAGE CAST TAKEDOWN RECONSTRUCTION;  Surgeon: Brian Bhatt MD;  [place] [facility] [place] [facility]  Service: Ophthalmology;  Laterality: Right;   • ENTROPIAN REPAIR Right 9/21/2021    Procedure: RIGHT LOWER LID EXCISION OF BASAL CELL CARCINOMA WITH FROZEN SECTION RIGHT LOWER LID RECONSTRUCTION WITH CAST FLAP, REPAIR OF CANULICULIS, AND FULL THICKNESS SKIN GRAFT;  Surgeon: Brian Bhatt MD;  Location: [facility] [place] [facility];  Service: Ophthalmology;  Laterality: Right;   • INGUINAL HERNIA REPAIR Right    • REPLACEMENT TOTAL KNEE Bilateral 2000   • ROTATOR CUFF REPAIR Left    • ROTATOR CUFF REPAIR Right    • SKIN BIOPSY     • VASECTOMY         Family History   Problem Relation Age of  Onset   • Hypertension Father    • Heart attack Father    • Heart attack Brother    • Alcohol abuse Brother    • Hypertension Brother    • Hypertension Paternal Grandmother    • Hypertension Paternal Grandfather    • Anemia Mother    • Arthritis Mother    • Hypertension Mother    • Hypertension Maternal Grandmother    • Heart disease Other         FH in males before age 55   • Malig Hyperthermia Neg Hx        Social History     Tobacco Use   • Smoking status: Never Smoker   • Smokeless tobacco: Never Used   Substance Use Topics   • Alcohol use: No            Objective     There were no vitals taken for this visit. Video Visit    Physical Exam  NAD  AAOx3  No labored breathing.      Lab Results   Component Value Date    GLUCOSE 105 (H) 10/13/2021    BUN 21 10/13/2021    CREATININE 0.95 10/13/2021    EGFRIFNONA 76 10/13/2021    EGFRIFAFRI 76 09/24/2019    BCR 22.1 10/13/2021    K 4.1 10/13/2021    CO2 25.6 10/13/2021    CALCIUM 9.4 10/13/2021    ALBUMIN 3.70 11/24/2020    AST 17 11/24/2020    ALT 18 11/24/2020       WBC   Date Value Ref Range Status   10/13/2021 5.13 3.40 - 10.80 10*3/mm3 Final     RBC   Date Value Ref Range Status   10/13/2021 3.95 (L) 4.14 - 5.80 10*6/mm3 Final     Hemoglobin   Date Value Ref Range Status   10/13/2021 12.7 (L) 13.0 - 17.7 g/dL Final     Hematocrit   Date Value Ref Range Status   10/13/2021 39.1 37.5 - 51.0 % Final     MCV   Date Value Ref Range Status   10/13/2021 99.0 (H) 79.0 - 97.0 fL Final     MCH   Date Value Ref Range Status   10/13/2021 32.2 26.6 - 33.0 pg Final     MCHC   Date Value Ref Range Status   10/13/2021 32.5 31.5 - 35.7 g/dL Final     RDW   Date Value Ref Range Status   10/13/2021 12.3 12.3 - 15.4 % Final     RDW-SD   Date Value Ref Range Status   10/13/2021 45.3 37.0 - 54.0 fl Final     MPV   Date Value Ref Range Status   10/13/2021 11.3 6.0 - 12.0 fL Final     Platelets   Date Value Ref Range Status   10/13/2021 172 140 - 450 10*3/mm3 Final     Neutrophil %   Date  Value Ref Range Status   06/30/2021 71.7 42.7 - 76.0 % Final     Lymphocyte %   Date Value Ref Range Status   06/30/2021 17.0 (L) 19.6 - 45.3 % Final     Monocyte %   Date Value Ref Range Status   06/30/2021 8.1 5.0 - 12.0 % Final     Eosinophil %   Date Value Ref Range Status   06/30/2021 2.2 0.3 - 6.2 % Final     Basophil %   Date Value Ref Range Status   06/30/2021 0.6 0.0 - 1.5 % Final     Immature Grans %   Date Value Ref Range Status   06/30/2021 0.4 0.0 - 0.5 % Final     Neutrophils, Absolute   Date Value Ref Range Status   06/30/2021 4.99 1.70 - 7.00 10*3/mm3 Final     Lymphocytes, Absolute   Date Value Ref Range Status   06/30/2021 1.18 0.70 - 3.10 10*3/mm3 Final     Monocytes, Absolute   Date Value Ref Range Status   06/30/2021 0.56 0.10 - 0.90 10*3/mm3 Final     Eosinophils, Absolute   Date Value Ref Range Status   06/30/2021 0.15 0.00 - 0.40 10*3/mm3 Final     Basophils, Absolute   Date Value Ref Range Status   06/30/2021 0.04 0.00 - 0.20 10*3/mm3 Final     Immature Grans, Absolute   Date Value Ref Range Status   06/30/2021 0.03 0.00 - 0.05 10*3/mm3 Final     nRBC   Date Value Ref Range Status   06/30/2021 0.1 0.0 - 0.2 /100 WBC Final       No results found for: HGBA1C    No results found for: EOHQJRVI40    TSH   Date Value Ref Range Status   05/26/2021 2.500 0.270 - 4.200 uIU/mL Final       Lab Results   Component Value Date    CHOL 128 05/26/2020     Lab Results   Component Value Date    TRIG 95 05/26/2020     Lab Results   Component Value Date    HDL 50 05/26/2020     Lab Results   Component Value Date    LDL 59 05/26/2020     Lab Results   Component Value Date    VLDL 19 05/26/2020     Lab Results   Component Value Date    LDLHDL 1.18 05/26/2020         Procedures    Assessment/Plan   Problems Addressed this Visit     Essential hypertension - Primary    Relevant Medications    metoprolol succinate XL (Toprol XL) 50 MG 24 hr tablet    Stool incontinence    Relevant Orders    Ambulatory Referral to  Colorectal Surgery      Diagnoses       Codes Comments    Essential hypertension    -  Primary ICD-10-CM: I10  ICD-9-CM: 401.9     Incontinence of feces, unspecified fecal incontinence type     ICD-10-CM: R15.9  ICD-9-CM: 787.60           Orders Placed This Encounter   Procedures   • Ambulatory Referral to Colorectal Surgery     Referral Priority:   Routine     Referral Type:   Consultation     Referral Reason:   Specialty Services Required     Requested Specialty:   Colon and Rectal Surgery     Number of Visits Requested:   1       Current Outpatient Medications   Medication Sig Dispense Refill   • acetaminophen (TYLENOL) 325 MG tablet Take 2 tablets by mouth Every 4 (Four) Hours As Needed for Mild Pain .     • amiodarone (PACERONE) 200 MG tablet Take 1/2 (one-half) tablet by mouth once daily 45 tablet 0   • doxazosin (CARDURA) 2 MG tablet Take 1 tablet by mouth Daily. 90 tablet 2   • erythromycin (ROMYCIN) 5 MG/GM ophthalmic ointment Apply  to eye(s) as directed by provider 2 (two) times a day. Apply to surgical site 2x/day. May use in eye for discomfort. 3.5 g 1   • esomeprazole (nexIUM) 20 MG capsule Take 20 mg by mouth Every Morning Before Breakfast.     • Euthyrox 112 MCG tablet Take 1 tablet by mouth once daily 90 tablet 0   • hydrALAZINE (APRESOLINE) 25 MG tablet TAKE 1 TABLET BY MOUTH THREE TIMES DAILY 90 tablet 2   • isosorbide mononitrate (IMDUR) 30 MG 24 hr tablet Take 1 tablet by mouth once daily 90 tablet 1   • metoprolol succinate XL (Toprol XL) 50 MG 24 hr tablet Take 1 tablet by mouth Daily. 90 tablet 1   • olmesartan-hydrochlorothiazide (BENICAR HCT) 40-25 MG per tablet TAKE ONE TABLET BY MOUTH DAILY 90 tablet 0   • rosuvastatin (CRESTOR) 5 MG tablet Take 1 tablet by mouth once daily (Patient taking differently: Take 5 mg by mouth Daily.) 90 tablet 1   • warfarin (COUMADIN) 7.5 MG tablet TAKE 1 TABLET BY MOUTH ONCE DAILY EVERY  NIGHT (Patient taking differently: Take 7.5 mg by mouth Every Night. TO  HOLD 3 DAYS PRIOR TO OR PER MD GUIDELINES) 90 tablet 2     No current facility-administered medications for this visit.       Return in about 3 months (around 5/17/2022) for hypertension, hyperlipidema.    There are no Patient Instructions on file for this visit.         Time spent on visit:  22   minutes.  This patient has consented to a telehealth visit via video. The visit was scheduled as a video visit to comply with patient safety concerns in accordance with CDC recommendations.  All vitals recorded within this visit are reported by the patient.      Stop bystolic and start metoprolol xr 50.  SE discussed  Will get colorectal referral.

## 2022-03-16 ENCOUNTER — ANTICOAGULATION VISIT (OUTPATIENT)
Dept: CARDIOLOGY | Facility: CLINIC | Age: 82
End: 2022-03-16

## 2022-03-16 ENCOUNTER — HOSPITAL ENCOUNTER (OUTPATIENT)
Dept: CARDIOLOGY | Facility: HOSPITAL | Age: 82
Discharge: HOME | End: 2022-03-16
Admitting: INTERNAL MEDICINE

## 2022-03-16 LAB — INR PPP: 2.4

## 2022-03-16 PROCEDURE — 85610 PROTHROMBIN TIME: CPT | Performed by: INTERNAL MEDICINE

## 2022-03-17 RX ORDER — LEVOTHYROXINE SODIUM 112 UG/1
TABLET ORAL
Qty: 90 TABLET | Refills: 0 | Status: SHIPPED | OUTPATIENT
Start: 2022-03-17 | End: 2022-06-02

## 2022-03-17 NOTE — TELEPHONE ENCOUNTER
Rx Refill Note  Requested Prescriptions     Pending Prescriptions Disp Refills   • Euthyrox 112 MCG tablet [Pharmacy Med Name: Euthyrox 112 MCG Oral Tablet] 90 tablet 0     Sig: Take 1 tablet by mouth once daily      Last office visit with prescribing clinician: 2/17/2022      Next office visit with prescribing clinician: Visit date not found          {TIP  Is Refill Pharmacy correct?:23}  Scooby Luna MA  03/17/22, 13:12 EDT

## 2022-03-23 ENCOUNTER — TELEPHONE (OUTPATIENT)
Dept: CARDIOLOGY | Facility: CLINIC | Age: 82
End: 2022-03-23

## 2022-03-23 NOTE — TELEPHONE ENCOUNTER
Received a fax from [facility] that is requesting a clearance and  to hold blood thinner for a left upper eyelid surgery.     Per Dr. KARI Downing MD From a cardiac stand point patient is cleared and is  to hold Coumadin for 3 days and Aspirin for 5 days prior to surgery with a small risk     Letter has been sent

## 2022-03-25 NOTE — TELEPHONE ENCOUNTER
Rx Refill Note  Requested Prescriptions     Pending Prescriptions Disp Refills   • rosuvastatin (CRESTOR) 5 MG tablet [Pharmacy Med Name: Rosuvastatin Calcium 5 MG Oral Tablet] 90 tablet 0     Sig: Take 1 tablet by mouth once daily      Last office visit with prescribing clinician: 8/30/2021      Next office visit with prescribing clinician: Visit date not found            Jovita Tenorio, [facility]  03/25/22, 15:58 EDT

## 2022-03-31 RX ORDER — ROSUVASTATIN CALCIUM 5 MG/1
TABLET, COATED ORAL
Qty: 90 TABLET | Refills: 0 | Status: SHIPPED | OUTPATIENT
Start: 2022-03-31 | End: 2022-06-28

## 2022-04-18 DIAGNOSIS — I10 ESSENTIAL HYPERTENSION: ICD-10-CM

## 2022-04-18 RX ORDER — OLMESARTAN MEDOXOMIL AND HYDROCHLOROTHIAZIDE 40/25 40; 25 MG/1; MG/1
TABLET ORAL
Qty: 90 TABLET | Refills: 0 | Status: SHIPPED | OUTPATIENT
Start: 2022-04-18 | End: 2022-07-29

## 2022-04-18 NOTE — TELEPHONE ENCOUNTER
Rx Refill Note  Requested Prescriptions     Pending Prescriptions Disp Refills   • olmesartan-hydrochlorothiazide (BENICAR HCT) 40-25 MG per tablet [Pharmacy Med Name: OLMESARTAN-HCTZ 40-25 MG TAB] 90 tablet 0     Sig: TAKE ONE TABLET BY MOUTH DAILY      Last office visit with prescribing clinician: 2/17/2022      Next office visit with prescribing clinician: Visit date not found            Scooby Luna MA  04/18/22, 15:36 EDT

## 2022-04-19 RX ORDER — WARFARIN SODIUM 7.5 MG/1
7.5 TABLET ORAL NIGHTLY
Qty: 90 TABLET | Refills: 1 | Status: SHIPPED | OUTPATIENT
Start: 2022-04-19 | End: 2022-10-24

## 2022-04-21 ENCOUNTER — OFFICE VISIT (OUTPATIENT)
Dept: FAMILY MEDICINE CLINIC | Facility: CLINIC | Age: 82
End: 2022-04-21

## 2022-04-21 VITALS
BODY MASS INDEX: 33.67 KG/M2 | HEIGHT: 72 IN | RESPIRATION RATE: 16 BRPM | WEIGHT: 248.6 LBS | DIASTOLIC BLOOD PRESSURE: 82 MMHG | HEART RATE: 84 BPM | SYSTOLIC BLOOD PRESSURE: 110 MMHG | OXYGEN SATURATION: 97 % | TEMPERATURE: 98 F

## 2022-04-21 DIAGNOSIS — E03.9 ACQUIRED HYPOTHYROIDISM: ICD-10-CM

## 2022-04-21 DIAGNOSIS — Z00.00 MEDICARE ANNUAL WELLNESS VISIT, SUBSEQUENT: Primary | ICD-10-CM

## 2022-04-21 DIAGNOSIS — E78.2 MIXED HYPERLIPIDEMIA: ICD-10-CM

## 2022-04-21 DIAGNOSIS — I10 ESSENTIAL HYPERTENSION: ICD-10-CM

## 2022-04-21 PROCEDURE — G0439 PPPS, SUBSEQ VISIT: HCPCS | Performed by: FAMILY MEDICINE

## 2022-04-21 PROCEDURE — 1170F FXNL STATUS ASSESSED: CPT | Performed by: FAMILY MEDICINE

## 2022-04-21 PROCEDURE — 1160F RVW MEDS BY RX/DR IN RCRD: CPT | Performed by: FAMILY MEDICINE

## 2022-04-21 PROCEDURE — 1126F AMNT PAIN NOTED NONE PRSNT: CPT | Performed by: FAMILY MEDICINE

## 2022-04-21 PROCEDURE — 1125F AMNT PAIN NOTED PAIN PRSNT: CPT | Performed by: FAMILY MEDICINE

## 2022-04-21 PROCEDURE — 99214 OFFICE O/P EST MOD 30 MIN: CPT | Performed by: FAMILY MEDICINE

## 2022-04-21 PROCEDURE — 1159F MED LIST DOCD IN RCRD: CPT | Performed by: FAMILY MEDICINE

## 2022-04-21 RX ORDER — NEBIVOLOL 10 MG/1
5 TABLET ORAL DAILY
COMMUNITY
End: 2022-11-29

## 2022-04-21 NOTE — PROGRESS NOTES
The ABCs of the Annual Wellness Visit  Subsequent Medicare Wellness Visit    Chief Complaint   Patient presents with   • Weight Gain      Subjective    History of Present Illness:  David Comer Sr. is a 82 y.o. male who presents for a Subsequent Medicare Wellness Visit.  He is having trouble losing weight.  He has been eating about 8199-8672 calories daily and has only been able to lose about 10 lbs in about 10 weeks.  He gets winded easily as well.   No swelling in legs.   HTN- no CP or HA  HLD- no exercise  Hypothyroidism- stable and need check on it.    He sees cardiology as well.    The following portions of the patient's history were reviewed and   updated as appropriate: allergies, current medications, past family history, past medical history, past social history, past surgical history and problem list.    Compared to one year ago, the patient feels his physical   health is same    Compared to one year ago, the patient feels his mental   health is the same.    Recent Hospitalizations:  He was not admitted to the hospital during the last year.       Current Medical Providers:  Patient Care Team:  Chacha Pineda MD as PCP - General (Family Medicine)  Harlan Downing MD as Consulting Physician (Cardiology)    Outpatient Medications Prior to Visit   Medication Sig Dispense Refill   • acetaminophen (TYLENOL) 325 MG tablet Take 2 tablets by mouth Every 4 (Four) Hours As Needed for Mild Pain .     • amiodarone (PACERONE) 200 MG tablet Take 1/2 (one-half) tablet by mouth once daily 45 tablet 0   • doxazosin (CARDURA) 2 MG tablet Take 1 tablet by mouth Daily. 90 tablet 2   • erythromycin (ROMYCIN) 5 MG/GM ophthalmic ointment Apply  to eye(s) as directed by provider 2 (two) times a day. Apply to surgical site 2x/day. May use in eye for discomfort. 3.5 g 1   • esomeprazole (nexIUM) 20 MG capsule Take 20 mg by mouth Every Morning Before Breakfast.     • Euthyrox 112 MCG tablet Take 1 tablet by mouth once daily  90 tablet 0   • hydrALAZINE (APRESOLINE) 25 MG tablet TAKE 1 TABLET BY MOUTH THREE TIMES DAILY 90 tablet 2   • isosorbide mononitrate (IMDUR) 30 MG 24 hr tablet Take 1 tablet by mouth once daily 90 tablet 1   • metoprolol succinate XL (Toprol XL) 50 MG 24 hr tablet Take 1 tablet by mouth Daily. 90 tablet 1   • Nebivolol HCl (BYSTOLIC PO) Take  by mouth.     • olmesartan-hydrochlorothiazide (BENICAR HCT) 40-25 MG per tablet TAKE ONE TABLET BY MOUTH DAILY 90 tablet 0   • rosuvastatin (CRESTOR) 5 MG tablet Take 1 tablet by mouth once daily 90 tablet 0   • warfarin (COUMADIN) 7.5 MG tablet Take 1 tablet by mouth Every Night. 90 tablet 1     No facility-administered medications prior to visit.       No opioid medication identified on active medication list. I have reviewed chart for other potential  high risk medication/s and harmful drug interactions in the elderly.          Aspirin is not on active medication list.  Aspirin use is not indicated based on review of current medical condition/s. Risk of harm outweighs potential benefits.  .    Patient Active Problem List   Diagnosis   • Chronic coronary artery disease   • Abdominal aortic aneurysm (HCC)   • Paroxysmal atrial fibrillation (HCC)   • Gastroesophageal reflux disease   • Essential hypertension   • Mixed hyperlipidemia   • Hypogonadism in male   • Acquired hypothyroidism   • Osteoarthritis of spine   • Vitamin D deficiency   • Anticoagulated   • Atrial fibrillation, rapid (HCC)   • Syncope and collapse   • Chest pain   • Statin intolerance   • Gross hematuria   • Coumadin toxicity   • Sepsis (HCC)   • H/O amiodarone therapy   • Dehydration   • Renal insufficiency   • Thoracic aortic aneurysm without rupture (HCC)   • Refused influenza vaccine   • Medicare annual wellness visit, subsequent   • BPH without obstruction/lower urinary tract symptoms   • Thoracic aortic aneurysm (HCC)   • Kidney stone   • Hand injury   • Acute kidney injury (HCC)   • Hemorrhagic  "disorder due to circulating anticoagulants (HCC)   • Traumatic hematoma of right knee   • Arthritis   • SOB (shortness of breath)   • Fatigue   • Abnormal nuclear stress test   • Acute left-sided low back pain without sciatica   • Lt groin pain   • Stool incontinence     Advance Care Planning  Advance Directive is not on file.  ACP discussion was held with the patient during this visit. Patient has an advance directive (not in EMR), copy requested.          Objective    Vitals:    04/21/22 1426   BP: 110/82   BP Location: Right arm   Patient Position: Sitting   Cuff Size: Large Adult   Pulse: 84   Resp: 16   Temp: 98 °F (36.7 °C)   TempSrc: Temporal   SpO2: 97%   Weight: 113 kg (248 lb 9.6 oz)   Height: 181.6 cm (71.5\")     BMI Readings from Last 1 Encounters:   04/21/22 34.19 kg/m²   BMI is above normal parameters. Recommendations include: exercise counseling    Does the patient have evidence of cognitive impairment? No    Physical Exam  Vitals and nursing note reviewed.   Constitutional:       Appearance: Normal appearance. He is well-developed.   Cardiovascular:      Rate and Rhythm: Normal rate and regular rhythm.      Heart sounds: Normal heart sounds. No murmur heard.  Pulmonary:      Effort: Pulmonary effort is normal. No respiratory distress.      Breath sounds: Normal breath sounds. No stridor. No wheezing or rhonchi.   Musculoskeletal:      Right lower leg: No edema.      Left lower leg: No edema.   Neurological:      General: No focal deficit present.      Mental Status: He is alert and oriented to person, place, and time.   Psychiatric:         Mood and Affect: Mood normal.         Behavior: Behavior normal.                 HEALTH RISK ASSESSMENT    Smoking Status:  Social History     Tobacco Use   Smoking Status Never Smoker   Smokeless Tobacco Never Used     Alcohol Consumption:  Social History     Substance and Sexual Activity   Alcohol Use No     Fall Risk Screen:    STEADI Fall Risk Assessment has " not been completed.    Depression Screening:  PHQ-2/PHQ-9 Depression Screening 4/21/2022   Retired Total Score -   Little Interest or Pleasure in Doing Things 0-->not at all   Feeling Down, Depressed or Hopeless 0-->not at all   PHQ-9: Brief Depression Severity Measure Score 0       Health Habits and Functional and Cognitive Screening:  Functional & Cognitive Status 4/21/2022   Do you have difficulty preparing food and eating? No   Do you have difficulty bathing yourself, getting dressed or grooming yourself? No   Do you have difficulty using the toilet? No   Do you have difficulty moving around from place to place? No   Do you have trouble with steps or getting out of a bed or a chair? No   Current Diet Well Balanced Diet   Dental Exam Up to date   Eye Exam Up to date   Exercise (times per week) 0 times per week   Current Exercises Include No Regular Exercise   Current Exercise Activities Include -   Do you need help using the phone?  No   Are you deaf or do you have serious difficulty hearing?  No   Do you need help with transportation? No   Do you need help shopping? No   Do you need help preparing meals?  No   Do you need help with housework?  No   Do you need help with laundry? No   Do you need help taking your medications? No   Do you need help managing money? No   Do you ever drive or ride in a car without wearing a seat belt? No   Have you felt unusual stress, anger or loneliness in the last month? No   Who do you live with? Child   If you need help, do you have trouble finding someone available to you? No   Have you been bothered in the last four weeks by sexual problems? No   Do you have difficulty concentrating, remembering or making decisions? No       Age-appropriate Screening Schedule:  Refer to the list below for future screening recommendations based on patient's age, sex and/or medical conditions. Orders for these recommended tests are listed in the plan section. The patient has been provided with a  written plan.    Health Maintenance   Topic Date Due   • TDAP/TD VACCINES (1 - Tdap) Never done   • ZOSTER VACCINE (1 of 2) Never done   • LIPID PANEL  05/26/2021   • INFLUENZA VACCINE  Discontinued              Assessment/Plan   CMS Preventative Services Quick Reference  Risk Factors Identified During Encounter  Cardiovascular Disease  The above risks/problems have been discussed with the patient.  Follow up actions/plans if indicated are seen below in the Assessment/Plan Section.  Pertinent information has been shared with the patient in the After Visit Summary.    Diagnoses and all orders for this visit:    1. Medicare annual wellness visit, subsequent (Primary)    2. Mixed hyperlipidemia  -     Lipid Panel    3. Essential hypertension  -     Comprehensive Metabolic Panel    4. Acquired hypothyroidism  -     TSH        Follow Up:   Return in about 6 months (around 10/21/2022) for hypertension, hyperlipidema.     An After Visit Summary and PPPS were made available to the patient.               MWE done and patient to work on diet and exercise for Preventive Counseling.    Will get labs.  Work on activity and exercise.        He will try Mousatpha  For weight loss.  Will get labs today and work on diet and exercise.  Follow up with cardiology.

## 2022-04-22 ENCOUNTER — HOSPITAL ENCOUNTER (OUTPATIENT)
Dept: CARDIOLOGY | Facility: HOSPITAL | Age: 82
Discharge: HOME | End: 2022-04-22
Admitting: INTERNAL MEDICINE

## 2022-04-22 ENCOUNTER — ANTICOAGULATION VISIT (OUTPATIENT)
Dept: CARDIOLOGY | Facility: CLINIC | Age: 82
End: 2022-04-22

## 2022-04-22 LAB
ALBUMIN SERPL-MCNC: 4.2 G/DL (ref 3.6–4.6)
ALBUMIN/GLOB SERPL: 1.8 {RATIO} (ref 1.2–2.2)
ALP SERPL-CCNC: 55 IU/L (ref 44–121)
ALT SERPL-CCNC: 21 IU/L (ref 0–44)
AST SERPL-CCNC: 17 IU/L (ref 0–40)
BILIRUB SERPL-MCNC: 0.3 MG/DL (ref 0–1.2)
BUN SERPL-MCNC: 18 MG/DL (ref 8–27)
BUN/CREAT SERPL: 17 (ref 10–24)
CALCIUM SERPL-MCNC: 9.6 MG/DL (ref 8.6–10.2)
CHLORIDE SERPL-SCNC: 102 MMOL/L (ref 96–106)
CHOLEST SERPL-MCNC: 159 MG/DL (ref 100–199)
CO2 SERPL-SCNC: 25 MMOL/L (ref 20–29)
CREAT SERPL-MCNC: 1.07 MG/DL (ref 0.76–1.27)
EGFRCR SERPLBLD CKD-EPI 2021: 70 ML/MIN/1.73
GLOBULIN SER CALC-MCNC: 2.4 G/DL (ref 1.5–4.5)
GLUCOSE SERPL-MCNC: 69 MG/DL (ref 65–99)
HDLC SERPL-MCNC: 52 MG/DL
INR PPP: 2.2
LDLC SERPL CALC-MCNC: 79 MG/DL (ref 0–99)
POTASSIUM SERPL-SCNC: 4.2 MMOL/L (ref 3.5–5.2)
PROT SERPL-MCNC: 6.6 G/DL (ref 6–8.5)
SODIUM SERPL-SCNC: 143 MMOL/L (ref 134–144)
TRIGL SERPL-MCNC: 167 MG/DL (ref 0–149)
TSH SERPL DL<=0.005 MIU/L-ACNC: 4.11 UIU/ML (ref 0.45–4.5)
VLDLC SERPL CALC-MCNC: 28 MG/DL (ref 5–40)

## 2022-04-22 PROCEDURE — 85610 PROTHROMBIN TIME: CPT | Performed by: INTERNAL MEDICINE

## 2022-05-09 ENCOUNTER — HOSPITAL ENCOUNTER (OUTPATIENT)
Dept: CARDIOLOGY | Facility: HOSPITAL | Age: 82
Discharge: HOME | End: 2022-05-09
Admitting: INTERNAL MEDICINE

## 2022-05-09 ENCOUNTER — ANTICOAGULATION VISIT (OUTPATIENT)
Dept: CARDIOLOGY | Facility: CLINIC | Age: 82
End: 2022-05-09

## 2022-05-09 LAB — INR PPP: 1.5

## 2022-05-09 PROCEDURE — 85610 PROTHROMBIN TIME: CPT | Performed by: INTERNAL MEDICINE

## 2022-05-09 NOTE — PATIENT INSTRUCTIONS
Follow instructions on the calender for coumadin dose.  Return for your follow- up appointment on :  05/18/2022  at 11 am

## 2022-05-18 ENCOUNTER — ANTICOAGULATION VISIT (OUTPATIENT)
Dept: CARDIOLOGY | Facility: CLINIC | Age: 82
End: 2022-05-18

## 2022-05-18 ENCOUNTER — HOSPITAL ENCOUNTER (OUTPATIENT)
Dept: CARDIOLOGY | Facility: HOSPITAL | Age: 82
Discharge: HOME | End: 2022-05-18
Admitting: INTERNAL MEDICINE

## 2022-05-18 LAB — INR PPP: 3.1

## 2022-05-18 PROCEDURE — 85610 PROTHROMBIN TIME: CPT | Performed by: INTERNAL MEDICINE

## 2022-05-19 RX ORDER — AMIODARONE HYDROCHLORIDE 200 MG/1
TABLET ORAL
Qty: 45 TABLET | Refills: 0 | Status: SHIPPED | OUTPATIENT
Start: 2022-05-19 | End: 2022-08-11

## 2022-05-25 ENCOUNTER — ANTICOAGULATION VISIT (OUTPATIENT)
Dept: CARDIOLOGY | Facility: CLINIC | Age: 82
End: 2022-05-25

## 2022-05-25 ENCOUNTER — HOSPITAL ENCOUNTER (OUTPATIENT)
Dept: CARDIOLOGY | Facility: HOSPITAL | Age: 82
Discharge: HOME | End: 2022-05-25
Admitting: INTERNAL MEDICINE

## 2022-05-25 DIAGNOSIS — I48.0 PAROXYSMAL ATRIAL FIBRILLATION: ICD-10-CM

## 2022-05-25 DIAGNOSIS — Z79.01 LONG TERM (CURRENT) USE OF ANTICOAGULANTS: Primary | ICD-10-CM

## 2022-05-25 LAB — INR PPP: 3

## 2022-05-25 PROCEDURE — 85610 PROTHROMBIN TIME: CPT | Performed by: INTERNAL MEDICINE

## 2022-06-02 RX ORDER — LEVOTHYROXINE SODIUM 112 UG/1
TABLET ORAL
Qty: 90 TABLET | Refills: 0 | Status: SHIPPED | OUTPATIENT
Start: 2022-06-02 | End: 2022-08-26

## 2022-06-02 NOTE — TELEPHONE ENCOUNTER
Rx Refill Note  Requested Prescriptions     Pending Prescriptions Disp Refills   • Euthyrox 112 MCG tablet [Pharmacy Med Name: Euthyrox 112 MCG Oral Tablet] 90 tablet 0     Sig: Take 1 tablet by mouth once daily      Last office visit with prescribing clinician: 4/21/2022      Next office visit with prescribing clinician: Visit date not found            Scooby Luna MA  06/02/22, 11:15 EDT

## 2022-06-22 ENCOUNTER — APPOINTMENT (OUTPATIENT)
Dept: CARDIOLOGY | Facility: HOSPITAL | Age: 82
End: 2022-06-22

## 2022-06-28 RX ORDER — ROSUVASTATIN CALCIUM 5 MG/1
TABLET, COATED ORAL
Qty: 90 TABLET | Refills: 1 | Status: SHIPPED | OUTPATIENT
Start: 2022-06-28 | End: 2023-01-13

## 2022-07-29 DIAGNOSIS — I10 ESSENTIAL HYPERTENSION: ICD-10-CM

## 2022-07-29 RX ORDER — OLMESARTAN MEDOXOMIL AND HYDROCHLOROTHIAZIDE 40/25 40; 25 MG/1; MG/1
TABLET ORAL
Qty: 90 TABLET | Refills: 0 | Status: SHIPPED | OUTPATIENT
Start: 2022-07-29 | End: 2022-10-26

## 2022-07-29 NOTE — TELEPHONE ENCOUNTER
Rx Refill Note  Requested Prescriptions     Pending Prescriptions Disp Refills   • olmesartan-hydrochlorothiazide (BENICAR HCT) 40-25 MG per tablet [Pharmacy Med Name: OLMESARTAN-HCTZ 40-25 MG TAB] 90 tablet 0     Sig: TAKE ONE TABLET BY MOUTH DAILY      Last office visit with prescribing clinician: 4/21/2022  LAST REFILL 04/18/2022

## 2022-08-11 RX ORDER — AMIODARONE HYDROCHLORIDE 200 MG/1
TABLET ORAL
Qty: 45 TABLET | Refills: 0 | Status: SHIPPED | OUTPATIENT
Start: 2022-08-11 | End: 2022-11-14

## 2022-08-11 RX ORDER — ISOSORBIDE MONONITRATE 30 MG/1
TABLET, EXTENDED RELEASE ORAL
Qty: 90 TABLET | Refills: 0 | Status: SHIPPED | OUTPATIENT
Start: 2022-08-11 | End: 2022-11-14

## 2022-08-16 DIAGNOSIS — I10 ESSENTIAL HYPERTENSION: ICD-10-CM

## 2022-08-16 RX ORDER — METOPROLOL SUCCINATE 50 MG/1
TABLET, EXTENDED RELEASE ORAL
Qty: 90 TABLET | Refills: 0 | Status: SHIPPED | OUTPATIENT
Start: 2022-08-16 | End: 2022-11-23 | Stop reason: HOSPADM

## 2022-08-16 NOTE — TELEPHONE ENCOUNTER
Rx Refill Note  Requested Prescriptions     Pending Prescriptions Disp Refills   • metoprolol succinate XL (TOPROL-XL) 50 MG 24 hr tablet [Pharmacy Med Name: Metoprolol Succinate ER 50 MG Oral Tablet Extended Release 24 Hour] 90 tablet 0     Sig: Take 1 tablet by mouth once daily      Last office visit with prescribing clinician: 4/21/2022      Next office visit with prescribing clinician: Visit date not found

## 2022-08-26 RX ORDER — LEVOTHYROXINE SODIUM 112 UG/1
TABLET ORAL
Qty: 90 TABLET | Refills: 0 | Status: SHIPPED | OUTPATIENT
Start: 2022-08-26 | End: 2022-11-26

## 2022-08-30 ENCOUNTER — HOSPITAL ENCOUNTER (OUTPATIENT)
Dept: CT IMAGING | Facility: HOSPITAL | Age: 82
Discharge: HOME | End: 2022-08-30
Admitting: INTERNAL MEDICINE

## 2022-08-30 DIAGNOSIS — I71.40 ABDOMINAL AORTIC ANEURYSM (AAA) WITHOUT RUPTURE: ICD-10-CM

## 2022-08-30 LAB — CREAT BLDA-MCNC: 1.1 MG/DL (ref 0.6–1.3)

## 2022-08-30 PROCEDURE — 25010000002 IOPAMIDOL 61 % SOLUTION: Performed by: INTERNAL MEDICINE

## 2022-08-30 PROCEDURE — 71260 CT THORAX DX C+: CPT

## 2022-08-30 PROCEDURE — 82565 ASSAY OF CREATININE: CPT

## 2022-08-30 RX ADMIN — IOPAMIDOL 85 ML: 612 INJECTION, SOLUTION INTRAVENOUS at 11:33

## 2022-09-12 ENCOUNTER — TRANSITIONAL CARE MANAGEMENT TELEPHONE ENCOUNTER (OUTPATIENT)
Dept: CALL CENTER | Facility: HOSPITAL | Age: 82
End: 2022-09-12

## 2022-09-12 ENCOUNTER — TELEPHONE (OUTPATIENT)
Dept: FAMILY MEDICINE CLINIC | Facility: CLINIC | Age: 82
End: 2022-09-12

## 2022-09-12 ENCOUNTER — READMISSION MANAGEMENT (OUTPATIENT)
Dept: CALL CENTER | Facility: HOSPITAL | Age: 82
End: 2022-09-12

## 2022-09-12 NOTE — OUTREACH NOTE
Prep Survey    Flowsheet Row Responses   [facility] facility patient discharged from? Non-BH   Is LACE score < 7 ? Non-BH Discharge   Emergency Room discharge w/ pulse ox? No   Eligibility [facility]   Date of Discharge 09/10/22   Discharge Disposition Home or Self Care   Discharge diagnosis Unknown   Does the patient have one of the following disease processes/diagnoses(primary or secondary)? Other   Prep survey completed? Yes          CHRISTIAN ORLANDO - Registered Nurse

## 2022-09-12 NOTE — TELEPHONE ENCOUNTER
Caller: David Comer Sr.    Relationship to patient: Self    Best call back number: [phone]    New or established patient?  [] New  [x] Established    Date of discharge: 9/10/22  Facility discharged from: [facility] [place], [place]     Specialty Only: Did you see a [employer] provider?    [] Yes  [x] No

## 2022-09-12 NOTE — OUTREACH NOTE
Call Center TCM Note    Flowsheet Row Responses   [facility] patient discharged from? Non-   Does the patient have one of the following disease processes/diagnoses(primary or secondary)? Other   TCM attempt successful? Yes   Call start time 1315   Call end time 1323   Discharge diagnosis Septic with organ failure- Patient stated Renal failure, septic, and pneumonia   Person spoke with today (if not patient) and relationship Patient   Medication alerts for this patient Patient states dc on antibiotics but did not know the name.   Meds reviewed with patient/caregiver? Yes   Is the patient taking all medications as directed (includes completed medication regime)? Yes   Has home health visited the patient within 72 hours of discharge? N/A   Did the patient receive a copy of their discharge instructions? Yes   Nursing interventions Reviewed instructions with patient   What is the patient's perception of their health status since discharge? Improving   Is the patient/caregiver able to teach back signs and symptoms related to disease process for when to call PCP? Yes   Is the patient/caregiver able to teach back signs and symptoms related to disease process for when to call 911? Yes   Is the patient/caregiver able to teach back the hierarchy of who to call/visit for symptoms/problems? PCP, Specialist, Home health nurse, Urgent Care, ED, 911 Yes   TCM call completed? Yes   Wrap up additional comments Patient was in hospital on vacation- BP was 60/30 and patient was septic he has hosp fu noted with pcp 09/15 @ 1p and patient is currently in the process trying to get a release of records from [facility]          La Nena Almeida RN    9/12/2022, 13:23 EDT

## 2022-09-14 NOTE — PROGRESS NOTES
Transitional Care Follow Up Visit  Subjective     David Comer Sr. is a 82 y.o. male who presents for a transitional care management visit.    Within 48 business hours after discharge our office contacted him via telephone to coordinate his care and needs.      I reviewed and discussed the details of that call along with the discharge summary, hospital problems, inpatient lab results, inpatient diagnostic studies, and consultation reports with David.     Current outpatient and discharge medications have been reconciled for the patient.  Reviewed by: Chacha Pineda MD      Date of TCM Phone Call 9/12/2022   [facility]   Date of Discharge 9/10/2022   Discharge Disposition Home or Self Care     Risk for Readmission (LACE) No data recorded  Dx with aspiration pneumonia, resp failure and sepsis.    History of Present Illness   Pt is here for hospital follow up with daughter.  He states he has been doing better few days but still a little weak.  He is breathing ok.  No CP or HA.  He was treated for spesis  HTN- his bp has been back to normal.    He finishes abx today.    Course During Hospital Stay:  This is a 76-year-old male with CAD, A. fib, hypertension and hypothyroidism. He initially presented with chest pains and  complaints followed by shaking chills, nausea, dry heaving and abdominal discomfort. On presentation he was noted with severe  sepsis without septic shock, pneumonia as noted on CT chest possibly aspiration. A CT of the abdomen and pelvis also  showed prostatic megaly, thickening of the bladder wall and bladder diverticulum which all suggested signs of chronic low obstruction.  The patient is treated for severe sepsis with the source being pulmonary and urologic. He is treated for aspiration pneumonia with IV  Zosyn. He is noted with acute respiratory failure with hypoxia and he is placed on supplemental oxygen. This has been successfully  weaned off and he is now oxygenating  well on room air. He is now being discharged on oral Augmentin.  The patient is noted with a mildly elevated troponin of 0.57. EKG showed sinus rhythm with diffuse nonspecific ST-T  changes. Echocardiogram showed no wall motion abnormalities, mild diastolic dysfunction. Cardiology is consulted and recommended  no intervention. The elevation of troponin is felt to be because of impaired pulmonary function.  Patient is also noted in acute renal failure with a creatinine of 1.5. He did receive IV fluids and now renal function improved. His  creatinine is now down to normal.  The patient has paroxysmal atrial fibrillation and he had an episode of rapid A. fib briefly. He is on warfarin. His INR was initially  elevated this was temporarily held, resumed upon discharge.  CONSULTING SERVICES:  Cardiology  SIGNIFICANT FINDINGS  (09/07/2022 10:32 EDT XR Chest 1 View)  IMPRESSION:  Page 1     The following portions of the patient's history were reviewed and updated as appropriate: allergies, current medications, past family history, past medical history, past social history, past surgical history and problem list.    Review of Systems    Objective   Physical Exam  Vitals and nursing note reviewed.   Constitutional:       Appearance: Normal appearance. He is well-developed.   Cardiovascular:      Rate and Rhythm: Normal rate and regular rhythm.      Heart sounds: Normal heart sounds. No murmur heard.  Pulmonary:      Effort: Pulmonary effort is normal. No respiratory distress.      Breath sounds: Normal breath sounds. No stridor. No wheezing or rhonchi.   Neurological:      General: No focal deficit present.      Mental Status: He is alert and oriented to person, place, and time. He is not disoriented.   Psychiatric:         Mood and Affect: Mood normal.         Behavior: Behavior normal.         Assessment & Plan   Diagnoses and all orders for this visit:    1. Hospital discharge follow-up (Primary)    2. Aspiration  pneumonia, unspecified aspiration pneumonia type, unspecified laterality, unspecified part of lung (HCC)    3. Essential hypertension    4. Benign prostatic hyperplasia with lower urinary tract symptoms, symptom details unspecified  -     Ambulatory Referral to Urology             I requested and reviewed all records, labs, and diagnostics from the hospital with patient and family.  Patient is to follow-up with specialists as discussed and directed.

## 2022-09-15 ENCOUNTER — OFFICE VISIT (OUTPATIENT)
Dept: FAMILY MEDICINE CLINIC | Facility: CLINIC | Age: 82
End: 2022-09-15

## 2022-09-15 VITALS
OXYGEN SATURATION: 96 % | TEMPERATURE: 98.4 F | HEIGHT: 72 IN | RESPIRATION RATE: 16 BRPM | BODY MASS INDEX: 32.34 KG/M2 | WEIGHT: 238.8 LBS | HEART RATE: 59 BPM | DIASTOLIC BLOOD PRESSURE: 80 MMHG | SYSTOLIC BLOOD PRESSURE: 118 MMHG

## 2022-09-15 DIAGNOSIS — I10 ESSENTIAL HYPERTENSION: ICD-10-CM

## 2022-09-15 DIAGNOSIS — Z09 HOSPITAL DISCHARGE FOLLOW-UP: Primary | ICD-10-CM

## 2022-09-15 DIAGNOSIS — J69.0 ASPIRATION PNEUMONIA, UNSPECIFIED ASPIRATION PNEUMONIA TYPE, UNSPECIFIED LATERALITY, UNSPECIFIED PART OF LUNG: ICD-10-CM

## 2022-09-15 DIAGNOSIS — N40.1 BENIGN PROSTATIC HYPERPLASIA WITH LOWER URINARY TRACT SYMPTOMS, SYMPTOM DETAILS UNSPECIFIED: ICD-10-CM

## 2022-09-15 PROCEDURE — 99214 OFFICE O/P EST MOD 30 MIN: CPT | Performed by: FAMILY MEDICINE

## 2022-09-15 RX ORDER — AMOXICILLIN AND CLAVULANATE POTASSIUM 875; 125 MG/1; MG/1
TABLET, FILM COATED ORAL
COMMUNITY
Start: 2022-09-10 | End: 2022-09-15

## 2022-09-23 RX ORDER — HYDRALAZINE HYDROCHLORIDE 25 MG/1
TABLET, FILM COATED ORAL
Qty: 270 TABLET | Refills: 0 | Status: SHIPPED | OUTPATIENT
Start: 2022-09-23 | End: 2023-03-07

## 2022-10-14 RX ORDER — DOXAZOSIN 2 MG/1
TABLET ORAL
Qty: 90 TABLET | Refills: 0 | Status: SHIPPED | OUTPATIENT
Start: 2022-10-14 | End: 2023-01-26 | Stop reason: SDUPTHER

## 2022-10-14 NOTE — TELEPHONE ENCOUNTER
Rx Refill Note  Requested Prescriptions     Pending Prescriptions Disp Refills   • doxazosin (CARDURA) 2 MG tablet [Pharmacy Med Name: Doxazosin Mesylate 2 MG Oral Tablet] 90 tablet 0     Sig: Take 1 tablet by mouth once daily      Last office visit with prescribing clinician: 09/15/2022    Next office visit with prescribing clinician: Visit date not found  Last refill  12/26/2021

## 2022-10-24 RX ORDER — WARFARIN SODIUM 7.5 MG/1
TABLET ORAL
Qty: 90 TABLET | Refills: 1 | Status: SHIPPED | OUTPATIENT
Start: 2022-10-24

## 2022-10-24 NOTE — TELEPHONE ENCOUNTER
Requested Prescriptions     Pending Prescriptions Disp Refills   • warfarin (COUMADIN) 7.5 MG tablet [Pharmacy Med Name: Warfarin Sodium 7.5 MG Oral Tablet] 90 tablet 0     Sig: TAKE 1 TABLET BY MOUTH ONCE DAILY AT NIGHT

## 2022-10-26 DIAGNOSIS — I10 ESSENTIAL HYPERTENSION: ICD-10-CM

## 2022-10-26 RX ORDER — OLMESARTAN MEDOXOMIL AND HYDROCHLOROTHIAZIDE 40/25 40; 25 MG/1; MG/1
TABLET ORAL
Qty: 90 TABLET | Refills: 0 | Status: SHIPPED | OUTPATIENT
Start: 2022-10-26 | End: 2023-01-23

## 2022-10-26 NOTE — TELEPHONE ENCOUNTER
Rx Refill Note  Requested Prescriptions     Pending Prescriptions Disp Refills   • olmesartan-hydrochlorothiazide (BENICAR HCT) 40-25 MG per tablet [Pharmacy Med Name: OLMESARTAN-HCTZ 40-25 MG TAB] 90 tablet 0     Sig: TAKE ONE TABLET BY MOUTH DAILY      Last office visit with prescribing clinician: 9/15/2022      Next office visit with prescribing clinician: Visit date not found            Scooby Luna, CRISPIN/LMDANNY  10/26/22, 10:38 EDT

## 2022-11-14 RX ORDER — AMIODARONE HYDROCHLORIDE 200 MG/1
TABLET ORAL
Qty: 45 TABLET | Refills: 0 | Status: SHIPPED | OUTPATIENT
Start: 2022-11-14 | End: 2023-02-21

## 2022-11-14 RX ORDER — ISOSORBIDE MONONITRATE 30 MG/1
TABLET, EXTENDED RELEASE ORAL
Qty: 90 TABLET | Refills: 0 | Status: SHIPPED | OUTPATIENT
Start: 2022-11-14 | End: 2023-02-21

## 2022-11-17 ENCOUNTER — HOSPITAL ENCOUNTER (OUTPATIENT)
Dept: CARDIOLOGY | Facility: HOSPITAL | Age: 82
Discharge: HOME | End: 2022-11-17
Admitting: INTERNAL MEDICINE

## 2022-11-17 ENCOUNTER — OFFICE VISIT (OUTPATIENT)
Dept: CARDIOLOGY | Facility: CLINIC | Age: 82
End: 2022-11-17

## 2022-11-17 ENCOUNTER — ANTICOAGULATION VISIT (OUTPATIENT)
Dept: CARDIOLOGY | Facility: CLINIC | Age: 82
End: 2022-11-17

## 2022-11-17 VITALS
HEIGHT: 72 IN | HEART RATE: 79 BPM | DIASTOLIC BLOOD PRESSURE: 75 MMHG | BODY MASS INDEX: 32.23 KG/M2 | WEIGHT: 238 LBS | SYSTOLIC BLOOD PRESSURE: 111 MMHG

## 2022-11-17 DIAGNOSIS — I48.0 PAROXYSMAL ATRIAL FIBRILLATION: Primary | ICD-10-CM

## 2022-11-17 DIAGNOSIS — Z92.29 H/O AMIODARONE THERAPY: ICD-10-CM

## 2022-11-17 DIAGNOSIS — I10 ESSENTIAL HYPERTENSION: ICD-10-CM

## 2022-11-17 DIAGNOSIS — Z79.01 LONG TERM (CURRENT) USE OF ANTICOAGULANTS: ICD-10-CM

## 2022-11-17 DIAGNOSIS — Z79.01 LONG TERM (CURRENT) USE OF ANTICOAGULANTS: Primary | ICD-10-CM

## 2022-11-17 DIAGNOSIS — R06.02 SOB (SHORTNESS OF BREATH): ICD-10-CM

## 2022-11-17 DIAGNOSIS — I48.0 PAROXYSMAL ATRIAL FIBRILLATION: ICD-10-CM

## 2022-11-17 LAB — INR PPP: 2.7

## 2022-11-17 PROCEDURE — 93000 ELECTROCARDIOGRAM COMPLETE: CPT | Performed by: INTERNAL MEDICINE

## 2022-11-17 PROCEDURE — 85610 PROTHROMBIN TIME: CPT | Performed by: INTERNAL MEDICINE

## 2022-11-17 PROCEDURE — 99214 OFFICE O/P EST MOD 30 MIN: CPT | Performed by: INTERNAL MEDICINE

## 2022-11-17 NOTE — PROGRESS NOTES
1 yr follow up    Subjective:        David Comer Sr. is a 82 y.o. male who here for follow up    CC  Follow-up paroxysmal atrial fibrillation hypertension on amiodarone therapy  HPI  82-year-old male with paroxysmal atrial fibrillation on amiodarone therapy also hypertension shortness of breath here for the follow-up with no complaints of chest pains tightness heaviness or the pressure sensation     Problems Addressed this Visit        Cardiac and Vasculature    Paroxysmal atrial fibrillation (HCC) - Primary    Essential hypertension    H/O amiodarone therapy       Coag and Thromboembolic    Long term (current) use of anticoagulants       Pulmonary and Pneumonias    SOB (shortness of breath)   Diagnoses       Codes Comments    Paroxysmal atrial fibrillation (HCC)    -  Primary ICD-10-CM: I48.0  ICD-9-CM: 427.31     Long term (current) use of anticoagulants     ICD-10-CM: Z79.01  ICD-9-CM: V58.61     H/O amiodarone therapy     ICD-10-CM: Z92.29  ICD-9-CM: V87.49     Essential hypertension     ICD-10-CM: I10  ICD-9-CM: 401.9     SOB (shortness of breath)     ICD-10-CM: R06.02  ICD-9-CM: 786.05         .    The following portions of the patient's history were reviewed and updated as appropriate: allergies, current medications, past family history, past medical history, past social history, past surgical history and problem list.    Past Medical History:   Diagnosis Date   • AAA (abdominal aortic aneurysm) without rupture    • Aneurysm of thoracic aorta     CT CHEST 8/2021 IN [facility]    • Arthritis    • Basal cell carcinoma     RIGHT LOWER LID    • BPH (benign prostatic hyperplasia)    • Chronic lumbar pain    • Degenerative arthritis of lumbar spine    • Degenerative cervical disc    • Disease of thyroid gland    • Elevated rheumatoid factor    • History of atrial fibrillation    • Hyperlipidemia    • Hypertension    • Hypogonadism in male    • Muscle strain of left upper back     PRESCRIBED PREDNISONE DOSE PACK   "  • On anticoagulant therapy    • Pulmonary nodule     6 MM - REPEAT CT SCAN IN ONE YEAR, WATCHING    • Refused influenza vaccine    • Scoliosis    • Vitamin D deficiency      reports that he has never smoked. He has never used smokeless tobacco. He reports that he does not drink alcohol and does not use drugs.   Family History   Problem Relation Age of Onset   • Hypertension Father    • Heart attack Father    • Heart attack Brother    • Alcohol abuse Brother    • Hypertension Brother    • Hypertension Paternal Grandmother    • Hypertension Paternal Grandfather    • Anemia Mother    • Arthritis Mother    • Hypertension Mother    • Hypertension Maternal Grandmother    • Heart disease Other         FH in males before age 55   • Malig Hyperthermia Neg Hx        Review of Systems  Constitutional: No wt loss, fever, fatigue  Gastrointestinal: No nausea, abdominal pain  Behavioral/Psych: No insomnia or anxiety   Cardiovascular no chest pains or tightness in the chest  Objective:       Physical Exam  /75   Pulse 79   Ht 181.6 cm (71.5\")   Wt 108 kg (238 lb)   BMI 32.73 kg/m²   General appearance: No acute changes   Neck: Trachea midline; NECK, supple, no thyromegaly or lymphadenopathy   Lungs: Normal size and shape, normal breath sounds, equal distribution of air, no rales and rhonchi   CV: S1-S2 regular, no murmurs, no rub, no gallop   Abdomen: Soft, nontender; no masses , no abnormal abdominal sounds   Extremities: No deformity , normal color , no peripheral edema   Skin: Normal temperature, turgor and texture; no rash, ulcers            ECG 12 Lead    Date/Time: 11/17/2022 11:55 AM  Performed by: Harlan Downing MD  Authorized by: Harlan Downing MD   Comparison: compared with previous ECG   Similar to previous ECG  Rhythm: sinus rhythm  ST Flattening: all    Clinical impression: non-specific ECG              Echocardiogram:      No current facility-administered medications for this visit.  No " current outpatient medications on file.    Facility-Administered Medications Ordered in Other Visits:   •  acetaminophen (TYLENOL) tablet 650 mg, 650 mg, Oral, Q4H PRN, Rachelle Henriquez MD  •  amiodarone (PACERONE) tablet 100 mg, 100 mg, Oral, Daily, Rachelle Henriquez MD, 100 mg at 11/22/22 0808  •  aspirin tablet 325 mg, 325 mg, Oral, Once, Angel Bhatt MD  •  hydrALAZINE (APRESOLINE) tablet 25 mg, 25 mg, Oral, TID, Rachelle Henriquez MD, 25 mg at 11/22/22 2019  •  isosorbide mononitrate (IMDUR) 24 hr tablet 30 mg, 30 mg, Oral, Daily, Rachelle Henriquez MD, 30 mg at 11/22/22 0807  •  levothyroxine (SYNTHROID, LEVOTHROID) tablet 112 mcg, 112 mcg, Oral, Q AM, Rachelle Henriquez MD, 112 mcg at 11/23/22 0637  •  losartan (COZAAR) 100 mg, hydroCHLOROthiazide (HYDRODIURIL) 25 mg, , Oral, Daily, Rachelle Henriquez MD, Given at 11/22/22 0808  •  melatonin tablet 3 mg, 3 mg, Oral, Nightly PRN, Rachelle Henriquez MD  •  nebivolol (BYSTOLIC) tablet 5 mg, 5 mg, Oral, Daily, Rachelle Henriquez MD, 5 mg at 11/22/22 0807  •  nitroglycerin (NITROSTAT) SL tablet 0.4 mg, 0.4 mg, Sublingual, Q5 Min PRN, Rachelle Henriquez MD  •  ondansetron (ZOFRAN) tablet 4 mg, 4 mg, Oral, Q6H PRN **OR** ondansetron (ZOFRAN) injection 4 mg, 4 mg, Intravenous, Q6H PRN, Rachelle Henriquez MD  •  pantoprazole (PROTONIX) EC tablet 40 mg, 40 mg, Oral, QAM, Rachelle Herniquez MD, 40 mg at 11/23/22 0637  •  Pharmacy to dose warfarin, , Does not apply, Continuous PRN, Rachelle Henriquez MD  •  rosuvastatin (CRESTOR) tablet 5 mg, 5 mg, Oral, Nightly, Rachelle Henriquez MD, 5 mg at 11/22/22 2019  •  sodium chloride 0.9 % flush 10 mL, 10 mL, Intravenous, PRN, Angel Bhatt MD  •  terazosin (HYTRIN) capsule 2 mg, 2 mg, Oral, Nightly, Rachelle Henriquez MD, 2 mg at 11/22/22 2019   Assessment:        Patient Active Problem List   Diagnosis   • Chronic coronary artery disease   • Abdominal aortic  aneurysm   • Paroxysmal atrial fibrillation (HCC)   • Gastroesophageal reflux disease   • Essential hypertension   • Mixed hyperlipidemia   • Hypogonadism in male   • Acquired hypothyroidism   • Osteoarthritis of spine   • Vitamin D deficiency   • Long term (current) use of anticoagulants   • Atrial fibrillation, rapid (HCC)   • Syncope and collapse   • Chest pain   • Statin intolerance   • Gross hematuria   • Coumadin toxicity   • Sepsis (HCC)   • H/O amiodarone therapy   • Dehydration   • Renal insufficiency   • Thoracic aortic aneurysm without rupture   • Refused influenza vaccine   • Medicare annual wellness visit, subsequent   • BPH without obstruction/lower urinary tract symptoms   • Thoracic aortic aneurysm   • Kidney stone   • Hand injury   • Acute kidney injury (HCC)   • 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 Paroxysmal atrial fibrillation (HCC)  I48.0 427.31   2. Long term (current) use of anticoagulants  Z79.01 V58.61   3. H/O amiodarone therapy  Z92.29 V87.49   4. Essential hypertension  I10 401.9   5. SOB (shortness of breath)  R06.02 786.05     1. Paroxysmal atrial fibrillation (HCC)  Under control    2. Long term (current) use of anticoagulants  Continue current treatment    3. H/O amiodarone therapy  Continue current treatment    4. Essential hypertension  Blood pressure under control    5. SOB (shortness of breath)  Multifactorial       Ok pull teeth  May stop coumadin 3 days    See in 6 months  COUNSELING:    David Comer Sr.Counseling was given to patient for the following topics: diagnostic results, risk factor  reductions, impressions, risks and benefits of treatment options and importance of treatment compliance .       SMOKING COUNSELING:        Dictated using Dragon dictation

## 2022-11-21 ENCOUNTER — APPOINTMENT (OUTPATIENT)
Dept: GENERAL RADIOLOGY | Facility: HOSPITAL | Age: 82
End: 2022-11-21

## 2022-11-21 ENCOUNTER — APPOINTMENT (OUTPATIENT)
Dept: CT IMAGING | Facility: HOSPITAL | Age: 82
End: 2022-11-21

## 2022-11-21 ENCOUNTER — APPOINTMENT (OUTPATIENT)
Dept: ULTRASOUND IMAGING | Facility: HOSPITAL | Age: 82
End: 2022-11-21

## 2022-11-21 ENCOUNTER — HOSPITAL ENCOUNTER (OUTPATIENT)
Facility: HOSPITAL | Age: 82
Setting detail: OBSERVATION
Discharge: HOME | End: 2022-11-23
Attending: EMERGENCY MEDICINE | Admitting: STUDENT IN AN ORGANIZED HEALTH CARE EDUCATION/TRAINING PROGRAM

## 2022-11-21 DIAGNOSIS — R07.9 CHEST PAIN, UNSPECIFIED TYPE: Primary | ICD-10-CM

## 2022-11-21 DIAGNOSIS — R93.2 ABNORMAL COMPUTED TOMOGRAPHY OF GALLBLADDER: ICD-10-CM

## 2022-11-21 LAB
ALBUMIN SERPL-MCNC: 4.5 G/DL (ref 3.5–5.2)
ALBUMIN/GLOB SERPL: 1.8 G/DL
ALP SERPL-CCNC: 63 U/L (ref 39–117)
ALT SERPL W P-5'-P-CCNC: 31 U/L (ref 1–41)
ANION GAP SERPL CALCULATED.3IONS-SCNC: 11.9 MMOL/L (ref 5–15)
AST SERPL-CCNC: 24 U/L (ref 1–40)
BASOPHILS # BLD AUTO: 0.04 10*3/MM3 (ref 0–0.2)
BASOPHILS NFR BLD AUTO: 0.5 % (ref 0–1.5)
BILIRUB SERPL-MCNC: 0.3 MG/DL (ref 0–1.2)
BUN SERPL-MCNC: 16 MG/DL (ref 8–23)
BUN/CREAT SERPL: 15.1 (ref 7–25)
CALCIUM SPEC-SCNC: 10 MG/DL (ref 8.6–10.5)
CHLORIDE SERPL-SCNC: 102 MMOL/L (ref 98–107)
CO2 SERPL-SCNC: 27.1 MMOL/L (ref 22–29)
CREAT SERPL-MCNC: 1.06 MG/DL (ref 0.76–1.27)
DEPRECATED RDW RBC AUTO: 41.7 FL (ref 37–54)
EGFRCR SERPLBLD CKD-EPI 2021: 70.5 ML/MIN/1.73
EOSINOPHIL # BLD AUTO: 0.24 10*3/MM3 (ref 0–0.4)
EOSINOPHIL NFR BLD AUTO: 3.2 % (ref 0.3–6.2)
ERYTHROCYTE [DISTWIDTH] IN BLOOD BY AUTOMATED COUNT: 12 % (ref 12.3–15.4)
GLOBULIN UR ELPH-MCNC: 2.5 GM/DL
GLUCOSE SERPL-MCNC: 110 MG/DL (ref 65–99)
HCT VFR BLD AUTO: 42.4 % (ref 37.5–51)
HGB BLD-MCNC: 14.2 G/DL (ref 13–17.7)
HOLD SPECIMEN: NORMAL
HOLD SPECIMEN: NORMAL
IMM GRANULOCYTES # BLD AUTO: 0.03 10*3/MM3 (ref 0–0.05)
IMM GRANULOCYTES NFR BLD AUTO: 0.4 % (ref 0–0.5)
INR PPP: 2.62 (ref 0.9–1.1)
INR PPP: 2.83 (ref 0.9–1.1)
LYMPHOCYTES # BLD AUTO: 1.24 10*3/MM3 (ref 0.7–3.1)
LYMPHOCYTES NFR BLD AUTO: 16.7 % (ref 19.6–45.3)
MCH RBC QN AUTO: 31.2 PG (ref 26.6–33)
MCHC RBC AUTO-ENTMCNC: 33.5 G/DL (ref 31.5–35.7)
MCV RBC AUTO: 93.2 FL (ref 79–97)
MONOCYTES # BLD AUTO: 0.52 10*3/MM3 (ref 0.1–0.9)
MONOCYTES NFR BLD AUTO: 7 % (ref 5–12)
NEUTROPHILS NFR BLD AUTO: 5.36 10*3/MM3 (ref 1.7–7)
NEUTROPHILS NFR BLD AUTO: 72.2 % (ref 42.7–76)
NRBC BLD AUTO-RTO: 0 /100 WBC (ref 0–0.2)
PLATELET # BLD AUTO: 158 10*3/MM3 (ref 140–450)
PMV BLD AUTO: 11.5 FL (ref 6–12)
POTASSIUM SERPL-SCNC: 4.1 MMOL/L (ref 3.5–5.2)
PROT SERPL-MCNC: 7 G/DL (ref 6–8.5)
PROTHROMBIN TIME: 28.4 SECONDS (ref 11.7–14.2)
PROTHROMBIN TIME: 30.2 SECONDS (ref 11.7–14.2)
QT INTERVAL: 460 MS
RBC # BLD AUTO: 4.55 10*6/MM3 (ref 4.14–5.8)
SODIUM SERPL-SCNC: 141 MMOL/L (ref 136–145)
TROPONIN T SERPL-MCNC: <0.01 NG/ML (ref 0–0.03)
TROPONIN T SERPL-MCNC: <0.01 NG/ML (ref 0–0.03)
WBC NRBC COR # BLD: 7.43 10*3/MM3 (ref 3.4–10.8)
WHOLE BLOOD HOLD COAG: NORMAL
WHOLE BLOOD HOLD SPECIMEN: NORMAL

## 2022-11-21 PROCEDURE — 93005 ELECTROCARDIOGRAM TRACING: CPT | Performed by: EMERGENCY MEDICINE

## 2022-11-21 PROCEDURE — 74174 CTA ABD&PLVS W/CONTRAST: CPT

## 2022-11-21 PROCEDURE — 71046 X-RAY EXAM CHEST 2 VIEWS: CPT

## 2022-11-21 PROCEDURE — 93005 ELECTROCARDIOGRAM TRACING: CPT

## 2022-11-21 PROCEDURE — 80053 COMPREHEN METABOLIC PANEL: CPT | Performed by: EMERGENCY MEDICINE

## 2022-11-21 PROCEDURE — G0378 HOSPITAL OBSERVATION PER HR: HCPCS

## 2022-11-21 PROCEDURE — 84484 ASSAY OF TROPONIN QUANT: CPT | Performed by: EMERGENCY MEDICINE

## 2022-11-21 PROCEDURE — 76705 ECHO EXAM OF ABDOMEN: CPT

## 2022-11-21 PROCEDURE — 85610 PROTHROMBIN TIME: CPT | Performed by: INTERNAL MEDICINE

## 2022-11-21 PROCEDURE — 25010000002 ENOXAPARIN PER 10 MG: Performed by: INTERNAL MEDICINE

## 2022-11-21 PROCEDURE — 96372 THER/PROPH/DIAG INJ SC/IM: CPT

## 2022-11-21 PROCEDURE — 71275 CT ANGIOGRAPHY CHEST: CPT

## 2022-11-21 PROCEDURE — 93010 ELECTROCARDIOGRAM REPORT: CPT | Performed by: INTERNAL MEDICINE

## 2022-11-21 PROCEDURE — 85025 COMPLETE CBC W/AUTO DIFF WBC: CPT | Performed by: EMERGENCY MEDICINE

## 2022-11-21 PROCEDURE — 36415 COLL VENOUS BLD VENIPUNCTURE: CPT

## 2022-11-21 PROCEDURE — 96374 THER/PROPH/DIAG INJ IV PUSH: CPT

## 2022-11-21 PROCEDURE — 99285 EMERGENCY DEPT VISIT HI MDM: CPT

## 2022-11-21 PROCEDURE — 0 IOPAMIDOL PER 1 ML: Performed by: EMERGENCY MEDICINE

## 2022-11-21 PROCEDURE — 85610 PROTHROMBIN TIME: CPT | Performed by: EMERGENCY MEDICINE

## 2022-11-21 RX ORDER — AMIODARONE HYDROCHLORIDE 200 MG/1
100 TABLET ORAL DAILY
Status: DISCONTINUED | OUTPATIENT
Start: 2022-11-22 | End: 2022-11-23 | Stop reason: HOSPADM

## 2022-11-21 RX ORDER — TERAZOSIN 2 MG/1
2 CAPSULE ORAL NIGHTLY
Status: DISCONTINUED | OUTPATIENT
Start: 2022-11-21 | End: 2022-11-23 | Stop reason: HOSPADM

## 2022-11-21 RX ORDER — ASPIRIN 325 MG
325 TABLET ORAL ONCE
Status: DISCONTINUED | OUTPATIENT
Start: 2022-11-21 | End: 2022-11-23 | Stop reason: HOSPADM

## 2022-11-21 RX ORDER — ENOXAPARIN SODIUM 100 MG/ML
40 INJECTION SUBCUTANEOUS EVERY 24 HOURS
Status: DISCONTINUED | OUTPATIENT
Start: 2022-11-21 | End: 2022-11-22

## 2022-11-21 RX ORDER — SODIUM CHLORIDE 0.9 % (FLUSH) 0.9 %
10 SYRINGE (ML) INJECTION AS NEEDED
Status: DISCONTINUED | OUTPATIENT
Start: 2022-11-21 | End: 2022-11-23 | Stop reason: HOSPADM

## 2022-11-21 RX ORDER — UREA 10 %
3 LOTION (ML) TOPICAL NIGHTLY PRN
Status: DISCONTINUED | OUTPATIENT
Start: 2022-11-21 | End: 2022-11-23 | Stop reason: HOSPADM

## 2022-11-21 RX ORDER — ONDANSETRON 2 MG/ML
4 INJECTION INTRAMUSCULAR; INTRAVENOUS EVERY 6 HOURS PRN
Status: DISCONTINUED | OUTPATIENT
Start: 2022-11-21 | End: 2022-11-23 | Stop reason: HOSPADM

## 2022-11-21 RX ORDER — HYDRALAZINE HYDROCHLORIDE 25 MG/1
25 TABLET, FILM COATED ORAL 3 TIMES DAILY
Status: DISCONTINUED | OUTPATIENT
Start: 2022-11-21 | End: 2022-11-23 | Stop reason: HOSPADM

## 2022-11-21 RX ORDER — ACETAMINOPHEN 325 MG/1
650 TABLET ORAL EVERY 4 HOURS PRN
Status: DISCONTINUED | OUTPATIENT
Start: 2022-11-21 | End: 2022-11-23 | Stop reason: HOSPADM

## 2022-11-21 RX ORDER — PANTOPRAZOLE SODIUM 40 MG/1
40 TABLET, DELAYED RELEASE ORAL EVERY MORNING
Status: DISCONTINUED | OUTPATIENT
Start: 2022-11-22 | End: 2022-11-23 | Stop reason: HOSPADM

## 2022-11-21 RX ORDER — NEBIVOLOL 5 MG/1
5 TABLET ORAL DAILY
Status: DISCONTINUED | OUTPATIENT
Start: 2022-11-22 | End: 2022-11-23 | Stop reason: HOSPADM

## 2022-11-21 RX ORDER — HYDRALAZINE HYDROCHLORIDE 25 MG/1
25 TABLET, FILM COATED ORAL EVERY 8 HOURS SCHEDULED
Status: COMPLETED | OUTPATIENT
Start: 2022-11-21 | End: 2022-11-21

## 2022-11-21 RX ORDER — WARFARIN SODIUM 7.5 MG/1
7.5 TABLET ORAL NIGHTLY
Status: DISCONTINUED | OUTPATIENT
Start: 2022-11-21 | End: 2022-11-22 | Stop reason: DRUGHIGH

## 2022-11-21 RX ORDER — ISOSORBIDE MONONITRATE 30 MG/1
30 TABLET, EXTENDED RELEASE ORAL DAILY
Status: DISCONTINUED | OUTPATIENT
Start: 2022-11-22 | End: 2022-11-23 | Stop reason: HOSPADM

## 2022-11-21 RX ORDER — LABETALOL HYDROCHLORIDE 5 MG/ML
20 INJECTION, SOLUTION INTRAVENOUS ONCE
Status: COMPLETED | OUTPATIENT
Start: 2022-11-21 | End: 2022-11-21

## 2022-11-21 RX ORDER — NITROGLYCERIN 0.4 MG/1
0.4 TABLET SUBLINGUAL
Status: DISCONTINUED | OUTPATIENT
Start: 2022-11-21 | End: 2022-11-23 | Stop reason: HOSPADM

## 2022-11-21 RX ORDER — ONDANSETRON 4 MG/1
4 TABLET, FILM COATED ORAL EVERY 6 HOURS PRN
Status: DISCONTINUED | OUTPATIENT
Start: 2022-11-21 | End: 2022-11-23 | Stop reason: HOSPADM

## 2022-11-21 RX ORDER — ROSUVASTATIN CALCIUM 5 MG/1
5 TABLET, COATED ORAL NIGHTLY
Status: DISCONTINUED | OUTPATIENT
Start: 2022-11-21 | End: 2022-11-23 | Stop reason: HOSPADM

## 2022-11-21 RX ORDER — LEVOTHYROXINE SODIUM 112 UG/1
112 TABLET ORAL
Status: DISCONTINUED | OUTPATIENT
Start: 2022-11-22 | End: 2022-11-23 | Stop reason: HOSPADM

## 2022-11-21 RX ADMIN — HYDRALAZINE HYDROCHLORIDE 25 MG: 25 TABLET, FILM COATED ORAL at 19:06

## 2022-11-21 RX ADMIN — ENOXAPARIN SODIUM 40 MG: 100 INJECTION SUBCUTANEOUS at 18:50

## 2022-11-21 RX ADMIN — IOPAMIDOL 95 ML: 755 INJECTION, SOLUTION INTRAVENOUS at 13:33

## 2022-11-21 RX ADMIN — ROSUVASTATIN CALCIUM 5 MG: 5 TABLET, FILM COATED ORAL at 22:45

## 2022-11-21 RX ADMIN — LABETALOL HYDROCHLORIDE 20 MG: 5 INJECTION, SOLUTION INTRAVENOUS at 12:21

## 2022-11-21 RX ADMIN — WARFARIN 7.5 MG: 7.5 TABLET ORAL at 22:45

## 2022-11-21 RX ADMIN — TERAZOSIN 2 MG: 2 CAPSULE ORAL at 22:44

## 2022-11-21 NOTE — PROGRESS NOTES
Clinical Pharmacy Services: Medication History    David Comer Sr. is a 82 y.o. male presenting to [facility] for   Chief Complaint   Patient presents with   • Chest Pain       He  has a past medical history of AAA (abdominal aortic aneurysm) without rupture, Aneurysm of thoracic aorta, Arthritis, Basal cell carcinoma, BPH (benign prostatic hyperplasia), Chronic lumbar pain, Degenerative arthritis of lumbar spine, Degenerative cervical disc, Disease of thyroid gland, Elevated rheumatoid factor, History of atrial fibrillation, Hyperlipidemia, Hypertension, Hypogonadism in male, Muscle strain of left upper back, On anticoagulant therapy, Pulmonary nodule, Refused influenza vaccine, Scoliosis, and Vitamin D deficiency.    Allergies as of 11/21/2022   • (No Known Allergies)       Medication information was obtained from: Patient/pharmacy   Pharmacy and Phone Number:   [facility] [address] [place] [address] - [address] [place] [address] - [phone]  - [phone] FX  [address] [place] [address]  Phone: [phone] Fax: [phone]    [facility] [id number] - [place], [place] - [address] - [phone] PH - [phone] FX  [address] [address]  Phone: [phone] Fax: [phone]    [facility]  [address] [place] [address]  Phone: [phone] Fax: [phone]        Prior to Admission Medications     Prescriptions Last Dose Informant Patient Reported? Taking?    acetaminophen (TYLENOL) 325 MG tablet 11/20/2022 Self No Yes    Take 2 tablets by mouth Every 4 (Four) Hours As Needed for Mild Pain .    amiodarone (PACERONE) 200 MG tablet 11/20/2022 Self No Yes    Take 1/2 (one-half) tablet by mouth once daily    Patient taking differently:  Take 100 mg by mouth Daily.    doxazosin (CARDURA) 2 MG tablet 11/20/2022 Self No Yes    Take 1 tablet by mouth once daily    Patient taking differently:   Patient Seen in: [address] [facility]    History   Patient presents with:  Rash    Stated Complaint: rash right hand     HPI    9year-old male brought in by his mother today with chief complaints of a itchy rash on the dorsal aspects of both Take 2 mg by mouth Every Night.    esomeprazole (nexIUM) 20 MG capsule 11/20/2022 Self Yes Yes    Take 20 mg by mouth Every Morning Before Breakfast.    Euthyrox 112 MCG tablet 11/20/2022 Self No Yes    Take 1 tablet by mouth once daily    Patient taking differently:  Take 112 mcg by mouth Every Morning.    hydrALAZINE (APRESOLINE) 25 MG tablet 11/20/2022 Self No Yes    TAKE 1 TABLET BY MOUTH THREE TIMES DAILY    Patient taking differently:  Take 25 mg by mouth 3 (Three) Times a Day.    isosorbide mononitrate (IMDUR) 30 MG 24 hr tablet 11/20/2022 Self No Yes    Take 1 tablet by mouth once daily    Patient taking differently:  Take 30 mg by mouth Daily.    metoprolol succinate XL (TOPROL-XL) 50 MG 24 hr tablet 11/20/2022 Self No Yes    Take 1 tablet by mouth once daily    Patient taking differently:  Take 50 mg by mouth Daily.    nebivolol (BYSTOLIC) 10 MG tablet 11/20/2022 Self, Pharmacy Yes Yes    Take 5 mg by mouth Daily.    olmesartan-hydrochlorothiazide (BENICAR HCT) 40-25 MG per tablet 11/20/2022 Self No Yes    TAKE ONE TABLET BY MOUTH DAILY    Patient taking differently:  Take 1 tablet by mouth Daily.    rosuvastatin (CRESTOR) 5 MG tablet 11/20/2022 Self No Yes    Take 1 tablet by mouth once daily    Patient taking differently:  Take 5 mg by mouth Every Night.    warfarin (COUMADIN) 7.5 MG tablet 11/20/2022 Self, Pharmacy No Yes    TAKE 1 TABLET BY MOUTH ONCE DAILY AT NIGHT    Patient taking differently:  Take 7.5 mg by mouth Every Night.            Medication notes:     This medication list is complete to the best of my knowledge as of 11/21/2022    Please call if questions.    Mervin Krishnan  Medication History Technician  [phone]    11/21/2022 15:16 EST       midline. Mucosa normal. No drainage or sinus tenderness.   Throat: lips, mucosa, and tongue normal; teeth and gums normal and no lip, tongue or throat swelling noted  Neck: no adenopathy and supple, symmetrical, trachea midline  Back: no tenderness to percu

## 2022-11-21 NOTE — CASE MANAGEMENT/SOCIAL WORK
Discharge Planning Assessment  [facility]     Patient Name: David Comer Sr.  MRN: [id number]  Today's Date: 11/21/2022    Admit Date: 11/21/2022    Plan: Plans home; denies needs.   Discharge Needs Assessment     Row Name 11/21/22 1835       Living Environment    People in Home child(angie), adult    Name(s) of People in Home hollie Vaz [phone]    Current Living Arrangements home    Primary Care Provided by self    Provides Primary Care For no one, unable/limited ability to care for self    Family Caregiver if Needed child(angie), adult    Family Caregiver Names hollie Vaz [phone]    Quality of Family Relationships helpful;involved;supportive    Able to Return to Prior Arrangements yes       Resource/Environmental Concerns    Resource/Environmental Concerns none       Transition Planning    Patient/Family Anticipates Transition to home with family    Patient/Family Anticipated Services at Transition none    Transportation Anticipated family or friend will provide       Discharge Needs Assessment    Equipment Currently Used at Home none    Concerns to be Addressed denies needs/concerns at this time    Anticipated Changes Related to Illness none    Equipment Needed After Discharge none    Current Discharge Risk physical impairment               Discharge Plan     Row Name 11/21/22 1835       Plan    Plan Plans home; denies needs.    Patient/Family in Agreement with Plan yes    Provided Post Acute Provider List? N/A    N/A Provider List Comment Denies HH/SNF needs.    Provided Post Acute Provider Quality & Resource List? N/A    N/A Quality & Resource List Comment Denies HH/SNF needs.    Plan Comments Met with the patient at bedside; explained role of CCP, verified facesheet, checked [place] notice and discussed discharge planning needs. The patient plans to return to [facility] where he spends most of his time at [address] [place] [address] Rashaun Kaiser not at his [place] address.  The  patient states that his daughter Cora Vaz [phone] stays there with him and can assist him if needed.  The patient uses no DME, has no trouble ambulating or with navigating steps.  The patient’s PCP is Chacha Pineda, pharmacy is [facility] in [place] and he is signed up for [facility].  The patient denies any trouble remembering to take his medication or with affording his medication.  The patient is unsure who he has used for  in the past, states that he has been to [facility] for rehab and was encouraged to bring his POA documents.  The patient drives himself to appointments and states that his daughter will transport him home upon d/c.  The patient states that he gets his Coumadin levels checked at Dr. Downing’s office.  The patient currently denies any d/c needs. CCP will follow to see if the patient will need Lovenox upon d/c. T.D, CSW              Continued Care and Services - Admitted Since 11/21/2022    Coordination has not been started for this encounter.          Demographic Summary     Row Name 11/21/22 9197       General Information    Admission Type observation    Arrived From home    Referral Source admission list    Reason for Consult discharge planning    Preferred Language English               Functional Status     Row Name 11/21/22 4920       Functional Status    Usual Activity Tolerance good    Current Activity Tolerance moderate       Functional Status, IADL    Medications independent    Meal Preparation independent    Housekeeping independent    Laundry independent    Shopping independent       Mental Status    General Appearance WDL WDL       Mental Status Summary    Recent Changes in Mental Status/Cognitive Functioning no changes               Psychosocial    No documentation.                Abuse/Neglect    No documentation.                Legal    No documentation.                Substance Abuse    No documentation.                Patient Forms    No documentation.                    Delicia Salmeron, W

## 2022-11-21 NOTE — PROGRESS NOTES
"[facility] Clinical Pharmacy Services: Enoxaparin Consult    David BILL Nahomi Raymundo has a pharmacy consult to dose prophylactic enoxaparin per 's request.     Indication: VTE Prophylaxis  Home Anticoagulation: warfarin    Relevant clinical data and objective history reviewed:  82 y.o. male 182.9 cm (72\") 109 kg (240 lb)   Body mass index is 32.55 kg/m².   Results from last 7 days   Lab Units 11/21/22  1130   PLATELETS 10*3/mm3 158     Estimated Creatinine Clearance: 69.7 mL/min (by C-G formula based on SCr of 1.06 mg/dL).    Assessment/Plan    Will start patient on 40mg subcutaneous every 24 hours, adjusted for renal function. Consult order will be discontinued but pharmacy will continue to follow.     Jesus Alberto Mcgrath, [facility]  Clinical Pharmacist   "

## 2022-11-21 NOTE — ED NOTES
Nursing report ED to floor  David KHAN Age Sr.  82 y.o.  male    HPI :   Chief Complaint   Patient presents with    Chest Pain       Admitting doctor:   Rachelle Henriquez MD    Admitting diagnosis:   The primary encounter diagnosis was Chest pain, unspecified type. A diagnosis of Abnormal computed tomography of gallbladder was also pertinent to this visit.    Code status:   Current Code Status       Date Active Code Status Order ID Comments User Context       Prior            Allergies:   Patient has no known allergies.    Isolation:   No active isolations    Intake and Output  No intake or output data in the 24 hours ending 11/21/22 1549    Weight:       11/21/22  1133   Weight: 109 kg (240 lb)       Most recent vitals:   Vitals:    11/21/22 1135 11/21/22 1250 11/21/22 1348 11/21/22 1524   BP: (!) 180/102 156/94 168/90 116/95   Pulse: 55 58 58 55   Resp: 16 16 16 14   Temp:       SpO2: 96% 95% 97% 98%   Weight:       Height:           Active LDAs/IV Access:   Lines, Drains & Airways       Active LDAs       Name Placement date Placement time Site Days    Peripheral IV 11/21/22 1136 Right Antecubital 11/21/22  1136  Antecubital  less than 1                    Labs (abnormal labs have a star):   Labs Reviewed   COMPREHENSIVE METABOLIC PANEL - Abnormal; Notable for the following components:       Result Value    Glucose 110 (*)     All other components within normal limits    Narrative:     GFR Normal >60  Chronic Kidney Disease <60  Kidney Failure <15    The GFR formula is only valid for adults with stable renal function between ages 18 and 70.   CBC WITH AUTO DIFFERENTIAL - Abnormal; Notable for the following components:    RDW 12.0 (*)     Lymphocyte % 16.7 (*)     All other components within normal limits   PROTIME-INR - Abnormal; Notable for the following components:    Protime 28.4 (*)     INR 2.62 (*)     All other components within normal limits   TROPONIN (IN-HOUSE) - Normal    Narrative:     Troponin T  Reference Range:  <= 0.03 ng/mL-   Negative for AMI  >0.03 ng/mL-     Abnormal for myocardial necrosis.  Clinicians would have to utilize clinical acumen, EKG, Troponin and serial changes to determine if it is an Acute Myocardial Infarction or myocardial injury due to an underlying chronic condition.       Results may be falsely decreased if patient taking Biotin.     TROPONIN (IN-HOUSE) - Normal    Narrative:     Troponin T Reference Range:  <= 0.03 ng/mL-   Negative for AMI  >0.03 ng/mL-     Abnormal for myocardial necrosis.  Clinicians would have to utilize clinical acumen, EKG, Troponin and serial changes to determine if it is an Acute Myocardial Infarction or myocardial injury due to an underlying chronic condition.       Results may be falsely decreased if patient taking Biotin.     RAINBOW DRAW    Narrative:     The following orders were created for panel order [place] Draw.  Procedure                               Abnormality         Status                     ---------                               -----------         ------                     Green Top (Gel)[[id number]]                                  Final result               Lavender Top[[id number]]                                     Final result               Gold Top - SST[[id number]]                                   Final result               Light Blue Top[[id number]]                                   Final result                 Please view results for these tests on the individual orders.   CBC AND DIFFERENTIAL    Narrative:     The following orders were created for panel order CBC & Differential.  Procedure                               Abnormality         Status                     ---------                               -----------         ------                     CBC Auto Differential[[id number]]        Abnormal            Final result                 Please view results for these tests on the individual orders.   GREEN TOP   LAVENDER TOP    GOLD TOP - [facility]   LIGHT BLUE TOP       EKG:   ECG 12 Lead Chest Pain   Final Result   HEART RATE= 59  bpm   RR Interval= 1017  ms   AK Interval= 204  ms   P Horizontal Axis=   deg   P Front Axis= -21  deg   QRSD Interval= 117  ms   QT Interval= 460  ms   QRS Axis= 2  deg   T Wave Axis= 19  deg   - ABNORMAL ECG -   Sinus rhythm   Incomplete right bundle branch block   NON SPECIFIC T-WAVE CHANGES   No change from previous tracing   Electronically Signed By: Ant Mcgrath ([facility]) 21-Nov-2022 10:58:05   Date and Time of Study: 2022-11-21 08:49:58          Meds given in ED:   Medications   sodium chloride 0.9 % flush 10 mL (has no administration in time range)   aspirin tablet 325 mg (325 mg Oral Not Given 11/21/22 1248)   labetalol (NORMODYNE,TRANDATE) injection 20 mg (20 mg Intravenous Given 11/21/22 1221)   iopamidol (ISOVUE-370) 76 % injection 95 mL (95 mL Intravenous Given 11/21/22 1333)       Imaging results:  XR Chest 2 View    Result Date: 11/21/2022  Minimal likely atelectasis or scarring right lateral costophrenic angle. Ectatic appearing aorta. Follow-up as clinically indicated.  This report was finalized on 11/21/2022 11:05 AM by Dr. Dinesh Quezada M.D.      CT Angiogram Abdomen Pelvis    Result Date: 11/21/2022  1.  No findings of definite aortic dissection. However, please note that evaluation of the thoracic aorta is suboptimal due to motion artifact. 2.  Findings most suggestive of cholecystitis. 3.  Patchy pulmonary opacification within the bilateral upper lungs is present and while evaluation is difficult due to respiratory motion, findings are concerning for pneumonia and correlation with patient history is recommended. 4.  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 5.  Other findings as above.  This report was finalized on 11/21/2022 2:48 PM by Dr. Regulo Laura M.D.      CT Angiogram Chest    Result Date: 11/21/2022  1.   No findings of definite aortic dissection. However, please note that evaluation of the thoracic aorta is suboptimal due to motion artifact. 2.  Findings most suggestive of cholecystitis. 3.  Patchy pulmonary opacification within the bilateral upper lungs is present and while evaluation is difficult due to respiratory motion, findings are concerning for pneumonia and correlation with patient history is recommended. 4.  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 5.  Other findings as above.  This report was finalized on 11/21/2022 2:48 PM by Dr. Regulo Laura M.D.       Ambulatory status:   - up with assist    Social issues:   Social History     Socioeconomic History    Marital status:    Tobacco Use    Smoking status: Never    Smokeless tobacco: Never   Vaping Use    Vaping Use: Never used   Substance and Sexual Activity    Alcohol use: No    Drug use: No    Sexual activity: Defer       NIH Stroke Scale:         Aubree Renteria RN  11/21/22 15:49 EST

## 2022-11-21 NOTE — ED PROVIDER NOTES
EMERGENCY DEPARTMENT ENCOUNTER    Room Number:  25/25  Date of encounter:  11/21/2022  PCP: Chacha Pineda MD  Historian: Patient      HPI:  Chief Complaint: Chest pain    A complete HPI/ROS/PMH/PSH/SH/FH are unobtainable due to: N/A    Context: David Comer Sr. is a 82 y.o. male who presents to the ED c/o sudden onset of chest pressure which started around 430 this morning while he was sleeping.  The pain was located in the center of his chest and radiated through to his back.  It lasted about 3 hours or so and then resolved spontaneously.  Currently, he states he has no pain.  He did not have any shortness of breath, nausea or diaphoresis.  He did have vague, mild left arm discomfort at the time.  This is since resolved.  No recent fevers or chills.  No cough or congestion.  No abdominal pain, nausea, vomiting or diarrhea.      Duration: Approximately 3 hours  Onset: Around 4:30 AM  Timing: Constant  Location: Substernal  Radiation: Back  Quality: Pressure  Intensity/Severity: Moderate-severe  Progression: Resolved  Associated Symptoms: Left arm discomfort  Aggravating Factors: None  Alleviating Factors: None  Previous Episodes: Not recently  Treatment before arrival: None.  He did not take his hypertension medicine this morning.    Summary of prior records: He has a history of chronic low back pain, hyperlipidemia, BPH, hypertension, atrial fibrillation, thoracic and abdominal aortic aneurysm.  No recent ER visits or hospitalizations at [facility].    The patient was placed in a mask in triage, hand hygiene was performed before and after my interaction with the patient.  I wore a mask, safety glasses and gloves during my entire interaction with the patient.    PAST MEDICAL HISTORY  Active Ambulatory Problems     Diagnosis Date Noted   • Chronic coronary artery disease 10/18/2016   • Abdominal aortic aneurysm 10/18/2016   • Paroxysmal atrial fibrillation (HCC) 10/18/2016   • Gastroesophageal  reflux disease 10/18/2016   • Essential hypertension 10/18/2016   • Mixed hyperlipidemia 10/18/2016   • Hypogonadism in male 10/18/2016   • Acquired hypothyroidism 10/18/2016   • Osteoarthritis of spine 03/01/2016   • Vitamin D deficiency 10/18/2016   • Anticoagulated 02/09/2017   • Atrial fibrillation, rapid ([facility]) 05/30/2017   • Syncope and collapse 08/28/2017   • Chest pain 08/28/2017   • Statin intolerance 09/28/2017   • Gross hematuria 01/29/2018   • Coumadin toxicity 01/29/2018   • Sepsis ([facility]) 02/02/2018   • H/O amiodarone therapy 09/17/2018   • Dehydration 09/24/2019   • Renal insufficiency 09/24/2019   • Thoracic aortic aneurysm without rupture 11/18/2019   • Refused influenza vaccine 12/20/2019   • Medicare annual wellness visit, subsequent    • BPH without obstruction/lower urinary tract symptoms 01/06/2020   • Thoracic aortic aneurysm    • Kidney stone    • Hand injury 11/24/2020   • Acute kidney injury (HCC) 11/24/2020   • 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 ([facility]) 11/25/2020   • Traumatic hematoma of right knee 11/27/2020   • Arthritis    • SOB (shortness of breath) 06/23/2021   • Fatigue 06/23/2021   • Abnormal nuclear stress test 06/23/2021   • Acute left-sided low back pain without sciatica 09/17/2021   • Lt groin pain 09/17/2021   • Stool incontinence 02/17/2022   • Aspiration pneumonia ([facility]) 09/15/2022   • Hospital discharge follow-up 09/15/2022     Resolved Ambulatory Problems     Diagnosis Date Noted   • No Resolved Ambulatory Problems     Past Medical History:   Diagnosis Date   • AAA (abdominal aortic aneurysm) without rupture    • Aneurysm of thoracic aorta    • Basal cell carcinoma    • BPH (benign prostatic hyperplasia)    • Chronic lumbar pain    • Degenerative arthritis of lumbar spine    • Degenerative cervical disc    • Disease of thyroid gland    • Elevated rheumatoid factor    • History of atrial fibrillation    • Hyperlipidemia    • Hypertension    • Muscle strain of  left upper back    • On anticoagulant therapy    • Pulmonary nodule    • Scoliosis          PAST SURGICAL HISTORY  Past Surgical History:   Procedure Laterality Date   • ANKLE FUSION Right    • CARDIAC CATHETERIZATION     • CARDIAC CATHETERIZATION N/A 7/2/2021    Procedure: Left Heart Cath;  Surgeon: Harlan Downing MD;  [facility];  Service: Cardiology;  Laterality: N/A;   • CARDIAC CATHETERIZATION N/A 7/2/2021    Procedure: Coronary angiography;  Surgeon: Harlan Downing MD;  [facility];  Service: Cardiology;  Laterality: N/A;   • CARDIAC CATHETERIZATION N/A 7/2/2021    Procedure: Left ventriculography;  Surgeon: Harlan Downing MD;  Location:  [facility] CATH INVASIVE LOCATION;  Service: Cardiology;  Laterality: N/A;   • HOBBS TUBE INSERTION Right 10/19/2021    Procedure: RIGHT SECOND STAGE CAST TAKEDOWN RECONSTRUCTION;  Surgeon: Brian Bhatt MD;  Location: [facility] OR [facility];  Service: Ophthalmology;  Laterality: Right;   • ENTROPIAN REPAIR Right 9/21/2021    Procedure: RIGHT LOWER LID EXCISION OF BASAL CELL CARCINOMA WITH FROZEN SECTION RIGHT LOWER LID RECONSTRUCTION WITH CAST FLAP, REPAIR OF CANULICULIS, AND FULL THICKNESS SKIN GRAFT;  Surgeon: Brian Bhatt MD;  Location: [facility] OR [facility];  Service: Ophthalmology;  Laterality: Right;   • INGUINAL HERNIA REPAIR Right    • REPLACEMENT TOTAL KNEE Bilateral 2000   • ROTATOR CUFF REPAIR Left    • ROTATOR CUFF REPAIR Right    • SKIN BIOPSY     • VASECTOMY           FAMILY HISTORY  Family History   Problem Relation Age of Onset   • Hypertension Father    • Heart attack Father    • Heart attack Brother    • Alcohol abuse Brother    • Hypertension Brother    • Hypertension Paternal Grandmother    • Hypertension Paternal Grandfather    • Anemia Mother    • Arthritis Mother    • Hypertension Mother    • Hypertension Maternal Grandmother    • Heart disease Other         FH in  males before age 55   • Malig Hyperthermia Neg Hx          SOCIAL HISTORY  Social History     Socioeconomic History   • Marital status:    Tobacco Use   • Smoking status: Never   • Smokeless tobacco: Never   Vaping Use   • Vaping Use: Never used   Substance and Sexual Activity   • Alcohol use: No   • Drug use: No   • Sexual activity: Defer         ALLERGIES  Patient has no known allergies.        REVIEW OF SYSTEMS  Review of Systems   Constitutional: Negative for chills and fever.   Respiratory: Negative for cough and shortness of breath.    Cardiovascular: Positive for chest pain. Negative for palpitations and leg swelling.   Gastrointestinal: Negative for abdominal pain, diarrhea, nausea and vomiting.   Musculoskeletal: Positive for back pain.        All systems reviewed and negative except for those discussed in HPI.       PHYSICAL EXAM    I have reviewed the triage vital signs and nursing notes.    ED Triage Vitals   Temp Heart Rate Resp BP SpO2   11/21/22 0840 11/21/22 0840 11/21/22 0840 11/21/22 1133 11/21/22 0840   97.8 °F (36.6 °C) 70 18 (!) 180/104 98 %      Temp src Heart Rate Source Patient Position BP Location FiO2 (%)   -- -- -- -- --              Physical Exam   Constitutional: Pt. is oriented to person, place, and time and well-developed, well-nourished, and in no distress.   HENT: Normocephalic and atraumatic.   Neck: Normal range of motion. Neck supple. No JVD present.   Cardiovascular: Normal rate, regular rhythm and normal heart sounds. No murmur heard.  Pulmonary/Chest: Effort normal and breath sounds normal. No stridor. No respiratory distress. No wheezes, no rales.   Abdominal: Soft. Bowel sounds are normal. No distension. There is no tenderness. There is no rebound and no guarding.  No pulsatile masses are felt.  Musculoskeletal: Normal range of motion. No edema, tenderness or deformity.   Neurological: Pt. is alert and oriented to person, place, and time.  He has no focal neurologic  deficits  Skin: Skin is warm and dry. No rash noted. Pt. is not diaphoretic. No erythema.   Psychiatric: Mood, affect and judgment normal.  He is pleasant and cooperative.  Nursing note and vitals reviewed.        LAB RESULTS  Recent Results (from the past 24 hour(s))   ECG 12 Lead Chest Pain    Collection Time: 11/21/22  8:49 AM   Result Value Ref Range    QT Interval 460 ms   Comprehensive Metabolic Panel    Collection Time: 11/21/22 11:30 AM    Specimen: Blood   Result Value Ref Range    Glucose 110 (H) 65 - 99 mg/dL    BUN 16 8 - 23 mg/dL    Creatinine 1.06 0.76 - 1.27 mg/dL    Sodium 141 136 - 145 mmol/L    Potassium 4.1 3.5 - 5.2 mmol/L    Chloride 102 98 - 107 mmol/L    CO2 27.1 22.0 - 29.0 mmol/L    Calcium 10.0 8.6 - 10.5 mg/dL    Total Protein 7.0 6.0 - 8.5 g/dL    Albumin 4.50 3.50 - 5.20 g/dL    ALT (SGPT) 31 1 - 41 U/L    AST (SGOT) 24 1 - 40 U/L    Alkaline Phosphatase 63 39 - 117 U/L    Total Bilirubin 0.3 0.0 - 1.2 mg/dL    Globulin 2.5 gm/dL    A/G Ratio 1.8 g/dL    BUN/Creatinine Ratio 15.1 7.0 - 25.0    Anion Gap 11.9 5.0 - 15.0 mmol/L    eGFR 70.5 >60.0 mL/min/1.73   Troponin    Collection Time: 11/21/22 11:30 AM    Specimen: Blood   Result Value Ref Range    Troponin T <0.010 0.000 - 0.030 ng/mL   Green Top (Gel)    Collection Time: 11/21/22 11:30 AM   Result Value Ref Range    Extra Tube Hold for add-ons.    Lavender Top    Collection Time: 11/21/22 11:30 AM   Result Value Ref Range    Extra Tube hold for add-on    Gold Top - SST    Collection Time: 11/21/22 11:30 AM   Result Value Ref Range    Extra Tube Hold for add-ons.    Light Blue Top    Collection Time: 11/21/22 11:30 AM   Result Value Ref Range    Extra Tube Hold for add-ons.    CBC Auto Differential    Collection Time: 11/21/22 11:30 AM    Specimen: Blood   Result Value Ref Range    WBC 7.43 3.40 - 10.80 10*3/mm3    RBC 4.55 4.14 - 5.80 10*6/mm3    Hemoglobin 14.2 13.0 - 17.7 g/dL    Hematocrit 42.4 37.5 - 51.0 %    MCV 93.2 79.0 -  97.0 fL    MCH 31.2 26.6 - 33.0 pg    MCHC 33.5 31.5 - 35.7 g/dL    RDW 12.0 (L) 12.3 - 15.4 %    RDW-SD 41.7 37.0 - 54.0 fl    MPV 11.5 6.0 - 12.0 fL    Platelets 158 140 - 450 10*3/mm3    Neutrophil % 72.2 42.7 - 76.0 %    Lymphocyte % 16.7 (L) 19.6 - 45.3 %    Monocyte % 7.0 5.0 - 12.0 %    Eosinophil % 3.2 0.3 - 6.2 %    Basophil % 0.5 0.0 - 1.5 %    Immature Grans % 0.4 0.0 - 0.5 %    Neutrophils, Absolute 5.36 1.70 - 7.00 10*3/mm3    Lymphocytes, Absolute 1.24 0.70 - 3.10 10*3/mm3    Monocytes, Absolute 0.52 0.10 - 0.90 10*3/mm3    Eosinophils, Absolute 0.24 0.00 - 0.40 10*3/mm3    Basophils, Absolute 0.04 0.00 - 0.20 10*3/mm3    Immature Grans, Absolute 0.03 0.00 - 0.05 10*3/mm3    nRBC 0.0 0.0 - 0.2 /100 WBC   Protime-INR    Collection Time: 11/21/22 11:30 AM    Specimen: Blood   Result Value Ref Range    Protime 28.4 (H) 11.7 - 14.2 Seconds    INR 2.62 (H) 0.90 - 1.10   Troponin    Collection Time: 11/21/22  1:40 PM    Specimen: Arm, Right; Blood   Result Value Ref Range    Troponin T <0.010 0.000 - 0.030 ng/mL       Ordered the above labs and independently reviewed the results.        RADIOLOGY  XR Chest 2 View    Result Date: 11/21/2022  XR CHEST 2 VW-  HISTORY: Male who is 82 years-old,  chest pain  TECHNIQUE: Frontal and lateral views of the chest  COMPARISON:  image from CT from 08/30/2022  FINDINGS: The heart size is normal. Aorta appears ectatic. Pulmonary vasculature is unremarkable. Minimal likely atelectasis or scarring at the right lateral costophrenic angle. No focal pulmonary consolidation, pleural effusion, or pneumothorax. No acute osseous process.      Minimal likely atelectasis or scarring right lateral costophrenic angle. Ectatic appearing aorta. Follow-up as clinically indicated.  This report was finalized on 11/21/2022 11:05 AM by Dr. Dinesh Quezada M.D.      CT Angiogram Chest, CT Angiogram Abdomen Pelvis    Result Date: 11/21/2022  CT ANGIOGRAM OF THE CHEST, ABDOMEN AND  PELVIS. MULTIPLE CORONAL, SAGITTAL, AND 3-D RECONSTRUCTIONS.  HISTORY: Chest pain, aortic aneurysm  TECHNIQUE: Radiation dose reduction techniques were utilized, including automated exposure control and exposure modulation based on body size. CT angiogram of the chest, abdomen and pelvis was performed specifically tailored to evaluate the aorta. Multiple coronal, sagittal, and 3-D reconstruction images were obtained.  COMPARISON:CT chest, abdomen and pelvis dating back to 11/24/2020  FINDINGS: Evaluation of the ascending aorta is suboptimal due to motion artifact. Additionally, please note evaluation of the abdominal aorta is suboptimal due to contrast timing.  Rounded soft tissue density contiguous with the right sternomanubrial articulation measuring up to 1.4 x 1.2 cm is grossly unchanged since 11/24/2020.  There is a moderate hiatal hernia, as before. No noncalcified hilar, mediastinal or axillary adenopathy by size criteria.  No significant stenosis is present within the origins of the great vessels arising off the aortic arch. No significant pericardial effusion is present. Representative measurements of the thoracic aorta are as follows and compared with CT chest 11/24/2020 unless otherwise stated: *  Aortic root measuring 3.6 cm on coronal and 3.9 cm on sagittal, as before. *  Sinotubular junction measuring 3.5 cm, as before. *  Tubular ascending aorta measuring approximately 4.5 cm, as before. *  Distal aortic arch measuring approximately 2.3 cm, as before. *  Descending thoracic aorta measuring 2.4 cm, as before.  No pleural effusion pneumothorax is seen. There is patchy ground glass and pulmonary opacification within the bilateral upper lobes. Sub-6 mm pulmonary nodule within the lingula is grossly unchanged since 11/24/2020.  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 Moderate atherosclerotic calcification is  present within the abdominal aorta and its major branches. There appears to be mild stenosis of the origin of the right renal artery arising off the aorta secondary, however please note that evaluation is limited due to contrast timing.  There are no findings of small bowel obstruction. The appendix is unremarkable. The gallbladder wall has a hyperdense appearance with pericholecystic stranding and/or fluid. Subcentimeter hepatic lesions are too small to characterize. While this examination is not tailored for detailed evaluation of the abdominal viscera due to contrast timing, no gross abnormality seen within the pancreas, spleen or adrenal glands. Subcentimeter renal lesions are too small to characterize. Larger hypodense lesions imaging density less than 15 Hounsfield units are likely benign per Bosniak 2019 criteria. Left nephrolithiasis measures approximately 4 to 5 mm. There is no hydronephrosis. No abdominopelvic adenopathy by size criteria. There is colonic diverticulosis. Diverticulum arises off the posterior superior aspect of the bladder. Prostate is moderately enlarged. There is a small amount free fluid in the pelvis. No free intraperitoneal air is seen.  No new suspicious lytic or blastic osseous lesions are present. Levoscoliosis of the lumbar spine is present with multilevel moderate to severe degenerative changes. Findings can be further evaluated with MRI if clinically indicated.      1.  No findings of definite aortic dissection. However, please note that evaluation of the thoracic aorta is suboptimal due to motion artifact. 2.  Findings most suggestive of cholecystitis. 3.  Patchy pulmonary opacification within the bilateral upper lungs is present and while evaluation is difficult due to respiratory motion, findings are concerning for pneumonia and correlation with patient history is recommended. 4.  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 5.  Other findings as above.  This report was finalized on 11/21/2022 2:48 PM by Dr. Regulo Laura M.D.        I ordered the above noted radiological studies. Reviewed by me and discussed with radiologist.  See dictation for official radiology interpretation.      PROCEDURES    Procedures      MEDICATIONS GIVEN IN ER    Medications   sodium chloride 0.9 % flush 10 mL (has no administration in time range)   aspirin tablet 325 mg (325 mg Oral Not Given 11/21/22 1248)   labetalol (NORMODYNE,TRANDATE) injection 20 mg (20 mg Intravenous Given 11/21/22 1221)   iopamidol (ISOVUE-370) 76 % injection 95 mL (95 mL Intravenous Given 11/21/22 1333)         PROGRESS, DATA ANALYSIS, CONSULTS, AND MEDICAL DECISION MAKING    Any/all labs have been independently reviewed by me.  Any/all radiology studies have been reviewed by me and discussed with radiologist dictating the report.   EKG's independently viewed and interpreted by me.  Discussion below represents my analysis of pertinent findings related to patient's condition, differential diagnosis, treatment plan and final disposition.    Number of Diagnoses or Management Options     Amount and/or Complexity of Data Reviewed  Clinical lab tests: Yes  Tests in the radiology section of CPT®: Yes  Tests in the medicine section of CPT®: Yes  Review and summarize past medical records:  (Yes - see HPI)  Independent visualization of images, tracings, or specimens: (Yes - see below)      ED Course as of 11/21/22 1516   Mon Nov 21, 2022   1140 Differential diagnosis includes but is not limited to: Pulmonary embolism, aortic dissection, acute coronary syndrome/STEMI, pneumonia/CHF/COPD exacerbation, pneumothorax, pleurisy, bronchitis, gastroesophageal reflux, referred pain, traumatic injury/rib fractures, etc. [WC]   1140 EKG performed at 08 49 and interpreted by me shows normal sinus rhythm with heart rate of 59 bpm.  LA interval is borderline prolonged.   Incomplete right bundle branch block.  ST-T segments are unremarkable.  There is no significant change compared to 11/17/2022. [WC]   1141 Chest x-ray independently visualized by me discussed with/interpreted by Dr. Quezada (radiology)-there is atelectasis/scarring at the right costophrenic angle.  For official interpretation, see procedure report [WC]   1141 Cardiac monitor revealed normal sinus rhythm as interpreted by me.  Cardiac monitoring was ordered secondary to the patient's history of chest pain and to monitor the patient for dysrhythmia. [WC]   1159 IV labetalol ordered for hypertension.  CT angiogram of the chest, abdomen pelvis will be obtained to his prior history of thoracic and abdominal aortic aneurysm and to evaluate for dissection. [WC]   1218 INR(!): 2.62 [WC]   1218 CBC is normal. [WC]   1231 Troponin T: <0.010 [WC]   1231 CMP is unremarkable. [WC]   1241 I just received report from nursing staff that the CT scanners are offline and are being rebooted.  [WC]   1426 CT angiogram of the chest abdomen pelvis were independently visualized by me and discussed with/interpreted by Dr. Laura (radiology)-there is no thoracic aortic dissection, there is no abdominal aortic aneurysm leak.  He does appear to have acute cholecystitis however.  For official interpretation, see dictated report. [WC]   1443 Case discussed with Dr. Henriquez ([facility])-she agrees to place the patient in observation status to telemetry bed. [WC]      ED Course User Index  [WC] Angel Bhatt MD       AS OF 15:16 EST VITALS:    BP - 168/90  HR - 58  TEMP - 97.8 °F (36.6 °C)  02 SATS - 97%        DIAGNOSIS  Final diagnoses:   Chest pain, unspecified type   Abnormal computed tomography of gallbladder         DISPOSITION  Obs - tele          Note Disclaimer: At [facility], we believe that sharing information builds trust and better relationships. You are receiving this note because you recently visited [facility]. It is possible  you will see health information before a provider has talked with you about it. This kind of information can be easy to misunderstand. To help you fully understand what it means for your health, we urge you to discuss this note with your provider.\         Angel Bhatt MD  11/21/22 151

## 2022-11-21 NOTE — ED TRIAGE NOTES
Patient to ER via car from home for chest pain that woke him up at 3am    Patient wearing mask this RN in PPE

## 2022-11-22 ENCOUNTER — APPOINTMENT (OUTPATIENT)
Dept: CARDIOLOGY | Facility: HOSPITAL | Age: 82
End: 2022-11-22

## 2022-11-22 LAB
ANION GAP SERPL CALCULATED.3IONS-SCNC: 9 MMOL/L (ref 5–15)
AORTIC DIMENSIONLESS INDEX: 0.7 (DI)
ASCENDING AORTA: 2.8 CM
BH CV ECHO MEAS - ACS: 1.65 CM
BH CV ECHO MEAS - AO MEAN PG: 3.3 MMHG
BH CV ECHO MEAS - AO ROOT DIAM: 4.2 CM
BH CV ECHO MEAS - AO V2 VTI: 23.4 CM
BH CV ECHO MEAS - AVA(I,D): 2.35 CM2
BH CV ECHO MEAS - EDV(CUBED): 48.2 ML
BH CV ECHO MEAS - EDV(MOD-SP2): 96 ML
BH CV ECHO MEAS - EDV(MOD-SP4): 114 ML
BH CV ECHO MEAS - EF(MOD-BP): 63 %
BH CV ECHO MEAS - EF(MOD-SP2): 58.3 %
BH CV ECHO MEAS - EF(MOD-SP4): 66.7 %
BH CV ECHO MEAS - ESV(CUBED): 33 ML
BH CV ECHO MEAS - ESV(MOD-SP2): 40 ML
BH CV ECHO MEAS - ESV(MOD-SP4): 38 ML
BH CV ECHO MEAS - FS: 11.9 %
BH CV ECHO MEAS - IVS/LVPW: 0.99 CM
BH CV ECHO MEAS - IVSD: 1.12 CM
BH CV ECHO MEAS - LAT PEAK E' VEL: 6.6 CM/SEC
BH CV ECHO MEAS - LV DIASTOLIC VOL/BSA (35-75): 49.7 CM2
BH CV ECHO MEAS - LV MASS(C)D: 129.9 GRAMS
BH CV ECHO MEAS - LV MEAN PG: 1.33 MMHG
BH CV ECHO MEAS - LV SYSTOLIC VOL/BSA (12-30): 16.6 CM2
BH CV ECHO MEAS - LV V1 VTI: 17 CM
BH CV ECHO MEAS - LVIDD: 3.6 CM
BH CV ECHO MEAS - LVIDS: 3.2 CM
BH CV ECHO MEAS - LVOT AREA: 3.2 CM2
BH CV ECHO MEAS - LVOT DIAM: 2.03 CM
BH CV ECHO MEAS - LVPWD: 1.13 CM
BH CV ECHO MEAS - MED PEAK E' VEL: 6.03 CM/SEC
BH CV ECHO MEAS - MV A DUR: 0.15 SEC
BH CV ECHO MEAS - MV A MAX VEL: 65.2 CM/SEC
BH CV ECHO MEAS - MV DEC SLOPE: 215.9 CM/SEC2
BH CV ECHO MEAS - MV DEC TIME: 0.31 MSEC
BH CV ECHO MEAS - MV E MAX VEL: 49.9 CM/SEC
BH CV ECHO MEAS - MV E/A: 0.77
BH CV ECHO MEAS - MV MEAN PG: 1.26 MMHG
BH CV ECHO MEAS - MV P1/2T: 88.4 MSEC
BH CV ECHO MEAS - MV V2 VTI: 20.7 CM
BH CV ECHO MEAS - MVA(P1/2T): 2.49 CM2
BH CV ECHO MEAS - MVA(VTI): 2.7 CM2
BH CV ECHO MEAS - PULM A REVS DUR: 0.12 SEC
BH CV ECHO MEAS - PULM A REVS VEL: 33.8 CM/SEC
BH CV ECHO MEAS - PULM DIAS VEL: 24.7 CM/SEC
BH CV ECHO MEAS - PULM S/D: 1.04
BH CV ECHO MEAS - PULM SYS VEL: 25.5 CM/SEC
BH CV ECHO MEAS - RAP SYSTOLE: 3 MMHG
BH CV ECHO MEAS - RVSP: 26 MMHG
BH CV ECHO MEAS - SI(MOD-SP2): 24.4 ML/M2
BH CV ECHO MEAS - SI(MOD-SP4): 33.1 ML/M2
BH CV ECHO MEAS - SV(LVOT): 54.9 ML
BH CV ECHO MEAS - SV(MOD-SP2): 56 ML
BH CV ECHO MEAS - SV(MOD-SP4): 76 ML
BH CV ECHO MEAS - TAPSE (>1.6): 2.6 CM
BH CV ECHO MEAS - TR MAX PG: 22.8 MMHG
BH CV ECHO MEAS - TR MAX VEL: 238.9 CM/SEC
BH CV ECHO MEASUREMENTS AVERAGE E/E' RATIO: 7.9
BH CV XLRA - RV BASE: 4.7 CM
BH CV XLRA - RV LENGTH: 8.9 CM
BH CV XLRA - RV MID: 3.7 CM
BH CV XLRA - TDI S': 15 CM/SEC
BUN SERPL-MCNC: 17 MG/DL (ref 8–23)
BUN/CREAT SERPL: 21.3 (ref 7–25)
CALCIUM SPEC-SCNC: 9.1 MG/DL (ref 8.6–10.5)
CHLORIDE SERPL-SCNC: 104 MMOL/L (ref 98–107)
CO2 SERPL-SCNC: 27 MMOL/L (ref 22–29)
CREAT SERPL-MCNC: 0.8 MG/DL (ref 0.76–1.27)
DEPRECATED RDW RBC AUTO: 43.3 FL (ref 37–54)
EGFRCR SERPLBLD CKD-EPI 2021: 88.9 ML/MIN/1.73
ERYTHROCYTE [DISTWIDTH] IN BLOOD BY AUTOMATED COUNT: 12.4 % (ref 12.3–15.4)
GLUCOSE SERPL-MCNC: 84 MG/DL (ref 65–99)
HCT VFR BLD AUTO: 37.1 % (ref 37.5–51)
HGB BLD-MCNC: 12.4 G/DL (ref 13–17.7)
INR PPP: 3 (ref 0.9–1.1)
LEFT ATRIUM VOLUME INDEX: 34.8 ML/M2
MAXIMAL PREDICTED HEART RATE: 139 BPM
MCH RBC QN AUTO: 31.9 PG (ref 26.6–33)
MCHC RBC AUTO-ENTMCNC: 33.4 G/DL (ref 31.5–35.7)
MCV RBC AUTO: 95.4 FL (ref 79–97)
PLATELET # BLD AUTO: 138 10*3/MM3 (ref 140–450)
PMV BLD AUTO: 12 FL (ref 6–12)
POTASSIUM SERPL-SCNC: 3.6 MMOL/L (ref 3.5–5.2)
PROTHROMBIN TIME: 31.6 SECONDS (ref 11.7–14.2)
RBC # BLD AUTO: 3.89 10*6/MM3 (ref 4.14–5.8)
SODIUM SERPL-SCNC: 140 MMOL/L (ref 136–145)
STRESS TARGET HR: 118 BPM
WBC NRBC COR # BLD: 6.08 10*3/MM3 (ref 3.4–10.8)

## 2022-11-22 PROCEDURE — 93306 TTE W/DOPPLER COMPLETE: CPT

## 2022-11-22 PROCEDURE — 25010000002 PERFLUTREN (DEFINITY) 8.476 MG IN SODIUM CHLORIDE (PF) 0.9 % 10 ML INJECTION: Performed by: INTERNAL MEDICINE

## 2022-11-22 PROCEDURE — 99213 OFFICE O/P EST LOW 20 MIN: CPT | Performed by: INTERNAL MEDICINE

## 2022-11-22 PROCEDURE — 80048 BASIC METABOLIC PNL TOTAL CA: CPT | Performed by: INTERNAL MEDICINE

## 2022-11-22 PROCEDURE — 93306 TTE W/DOPPLER COMPLETE: CPT | Performed by: INTERNAL MEDICINE

## 2022-11-22 PROCEDURE — 85610 PROTHROMBIN TIME: CPT | Performed by: INTERNAL MEDICINE

## 2022-11-22 PROCEDURE — 85027 COMPLETE CBC AUTOMATED: CPT | Performed by: INTERNAL MEDICINE

## 2022-11-22 PROCEDURE — G0378 HOSPITAL OBSERVATION PER HR: HCPCS

## 2022-11-22 RX ADMIN — ISOSORBIDE MONONITRATE 30 MG: 30 TABLET, EXTENDED RELEASE ORAL at 08:07

## 2022-11-22 RX ADMIN — PANTOPRAZOLE SODIUM 40 MG: 40 TABLET, DELAYED RELEASE ORAL at 05:59

## 2022-11-22 RX ADMIN — HYDRALAZINE HYDROCHLORIDE 25 MG: 25 TABLET, FILM COATED ORAL at 20:19

## 2022-11-22 RX ADMIN — LOSARTAN POTASSIUM: 50 TABLET, FILM COATED ORAL at 08:08

## 2022-11-22 RX ADMIN — HYDRALAZINE HYDROCHLORIDE 25 MG: 25 TABLET, FILM COATED ORAL at 17:05

## 2022-11-22 RX ADMIN — ROSUVASTATIN CALCIUM 5 MG: 5 TABLET, FILM COATED ORAL at 20:19

## 2022-11-22 RX ADMIN — HYDRALAZINE HYDROCHLORIDE 25 MG: 25 TABLET, FILM COATED ORAL at 08:07

## 2022-11-22 RX ADMIN — AMIODARONE HYDROCHLORIDE 100 MG: 200 TABLET ORAL at 08:08

## 2022-11-22 RX ADMIN — NEBIVOLOL 5 MG: 5 TABLET ORAL at 08:07

## 2022-11-22 RX ADMIN — LEVOTHYROXINE SODIUM 112 MCG: 0.11 TABLET ORAL at 05:59

## 2022-11-22 RX ADMIN — PERFLUTREN 2 ML: 6.52 INJECTION, SUSPENSION INTRAVENOUS at 14:27

## 2022-11-22 RX ADMIN — TERAZOSIN 2 MG: 2 CAPSULE ORAL at 20:19

## 2022-11-22 NOTE — PROGRESS NOTES
Name: David Comer Sr. ADMIT: 2022   : 1940  PCP: Chacha Pineda MD    MRN: [id number] LOS: 0 days   AGE/SEX: 82 y.o. male  ROOM: [facility]     Subjective   Subjective   Resting in bed, his chest pain has resolved.  Echo is pending for today and stress test is pending for tomorrow.    Review of Systems   Constitutional: Negative for fatigue and fever.   Respiratory: Negative for cough and shortness of breath.    Cardiovascular: Positive for chest pain. Negative for palpitations.   Gastrointestinal: Negative for abdominal pain, nausea and vomiting.   Psychiatric/Behavioral: Negative for confusion.        Objective   Objective   Vital Signs  Temp:  [97.4 °F (36.3 °C)-97.8 °F (36.6 °C)] 97.4 °F (36.3 °C)  Heart Rate:  [64-70] 70  Resp:  [18] 18  BP: (108-199)/() 129/80  SpO2:  [93 %] 93 %  on  Flow (L/min):  [2] 2;   Device (Oxygen Therapy): room air  Body mass index is 32.25 kg/m².  Physical Exam  Constitutional:       General: He is not in acute distress.     Appearance: Normal appearance. He is not toxic-appearing.   Cardiovascular:      Rate and Rhythm: Normal rate and regular rhythm.      Heart sounds: No murmur heard.  Pulmonary:      Effort: Pulmonary effort is normal. No respiratory distress.      Breath sounds: Normal breath sounds. No wheezing.   Abdominal:      General: Abdomen is flat. Bowel sounds are normal. There is no distension.      Palpations: Abdomen is soft.      Tenderness: There is no abdominal tenderness.   Musculoskeletal:         General: No tenderness.      Right lower leg: No edema.      Left lower leg: No edema.   Skin:     General: Skin is warm and dry.   Neurological:      General: No focal deficit present.      Mental Status: He is alert and oriented to person, place, and time. Mental status is at baseline.      Motor: No weakness.       Results Review     I reviewed the patient's new clinical results.  Results from last 7 days   Lab Units 22  0819  11/21/22  1130   WBC 10*3/mm3 6.08 7.43   HEMOGLOBIN g/dL 12.4* 14.2   PLATELETS 10*3/mm3 138* 158     Results from last 7 days   Lab Units 11/22/22  0438 11/21/22  1130   SODIUM mmol/L 140 141   POTASSIUM mmol/L 3.6 4.1   CHLORIDE mmol/L 104 102   CO2 mmol/L 27.0 27.1   BUN mg/dL 17 16   CREATININE mg/dL 0.80 1.06   GLUCOSE mg/dL 84 110*   Estimated Creatinine Clearance: 92 mL/min (by C-G formula based on SCr of 0.8 mg/dL).  Results from last 7 days   Lab Units 11/21/22  1130   ALBUMIN g/dL 4.50   BILIRUBIN mg/dL 0.3   ALK PHOS U/L 63   AST (SGOT) U/L 24   ALT (SGPT) U/L 31     Results from last 7 days   Lab Units 11/22/22  0438 11/21/22  1130   CALCIUM mg/dL 9.1 10.0   ALBUMIN g/dL  --  4.50       COVID19   Date Value Ref Range Status   10/16/2021 Not Detected Not Detected - Ref. Range Final   09/18/2021 Not Detected Not Detected - Ref. Range Final     No results found for: HGBA1C, POCGLU    Adult Transthoracic Echo Complete W/ Cont if Necessary Per Protocol  •  Left ventricular ejection fraction appears to be 61 - 65%.  •  Left ventricular diastolic function was normal.  •  The right atrial cavity is borderline dilated.    Scheduled Medications  amiodarone, 100 mg, Oral, Daily  aspirin, 325 mg, Oral, Once  hydrALAZINE, 25 mg, Oral, TID  isosorbide mononitrate, 30 mg, Oral, Daily  levothyroxine, 112 mcg, Oral, Q AM  losartan-HCTZ 100-25 combo dose, , Oral, Daily  nebivolol, 5 mg, Oral, Daily  pantoprazole, 40 mg, Oral, QAM  rosuvastatin, 5 mg, Oral, Nightly  terazosin, 2 mg, Oral, Nightly    Infusions  Pharmacy to dose warfarin,     Diet  NPO Diet NPO Type: Strict NPO  Diet: Cardiac Diets; Healthy Heart (2-3 Na+); No Caffeine; Texture: Regular Texture (IDDSI 7); Fluid Consistency: Thin (IDDSI 0)       I have personally reviewed:  [x]  Laboratory   []  Microbiology   []  Radiology   [x]  EKG/Telemetry   [x]  Cardiology/Vascular    []  Pathology   [x]  Records    Assessment/Plan     Active Hospital Problems     Diagnosis  POA   • **Chest pain, unspecified type [R07.9]  Yes   • Long term current use of anticoagulant therapy [Z79.01]  Not Applicable   • Chronic coronary artery disease [I25.10]  Yes   • Paroxysmal atrial fibrillation (HCC) [I48.0]  Yes   • Essential hypertension [I10]  Yes   • Mixed hyperlipidemia [E78.2]  Yes      Resolved Hospital Problems   No resolved problems to display.       82 y.o. male admitted with Chest pain, unspecified type.    Chest pain  CAD  - Trops neg x2, EKG  W/ SR and non-specific t wave changes; cath 7/21 w/ early atherosclerotic plaque/normal coronaries  - cards has seen; plan for stress test tomorrow; echo today  - further plans pending above tests    Paroxsymal atrial fibrillation- amiodarone, warfarin, nebivolol  Hypertension- nebivolol, imdur, losartan, hydralazine  HLD- crestor  BPH- terazosin    · Warfarin (home med) for DVT prophylaxis.  · Full code.  · Discussed with patient, nursing staff, CCP and care team on multidisciplinary rounds.  · Anticipate discharge home tomorrow. if stress test is negative.       Jia Joseph MD  [facility]  11/22/22  17:09 EST    I wore protective equipment throughout this patient encounter including a face mask, gloves and protective eyewear.  Hand hygiene was performed before donning protective equipment and after removal when leaving the room.

## 2022-11-22 NOTE — PLAN OF CARE
Goal Outcome Evaluation:   High blood pressures noted.  1-time dose of hydralazine given at end of dayshift until home meds addressed.  ER reported patient up independently.  On Room air.

## 2022-11-22 NOTE — PLAN OF CARE
Goal Outcome Evaluation:  Plan of Care Reviewed With: patient        Progress: improving  Outcome Evaluation: pt axox4, NSR or monitor, room air. echo done today. stress test tomorrow, NPO at MN. pt ad sanjiv in room. shower ordered obtained today. pt has no reports of chest pain.

## 2022-11-22 NOTE — PROGRESS NOTES
[facility] Clinical Pharmacy Services: Warfarin Dosing/Monitoring Consult    David Comer Sr. is a 82 y.o. male, estimated creatinine clearance is 69.7 mL/min (by C-G formula based on SCr of 1.06 mg/dL). weighing 109 kg (240 lb).    Results from last 7 days   Lab Units 11/21/22  1130 11/17/22  0000   INR  2.62* 2.70   HEMOGLOBIN g/dL 14.2  --    HEMATOCRIT % 42.4  --    PLATELETS 10*3/mm3 158  --      Prior to admission anticoagulation:   Warfarin 7.5 mg daily     Hospital Anticoagulation:  Consulting provider: Dr. Henriquez  Start date: 11/21  Indication: A Fib - requiring full anticoagulation  Target INR: 2 - 3  Expected duration: TBD      Potential food or drug interactions:   Amiodarone -  may enhance the anticoagulant effect of Vitamin K Antagonists    On Enoxaparin 40 mg for VTE Prophylaxis day 1    Education complete?/Date: No; plan for follow up TBD    Assessment/Plan:  Dose: Warfarin 7.5 mg one time for tonight   Discontinuation of Enoxaparin will deferred to day shift clinicals  Monitor for any signs or symptoms of bleeding  Follow up daily INRs and dose adjustments    Pharmacy will continue to follow until discharge or discontinuation of warfarin.     Elia Walker [facility]  Clinical Pharmacist

## 2022-11-22 NOTE — PLAN OF CARE
Goal Outcome Evaluation:  Plan of Care Reviewed With: patient        Progress: improving  Outcome Evaluation: Denies chest pain or any pain this shift. VSS. BP more controlled with home meds added. Up ad sanjiv to bathroom.

## 2022-11-22 NOTE — H&P
HISTORY AND PHYSICAL   [facility]        Date of Admission: 2022  Patient Identification:  Name: David Comer Sr.  Age: 82 y.o.  Sex: male  :  1940  MRN: [id number]                     Primary Care Physician: Chacha Pineda MD    Chief Complaint:  82 year old gentleman who presented to the emergency room after he had chest pain/pressure earlier today; the pain was in the center of his chest; he is not having any pain now; he has had several family members who passed away with heart disease so he was concerned; he took two baby aspirin but not nitroglycerin; he is followed by dr sheikh; denies shortness of breath; he has a history of a fib, hypertension and hyperlipidemia    History of Present Illness:   As above    Past Medical History:  Past Medical History:   Diagnosis Date   • AAA (abdominal aortic aneurysm) without rupture    • Aneurysm of thoracic aorta     CT CHEST 2021 IN [facility]    • Arthritis    • Basal cell carcinoma     RIGHT LOWER LID    • BPH (benign prostatic hyperplasia)    • Chronic lumbar pain    • Degenerative arthritis of lumbar spine    • Degenerative cervical disc    • Disease of thyroid gland    • Elevated rheumatoid factor    • History of atrial fibrillation    • Hyperlipidemia    • Hypertension    • Hypogonadism in male    • Muscle strain of left upper back     PRESCRIBED PREDNISONE DOSE PACK    • On anticoagulant therapy    • Pulmonary nodule     6 MM - REPEAT CT SCAN IN ONE YEAR, WATCHING    • Refused influenza vaccine    • Scoliosis    • Vitamin D deficiency      Past Surgical History:  Past Surgical History:   Procedure Laterality Date   • ANKLE FUSION Right    • CARDIAC CATHETERIZATION     • CARDIAC CATHETERIZATION N/A 2021    Procedure: Left Heart Cath;  Surgeon: Harlan Sheikh MD;  Location: [facility] INVASIVE LOCATION;  Service: Cardiology;  Laterality: N/A;   • CARDIAC CATHETERIZATION N/A 2021    Procedure: Coronary  angiography;  Surgeon: Harlan Downing MD;  Location:  [facility] C[facility] INVASIVE LOCATION;  Service: Cardiology;  Laterality: N/A;   • CARDIAC CATHETERIZATION N/A 7/2/2021    Procedure: Left ventriculography;  Surgeon: Harlan Downing MD;  Location:  [facility] INVASIVE LOCATION;  Service: Cardiology;  Laterality: N/A;   • HOBBS TUBE INSERTION Right 10/19/2021    Procedure: RIGHT SECOND STAGE CAST TAKEDOWN RECONSTRUCTION;  Surgeon: Brian Bhatt MD;  [place] [facility] [place] [facility] [place]  Service: Ophthalmology;  Laterality: Right;   • ENTROPIAN REPAIR Right 9/21/2021    Procedure: RIGHT LOWER LID EXCISION OF BASAL CELL CARCINOMA WITH FROZEN SECTION RIGHT LOWER LID RECONSTRUCTION WITH CAST FLAP, REPAIR OF CANULICULIS, AND FULL THICKNESS SKIN GRAFT;  Surgeon: Brian Bhatt MD;  [place] [facility] [place] [facility] [place]  Service: Ophthalmology;  Laterality: Right;   • INGUINAL HERNIA REPAIR Right    • REPLACEMENT TOTAL KNEE Bilateral 2000   • ROTATOR CUFF REPAIR Left    • ROTATOR CUFF REPAIR Right    • SKIN BIOPSY     • VASECTOMY        Home Meds:  Medications Prior to Admission   Medication Sig Dispense Refill Last Dose   • acetaminophen (TYLENOL) 325 MG tablet Take 2 tablets by mouth Every 4 (Four) Hours As Needed for Mild Pain .   11/20/2022   • amiodarone (PACERONE) 200 MG tablet Take 1/2 (one-half) tablet by mouth once daily (Patient taking differently: Take 100 mg by mouth Daily.) 45 tablet 0 11/20/2022   • doxazosin (CARDURA) 2 MG tablet Take 1 tablet by mouth once daily (Patient taking differently: Take 2 mg by mouth Every Night.) 90 tablet 0 11/20/2022   • esomeprazole (nexIUM) 20 MG capsule Take 20 mg by mouth Every Morning Before Breakfast.   11/20/2022   • Euthyrox 112 MCG tablet Take 1 tablet by mouth once daily (Patient taking differently: Take 112 mcg by mouth Every Morning.) 90 tablet 0 11/20/2022   • hydrALAZINE (APRESOLINE) 25 MG tablet TAKE 1 TABLET BY MOUTH THREE TIMES DAILY (Patient taking  differently: Take 25 mg by mouth 3 (Three) Times a Day.) 270 tablet 0 11/20/2022   • isosorbide mononitrate (IMDUR) 30 MG 24 hr tablet Take 1 tablet by mouth once daily (Patient taking differently: Take 30 mg by mouth Daily.) 90 tablet 0 11/20/2022   • metoprolol succinate XL (TOPROL-XL) 50 MG 24 hr tablet Take 1 tablet by mouth once daily (Patient taking differently: Take 50 mg by mouth Daily.) 90 tablet 0 11/20/2022   • nebivolol (BYSTOLIC) 10 MG tablet Take 5 mg by mouth Daily.   11/20/2022   • olmesartan-hydrochlorothiazide (BENICAR HCT) 40-25 MG per tablet TAKE ONE TABLET BY MOUTH DAILY (Patient taking differently: Take 1 tablet by mouth Daily.) 90 tablet 0 11/20/2022   • rosuvastatin (CRESTOR) 5 MG tablet Take 1 tablet by mouth once daily (Patient taking differently: Take 5 mg by mouth Every Night.) 90 tablet 1 11/20/2022   • warfarin (COUMADIN) 7.5 MG tablet TAKE 1 TABLET BY MOUTH ONCE DAILY AT NIGHT (Patient taking differently: Take 7.5 mg by mouth Every Night.) 90 tablet 1 11/20/2022       Allergies:  No Known Allergies  Immunizations:  Immunization History   Administered Date(s) Administered   • COVID-19 (PFIZER) PURPLE CAP 02/18/2021, 03/11/2021, 10/27/2021   • Influenza TIV (IM) 10/18/2014   • Pneumococcal Polysaccharide (PPSV23) 09/04/2013     Social History:   Social History     Social History Narrative   • Not on file     Social History     Socioeconomic History   • Marital status:    Tobacco Use   • Smoking status: Never   • Smokeless tobacco: Never   Vaping Use   • Vaping Use: Never used   Substance and Sexual Activity   • Alcohol use: No   • Drug use: No   • Sexual activity: Defer       Family History:  Family History   Problem Relation Age of Onset   • Hypertension Father    • Heart attack Father    • Heart attack Brother    • Alcohol abuse Brother    • Hypertension Brother    • Hypertension Paternal Grandmother    • Hypertension Paternal Grandfather    • Anemia Mother    • Arthritis Mother  "   • Hypertension Mother    • Hypertension Maternal Grandmother    • Heart disease Other         FH in males before age 55   • Malig Hyperthermia Neg Hx         Review of Systems  See history of present illness and past medical history.  Patient denies headache, dizziness, syncope, falls, trauma, change in vision, change in hearing, change in taste, changes in weight, changes in appetite, focal weakness, numbness, or paresthesia.  Patient denies  palpitations, dyspnea, orthopnea, PND, cough, sinus pressure, rhinorrhea, epistaxis, hemoptysis, nausea, vomiting,hematemesis, diarrhea, constipation or hematchezia.  Denies cold or heat intolerance, polydipsia, polyuria, polyphagia. Denies hematuria, pyuria, dysuria, hesitancy, frequency or urgency. Denies consumption of raw and under cooked meats foods or change in water source.  Denies fever, chills, sweats, night sweats.  Denies missing any routine medications. Remainder of ROS is negative.    Objective:  T Max 24 hrs: Temp (24hrs), Av.8 °F (36.6 °C), Min:97.7 °F (36.5 °C), Max:97.8 °F (36.6 °C)    Vitals Ranges:   Temp:  [97.7 °F (36.5 °C)-97.8 °F (36.6 °C)] 97.7 °F (36.5 °C)  Heart Rate:  [55-70] 65  Resp:  [14-18] 18  BP: (116-199)/() 149/92      Exam:  /92 (BP Location: Left arm, Patient Position: Lying)   Pulse 65   Temp 97.7 °F (36.5 °C) (Oral)   Resp 18   Ht 182.9 cm (72\")   Wt 109 kg (240 lb)   SpO2 93%   BMI 32.55 kg/m²     General Appearance:    Alert, cooperative, no distress, appears stated age   Head:    Normocephalic, without obvious abnormality, atraumatic   Eyes:    PERRL, conjunctivae/corneas clear, EOM's intact, both eyes   Ears:    Normal external ear canals, both ears   Nose:   Nares normal, septum midline, mucosa normal, no drainage    or sinus tenderness   Throat:   Lips, mucosa, and tongue normal   Neck:   Supple, symmetrical, trachea midline, no adenopathy;     thyroid:  no enlargement/tenderness/nodules; no carotid    " bruit or JVD   Back:     Symmetric, no curvature, ROM normal, no CVA tenderness   Lungs:     Clear to auscultation bilaterally, respirations unlabored   Chest Wall:    No tenderness or deformity    Heart:    Regular rate and rhythm, S1 and S2 normal, no murmur, rub   or gallop   Abdomen:     Soft, nontender, bowel sounds active all four quadrants,     no masses, no hepatomegaly, no splenomegaly   Extremities:   Extremities normal, atraumatic, no cyanosis or edema   Pulses:   2+ and symmetric all extremities   Skin:   Skin color, texture, turgor normal, no rashes or lesions   Lymph nodes:   Cervical, supraclavicular, and axillary nodes normal   Neurologic:   CNII-XII intact, normal strength, sensation intact throughout      .    Data Review:  Labs in chart were reviewed.  WBC   Date Value Ref Range Status   11/21/2022 7.43 3.40 - 10.80 10*3/mm3 Final     Hemoglobin   Date Value Ref Range Status   11/21/2022 14.2 13.0 - 17.7 g/dL Final     Hematocrit   Date Value Ref Range Status   11/21/2022 42.4 37.5 - 51.0 % Final     Platelets   Date Value Ref Range Status   11/21/2022 158 140 - 450 10*3/mm3 Final     Sodium   Date Value Ref Range Status   11/21/2022 141 136 - 145 mmol/L Final     Potassium   Date Value Ref Range Status   11/21/2022 4.1 3.5 - 5.2 mmol/L Final     Chloride   Date Value Ref Range Status   11/21/2022 102 98 - 107 mmol/L Final     CO2   Date Value Ref Range Status   11/21/2022 27.1 22.0 - 29.0 mmol/L Final     BUN   Date Value Ref Range Status   11/21/2022 16 8 - 23 mg/dL Final     Creatinine   Date Value Ref Range Status   11/21/2022 1.06 0.76 - 1.27 mg/dL Final     Glucose   Date Value Ref Range Status   11/21/2022 110 (H) 65 - 99 mg/dL Final     Calcium   Date Value Ref Range Status   11/21/2022 10.0 8.6 - 10.5 mg/dL Final     AST (SGOT)   Date Value Ref Range Status   11/21/2022 24 1 - 40 U/L Final     ALT (SGPT)   Date Value Ref Range Status   11/21/2022 31 1 - 41 U/L Final     Alkaline  Phosphatase   Date Value Ref Range Status   11/21/2022 63 39 - 117 U/L Final                Imaging Results (All)     Procedure Component Value Units Date/Time    US Gallbladder [[id number]] Collected: 11/21/22 1710     Updated: 11/21/22 2042    Narrative:      RIGHT UPPER QUADRANT ULTRASOUND     HISTORY: 82-year-old male with pain. Evaluate for cholecystitis.     TECHNIQUE: Real-time ultrasound of the right upper quadrant was  performed. Confirmatory images were obtained.     FINDINGS: The gallbladder is partially contracted and has a slightly  thickened wall. There is a trace of pericholecystic fluid. There is no  intrahepatic or extrahepatic biliary dilatation. No stones or sludge are  seen within the gallbladder. Liver and right kidney as visualized appear  unremarkable. Pancreatic body appears unremarkable. Pancreatic head and  tail are not seen.       Impression:      The gallbladder wall is slightly thickened, but the  gallbladder does not appear obstructed. There is no evidence for acute  cholecystitis. The tiny amount of pericholecystic fluid may be related  to liver pathology or passive hepatic congestion.     This report was finalized on 11/21/2022 8:39 PM by Dr. Marilyn Bullock M.D.       CT Angiogram Chest [[id number]] Collected: 11/21/22 1403     Updated: 11/21/22 1451    Narrative:      CT ANGIOGRAM OF THE CHEST, ABDOMEN AND PELVIS. MULTIPLE CORONAL,  SAGITTAL, AND 3-D RECONSTRUCTIONS.     HISTORY: Chest pain, aortic aneurysm     TECHNIQUE: Radiation dose reduction techniques were utilized, including  automated exposure control and exposure modulation based on body size.   CT angiogram of the chest, abdomen and pelvis was performed specifically  tailored to evaluate the aorta. Multiple coronal, sagittal, and 3-D  reconstruction images were obtained.      COMPARISON:CT chest, abdomen and pelvis dating back to 11/24/2020     FINDINGS: Evaluation of the ascending aorta is suboptimal due to motion  artifact.  Additionally, please note evaluation of the abdominal aorta is  suboptimal due to contrast timing.     Rounded soft tissue density contiguous with the right sternomanubrial  articulation measuring up to 1.4 x 1.2 cm is grossly unchanged since  11/24/2020.     There is a moderate hiatal hernia, as before. No noncalcified hilar,  mediastinal or axillary adenopathy by size criteria.     No significant stenosis is present within the origins of the great  vessels arising off the aortic arch. No significant pericardial effusion  is present. Representative measurements of the thoracic aorta are as  follows and compared with CT chest 11/24/2020 unless otherwise stated:  *  Aortic root measuring 3.6 cm on coronal and 3.9 cm on sagittal, as  before.  *  Sinotubular junction measuring 3.5 cm, as before.  *  Tubular ascending aorta measuring approximately 4.5 cm, as before.  *  Distal aortic arch measuring approximately 2.3 cm, as before.  *  Descending thoracic aorta measuring 2.4 cm, as before.     No pleural effusion pneumothorax is seen. There is patchy ground glass  and pulmonary opacification within the bilateral upper lobes. Sub-6 mm  pulmonary nodule within the lingula is grossly unchanged since  11/24/2020.     Short segment fusiform dilation of the celiac artery measuring up to  approximately 1.3 cm is present, grossly unchanged since 11/24/2020.  Moderate atherosclerotic calcification is present within the abdominal  aorta and its major branches. There appears to be mild stenosis of the  origin of the right renal artery arising off the aorta secondary,  however please note that evaluation is limited due to contrast timing.     There are no findings of small bowel obstruction. The appendix is  unremarkable. The gallbladder wall has a hyperdense appearance with  pericholecystic stranding and/or fluid. Subcentimeter hepatic lesions  are too small to characterize. While this examination is not tailored  for detailed  evaluation of the abdominal viscera due to contrast timing,  no gross abnormality seen within the pancreas, spleen or adrenal glands.  Subcentimeter renal lesions are too small to characterize. Larger  hypodense lesions imaging density less than 15 Hounsfield units are  likely benign per Bosniak 2019 criteria. Left nephrolithiasis measures  approximately 4 to 5 mm. There is no hydronephrosis. No abdominopelvic  adenopathy by size criteria. There is colonic diverticulosis.  Diverticulum arises off the posterior superior aspect of the bladder.  Prostate is moderately enlarged. There is a small amount free fluid in  the pelvis. No free intraperitoneal air is seen.     No new suspicious lytic or blastic osseous lesions are present.  Levoscoliosis of the lumbar spine is present with multilevel moderate to  severe degenerative changes. Findings can be further evaluated with MRI  if clinically indicated.       Impression:      1.  No findings of definite aortic dissection. However, please note that  evaluation of the thoracic aorta is suboptimal due to motion artifact.  2.  Findings most suggestive of cholecystitis.  3.  Patchy pulmonary opacification within the bilateral upper lungs is  present and while evaluation is difficult due to respiratory motion,  findings are concerning for pneumonia and correlation with patient  history is recommended.  4.  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  5.  Other findings as above.     This report was finalized on 11/21/2022 2:48 PM by Dr. Regulo Laura M.D.       CT Angiogram Abdomen Pelvis [[id number]] Collected: 11/21/22 1403     Updated: 11/21/22 1451    Narrative:      CT ANGIOGRAM OF THE CHEST, ABDOMEN AND PELVIS. MULTIPLE CORONAL,  SAGITTAL, AND 3-D RECONSTRUCTIONS.     HISTORY: Chest pain, aortic aneurysm     TECHNIQUE: Radiation dose reduction techniques were utilized, including  automated  exposure control and exposure modulation based on body size.   CT angiogram of the chest, abdomen and pelvis was performed specifically  tailored to evaluate the aorta. Multiple coronal, sagittal, and 3-D  reconstruction images were obtained.      COMPARISON:CT chest, abdomen and pelvis dating back to 11/24/2020     FINDINGS: Evaluation of the ascending aorta is suboptimal due to motion  artifact. Additionally, please note evaluation of the abdominal aorta is  suboptimal due to contrast timing.     Rounded soft tissue density contiguous with the right sternomanubrial  articulation measuring up to 1.4 x 1.2 cm is grossly unchanged since  11/24/2020.     There is a moderate hiatal hernia, as before. No noncalcified hilar,  mediastinal or axillary adenopathy by size criteria.     No significant stenosis is present within the origins of the great  vessels arising off the aortic arch. No significant pericardial effusion  is present. Representative measurements of the thoracic aorta are as  follows and compared with CT chest 11/24/2020 unless otherwise stated:  *  Aortic root measuring 3.6 cm on coronal and 3.9 cm on sagittal, as  before.  *  Sinotubular junction measuring 3.5 cm, as before.  *  Tubular ascending aorta measuring approximately 4.5 cm, as before.  *  Distal aortic arch measuring approximately 2.3 cm, as before.  *  Descending thoracic aorta measuring 2.4 cm, as before.     No pleural effusion pneumothorax is seen. There is patchy ground glass  and pulmonary opacification within the bilateral upper lobes. Sub-6 mm  pulmonary nodule within the lingula is grossly unchanged since  11/24/2020.     Short segment fusiform dilation of the celiac artery measuring up to  approximately 1.3 cm is present, grossly unchanged since 11/24/2020.  Moderate atherosclerotic calcification is present within the abdominal  aorta and its major branches. There appears to be mild stenosis of the  origin of the right renal artery  arising off the aorta secondary,  however please note that evaluation is limited due to contrast timing.     There are no findings of small bowel obstruction. The appendix is  unremarkable. The gallbladder wall has a hyperdense appearance with  pericholecystic stranding and/or fluid. Subcentimeter hepatic lesions  are too small to characterize. While this examination is not tailored  for detailed evaluation of the abdominal viscera due to contrast timing,  no gross abnormality seen within the pancreas, spleen or adrenal glands.  Subcentimeter renal lesions are too small to characterize. Larger  hypodense lesions imaging density less than 15 Hounsfield units are  likely benign per Bosniak 2019 criteria. Left nephrolithiasis measures  approximately 4 to 5 mm. There is no hydronephrosis. No abdominopelvic  adenopathy by size criteria. There is colonic diverticulosis.  Diverticulum arises off the posterior superior aspect of the bladder.  Prostate is moderately enlarged. There is a small amount free fluid in  the pelvis. No free intraperitoneal air is seen.     No new suspicious lytic or blastic osseous lesions are present.  Levoscoliosis of the lumbar spine is present with multilevel moderate to  severe degenerative changes. Findings can be further evaluated with MRI  if clinically indicated.       Impression:      1.  No findings of definite aortic dissection. However, please note that  evaluation of the thoracic aorta is suboptimal due to motion artifact.  2.  Findings most suggestive of cholecystitis.  3.  Patchy pulmonary opacification within the bilateral upper lungs is  present and while evaluation is difficult due to respiratory motion,  findings are concerning for pneumonia and correlation with patient  history is recommended.  4.  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  5.  Other findings as above.     This report was finalized on  11/21/2022 2:48 PM by Dr. Regulo Laura M.D.       XR Chest 2 View [[id number]] Collected: 11/21/22 1102     Updated: 11/21/22 1108    Narrative:      XR CHEST 2 VW-     HISTORY: Male who is 82 years-old,  chest pain     TECHNIQUE: Frontal and lateral views of the chest     COMPARISON:  image from CT from 08/30/2022     FINDINGS: The heart size is normal. Aorta appears ectatic. Pulmonary  vasculature is unremarkable. Minimal likely atelectasis or scarring at  the right lateral costophrenic angle. No focal pulmonary consolidation,  pleural effusion, or pneumothorax. No acute osseous process.       Impression:      Minimal likely atelectasis or scarring right lateral  costophrenic angle. Ectatic appearing aorta. Follow-up as clinically  indicated.     This report was finalized on 11/21/2022 11:05 AM by Dr. Dinesh Quezada M.D.               Assessment:  Active Hospital Problems    Diagnosis  POA   • **Chest pain, unspecified type [R07.9]  Yes      Resolved Hospital Problems   No resolved problems to display.   hypertension  Hyperlipidemia  Hyperglycemia  Rheumatoid arthritis  hypothyroidism    Plan:  Ask dr sheikh to see him  He has ruled out for acs with serial troponins  He had a cardiac cath last year as well as a stress test  Will await cardiology input before ordering any further testing  Monitor on telemetry  DJazzw patient and ED provider as well as nurse  Rachelle Henriquez MD  11/21/2022  20:44 EST

## 2022-11-22 NOTE — CONSULTS
[facility]  Cardiology Consult Note    Patient Identification:  Name: David Comer Sr.  Age: 82 y.o.  Sex: male  :  1940  MRN: [id number]             Requesting Physician:  Rachelle Henriquez MD     Reason for Consultation / Chief Complaint: cp    History of Present Illness:   This is a 82-year-old who is well-known to our service.  He has a history to include AAA, arthritis, BPH, hyperlipidemia, hypertension, pulmonary nodule, rheumatoid arthritis, hypothyroidism, paroxysmal atrial fibs, and vitamin D deficiency.  Who presented to [facility] with chest pain/pressure on 2022.  At the time of the chest pain he took aspirin.    On admission chest x-ray showed minimal likely atelectasis or scarring in the right lateral costophrenic angle.  CT did not show a definite aortic dissection.  See full report as below.  Troponin is negative x2, glucose 110, creatinine 1.06, and hemoglobin 14.2.    2021 revealed EF 64.1%, saline test results are negative, LV C is mildly dilated, LV wall segments are hypokinetic: Apical anterior, apical lateral, apical inferior, apical septal and apex hypokinetic.  LV diastolic function was normal, RV C is borderline dilated, RAC is borderline dilated and no evidence of pericardial effusion.  Stress test in .  Cardiac cath in  revealed early atherosclerotic plaque otherwise normal coronary arteries.    Comorbid cardiac risk factors: hypertension, hyperlipidemia, age, CAD    Past Medical History:  Past Medical History:   Diagnosis Date    AAA (abdominal aortic aneurysm) without rupture     Aneurysm of thoracic aorta     CT CHEST 2021 IN [facility]     Arthritis     Basal cell carcinoma     RIGHT LOWER LID     BPH (benign prostatic hyperplasia)     Chronic lumbar pain     Degenerative arthritis of lumbar spine     Degenerative cervical disc     Disease of thyroid gland     Elevated rheumatoid factor     History of atrial  fibrillation     Hyperlipidemia     Hypertension     Hypogonadism in male     Muscle strain of left upper back     PRESCRIBED PREDNISONE DOSE PACK     On anticoagulant therapy     Pulmonary nodule     6 MM - REPEAT CT SCAN IN ONE YEAR, WATCHING     Refused influenza vaccine     Scoliosis     Vitamin D deficiency      Past Surgical History:  Past Surgical History:   Procedure Laterality Date    ANKLE FUSION Right     CARDIAC CATHETERIZATION      CARDIAC CATHETERIZATION N/A 7/2/2021    Procedure: Left Heart Cath;  Surgeon: Harlan Downing MD;  Location: [facility] CATH INVASIVE LOCATION;  Service: Cardiology;  Laterality: N/A;    CARDIAC CATHETERIZATION N/A 7/2/2021    Procedure: Coronary angiography;  Surgeon: Harlan Downing MD;  Location: [facility] CATH INVASIVE LOCATION;  Service: Cardiology;  Laterality: N/A;    CARDIAC CATHETERIZATION N/A 7/2/2021    Procedure: Left ventriculography;  Surgeon: Harlan Downing MD;  Location: [facility] CATH INVASIVE LOCATION;  Service: Cardiology;  Laterality: N/A;    HOBBS TUBE INSERTION Right 10/19/2021    Procedure: RIGHT SECOND STAGE CAST TAKEDOWN RECONSTRUCTION;  Surgeon: Brian Bhatt MD;  Location: [facility] OR [facility];  Service: Ophthalmology;  Laterality: Right;    ENTROPIAN REPAIR Right 9/21/2021    Procedure: RIGHT LOWER LID EXCISION OF BASAL CELL CARCINOMA WITH FROZEN SECTION RIGHT LOWER LID RECONSTRUCTION WITH CAST FLAP, REPAIR OF CANULICULIS, AND FULL THICKNESS SKIN GRAFT;  Surgeon: Brian Bhatt MD;  Location: [facility] OR [facility];  Service: Ophthalmology;  Laterality: Right;    INGUINAL HERNIA REPAIR Right     REPLACEMENT TOTAL KNEE Bilateral 2000    ROTATOR CUFF REPAIR Left     ROTATOR CUFF REPAIR Right     SKIN BIOPSY      VASECTOMY        Allergies:  No Known Allergies  Home Meds:  Medications Prior to Admission   Medication Sig Dispense Refill Last Dose    acetaminophen (TYLENOL) 325 MG tablet Take 2 tablets by mouth Every 4 (Four) Hours  As Needed for Mild Pain .   11/20/2022    amiodarone (PACERONE) 200 MG tablet Take 1/2 (one-half) tablet by mouth once daily (Patient taking differently: Take 100 mg by mouth Daily.) 45 tablet 0 11/20/2022    doxazosin (CARDURA) 2 MG tablet Take 1 tablet by mouth once daily (Patient taking differently: Take 2 mg by mouth Every Night.) 90 tablet 0 11/20/2022    esomeprazole (nexIUM) 20 MG capsule Take 20 mg by mouth Every Morning Before Breakfast.   11/20/2022    Euthyrox 112 MCG tablet Take 1 tablet by mouth once daily (Patient taking differently: Take 112 mcg by mouth Every Morning.) 90 tablet 0 11/20/2022    hydrALAZINE (APRESOLINE) 25 MG tablet TAKE 1 TABLET BY MOUTH THREE TIMES DAILY (Patient taking differently: Take 25 mg by mouth 3 (Three) Times a Day.) 270 tablet 0 11/20/2022    isosorbide mononitrate (IMDUR) 30 MG 24 hr tablet Take 1 tablet by mouth once daily (Patient taking differently: Take 30 mg by mouth Daily.) 90 tablet 0 11/20/2022    metoprolol succinate XL (TOPROL-XL) 50 MG 24 hr tablet Take 1 tablet by mouth once daily (Patient taking differently: Take 50 mg by mouth Daily.) 90 tablet 0 11/20/2022    nebivolol (BYSTOLIC) 10 MG tablet Take 5 mg by mouth Daily.   11/20/2022    olmesartan-hydrochlorothiazide (BENICAR HCT) 40-25 MG per tablet TAKE ONE TABLET BY MOUTH DAILY (Patient taking differently: Take 1 tablet by mouth Daily.) 90 tablet 0 11/20/2022    rosuvastatin (CRESTOR) 5 MG tablet Take 1 tablet by mouth once daily (Patient taking differently: Take 5 mg by mouth Every Night.) 90 tablet 1 11/20/2022    warfarin (COUMADIN) 7.5 MG tablet TAKE 1 TABLET BY MOUTH ONCE DAILY AT NIGHT (Patient taking differently: Take 7.5 mg by mouth Every Night.) 90 tablet 1 11/20/2022     Current Meds:    Scheduled    Medication Ordered Dose/Rate, Route, Frequency Last Action   amiodarone (PACERONE) tablet 100 mg 100 mg, PO, Daily Given, 100 mg at 11/22 0808   aspirin tablet 325 mg 325 mg, PO, Once Ordered    hydrALAZINE (APRESOLINE) tablet 25 mg 25 mg, PO, TID Given, 25 mg at 11/22 0807   isosorbide mononitrate (IMDUR) 24 hr tablet 30 mg 30 mg, PO, Daily Given, 30 mg at 11/22 0807   levothyroxine (SYNTHROID, LEVOTHROID) tablet 112 mcg 112 mcg, PO, Q AM Given, 112 mcg at 11/22 0559   losartan (COZAAR) 100 mg, hydroCHLOROthiazide (HYDRODIURIL) 25 mg No Dose/Rate, PO, Daily Given, No Dose/Rate at 11/22 0808   nebivolol (BYSTOLIC) tablet 5 mg 5 mg, PO, Daily Given, 5 mg at 11/22 0807   pantoprazole (PROTONIX) EC tablet 40 mg 40 mg, PO, QAM Given, 40 mg at 11/22 0559   rosuvastatin (CRESTOR) tablet 5 mg 5 mg, PO, Nightly Given, 5 mg at 11/21 2245   terazosin (HYTRIN) capsule 2 mg 2 mg, PO, Nightly Given, 2 mg at 11/21 2244     PRN    Medication Ordered Dose/Rate, Route, Frequency Last Action   acetaminophen (TYLENOL) tablet 650 mg 650 mg, PO, Q4H PRN Ordered   melatonin tablet 3 mg 3 mg, PO, Nightly PRN Ordered   nitroglycerin (NITROSTAT) SL tablet 0.4 mg 0.4 mg, SL, Q5 Min PRN Ordered   ondansetron (ZOFRAN) injection 4 mg (Or Linked Group #1) 4 mg, IV, Q6H PRN Ordered   ondansetron (ZOFRAN) tablet 4 mg (Or Linked Group #1) 4 mg, PO, Q6H PRN Ordered   Pharmacy to dose warfarin No Dose/Rate, XX, Continuous PRN Ordered   sodium chloride 0.9 % flush 10 mL 10 mL, IV, PRN Ordered       Social History:   Social History     Tobacco Use    Smoking status: Never    Smokeless tobacco: Never   Substance Use Topics    Alcohol use: No      Family History:  Family History   Problem Relation Age of Onset    Hypertension Father     Heart attack Father     Heart attack Brother     Alcohol abuse Brother     Hypertension Brother     Hypertension Paternal Grandmother     Hypertension Paternal Grandfather     Anemia Mother     Arthritis Mother     Hypertension Mother     Hypertension Maternal Grandmother     Heart disease Other         FH in males before age 55    Malig Hyperthermia Neg Hx         Review of Systems    Review of  "Systems  Constitutional: No wt loss, fever   Gastrointestinal: No nausea , abdominal pain  Behavioral/Psych: No insomnia or anxiety   Cardiovascular ----positive for cp. All other systems reviewed and are negative      Constitutional:  Temp:  [97.4 °F (36.3 °C)-97.8 °F (36.6 °C)] 97.4 °F (36.3 °C)  Heart Rate:  [55-65] 64  Resp:  [14-18] 18  BP: (108-199)/() 129/80    Physical Exam             Physical Exam  /80   Pulse 70   Temp 97.4 °F (36.3 °C) (Oral)   Resp 18   Ht 183 cm (72.05\")   Wt 108 kg (238 lb 1.6 oz)   SpO2 93%   BMI 32.25 kg/m²     General appearance: No acute changes   Eyes: Sclerae conjunctivae normal, pupils reactive   HENT: Atraumatic; oropharynx clear with moist mucous membranes and no mucosal ulcerations;  Neck: Trachea midline; NECK, supple, no thyromegaly or lymphadenopathy   Lungs: Normal size and shape, normal breath sounds, equal distribution of air, no rales and rhonchi   CV: S1-S2 regular, no murmurs, no rub, no gallop   Abdomen: Soft, nontender; no masses , no abnormal abdominal sounds   Extremities: No deformity , normal color , no peripheral edema   Skin: Normal temperature, turgor and texture; no rash, ulcers  Psych: Appropriate affect, alert and oriented to person, place and time           Cardiographics  ECG:   Sinus rhythm with nonspecific T wave changes and incomplete RBBB              Telemetry: unavailable    Echocardiogram: pending  7/2021     Impression:      Early atherosclerotic plaque otherwise normal coronary arteries  Normal LV gram     Recommendations:      Medical management            I sincerely appreciate the opportunity to participate in your patient's care. Please feel free to contact me anytime if I can be of assistance in this or any other way.     Harlan Downing MD  7/2/2021  14:21 EDT         2021  Interpretation Summary    Saline test results are negative.  The left ventricular cavity is mildly dilated.  The following left ventricular " wall segments are hypokinetic: apical anterior, apical lateral, apical inferior, apical septal and apex hypokinetic.  Calculated left ventricular EF = 64.1% Estimated left ventricular EF was in agreement with the calculated left ventricular EF.  Left ventricular diastolic function was normal.  The right ventricular cavity is borderline dilated.  The right atrial cavity is borderline dilated.  There is no evidence of pericardial effusion. .     2021  Interpretation Summary       Findings consistent with a normal ECG stress test.  Left ventricular ejection fraction is normal. (Calculated EF = 60%).  Myocardial perfusion imaging indicates a medium-sized infarct located in the lateral wall and inferior wall with mild eddie-infarct ischemia.  Impressions are consistent with an intermediate risk study.     Asymptomatic for chest pain. ECG is negative for ischemia.   Ectopy: rare PVC in recovery.  B/P: Appropriate for Beta-blocker therapy, Baseline 128/80, Peak (post Lexiscan) 120/74  Recovery: 114//78  Pharmacologic study due to Beta-blocker therapy. Able to participate in low level exercise and tolerance is good      Supervised by:  Angeles CALLE.        Imaging  Chest X-ray:   IMPRESSION:  Minimal likely atelectasis or scarring right lateral  costophrenic angle. Ectatic appearing aorta. Follow-up as clinically  indicated.     This report was finalized on 11/21/2022 11:05 AM by Dr. Dinesh Quezada M.D.     CT  IMPRESSION:  1.  No findings of definite aortic dissection. However, please note that  evaluation of the thoracic aorta is suboptimal due to motion artifact.  2.  Findings most suggestive of cholecystitis.  3.  Patchy pulmonary opacification within the bilateral upper lungs is  present and while evaluation is difficult due to respiratory motion,  findings are concerning for pneumonia and correlation with patient  history is recommended.  4.  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  5.  Other findings as above.     This report was finalized on 11/21/2022 2:48 PM by Dr. Regulo Laura M.D.       Lab Review   Results from last 7 days   Lab Units 11/21/22  1340 11/21/22  1130   TROPONIN T ng/mL <0.010 <0.010         Results from last 7 days   Lab Units 11/22/22  0438   SODIUM mmol/L 140   POTASSIUM mmol/L 3.6   BUN mg/dL 17   CREATININE mg/dL 0.80   CALCIUM mg/dL 9.1       Results from last 7 days   Lab Units 11/22/22  0438 11/21/22  1130   WBC 10*3/mm3 6.08 7.43   HEMOGLOBIN g/dL 12.4* 14.2   HEMATOCRIT % 37.1* 42.4   PLATELETS 10*3/mm3 138* 158     Results from last 7 days   Lab Units 11/22/22  0438 11/21/22  2108 11/21/22  1130   INR  3.00* 2.83* 2.62*     CHADS-VASc Risk Assessment              3 Total Score    1 Hypertension    2 Age >/= 75        Criteria that do not apply:    CHF    DM    PRIOR STROKE/TIA/THROMBO    Vascular Disease    Age 65-74    Sex: Female            The following medical decision was discussed in detail with Dr. Downing      Assessment:   Chest pain with known CAD  Hypertension hyperlipidemia   AAA  pulmonary nodule  Chest pain  Paroxysmal atrial fibrillation on amiodarone and warfarin    Recommendations / Plan:   This is a 82-year-old male, who is well-known to our service.  He presented to [facility] after having chest pressure/pain.  At the time of the discomfort he took an baby aspirin.  Parents negative x2, EKG shows sinus rhythm with nonspecific T wave changes.  Previous ischemic work-up as above.  At this time we will do stress test and echo.  Further recommendations once these have been completed.      It was explained to the patient that stress testing carries 85% specificity/sensitivity, and does not rule out future cardiac event.  Risks of the procedure were explained to the patient including shortness of breath, induction of myocardial infarction, and dizziness.   Patient is agreeable to proceeding with stress testing.       Labs/tests ordered for am: stress test, echo, inr in am     Harlan Downing MD    11/22/2022, 14:20 EST      EMR Dragon/Transcription:   Dictated utilizing Dragon dictation

## 2022-11-22 NOTE — PROGRESS NOTES
[facility] Clinical Pharmacy Services: Warfarin Dosing/Monitoring Consult    David Comer Sr. is a 82 y.o. male, estimated creatinine clearance is 92 mL/min (by C-G formula based on SCr of 0.8 mg/dL). weighing 109 kg (240 lb).    Results from last 7 days   Lab Units 11/22/22  0438 11/21/22  2108 11/21/22  1130 11/17/22  0000   INR  3.00* 2.83* 2.62* 2.70   HEMOGLOBIN g/dL 12.4*  --  14.2  --    HEMATOCRIT % 37.1*  --  42.4  --    PLATELETS 10*3/mm3 138*  --  158  --      Prior to admission anticoagulation:   Warfarin 7.5 mg daily     Hospital Anticoagulation:  Consulting provider: Dr. Henriquez  Start date: 11/21  Indication: A Fib - requiring full anticoagulation  Target INR: 2 - 3  Expected duration: TBD  Lovenox dose has been d/c     Potential food or drug interactions:   Amiodarone -  may enhance the anticoagulant effect of Vitamin K Antagonists         Education complete?/Date: No; plan for follow up TBD    Assessment/Plan:  INR is 3 , will hold dose for today     Discontinuation of Enoxaparin will deferred to day shift clinicals  Monitor for any signs or symptoms of bleeding  Follow up daily INRs and dose adjustments    Pharmacy will continue to follow until discharge or discontinuation of warfarin.      Delicia Bray, [facility]    Clinical Pharmacist

## 2022-11-23 ENCOUNTER — READMISSION MANAGEMENT (OUTPATIENT)
Dept: CALL CENTER | Facility: HOSPITAL | Age: 82
End: 2022-11-23

## 2022-11-23 ENCOUNTER — APPOINTMENT (OUTPATIENT)
Dept: NUCLEAR MEDICINE | Facility: HOSPITAL | Age: 82
End: 2022-11-23

## 2022-11-23 VITALS
HEART RATE: 77 BPM | RESPIRATION RATE: 18 BRPM | TEMPERATURE: 97.8 F | DIASTOLIC BLOOD PRESSURE: 79 MMHG | OXYGEN SATURATION: 94 % | SYSTOLIC BLOOD PRESSURE: 122 MMHG | BODY MASS INDEX: 32.82 KG/M2 | WEIGHT: 242.3 LBS | HEIGHT: 72 IN

## 2022-11-23 LAB
ANION GAP SERPL CALCULATED.3IONS-SCNC: 8.8 MMOL/L (ref 5–15)
BASOPHILS # BLD AUTO: 0.03 10*3/MM3 (ref 0–0.2)
BASOPHILS NFR BLD AUTO: 0.5 % (ref 0–1.5)
BH CV REST NUCLEAR ISOTOPE DOSE: 10.6 MCI
BH CV STRESS COMMENTS STAGE 1: NORMAL
BH CV STRESS DOSE REGADENOSON STAGE 1: 0.4
BH CV STRESS DURATION MIN STAGE 1: 0
BH CV STRESS DURATION SEC STAGE 1: 10
BH CV STRESS NUCLEAR ISOTOPE DOSE: 30.5 MCI
BH CV STRESS PROTOCOL 1: NORMAL
BH CV STRESS RECOVERY BP: NORMAL MMHG
BH CV STRESS RECOVERY HR: 69 BPM
BH CV STRESS STAGE 1: 1
BUN SERPL-MCNC: 16 MG/DL (ref 8–23)
BUN/CREAT SERPL: 19.5 (ref 7–25)
CALCIUM SPEC-SCNC: 8.7 MG/DL (ref 8.6–10.5)
CHLORIDE SERPL-SCNC: 103 MMOL/L (ref 98–107)
CO2 SERPL-SCNC: 29.2 MMOL/L (ref 22–29)
CREAT SERPL-MCNC: 0.82 MG/DL (ref 0.76–1.27)
DEPRECATED RDW RBC AUTO: 42.8 FL (ref 37–54)
EGFRCR SERPLBLD CKD-EPI 2021: 88.3 ML/MIN/1.73
EOSINOPHIL # BLD AUTO: 0.25 10*3/MM3 (ref 0–0.4)
EOSINOPHIL NFR BLD AUTO: 4.1 % (ref 0.3–6.2)
ERYTHROCYTE [DISTWIDTH] IN BLOOD BY AUTOMATED COUNT: 12.3 % (ref 12.3–15.4)
GLUCOSE SERPL-MCNC: 100 MG/DL (ref 65–99)
HCT VFR BLD AUTO: 37.2 % (ref 37.5–51)
HGB BLD-MCNC: 12.5 G/DL (ref 13–17.7)
IMM GRANULOCYTES # BLD AUTO: 0.01 10*3/MM3 (ref 0–0.05)
IMM GRANULOCYTES NFR BLD AUTO: 0.2 % (ref 0–0.5)
INR PPP: 2.75 (ref 0.9–1.1)
LV EF NUC BP: 60 %
LYMPHOCYTES # BLD AUTO: 1.14 10*3/MM3 (ref 0.7–3.1)
LYMPHOCYTES NFR BLD AUTO: 18.8 % (ref 19.6–45.3)
MAGNESIUM SERPL-MCNC: 1.7 MG/DL (ref 1.6–2.4)
MAXIMAL PREDICTED HEART RATE: 139 BPM
MCH RBC QN AUTO: 32.1 PG (ref 26.6–33)
MCHC RBC AUTO-ENTMCNC: 33.6 G/DL (ref 31.5–35.7)
MCV RBC AUTO: 95.6 FL (ref 79–97)
MONOCYTES # BLD AUTO: 0.57 10*3/MM3 (ref 0.1–0.9)
MONOCYTES NFR BLD AUTO: 9.4 % (ref 5–12)
NEUTROPHILS NFR BLD AUTO: 4.06 10*3/MM3 (ref 1.7–7)
NEUTROPHILS NFR BLD AUTO: 67 % (ref 42.7–76)
NRBC BLD AUTO-RTO: 0 /100 WBC (ref 0–0.2)
PERCENT MAX PREDICTED HR: 60.43 %
PLATELET # BLD AUTO: 136 10*3/MM3 (ref 140–450)
PMV BLD AUTO: 11.8 FL (ref 6–12)
POTASSIUM SERPL-SCNC: 3.7 MMOL/L (ref 3.5–5.2)
PROTHROMBIN TIME: 29.5 SECONDS (ref 11.7–14.2)
RBC # BLD AUTO: 3.89 10*6/MM3 (ref 4.14–5.8)
SODIUM SERPL-SCNC: 141 MMOL/L (ref 136–145)
STRESS BASELINE BP: NORMAL MMHG
STRESS BASELINE HR: 63 BPM
STRESS PERCENT HR: 71 %
STRESS POST PEAK BP: NORMAL MMHG
STRESS POST PEAK HR: 84 BPM
STRESS TARGET HR: 118 BPM
WBC NRBC COR # BLD: 6.06 10*3/MM3 (ref 3.4–10.8)

## 2022-11-23 PROCEDURE — G0378 HOSPITAL OBSERVATION PER HR: HCPCS

## 2022-11-23 PROCEDURE — 85610 PROTHROMBIN TIME: CPT | Performed by: INTERNAL MEDICINE

## 2022-11-23 PROCEDURE — 78452 HT MUSCLE IMAGE SPECT MULT: CPT

## 2022-11-23 PROCEDURE — 85025 COMPLETE CBC W/AUTO DIFF WBC: CPT | Performed by: NURSE PRACTITIONER

## 2022-11-23 PROCEDURE — 93017 CV STRESS TEST TRACING ONLY: CPT

## 2022-11-23 PROCEDURE — 0 TECHNETIUM SESTAMIBI: Performed by: STUDENT IN AN ORGANIZED HEALTH CARE EDUCATION/TRAINING PROGRAM

## 2022-11-23 PROCEDURE — A9500 TC99M SESTAMIBI: HCPCS | Performed by: STUDENT IN AN ORGANIZED HEALTH CARE EDUCATION/TRAINING PROGRAM

## 2022-11-23 PROCEDURE — 80048 BASIC METABOLIC PNL TOTAL CA: CPT | Performed by: NURSE PRACTITIONER

## 2022-11-23 PROCEDURE — 78452 HT MUSCLE IMAGE SPECT MULT: CPT | Performed by: INTERNAL MEDICINE

## 2022-11-23 PROCEDURE — 83735 ASSAY OF MAGNESIUM: CPT | Performed by: NURSE PRACTITIONER

## 2022-11-23 PROCEDURE — 99214 OFFICE O/P EST MOD 30 MIN: CPT | Performed by: INTERNAL MEDICINE

## 2022-11-23 PROCEDURE — 93018 CV STRESS TEST I&R ONLY: CPT | Performed by: INTERNAL MEDICINE

## 2022-11-23 PROCEDURE — 25010000002 REGADENOSON 0.4 MG/5ML SOLUTION: Performed by: STUDENT IN AN ORGANIZED HEALTH CARE EDUCATION/TRAINING PROGRAM

## 2022-11-23 RX ORDER — WARFARIN SODIUM 7.5 MG/1
7.5 TABLET ORAL
Status: DISCONTINUED | OUTPATIENT
Start: 2022-11-23 | End: 2022-11-23 | Stop reason: HOSPADM

## 2022-11-23 RX ADMIN — PANTOPRAZOLE SODIUM 40 MG: 40 TABLET, DELAYED RELEASE ORAL at 06:37

## 2022-11-23 RX ADMIN — REGADENOSON 0.4 MG: 0.08 INJECTION, SOLUTION INTRAVENOUS at 08:20

## 2022-11-23 RX ADMIN — ISOSORBIDE MONONITRATE 30 MG: 30 TABLET, EXTENDED RELEASE ORAL at 12:26

## 2022-11-23 RX ADMIN — AMIODARONE HYDROCHLORIDE 100 MG: 200 TABLET ORAL at 12:26

## 2022-11-23 RX ADMIN — TECHNETIUM TC 99M SESTAMIBI 1 DOSE: 1 INJECTION INTRAVENOUS at 08:20

## 2022-11-23 RX ADMIN — TECHNETIUM TC 99M SESTAMIBI 1 DOSE: 1 INJECTION INTRAVENOUS at 06:26

## 2022-11-23 RX ADMIN — LOSARTAN POTASSIUM: 50 TABLET, FILM COATED ORAL at 12:28

## 2022-11-23 RX ADMIN — LEVOTHYROXINE SODIUM 112 MCG: 0.11 TABLET ORAL at 06:37

## 2022-11-23 RX ADMIN — NEBIVOLOL 5 MG: 5 TABLET ORAL at 12:26

## 2022-11-23 NOTE — OUTREACH NOTE
Prep Survey    Flowsheet Row Responses   [facility] patient discharged from? [place]   Is LACE score < 7 ? Yes   Emergency Room discharge w/ pulse ox? No   Eligibility [facility]   Date of Admission 11/21/22   Date of Discharge 11/23/22   Discharge Disposition Home or Self Care   Discharge diagnosis Chest pain   Does the patient have one of the following disease processes/diagnoses(primary or secondary)? Other   Does the patient have Home health ordered? No   Is there a DME ordered? No   Prep survey completed? Yes          IVIS SAMUELS - Registered Nurse

## 2022-11-23 NOTE — CASE MANAGEMENT/SOCIAL WORK
Case Management Discharge Note      Final Note: Home    Provided Post Acute Provider List?: N/A  N/A Provider List Comment: Denies HH/SNF needs.  Provided Post Acute Provider Quality & Resource List?: N/A  N/A Quality & Resource List Comment: Denies HH/SNF needs.    Selected Continued Care - Discharged on 11/23/2022 Admission date: 11/21/2022 - Discharge disposition: Home or Self Care    Destination    No services have been selected for the patient.              Durable Medical Equipment    No services have been selected for the patient.              Dialysis/Infusion    No services have been selected for the patient.              Home Medical Care    No services have been selected for the patient.              Therapy    No services have been selected for the patient.              Community Resources    No services have been selected for the patient.              Community & DME    No services have been selected for the patient.                  Transportation Services  Private: Car    Final Discharge Disposition Code: 01 - home or self-care   HOSPITALIST  PROGRESS NOTE:    Patient: Pilar Lawrence Attending: Ben Hernández MD   : 1943 Date: 3/23/2020 9:04 AM   AGE: 76 year old Current Hospital Day: Hospital Day: 2   SEX:  female      Chief Complaint:    Active Hospital Problems    Diagnosis   • Microcytic anemia   • Acute hypoxemic respiratory failure (CMS/HCC)   • COPD with acute exacerbation (CMS/HCC)   • Diabetes mellitus type 1 (CMS/HCC)   • Tobacco use disorder   • CKD (chronic kidney disease) stage 3, GFR 30-59 ml/min (CMS/HCC)   • Severe sepsis (CMS/HCC)   • Malignant neoplasm of rectum (CMS/HCC)        Subjective:   Patient now intubated, and sedated, please refer to previous notes for the discussion had with and prior to intubation     Objective:       Intake/Output:      I&O Last shift: No intake/output data recorded.    I&O Last 24 hours:     Intake/Output Summary (Last 24 hours) at 3/23/2020 0904  Last data filed at 3/23/2020 0600  Gross per 24 hour   Intake 2231.58 ml   Output 1600 ml   Net 631.58 ml       Last Stool Occurrence:      Vitals 24 Hour Range Last Value   Temperature Temp  Min: 96.6 °F (35.9 °C)  Max: 99.1 °F (37.3 °C) 98.4 °F (36.9 °C) (20 0715)   Pulse Pulse  Min: 61  Max: 130 72 (20 0715)   Respiratory Resp  Min: 0  Max: 94 (!) 22 (20 0715)   Non-Invasive Blood Pressure BP  Min: 83/49  Max: 151/73 120/61 (20 0715)   Pulse Oximetry SpO2  Min: 88 %  Max: 100 % 100 % (20 0855)       Vital Most Recent Value First Value   Weight 68.1 kg Weight: 70.7 kg   Height 5' 5\" (165.1 cm) Height: 5' 5\" (165.1 cm)     Physical Exam:  Physical Exam   Constitutional: No distress.   HENT:   Head: Normocephalic.   Eyes: Pupils are equal, round, and reactive to light.   Neck: No tracheal deviation present.   Cardiovascular: Normal rate and regular rhythm.   Pulmonary/Chest: No respiratory distress. She has no wheezes.   Transmitted breath sounds, coarse crackles   Abdominal: She exhibits no distension. There  is no abdominal tenderness.   Musculoskeletal:         General: No edema.   Neurological:   Intubated and sedated   Skin: She is not diaphoretic.        Laboratory Results:  Recent Labs   Lab 03/23/20  0542 03/22/20 2211 03/22/20  0353   SODIUM 142  --  139   POTASSIUM 4.1 4.0 3.8   CHLORIDE 109*  --  105   CO2 19*  --  20*   BUN 26*  --  20   CREATININE 1.07*  --  0.89   GLUCOSE 166*  --  346*   ALBUMIN  --   --  2.9*   AST  --   --  23   BILIRUBIN  --   --  0.2     Recent Labs   Lab 03/23/20  0542 03/23/20  0354 03/22/20 2211 03/22/20  1602  03/22/20  0353   WBC 15.9*  --   --  14.7*  --  9.1   HGB 8.0* 8.2* 6.6* 7.0*   < > 5.6*   HCT 26.0*  --   --  23.6*  --  19.6*     --   --  319  --  408    < > = values in this interval not displayed.       Cultures:  No results found for this or any previous visit.  Results for orders placed or performed during the hospital encounter of 07/24/13   Urine Culture   Result Value Ref Range    Specimen Description URINE, CATHETERIZED, STRAIGHT     CULTURE NO GROWTH 2 DAYS.     REPORT STATUS 07/26/2013 FINAL      No results found for this or any previous visit.        Assessment:   Active Hospital Problems    Diagnosis   • Microcytic anemia   • Acute hypoxemic respiratory failure (CMS/[facility])   • COPD with acute exacerbation (CMS/[facility])   • Diabetes mellitus type 1 (CMS/[facility])   • Tobacco use disorder   • CKD (chronic kidney disease) stage 3, GFR 30-59 ml/min (CMS/HCC)   • Severe sepsis (CMS/[facility])   • Malignant neoplasm of rectum (CMS/[facility])       Plan:  1. Septic shock, likely secondary to a pulmonary source, continue with antibiotics as ordered for now.  Patient requires a low-dose of Levophed will try to wean her off a giving her standing doses of albumin, prefer colloids over crystalloid           2. Diagnosis history of type 1 diabetes, monitor blood sugars, will discontinue the insulin drip, will do sliding scale for now, would consider adding long-acting insulin based upon  the blood sugars because of her history.  Monitor labs for any signs of acidemia    3. Acute hypoxemic respiratory failure, it seems the setting of congestive heart failure vs noncardiogenic pulmonary edema/ARDS; echocardiogram is pending, pulmonary vascular congestion, started on diuretics blood pressure is now stable on low-dose of Levophed therefore would consider diuresing little more aggressively today.  Her oxygen requirements however a stable and close to 40%   She is not much negative because of the PRBCs transfused yesterday, therefore give her an extra dose of 20 mg IV Lasix now.    4. Acute kidney injury in the setting of septic shock, likely early ischemic acute tubular injury    5. Troponin elevation, MI type 2 most likely, echo is pending, will speak with Cardiology, cannot use antiplatelets or heparin because of anemia    6. History of COPD, continue bronchodilators as per ventilation protocol, steroids held since constanza corona virus pending    7. Acute blood loss Anemia, possible GI source , also history of vaginal bleed, which per the patient's conversation I had yesterday with her it seems has not been a huge issue recently, but she has noticed black colored stools off and on.  I spoke with GI, will evaluate once the patient is more stable transfuse per indications.  She required 1 more unit of PRBCs in the evening, check CBC today evening as well         Diet: Npo Diet Without Exceptions      Best practice:      Code Status:     Selective Treatment/DNR    Disposition:      Ben Hernández MD  3/23/2020  9:04 AM

## 2022-11-23 NOTE — PROGRESS NOTES
[facility]  Cardiology Progress Note    Patient Identification:  Name: David Comer Sr.  Age: 82 y.o.  Sex: male  :  1940  MRN: [id number]                 Follow Up / Chief Complaint: Chest pain    Interval History:  Feels much better no more chest pain     Subjective:  No chest pain      Objective:    Past Medical History:  Past Medical History:   Diagnosis Date   • AAA (abdominal aortic aneurysm) without rupture    • Aneurysm of thoracic aorta     CT CHEST 2021 IN [facility]    • Arthritis    • Basal cell carcinoma     RIGHT LOWER LID    • BPH (benign prostatic hyperplasia)    • Chronic lumbar pain    • Degenerative arthritis of lumbar spine    • Degenerative cervical disc    • Disease of thyroid gland    • Elevated rheumatoid factor    • History of atrial fibrillation    • Hyperlipidemia    • Hypertension    • Hypogonadism in male    • Muscle strain of left upper back     PRESCRIBED PREDNISONE DOSE PACK    • On anticoagulant therapy    • Pulmonary nodule     6 MM - REPEAT CT SCAN IN ONE YEAR, WATCHING    • Refused influenza vaccine    • Scoliosis    • Vitamin D deficiency      Past Surgical History:  Past Surgical History:   Procedure Laterality Date   • ANKLE FUSION Right    • CARDIAC CATHETERIZATION     • CARDIAC CATHETERIZATION N/A 2021    Procedure: Left Heart Cath;  Surgeon: Harlan Downing MD;  Location:  [facility];  Service: Cardiology;  Laterality: N/A;   • CARDIAC CATHETERIZATION N/A 2021    Procedure: Coronary angiography;  Surgeon: Harlan Downing MD;  Location:  [facility];  Service: Cardiology;  Laterality: N/A;   • CARDIAC CATHETERIZATION N/A 2021    Procedure: Left ventriculography;  Surgeon: Harlan Downing MD;  Location:  [facility];  Service: Cardiology;  Laterality: N/A;   • HOBBS TUBE INSERTION Right 10/19/2021    Procedure: RIGHT SECOND STAGE CAST TAKEDOWN RECONSTRUCTION;   Surgeon: Brian Bhatt MD;  Location: [facility];  Service: Ophthalmology;  Laterality: Right;   • ENTROPIAN REPAIR Right 9/21/2021    Procedure: RIGHT LOWER LID EXCISION OF BASAL CELL CARCINOMA WITH FROZEN SECTION RIGHT LOWER LID RECONSTRUCTION WITH CAST FLAP, REPAIR OF CANULICULIS, AND FULL THICKNESS SKIN GRAFT;  Surgeon: Brian Bhatt MD;  Location: [facility];  Service: Ophthalmology;  Laterality: Right;   • INGUINAL HERNIA REPAIR Right    • REPLACEMENT TOTAL KNEE Bilateral 2000   • ROTATOR CUFF REPAIR Left    • ROTATOR CUFF REPAIR Right    • SKIN BIOPSY     • VASECTOMY          Social History:   Social History     Tobacco Use   • Smoking status: Never   • Smokeless tobacco: Never   Substance Use Topics   • Alcohol use: No      Family History:  Family History   Problem Relation Age of Onset   • Hypertension Father    • Heart attack Father    • Heart attack Brother    • Alcohol abuse Brother    • Hypertension Brother    • Hypertension Paternal Grandmother    • Hypertension Paternal Grandfather    • Anemia Mother    • Arthritis Mother    • Hypertension Mother    • Hypertension Maternal Grandmother    • Heart disease Other         FH in males before age 55   • Malig Hyperthermia Neg Hx           Allergies:  No Known Allergies  Scheduled Meds:  amiodarone, 100 mg, Daily  aspirin, 325 mg, Once  hydrALAZINE, 25 mg, TID  isosorbide mononitrate, 30 mg, Daily  levothyroxine, 112 mcg, Q AM  losartan-HCTZ 100-25 combo dose, , Daily  nebivolol, 5 mg, Daily  pantoprazole, 40 mg, QAM  rosuvastatin, 5 mg, Nightly  terazosin, 2 mg, Nightly            INTAKE AND OUTPUT:  No intake or output data in the 24 hours ending 11/23/22 1331    Review of Systems:   GI:  Cardiac: no cp  Pulmonary:    Constitutional:  Temp:  [97.6 °F (36.4 °C)-97.8 °F (36.6 °C)] 97.6 °F (36.4 °C)  Heart Rate:  [55-71] 55  Resp:  [18-22] 18  BP: (106-129)/(67-91) 123/72    Physical Exam:  General:  Appears in no acute  "distress  Eyes: PERTL,  HEENT:  No JVD. Thyroid not visibly enlarged. No mucosal pallor or cyanosis  Respiratory: Respirations regular and unlabored at rest. BBS with good air entry in all fields. No crackles, rubs or wheezes auscultated  Cardiovascular: S1S2 Regular rate and rhythm. No murmur, rub or gallop. No carotid bruits. DP/PT pulses     . No pretibial pitting edema  Gastrointestinal: Abdomen soft, flat, non tender. Bowel sounds present. No hepatosplenomegaly. No ascites  Musculoskeletal: TORRES x4. No abnormal movements  Extremities: No digital clubbing or cyanosis  Skin: Color pink. Skin warm and dry to touch. No rashes    Neuro: AAO x3 CN II-XII grossly intact  Psych: Mood and affect normal, pleasant and cooperative          Cardiographics  Telemetry:     ECG:     Echocardiogram:     Lab Review   Results from last 7 days   Lab Units 11/21/22  1340 11/21/22  1130   TROPONIN T ng/mL <0.010 <0.010     Results from last 7 days   Lab Units 11/23/22  0416   MAGNESIUM mg/dL 1.7     Results from last 7 days   Lab Units 11/23/22  0416   SODIUM mmol/L 141   POTASSIUM mmol/L 3.7   BUN mg/dL 16   CREATININE mg/dL 0.82   CALCIUM mg/dL 8.7     @[id number]@  Results from last 7 days   Lab Units 11/23/22  0416 11/22/22  0438 11/21/22  1130   WBC 10*3/mm3 6.06 6.08 7.43   HEMOGLOBIN g/dL 12.5* 12.4* 14.2   HEMATOCRIT % 37.2* 37.1* 42.4   PLATELETS 10*3/mm3 136* 138* 158     Results from last 7 days   Lab Units 11/23/22  0416 11/22/22  0438 11/21/22  2108   INR  2.75* 3.00* 2.83*         Assessment:  Chest pain      Plan:    The stress test possible small inferior wall ischemia, patient can be discharged follow-up as an outpatient if continues to have chest pain will consider diagnostic heart catheter      )11/23/2022  MD CHANTAL [facility] [employer]/Transcription:   \"Dictated utilizing Dragon dictation\".     "

## 2022-11-23 NOTE — PLAN OF CARE
Goal Outcome Evaluation:              Outcome Evaluation: Patient a/o x4, calm and coopertive with care, RA, cont b/b, up ad sanjiv. Pt is having stress test Wednesday morning, pt NPO since midnight. All meds given as ordered. No new issues noted. See v/s and labs.

## 2022-11-23 NOTE — PROGRESS NOTES
[facility] Clinical Pharmacy Services: Warfarin Dosing/Monitoring Consult    David Comer Sr. is a 82 y.o. male, estimated creatinine clearance is 92 mL/min (by C-G formula based on SCr of 0.8 mg/dL). weighing 109 kg (240 lb).    Results from last 7 days   Lab Units 11/23/22  0416 11/22/22  0438 11/21/22  2108 11/21/22  1130 11/17/22  0000   INR  2.75* 3.00* 2.83* 2.62* 2.70   HEMOGLOBIN g/dL 12.5* 12.4*  --  14.2  --    HEMATOCRIT % 37.2* 37.1*  --  42.4  --    PLATELETS 10*3/mm3 136* 138*  --  158  --      Prior to admission anticoagulation:   Warfarin 7.5 mg daily     Hospital Anticoagulation:  Consulting provider: Dr. Henriquez  Start date: 11/21  Indication: A Fib - requiring full anticoagulation  Target INR: 2 - 3  Expected duration: TBD  Lovenox dose has been d/c     Potential food or drug interactions:   Amiodarone -  may enhance the anticoagulant effect of Vitamin K Antagonists (home med)    Education complete?/Date: No; plan for follow up TBD    Assessment/Plan:  INR back within therapeutic range today, will restart home warfarin and continue to monitor.   Monitor for any signs or symptoms of bleeding  Follow up daily INRs and dose adjustments    Pharmacy will continue to follow until discharge or discontinuation of warfarin.     Cristina BustamanteD, BCPS

## 2022-11-24 NOTE — DISCHARGE SUMMARY
Patient Name: David Comer Sr.  : 1940  MRN: [id number]    Date of Admission: 2022  Date of Discharge:  2022  Primary Care Physician: Chacha Pineda MD      Chief Complaint:   Chest Pain      Discharge Diagnoses     Active Hospital Problems    Diagnosis  POA   • **Chest pain, unspecified type [R07.9]  Yes   • Long term (current) use of anticoagulants [Z79.01]  Not Applicable   • Chronic coronary artery disease [I25.10]  Yes   • Paroxysmal atrial fibrillation (HCC) [I48.0]  Yes   • Essential hypertension [I10]  Yes   • Mixed hyperlipidemia [E78.2]  Yes      Resolved Hospital Problems   No resolved problems to display.        Hospital Course     Mr. Comer is a 82 y.o. male with a history of CAD, proximal atrial fibrillation, hypertension who presented to [facility] initially complaining of chest pain.  Please see the admitting history and physical for further details.  He was admitted to the hospital for further evaluation and treatment.  Cardiology consulted on admission for additional management recommendations.  He underwent echocardiography, results are below, and stress test.  Per cardiology, the stress test showed possible small inferior wall ischemia, he will follow-up as an outpatient in 2 weeks and if chest pain continues cardiology will consider performing heart catheterization for further work-up.  The patient has also been instructed to follow-up with his primary care physician in 1 week for posthospital follow-up visit.    Results for orders placed during the hospital encounter of 22    Adult Transthoracic Echo Complete W/ Cont if Necessary Per Protocol    Interpretation Summary  •  Left ventricular ejection fraction appears to be 61 - 65%.  •  Left ventricular diastolic function was normal.  •  The right atrial cavity is borderline dilated.    Day of Discharge     Subjective:  Reports she is bored, looking forward to going home today.  Stress test was this  morning.    Review of Systems   Constitutional: Negative for fatigue and fever.   Respiratory: Negative for cough and shortness of breath.    Cardiovascular: Negative for chest pain and palpitations.   Gastrointestinal: Negative for abdominal pain, nausea and vomiting.   Psychiatric/Behavioral: Negative for confusion.       Physical Exam:  Temp:  [97.6 °F (36.4 °C)-97.8 °F (36.6 °C)] 97.8 °F (36.6 °C)  Heart Rate:  [55-77] 77  Resp:  [18-22] 18  BP: (112-123)/(72-79) 122/79  Body mass index is 32.82 kg/m².  Physical Exam  Constitutional:       General: He is not in acute distress.     Appearance: Normal appearance. He is not toxic-appearing.   Cardiovascular:      Rate and Rhythm: Normal rate and regular rhythm.      Heart sounds: No murmur heard.  Pulmonary:      Effort: Pulmonary effort is normal. No respiratory distress.      Breath sounds: Normal breath sounds. No wheezing.   Abdominal:      General: Abdomen is flat. Bowel sounds are normal. There is no distension.      Palpations: Abdomen is soft.      Tenderness: There is no abdominal tenderness.   Musculoskeletal:         General: No tenderness.      Right lower leg: No edema.      Left lower leg: No edema.   Skin:     General: Skin is warm and dry.   Neurological:      General: No focal deficit present.      Mental Status: He is alert and oriented to person, place, and time. Mental status is at baseline.      Motor: No weakness.         Consultants     Consult Orders (all) (From admission, onward)     Start     Ordered    11/21/22 2046  Inpatient Cardiology Consult  Once        Specialty:  Cardiology  Provider:  Harlan Downing MD    11/21/22 2045 11/21/22 1428  [facility] (on-call MD unless specified) Details  Once,   Status:  Canceled        Specialty:  Hospitalist  Provider:  (Not yet assigned)    11/21/22 1427              Procedures     Imaging Results (All)     Procedure Component Value Units Date/Time    US Gallbladder [[id number]] Collected:  11/21/22 1710     Updated: 11/21/22 2042    Narrative:      RIGHT UPPER QUADRANT ULTRASOUND     HISTORY: 82-year-old male with pain. Evaluate for cholecystitis.     TECHNIQUE: Real-time ultrasound of the right upper quadrant was  performed. Confirmatory images were obtained.     FINDINGS: The gallbladder is partially contracted and has a slightly  thickened wall. There is a trace of pericholecystic fluid. There is no  intrahepatic or extrahepatic biliary dilatation. No stones or sludge are  seen within the gallbladder. Liver and right kidney as visualized appear  unremarkable. Pancreatic body appears unremarkable. Pancreatic head and  tail are not seen.       Impression:      The gallbladder wall is slightly thickened, but the  gallbladder does not appear obstructed. There is no evidence for acute  cholecystitis. The tiny amount of pericholecystic fluid may be related  to liver pathology or passive hepatic congestion.     This report was finalized on 11/21/2022 8:39 PM by Dr. Marilyn Bullock M.D.       CT Angiogram Chest [[id number]] Collected: 11/21/22 1403     Updated: 11/21/22 1451    Narrative:      CT ANGIOGRAM OF THE CHEST, ABDOMEN AND PELVIS. MULTIPLE CORONAL,  SAGITTAL, AND 3-D RECONSTRUCTIONS.     HISTORY: Chest pain, aortic aneurysm     TECHNIQUE: Radiation dose reduction techniques were utilized, including  automated exposure control and exposure modulation based on body size.   CT angiogram of the chest, abdomen and pelvis was performed specifically  tailored to evaluate the aorta. Multiple coronal, sagittal, and 3-D  reconstruction images were obtained.      COMPARISON:CT chest, abdomen and pelvis dating back to 11/24/2020     FINDINGS: Evaluation of the ascending aorta is suboptimal due to motion  artifact. Additionally, please note evaluation of the abdominal aorta is  suboptimal due to contrast timing.     Rounded soft tissue density contiguous with the right sternomanubrial  articulation measuring up to  1.4 x 1.2 cm is grossly unchanged since  11/24/2020.     There is a moderate hiatal hernia, as before. No noncalcified hilar,  mediastinal or axillary adenopathy by size criteria.     No significant stenosis is present within the origins of the great  vessels arising off the aortic arch. No significant pericardial effusion  is present. Representative measurements of the thoracic aorta are as  follows and compared with CT chest 11/24/2020 unless otherwise stated:  *  Aortic root measuring 3.6 cm on coronal and 3.9 cm on sagittal, as  before.  *  Sinotubular junction measuring 3.5 cm, as before.  *  Tubular ascending aorta measuring approximately 4.5 cm, as before.  *  Distal aortic arch measuring approximately 2.3 cm, as before.  *  Descending thoracic aorta measuring 2.4 cm, as before.     No pleural effusion pneumothorax is seen. There is patchy ground glass  and pulmonary opacification within the bilateral upper lobes. Sub-6 mm  pulmonary nodule within the lingula is grossly unchanged since  11/24/2020.     Short segment fusiform dilation of the celiac artery measuring up to  approximately 1.3 cm is present, grossly unchanged since 11/24/2020.  Moderate atherosclerotic calcification is present within the abdominal  aorta and its major branches. There appears to be mild stenosis of the  origin of the right renal artery arising off the aorta secondary,  however please note that evaluation is limited due to contrast timing.     There are no findings of small bowel obstruction. The appendix is  unremarkable. The gallbladder wall has a hyperdense appearance with  pericholecystic stranding and/or fluid. Subcentimeter hepatic lesions  are too small to characterize. While this examination is not tailored  for detailed evaluation of the abdominal viscera due to contrast timing,  no gross abnormality seen within the pancreas, spleen or adrenal glands.  Subcentimeter renal lesions are too small to characterize.  Larger  hypodense lesions imaging density less than 15 Hounsfield units are  likely benign per Bosniak 2019 criteria. Left nephrolithiasis measures  approximately 4 to 5 mm. There is no hydronephrosis. No abdominopelvic  adenopathy by size criteria. There is colonic diverticulosis.  Diverticulum arises off the posterior superior aspect of the bladder.  Prostate is moderately enlarged. There is a small amount free fluid in  the pelvis. No free intraperitoneal air is seen.     No new suspicious lytic or blastic osseous lesions are present.  Levoscoliosis of the lumbar spine is present with multilevel moderate to  severe degenerative changes. Findings can be further evaluated with MRI  if clinically indicated.       Impression:      1.  No findings of definite aortic dissection. However, please note that  evaluation of the thoracic aorta is suboptimal due to motion artifact.  2.  Findings most suggestive of cholecystitis.  3.  Patchy pulmonary opacification within the bilateral upper lungs is  present and while evaluation is difficult due to respiratory motion,  findings are concerning for pneumonia and correlation with patient  history is recommended.  4.  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  5.  Other findings as above.     This report was finalized on 11/21/2022 2:48 PM by Dr. Regulo Laura M.D.       CT Angiogram Abdomen Pelvis [[id number]] Collected: 11/21/22 1403     Updated: 11/21/22 1451    Narrative:      CT ANGIOGRAM OF THE CHEST, ABDOMEN AND PELVIS. MULTIPLE CORONAL,  SAGITTAL, AND 3-D RECONSTRUCTIONS.     HISTORY: Chest pain, aortic aneurysm     TECHNIQUE: Radiation dose reduction techniques were utilized, including  automated exposure control and exposure modulation based on body size.   CT angiogram of the chest, abdomen and pelvis was performed specifically  tailored to evaluate the aorta. Multiple coronal, sagittal, and  3-D  reconstruction images were obtained.      COMPARISON:CT chest, abdomen and pelvis dating back to 11/24/2020     FINDINGS: Evaluation of the ascending aorta is suboptimal due to motion  artifact. Additionally, please note evaluation of the abdominal aorta is  suboptimal due to contrast timing.     Rounded soft tissue density contiguous with the right sternomanubrial  articulation measuring up to 1.4 x 1.2 cm is grossly unchanged since  11/24/2020.     There is a moderate hiatal hernia, as before. No noncalcified hilar,  mediastinal or axillary adenopathy by size criteria.     No significant stenosis is present within the origins of the great  vessels arising off the aortic arch. No significant pericardial effusion  is present. Representative measurements of the thoracic aorta are as  follows and compared with CT chest 11/24/2020 unless otherwise stated:  *  Aortic root measuring 3.6 cm on coronal and 3.9 cm on sagittal, as  before.  *  Sinotubular junction measuring 3.5 cm, as before.  *  Tubular ascending aorta measuring approximately 4.5 cm, as before.  *  Distal aortic arch measuring approximately 2.3 cm, as before.  *  Descending thoracic aorta measuring 2.4 cm, as before.     No pleural effusion pneumothorax is seen. There is patchy ground glass  and pulmonary opacification within the bilateral upper lobes. Sub-6 mm  pulmonary nodule within the lingula is grossly unchanged since  11/24/2020.     Short segment fusiform dilation of the celiac artery measuring up to  approximately 1.3 cm is present, grossly unchanged since 11/24/2020.  Moderate atherosclerotic calcification is present within the abdominal  aorta and its major branches. There appears to be mild stenosis of the  origin of the right renal artery arising off the aorta secondary,  however please note that evaluation is limited due to contrast timing.     There are no findings of small bowel obstruction. The appendix is  unremarkable. The  gallbladder wall has a hyperdense appearance with  pericholecystic stranding and/or fluid. Subcentimeter hepatic lesions  are too small to characterize. While this examination is not tailored  for detailed evaluation of the abdominal viscera due to contrast timing,  no gross abnormality seen within the pancreas, spleen or adrenal glands.  Subcentimeter renal lesions are too small to characterize. Larger  hypodense lesions imaging density less than 15 Hounsfield units are  likely benign per Bosniak 2019 criteria. Left nephrolithiasis measures  approximately 4 to 5 mm. There is no hydronephrosis. No abdominopelvic  adenopathy by size criteria. There is colonic diverticulosis.  Diverticulum arises off the posterior superior aspect of the bladder.  Prostate is moderately enlarged. There is a small amount free fluid in  the pelvis. No free intraperitoneal air is seen.     No new suspicious lytic or blastic osseous lesions are present.  Levoscoliosis of the lumbar spine is present with multilevel moderate to  severe degenerative changes. Findings can be further evaluated with MRI  if clinically indicated.       Impression:      1.  No findings of definite aortic dissection. However, please note that  evaluation of the thoracic aorta is suboptimal due to motion artifact.  2.  Findings most suggestive of cholecystitis.  3.  Patchy pulmonary opacification within the bilateral upper lungs is  present and while evaluation is difficult due to respiratory motion,  findings are concerning for pneumonia and correlation with patient  history is recommended.  4.  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  5.  Other findings as above.     This report was finalized on 11/21/2022 2:48 PM by Dr. Regulo Laura M.D.       XR Chest 2 View [[id number]] Collected: 11/21/22 1102     Updated: 11/21/22 1108    Narrative:      XR CHEST 2 VW-     HISTORY: Male who is 82  years-old,  chest pain     TECHNIQUE: Frontal and lateral views of the chest     COMPARISON:  image from CT from 08/30/2022     FINDINGS: The heart size is normal. Aorta appears ectatic. Pulmonary  vasculature is unremarkable. Minimal likely atelectasis or scarring at  the right lateral costophrenic angle. No focal pulmonary consolidation,  pleural effusion, or pneumothorax. No acute osseous process.       Impression:      Minimal likely atelectasis or scarring right lateral  costophrenic angle. Ectatic appearing aorta. Follow-up as clinically  indicated.     This report was finalized on 11/21/2022 11:05 AM by Dr. Dinesh Quezada M.D.             Pertinent Labs     Results from last 7 days   Lab Units 11/23/22  0416 11/22/22  0438 11/21/22  1130   WBC 10*3/mm3 6.06 6.08 7.43   HEMOGLOBIN g/dL 12.5* 12.4* 14.2   PLATELETS 10*3/mm3 136* 138* 158     Results from last 7 days   Lab Units 11/23/22  0416 11/22/22  0438 11/21/22  1130   SODIUM mmol/L 141 140 141   POTASSIUM mmol/L 3.7 3.6 4.1   CHLORIDE mmol/L 103 104 102   CO2 mmol/L 29.2* 27.0 27.1   BUN mg/dL 16 17 16   CREATININE mg/dL 0.82 0.80 1.06   GLUCOSE mg/dL 100* 84 110*   Estimated Creatinine Clearance: 90.5 mL/min (by C-G formula based on SCr of 0.82 mg/dL).  Results from last 7 days   Lab Units 11/21/22  1130   ALBUMIN g/dL 4.50   BILIRUBIN mg/dL 0.3   ALK PHOS U/L 63   AST (SGOT) U/L 24   ALT (SGPT) U/L 31     Results from last 7 days   Lab Units 11/23/22  0416 11/22/22  0438 11/21/22  1130   CALCIUM mg/dL 8.7 9.1 10.0   ALBUMIN g/dL  --   --  4.50   MAGNESIUM mg/dL 1.7  --   --        Results from last 7 days   Lab Units 11/21/22  1340 11/21/22  1130   TROPONIN T ng/mL <0.010 <0.010           Invalid input(s): LDLCALC        Test Results Pending at Discharge       Discharge Details        Discharge Medications      Changes to Medications      Instructions Start Date   doxazosin 2 MG tablet  Commonly known as: CARDURA  What changed: when to take  this   Take 1 tablet by mouth once daily      Euthyrox 112 MCG tablet  Generic drug: levothyroxine  What changed:   · how much to take  · when to take this   Take 1 tablet by mouth once daily      rosuvastatin 5 MG tablet  Commonly known as: CRESTOR  What changed: when to take this   Take 1 tablet by mouth once daily         Continue These Medications      Instructions Start Date   acetaminophen 325 MG tablet  Commonly known as: TYLENOL   650 mg, Oral, Every 4 Hours PRN      amiodarone 200 MG tablet  Commonly known as: PACERONE   Take 1/2 (one-half) tablet by mouth once daily      esomeprazole 20 MG capsule  Commonly known as: nexIUM   20 mg, Oral, Every Morning Before Breakfast      hydrALAZINE 25 MG tablet  Commonly known as: APRESOLINE   TAKE 1 TABLET BY MOUTH THREE TIMES DAILY      isosorbide mononitrate 30 MG 24 hr tablet  Commonly known as: IMDUR   Take 1 tablet by mouth once daily      nebivolol 10 MG tablet  Commonly known as: BYSTOLIC   5 mg, Oral, Daily      olmesartan-hydrochlorothiazide 40-25 MG per tablet  Commonly known as: BENICAR HCT   TAKE ONE TABLET BY MOUTH DAILY      warfarin 7.5 MG tablet  Commonly known as: COUMADIN   TAKE 1 TABLET BY MOUTH ONCE DAILY AT NIGHT         Stop These Medications    metoprolol succinate XL 50 MG 24 hr tablet  Commonly known as: TOPROL-XL            No Known Allergies      Discharge Disposition:  Home or Self Care    Discharge Diet:  No active diet order      Discharge Activity:   Activity Instructions     Activity as Tolerated            CODE STATUS:    Code Status and Medical Interventions:   Ordered at: 11/21/22 1757     Code Status (Patient has no pulse and is not breathing):    CPR (Attempt to Resuscitate)     Medical Interventions (Patient has pulse or is breathing):    Full       Future Appointments   Date Time Provider Department Center   12/15/2022 11:30 AM [facility]   12/15/2022  2:00 PM Harlan Downing MD [facility]    5/18/2023  1:45 PM Harlan Downing MD [facility]     Additional Instructions for the Follow-ups that You Need to Schedule     Discharge Follow-up with PCP   As directed       Currently Documented PCP:    Chacha Pineda MD    PCP Phone Number:    [phone]     Follow Up Details: 1 week post hospital follow up         Discharge Follow-up with Specified Provider: cardiology 2 weeks; 2 Weeks   As directed      To: cardiology 2 weeks    Follow Up: 2 Weeks            Follow-up Information     Chacha Pineda MD .    Specialties: Family Medicine, Emergency Medicine, Urgent Care  Why: 1 week post hospital follow up  Contact information:  [address] [facility] [address]  [place] [address]  [phone]                         Additional Instructions for the Follow-ups that You Need to Schedule     Discharge Follow-up with PCP   As directed       Currently Documented PCP:    Chacha Pineda MD    PCP Phone Number:    [phone]     Follow Up Details: 1 week post hospital follow up         Discharge Follow-up with Specified Provider: cardiology 2 weeks; 2 Weeks   As directed      To: cardiology 2 weeks    Follow Up: 2 Weeks           Time Spent on Discharge:  I spent greater than 30 minutes on this discharge activity which included: face-to-face encounter with the patient, reviewing the data in the system, coordination of the care with the nursing staff as well as consultants, documentation, and entering orders.       Jia Joseph MD  [facility]  11/23/22  20:34 EST

## 2022-11-25 ENCOUNTER — TRANSITIONAL CARE MANAGEMENT TELEPHONE ENCOUNTER (OUTPATIENT)
Dept: CALL CENTER | Facility: HOSPITAL | Age: 82
End: 2022-11-25

## 2022-11-25 DIAGNOSIS — I10 ESSENTIAL HYPERTENSION: ICD-10-CM

## 2022-11-25 NOTE — OUTREACH NOTE
Call Center TCM Note    Flowsheet Row Responses   [facility] patient discharged from? [place]   Does the patient have one of the following disease processes/diagnoses(primary or secondary)? Other   TCM attempt successful? Yes  [No verbal]   Call start time [phone]   Call end time [phone]   Discharge diagnosis Chest pain   Meds reviewed with patient/caregiver? Yes   Is the patient having any side effects they believe may be caused by any medication additions or changes? No   Does the patient have all medications ordered at discharge? Yes   Is the patient taking all medications as directed (includes completed medication regime)? No   What is preventing the patient from taking all medications as directed? Other, Desires to consult PCP first   Nursing Interventions Nurse provided patient education, Advised patient to call provider   Medication comments Pt reports he will call cardio to clarify if he is to be off the Metoprolol as the Cardio Dr did not tell him to stop the med in the hospital.    Comments HOSP DC FU appt 11/29/22 @ 3pm.    Does the patient have an appointment with their PCP within 7 days of discharge? Yes   Has home health visited the patient within 72 hours of discharge? N/A   Psychosocial issues? No   Did the patient receive a copy of their discharge instructions? Yes   Nursing interventions Reviewed instructions with patient   What is the patient's perception of their health status since discharge? Improving   Is the patient/caregiver able to teach back signs and symptoms related to disease process for when to call PCP? Yes   Is the patient/caregiver able to teach back signs and symptoms related to disease process for when to call 911? Yes   Is the patient/caregiver able to teach back the hierarchy of who to call/visit for symptoms/problems? PCP, Specialist, Home health nurse, Urgent Care, ED, 911 Yes   TCM call completed? Yes   Wrap up additional comments pt reports he is doing ok and will call  cardio for appt and to ask about metoprolol   Call end time [phone]          Yara Huertas RN    11/25/2022, 09:54 EST

## 2022-11-26 RX ORDER — METOPROLOL SUCCINATE 50 MG/1
TABLET, EXTENDED RELEASE ORAL
Qty: 90 TABLET | Refills: 0 | OUTPATIENT
Start: 2022-11-26

## 2022-11-26 RX ORDER — LEVOTHYROXINE SODIUM 112 UG/1
TABLET ORAL
Qty: 90 TABLET | Refills: 0 | Status: SHIPPED | OUTPATIENT
Start: 2022-11-26 | End: 2023-03-13

## 2022-11-26 NOTE — TELEPHONE ENCOUNTER
Rx Refill Note  Requested Prescriptions     Pending Prescriptions Disp Refills   • levothyroxine (SYNTHROID, LEVOTHROID) 112 MCG tablet [Pharmacy Med Name: Levothyroxine Sodium 112 MCG Oral Tablet] 90 tablet 0     Sig: Take 1 tablet by mouth once daily   • metoprolol succinate XL (TOPROL-XL) 50 MG 24 hr tablet [Pharmacy Med Name: Metoprolol Succinate ER 50 MG Oral Tablet Extended Release 24 Hour] 90 tablet 0     Sig: Take 1 tablet by mouth once daily      Last office visit with prescribing clinician: 9/15/2022      Next office visit with prescribing clinician: 11/29/2022            Scooby Luna CMA/LMR  11/26/22, 09:02 EST

## 2022-11-29 ENCOUNTER — OFFICE VISIT (OUTPATIENT)
Dept: FAMILY MEDICINE CLINIC | Facility: CLINIC | Age: 82
End: 2022-11-29

## 2022-11-29 VITALS
TEMPERATURE: 97.7 F | DIASTOLIC BLOOD PRESSURE: 68 MMHG | BODY MASS INDEX: 32.7 KG/M2 | SYSTOLIC BLOOD PRESSURE: 110 MMHG | HEART RATE: 112 BPM | WEIGHT: 241.4 LBS | HEIGHT: 72 IN | OXYGEN SATURATION: 95 % | RESPIRATION RATE: 16 BRPM

## 2022-11-29 DIAGNOSIS — I48.0 PAROXYSMAL ATRIAL FIBRILLATION: ICD-10-CM

## 2022-11-29 DIAGNOSIS — I10 ESSENTIAL HYPERTENSION: ICD-10-CM

## 2022-11-29 DIAGNOSIS — Z09 HOSPITAL DISCHARGE FOLLOW-UP: Primary | ICD-10-CM

## 2022-11-29 DIAGNOSIS — I25.10 CHRONIC CORONARY ARTERY DISEASE: ICD-10-CM

## 2022-11-29 DIAGNOSIS — E78.2 MIXED HYPERLIPIDEMIA: ICD-10-CM

## 2022-11-29 PROCEDURE — 1111F DSCHRG MED/CURRENT MED MERGE: CPT | Performed by: FAMILY MEDICINE

## 2022-11-29 PROCEDURE — 99495 TRANSJ CARE MGMT MOD F2F 14D: CPT | Performed by: FAMILY MEDICINE

## 2022-11-29 RX ORDER — METOPROLOL SUCCINATE 50 MG/1
50 TABLET, EXTENDED RELEASE ORAL DAILY
Qty: 90 TABLET | Refills: 1 | Status: SHIPPED | OUTPATIENT
Start: 2022-11-29

## 2022-12-15 ENCOUNTER — OFFICE VISIT (OUTPATIENT)
Dept: CARDIOLOGY | Facility: CLINIC | Age: 82
End: 2022-12-15

## 2022-12-15 ENCOUNTER — ANTICOAGULATION VISIT (OUTPATIENT)
Dept: CARDIOLOGY | Facility: CLINIC | Age: 82
End: 2022-12-15

## 2022-12-15 ENCOUNTER — HOSPITAL ENCOUNTER (OUTPATIENT)
Dept: CARDIOLOGY | Facility: HOSPITAL | Age: 82
Discharge: HOME | End: 2022-12-15
Admitting: INTERNAL MEDICINE

## 2022-12-15 VITALS
DIASTOLIC BLOOD PRESSURE: 84 MMHG | BODY MASS INDEX: 33.59 KG/M2 | WEIGHT: 248 LBS | HEART RATE: 77 BPM | HEIGHT: 72 IN | SYSTOLIC BLOOD PRESSURE: 117 MMHG

## 2022-12-15 DIAGNOSIS — I71.40 ABDOMINAL AORTIC ANEURYSM (AAA) WITHOUT RUPTURE, UNSPECIFIED PART: Primary | ICD-10-CM

## 2022-12-15 DIAGNOSIS — Z79.01 LONG TERM (CURRENT) USE OF ANTICOAGULANTS: ICD-10-CM

## 2022-12-15 DIAGNOSIS — I48.0 PAROXYSMAL ATRIAL FIBRILLATION: ICD-10-CM

## 2022-12-15 DIAGNOSIS — I10 ESSENTIAL HYPERTENSION: ICD-10-CM

## 2022-12-15 DIAGNOSIS — R06.02 SOB (SHORTNESS OF BREATH): ICD-10-CM

## 2022-12-15 DIAGNOSIS — Z92.29 H/O AMIODARONE THERAPY: ICD-10-CM

## 2022-12-15 LAB — INR PPP: 2.5

## 2022-12-15 PROCEDURE — 85610 PROTHROMBIN TIME: CPT | Performed by: INTERNAL MEDICINE

## 2022-12-15 PROCEDURE — 99214 OFFICE O/P EST MOD 30 MIN: CPT | Performed by: INTERNAL MEDICINE

## 2022-12-15 PROCEDURE — 93000 ELECTROCARDIOGRAM COMPLETE: CPT | Performed by: INTERNAL MEDICINE

## 2022-12-15 RX ORDER — NEBIVOLOL 5 MG/1
5 TABLET ORAL DAILY
COMMUNITY
End: 2023-02-06

## 2022-12-15 NOTE — PROGRESS NOTES
[facility]    Subjective:        David KHAN Age Sr. is a 82 y.o. male who here for follow up    CC  Follow-up atrial fibrillation hypertension anticoagulation  HPI  82-year-old male with abdominal aortic aneurysm, paroxysmal atrial fibrillation, hypertension on amiodarone therapy here for the follow-up with no complaints of chest pains tightness heaviness or the pressure sensation     Problems Addressed this Visit        Cardiac and Vasculature    Abdominal aortic aneurysm - Primary    Relevant Orders    CT Chest With Contrast    Paroxysmal atrial fibrillation (HCC)    Relevant Medications    nebivolol (Bystolic) 5 MG tablet    Essential hypertension    Relevant Medications    nebivolol (Bystolic) 5 MG tablet    H/O amiodarone therapy       Coag and Thromboembolic    Long term (current) use of anticoagulants       Pulmonary and Pneumonias    SOB (shortness of breath)   Diagnoses       Codes Comments    Abdominal aortic aneurysm (AAA) without rupture, unspecified part    -  Primary ICD-10-CM: I71.40  ICD-9-CM: 441.4     Long term (current) use of anticoagulants     ICD-10-CM: Z79.01  ICD-9-CM: V58.61     Paroxysmal atrial fibrillation (HCC)     ICD-10-CM: I48.0  ICD-9-CM: 427.31     H/O amiodarone therapy     ICD-10-CM: Z92.29  ICD-9-CM: V87.49     Essential hypertension     ICD-10-CM: I10  ICD-9-CM: 401.9     SOB (shortness of breath)     ICD-10-CM: R06.02  ICD-9-CM: 786.05         .    The following portions of the patient's history were reviewed and updated as appropriate: allergies, current medications, past family history, past medical history, past social history, past surgical history and problem list.    Past Medical History:   Diagnosis Date   • AAA (abdominal aortic aneurysm) without rupture    • Aneurysm of thoracic aorta     CT CHEST 8/2021 [facility]    • Arthritis    • Basal cell carcinoma     RIGHT LOWER LID    • BPH (benign prostatic hyperplasia)    • Chronic lumbar pain    • Degenerative arthritis of  "lumbar spine    • Degenerative cervical disc    • Disease of thyroid gland    • Elevated rheumatoid factor    • History of atrial fibrillation    • Hyperlipidemia    • Hypertension    • Hypogonadism in male    • Muscle strain of left upper back     PRESCRIBED PREDNISONE DOSE PACK    • On anticoagulant therapy    • Pulmonary nodule     6 MM - REPEAT CT SCAN IN ONE YEAR, WATCHING    • Refused influenza vaccine    • Scoliosis    • Vitamin D deficiency      reports that he has never smoked. He has never used smokeless tobacco. He reports that he does not drink alcohol and does not use drugs.   Family History   Problem Relation Age of Onset   • Hypertension Father    • Heart attack Father    • Heart attack Brother    • Alcohol abuse Brother    • Hypertension Brother    • Hypertension Paternal Grandmother    • Hypertension Paternal Grandfather    • Anemia Mother    • Arthritis Mother    • Hypertension Mother    • Hypertension Maternal Grandmother    • Heart disease Other         FH in males before age 55   • Malig Hyperthermia Neg Hx        Review of Systems  Constitutional: No wt loss, fever, fatigue  Gastrointestinal: No nausea, abdominal pain  Behavioral/Psych: No insomnia or anxiety   Cardiovascular no chest pains or tightness in the chest  Objective:       Physical Exam  /84   Pulse 77   Ht 182.9 cm (72\")   Wt 112 kg (248 lb)   BMI 33.63 kg/m²   General appearance: No acute changes   Neck: Trachea midline; NECK, supple, no thyromegaly or lymphadenopathy   Lungs: Normal size and shape, normal breath sounds, equal distribution of air, no rales and rhonchi   CV: S1-S2 regular, no murmurs, no rub, no gallop   Abdomen: Soft, nontender; no masses , no abnormal abdominal sounds   Extremities: No deformity , normal color , no peripheral edema   Skin: Normal temperature, turgor and texture; no rash, ulcers            ECG 12 Lead    Date/Time: 12/15/2022 2:26 PM  Performed by: Harlan Downing MD  Authorized " by: Harlan Downing MD   Comparison: compared with previous ECG   Similar to previous ECG  Rhythm: sinus rhythm  ST Flattening: all    Clinical impression: non-specific ECG              Echocardiogram:        Current Outpatient Medications:   •  acetaminophen (TYLENOL) 325 MG tablet, Take 2 tablets by mouth Every 4 (Four) Hours As Needed for Mild Pain ., Disp:  , Rfl:   •  amiodarone (PACERONE) 200 MG tablet, Take 1/2 (one-half) tablet by mouth once daily (Patient taking differently: Take 100 mg by mouth Daily.), Disp: 45 tablet, Rfl: 0  •  doxazosin (CARDURA) 2 MG tablet, Take 1 tablet by mouth once daily (Patient taking differently: Take 2 mg by mouth Every Night.), Disp: 90 tablet, Rfl: 0  •  esomeprazole (nexIUM) 20 MG capsule, Take 20 mg by mouth Every Morning Before Breakfast., Disp: , Rfl:   •  hydrALAZINE (APRESOLINE) 25 MG tablet, TAKE 1 TABLET BY MOUTH THREE TIMES DAILY (Patient taking differently: Take 25 mg by mouth 3 (Three) Times a Day.), Disp: 270 tablet, Rfl: 0  •  isosorbide mononitrate (IMDUR) 30 MG 24 hr tablet, Take 1 tablet by mouth once daily (Patient taking differently: Take 30 mg by mouth Daily.), Disp: 90 tablet, Rfl: 0  •  levothyroxine (SYNTHROID, LEVOTHROID) 112 MCG tablet, Take 1 tablet by mouth once daily, Disp: 90 tablet, Rfl: 0  •  metoprolol succinate XL (Toprol XL) 50 MG 24 hr tablet, Take 1 tablet by mouth Daily., Disp: 90 tablet, Rfl: 1  •  nebivolol (Bystolic) 5 MG tablet, Take 5 mg by mouth Daily., Disp: , Rfl:   •  olmesartan-hydrochlorothiazide (BENICAR HCT) 40-25 MG per tablet, TAKE ONE TABLET BY MOUTH DAILY (Patient taking differently: Take 1 tablet by mouth Daily.), Disp: 90 tablet, Rfl: 0  •  rosuvastatin (CRESTOR) 5 MG tablet, Take 1 tablet by mouth once daily (Patient taking differently: Take 5 mg by mouth Every Night.), Disp: 90 tablet, Rfl: 1  •  warfarin (COUMADIN) 7.5 MG tablet, TAKE 1 TABLET BY MOUTH ONCE DAILY AT NIGHT (Patient taking differently: Take 7.5  mg by mouth Every Night.), Disp: 90 tablet, Rfl: 1   Assessment:        Patient Active Problem List   Diagnosis   • Chronic coronary artery disease   • Abdominal aortic aneurysm   • Paroxysmal atrial fibrillation (HCC)   • Gastroesophageal reflux disease   • Essential hypertension   • Mixed hyperlipidemia   • Hypogonadism in male   • Acquired hypothyroidism   • Osteoarthritis of spine   • Vitamin D deficiency   • Long term (current) use of anticoagulants   • Atrial fibrillation, rapid (HCC)   • Syncope and collapse   • Chest pain   • Statin intolerance   • Gross hematuria   • Coumadin toxicity   • Sepsis (HCC)   • H/O amiodarone therapy   • Dehydration   • Renal insufficiency   • Thoracic aortic aneurysm without rupture   • Refused influenza vaccine   • Medicare annual wellness visit, subsequent   • BPH without obstruction/lower urinary tract symptoms   • Thoracic aortic aneurysm   • Kidney stone   • Hand injury   • Acute kidney injury (HCC)   • 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 Abdominal aortic aneurysm (AAA) without rupture, unspecified part  I71.40 441.4   2. Long term (current) use of anticoagulants  Z79.01 V58.61   3. Paroxysmal atrial fibrillation (HCC)  I48.0 427.31   4. H/O amiodarone therapy  Z92.29 V87.49   5. Essential hypertension  I10 401.9   6. SOB (shortness of breath)  R06.02 786.05     1. Abdominal aortic aneurysm (AAA) without rupture, unspecified part  We will repeat the CT scan  - CT Chest With Contrast; Future    2. Long term (current) use of anticoagulants  Pros and cons as well as indication of the  anticoagulation has been explained to the patient in detail    There are no obvious complications at this stage    Risk of  the bleedings has been explained    Need for the regular blood workup and adjust the dose has been explained    Need for proper follow-up on anticoagulation also has been explained      3. Paroxysmal atrial fibrillation (HCC)  Under control    4. H/O amiodarone therapy  David Comer Sr. IS ON AMIODARONE,   Significant side effects associated with this medication has been explained     Pros and cons of the medications has been discussed, decision has been to continue the medication   6 months to yearly checkup for eye examination, thyroid function test, chest x-ray and liver function test has been advised    Patient understands well      5. Essential hypertension  Blood pressure under control    6. SOB (shortness of breath)  Multifactorial       1 YR  WITH CT OF CHEST ABDOMEN FOR ANEURYSM  COUNSELING:    David Comer Sr.Counseling was given to patient for the following topics: diagnostic results, risk factor reductions, impressions, risks and benefits of treatment options and importance of treatment compliance .       SMOKING COUNSELING:        Dictated using Dragon dictation

## 2022-12-29 ENCOUNTER — APPOINTMENT (OUTPATIENT)
Dept: GENERAL RADIOLOGY | Facility: HOSPITAL | Age: 82
End: 2022-12-29
Payer: MEDICARE

## 2022-12-29 ENCOUNTER — HOSPITAL ENCOUNTER (OUTPATIENT)
Facility: HOSPITAL | Age: 82
Discharge: HOME | End: 2022-12-30
Attending: EMERGENCY MEDICINE | Admitting: INTERNAL MEDICINE
Payer: MEDICARE

## 2022-12-29 DIAGNOSIS — Z79.01 ANTICOAGULATED: ICD-10-CM

## 2022-12-29 DIAGNOSIS — Z78.9 STATIN INTOLERANCE: ICD-10-CM

## 2022-12-29 DIAGNOSIS — R07.9 CHEST PAIN, UNSPECIFIED TYPE: Primary | ICD-10-CM

## 2022-12-29 DIAGNOSIS — R07.2 PRECORDIAL PAIN: ICD-10-CM

## 2022-12-29 LAB
ALBUMIN SERPL-MCNC: 4.2 G/DL (ref 3.5–5.2)
ALBUMIN/GLOB SERPL: 1.4 G/DL
ALP SERPL-CCNC: 73 U/L (ref 39–117)
ALT SERPL W P-5'-P-CCNC: 33 U/L (ref 1–41)
ANION GAP SERPL CALCULATED.3IONS-SCNC: 10.6 MMOL/L (ref 5–15)
AST SERPL-CCNC: 32 U/L (ref 1–40)
BASOPHILS # BLD AUTO: 0.05 10*3/MM3 (ref 0–0.2)
BASOPHILS NFR BLD AUTO: 0.7 % (ref 0–1.5)
BILIRUB SERPL-MCNC: 0.3 MG/DL (ref 0–1.2)
BUN SERPL-MCNC: 24 MG/DL (ref 8–23)
BUN/CREAT SERPL: 23.3 (ref 7–25)
CALCIUM SPEC-SCNC: 9.5 MG/DL (ref 8.6–10.5)
CHLORIDE SERPL-SCNC: 102 MMOL/L (ref 98–107)
CHOLEST SERPL-MCNC: 157 MG/DL (ref 0–200)
CO2 SERPL-SCNC: 24.4 MMOL/L (ref 22–29)
CREAT SERPL-MCNC: 1.03 MG/DL (ref 0.76–1.27)
DEPRECATED RDW RBC AUTO: 44.7 FL (ref 37–54)
EGFRCR SERPLBLD CKD-EPI 2021: 73 ML/MIN/1.73
EOSINOPHIL # BLD AUTO: 0.39 10*3/MM3 (ref 0–0.4)
EOSINOPHIL NFR BLD AUTO: 5.4 % (ref 0.3–6.2)
ERYTHROCYTE [DISTWIDTH] IN BLOOD BY AUTOMATED COUNT: 12.6 % (ref 12.3–15.4)
GLOBULIN UR ELPH-MCNC: 2.9 GM/DL
GLUCOSE SERPL-MCNC: 98 MG/DL (ref 65–99)
HCT VFR BLD AUTO: 44.5 % (ref 37.5–51)
HDLC SERPL-MCNC: 50 MG/DL (ref 40–60)
HGB BLD-MCNC: 13.9 G/DL (ref 13–17.7)
HOLD SPECIMEN: NORMAL
HOLD SPECIMEN: NORMAL
IMM GRANULOCYTES # BLD AUTO: 0.03 10*3/MM3 (ref 0–0.05)
IMM GRANULOCYTES NFR BLD AUTO: 0.4 % (ref 0–0.5)
INR PPP: 2.3 (ref 0.9–1.1)
LDLC SERPL CALC-MCNC: 79 MG/DL (ref 0–100)
LDLC/HDLC SERPL: 1.48 {RATIO}
LYMPHOCYTES # BLD AUTO: 1.33 10*3/MM3 (ref 0.7–3.1)
LYMPHOCYTES NFR BLD AUTO: 18.4 % (ref 19.6–45.3)
MCH RBC QN AUTO: 30.5 PG (ref 26.6–33)
MCHC RBC AUTO-ENTMCNC: 31.2 G/DL (ref 31.5–35.7)
MCV RBC AUTO: 97.6 FL (ref 79–97)
MONOCYTES # BLD AUTO: 0.55 10*3/MM3 (ref 0.1–0.9)
MONOCYTES NFR BLD AUTO: 7.6 % (ref 5–12)
NEUTROPHILS NFR BLD AUTO: 4.88 10*3/MM3 (ref 1.7–7)
NEUTROPHILS NFR BLD AUTO: 67.5 % (ref 42.7–76)
NRBC BLD AUTO-RTO: 0 /100 WBC (ref 0–0.2)
NT-PROBNP SERPL-MCNC: 34.5 PG/ML (ref 0–1800)
PLATELET # BLD AUTO: 158 10*3/MM3 (ref 140–450)
PMV BLD AUTO: 11.7 FL (ref 6–12)
POTASSIUM SERPL-SCNC: 3.9 MMOL/L (ref 3.5–5.2)
PROT SERPL-MCNC: 7.1 G/DL (ref 6–8.5)
PROTHROMBIN TIME: 25.6 SECONDS (ref 11.7–14.2)
RBC # BLD AUTO: 4.56 10*6/MM3 (ref 4.14–5.8)
SODIUM SERPL-SCNC: 137 MMOL/L (ref 136–145)
TRIGL SERPL-MCNC: 164 MG/DL (ref 0–150)
TROPONIN T SERPL-MCNC: <0.01 NG/ML (ref 0–0.03)
TROPONIN T SERPL-MCNC: <0.01 NG/ML (ref 0–0.03)
VLDLC SERPL-MCNC: 28 MG/DL (ref 5–40)
WBC NRBC COR # BLD: 7.23 10*3/MM3 (ref 3.4–10.8)
WHOLE BLOOD HOLD COAG: NORMAL
WHOLE BLOOD HOLD SPECIMEN: NORMAL

## 2022-12-29 PROCEDURE — 36415 COLL VENOUS BLD VENIPUNCTURE: CPT

## 2022-12-29 PROCEDURE — 83880 ASSAY OF NATRIURETIC PEPTIDE: CPT

## 2022-12-29 PROCEDURE — 93005 ELECTROCARDIOGRAM TRACING: CPT | Performed by: EMERGENCY MEDICINE

## 2022-12-29 PROCEDURE — 93010 ELECTROCARDIOGRAM REPORT: CPT | Performed by: INTERNAL MEDICINE

## 2022-12-29 PROCEDURE — 80053 COMPREHEN METABOLIC PANEL: CPT

## 2022-12-29 PROCEDURE — 84484 ASSAY OF TROPONIN QUANT: CPT

## 2022-12-29 PROCEDURE — G0378 HOSPITAL OBSERVATION PER HR: HCPCS

## 2022-12-29 PROCEDURE — 99285 EMERGENCY DEPT VISIT HI MDM: CPT

## 2022-12-29 PROCEDURE — 71045 X-RAY EXAM CHEST 1 VIEW: CPT

## 2022-12-29 PROCEDURE — 85025 COMPLETE CBC W/AUTO DIFF WBC: CPT

## 2022-12-29 PROCEDURE — 80061 LIPID PANEL: CPT

## 2022-12-29 PROCEDURE — 99284 EMERGENCY DEPT VISIT MOD MDM: CPT

## 2022-12-29 PROCEDURE — 85610 PROTHROMBIN TIME: CPT

## 2022-12-29 RX ORDER — SODIUM CHLORIDE 0.9 % (FLUSH) 0.9 %
10 SYRINGE (ML) INJECTION AS NEEDED
Status: DISCONTINUED | OUTPATIENT
Start: 2022-12-29 | End: 2023-01-10 | Stop reason: HOSPADM

## 2022-12-29 RX ORDER — SODIUM CHLORIDE 0.9 % (FLUSH) 0.9 %
10 SYRINGE (ML) INJECTION EVERY 12 HOURS SCHEDULED
Status: DISCONTINUED | OUTPATIENT
Start: 2022-12-29 | End: 2023-01-10 | Stop reason: HOSPADM

## 2022-12-29 RX ORDER — SODIUM CHLORIDE 9 MG/ML
40 INJECTION, SOLUTION INTRAVENOUS AS NEEDED
Status: DISCONTINUED | OUTPATIENT
Start: 2022-12-29 | End: 2023-01-10 | Stop reason: HOSPADM

## 2022-12-29 RX ADMIN — Medication 10 ML: at 21:12

## 2022-12-29 NOTE — ED TRIAGE NOTES
.Pt masked on arrival, staff masked    Pt reports pain across chest/ribs that started about 1.5 hours ago, has improved but not resolved, BYRNES

## 2022-12-29 NOTE — Clinical Note
Hemostasis started on the right radial artery. R-Band was used in achieving hemostasis. Radial compression device applied to vessel. Hemostasis achieved successfully. Closure device additional comment: TR w/ 14cc air.

## 2022-12-30 VITALS
SYSTOLIC BLOOD PRESSURE: 129 MMHG | TEMPERATURE: 97.9 F | DIASTOLIC BLOOD PRESSURE: 79 MMHG | HEIGHT: 72 IN | BODY MASS INDEX: 32.51 KG/M2 | WEIGHT: 240 LBS | HEART RATE: 74 BPM | RESPIRATION RATE: 16 BRPM | OXYGEN SATURATION: 96 %

## 2022-12-30 LAB
ANION GAP SERPL CALCULATED.3IONS-SCNC: 9.9 MMOL/L (ref 5–15)
BUN SERPL-MCNC: 21 MG/DL (ref 8–23)
BUN/CREAT SERPL: 23.1 (ref 7–25)
CALCIUM SPEC-SCNC: 8.6 MG/DL (ref 8.6–10.5)
CHLORIDE SERPL-SCNC: 105 MMOL/L (ref 98–107)
CO2 SERPL-SCNC: 24.1 MMOL/L (ref 22–29)
CREAT SERPL-MCNC: 0.91 MG/DL (ref 0.76–1.27)
DEPRECATED RDW RBC AUTO: 41.8 FL (ref 37–54)
EGFRCR SERPLBLD CKD-EPI 2021: 84.7 ML/MIN/1.73
ERYTHROCYTE [DISTWIDTH] IN BLOOD BY AUTOMATED COUNT: 12.1 % (ref 12.3–15.4)
GLUCOSE SERPL-MCNC: 133 MG/DL (ref 65–99)
HCT VFR BLD AUTO: 36.5 % (ref 37.5–51)
HGB BLD-MCNC: 12.4 G/DL (ref 13–17.7)
INR PPP: 2.06 (ref 0.9–1.1)
MAGNESIUM SERPL-MCNC: 1.9 MG/DL (ref 1.6–2.4)
MCH RBC QN AUTO: 32 PG (ref 26.6–33)
MCHC RBC AUTO-ENTMCNC: 34 G/DL (ref 31.5–35.7)
MCV RBC AUTO: 94.1 FL (ref 79–97)
PLATELET # BLD AUTO: 140 10*3/MM3 (ref 140–450)
PMV BLD AUTO: 12.2 FL (ref 6–12)
POTASSIUM SERPL-SCNC: 3.7 MMOL/L (ref 3.5–5.2)
PROTHROMBIN TIME: 23.5 SECONDS (ref 11.7–14.2)
QT INTERVAL: 434 MS
QT INTERVAL: 471 MS
RBC # BLD AUTO: 3.88 10*6/MM3 (ref 4.14–5.8)
SODIUM SERPL-SCNC: 139 MMOL/L (ref 136–145)
TROPONIN T SERPL-MCNC: <0.01 NG/ML (ref 0–0.03)
WBC NRBC COR # BLD: 6.87 10*3/MM3 (ref 3.4–10.8)

## 2022-12-30 PROCEDURE — 85027 COMPLETE CBC AUTOMATED: CPT

## 2022-12-30 PROCEDURE — 84484 ASSAY OF TROPONIN QUANT: CPT

## 2022-12-30 PROCEDURE — 93458 L HRT ARTERY/VENTRICLE ANGIO: CPT | Performed by: INTERNAL MEDICINE

## 2022-12-30 PROCEDURE — 25010000002 FENTANYL CITRATE (PF) 50 MCG/ML SOLUTION: Performed by: INTERNAL MEDICINE

## 2022-12-30 PROCEDURE — G0378 HOSPITAL OBSERVATION PER HR: HCPCS

## 2022-12-30 PROCEDURE — C1769 GUIDE WIRE: HCPCS | Performed by: INTERNAL MEDICINE

## 2022-12-30 PROCEDURE — 80048 BASIC METABOLIC PNL TOTAL CA: CPT

## 2022-12-30 PROCEDURE — 25010000002 MIDAZOLAM PER 1 MG: Performed by: INTERNAL MEDICINE

## 2022-12-30 PROCEDURE — 93005 ELECTROCARDIOGRAM TRACING: CPT

## 2022-12-30 PROCEDURE — 99152 MOD SED SAME PHYS/QHP 5/>YRS: CPT | Performed by: INTERNAL MEDICINE

## 2022-12-30 PROCEDURE — 25010000002 HEPARIN (PORCINE) PER 1000 UNITS: Performed by: INTERNAL MEDICINE

## 2022-12-30 PROCEDURE — 99234 HOSP IP/OBS SM DT SF/LOW 45: CPT | Performed by: INTERNAL MEDICINE

## 2022-12-30 PROCEDURE — C1894 INTRO/SHEATH, NON-LASER: HCPCS | Performed by: INTERNAL MEDICINE

## 2022-12-30 PROCEDURE — 85610 PROTHROMBIN TIME: CPT | Performed by: NURSE PRACTITIONER

## 2022-12-30 PROCEDURE — 83735 ASSAY OF MAGNESIUM: CPT

## 2022-12-30 PROCEDURE — 0 IOPAMIDOL PER 1 ML: Performed by: INTERNAL MEDICINE

## 2022-12-30 RX ORDER — HEPARIN SODIUM 1000 [USP'U]/ML
INJECTION, SOLUTION INTRAVENOUS; SUBCUTANEOUS
Status: DISCONTINUED | OUTPATIENT
Start: 2022-12-30 | End: 2022-12-30 | Stop reason: HOSPADM

## 2022-12-30 RX ORDER — FENTANYL CITRATE 50 UG/ML
INJECTION, SOLUTION INTRAMUSCULAR; INTRAVENOUS
Status: DISCONTINUED | OUTPATIENT
Start: 2022-12-30 | End: 2022-12-30 | Stop reason: HOSPADM

## 2022-12-30 RX ORDER — LIDOCAINE HYDROCHLORIDE 20 MG/ML
INJECTION, SOLUTION INFILTRATION; PERINEURAL
Status: DISCONTINUED | OUTPATIENT
Start: 2022-12-30 | End: 2022-12-30 | Stop reason: HOSPADM

## 2022-12-30 RX ORDER — MIDAZOLAM HYDROCHLORIDE 1 MG/ML
INJECTION INTRAMUSCULAR; INTRAVENOUS
Status: DISCONTINUED | OUTPATIENT
Start: 2022-12-30 | End: 2022-12-30 | Stop reason: HOSPADM

## 2022-12-30 RX ORDER — VERAPAMIL HYDROCHLORIDE 2.5 MG/ML
INJECTION, SOLUTION INTRAVENOUS
Status: DISCONTINUED | OUTPATIENT
Start: 2022-12-30 | End: 2022-12-30 | Stop reason: HOSPADM

## 2022-12-30 RX ORDER — SODIUM CHLORIDE 9 MG/ML
INJECTION, SOLUTION INTRAVENOUS
Status: COMPLETED | OUTPATIENT
Start: 2022-12-30 | End: 2022-12-30

## 2022-12-30 RX ORDER — ACETAMINOPHEN 325 MG/1
650 TABLET ORAL EVERY 4 HOURS PRN
Status: DISCONTINUED | OUTPATIENT
Start: 2022-12-30 | End: 2023-01-10 | Stop reason: HOSPADM

## 2022-12-30 RX ADMIN — Medication 10 ML: at 08:45

## 2022-12-30 NOTE — PLAN OF CARE
Goal Outcome Evaluation:         Patient has slept well overnight. No complaints of chest pain. Vss. Will continue to monitor for any changes

## 2022-12-30 NOTE — PLAN OF CARE
Goal Outcome Evaluation:   Pt left via wc w cardiac cath transport. Alert and oriented . Able to make all needs known verbally. Pt medical care transferred to cardiac md

## 2022-12-30 NOTE — CASE MANAGEMENT/SOCIAL WORK
Discharge Planning Assessment  [facility]     Patient Name: David Comer Sr.  MRN: [id number]  Today's Date: 12/30/2022    Admit Date: 12/29/2022    Plan: Return  home with daughter   Discharge Needs Assessment     Row Name 12/30/22 1203       Living Environment    People in Home child(angie), adult    Name(s) of People in Home Cleve Vaz    Current Living Arrangements home    Primary Care Provided by self    Provides Primary Care For no one    Family Caregiver if Needed child(angie), adult    Family Caregiver Names Cleve Vaz [phone]    Quality of Family Relationships helpful    Able to Return to Prior Arrangements yes       Resource/Environmental Concerns    Resource/Environmental Concerns none    Transportation Concerns none       Transition Planning    Patient/Family Anticipates Transition to home with family    Patient/Family Anticipated Services at Transition none    Transportation Anticipated family or friend will provide       Discharge Needs Assessment    Readmission Within the Last 30 Days no previous admission in last 30 days    Equipment Currently Used at Home none    Concerns to be Addressed denies needs/concerns at this time    Anticipated Changes Related to Illness none    Equipment Needed After Discharge none               Discharge Plan     Row Name 12/30/22 3766       Plan    Plan Return  home with daughter    Patient/Family in Agreement with Plan yes    Plan Comments Spoke with patient at bedside.  He lives with his daughter at [facility] but address is [place] address that he is at part time.  He is IADL, uses no DME, has never used HH or been to SNF.  PCP is Dr. Chacha Pineda and pharmacy is [facility] in [place].  Patient drives but daughter drives and will assist if needed.  He plans to return home at DC and does not anticipate any DC needs.  CCP will follow as needed.  Renu EVANS              Continued Care and Services - Admitted Since 12/29/2022    Coordination has  not been started for this encounter.          Demographic Summary     Row Name 12/30/22 1202       General Information    Admission Type observation    Arrived From home    Referral Source admission list    Reason for Consult discharge planning    Preferred Language English               Functional Status     Row Name 12/30/22 1203       Functional Status    Usual Activity Tolerance moderate    Current Activity Tolerance moderate       Functional Status, IADL    Medications independent    Meal Preparation independent;assistive person  Daughter lives with him    Housekeeping assistive person;independent    Laundry independent;assistive person    Shopping assistive person;independent       Mental Status    General Appearance WDL WDL       Mental Status Summary    Recent Changes in Mental Status/Cognitive Functioning no changes                        Becky S. [place], RN

## 2022-12-30 NOTE — ED PROVIDER NOTES
The CATHY and I have discussed this patient's history, physical exam and treatment plan.  I provided a substantive portion of the care of this patient.  I have reviewed the documentation and personally had a face to face interaction with the patient and personally performed the physical exam, in its entirety.  I affirm the documentation and agree with the treatment and plan.  The following describes my personal findings.    Cardiologist: Dr Downing  The patient presents complaining of chest pain onset 215 this evening, self resolved after an hour.  Patient denies shortness of air, nausea/vomiting, swelling of extremities.    Comprehensive Physical exam:  Patient is nontoxic appearing oriented, conversant awake, alert  HEENT: normocephalic, atraumatic  Neck: No JVD, no goiter, no pain with ROM  Pulmonary: Nontachypneic, breath sounds are well bilaterally, no rales, no wheezes  cardiovascular: Nontachycardic, regular rate, bilateral radial pulses palpable  Abdomen: Soft, nontender  musculoskeletal: Good range of motion, pulse, sensation is warm  Neuro/psychiatric:calm, appropriate, cooperative  Skin:warm, dry    EKG          EKG time: 1623  Rhythm/Rate: Sinus rhythm, rate in the 70s  P waves and MS: Normal P waves, normal BLACK's  QRS, axis: Right bundle branch pattern  ST and T waves: Nonspecific ST/T wave findings    Interpreted Contemporaneously by me, independently viewed  Minimally changed compared to prior 11/21/2022    Anticipate observation admission for recheck, further testing, treatment as needed with cardiology consult, serial enzymes    Patient was wearing facemask when I entered the room and throughout our encounter. Full protective equipment was worn throughout this patient encounter including a face mask, eye protection and gloves. Hand hygiene was performed before donning protective equipment and after removal when leaving the room.           Gerri Lilly MD  12/29/22 8947

## 2022-12-30 NOTE — CONSULTS
[facility]  Cardiology Consult Note    Patient Identification:  Name: David Comer Sr.  Age: 82 y.o.  Sex: male  :  1940  MRN: [id number]             Requesting Physician: Dr. Lilly    Reason for Consultation / Chief Complaint: Chest pain    History of Present Illness:   This is a an 82-year-old male, who is well-known to our service.  He presented to [facility] valuation of chest pain.  He has a history to include AAA, rheumatoid arthritis, hypothyroidism, paroxysmal atrial fibrillation, vitamin D deficiency, BPH, hypertention, hyperlipidemia, and history of pulmonary nodule.    Admission chest x-ray revealed no acute abnormalities.  Labs revealed troponin negative x3, potassium 3.7, creatinine 0.91 magnesium 1.9.  EKG shows sinus rhythm with heart rate in 70s and nonspecific T wave    Echo 2022 revealed EF 61 to 65%, LV diastolic function was normal and RAC is borderline dilated. Cardiac cath in  revealed early atherosclerotic plaque otherwise normal coronary arteries    Comorbid cardiac risk factors: hypertension, CAD, age, hyperlipidemia    Past Medical History:  Past Medical History:   Diagnosis Date    AAA (abdominal aortic aneurysm) without rupture     Aneurysm of thoracic aorta     CT CHEST 2021 IN [facility]     Arthritis     Basal cell carcinoma     RIGHT LOWER LID     BPH (benign prostatic hyperplasia)     Chronic lumbar pain     Degenerative arthritis of lumbar spine     Degenerative cervical disc     Disease of thyroid gland     Elevated rheumatoid factor     History of atrial fibrillation     Hyperlipidemia     Hypertension     Hypogonadism in male     Muscle strain of left upper back     PRESCRIBED PREDNISONE DOSE PACK     On anticoagulant therapy     Pulmonary nodule     6 MM - REPEAT CT SCAN IN ONE YEAR, WATCHING     Refused influenza vaccine     Scoliosis     Vitamin D deficiency      Past Surgical History:  Past Surgical History:   Procedure  Laterality Date    ANKLE FUSION Right     CARDIAC CATHETERIZATION      CARDIAC CATHETERIZATION N/A 7/2/2021    Procedure: Left Heart Cath;  Surgeon: Harlan Downing MD;  [facility];  Service: Cardiology;  Laterality: N/A;    CARDIAC CATHETERIZATION N/A 7/2/2021    Procedure: Coronary angiography;  Surgeon: Harlan Downing MD;  Location:  [facility] CATH INVASIVE LOCATION;  Service: Cardiology;  Laterality: N/A;    CARDIAC CATHETERIZATION N/A 7/2/2021    Procedure: Left ventriculography;  Surgeon: Harlan Downing MD;  Location:  [facility] CATH INVASIVE LOCATION;  Service: Cardiology;  Laterality: N/A;    HOBBS TUBE INSERTION Right 10/19/2021    Procedure: RIGHT SECOND STAGE CAST TAKEDOWN RECONSTRUCTION;  Surgeon: Brian Bhatt MD;  Location: [facility];  Service: Ophthalmology;  Laterality: Right;    ENTROPIAN REPAIR Right 9/21/2021    Procedure: RIGHT LOWER LID EXCISION OF BASAL CELL CARCINOMA WITH FROZEN SECTION RIGHT LOWER LID RECONSTRUCTION WITH CAST FLAP, REPAIR OF CANULICULIS, AND FULL THICKNESS SKIN GRAFT;  Surgeon: Brian Bhatt MD;  Location: [facility];  Service: Ophthalmology;  Laterality: Right;    INGUINAL HERNIA REPAIR Right     REPLACEMENT TOTAL KNEE Bilateral 2000    ROTATOR CUFF REPAIR Left     ROTATOR CUFF REPAIR Right     SKIN BIOPSY      VASECTOMY        Allergies:  No Known Allergies  Home Meds:  Medications Prior to Admission   Medication Sig Dispense Refill Last Dose    acetaminophen (TYLENOL) 325 MG tablet Take 2 tablets by mouth Every 4 (Four) Hours As Needed for Mild Pain .   Past Week    warfarin (COUMADIN) 7.5 MG tablet TAKE 1 TABLET BY MOUTH ONCE DAILY AT NIGHT (Patient taking differently: Take 7.5 mg by mouth Every Night.) 90 tablet 1 12/28/2022 at 1900    amiodarone (PACERONE) 200 MG tablet Take 1/2 (one-half) tablet by mouth once daily (Patient taking differently: Take 100 mg by mouth Daily.) 45 tablet 0 Unknown     doxazosin (CARDURA) 2 MG tablet Take 1 tablet by mouth once daily (Patient taking differently: Take 2 mg by mouth Every Night.) 90 tablet 0 Unknown    esomeprazole (nexIUM) 20 MG capsule Take 20 mg by mouth Every Morning Before Breakfast.   Unknown    hydrALAZINE (APRESOLINE) 25 MG tablet TAKE 1 TABLET BY MOUTH THREE TIMES DAILY (Patient taking differently: Take 25 mg by mouth 3 (Three) Times a Day.) 270 tablet 0 Unknown    isosorbide mononitrate (IMDUR) 30 MG 24 hr tablet Take 1 tablet by mouth once daily (Patient taking differently: Take 30 mg by mouth Daily.) 90 tablet 0 Unknown    levothyroxine (SYNTHROID, LEVOTHROID) 112 MCG tablet Take 1 tablet by mouth once daily 90 tablet 0 Unknown    metoprolol succinate XL (Toprol XL) 50 MG 24 hr tablet Take 1 tablet by mouth Daily. 90 tablet 1 Unknown    nebivolol (BYSTOLIC) 5 MG tablet Take 5 mg by mouth Daily.   Unknown    olmesartan-hydrochlorothiazide (BENICAR HCT) 40-25 MG per tablet TAKE ONE TABLET BY MOUTH DAILY (Patient taking differently: Take 1 tablet by mouth Daily.) 90 tablet 0 Unknown    rosuvastatin (CRESTOR) 5 MG tablet Take 1 tablet by mouth once daily (Patient taking differently: Take 5 mg by mouth Every Night.) 90 tablet 1 Unknown     Current Meds:    Scheduled    Medication Ordered Dose/Rate, Route, Frequency Last Action   sodium chloride 0.9 % flush 10 mL 10 mL, IV, Q12H Given, 10 mL at 12/30 0845     PRN    Medication Ordered Dose/Rate, Route, Frequency Last Action   sodium chloride 0.9 % flush 10 mL 10 mL, IV, PRN Ordered   sodium chloride 0.9 % flush 10 mL 10 mL, IV, PRN Ordered   sodium chloride 0.9 % infusion 40 mL 40 mL, IV, PRN Ordered       Social History:   Social History     Tobacco Use    Smoking status: Never    Smokeless tobacco: Never   Substance Use Topics    Alcohol use: No      Family History:  Family History   Problem Relation Age of Onset    Hypertension Father     Heart attack Father     Heart attack Brother     Alcohol abuse  "Brother     Hypertension Brother     Hypertension Paternal Grandmother     Hypertension Paternal Grandfather     Anemia Mother     Arthritis Mother     Hypertension Mother     Hypertension Maternal Grandmother     Heart disease Other         FH in males before age 55    Malig Hyperthermia Neg Hx         Review of Systems  Review of Systems  Constitutional: No wt loss, fever   Gastrointestinal: No nausea , abdominal pain  Behavioral/Psych: No insomnia or anxiety   Cardiovascular ----positive for CP. All other systems reviewed and are negative                Constitutional:  Temp:  [96.1 °F (35.6 °C)-97.9 °F (36.6 °C)] 97.9 °F (36.6 °C)  Heart Rate:  [62-93] 64  Resp:  [16-18] 16  BP: ()/(60-99) 134/83    Physical Exam            Physical Exam  /65 (BP Location: Left arm, Patient Position: Lying)   Pulse 68   Temp 97.9 °F (36.6 °C) (Oral)   Resp 16   Ht 182.9 cm (72\")   Wt 109 kg (240 lb)   SpO2 100%   BMI 32.55 kg/m²     General appearance: No acute changes   Eyes: Sclerae conjunctivae normal, pupils reactive   HENT: Atraumatic; oropharynx clear with moist mucous membranes and no mucosal ulcerations;  Neck: Trachea midline; NECK, supple, no thyromegaly or lymphadenopathy   Lungs: Normal size and shape, normal breath sounds, equal distribution of air, no rales and rhonchi   CV: S1-S2 regular, no murmurs, no rub, no gallop   Abdomen: Soft, nontender; no masses , no abnormal abdominal sounds   Extremities: No deformity , normal color , no peripheral edema   Skin: Normal temperature, turgor and texture; no rash, ulcers  Psych: Appropriate affect, alert and oriented to person, place and time             Cardiographics  ECG:          Echocardiogram:   11/2022  Interpretation Summary         Left ventricular ejection fraction appears to be 61 - 65%.    Left ventricular diastolic function was normal.    The right atrial cavity is borderline dilated.         2021  Impression:      Early atherosclerotic " plaque otherwise normal coronary arteries  Normal LV gram     Recommendations:      Medical management            I sincerely appreciate the opportunity to participate in your patient's care. Please feel free to contact me anytime if I can be of assistance in this or any other way.     Harlan Downing MD  7/2/2021  14:21 EDT       Imaging  Chest X-ray:   IMPRESSION:  No acute abnormality.     This report was finalized on 12/29/2022 5:15 PM by Dr. Robert Christiansen M.D.Lab Review   Results from last 7 days   Lab Units 12/30/22  0418 12/29/22  1935 12/29/22  1644   TROPONIN T ng/mL <0.010 <0.010 <0.010     Results from last 7 days   Lab Units 12/30/22  0418   MAGNESIUM mg/dL 1.9     Results from last 7 days   Lab Units 12/30/22  0418   SODIUM mmol/L 139   POTASSIUM mmol/L 3.7   BUN mg/dL 21   CREATININE mg/dL 0.91   CALCIUM mg/dL 8.6        Results from last 7 days   Lab Units 12/30/22  0418 12/29/22  1644   WBC 10*3/mm3 6.87 7.23   HEMOGLOBIN g/dL 12.4* 13.9   HEMATOCRIT % 36.5* 44.5   PLATELETS 10*3/mm3 140 158     Results from last 7 days   Lab Units 12/29/22  1644   INR  2.30*     CHADS-VASc Risk Assessment              3 Total Score    1 Hypertension    2 Age >/= 75        Criteria that do not apply:    CHF    DM    PRIOR STROKE/TIA/THROMBO    Vascular Disease    Age 65-74    Sex: Female            The following medical decision was discussed in detail with Dr. Downing      Assessment:  Chest pain with known CAD  Atrial fibrillation on warfarin  Hypertension   hyperlipidemia  Pulmonary nodule  AAA        Recommendations / Plan:   -year-old male, who is well-known to our service.  He has a history of CAD, atrial fibrillation on warfarin, hypertension and hyperlipidemia.  He presents to [facility] for chest pain.  EKG showed sinus rhythm with heart rate of 70 and nonspecific T waves.  Troponins negative x3.  He will undergo a cardiac cath today.    Procedure, risks and options of cardiac  cath explained to pt INCLUDING BUT NOT LIMITED TO MI, STROKE, DEATH, INFECTION HAEMORRHAGE, . Pt understands well and agrees with no further questions.      Labs/tests ordered for am cardiac cath with Dr. Downing and consent.  N.p.o.    Procedure, risks and options of cardiac cath explained to pt INCLUDING BUT NOT LIMITED TO MI, STROKE, DEATH, INFECTION HAEMORRHAGE, . Pt understands well and agrees with no further questions.       Harlan Downing MD    12/30/2022, 09:18 EST      EMR Dragon/Transcription:   Dictated utilizing Dragon dictation

## 2022-12-30 NOTE — PROGRESS NOTES
ED OBSERVATION PROGRESS/DISCHARGE SUMMARY    Date of Admission: 12/29/2022   LOS: 0 days   PCP: Chacha Pineda MD    Final Diagnosis chest pain      Subjective     Hospital Outcome:   Patient is a pleasant afebrile ambulatory 82-year-old  male admitted to the ED observation unit for chest discomfort.  He had a recent normal stress test and echocardiogram performed about a month ago but had recurrent episodes of crushing chest discomfort at rest.  He does have known history of atrial fibrillation maintained on warfarin therapy, his last dose of this was 2 days ago.  His INR yesterday was 2.3.     This morning he denies any chest discomfort.  He was seen and evaluated by Dr. Downing with cardiology service will undergo cardiac catheterization this afternoon.  We will transfer care at that time to cardiology service.    ROS:  General: no fevers, chills  Respiratory: no cough, dyspnea  Cardiovascular: no chest pain, palpitations  Abdomen: No abdominal pain, nausea, vomiting, or diarrhea  Neurologic: No focal weakness    Objective   Physical Exam:  I have reviewed the vital signs.  Temp:  [96.1 °F (35.6 °C)-97.9 °F (36.6 °C)] 97.9 °F (36.6 °C)  Heart Rate:  [62-93] 64  Resp:  [16-18] 16  BP: ()/(60-99) 134/83  General Appearance:    Alert, cooperative, no distress, elderly appearing  Head:    Normocephalic, atraumatic  Eyes:    Sclerae anicteric  Neck:   Supple, no mass  Lungs: Clear to auscultation bilaterally, respirations unlabored  Heart: Regular rate and rhythm, S1 and S2 normal, no murmur, rub or gallop, no peripheral edema  Abdomen:  Soft, non-tender, bowel sounds active, nondistended  Extremities: No clubbing, cyanosis, or edema to lower extremities  Pulses:  2+ and symmetric in distal lower extremities  Skin: No rashes   Neurologic: Oriented x3, Normal strength to extremities    Results Review:    I have reviewed the labs, radiology results and diagnostic studies.    Results from last 7  days   Lab Units 12/30/22 0418   WBC 10*3/mm3 6.87   HEMOGLOBIN g/dL 12.4*   HEMATOCRIT % 36.5*   PLATELETS 10*3/mm3 140     Results from last 7 days   Lab Units 12/30/22 0418 12/29/22  1644   SODIUM mmol/L 139 137   POTASSIUM mmol/L 3.7 3.9   CHLORIDE mmol/L 105 102   CO2 mmol/L 24.1 24.4   BUN mg/dL 21 24*   CREATININE mg/dL 0.91 1.03   CALCIUM mg/dL 8.6 9.5   BILIRUBIN mg/dL  --  0.3   ALK PHOS U/L  --  73   ALT (SGPT) U/L  --  33   AST (SGOT) U/L  --  32   GLUCOSE mg/dL 133* 98     Imaging Results (Last 24 Hours)     Procedure Component Value Units Date/Time    XR Chest 1 View [[id number]] Collected: 12/29/22 1714     Updated: 12/29/22 1718    Narrative:      PORTABLE CHEST X-RAY     HISTORY: Chest pain.     Portable chest x-ray is provided. Correlation: Chest x-ray 11/21/2022.     FINDINGS: The cardiomediastinal silhouette is normal. The lungs are  clear. The costophrenic sulci are dry and the bones appear normal. There  is no pneumothorax. Chronic bilateral massive rotator cuff tears.       Impression:      No acute abnormality.     This report was finalized on 12/29/2022 5:15 PM by Dr. Robert Christiansen M.D.             I have reviewed the medications.  ---------------------------------------------------------------------------------------------  Assessment & Plan   Assessment/Problem List    Chest pain    Statin intolerance    Chest pain, unspecified type      Plan:  Chest pain  -Cardiac monitoring  -Vital signs every 4 hours  -Cardiology consult  -Troponin less than 0.010, trend  -EKG-sinus rhythm incomplete right bundle branch block,  -N.p.o. after midnight  -Cardiac catheterization today        Atrial fibrillation  -Cardiac monitoring  -Hold Coumadin  -Check INR this morning    Disposition: Admitted to cardiology, to Cath Lab    This note will serve as a transfer note    Andie Camacho, ALVA 12/30/22 10:10 EST          I have worn appropriate PPE during this patient encounter, sanitized my hands both with  entering and exiting patient's room.

## 2022-12-30 NOTE — ED PROVIDER NOTES
EMERGENCY DEPARTMENT ENCOUNTER    Room Number:  109/1  Date of encounter:  12/29/2022  PCP: Chacha Pineda MD  Historian: Patient  Cardiologist: Dr Downing    PPE    Patient was placed in face mask in first look. Patient was wearing facemask when I entered the room and throughout our encounter. I wore full protective equipment throughout this patient encounter including a face mask, and gloves. Hand hygiene was performed before donning protective equipment and after removal when leaving the room.        HPI:  Chief Complaint: Chest pain  A complete HPI/ROS/PMH/PSH/SH/FH are unobtainable due to: Nothing    Context: David Comer Sr. is a 82 y.o. male with a history of BPH, A. fib, AAA, anticoagulated on Coumadin who arrives to the ED via private vehicle.  Patient presents with c/o moderate, currently resolved, diffuse pressure-like chest pain that occurred around 215 today.  Patient states the pain lasted about 45 minutes and is currently resolved.  He states he had an episode like this about a month ago.  Patient states he had been up here today celebrating [place], was on his way back home when the pain started, he decided to turn around and come back to the hospital.  Patient denies shortness of breath, diaphoresis, nausea, radiation of the pain, dizziness, calf pain or leg swelling.  Patient states that nothing makes the symptoms better and nothing worsens symptoms.          PAST MEDICAL HISTORY  Active Ambulatory Problems     Diagnosis Date Noted   • Chronic coronary artery disease 10/18/2016   • Abdominal aortic aneurysm 10/18/2016   • Paroxysmal atrial fibrillation (HCC) 10/18/2016   • Gastroesophageal reflux disease 10/18/2016   • Essential hypertension 10/18/2016   • Mixed hyperlipidemia 10/18/2016   • Hypogonadism in male 10/18/2016   • Acquired hypothyroidism 10/18/2016   • Osteoarthritis of spine 03/01/2016   • Vitamin D deficiency 10/18/2016   • Long term (current) use of anticoagulants  02/09/2017   • Atrial fibrillation, rapid ([facility]) 05/30/2017   • Syncope and collapse 08/28/2017   • Chest pain 08/28/2017   • Statin intolerance 09/28/2017   • Gross hematuria 01/29/2018   • Coumadin toxicity 01/29/2018   • Sepsis ([facility]) 02/02/2018   • H/O amiodarone therapy 09/17/2018   • Dehydration 09/24/2019   • Renal insufficiency 09/24/2019   • Thoracic aortic aneurysm without rupture 11/18/2019   • Refused influenza vaccine 12/20/2019   • Medicare annual wellness visit, subsequent    • BPH without obstruction/lower urinary tract symptoms 01/06/2020   • Thoracic aortic aneurysm    • Kidney stone    • Hand injury 11/24/2020   • Acute kidney injury ([facility]) 11/24/2020   • 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 ([facility]) 11/25/2020   • Traumatic hematoma of right knee 11/27/2020   • Arthritis    • SOB (shortness of breath) 06/23/2021   • Fatigue 06/23/2021   • Abnormal nuclear stress test 06/23/2021   • Acute left-sided low back pain without sciatica 09/17/2021   • Lt groin pain 09/17/2021   • Stool incontinence 02/17/2022   • Aspiration pneumonia ([facility]) 09/15/2022   • Hospital discharge follow-up 09/15/2022   • Chest pain, unspecified type 11/21/2022     Resolved Ambulatory Problems     Diagnosis Date Noted   • No Resolved Ambulatory Problems     Past Medical History:   Diagnosis Date   • AAA (abdominal aortic aneurysm) without rupture    • Aneurysm of thoracic aorta    • Basal cell carcinoma    • BPH (benign prostatic hyperplasia)    • Chronic lumbar pain    • Degenerative arthritis of lumbar spine    • Degenerative cervical disc    • Disease of thyroid gland    • Elevated rheumatoid factor    • History of atrial fibrillation    • Hyperlipidemia    • Hypertension    • Muscle strain of left upper back    • On anticoagulant therapy    • Pulmonary nodule    • Scoliosis          PAST SURGICAL HISTORY  Past Surgical History:   Procedure Laterality Date   • ANKLE FUSION Right    • CARDIAC CATHETERIZATION     •  CARDIAC CATHETERIZATION N/A 7/2/2021    Procedure: Left Heart Cath;  Surgeon: Harlan Downing MD;  Location: [facility] CATH INVASIVE LOCATION;  Service: Cardiology;  Laterality: N/A;   • CARDIAC CATHETERIZATION N/A 7/2/2021    Procedure: Coronary angiography;  Surgeon: Harlan Downing MD;  Location: [facility] CATH INVASIVE LOCATION;  Service: Cardiology;  Laterality: N/A;   • CARDIAC CATHETERIZATION N/A 7/2/2021    Procedure: Left ventriculography;  Surgeon: Harlan Downing MD;  Location: [facility] LOC[facility];  Service: Cardiology;  Laterality: N/A;   • HOBBS TUBE INSERTION Right 10/19/2021    Procedure: RIGHT SECOND STAGE CAST TAKEDOWN RECONSTRUCTION;  Surgeon: Brian Bhatt MD;  Location: [facility] OR [facility] [place];  Service: Ophthalmology;  Laterality: Right;   • ENTROPIAN REPAIR Right 9/21/2021    Procedure: RIGHT LOWER LID EXCISION OF BASAL CELL CARCINOMA WITH FROZEN SECTION RIGHT LOWER LID RECONSTRUCTION WITH CAST FLAP, REPAIR OF CANULICULIS, AND FULL THICKNESS SKIN GRAFT;  Surgeon: Brain Bhatt MD;  Location: [facility] OR [facility] [place];  Service: Ophthalmology;  Laterality: Right;   • INGUINAL HERNIA REPAIR Right    • REPLACEMENT TOTAL KNEE Bilateral 2000   • ROTATOR CUFF REPAIR Left    • ROTATOR CUFF REPAIR Right    • SKIN BIOPSY     • VASECTOMY           FAMILY HISTORY  Family History   Problem Relation Age of Onset   • Hypertension Father    • Heart attack Father    • Heart attack Brother    • Alcohol abuse Brother    • Hypertension Brother    • Hypertension Paternal Grandmother    • Hypertension Paternal Grandfather    • Anemia Mother    • Arthritis Mother    • Hypertension Mother    • Hypertension Maternal Grandmother    • Heart disease Other         FH in males before age 55   • Malig Hyperthermia Neg Hx          SOCIAL HISTORY  Social History     Socioeconomic History   • Marital status:    Tobacco Use   • Smoking status: Never   • Smokeless tobacco: Never   Vaping Use    • Vaping Use: Never used   Substance and Sexual Activity   • Alcohol use: No   • Drug use: No   • Sexual activity: Defer         ALLERGIES  Patient has no known allergies.        REVIEW OF SYSTEMS  Review of Systems     All systems reviewed and negative except for those discussed in HPI.        PHYSICAL EXAM    ED Triage Vitals   Temp Heart Rate Resp BP SpO2   12/29/22 1615 12/29/22 1615 12/29/22 1615 12/29/22 1709 12/29/22 1615   96.1 °F (35.6 °C) 93 16 128/81 94 %       Physical Exam  GENERAL: Well appearing, nontoxic appearing, not distressed  HENT: normocephalic, atraumatic  EYES: no scleral icterus, PERRL  CV: regular rhythm, regular rate, no murmur  RESPIRATORY: normal effort, CTAB  ABDOMEN: soft   MUSCULOSKELETAL: no deformity, no calf tenderness or lower extremity edema bilaterally  NEURO: alert, moves all extremities, follows commands, mental status normal/baseline  SKIN: warm, dry, no rash   Psych: Appropriate mood and affect  Nursing notes and vital signs reviewed      LAB RESULTS  Recent Results (from the past 24 hour(s))   Comprehensive Metabolic Panel    Collection Time: 12/29/22  4:44 PM    Specimen: Blood   Result Value Ref Range    Glucose 98 65 - 99 mg/dL    BUN 24 (H) 8 - 23 mg/dL    Creatinine 1.03 0.76 - 1.27 mg/dL    Sodium 137 136 - 145 mmol/L    Potassium 3.9 3.5 - 5.2 mmol/L    Chloride 102 98 - 107 mmol/L    CO2 24.4 22.0 - 29.0 mmol/L    Calcium 9.5 8.6 - 10.5 mg/dL    Total Protein 7.1 6.0 - 8.5 g/dL    Albumin 4.2 3.5 - 5.2 g/dL    ALT (SGPT) 33 1 - 41 U/L    AST (SGOT) 32 1 - 40 U/L    Alkaline Phosphatase 73 39 - 117 U/L    Total Bilirubin 0.3 0.0 - 1.2 mg/dL    Globulin 2.9 gm/dL    A/G Ratio 1.4 g/dL    BUN/Creatinine Ratio 23.3 7.0 - 25.0    Anion Gap 10.6 5.0 - 15.0 mmol/L    eGFR 73.0 >60.0 mL/min/1.73   Troponin    Collection Time: 12/29/22  4:44 PM    Specimen: Blood   Result Value Ref Range    Troponin T <0.010 0.000 - 0.030 ng/mL   Protime-INR    Collection Time: 12/29/22   4:44 PM    Specimen: Blood   Result Value Ref Range    Protime 25.6 (H) 11.7 - 14.2 Seconds    INR 2.30 (H) 0.90 - 1.10   BNP    Collection Time: 12/29/22  4:44 PM    Specimen: Blood   Result Value Ref Range    proBNP 34.5 0.0 - 1,800.0 pg/mL   Green Top (Gel)    Collection Time: 12/29/22  4:44 PM   Result Value Ref Range    Extra Tube Hold for add-ons.    Lavender Top    Collection Time: 12/29/22  4:44 PM   Result Value Ref Range    Extra Tube hold for add-on    Gold Top - SST    Collection Time: 12/29/22  4:44 PM   Result Value Ref Range    Extra Tube Hold for add-ons.    Light Blue Top    Collection Time: 12/29/22  4:44 PM   Result Value Ref Range    Extra Tube Hold for add-ons.    CBC Auto Differential    Collection Time: 12/29/22  4:44 PM    Specimen: Blood   Result Value Ref Range    WBC 7.23 3.40 - 10.80 10*3/mm3    RBC 4.56 4.14 - 5.80 10*6/mm3    Hemoglobin 13.9 13.0 - 17.7 g/dL    Hematocrit 44.5 37.5 - 51.0 %    MCV 97.6 (H) 79.0 - 97.0 fL    MCH 30.5 26.6 - 33.0 pg    MCHC 31.2 (L) 31.5 - 35.7 g/dL    RDW 12.6 12.3 - 15.4 %    RDW-SD 44.7 37.0 - 54.0 fl    MPV 11.7 6.0 - 12.0 fL    Platelets 158 140 - 450 10*3/mm3    Neutrophil % 67.5 42.7 - 76.0 %    Lymphocyte % 18.4 (L) 19.6 - 45.3 %    Monocyte % 7.6 5.0 - 12.0 %    Eosinophil % 5.4 0.3 - 6.2 %    Basophil % 0.7 0.0 - 1.5 %    Immature Grans % 0.4 0.0 - 0.5 %    Neutrophils, Absolute 4.88 1.70 - 7.00 10*3/mm3    Lymphocytes, Absolute 1.33 0.70 - 3.10 10*3/mm3    Monocytes, Absolute 0.55 0.10 - 0.90 10*3/mm3    Eosinophils, Absolute 0.39 0.00 - 0.40 10*3/mm3    Basophils, Absolute 0.05 0.00 - 0.20 10*3/mm3    Immature Grans, Absolute 0.03 0.00 - 0.05 10*3/mm3    nRBC 0.0 0.0 - 0.2 /100 WBC   Troponin    Collection Time: 12/29/22  7:35 PM    Specimen: Blood   Result Value Ref Range    Troponin T <0.010 0.000 - 0.030 ng/mL   Lipid Panel    Collection Time: 12/29/22  7:35 PM    Specimen: Blood   Result Value Ref Range    Total Cholesterol 157 0 - 200  mg/dL    Triglycerides 164 (H) 0 - 150 mg/dL    HDL Cholesterol 50 40 - 60 mg/dL    LDL Cholesterol  79 0 - 100 mg/dL    VLDL Cholesterol 28 5 - 40 mg/dL    LDL/HDL Ratio 1.48        Ordered the above labs and independently reviewed the results.      RADIOLOGY  XR Chest 1 View    Result Date: 12/29/2022  PORTABLE CHEST X-RAY  HISTORY: Chest pain.  Portable chest x-ray is provided. Correlation: Chest x-ray 11/21/2022.  FINDINGS: The cardiomediastinal silhouette is normal. The lungs are clear. The costophrenic sulci are dry and the bones appear normal. There is no pneumothorax. Chronic bilateral massive rotator cuff tears.      No acute abnormality.  This report was finalized on 12/29/2022 5:15 PM by Dr. Robert Christiansen M.D.        I ordered the above noted radiological studies and viewed the images on the PACS system.       EKG      Independently viewed by me and interpreted by Dr Lilly         MEDICAL RECORD REVIEW  [facility], patient was admitted 11/21/2022 through 11/23/2022 for chest pain.  During this admission he had an echocardiogram and a stress test.  Echo showed an EF of 61 to 65%, normal left ventricular diastolic function and right atrial cavity borderline dilated.      PROCEDURES    Procedures        DIFFERENTIAL DIAGNOSIS  Differential diagnosis for chest pain include but are not limited to the following:  Anxiety, muscle strain, costochondritis, pleurisy, herpes zoster, MI, ACS, Aortic dissection, PE, pneumonia, pneumothorax, GERD        PROGRESS, DATA ANALYSIS, CONSULTS, AND MEDICAL DECISION MAKING        ED Course as of 12/29/22 2304   Thu Dec 29, 2022   2041 Patient is an 82-year-old who presents today with a 45-minute episode of diffuse chest pressure around 215 today.  He states it lasted approximately 45 minutes which currently resolved.  He does have a history of CAD and is anticoagulated on Coumadin.  Discussed with him that thus far work-up is unremarkable however due to  his age, history and symptoms we will plan to admit to the hospital for cardiology consult.  He is agreeable to this plan. [MS]   2043 Consult Note    Discussed care with TOYA Tamez  Reviewed patient's history, exam, results and need for admission secondary to Chest pain  Dr. Yadav accepts the patient to be admitted to observation unit bed.     [MS]      ED Course User Index  [MS] Hawa Garcia, APRN     ADMISSION    Discussed treatment plan and reason for admission with pt/family and admitting physician.  Pt/family voiced understanding of the plan for admission for further testing/treatment as needed.      DIAGNOSIS  Final diagnoses:   Chest pain, unspecified type   Anticoagulated           MEDICATIONS GIVEN IN ED    Medications   sodium chloride 0.9 % flush 10 mL (has no administration in time range)   sodium chloride 0.9 % flush 10 mL (10 mL Intravenous Given 12/29/22 2112)   sodium chloride 0.9 % flush 10 mL (has no administration in time range)   sodium chloride 0.9 % infusion 40 mL (has no administration in time range)           COURSE & MEDICAL DECISION MAKING  Any/All labs and Any/All Imaging studies that were ordered were reviewed and are noted above.  Results were reviewed/discussed with the patient and they were also made aware of online access.    Pt also made aware that some labs, such as cultures, will not be resulted during ER visit and followup with PMD is necessary.        Hawa Garcia, APRN  12/29/22 4200

## 2022-12-30 NOTE — DISCHARGE INSTRUCTIONS
[facility]  [address] [place] [address]    Coronary Angiogram (Radial/Ulnar Approach) After Care    Refer to this sheet in the next few weeks. These instructions provide you with information on caring for yourself after your procedure. Your caregiver may also give you more specific instructions. Your treatment has been planned according to current medical practices, but problems sometimes occur. Call your caregiver if you have any problems or questions after your procedure.    Home Care Instructions:  You may shower the day after the procedure. Remove the bandage (dressing) and gently wash the site with plain soap and water. Gently pat the site dry. You may apply a band aid daily for 2 days if desired.    Do not apply powder or lotion to the site.  Do not submerge the affected site in water for 3 to 5 days or until the site is completely healed.   Do not lift, push or pull anything over 5 pounds for 5 days after your procedure or as directed by your physician.  As a reference, a gallon of milk weighs 8 pounds.   Inspect the site at least twice daily. You may notice some bruising at the site and it may be tender for 1 to 2 weeks.     Increase your fluid intake for the next 2 days.    Keep arm elevated for 24 hours. For the remainder of the day, keep your arm in “Pledge of Allegiance” position when up and about.     You may drive 24 hours after the procedure unless otherwise instructed by your caregiver.  Do not operate machinery or power tools for 24 hours.  A responsible adult should be with you for the first 24 hours after you arrive home. Do not make any important legal decisions or sign legal papers for 24 hours.  Do not drink alcohol for 24 hours.    Metformin or any medications containing Metformin should not be taken for 48 hours after your procedure.      Call Your Doctor if:   You have unusual pain at the radial/ulnar (wrist) site.  You have redness, warmth, swelling, or pain at the  radial/ulnar (wrist) site.  You have drainage (other than a small amount of blood on the dressing).  `You have chills or a fever > 101.  Your arm becomes pale or dark, cool, tingly, or numb.  You develop chest pain, shortness of breath, feel faint or pass out.    You have heavy bleeding from the site, hold pressure on the site for 20 minutes.  If the bleeding stops, apply a fresh bandage and call your cardiologist.  However, if you        continue to have bleeding, call 911 and continue to apply pressure to the site.   You have any symptoms of a stroke.  Remember BE FAST  B-balance. Sudden trouble walking or loss of balance.  E-eyes.  Sudden changes in how you see or a sudden onset of a very bad headache.   F-face. Sudden weakness or loss of feeling of the face or facial droop on one side.   A-arms Sudden weakness or numbness in one arm.  One arm drifts down if they are both held out in front of you. This happens suddenly and usually on one side of the body.   S-speech.  Sudden trouble speaking, slurred speech or trouble understanding what are saying.   T-time  Time to call emergency services.  Write down the symptoms and the time they started.

## 2022-12-30 NOTE — H&P
[facility]   HISTORY AND PHYSICAL    Patient Name: David Comer Sr.  : 1940  MRN: [id number]  Primary Care Physician:  Chacha Pineda MD  Date of admission: 2022    Subjective   Subjective     Chief Complaint: Chest pain  History of Present Illness  David Comer Sr. is an 82-year-old gentleman presented to the emergency department with a complaint of chest pain.  He states he was driving today around 2 PM and started having a heavy crushing chest pain but did not radiate to his arm or his neck.  He states that the pain lasted about an hour and a half.  He states that he was nauseous but did not have any diaphoresis.  He states that he was recently admitted to the hospital 5 weeks ago for the same type of chest pain except for today was much worse than the previous pain.  He has not had any additional pain since 2 PM.  He has a past medical history including, but not limited to, BPH, degenerative disc disease, atrial fibrillation, and scoliosis.  Review of Systems   Constitutional: Negative.    HENT: Negative.    Eyes: Negative.    Respiratory: Negative.  Negative for shortness of breath.    Cardiovascular: Positive for chest pain. Negative for palpitations and leg swelling.   Gastrointestinal: Positive for nausea. Negative for abdominal pain and vomiting.   Endocrine: Negative.    Genitourinary: Negative.    Musculoskeletal: Negative.    Skin: Negative.    Allergic/Immunologic: Negative.    Neurological: Negative.    Hematological: Negative.    Psychiatric/Behavioral: Negative.         Personal History     Past Medical History:   Diagnosis Date   • AAA (abdominal aortic aneurysm) without rupture    • Aneurysm of thoracic aorta     CT CHEST 2021 IN [facility]    • Arthritis    • Basal cell carcinoma     RIGHT LOWER LID    • BPH (benign prostatic hyperplasia)    • Chronic lumbar pain    • Degenerative arthritis of lumbar spine    • Degenerative cervical disc    • Disease of thyroid gland    •  Elevated rheumatoid factor    • History of atrial fibrillation    • Hyperlipidemia    • Hypertension    • Hypogonadism in male    • Muscle strain of left upper back     PRESCRIBED PREDNISONE DOSE PACK    • On anticoagulant therapy    • Pulmonary nodule     6 MM - REPEAT CT SCAN IN ONE YEAR, WATCHING    • Refused influenza vaccine    • Scoliosis    • Vitamin D deficiency        Past Surgical History:   Procedure Laterality Date   • ANKLE FUSION Right    • CARDIAC CATHETERIZATION     • CARDIAC CATHETERIZATION N/A 7/2/2021    Procedure: Left Heart Cath;  Surgeon: Harlan Downing MD;  [facility];  Service: Cardiology;  Laterality: N/A;   • CARDIAC CATHETERIZATION N/A 7/2/2021    Procedure: Coronary angiography;  Surgeon: Harlan Downing MD;  [facility];  Service: Cardiology;  Laterality: N/A;   • CARDIAC CATHETERIZATION N/A 7/2/2021    Procedure: Left ventriculography;  Surgeon: Harlan Downing MD;  [facility];  Service: Cardiology;  Laterality: N/A;   • HOBBS TUBE INSERTION Right 10/19/2021    Procedure: RIGHT SECOND STAGE CAST TAKEDOWN RECONSTRUCTION;  Surgeon: Brian Bhatt MD;  Location: [facility];  Service: Ophthalmology;  Laterality: Right;   • ENTROPIAN REPAIR Right 9/21/2021    Procedure: RIGHT LOWER LID EXCISION OF BASAL CELL CARCINOMA WITH FROZEN SECTION RIGHT LOWER LID RECONSTRUCTION WITH CAST FLAP, REPAIR OF CANULICULIS, AND FULL THICKNESS SKIN GRAFT;  Surgeon: Brian Bhatt MD;  Location: [facility] OR [facility] [place];  Service: Ophthalmology;  Laterality: Right;   • INGUINAL HERNIA REPAIR Right    • REPLACEMENT TOTAL KNEE Bilateral 2000   • ROTATOR CUFF REPAIR Left    • ROTATOR CUFF REPAIR Right    • SKIN BIOPSY     • VASECTOMY         Family History: family history includes Alcohol abuse in his brother; Anemia in his mother; Arthritis in his mother; Heart attack in his brother and  father; Heart disease in an other family member; Hypertension in his brother, father, maternal grandmother, mother, paternal grandfather, and paternal grandmother. Otherwise pertinent FHx was reviewed and not pertinent to current issue.    Social History:  reports that he has never smoked. He has never used smokeless tobacco. He reports that he does not drink alcohol and does not use drugs.    Home Medications:  acetaminophen, amiodarone, doxazosin, esomeprazole, hydrALAZINE, isosorbide mononitrate, levothyroxine, metoprolol succinate XL, nebivolol, olmesartan-hydrochlorothiazide, rosuvastatin, and warfarin    Allergies:  No Known Allergies    Objective    Objective     Vitals:   Temp:  [96.1 °F (35.6 °C)] 96.1 °F (35.6 °C)  Heart Rate:  [62-93] 62  Resp:  [16] 16  BP: (121-129)/(81-94) 121/82    Physical Exam  Vitals and nursing note reviewed.   Constitutional:       Appearance: Normal appearance.   Cardiovascular:      Rate and Rhythm: Normal rate and regular rhythm.      Heart sounds: S1 normal and S2 normal. No murmur heard.  Pulmonary:      Effort: Pulmonary effort is normal.      Breath sounds: Normal breath sounds.   Abdominal:      General: Bowel sounds are normal.      Palpations: Abdomen is soft.   Musculoskeletal:         General: Normal range of motion.   Skin:     General: Skin is warm and dry.      Capillary Refill: Capillary refill takes less than 2 seconds.   Neurological:      General: No focal deficit present.      Mental Status: He is alert and oriented to person, place, and time.   Psychiatric:         Mood and Affect: Mood normal.         Behavior: Behavior normal.         Thought Content: Thought content normal.         Judgment: Judgment normal.         Result Review    Result Review:  I have personally reviewed the results from the time of this admission to 12/29/2022 21:01 EST and agree with these findings:  [x]  Laboratory list / accordion  []  Microbiology  [x]  Radiology  []   EKG/Telemetry   []  Cardiology/Vascular   []  Pathology  [x]  Old records  []  Other:      Assessment & Plan   Assessment / Plan     Brief Patient Summary:  David Comer Sr. is a 82 y.o. male who was admitted to the observation unit for further evaluation and treatment of his chest pain.    Active Hospital Problems:  Active Hospital Problems    Diagnosis    • **Chest pain      Plan:     Chest pain  -Cardiac monitoring  -Vital signs every 4 hours  -Cardiology consult  -Troponin less than 0.010, trend  -EKG-sinus rhythm incomplete right bundle branch block,  -N.p.o. after midnight      Atrial fibrillation  -Cardiac monitoring  -Continue Coumadin  -Family to bring in medication with dosage list in a.m.    DVT prophylaxis:  Mechanical DVT prophylaxis orders are present.    CODE STATUS:    Code Status (Patient has no pulse and is not breathing): CPR (Attempt to Resuscitate)  Medical Interventions (Patient has pulse or is breathing): Full Support  Release to patient: Routine Release    Admission Status:  I believe this patient meets observation status.    Dorothy Griffin, APRN

## 2022-12-31 PROCEDURE — G0378 HOSPITAL OBSERVATION PER HR: HCPCS

## 2023-01-01 PROCEDURE — G0378 HOSPITAL OBSERVATION PER HR: HCPCS

## 2023-01-02 PROCEDURE — G0378 HOSPITAL OBSERVATION PER HR: HCPCS

## 2023-01-03 PROCEDURE — G0378 HOSPITAL OBSERVATION PER HR: HCPCS

## 2023-01-04 PROCEDURE — G0378 HOSPITAL OBSERVATION PER HR: HCPCS

## 2023-01-05 PROCEDURE — G0378 HOSPITAL OBSERVATION PER HR: HCPCS

## 2023-01-05 NOTE — DISCHARGE SUMMARY
[facility]  Physician Discharge Summary    Patient Identification:  Name: David Comer Sr.  Age: 82 y.o.  Sex: male  :  1940  MRN: [id number]    Admit date: 2022    Discharge date and time:  22  Admitting Physician: Harlan Downing MD     Discharge Physician:  Harlan Downing MD    Discharge Diagnoses:   Chest pain  Patient Active Problem List   Diagnosis    Chronic coronary artery disease    Abdominal aortic aneurysm    Paroxysmal atrial fibrillation (HCC)    Gastroesophageal reflux disease    Essential hypertension    Mixed hyperlipidemia    Hypogonadism in male    Acquired hypothyroidism    Osteoarthritis of spine    Vitamin D deficiency    Long term (current) use of anticoagulants    Atrial fibrillation, rapid (HCC)    Syncope and collapse    Chest pain    Statin intolerance    Gross hematuria    Coumadin toxicity    Sepsis (HCC)    H/O amiodarone therapy    Dehydration    Renal insufficiency    Thoracic aortic aneurysm without rupture    Refused influenza vaccine    Medicare annual wellness visit, subsequent    BPH without obstruction/lower urinary tract symptoms    Thoracic aortic aneurysm    Kidney stone    Hand injury    Acute kidney injury (HCC)    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)    Traumatic hematoma of right knee    Arthritis    SOB (shortness of breath)    Fatigue    Abnormal nuclear stress test    Acute left-sided low back pain without sciatica    Lt groin pain    Stool incontinence    Aspiration pneumonia (HCC)    Hospital discharge follow-up    Chest pain, unspecified type       Discharged Condition: stable    Hospital Course:      This is a 82-year-old male who is well-known to our service.  He has a history to include AAA, rheumatoid arthritis, hypothyroidism, PAF, vitamin D deficiency, hypertension, hyperlipidemia and history of pulmonary nodule.  He presented to [facility] with chest pain.  Underwent a  "cardiac cath which revealed early Isoclor plaque otherwise normal coronary arteries.  He was sent home in stable condition after his cardiac cath.  No changes were made medical management.    Consults:   IP CONSULT TO CARDIOLOGY    Discharge Exam:             Physical Exam  /79   Pulse 74   Temp 97.9 °F (36.6 °C) (Oral)   Resp 16   Ht 182.9 cm (72\")   Wt 109 kg (240 lb)   SpO2 96%   BMI 32.55 kg/m²     General appearance: No acute changes   Eyes: Sclerae conjunctivae normal, pupils reactive   HENT: Atraumatic; oropharynx clear with moist mucous membranes and no mucosal ulcerations;  Neck: Trachea midline; NECK, supple, no thyromegaly or lymphadenopathy   Lungs: Normal size and shape, normal breath sounds, equal distribution of air, no rales and rhonchi   CV: S1-S2 regular, no murmurs, no rub, no gallop   Abdomen: Soft, nontender; no masses , no abnormal abdominal sounds   Extremities: No deformity , normal color , no peripheral edema   Skin: Normal temperature, turgor and texture; no rash, ulcers  Psych: Appropriate affect, alert and oriented to person, place and time               LABS:  Results from last 7 days   Lab Units 12/30/22  0418 12/29/22  1935 12/29/22  1644   TROPONIN T ng/mL <0.010 <0.010 <0.010     Results from last 7 days   Lab Units 12/30/22  0418   MAGNESIUM mg/dL 1.9     Results from last 7 days   Lab Units 12/30/22  0418   SODIUM mmol/L 139   POTASSIUM mmol/L 3.7   BUN mg/dL 21   CREATININE mg/dL 0.91   CALCIUM mg/dL 8.6      Results from last 7 days   Lab Units 12/30/22  0418 12/29/22  1644   WBC 10*3/mm3 6.87 7.23   HEMOGLOBIN g/dL 12.4* 13.9   HEMATOCRIT % 36.5* 44.5   PLATELETS 10*3/mm3 140 158     Results from last 7 days   Lab Units 12/30/22  0849 12/29/22  1644   INR  2.06* 2.30*     Results from last 7 days   Lab Units 12/29/22  1935   CHOLESTEROL mg/dL 157   TRIGLYCERIDES mg/dL 164*   HDL CHOL mg/dL 50   LDL CHOL mg/dL 79     Disposition:  Home    Discharge Medications:    "   Discharge Medications        Changes to Medications        Instructions Start Date   doxazosin 2 MG tablet  Commonly known as: CARDURA  What changed: when to take this   Take 1 tablet by mouth once daily      rosuvastatin 5 MG tablet  Commonly known as: CRESTOR  What changed: when to take this   Take 1 tablet by mouth once daily             Continue These Medications        Instructions Start Date   acetaminophen 325 MG tablet  Commonly known as: TYLENOL   650 mg, Oral, Every 4 Hours PRN      amiodarone 200 MG tablet  Commonly known as: PACERONE   Take 1/2 (one-half) tablet by mouth once daily      esomeprazole 20 MG capsule  Commonly known as: nexIUM   20 mg, Oral, Every Morning Before Breakfast      hydrALAZINE 25 MG tablet  Commonly known as: APRESOLINE   TAKE 1 TABLET BY MOUTH THREE TIMES DAILY      isosorbide mononitrate 30 MG 24 hr tablet  Commonly known as: IMDUR   Take 1 tablet by mouth once daily      levothyroxine 112 MCG tablet  Commonly known as: SYNTHROID, LEVOTHROID   Take 1 tablet by mouth once daily      metoprolol succinate XL 50 MG 24 hr tablet  Commonly known as: Toprol XL   50 mg, Oral, Daily      nebivolol 5 MG tablet  Commonly known as: BYSTOLIC   5 mg, Oral, Daily      olmesartan-hydrochlorothiazide 40-25 MG per tablet  Commonly known as: BENICAR HCT   TAKE ONE TABLET BY MOUTH DAILY      warfarin 7.5 MG tablet  Commonly known as: COUMADIN   TAKE 1 TABLET BY MOUTH ONCE DAILY AT NIGHT             The following medical decision was discussed in detail with Dr. Downing  ]    Discharge Home Instructions:     1. Return to ER per EMS for any recurrent symptoms including, but not limited to: chest pain/pressure/tightness, weakness, Shortness of breath, dizziness, palpitations, near syncope or syncope    2. Routine post cardiac catheterization/PCI discharge home care instructions:    1. No submerging procedure site below water for 7-10 days.  2. No lifting objects greater than 1 lbs for 3  "days.  3. If groin site used, avoid climbing several flights of stairs or sitting for longer than 2 hours at a time for the next 24 hours.   4. Monitor puncture site for bleeding and/or knots;. If bleeding should occur at the groin site: lie flat, apply pressure and return to the ER. If bleeding should occur at the wrist site, apply pressure and return to the ER.  5.  You may apply a DRY Band-Aid over the puncture site if needed. Do not apply any lotions, salves or ointments to site.  6. No driving for 3 days.  7. Return to ER for recurrent symptoms.  8. No smoking.  9. Take all medications as prescribed.         Signed:   Harlan Downing MD    1/5/2023  09:33 EST      EMR Dragon/Transcription:   \"Dictated utilizing Dragon dictation\".     "

## 2023-01-06 PROCEDURE — G0378 HOSPITAL OBSERVATION PER HR: HCPCS

## 2023-01-07 PROCEDURE — G0378 HOSPITAL OBSERVATION PER HR: HCPCS

## 2023-01-08 PROCEDURE — G0378 HOSPITAL OBSERVATION PER HR: HCPCS

## 2023-01-09 PROCEDURE — G0378 HOSPITAL OBSERVATION PER HR: HCPCS

## 2023-01-11 ENCOUNTER — HOSPITAL ENCOUNTER (OUTPATIENT)
Dept: CARDIOLOGY | Facility: HOSPITAL | Age: 83
Discharge: HOME | End: 2023-01-11
Admitting: INTERNAL MEDICINE
Payer: MEDICARE

## 2023-01-11 ENCOUNTER — OFFICE VISIT (OUTPATIENT)
Dept: CARDIOLOGY | Facility: CLINIC | Age: 83
End: 2023-01-11
Payer: MEDICARE

## 2023-01-11 ENCOUNTER — ANTICOAGULATION VISIT (OUTPATIENT)
Dept: CARDIOLOGY | Facility: CLINIC | Age: 83
End: 2023-01-11
Payer: MEDICARE

## 2023-01-11 VITALS
SYSTOLIC BLOOD PRESSURE: 120 MMHG | WEIGHT: 246 LBS | BODY MASS INDEX: 33.32 KG/M2 | DIASTOLIC BLOOD PRESSURE: 80 MMHG | HEIGHT: 72 IN | HEART RATE: 70 BPM

## 2023-01-11 DIAGNOSIS — I48.0 PAROXYSMAL ATRIAL FIBRILLATION: ICD-10-CM

## 2023-01-11 DIAGNOSIS — Z79.01 LONG TERM (CURRENT) USE OF ANTICOAGULANTS: ICD-10-CM

## 2023-01-11 DIAGNOSIS — Z98.890 STATUS POST CARDIAC CATHETERIZATION: ICD-10-CM

## 2023-01-11 DIAGNOSIS — Z09 HOSPITAL DISCHARGE FOLLOW-UP: Primary | ICD-10-CM

## 2023-01-11 LAB — INR PPP: 2.5

## 2023-01-11 PROCEDURE — 85610 PROTHROMBIN TIME: CPT | Performed by: INTERNAL MEDICINE

## 2023-01-11 PROCEDURE — 99214 OFFICE O/P EST MOD 30 MIN: CPT | Performed by: NURSE PRACTITIONER

## 2023-01-11 NOTE — PROGRESS NOTES
Subjective:        David Comer Sr. is a 82 y.o. male who here for follow up    No chief complaint on file.  Hospital follow-up status post cardiac cath    HPI    This is an 82-year-old male who is well-known to our service.  He presented to [facility] with chest pain.  He has a history to include AAA, rheumatoid arthritis, hypothyroidism, PAF, vitamin D deficiency, hypertension, hyperlipidemia and history of pulmonary nodule.  He underwent a cardiac cath which revealed early atherosclerotic plaque otherwise normal coronary arteries.  He was sent home in stable condition.    Echo revealed EF 61 to 65%, LV diastolic function was normal, and RAC is borderline dilated.    The following portions of the patient's history were reviewed and updated as appropriate: allergies, current medications, past family history, past medical history, past social history, past surgical history and problem list.    Past Medical History:   Diagnosis Date   • AAA (abdominal aortic aneurysm) without rupture    • Aneurysm of thoracic aorta     CT CHEST 8/2021 [facility]    • Arthritis    • Basal cell carcinoma     RIGHT LOWER LID    • BPH (benign prostatic hyperplasia)    • Chronic lumbar pain    • Degenerative arthritis of lumbar spine    • Degenerative cervical disc    • Disease of thyroid gland    • Elevated rheumatoid factor    • History of atrial fibrillation    • Hyperlipidemia    • Hypertension    • Hypogonadism in male    • Muscle strain of left upper back     PRESCRIBED PREDNISONE DOSE PACK    • On anticoagulant therapy    • Pulmonary nodule     6 MM - REPEAT CT SCAN IN ONE YEAR, WATCHING    • Refused influenza vaccine    • Scoliosis    • Vitamin D deficiency          reports that he has never smoked. He has never used smokeless tobacco. He reports that he does not drink alcohol and does not use drugs.     Family History   Problem Relation Age of Onset   • Hypertension Father    • Heart attack Father    • Heart  attack Brother    • Alcohol abuse Brother    • Hypertension Brother    • Hypertension Paternal Grandmother    • Hypertension Paternal Grandfather    • Anemia Mother    • Arthritis Mother    • Hypertension Mother    • Hypertension Maternal Grandmother    • Heart disease Other         FH in males before age 55   • Malig Hyperthermia Neg Hx        ROS     Review of Systems  Constitutional: No wt loss, fever, fatigue  Gastrointestinal: No nausea, abdominal pain  Behavioral/Psych: No insomnia or anxiety  Cardiovascular : denies any chest pain, and shortness of breath      Objective:           Vitals and nursing note reviewed.   Constitutional:       Appearance: Well-developed.   HENT:      Head: Normocephalic.      Right Ear: External ear normal.      Left Ear: External ear normal.   Neck:      Vascular: No JVD.   Pulmonary:      Effort: Pulmonary effort is normal. No respiratory distress.      Breath sounds: Normal breath sounds. No stridor. No rales.   Cardiovascular:      Normal rate. Regular rhythm.      No gallop.      Comments: Cardiac cath shows no s/s of infection or hematoma noted  Pulses:     Intact distal pulses.   Abdominal:      General: Bowel sounds are normal. There is no distension.      Palpations: Abdomen is soft.      Tenderness: There is no abdominal tenderness. There is no guarding.   Musculoskeletal: Normal range of motion.         General: No tenderness.      Cervical back: Normal range of motion. Skin:     General: Skin is warm.   Neurological:      Mental Status: Alert and oriented to person, place, and time.      Deep Tendon Reflexes: Reflexes are normal and symmetric.   Psychiatric:         Judgment: Judgment normal.         Procedures      12/30/2022    Early atherosclerotic plaque otherwise normal coronary artery  Normal LV gram    Recommendations:    Medical management    11/23/2022  Interpretation Summary       •  Findings consistent with a normal ECG stress test.  •  Left ventricular  ejection fraction is normal (Calculated EF = 60%).  •  Myocardial perfusion imaging indicates a small-sized, mildly severe area of ischemia located in the inferior wall.  •  Impressions are consistent with an intermediate risk study.     11/22/2022  Interpretation Summary       •  Left ventricular ejection fraction appears to be 61 - 65%.  •  Left ventricular diastolic function was normal.  •  The right atrial cavity is borderline dilated.         Current Outpatient Medications:   •  acetaminophen (TYLENOL) 325 MG tablet, Take 2 tablets by mouth Every 4 (Four) Hours As Needed for Mild Pain ., Disp:  , Rfl:   •  amiodarone (PACERONE) 200 MG tablet, Take 1/2 (one-half) tablet by mouth once daily (Patient taking differently: Take 100 mg by mouth Daily.), Disp: 45 tablet, Rfl: 0  •  doxazosin (CARDURA) 2 MG tablet, Take 1 tablet by mouth once daily (Patient taking differently: Take 2 mg by mouth Every Night.), Disp: 90 tablet, Rfl: 0  •  esomeprazole (nexIUM) 20 MG capsule, Take 20 mg by mouth Every Morning Before Breakfast., Disp: , Rfl:   •  hydrALAZINE (APRESOLINE) 25 MG tablet, TAKE 1 TABLET BY MOUTH THREE TIMES DAILY (Patient taking differently: Take 25 mg by mouth 3 (Three) Times a Day.), Disp: 270 tablet, Rfl: 0  •  isosorbide mononitrate (IMDUR) 30 MG 24 hr tablet, Take 1 tablet by mouth once daily (Patient taking differently: Take 30 mg by mouth Daily.), Disp: 90 tablet, Rfl: 0  •  levothyroxine (SYNTHROID, LEVOTHROID) 112 MCG tablet, Take 1 tablet by mouth once daily, Disp: 90 tablet, Rfl: 0  •  metoprolol succinate XL (Toprol XL) 50 MG 24 hr tablet, Take 1 tablet by mouth Daily., Disp: 90 tablet, Rfl: 1  •  nebivolol (BYSTOLIC) 5 MG tablet, Take 5 mg by mouth Daily., Disp: , Rfl:   •  olmesartan-hydrochlorothiazide (BENICAR HCT) 40-25 MG per tablet, TAKE ONE TABLET BY MOUTH DAILY (Patient taking differently: Take 1 tablet by mouth Daily.), Disp: 90 tablet, Rfl: 0  •  rosuvastatin (CRESTOR) 5 MG tablet, Take 1  tablet by mouth once daily (Patient taking differently: Take 5 mg by mouth Every Night.), Disp: 90 tablet, Rfl: 1  •  warfarin (COUMADIN) 7.5 MG tablet, TAKE 1 TABLET BY MOUTH ONCE DAILY AT NIGHT (Patient taking differently: Take 7.5 mg by mouth Every Night.), Disp: 90 tablet, Rfl: 1  No current facility-administered medications for this visit.     Assessment:        Patient Active Problem List   Diagnosis   • Chronic coronary artery disease   • Abdominal aortic aneurysm   • Paroxysmal atrial fibrillation (HCC)   • Gastroesophageal reflux disease   • Essential hypertension   • Mixed hyperlipidemia   • Hypogonadism in male   • Acquired hypothyroidism   • Osteoarthritis of spine   • Vitamin D deficiency   • Long term (current) use of anticoagulants   • Atrial fibrillation, rapid (HCC)   • Syncope and collapse   • Chest pain   • Statin intolerance   • Gross hematuria   • Coumadin toxicity   • Sepsis ([facility])   • H/O amiodarone therapy   • Dehydration   • Renal insufficiency   • Thoracic aortic aneurysm without rupture   • Refused influenza vaccine   • Medicare annual wellness visit, subsequent   • BPH without obstruction/lower urinary tract symptoms   • Thoracic aortic aneurysm   • Kidney stone   • Hand injury   • Acute kidney injury (HCC)   • 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)   • Traumatic hematoma of right knee   • Arthritis   • SOB (shortness of breath)   • Fatigue   • Abnormal nuclear stress test   • Acute left-sided low back pain without sciatica   • Lt groin pain   • Stool incontinence   • Aspiration pneumonia (HCC)   • Hospital discharge follow-up   • Chest pain, unspecified type               Plan:   1.  Hospitalization for CAD status post cardiac cath: Cardiac cath site shows no signs or symptoms of infection or hematoma noted.  He denies any chest pain.  Medical management suggested     Risk reduction for the coronary artery disease, controlling the blood pressure, blood sugar  management, cholesterol management, exercise, stress management, and proper compliance with medications and follow-up has been discussed.        2.  AAA: He has an order placed for a CT for next year    3.  Paroxysmal atrial fibrillation: Recent TSH 4.110.  He follows up with his PCP regarding his thyroid function.  His most recent chest x-ray revealed no acute abnormality.  Under controlled on amnio and Eliquis.      4.  Hypertension: Today in the office his blood pressure is stable.  Continue current management.    Pros and cons as well as indication of the anticoagulation has been explained to the patient in detail. There are no obvious complications at this stage. Risk of  the bleedings has been explained. Need for the regular blood workup and adjust the dose has been explained. Need for proper follow-up on anticoagulation also has been explained.       5. Long-term anticoagulation: He denies any bleeding.    6.  Hypertension: Today his blood pressure is controlled on current medications.  No changes.    Educated patient on exercising for at least 30 minutes a day for 2 to 3 days a week. Importance of controlling hypertension and blood pressure checkup on the regular basis has been explained. Hypertension as a silent killer has been discussed. Risk reduction of the weight and regular exercises to control the hypertension has been explained.         No diagnosis found.    There are no diagnoses linked to this encounter.    COUNSELING: done    David Comer Sr.Counseling was given to patient for the following topics: diagnostic results, risk factor reductions, impressions, risks and benefits of treatment options and importance of treatment compliance .       SMOKING COUNSELING: denies     He will follow up in 1 year, unless he needs to be seen sooner.    Sincerely,   ALVA Saunders  [facility]  01/11/23  12:55 EST    EMR Dragon/Transcription disclaimer:   Much of this encounter note is an  electronic transcription/translation of spoken language to printed text. The electronic translation of spoken language may permit erroneous, or at times, nonsensical words or phrases to be inadvertently transcribed; Although I have reviewed the note for such errors, some may still exist.

## 2023-01-11 NOTE — PATIENT INSTRUCTIONS
Follow instructions on the calender for coumadin dose.  Return for your follow- up appointment on :  02/10/2023  12 pm ( [place] Time)  Call and reschedule if unable to keep appointment

## 2023-01-13 RX ORDER — ROSUVASTATIN CALCIUM 5 MG/1
TABLET, COATED ORAL
Qty: 90 TABLET | Refills: 1 | Status: SHIPPED | OUTPATIENT
Start: 2023-01-13

## 2023-01-21 DIAGNOSIS — I10 ESSENTIAL HYPERTENSION: ICD-10-CM

## 2023-01-23 RX ORDER — OLMESARTAN MEDOXOMIL AND HYDROCHLOROTHIAZIDE 40/25 40; 25 MG/1; MG/1
TABLET ORAL
Qty: 90 TABLET | Refills: 0 | Status: SHIPPED | OUTPATIENT
Start: 2023-01-23

## 2023-01-23 NOTE — TELEPHONE ENCOUNTER
Rx Refill Note  Requested Prescriptions     Pending Prescriptions Disp Refills   • olmesartan-hydrochlorothiazide (BENICAR HCT) 40-25 MG per tablet [Pharmacy Med Name: OLMESARTAN-HCTZ 40-25 MG TAB] 90 tablet 0     Sig: TAKE ONE TABLET BY MOUTH DAILY      Last office visit with prescribing clinician: 11/29/2022   Last telemedicine visit with prescribing clinician: Visit date not found   Next office visit with prescribing clinician: Visit date not found

## 2023-01-26 RX ORDER — DOXAZOSIN 2 MG/1
2 TABLET ORAL NIGHTLY
Qty: 90 TABLET | Refills: 0 | Status: SHIPPED | OUTPATIENT
Start: 2023-01-26

## 2023-01-28 ENCOUNTER — APPOINTMENT (OUTPATIENT)
Dept: CT IMAGING | Facility: HOSPITAL | Age: 83
DRG: 178 | End: 2023-01-28
Payer: MEDICARE

## 2023-01-28 ENCOUNTER — APPOINTMENT (OUTPATIENT)
Dept: GENERAL RADIOLOGY | Facility: HOSPITAL | Age: 83
DRG: 178 | End: 2023-01-28
Payer: MEDICARE

## 2023-01-28 ENCOUNTER — HOSPITAL ENCOUNTER (INPATIENT)
Facility: HOSPITAL | Age: 83
LOS: 2 days | Discharge: HOME | DRG: 178 | End: 2023-01-30
Attending: EMERGENCY MEDICINE | Admitting: INTERNAL MEDICINE
Payer: MEDICARE

## 2023-01-28 DIAGNOSIS — E87.20 LACTIC ACIDOSIS: ICD-10-CM

## 2023-01-28 DIAGNOSIS — I48.91 ATRIAL FIBRILLATION WITH RVR: ICD-10-CM

## 2023-01-28 DIAGNOSIS — R65.21 SEPSIS WITH ACUTE RENAL FAILURE, TUBULAR NECROSIS, AND SEPTIC SHOCK, DUE TO UNSPECIFIED ORGANISM: ICD-10-CM

## 2023-01-28 DIAGNOSIS — U07.1 COVID-19 VIRUS INFECTION: ICD-10-CM

## 2023-01-28 DIAGNOSIS — R53.1 GENERALIZED WEAKNESS: Primary | ICD-10-CM

## 2023-01-28 DIAGNOSIS — N17.0 SEPSIS WITH ACUTE RENAL FAILURE, TUBULAR NECROSIS, AND SEPTIC SHOCK, DUE TO UNSPECIFIED ORGANISM: ICD-10-CM

## 2023-01-28 DIAGNOSIS — I95.9 HYPOTENSION, UNSPECIFIED HYPOTENSION TYPE: ICD-10-CM

## 2023-01-28 DIAGNOSIS — R77.8 TROPONIN LEVEL ELEVATED: ICD-10-CM

## 2023-01-28 DIAGNOSIS — E86.9 VOLUME DEPLETION: ICD-10-CM

## 2023-01-28 DIAGNOSIS — A41.9 SEPSIS WITH ACUTE RENAL FAILURE, TUBULAR NECROSIS, AND SEPTIC SHOCK, DUE TO UNSPECIFIED ORGANISM: ICD-10-CM

## 2023-01-28 DIAGNOSIS — N17.0 ACUTE KIDNEY INJURY (AKI) WITH ACUTE TUBULAR NECROSIS (ATN): ICD-10-CM

## 2023-01-28 LAB
ABO GROUP BLD: NORMAL
ALBUMIN SERPL-MCNC: 3.7 G/DL (ref 3.5–5.2)
ALBUMIN/GLOB SERPL: 1.4 G/DL
ALP SERPL-CCNC: 61 U/L (ref 39–117)
ALT SERPL W P-5'-P-CCNC: 21 U/L (ref 1–41)
AMPHET+METHAMPHET UR QL: NEGATIVE
ANION GAP SERPL CALCULATED.3IONS-SCNC: 14.6 MMOL/L (ref 5–15)
APTT PPP: 49.3 SECONDS (ref 22.7–35.4)
AST SERPL-CCNC: 19 U/L (ref 1–40)
BARBITURATES UR QL SCN: NEGATIVE
BASOPHILS # BLD AUTO: 0.03 10*3/MM3 (ref 0–0.2)
BASOPHILS NFR BLD AUTO: 0.4 % (ref 0–1.5)
BENZODIAZ UR QL SCN: NEGATIVE
BILIRUB SERPL-MCNC: 0.5 MG/DL (ref 0–1.2)
BILIRUB UR QL STRIP: NEGATIVE
BLD GP AB SCN SERPL QL: NEGATIVE
BUN SERPL-MCNC: 25 MG/DL (ref 8–23)
BUN/CREAT SERPL: 15.2 (ref 7–25)
CALCIUM SPEC-SCNC: 9 MG/DL (ref 8.6–10.5)
CANNABINOIDS SERPL QL: NEGATIVE
CHLORIDE SERPL-SCNC: 104 MMOL/L (ref 98–107)
CLARITY UR: CLEAR
CO2 SERPL-SCNC: 22.4 MMOL/L (ref 22–29)
COCAINE UR QL: NEGATIVE
COLOR UR: YELLOW
CREAT SERPL-MCNC: 1.64 MG/DL (ref 0.76–1.27)
D-LACTATE SERPL-SCNC: 1.4 MMOL/L (ref 0.5–2)
D-LACTATE SERPL-SCNC: 2.1 MMOL/L (ref 0.5–2)
D-LACTATE SERPL-SCNC: 3.1 MMOL/L (ref 0.5–2)
DEPRECATED RDW RBC AUTO: 41.4 FL (ref 37–54)
EGFRCR SERPLBLD CKD-EPI 2021: 41.8 ML/MIN/1.73
EOSINOPHIL # BLD AUTO: 0.12 10*3/MM3 (ref 0–0.4)
EOSINOPHIL NFR BLD AUTO: 1.5 % (ref 0.3–6.2)
ERYTHROCYTE [DISTWIDTH] IN BLOOD BY AUTOMATED COUNT: 11.9 % (ref 12.3–15.4)
ETHANOL BLD-MCNC: <10 MG/DL (ref 0–10)
ETHANOL UR QL: <0.01 %
FLUAV RNA RESP QL NAA+PROBE: NOT DETECTED
FLUBV RNA RESP QL NAA+PROBE: NOT DETECTED
GLOBULIN UR ELPH-MCNC: 2.7 GM/DL
GLUCOSE SERPL-MCNC: 130 MG/DL (ref 65–99)
GLUCOSE UR STRIP-MCNC: NEGATIVE MG/DL
HCT VFR BLD AUTO: 38.9 % (ref 37.5–51)
HGB BLD-MCNC: 13.3 G/DL (ref 13–17.7)
HGB UR QL STRIP.AUTO: NEGATIVE
IMM GRANULOCYTES # BLD AUTO: 0.02 10*3/MM3 (ref 0–0.05)
IMM GRANULOCYTES NFR BLD AUTO: 0.3 % (ref 0–0.5)
INR PPP: 2.22 (ref 0.9–1.1)
INR PPP: 2.36 (ref 0.9–1.1)
KETONES UR QL STRIP: NEGATIVE
LEUKOCYTE ESTERASE UR QL STRIP.AUTO: NEGATIVE
LYMPHOCYTES # BLD AUTO: 1.24 10*3/MM3 (ref 0.7–3.1)
LYMPHOCYTES NFR BLD AUTO: 16 % (ref 19.6–45.3)
MAGNESIUM SERPL-MCNC: 1.7 MG/DL (ref 1.6–2.4)
MCH RBC QN AUTO: 32 PG (ref 26.6–33)
MCHC RBC AUTO-ENTMCNC: 34.2 G/DL (ref 31.5–35.7)
MCV RBC AUTO: 93.5 FL (ref 79–97)
METHADONE UR QL SCN: NEGATIVE
MONOCYTES # BLD AUTO: 0.61 10*3/MM3 (ref 0.1–0.9)
MONOCYTES NFR BLD AUTO: 7.9 % (ref 5–12)
NEUTROPHILS NFR BLD AUTO: 5.74 10*3/MM3 (ref 1.7–7)
NEUTROPHILS NFR BLD AUTO: 73.9 % (ref 42.7–76)
NITRITE UR QL STRIP: NEGATIVE
NRBC BLD AUTO-RTO: 0 /100 WBC (ref 0–0.2)
OPIATES UR QL: NEGATIVE
OXYCODONE UR QL SCN: NEGATIVE
PH UR STRIP.AUTO: 6.5 [PH] (ref 5–8)
PLATELET # BLD AUTO: 185 10*3/MM3 (ref 140–450)
PMV BLD AUTO: 11.3 FL (ref 6–12)
POTASSIUM SERPL-SCNC: 3.5 MMOL/L (ref 3.5–5.2)
PROCALCITONIN SERPL-MCNC: 0.08 NG/ML (ref 0–0.25)
PROT SERPL-MCNC: 6.4 G/DL (ref 6–8.5)
PROT UR QL STRIP: NEGATIVE
PROTHROMBIN TIME: 25 SECONDS (ref 11.7–14.2)
PROTHROMBIN TIME: 26.1 SECONDS (ref 11.7–14.2)
QT INTERVAL: 354 MS
QT INTERVAL: 398 MS
RBC # BLD AUTO: 4.16 10*6/MM3 (ref 4.14–5.8)
RH BLD: NEGATIVE
RSV RNA NPH QL NAA+NON-PROBE: NOT DETECTED
SARS-COV-2 RNA RESP QL NAA+PROBE: DETECTED
SODIUM SERPL-SCNC: 141 MMOL/L (ref 136–145)
SP GR UR STRIP: 1.03 (ref 1–1.03)
T&S EXPIRATION DATE: NORMAL
TROPONIN T SERPL-MCNC: 0.01 NG/ML (ref 0–0.03)
TROPONIN T SERPL-MCNC: 0.03 NG/ML (ref 0–0.03)
TSH SERPL DL<=0.05 MIU/L-ACNC: 2.69 UIU/ML (ref 0.27–4.2)
UROBILINOGEN UR QL STRIP: NORMAL
WBC NRBC COR # BLD: 7.76 10*3/MM3 (ref 3.4–10.8)

## 2023-01-28 PROCEDURE — 84145 PROCALCITONIN (PCT): CPT | Performed by: EMERGENCY MEDICINE

## 2023-01-28 PROCEDURE — 36415 COLL VENOUS BLD VENIPUNCTURE: CPT | Performed by: EMERGENCY MEDICINE

## 2023-01-28 PROCEDURE — 0 IOPAMIDOL PER 1 ML: Performed by: EMERGENCY MEDICINE

## 2023-01-28 PROCEDURE — 99285 EMERGENCY DEPT VISIT HI MDM: CPT

## 2023-01-28 PROCEDURE — 87040 BLOOD CULTURE FOR BACTERIA: CPT | Performed by: EMERGENCY MEDICINE

## 2023-01-28 PROCEDURE — 86901 BLOOD TYPING SEROLOGIC RH(D): CPT | Performed by: EMERGENCY MEDICINE

## 2023-01-28 PROCEDURE — 83605 ASSAY OF LACTIC ACID: CPT | Performed by: EMERGENCY MEDICINE

## 2023-01-28 PROCEDURE — 86900 BLOOD TYPING SEROLOGIC ABO: CPT | Performed by: EMERGENCY MEDICINE

## 2023-01-28 PROCEDURE — 80307 DRUG TEST PRSMV CHEM ANLYZR: CPT | Performed by: EMERGENCY MEDICINE

## 2023-01-28 PROCEDURE — 71045 X-RAY EXAM CHEST 1 VIEW: CPT

## 2023-01-28 PROCEDURE — 93005 ELECTROCARDIOGRAM TRACING: CPT | Performed by: EMERGENCY MEDICINE

## 2023-01-28 PROCEDURE — 74174 CTA ABD&PLVS W/CONTRAST: CPT

## 2023-01-28 PROCEDURE — 84443 ASSAY THYROID STIM HORMONE: CPT | Performed by: EMERGENCY MEDICINE

## 2023-01-28 PROCEDURE — 84484 ASSAY OF TROPONIN QUANT: CPT | Performed by: EMERGENCY MEDICINE

## 2023-01-28 PROCEDURE — 83735 ASSAY OF MAGNESIUM: CPT | Performed by: EMERGENCY MEDICINE

## 2023-01-28 PROCEDURE — 80053 COMPREHEN METABOLIC PANEL: CPT | Performed by: EMERGENCY MEDICINE

## 2023-01-28 PROCEDURE — 85610 PROTHROMBIN TIME: CPT | Performed by: EMERGENCY MEDICINE

## 2023-01-28 PROCEDURE — 85025 COMPLETE CBC W/AUTO DIFF WBC: CPT | Performed by: EMERGENCY MEDICINE

## 2023-01-28 PROCEDURE — 87637 SARSCOV2&INF A&B&RSV AMP PRB: CPT | Performed by: EMERGENCY MEDICINE

## 2023-01-28 PROCEDURE — 81003 URINALYSIS AUTO W/O SCOPE: CPT | Performed by: EMERGENCY MEDICINE

## 2023-01-28 PROCEDURE — 93010 ELECTROCARDIOGRAM REPORT: CPT | Performed by: INTERNAL MEDICINE

## 2023-01-28 PROCEDURE — 85730 THROMBOPLASTIN TIME PARTIAL: CPT | Performed by: EMERGENCY MEDICINE

## 2023-01-28 PROCEDURE — 82077 ASSAY SPEC XCP UR&BREATH IA: CPT | Performed by: EMERGENCY MEDICINE

## 2023-01-28 PROCEDURE — 85610 PROTHROMBIN TIME: CPT | Performed by: INTERNAL MEDICINE

## 2023-01-28 PROCEDURE — 86850 RBC ANTIBODY SCREEN: CPT | Performed by: EMERGENCY MEDICINE

## 2023-01-28 PROCEDURE — 82565 ASSAY OF CREATININE: CPT

## 2023-01-28 PROCEDURE — 84484 ASSAY OF TROPONIN QUANT: CPT | Performed by: INTERNAL MEDICINE

## 2023-01-28 RX ORDER — ACETAMINOPHEN 325 MG/1
650 TABLET ORAL EVERY 4 HOURS PRN
Status: DISCONTINUED | OUTPATIENT
Start: 2023-01-28 | End: 2023-01-30 | Stop reason: HOSPADM

## 2023-01-28 RX ORDER — ONDANSETRON 2 MG/ML
4 INJECTION INTRAMUSCULAR; INTRAVENOUS EVERY 6 HOURS PRN
Status: DISCONTINUED | OUTPATIENT
Start: 2023-01-28 | End: 2023-01-30 | Stop reason: HOSPADM

## 2023-01-28 RX ORDER — HYDRALAZINE HYDROCHLORIDE 25 MG/1
25 TABLET, FILM COATED ORAL 3 TIMES DAILY
Status: DISCONTINUED | OUTPATIENT
Start: 2023-01-28 | End: 2023-01-30 | Stop reason: HOSPADM

## 2023-01-28 RX ORDER — HYDROCHLOROTHIAZIDE 25 MG/1
25 TABLET ORAL
Status: DISCONTINUED | OUTPATIENT
Start: 2023-01-28 | End: 2023-01-30 | Stop reason: HOSPADM

## 2023-01-28 RX ORDER — AMIODARONE HYDROCHLORIDE 200 MG/1
100 TABLET ORAL DAILY
Status: DISCONTINUED | OUTPATIENT
Start: 2023-01-28 | End: 2023-01-30 | Stop reason: HOSPADM

## 2023-01-28 RX ORDER — ONDANSETRON 4 MG/1
4 TABLET, FILM COATED ORAL EVERY 6 HOURS PRN
Status: DISCONTINUED | OUTPATIENT
Start: 2023-01-28 | End: 2023-01-30 | Stop reason: HOSPADM

## 2023-01-28 RX ORDER — LEVOTHYROXINE SODIUM 112 UG/1
112 TABLET ORAL DAILY
Status: DISCONTINUED | OUTPATIENT
Start: 2023-01-28 | End: 2023-01-30 | Stop reason: HOSPADM

## 2023-01-28 RX ORDER — SODIUM CHLORIDE 0.9 % (FLUSH) 0.9 %
10 SYRINGE (ML) INJECTION AS NEEDED
Status: DISCONTINUED | OUTPATIENT
Start: 2023-01-28 | End: 2023-01-30 | Stop reason: HOSPADM

## 2023-01-28 RX ORDER — ROSUVASTATIN CALCIUM 5 MG/1
5 TABLET, COATED ORAL DAILY
Status: DISCONTINUED | OUTPATIENT
Start: 2023-01-28 | End: 2023-01-30 | Stop reason: HOSPADM

## 2023-01-28 RX ORDER — NEBIVOLOL 5 MG/1
5 TABLET ORAL DAILY
Status: DISCONTINUED | OUTPATIENT
Start: 2023-01-28 | End: 2023-01-30 | Stop reason: HOSPADM

## 2023-01-28 RX ORDER — SODIUM CHLORIDE 9 MG/ML
75 INJECTION, SOLUTION INTRAVENOUS CONTINUOUS
Status: DISCONTINUED | OUTPATIENT
Start: 2023-01-28 | End: 2023-01-29

## 2023-01-28 RX ORDER — ISOSORBIDE MONONITRATE 30 MG/1
30 TABLET, EXTENDED RELEASE ORAL DAILY
Status: DISCONTINUED | OUTPATIENT
Start: 2023-01-28 | End: 2023-01-30 | Stop reason: HOSPADM

## 2023-01-28 RX ORDER — LOSARTAN POTASSIUM 100 MG/1
100 TABLET ORAL
Status: DISCONTINUED | OUTPATIENT
Start: 2023-01-28 | End: 2023-01-30 | Stop reason: HOSPADM

## 2023-01-28 RX ORDER — PANTOPRAZOLE SODIUM 40 MG/1
40 TABLET, DELAYED RELEASE ORAL
Status: DISCONTINUED | OUTPATIENT
Start: 2023-01-29 | End: 2023-01-30 | Stop reason: HOSPADM

## 2023-01-28 RX ORDER — TERAZOSIN 2 MG/1
2 CAPSULE ORAL NIGHTLY
Status: DISCONTINUED | OUTPATIENT
Start: 2023-01-28 | End: 2023-01-30 | Stop reason: HOSPADM

## 2023-01-28 RX ORDER — METOPROLOL SUCCINATE 50 MG/1
50 TABLET, EXTENDED RELEASE ORAL DAILY
Status: DISCONTINUED | OUTPATIENT
Start: 2023-01-28 | End: 2023-01-30 | Stop reason: HOSPADM

## 2023-01-28 RX ORDER — WARFARIN SODIUM 7.5 MG/1
7.5 TABLET ORAL NIGHTLY
Status: DISCONTINUED | OUTPATIENT
Start: 2023-01-28 | End: 2023-01-30 | Stop reason: HOSPADM

## 2023-01-28 RX ORDER — UREA 10 %
3 LOTION (ML) TOPICAL NIGHTLY PRN
Status: DISCONTINUED | OUTPATIENT
Start: 2023-01-28 | End: 2023-01-30 | Stop reason: HOSPADM

## 2023-01-28 RX ADMIN — TERAZOSIN 2 MG: 2 CAPSULE ORAL at 21:06

## 2023-01-28 RX ADMIN — SODIUM CHLORIDE, POTASSIUM CHLORIDE, SODIUM LACTATE AND CALCIUM CHLORIDE 1000 ML: 600; 310; 30; 20 INJECTION, SOLUTION INTRAVENOUS at 12:40

## 2023-01-28 RX ADMIN — WARFARIN SODIUM 7.5 MG: 7.5 TABLET ORAL at 21:06

## 2023-01-28 RX ADMIN — METOPROLOL SUCCINATE 50 MG: 50 TABLET, FILM COATED, EXTENDED RELEASE ORAL at 21:06

## 2023-01-28 RX ADMIN — SODIUM CHLORIDE 1360 ML: 9 INJECTION, SOLUTION INTRAVENOUS at 14:53

## 2023-01-28 RX ADMIN — SODIUM CHLORIDE 75 ML/HR: 9 INJECTION, SOLUTION INTRAVENOUS at 18:54

## 2023-01-28 RX ADMIN — SODIUM CHLORIDE 1000 ML: 9 INJECTION, SOLUTION INTRAVENOUS at 13:30

## 2023-01-28 RX ADMIN — HYDRALAZINE HYDROCHLORIDE 25 MG: 25 TABLET, FILM COATED ORAL at 21:06

## 2023-01-28 RX ADMIN — IOPAMIDOL 100 ML: 755 INJECTION, SOLUTION INTRAVENOUS at 13:10

## 2023-01-28 RX ADMIN — ROSUVASTATIN CALCIUM 5 MG: 5 TABLET, FILM COATED ORAL at 21:06

## 2023-01-29 PROBLEM — U07.1 COVID-19 VIRUS INFECTION: Status: ACTIVE | Noted: 2023-01-29

## 2023-01-29 LAB
ANION GAP SERPL CALCULATED.3IONS-SCNC: 8 MMOL/L (ref 5–15)
BUN SERPL-MCNC: 15 MG/DL (ref 8–23)
BUN/CREAT SERPL: 17 (ref 7–25)
CALCIUM SPEC-SCNC: 8.4 MG/DL (ref 8.6–10.5)
CHLORIDE SERPL-SCNC: 107 MMOL/L (ref 98–107)
CO2 SERPL-SCNC: 27 MMOL/L (ref 22–29)
CREAT SERPL-MCNC: 0.88 MG/DL (ref 0.76–1.27)
DEPRECATED RDW RBC AUTO: 41.7 FL (ref 37–54)
EGFRCR SERPLBLD CKD-EPI 2021: 86.4 ML/MIN/1.73
ERYTHROCYTE [DISTWIDTH] IN BLOOD BY AUTOMATED COUNT: 11.9 % (ref 12.3–15.4)
GLUCOSE SERPL-MCNC: 91 MG/DL (ref 65–99)
HCT VFR BLD AUTO: 32.8 % (ref 37.5–51)
HGB BLD-MCNC: 10.9 G/DL (ref 13–17.7)
INR PPP: 2.26 (ref 0.9–1.1)
MCH RBC QN AUTO: 31.4 PG (ref 26.6–33)
MCHC RBC AUTO-ENTMCNC: 33.2 G/DL (ref 31.5–35.7)
MCV RBC AUTO: 94.5 FL (ref 79–97)
PLATELET # BLD AUTO: 163 10*3/MM3 (ref 140–450)
PMV BLD AUTO: 11.2 FL (ref 6–12)
POTASSIUM SERPL-SCNC: 3.7 MMOL/L (ref 3.5–5.2)
PROTHROMBIN TIME: 25.3 SECONDS (ref 11.7–14.2)
RBC # BLD AUTO: 3.47 10*6/MM3 (ref 4.14–5.8)
SODIUM SERPL-SCNC: 142 MMOL/L (ref 136–145)
WBC NRBC COR # BLD: 4.67 10*3/MM3 (ref 3.4–10.8)

## 2023-01-29 PROCEDURE — 85027 COMPLETE CBC AUTOMATED: CPT | Performed by: INTERNAL MEDICINE

## 2023-01-29 PROCEDURE — 25010000002 DEXAMETHASONE PER 1 MG: Performed by: HOSPITALIST

## 2023-01-29 PROCEDURE — 99221 1ST HOSP IP/OBS SF/LOW 40: CPT | Performed by: INTERNAL MEDICINE

## 2023-01-29 PROCEDURE — 85610 PROTHROMBIN TIME: CPT | Performed by: INTERNAL MEDICINE

## 2023-01-29 PROCEDURE — 80048 BASIC METABOLIC PNL TOTAL CA: CPT | Performed by: INTERNAL MEDICINE

## 2023-01-29 RX ORDER — DEXAMETHASONE SODIUM PHOSPHATE 10 MG/ML
6 INJECTION INTRAMUSCULAR; INTRAVENOUS DAILY
Status: DISCONTINUED | OUTPATIENT
Start: 2023-01-29 | End: 2023-01-30

## 2023-01-29 RX ADMIN — HYDRALAZINE HYDROCHLORIDE 25 MG: 25 TABLET, FILM COATED ORAL at 15:34

## 2023-01-29 RX ADMIN — NEBIVOLOL 5 MG: 5 TABLET ORAL at 08:18

## 2023-01-29 RX ADMIN — HYDRALAZINE HYDROCHLORIDE 25 MG: 25 TABLET, FILM COATED ORAL at 20:36

## 2023-01-29 RX ADMIN — LEVOTHYROXINE SODIUM 112 MCG: 0.11 TABLET ORAL at 06:36

## 2023-01-29 RX ADMIN — DEXAMETHASONE SODIUM PHOSPHATE 6 MG: 10 INJECTION INTRAMUSCULAR; INTRAVENOUS at 12:57

## 2023-01-29 RX ADMIN — TERAZOSIN 2 MG: 2 CAPSULE ORAL at 20:36

## 2023-01-29 RX ADMIN — WARFARIN SODIUM 7.5 MG: 7.5 TABLET ORAL at 20:36

## 2023-01-29 RX ADMIN — LOSARTAN POTASSIUM 100 MG: 100 TABLET, FILM COATED ORAL at 08:18

## 2023-01-29 RX ADMIN — ISOSORBIDE MONONITRATE 30 MG: 30 TABLET, EXTENDED RELEASE ORAL at 08:18

## 2023-01-29 RX ADMIN — HYDRALAZINE HYDROCHLORIDE 25 MG: 25 TABLET, FILM COATED ORAL at 08:18

## 2023-01-29 RX ADMIN — HYDROCHLOROTHIAZIDE 25 MG: 25 TABLET ORAL at 08:18

## 2023-01-29 RX ADMIN — AMIODARONE HYDROCHLORIDE 100 MG: 200 TABLET ORAL at 08:18

## 2023-01-29 RX ADMIN — PANTOPRAZOLE SODIUM 40 MG: 40 TABLET, DELAYED RELEASE ORAL at 06:37

## 2023-01-29 RX ADMIN — METOPROLOL SUCCINATE 50 MG: 50 TABLET, FILM COATED, EXTENDED RELEASE ORAL at 20:36

## 2023-01-29 RX ADMIN — ROSUVASTATIN CALCIUM 5 MG: 5 TABLET, FILM COATED ORAL at 20:36

## 2023-01-30 ENCOUNTER — READMISSION MANAGEMENT (OUTPATIENT)
Dept: CALL CENTER | Facility: HOSPITAL | Age: 83
End: 2023-01-30
Payer: MEDICARE

## 2023-01-30 VITALS
RESPIRATION RATE: 16 BRPM | TEMPERATURE: 97.5 F | DIASTOLIC BLOOD PRESSURE: 76 MMHG | WEIGHT: 243.83 LBS | BODY MASS INDEX: 33.03 KG/M2 | HEIGHT: 72 IN | OXYGEN SATURATION: 93 % | HEART RATE: 61 BPM | SYSTOLIC BLOOD PRESSURE: 137 MMHG

## 2023-01-30 LAB
INR PPP: 2.34 (ref 0.9–1.1)
PROTHROMBIN TIME: 26 SECONDS (ref 11.7–14.2)

## 2023-01-30 PROCEDURE — 85610 PROTHROMBIN TIME: CPT | Performed by: INTERNAL MEDICINE

## 2023-01-30 PROCEDURE — 99232 SBSQ HOSP IP/OBS MODERATE 35: CPT

## 2023-01-30 PROCEDURE — 25010000002 DEXAMETHASONE PER 1 MG: Performed by: HOSPITALIST

## 2023-01-30 RX ORDER — DEXAMETHASONE 6 MG/1
6 TABLET ORAL
Qty: 3 TABLET | Refills: 0 | Status: SHIPPED | OUTPATIENT
Start: 2023-01-30 | End: 2023-02-02

## 2023-01-30 RX ADMIN — HYDRALAZINE HYDROCHLORIDE 25 MG: 25 TABLET, FILM COATED ORAL at 08:23

## 2023-01-30 RX ADMIN — LOSARTAN POTASSIUM 100 MG: 100 TABLET, FILM COATED ORAL at 08:23

## 2023-01-30 RX ADMIN — PANTOPRAZOLE SODIUM 40 MG: 40 TABLET, DELAYED RELEASE ORAL at 05:41

## 2023-01-30 RX ADMIN — DEXAMETHASONE SODIUM PHOSPHATE 6 MG: 10 INJECTION INTRAMUSCULAR; INTRAVENOUS at 08:24

## 2023-01-30 RX ADMIN — HYDROCHLOROTHIAZIDE 25 MG: 25 TABLET ORAL at 08:27

## 2023-01-30 RX ADMIN — ISOSORBIDE MONONITRATE 30 MG: 30 TABLET, EXTENDED RELEASE ORAL at 08:23

## 2023-01-30 RX ADMIN — LEVOTHYROXINE SODIUM 112 MCG: 0.11 TABLET ORAL at 08:23

## 2023-01-30 RX ADMIN — AMIODARONE HYDROCHLORIDE 100 MG: 200 TABLET ORAL at 08:25

## 2023-01-30 RX ADMIN — NEBIVOLOL 5 MG: 5 TABLET ORAL at 08:23

## 2023-01-30 NOTE — OUTREACH NOTE
Prep Survey    Flowsheet Row Responses   [facility] patient discharged from? [place]   Is LACE score < 7 ? No   Eligibility [facility]   Date of Admission 01/28/23   Date of Discharge 01/30/23   Discharge Disposition Home or Self Care   Discharge diagnosis COVID-19 virus infection   Does the patient have one of the following disease processes/diagnoses(primary or secondary)? Other   Does the patient have Home health ordered? No   Is there a DME ordered? No   Prep survey completed? Yes          CHELO DAO - Registered Nurse

## 2023-01-31 ENCOUNTER — TRANSITIONAL CARE MANAGEMENT TELEPHONE ENCOUNTER (OUTPATIENT)
Dept: CALL CENTER | Facility: HOSPITAL | Age: 83
End: 2023-01-31
Payer: MEDICARE

## 2023-01-31 NOTE — OUTREACH NOTE
Call Center TCM Note    Flowsheet Row Responses   [facility] patient discharged from? [place]   Does the patient have one of the following disease processes/diagnoses(primary or secondary)? Other   TCM attempt successful? Yes   Call start time 1122   Call end time [id number]   Discharge diagnosis COVID-19 virus infection   Meds reviewed with patient/caregiver? Yes   Is the patient having any side effects they believe may be caused by any medication additions or changes? No   Does the patient have all medications ordered at discharge? Yes   Is the patient taking all medications as directed (includes completed medication regime)? Yes   Does the patient have an appointment with their PCP within 7 days of discharge? No   Nursing Interventions Routed TCM call to PCP office   Has home health visited the patient within 72 hours of discharge? N/A   Psychosocial issues? No   Did the patient receive a copy of their discharge instructions? Yes   Nursing interventions Reviewed instructions with patient   What is the patient's perception of their health status since discharge? Improving   Is the patient/caregiver able to teach back signs and symptoms related to disease process for when to call PCP? Yes   Is the patient/caregiver able to teach back signs and symptoms related to disease process for when to call 911? Yes   Is the patient/caregiver able to teach back the hierarchy of who to call/visit for symptoms/problems? PCP, Specialist, Home health nurse, Urgent Care, ED, 911 Yes   If the patient is a current smoker, are they able to teach back resources for cessation? Not a smoker   TCM call completed? Yes   Wrap up additional comments D/C DX: COVID-19 virus infection - Pt feeling pretty good. New rx Dexamethasone in place. Pt does want to sched TCM APPT with PCP Dr Pineda. Pt is requesting 02/07/2023 as he lives 85 miles from [facility] but will be in town that day for other appts. He is requesting about 1pm, there is a 130pm  doulble slot but per our Provider Preference I cannot sched there. Asliing ofc to please call pt to sched TCM APPT.    Call end time [id number]          Delicia Ortiz MA    1/31/2023, 11:30 EST

## 2023-02-01 LAB — CREAT BLDA-MCNC: 1.9 MG/DL (ref 0.6–1.3)

## 2023-02-02 LAB
BACTERIA SPEC AEROBE CULT: NORMAL
BACTERIA SPEC AEROBE CULT: NORMAL

## 2023-02-06 ENCOUNTER — OFFICE VISIT (OUTPATIENT)
Dept: FAMILY MEDICINE CLINIC | Facility: CLINIC | Age: 83
End: 2023-02-06
Payer: MEDICARE

## 2023-02-06 VITALS
OXYGEN SATURATION: 97 % | HEIGHT: 72 IN | BODY MASS INDEX: 32.83 KG/M2 | WEIGHT: 242.4 LBS | SYSTOLIC BLOOD PRESSURE: 102 MMHG | DIASTOLIC BLOOD PRESSURE: 80 MMHG | RESPIRATION RATE: 16 BRPM | TEMPERATURE: 97.1 F | HEART RATE: 84 BPM

## 2023-02-06 DIAGNOSIS — R53.1 GENERALIZED WEAKNESS: ICD-10-CM

## 2023-02-06 DIAGNOSIS — Z09 HOSPITAL DISCHARGE FOLLOW-UP: Primary | ICD-10-CM

## 2023-02-06 DIAGNOSIS — U07.1 COVID-19 VIRUS INFECTION: ICD-10-CM

## 2023-02-06 DIAGNOSIS — I10 ESSENTIAL HYPERTENSION: ICD-10-CM

## 2023-02-06 PROBLEM — S69.90XA HAND INJURY: Status: RESOLVED | Noted: 2020-11-24 | Resolved: 2023-02-06

## 2023-02-06 PROBLEM — R07.9 CHEST PAIN: Status: RESOLVED | Noted: 2017-08-28 | Resolved: 2023-02-06

## 2023-02-06 PROBLEM — J69.0 ASPIRATION PNEUMONIA: Status: RESOLVED | Noted: 2022-09-15 | Resolved: 2023-02-06

## 2023-02-06 PROBLEM — J00 ACUTE NASOPHARYNGITIS: Status: RESOLVED | Noted: 2020-07-16 | Resolved: 2023-02-06

## 2023-02-06 PROBLEM — S80.01XA TRAUMATIC HEMATOMA OF RIGHT KNEE: Status: RESOLVED | Noted: 2020-11-27 | Resolved: 2023-02-06

## 2023-02-06 PROBLEM — A41.9 SEPSIS: Status: RESOLVED | Noted: 2018-02-02 | Resolved: 2023-02-06

## 2023-02-06 PROBLEM — N17.9 ACUTE KIDNEY INJURY: Status: RESOLVED | Noted: 2020-11-24 | Resolved: 2023-02-06

## 2023-02-06 PROBLEM — R55 SYNCOPE AND COLLAPSE: Status: RESOLVED | Noted: 2017-08-28 | Resolved: 2023-02-06

## 2023-02-06 PROBLEM — J18.9 PNEUMONIA DUE TO INFECTIOUS ORGANISM: Status: RESOLVED | Noted: 2017-08-28 | Resolved: 2023-02-06

## 2023-02-06 PROCEDURE — 99495 TRANSJ CARE MGMT MOD F2F 14D: CPT | Performed by: FAMILY MEDICINE

## 2023-02-06 PROCEDURE — 1111F DSCHRG MED/CURRENT MED MERGE: CPT | Performed by: FAMILY MEDICINE

## 2023-02-06 NOTE — PROGRESS NOTES
Transitional Care Follow Up Visit  Subjective     David Comer Sr. is a 82 y.o. male who presents for a transitional care management visit.    Within 48 business hours after discharge our office contacted him via telephone to coordinate his care and needs.      I reviewed and discussed the details of that call along with the discharge summary, hospital problems, inpatient lab results, inpatient diagnostic studies, and consultation reports with David.     Current outpatient and discharge medications have been reconciled for the patient.  Reviewed by: Chacha Pineda MD      Date of TCM Phone Call 1/30/2023   [facility]   Date of Admission 1/28/2023   Date of Discharge 1/30/2023   Discharge Disposition Home or Self Care     Risk for Readmission (LACE) Score: 8 (1/30/2023  6:00 AM)        Diagnosis   POA   • **COVID-19 virus infection [U07.1]   Unknown   • Generalized weakness [R53.1]   Yes   • Paroxysmal atrial fibrillation (HCC) [I48.0]   Yes   • Gastroesophageal reflux disease [K21.9]   Yes   • Essential hypertension [I10]   Yes   • Mixed hyperlipidemia [E78.2]           History of Present Illness   Pt is here for follow up of hospital stay for covid.  He still has lots of fatigue and gets tired easily.  No SOB.  No CP  HTN- BP has been on low side over past few days.  He is trying to drink fluids.  No wheezing.        Course During Hospital Stay:    Hospital Course  82 y.o. male presented to the ER for generalized weakness.  Source of this was found to be secondary to COVID infection.  He ultimately is stable on room air and has no aspect of pneumonia but given his symptomatic issues I empirically started on Decadron.  Within 24 hours, between the Decadron and IVF, he returned back to previous baseline.  He states he got longer feels a generalized weakness and is getting himself up and out of bed with no issue ambulating to and from the bathroom.  He denies any need for additional PT evaluation or any  aspects with home health.  Doubtful any aspects of sepsis at admission and CT of the chest did not demonstrate any acute intrathoracic etiologies.  Blood culture showed no growth to date.  Patient is can follow-up on the abnormalities noted with  system with PCP and can consider urological referral as needed.  I did not address this with the patient on this admission and this can further be discussed in an outpatient setting.  His INR is therapeutic and his EKG is without acute disease.  Cardiology did see in consultation and no additional work-up or recommendations are noted.  He is stable on his medications for hypertension/HLD/PAF and he is rate controlled.  Vital signs are stable as well as afebrile.  Patient clinically looks back to baseline and he would very much like to go home today and I think he is more than medically stable to return to previous living condition.        The following portions of the patient's history were reviewed and updated as appropriate: allergies, current medications, past family history, past medical history, past social history, past surgical history and problem list.    Review of Systems    Objective   Physical Exam  Vitals and nursing note reviewed.   Constitutional:       Appearance: Normal appearance. He is well-developed.   Cardiovascular:      Rate and Rhythm: Normal rate and regular rhythm.      Heart sounds: Normal heart sounds. No murmur heard.  Pulmonary:      Effort: Pulmonary effort is normal. No respiratory distress.      Breath sounds: Normal breath sounds. No stridor. No wheezing or rhonchi.   Neurological:      General: No focal deficit present.      Mental Status: He is alert and oriented to person, place, and time. He is not disoriented.   Psychiatric:         Mood and Affect: Mood normal.         Behavior: Behavior normal.         Assessment & Plan   Diagnoses and all orders for this visit:    1. Hospital discharge follow-up (Primary)    2. COVID-19 virus  infection    3. Generalized weakness    4. Essential hypertension               I requested and reviewed all records, labs, and diagnostics from the hospital with patient.  Patient is to follow-up with specialists as discussed and directed.  Continue to push fluids.  And continue current plan. Continue to monitor BP.

## 2023-02-08 ENCOUNTER — TELEPHONE (OUTPATIENT)
Dept: CARDIOLOGY | Facility: CLINIC | Age: 83
End: 2023-02-08
Payer: MEDICARE

## 2023-02-08 NOTE — TELEPHONE ENCOUNTER
Caller: David Comer Sr.    Relationship to patient: Self    Best call back number: [phone]    Patient is needing: PATIENT REQUESTING CALL BACK TO GO OVER CLEARANCE FOR DENTAL PROCEDURE. PATIENT STATED IT WAS FAXED YESTERDAY TO [facility] ON [facility].

## 2023-02-09 NOTE — TELEPHONE ENCOUNTER
Per Dr. Downing From a cardiac stand point patient is cleared for surgery and is to hold warfarin for 3 days prior to surgery with a small risk. Restart warfarin after with a double dose and then check INR.       Letter has been sent

## 2023-02-21 RX ORDER — ISOSORBIDE MONONITRATE 30 MG/1
TABLET, EXTENDED RELEASE ORAL
Qty: 90 TABLET | Refills: 1 | Status: SHIPPED | OUTPATIENT
Start: 2023-02-21

## 2023-02-21 RX ORDER — AMIODARONE HYDROCHLORIDE 200 MG/1
TABLET ORAL
Qty: 45 TABLET | Refills: 1 | Status: SHIPPED | OUTPATIENT
Start: 2023-02-21

## 2023-02-28 ENCOUNTER — HOSPITAL ENCOUNTER (OUTPATIENT)
Dept: CARDIOLOGY | Facility: HOSPITAL | Age: 83
Discharge: HOME | End: 2023-02-28
Admitting: INTERNAL MEDICINE
Payer: MEDICARE

## 2023-02-28 ENCOUNTER — TELEPHONE (OUTPATIENT)
Dept: CARDIOLOGY | Facility: CLINIC | Age: 83
End: 2023-02-28

## 2023-02-28 ENCOUNTER — OFFICE VISIT (OUTPATIENT)
Dept: CARDIOLOGY | Facility: CLINIC | Age: 83
End: 2023-02-28
Payer: MEDICARE

## 2023-02-28 ENCOUNTER — ANTICOAGULATION VISIT (OUTPATIENT)
Dept: CARDIOLOGY | Facility: CLINIC | Age: 83
End: 2023-02-28
Payer: MEDICARE

## 2023-02-28 VITALS
SYSTOLIC BLOOD PRESSURE: 160 MMHG | DIASTOLIC BLOOD PRESSURE: 90 MMHG | HEIGHT: 72 IN | HEART RATE: 68 BPM | WEIGHT: 250 LBS | BODY MASS INDEX: 33.86 KG/M2

## 2023-02-28 DIAGNOSIS — I25.10 CHRONIC CORONARY ARTERY DISEASE: Primary | ICD-10-CM

## 2023-02-28 DIAGNOSIS — I10 ESSENTIAL HYPERTENSION: ICD-10-CM

## 2023-02-28 DIAGNOSIS — I48.0 PAROXYSMAL ATRIAL FIBRILLATION: ICD-10-CM

## 2023-02-28 DIAGNOSIS — I71.20 THORACIC AORTIC ANEURYSM WITHOUT RUPTURE, UNSPECIFIED PART: ICD-10-CM

## 2023-02-28 LAB — INR PPP: 4.1

## 2023-02-28 PROCEDURE — 1160F RVW MEDS BY RX/DR IN RCRD: CPT

## 2023-02-28 PROCEDURE — 93000 ELECTROCARDIOGRAM COMPLETE: CPT

## 2023-02-28 PROCEDURE — 3080F DIAST BP >= 90 MM HG: CPT

## 2023-02-28 PROCEDURE — 1159F MED LIST DOCD IN RCRD: CPT

## 2023-02-28 PROCEDURE — 99214 OFFICE O/P EST MOD 30 MIN: CPT

## 2023-02-28 PROCEDURE — 85610 PROTHROMBIN TIME: CPT | Performed by: INTERNAL MEDICINE

## 2023-02-28 PROCEDURE — 3077F SYST BP >= 140 MM HG: CPT

## 2023-02-28 NOTE — TELEPHONE ENCOUNTER
Caller: David Comer Sr.    Relationship to patient: Self    Best call back number: [phone]    Patient is needing: PT STATED [facility] WAS FAXING OVER [facility] FOR PT TO STOP ASPRIN AND COUMADIN FOR 3 DAYS FOR ORAL SURGERY. PT REQUESTING THAT BE TAKEN CARE OF ASAP SO HE CAN BE SCHEDULED FOR SURGERY . OFFICE NUMBER [phone]

## 2023-03-07 RX ORDER — HYDRALAZINE HYDROCHLORIDE 25 MG/1
TABLET, FILM COATED ORAL
Qty: 270 TABLET | Refills: 0 | Status: SHIPPED | OUTPATIENT
Start: 2023-03-07

## 2023-03-13 RX ORDER — LEVOTHYROXINE SODIUM 112 UG/1
TABLET ORAL
Qty: 90 TABLET | Refills: 0 | Status: SHIPPED | OUTPATIENT
Start: 2023-03-13

## 2023-03-16 ENCOUNTER — TELEPHONE (OUTPATIENT)
Dept: CARDIOLOGY | Facility: CLINIC | Age: 83
End: 2023-03-16
Payer: MEDICARE

## 2023-03-16 NOTE — TELEPHONE ENCOUNTER
Caller: aDvid Comer Sr.    Relationship: Self    Best call back number: [phone]    What is the best time to reach you: ANY    Who are you requesting to speak with (clinical staff, provider,  specific staff member): CLINICAL    What was the call regarding: PATIENT HAS SURGERY 03.17.23 AND HE NEEDS TO KNOW WHEN TO START TAKING THE WARAFIN AFTER THE SURGERY. PATIENT WANTS TO KNOW WHEN HE NEEDS TO BE SEEN BY DR. RIVERS. PLEASE CONTACT PATIENT WITH THE INSTRUCTION FOR MEDICATION AND APPOINTMENT.     Do you require a callback: YES

## 2023-04-23 DIAGNOSIS — I10 ESSENTIAL HYPERTENSION: ICD-10-CM

## 2023-04-24 RX ORDER — OLMESARTAN MEDOXOMIL AND HYDROCHLOROTHIAZIDE 40/25 40; 25 MG/1; MG/1
TABLET ORAL
Qty: 90 TABLET | Refills: 0 | Status: SHIPPED | OUTPATIENT
Start: 2023-04-24

## 2023-04-27 RX ORDER — DOXAZOSIN 2 MG/1
TABLET ORAL
Qty: 90 TABLET | Refills: 0 | Status: SHIPPED | OUTPATIENT
Start: 2023-04-27

## 2023-04-27 NOTE — TELEPHONE ENCOUNTER
LOV             2/6/2023   NOV            5/8/2023   Last RF       1/26/23    Ann Olivera, LILYA/LMR

## 2023-05-08 ENCOUNTER — HOSPITAL ENCOUNTER (OUTPATIENT)
Dept: CARDIOLOGY | Facility: HOSPITAL | Age: 83
Discharge: HOME | End: 2023-05-08
Admitting: INTERNAL MEDICINE
Payer: MEDICARE

## 2023-05-08 ENCOUNTER — ANTICOAGULATION VISIT (OUTPATIENT)
Dept: CARDIOLOGY | Facility: CLINIC | Age: 83
End: 2023-05-08
Payer: MEDICARE

## 2023-05-08 ENCOUNTER — OFFICE VISIT (OUTPATIENT)
Dept: FAMILY MEDICINE CLINIC | Facility: CLINIC | Age: 83
End: 2023-05-08
Payer: MEDICARE

## 2023-05-08 VITALS
HEART RATE: 74 BPM | OXYGEN SATURATION: 99 % | BODY MASS INDEX: 30.2 KG/M2 | SYSTOLIC BLOOD PRESSURE: 120 MMHG | RESPIRATION RATE: 14 BRPM | HEIGHT: 72 IN | DIASTOLIC BLOOD PRESSURE: 84 MMHG | TEMPERATURE: 97.5 F | WEIGHT: 223 LBS

## 2023-05-08 DIAGNOSIS — E03.9 ACQUIRED HYPOTHYROIDISM: ICD-10-CM

## 2023-05-08 DIAGNOSIS — I10 ESSENTIAL HYPERTENSION: ICD-10-CM

## 2023-05-08 DIAGNOSIS — D50.8 IRON DEFICIENCY ANEMIA SECONDARY TO INADEQUATE DIETARY IRON INTAKE: ICD-10-CM

## 2023-05-08 DIAGNOSIS — Z00.00 MEDICARE ANNUAL WELLNESS VISIT, SUBSEQUENT: Primary | ICD-10-CM

## 2023-05-08 DIAGNOSIS — E78.2 MIXED HYPERLIPIDEMIA: ICD-10-CM

## 2023-05-08 PROBLEM — U07.1 COVID-19 VIRUS INFECTION: Status: RESOLVED | Noted: 2023-01-29 | Resolved: 2023-05-08

## 2023-05-08 LAB — INR PPP: 1.6

## 2023-05-08 PROCEDURE — 85610 PROTHROMBIN TIME: CPT | Performed by: INTERNAL MEDICINE

## 2023-05-08 RX ORDER — OLMESARTAN MEDOXOMIL AND HYDROCHLOROTHIAZIDE 40/25 40; 25 MG/1; MG/1
1 TABLET ORAL DAILY
Qty: 90 TABLET | Refills: 0 | Status: SHIPPED | OUTPATIENT
Start: 2023-05-08

## 2023-05-08 NOTE — PROGRESS NOTES
The ABCs of the Annual Wellness Visit  Subsequent Medicare Wellness Visit    Subjective    David Comer Sr. is a 83 y.o. male who presents for a Subsequent Medicare Wellness Visit.  Pt presents today for refills, labs and MWE and possible chest pain.  Also low energy levels.    The following portions of the patient's history were reviewed and   updated as appropriate: allergies, current medications, past family history, past medical history, past social history, past surgical history and problem list.    Compared to one year ago, the patient feels his physical   health is the same.    Compared to one year ago, the patient feels his mental   health is the same.    Recent Hospitalizations:  This patient has had a [facility] admission record on file within the last 365 days.    Current Medical Providers:  Patient Care Team:  Chacha Pineda MD as PCP - General (Family Medicine)  Harlan Downing MD as Consulting Physician (Cardiology)  Elliott Chawla MD as Consulting Physician (Otolaryngology)    Outpatient Medications Prior to Visit   Medication Sig Dispense Refill   • acetaminophen (TYLENOL) 325 MG tablet Take 2 tablets by mouth Every 4 (Four) Hours As Needed for Mild Pain .     • doxazosin (CARDURA) 2 MG tablet TAKE 1 TABLET BY MOUTH ONCE DAILY AT NIGHT 90 tablet 0   • hydrALAZINE (APRESOLINE) 25 MG tablet TAKE 1 TABLET BY MOUTH THREE TIMES DAILY 270 tablet 0   • Pacerone 200 MG tablet Take 1/2 (one-half) tablet by mouth once daily 45 tablet 1   • rosuvastatin (CRESTOR) 5 MG tablet Take 1 tablet by mouth once daily 90 tablet 1   • warfarin (COUMADIN) 7.5 MG tablet TAKE 1 TABLET BY MOUTH ONCE DAILY AT NIGHT (Patient taking differently: Take 1 tablet by mouth Every Night.) 90 tablet 1   • olmesartan-hydrochlorothiazide (BENICAR HCT) 40-25 MG per tablet TAKE ONE TABLET BY MOUTH DAILY 90 tablet 0   • esomeprazole (nexIUM) 20 MG capsule Take 1 capsule by mouth Every Morning Before Breakfast. (Patient  not taking: Reported on 5/8/2023)     • isosorbide mononitrate (IMDUR) 30 MG 24 hr tablet Take 1 tablet by mouth once daily (Patient not taking: Reported on 5/8/2023) 90 tablet 1   • levothyroxine (SYNTHROID, LEVOTHROID) 112 MCG tablet Take 1 tablet by mouth once daily (Patient not taking: Reported on 5/8/2023) 90 tablet 0   • metoprolol succinate XL (Toprol XL) 50 MG 24 hr tablet Take 1 tablet by mouth Daily. (Patient not taking: Reported on 5/8/2023) 90 tablet 1     No facility-administered medications prior to visit.       No opioid medication identified on active medication list. I have reviewed chart for other potential  high risk medication/s and harmful drug interactions in the elderly.          Aspirin is not on active medication list.  uses baby ASA.    Patient Active Problem List   Diagnosis   • Chronic coronary artery disease   • Abdominal aortic aneurysm   • Paroxysmal atrial fibrillation (HCC)   • Gastroesophageal reflux disease   • Essential hypertension   • Mixed hyperlipidemia   • Hypogonadism in male   • Acquired hypothyroidism   • Osteoarthritis of spine   • Vitamin D deficiency   • Long term (current) use of anticoagulants   • Atrial fibrillation, rapid   • Statin intolerance   • Gross hematuria   • Coumadin toxicity   • H/O amiodarone therapy   • Dehydration   • Renal insufficiency   • Thoracic aortic aneurysm without rupture   • Refused influenza vaccine   • Medicare annual wellness visit, subsequent   • BPH without obstruction/lower urinary tract symptoms   • Thoracic aortic aneurysm   • Kidney stone   • 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  ACP discussion was held with the patient during this visit. Patient has an advance directive (not in EMR), copy requested.     Objective    Vitals:    23 1125   BP: 120/84   BP Location: Right arm   Patient Position: Sitting   Cuff Size: Adult   Pulse: 74   Resp: 14   Temp: 97.5 °F (36.4 °C)   TempSrc: Temporal   SpO2: 99%   Weight: 101 kg (223 lb)   Height: 182.9 cm (72.01\")   PainSc:   8   PainLoc: Chest     Estimated body mass index is 30.24 kg/m² as calculated from the following:    Height as of this encounter: 182.9 cm (72.01\").    Weight as of this encounter: 101 kg (223 lb).    BMI is >= 30 and <35. (Class 1 Obesity). The following options were offered after discussion;: weight loss educational material (shared in after visit summary)      Does the patient have evidence of cognitive impairment? No          HEALTH RISK ASSESSMENT    Smoking Status:  Social History     Tobacco Use   Smoking Status Never   Smokeless Tobacco Never     Alcohol Consumption:  Social History     Substance and Sexual Activity   Alcohol Use No     Fall Risk Screen:    JAMES Fall Risk Assessment has not been completed.    Depression Screenin/6/2023     1:42 PM   PHQ-2/PHQ-9 Depression Screening   Little Interest or Pleasure in Doing Things 0-->not at all   Feeling Down, Depressed or Hopeless 0-->not at all   PHQ-9: Brief Depression Severity Measure Score 0       Health Habits and Functional and Cognitive Screenin/8/2023    11:00 AM   Functional & Cognitive Status   Do you have difficulty preparing food and eating? No   Do you have difficulty bathing yourself, getting dressed or grooming yourself? No   Do you have difficulty using the toilet? No   Do you have difficulty moving around from place to place? No   Do you have trouble with steps or getting out of a bed or a chair? No   Current Diet Well Balanced Diet "   Dental Exam Up to date   Eye Exam Not up to date   Exercise (times per week) 0 times per week   Current Exercises Include No Regular Exercise   Do you need help using the phone?  No   Are you deaf or do you have serious difficulty hearing?  No   Do you need help with transportation? No   Do you need help shopping? No   Do you need help preparing meals?  No   Do you need help with housework?  No   Do you need help with laundry? No   Do you need help taking your medications? No   Do you need help managing money? No   Do you ever drive or ride in a car without wearing a seat belt? No   Have you felt unusual stress, anger or loneliness in the last month? No   Who do you live with? Child   If you need help, do you have trouble finding someone available to you? No   Have you been bothered in the last four weeks by sexual problems? No   Do you have difficulty concentrating, remembering or making decisions? No       Age-appropriate Screening Schedule:  Refer to the list below for future screening recommendations based on patient's age, sex and/or medical conditions. Orders for these recommended tests are listed in the plan section. The patient has been provided with a written plan.    Health Maintenance   Topic Date Due   • COLORECTAL CANCER SCREENING  Never done   • TDAP/TD VACCINES (1 - Tdap) Never done   • ZOSTER VACCINE (1 of 2) Never done   • Pneumococcal Vaccine 65+ (2 - PCV) 09/04/2014   • COVID-19 Vaccine (4 - Booster for Pfizer series) 12/22/2021   • LIPID PANEL  12/29/2023   • ANNUAL WELLNESS VISIT  05/08/2024   • INFLUENZA VACCINE  Discontinued                  CMS Preventative Services Quick Reference  Risk Factors Identified During Encounter  Inactivity/Sedentary: Patient was advised to exercise at least 150 minutes a week per CDC recommendations.  The above risks/problems have been discussed with the patient.  Pertinent information has been shared with the patient in the After Visit Summary.  An After Visit  "Summary and PPPS were made available to the patient.    Follow Up:   Next Medicare Wellness visit to be scheduled in 1 year.       MWE done and patient to work on diet and exercise for Preventive Counseling.          Additional E&M Note during same encounter follows:  Patient has multiple medical problems which are significant and separately identifiable that require additional work above and beyond the Medicare Wellness Visit.      Chief Complaint  Hospital Follow Up Visit and Chest Pain    Subjective        HPI  David KHAN Age Sr. is also being seen today for chest discomfort and pain  HTN- no HA or blurred vision  HLD- on med and not active  Hypothyroidism- has not been taking thyroid med for some time.   His doctor didn't refill it.    Anemia no med;  Some fatigue         Objective   Vital Signs:  /84 (BP Location: Right arm, Patient Position: Sitting, Cuff Size: Adult)   Pulse 74   Temp 97.5 °F (36.4 °C) (Temporal)   Resp 14   Ht 182.9 cm (72.01\")   Wt 101 kg (223 lb)   SpO2 99%   BMI 30.24 kg/m²     Physical Exam  Vitals and nursing note reviewed.   Constitutional:       Appearance: Normal appearance. He is well-developed.   Cardiovascular:      Rate and Rhythm: Normal rate and regular rhythm.      Heart sounds: Normal heart sounds. No murmur heard.  Pulmonary:      Effort: Pulmonary effort is normal. No respiratory distress.      Breath sounds: Normal breath sounds. No stridor. No wheezing or rhonchi.   Neurological:      General: No focal deficit present.      Mental Status: He is alert and oriented to person, place, and time. He is not disoriented.   Psychiatric:         Mood and Affect: Mood normal.         Behavior: Behavior normal.                         Assessment and Plan   Diagnoses and all orders for this visit:    1. Medicare annual wellness visit, subsequent (Primary)    2. Mixed hyperlipidemia  -     Lipid Panel  -     Comprehensive Metabolic Panel    3. Acquired hypothyroidism  -     " TSH    4. Essential hypertension  -     TSH  -     olmesartan-hydrochlorothiazide (BENICAR HCT) 40-25 MG per tablet; Take 1 tablet by mouth Daily.  Dispense: 90 tablet; Refill: 0    5. Iron deficiency anemia secondary to inadequate dietary iron intake             Follow Up   Return in about 3 months (around 8/8/2023) for hypertension, hyperlipidema.  Patient was given instructions and counseling regarding his condition or for health maintenance advice. Please see specific information pulled into the AVS if appropriate.     Restart thyroid med and will start iron pills for his fatigue.  Labs and refills today.    His chest pain has been evaluated and heart issue has been ruled out.  Most likely musc skeletal.    Will take tylenol for his chest pain and discomfort prn.

## 2023-05-09 LAB
ALBUMIN SERPL-MCNC: 4.2 G/DL (ref 3.5–5.2)
ALBUMIN/GLOB SERPL: 1.8 G/DL
ALP SERPL-CCNC: 58 U/L (ref 39–117)
ALT SERPL-CCNC: 18 U/L (ref 1–41)
AST SERPL-CCNC: 21 U/L (ref 1–40)
BILIRUB SERPL-MCNC: 0.5 MG/DL (ref 0–1.2)
BUN SERPL-MCNC: 18 MG/DL (ref 8–23)
BUN/CREAT SERPL: 19.4 (ref 7–25)
CALCIUM SERPL-MCNC: 9.9 MG/DL (ref 8.6–10.5)
CHLORIDE SERPL-SCNC: 101 MMOL/L (ref 98–107)
CHOLEST SERPL-MCNC: 148 MG/DL (ref 0–200)
CO2 SERPL-SCNC: 26.2 MMOL/L (ref 22–29)
CREAT SERPL-MCNC: 0.93 MG/DL (ref 0.76–1.27)
EGFRCR SERPLBLD CKD-EPI 2021: 82 ML/MIN/1.73
GLOBULIN SER CALC-MCNC: 2.4 GM/DL
GLUCOSE SERPL-MCNC: 95 MG/DL (ref 65–99)
HDLC SERPL-MCNC: 57 MG/DL (ref 40–60)
LDLC SERPL CALC-MCNC: 67 MG/DL (ref 0–100)
POTASSIUM SERPL-SCNC: 4.5 MMOL/L (ref 3.5–5.2)
PROT SERPL-MCNC: 6.6 G/DL (ref 6–8.5)
SODIUM SERPL-SCNC: 139 MMOL/L (ref 136–145)
TRIGL SERPL-MCNC: 137 MG/DL (ref 0–150)
TSH SERPL DL<=0.005 MIU/L-ACNC: 1.07 UIU/ML (ref 0.27–4.2)
VLDLC SERPL CALC-MCNC: 24 MG/DL (ref 5–40)

## 2023-05-16 ENCOUNTER — HOSPITAL ENCOUNTER (OUTPATIENT)
Dept: CARDIOLOGY | Facility: HOSPITAL | Age: 83
Discharge: HOME | End: 2023-05-16
Admitting: INTERNAL MEDICINE
Payer: MEDICARE

## 2023-05-16 ENCOUNTER — ANTICOAGULATION VISIT (OUTPATIENT)
Dept: CARDIOLOGY | Facility: CLINIC | Age: 83
End: 2023-05-16
Payer: MEDICARE

## 2023-05-16 LAB — INR PPP: 1.9

## 2023-05-16 PROCEDURE — 85610 PROTHROMBIN TIME: CPT | Performed by: INTERNAL MEDICINE

## 2023-05-30 ENCOUNTER — HOSPITAL ENCOUNTER (OUTPATIENT)
Dept: CARDIOLOGY | Facility: HOSPITAL | Age: 83
Discharge: HOME | End: 2023-05-30
Admitting: INTERNAL MEDICINE

## 2023-05-30 ENCOUNTER — ANTICOAGULATION VISIT (OUTPATIENT)
Dept: CARDIOLOGY | Facility: CLINIC | Age: 83
End: 2023-05-30

## 2023-05-30 LAB — INR PPP: 5.5

## 2023-05-30 PROCEDURE — 85610 PROTHROMBIN TIME: CPT | Performed by: INTERNAL MEDICINE

## 2023-06-08 ENCOUNTER — ANTICOAGULATION VISIT (OUTPATIENT)
Dept: CARDIOLOGY | Facility: CLINIC | Age: 83
End: 2023-06-08
Payer: MEDICARE

## 2023-06-08 ENCOUNTER — HOSPITAL ENCOUNTER (OUTPATIENT)
Dept: CARDIOLOGY | Facility: HOSPITAL | Age: 83
Discharge: HOME | End: 2023-06-08
Payer: MEDICARE

## 2023-06-08 DIAGNOSIS — I10 ESSENTIAL HYPERTENSION: ICD-10-CM

## 2023-06-08 LAB — INR PPP: 3

## 2023-06-08 RX ORDER — WARFARIN SODIUM 7.5 MG/1
7.5 TABLET ORAL NIGHTLY
Qty: 90 TABLET | Refills: 1 | Status: SHIPPED | OUTPATIENT
Start: 2023-06-08

## 2023-06-08 RX ORDER — LEVOTHYROXINE SODIUM 112 UG/1
TABLET ORAL
Qty: 90 TABLET | Refills: 1 | Status: SHIPPED | OUTPATIENT
Start: 2023-06-08

## 2023-06-08 RX ORDER — METOPROLOL SUCCINATE 50 MG/1
TABLET, EXTENDED RELEASE ORAL
Qty: 90 TABLET | Refills: 1 | Status: SHIPPED | OUTPATIENT
Start: 2023-06-08

## 2023-06-09 ENCOUNTER — TELEPHONE (OUTPATIENT)
Dept: CARDIOLOGY | Facility: CLINIC | Age: 83
End: 2023-06-09
Payer: MEDICARE

## 2023-06-09 RX ORDER — WARFARIN SODIUM 5 MG/1
5 TABLET ORAL
Qty: 30 TABLET | Refills: 3 | Status: SHIPPED | OUTPATIENT
Start: 2023-06-09

## 2023-06-09 NOTE — TELEPHONE ENCOUNTER
Caller: David Comer Sr.    Relationship: Self    Best call back number: [phone]    What is the best time to reach you: ANY    Who are you requesting to speak with (clinical staff, provider,  specific staff member): CLINICAL      What was the call regarding: PATIENT WAS SUPPOSE START TAKING AND ADDITION 5 MG  OF WARFARIN ON 06.08.23 AND THE PHARMACY RECEIVED REFILL FOR WARFARIN 7.5 MG. PLEASE CONTACT PATIENT TO CONFIRM WHAT HE IS SUPPOSE TO DO.      Is it okay if the provider responds through MyChart: NO PATIENT PREFERS A CALL.

## 2023-07-28 ENCOUNTER — ANTICOAGULATION VISIT (OUTPATIENT)
Dept: CARDIOLOGY | Facility: CLINIC | Age: 83
End: 2023-07-28
Payer: MEDICARE

## 2023-07-28 LAB — INR PPP: 6.1

## 2023-08-01 ENCOUNTER — ANTICOAGULATION VISIT (OUTPATIENT)
Dept: CARDIOLOGY | Facility: CLINIC | Age: 83
End: 2023-08-01
Payer: MEDICARE

## 2023-08-01 ENCOUNTER — HOSPITAL ENCOUNTER (OUTPATIENT)
Dept: CARDIOLOGY | Facility: HOSPITAL | Age: 83
Discharge: HOME | End: 2023-08-01
Admitting: INTERNAL MEDICINE
Payer: MEDICARE

## 2023-08-01 LAB — INR PPP: 1.4

## 2023-08-01 PROCEDURE — 85610 PROTHROMBIN TIME: CPT | Performed by: INTERNAL MEDICINE

## 2023-08-02 RX ORDER — HYDRALAZINE HYDROCHLORIDE 25 MG/1
TABLET, FILM COATED ORAL
Qty: 270 TABLET | Refills: 1 | Status: SHIPPED | OUTPATIENT
Start: 2023-08-02

## 2023-08-15 ENCOUNTER — HOSPITAL ENCOUNTER (OUTPATIENT)
Dept: CARDIOLOGY | Facility: HOSPITAL | Age: 83
Discharge: HOME | End: 2023-08-15
Admitting: INTERNAL MEDICINE
Payer: MEDICARE

## 2023-08-15 ENCOUNTER — ANTICOAGULATION VISIT (OUTPATIENT)
Dept: CARDIOLOGY | Facility: CLINIC | Age: 83
End: 2023-08-15
Payer: MEDICARE

## 2023-08-15 LAB — INR PPP: 2.8

## 2023-08-15 PROCEDURE — 85610 PROTHROMBIN TIME: CPT | Performed by: INTERNAL MEDICINE

## 2023-08-29 ENCOUNTER — HOSPITAL ENCOUNTER (OUTPATIENT)
Dept: CARDIOLOGY | Facility: HOSPITAL | Age: 83
Discharge: HOME | End: 2023-08-29
Admitting: INTERNAL MEDICINE
Payer: MEDICARE

## 2023-08-29 ENCOUNTER — ANTICOAGULATION VISIT (OUTPATIENT)
Dept: CARDIOLOGY | Facility: CLINIC | Age: 83
End: 2023-08-29
Payer: MEDICARE

## 2023-08-29 LAB — INR PPP: 2.4

## 2023-08-29 PROCEDURE — 85610 PROTHROMBIN TIME: CPT | Performed by: INTERNAL MEDICINE

## 2023-08-31 RX ORDER — AMIODARONE HYDROCHLORIDE 200 MG/1
TABLET ORAL
Qty: 45 TABLET | Refills: 2 | Status: SHIPPED | OUTPATIENT
Start: 2023-08-31

## 2023-08-31 RX ORDER — ISOSORBIDE MONONITRATE 30 MG/1
TABLET, EXTENDED RELEASE ORAL
Qty: 90 TABLET | Refills: 2 | Status: SHIPPED | OUTPATIENT
Start: 2023-08-31

## 2023-09-14 RX ORDER — HYDRALAZINE HYDROCHLORIDE 25 MG/1
25 TABLET, FILM COATED ORAL 3 TIMES DAILY
Qty: 270 TABLET | Refills: 1 | Status: SHIPPED | OUTPATIENT
Start: 2023-09-14

## 2023-09-19 ENCOUNTER — ANTICOAGULATION VISIT (OUTPATIENT)
Dept: CARDIOLOGY | Facility: CLINIC | Age: 83
End: 2023-09-19
Payer: MEDICARE

## 2023-09-19 ENCOUNTER — HOSPITAL ENCOUNTER (OUTPATIENT)
Dept: CARDIOLOGY | Facility: HOSPITAL | Age: 83
Discharge: HOME | End: 2023-09-19
Admitting: INTERNAL MEDICINE
Payer: MEDICARE

## 2023-09-19 LAB — INR PPP: 1.8

## 2023-09-19 PROCEDURE — 85610 PROTHROMBIN TIME: CPT | Performed by: INTERNAL MEDICINE

## 2023-09-27 RX ORDER — WARFARIN SODIUM 5 MG/1
TABLET ORAL
Qty: 45 TABLET | Refills: 1 | Status: SHIPPED | OUTPATIENT
Start: 2023-09-27

## 2023-10-02 ENCOUNTER — APPOINTMENT (OUTPATIENT)
Dept: GENERAL RADIOLOGY | Facility: HOSPITAL | Age: 83
DRG: 444 | End: 2023-10-02
Payer: MEDICARE

## 2023-10-02 ENCOUNTER — HOSPITAL ENCOUNTER (INPATIENT)
Facility: HOSPITAL | Age: 83
LOS: 7 days | Discharge: HOME | DRG: 444 | End: 2023-10-10
Attending: EMERGENCY MEDICINE | Admitting: STUDENT IN AN ORGANIZED HEALTH CARE EDUCATION/TRAINING PROGRAM
Payer: MEDICARE

## 2023-10-02 ENCOUNTER — APPOINTMENT (OUTPATIENT)
Dept: CT IMAGING | Facility: HOSPITAL | Age: 83
DRG: 444 | End: 2023-10-02
Payer: MEDICARE

## 2023-10-02 DIAGNOSIS — K81.0 ACUTE CHOLECYSTITIS: ICD-10-CM

## 2023-10-02 DIAGNOSIS — K44.9 HIATAL HERNIA: ICD-10-CM

## 2023-10-02 DIAGNOSIS — R07.9 CHEST PAIN, UNSPECIFIED TYPE: Primary | ICD-10-CM

## 2023-10-02 DIAGNOSIS — K80.20 GALLSTONES: ICD-10-CM

## 2023-10-02 LAB
ALBUMIN SERPL-MCNC: 3.8 G/DL (ref 3.5–5.2)
ALBUMIN/GLOB SERPL: 1.5 G/DL
ALP SERPL-CCNC: 63 U/L (ref 39–117)
ALT SERPL W P-5'-P-CCNC: 15 U/L (ref 1–41)
ANION GAP SERPL CALCULATED.3IONS-SCNC: 12.1 MMOL/L (ref 5–15)
AST SERPL-CCNC: 17 U/L (ref 1–40)
BASOPHILS # BLD AUTO: 0.03 10*3/MM3 (ref 0–0.2)
BASOPHILS NFR BLD AUTO: 0.3 % (ref 0–1.5)
BILIRUB SERPL-MCNC: 0.3 MG/DL (ref 0–1.2)
BUN SERPL-MCNC: 19 MG/DL (ref 8–23)
BUN/CREAT SERPL: 15.6 (ref 7–25)
CALCIUM SPEC-SCNC: 9.2 MG/DL (ref 8.6–10.5)
CHLORIDE SERPL-SCNC: 104 MMOL/L (ref 98–107)
CO2 SERPL-SCNC: 24.9 MMOL/L (ref 22–29)
CREAT SERPL-MCNC: 1.22 MG/DL (ref 0.76–1.27)
DEPRECATED RDW RBC AUTO: 44.4 FL (ref 37–54)
EGFRCR SERPLBLD CKD-EPI 2021: 59.2 ML/MIN/1.73
EOSINOPHIL # BLD AUTO: 0.1 10*3/MM3 (ref 0–0.4)
EOSINOPHIL NFR BLD AUTO: 1.1 % (ref 0.3–6.2)
ERYTHROCYTE [DISTWIDTH] IN BLOOD BY AUTOMATED COUNT: 12.2 % (ref 12.3–15.4)
GLOBULIN UR ELPH-MCNC: 2.6 GM/DL
GLUCOSE SERPL-MCNC: 112 MG/DL (ref 65–99)
HCT VFR BLD AUTO: 38.5 % (ref 37.5–51)
HGB BLD-MCNC: 12.7 G/DL (ref 13–17.7)
HOLD SPECIMEN: NORMAL
HOLD SPECIMEN: NORMAL
IMM GRANULOCYTES # BLD AUTO: 0.04 10*3/MM3 (ref 0–0.05)
IMM GRANULOCYTES NFR BLD AUTO: 0.4 % (ref 0–0.5)
INR PPP: 1.99 (ref 0.9–1.1)
LIPASE SERPL-CCNC: 20 U/L (ref 13–60)
LYMPHOCYTES # BLD AUTO: 1.29 10*3/MM3 (ref 0.7–3.1)
LYMPHOCYTES NFR BLD AUTO: 13.8 % (ref 19.6–45.3)
MCH RBC QN AUTO: 32.9 PG (ref 26.6–33)
MCHC RBC AUTO-ENTMCNC: 33 G/DL (ref 31.5–35.7)
MCV RBC AUTO: 99.7 FL (ref 79–97)
MONOCYTES # BLD AUTO: 0.54 10*3/MM3 (ref 0.1–0.9)
MONOCYTES NFR BLD AUTO: 5.8 % (ref 5–12)
NEUTROPHILS NFR BLD AUTO: 7.35 10*3/MM3 (ref 1.7–7)
NEUTROPHILS NFR BLD AUTO: 78.6 % (ref 42.7–76)
NRBC BLD AUTO-RTO: 0 /100 WBC (ref 0–0.2)
PLATELET # BLD AUTO: 165 10*3/MM3 (ref 140–450)
PMV BLD AUTO: 11.7 FL (ref 6–12)
POTASSIUM SERPL-SCNC: 3.6 MMOL/L (ref 3.5–5.2)
PROT SERPL-MCNC: 6.4 G/DL (ref 6–8.5)
PROTHROMBIN TIME: 22.9 SECONDS (ref 11.7–14.2)
RBC # BLD AUTO: 3.86 10*6/MM3 (ref 4.14–5.8)
SODIUM SERPL-SCNC: 141 MMOL/L (ref 136–145)
TROPONIN T SERPL HS-MCNC: 28 NG/L
WBC NRBC COR # BLD: 9.35 10*3/MM3 (ref 3.4–10.8)
WHOLE BLOOD HOLD COAG: NORMAL
WHOLE BLOOD HOLD SPECIMEN: NORMAL

## 2023-10-02 PROCEDURE — 80053 COMPREHEN METABOLIC PANEL: CPT

## 2023-10-02 PROCEDURE — 71045 X-RAY EXAM CHEST 1 VIEW: CPT

## 2023-10-02 PROCEDURE — 74174 CTA ABD&PLVS W/CONTRAST: CPT

## 2023-10-02 PROCEDURE — 25510000001 IOPAMIDOL PER 1 ML: Performed by: EMERGENCY MEDICINE

## 2023-10-02 PROCEDURE — 93005 ELECTROCARDIOGRAM TRACING: CPT | Performed by: EMERGENCY MEDICINE

## 2023-10-02 PROCEDURE — 25010000002 ONDANSETRON PER 1 MG: Performed by: EMERGENCY MEDICINE

## 2023-10-02 PROCEDURE — 84484 ASSAY OF TROPONIN QUANT: CPT | Performed by: EMERGENCY MEDICINE

## 2023-10-02 PROCEDURE — 93010 ELECTROCARDIOGRAM REPORT: CPT | Performed by: INTERNAL MEDICINE

## 2023-10-02 PROCEDURE — 85025 COMPLETE CBC W/AUTO DIFF WBC: CPT

## 2023-10-02 PROCEDURE — 25010000002 HYDROMORPHONE PER 4 MG: Performed by: EMERGENCY MEDICINE

## 2023-10-02 PROCEDURE — 83690 ASSAY OF LIPASE: CPT | Performed by: EMERGENCY MEDICINE

## 2023-10-02 PROCEDURE — 99285 EMERGENCY DEPT VISIT HI MDM: CPT

## 2023-10-02 PROCEDURE — 71275 CT ANGIOGRAPHY CHEST: CPT

## 2023-10-02 PROCEDURE — 85610 PROTHROMBIN TIME: CPT

## 2023-10-02 PROCEDURE — 36415 COLL VENOUS BLD VENIPUNCTURE: CPT

## 2023-10-02 PROCEDURE — 93005 ELECTROCARDIOGRAM TRACING: CPT

## 2023-10-02 PROCEDURE — 84484 ASSAY OF TROPONIN QUANT: CPT

## 2023-10-02 PROCEDURE — 25810000003 SODIUM CHLORIDE 0.9 % SOLUTION: Performed by: EMERGENCY MEDICINE

## 2023-10-02 RX ORDER — HYDROMORPHONE HYDROCHLORIDE 1 MG/ML
0.5 INJECTION, SOLUTION INTRAMUSCULAR; INTRAVENOUS; SUBCUTANEOUS ONCE
Status: COMPLETED | OUTPATIENT
Start: 2023-10-02 | End: 2023-10-02

## 2023-10-02 RX ORDER — ONDANSETRON 2 MG/ML
4 INJECTION INTRAMUSCULAR; INTRAVENOUS ONCE
Status: COMPLETED | OUTPATIENT
Start: 2023-10-02 | End: 2023-10-02

## 2023-10-02 RX ORDER — SODIUM CHLORIDE 0.9 % (FLUSH) 0.9 %
10 SYRINGE (ML) INJECTION AS NEEDED
Status: DISCONTINUED | OUTPATIENT
Start: 2023-10-02 | End: 2023-10-03

## 2023-10-02 RX ORDER — FAMOTIDINE 10 MG/ML
20 INJECTION, SOLUTION INTRAVENOUS ONCE
Status: COMPLETED | OUTPATIENT
Start: 2023-10-02 | End: 2023-10-02

## 2023-10-02 RX ORDER — ASPIRIN 325 MG
325 TABLET ORAL ONCE
Status: DISCONTINUED | OUTPATIENT
Start: 2023-10-02 | End: 2023-10-03

## 2023-10-02 RX ORDER — HYDROMORPHONE HYDROCHLORIDE 1 MG/ML
0.25 INJECTION, SOLUTION INTRAMUSCULAR; INTRAVENOUS; SUBCUTANEOUS ONCE
Status: COMPLETED | OUTPATIENT
Start: 2023-10-02 | End: 2023-10-02

## 2023-10-02 RX ADMIN — HYDROMORPHONE HYDROCHLORIDE 0.5 MG: 1 INJECTION, SOLUTION INTRAMUSCULAR; INTRAVENOUS; SUBCUTANEOUS at 23:48

## 2023-10-02 RX ADMIN — ONDANSETRON 4 MG: 2 INJECTION INTRAMUSCULAR; INTRAVENOUS at 23:48

## 2023-10-02 RX ADMIN — HYDROMORPHONE HYDROCHLORIDE 0.25 MG: 1 INJECTION, SOLUTION INTRAMUSCULAR; INTRAVENOUS; SUBCUTANEOUS at 21:12

## 2023-10-02 RX ADMIN — FAMOTIDINE 20 MG: 10 INJECTION INTRAVENOUS at 21:10

## 2023-10-02 RX ADMIN — IOPAMIDOL 95 ML: 755 INJECTION, SOLUTION INTRAVENOUS at 21:35

## 2023-10-02 RX ADMIN — ONDANSETRON 4 MG: 2 INJECTION INTRAMUSCULAR; INTRAVENOUS at 21:12

## 2023-10-02 RX ADMIN — SODIUM CHLORIDE 500 ML: 9 INJECTION, SOLUTION INTRAVENOUS at 21:14

## 2023-10-02 NOTE — Clinical Note
Level of Care: Telemetry [5]   Diagnosis: Epigastric pain [174904]   Admitting Physician: DARIO NGUYEN [[id number]]   Attending Physician: DARIO NGUYEN [[id number]]

## 2023-10-03 ENCOUNTER — APPOINTMENT (OUTPATIENT)
Dept: CARDIOLOGY | Facility: HOSPITAL | Age: 83
DRG: 444 | End: 2023-10-03
Payer: MEDICARE

## 2023-10-03 ENCOUNTER — APPOINTMENT (OUTPATIENT)
Dept: ULTRASOUND IMAGING | Facility: HOSPITAL | Age: 83
DRG: 444 | End: 2023-10-03
Payer: MEDICARE

## 2023-10-03 PROBLEM — R07.9 CHEST PAIN: Status: ACTIVE | Noted: 2023-10-03

## 2023-10-03 PROBLEM — R10.13 EPIGASTRIC PAIN: Status: ACTIVE | Noted: 2023-10-03

## 2023-10-03 PROBLEM — K81.0 ACUTE CHOLECYSTITIS: Status: ACTIVE | Noted: 2023-10-03

## 2023-10-03 LAB
ANION GAP SERPL CALCULATED.3IONS-SCNC: 9.2 MMOL/L (ref 5–15)
BUN SERPL-MCNC: 15 MG/DL (ref 8–23)
BUN/CREAT SERPL: 17.4 (ref 7–25)
CALCIUM SPEC-SCNC: 8.5 MG/DL (ref 8.6–10.5)
CHLORIDE SERPL-SCNC: 104 MMOL/L (ref 98–107)
CO2 SERPL-SCNC: 24.8 MMOL/L (ref 22–29)
CREAT SERPL-MCNC: 0.86 MG/DL (ref 0.76–1.27)
DEPRECATED RDW RBC AUTO: 42.4 FL (ref 37–54)
EGFRCR SERPLBLD CKD-EPI 2021: 86.5 ML/MIN/1.73
ERYTHROCYTE [DISTWIDTH] IN BLOOD BY AUTOMATED COUNT: 11.8 % (ref 12.3–15.4)
GLUCOSE SERPL-MCNC: 111 MG/DL (ref 65–99)
HCT VFR BLD AUTO: 36.4 % (ref 37.5–51)
HGB BLD-MCNC: 12.3 G/DL (ref 13–17.7)
INR PPP: 2.05 (ref 0.9–1.1)
MCH RBC QN AUTO: 33.2 PG (ref 26.6–33)
MCHC RBC AUTO-ENTMCNC: 33.8 G/DL (ref 31.5–35.7)
MCV RBC AUTO: 98.1 FL (ref 79–97)
PLATELET # BLD AUTO: 164 10*3/MM3 (ref 140–450)
PMV BLD AUTO: 11.9 FL (ref 6–12)
POTASSIUM SERPL-SCNC: 3.4 MMOL/L (ref 3.5–5.2)
PROTHROMBIN TIME: 23.5 SECONDS (ref 11.7–14.2)
QT INTERVAL: 438 MS
QTC INTERVAL: 434 MS
RBC # BLD AUTO: 3.71 10*6/MM3 (ref 4.14–5.8)
SODIUM SERPL-SCNC: 138 MMOL/L (ref 136–145)
TROPONIN T SERPL HS-MCNC: 33 NG/L
WBC NRBC COR # BLD: 15.71 10*3/MM3 (ref 3.4–10.8)

## 2023-10-03 PROCEDURE — 80048 BASIC METABOLIC PNL TOTAL CA: CPT | Performed by: NURSE PRACTITIONER

## 2023-10-03 PROCEDURE — 25010000002 PIPERACILLIN SOD-TAZOBACTAM PER 1 G: Performed by: HOSPITALIST

## 2023-10-03 PROCEDURE — 85027 COMPLETE CBC AUTOMATED: CPT | Performed by: NURSE PRACTITIONER

## 2023-10-03 PROCEDURE — 76705 ECHO EXAM OF ABDOMEN: CPT

## 2023-10-03 PROCEDURE — 99222 1ST HOSP IP/OBS MODERATE 55: CPT | Performed by: INTERNAL MEDICINE

## 2023-10-03 PROCEDURE — 85610 PROTHROMBIN TIME: CPT | Performed by: HOSPITALIST

## 2023-10-03 PROCEDURE — 99222 1ST HOSP IP/OBS MODERATE 55: CPT | Performed by: SURGERY

## 2023-10-03 RX ORDER — AMOXICILLIN 250 MG
2 CAPSULE ORAL 2 TIMES DAILY
Status: DISCONTINUED | OUTPATIENT
Start: 2023-10-03 | End: 2023-10-10 | Stop reason: HOSPADM

## 2023-10-03 RX ORDER — HYDRALAZINE HYDROCHLORIDE 10 MG/1
25 TABLET, FILM COATED ORAL 3 TIMES DAILY
Status: DISCONTINUED | OUTPATIENT
Start: 2023-10-03 | End: 2023-10-05

## 2023-10-03 RX ORDER — LEVOTHYROXINE SODIUM 112 UG/1
112 TABLET ORAL DAILY
Status: DISCONTINUED | OUTPATIENT
Start: 2023-10-03 | End: 2023-10-10 | Stop reason: HOSPADM

## 2023-10-03 RX ORDER — ISOSORBIDE MONONITRATE 30 MG/1
30 TABLET, EXTENDED RELEASE ORAL DAILY
Status: DISCONTINUED | OUTPATIENT
Start: 2023-10-03 | End: 2023-10-05

## 2023-10-03 RX ORDER — ONDANSETRON 2 MG/ML
4 INJECTION INTRAMUSCULAR; INTRAVENOUS EVERY 6 HOURS PRN
Status: DISCONTINUED | OUTPATIENT
Start: 2023-10-03 | End: 2023-10-10 | Stop reason: HOSPADM

## 2023-10-03 RX ORDER — SODIUM CHLORIDE 0.9 % (FLUSH) 0.9 %
10 SYRINGE (ML) INJECTION EVERY 12 HOURS SCHEDULED
Status: DISCONTINUED | OUTPATIENT
Start: 2023-10-03 | End: 2023-10-10 | Stop reason: HOSPADM

## 2023-10-03 RX ORDER — ROSUVASTATIN CALCIUM 5 MG/1
5 TABLET, COATED ORAL DAILY
Status: DISCONTINUED | OUTPATIENT
Start: 2023-10-03 | End: 2023-10-10 | Stop reason: HOSPADM

## 2023-10-03 RX ORDER — NEBIVOLOL 5 MG/1
5 TABLET ORAL DAILY
Status: DISCONTINUED | OUTPATIENT
Start: 2023-10-03 | End: 2023-10-05

## 2023-10-03 RX ORDER — ASPIRIN 81 MG/1
81 TABLET ORAL DAILY
Status: DISCONTINUED | OUTPATIENT
Start: 2023-10-03 | End: 2023-10-10 | Stop reason: HOSPADM

## 2023-10-03 RX ORDER — BISACODYL 10 MG
10 SUPPOSITORY, RECTAL RECTAL DAILY PRN
Status: DISCONTINUED | OUTPATIENT
Start: 2023-10-03 | End: 2023-10-10 | Stop reason: HOSPADM

## 2023-10-03 RX ORDER — AMIODARONE HYDROCHLORIDE 200 MG/1
100 TABLET ORAL DAILY
Status: DISCONTINUED | OUTPATIENT
Start: 2023-10-03 | End: 2023-10-10 | Stop reason: HOSPADM

## 2023-10-03 RX ORDER — ASPIRIN 81 MG/1
81 TABLET ORAL DAILY
COMMUNITY

## 2023-10-03 RX ORDER — POLYETHYLENE GLYCOL 3350 17 G/17G
17 POWDER, FOR SOLUTION ORAL DAILY PRN
Status: DISCONTINUED | OUTPATIENT
Start: 2023-10-03 | End: 2023-10-10 | Stop reason: HOSPADM

## 2023-10-03 RX ORDER — SODIUM CHLORIDE 9 MG/ML
40 INJECTION, SOLUTION INTRAVENOUS AS NEEDED
Status: DISCONTINUED | OUTPATIENT
Start: 2023-10-03 | End: 2023-10-10 | Stop reason: HOSPADM

## 2023-10-03 RX ORDER — BISACODYL 5 MG/1
5 TABLET, DELAYED RELEASE ORAL DAILY PRN
Status: DISCONTINUED | OUTPATIENT
Start: 2023-10-03 | End: 2023-10-10 | Stop reason: HOSPADM

## 2023-10-03 RX ORDER — TERAZOSIN 2 MG/1
2 CAPSULE ORAL NIGHTLY
Status: DISCONTINUED | OUTPATIENT
Start: 2023-10-03 | End: 2023-10-05

## 2023-10-03 RX ORDER — PANTOPRAZOLE SODIUM 40 MG/1
40 TABLET, DELAYED RELEASE ORAL
Status: DISCONTINUED | OUTPATIENT
Start: 2023-10-03 | End: 2023-10-05 | Stop reason: SDUPTHER

## 2023-10-03 RX ORDER — SODIUM CHLORIDE 0.9 % (FLUSH) 0.9 %
10 SYRINGE (ML) INJECTION AS NEEDED
Status: DISCONTINUED | OUTPATIENT
Start: 2023-10-03 | End: 2023-10-10 | Stop reason: HOSPADM

## 2023-10-03 RX ORDER — NEBIVOLOL 10 MG/1
5 TABLET ORAL DAILY
COMMUNITY

## 2023-10-03 RX ORDER — ONDANSETRON 4 MG/1
4 TABLET, FILM COATED ORAL EVERY 6 HOURS PRN
Status: DISCONTINUED | OUTPATIENT
Start: 2023-10-03 | End: 2023-10-10 | Stop reason: HOSPADM

## 2023-10-03 RX ORDER — ACETAMINOPHEN 325 MG/1
650 TABLET ORAL EVERY 6 HOURS PRN
Status: DISCONTINUED | OUTPATIENT
Start: 2023-10-03 | End: 2023-10-10 | Stop reason: HOSPADM

## 2023-10-03 RX ORDER — NITROGLYCERIN 0.4 MG/1
0.4 TABLET SUBLINGUAL
Status: DISCONTINUED | OUTPATIENT
Start: 2023-10-03 | End: 2023-10-10 | Stop reason: HOSPADM

## 2023-10-03 RX ADMIN — SENNOSIDES AND DOCUSATE SODIUM 2 TABLET: 50; 8.6 TABLET ORAL at 20:18

## 2023-10-03 RX ADMIN — PIPERACILLIN SODIUM AND TAZOBACTAM SODIUM 3.38 G: 3; .375 INJECTION, SOLUTION INTRAVENOUS at 09:31

## 2023-10-03 RX ADMIN — PIPERACILLIN SODIUM AND TAZOBACTAM SODIUM 3.38 G: 3; .375 INJECTION, SOLUTION INTRAVENOUS at 15:47

## 2023-10-03 RX ADMIN — HYDRALAZINE HYDROCHLORIDE 25 MG: 10 TABLET, FILM COATED ORAL at 12:58

## 2023-10-03 RX ADMIN — Medication 10 ML: at 01:09

## 2023-10-03 RX ADMIN — PANTOPRAZOLE SODIUM 40 MG: 40 TABLET, DELAYED RELEASE ORAL at 12:58

## 2023-10-03 RX ADMIN — Medication 10 ML: at 08:40

## 2023-10-03 RX ADMIN — ACETAMINOPHEN 650 MG: 325 TABLET, FILM COATED ORAL at 13:08

## 2023-10-03 RX ADMIN — ASPIRIN 81 MG: 81 TABLET, COATED ORAL at 12:58

## 2023-10-03 RX ADMIN — Medication 10 ML: at 20:19

## 2023-10-03 RX ADMIN — TERAZOSIN 2 MG: 2 CAPSULE ORAL at 20:18

## 2023-10-03 RX ADMIN — PIPERACILLIN SODIUM AND TAZOBACTAM SODIUM 3.38 G: 3; .375 INJECTION, SOLUTION INTRAVENOUS at 23:41

## 2023-10-03 RX ADMIN — AMIODARONE HYDROCHLORIDE 100 MG: 200 TABLET ORAL at 12:58

## 2023-10-03 RX ADMIN — HYDRALAZINE HYDROCHLORIDE 25 MG: 10 TABLET, FILM COATED ORAL at 20:18

## 2023-10-03 RX ADMIN — LEVOTHYROXINE SODIUM 112 MCG: 0.11 TABLET ORAL at 12:58

## 2023-10-03 RX ADMIN — ROSUVASTATIN CALCIUM 5 MG: 5 TABLET, FILM COATED ORAL at 12:59

## 2023-10-03 RX ADMIN — NEBIVOLOL 5 MG: 5 TABLET ORAL at 12:58

## 2023-10-03 RX ADMIN — ISOSORBIDE MONONITRATE 30 MG: 30 TABLET, EXTENDED RELEASE ORAL at 12:58

## 2023-10-03 NOTE — ED PROVIDER NOTES
" EMERGENCY DEPARTMENT ENCOUNTER    Room Number:  17/17  Date seen:  10/3/2023  PCP: Chacha Pineda MD  Historian: Patient      HPI:  Chief Complaint: Chest pain  Context: David Comer Sr. is a 83 y.o. male who presents to the ED c/o chest pain that began 3 hours ago.  Patient has a history of chronic coronary artery disease and paroxysmal atrial fibrillation and is on Coumadin.  The patient states he had multiple recurrent episodes of this epigastric/chest pain for which she had several evaluations in the past year.  He states that \"Dr. SAMUELS said that if he has this pain again it is not his heart\".  It appears the patient's had a CTA of his chest 6 months ago and a year ago which shows an aneurysm but no dissection.  A year ago there was a question could the patient had acute cholecystitis.  The patient states that the pain started after lifting some gas cans today and has not alleviated.  He denies nausea, vomiting, diarrhea, fevers, chills or shortness of breath.      PAST MEDICAL HISTORY  Active Ambulatory Problems     Diagnosis Date Noted    Chronic coronary artery disease 10/18/2016    Abdominal aortic aneurysm 10/18/2016    Paroxysmal atrial fibrillation 10/18/2016    Gastroesophageal reflux disease 10/18/2016    Essential hypertension 10/18/2016    Mixed hyperlipidemia 10/18/2016    Hypogonadism in male 10/18/2016    Acquired hypothyroidism 10/18/2016    Osteoarthritis of spine 03/01/2016    Vitamin D deficiency 10/18/2016    Long term (current) use of anticoagulants 02/09/2017    Atrial fibrillation, rapid 05/30/2017    Statin intolerance 09/28/2017    Gross hematuria 01/29/2018    Coumadin toxicity 01/29/2018    H/O amiodarone therapy 09/17/2018    Dehydration 09/24/2019    Renal insufficiency 09/24/2019    Thoracic aortic aneurysm without rupture 11/18/2019    Refused influenza vaccine 12/20/2019    Medicare annual wellness visit, subsequent     BPH without obstruction/lower urinary tract symptoms " 01/06/2020    Thoracic aortic aneurysm     Kidney stone     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 11/25/2020    Arthritis     SOB (shortness of breath) 06/23/2021    Fatigue 06/23/2021    Abnormal nuclear stress test 06/23/2021    Acute left-sided low back pain without sciatica 09/17/2021    Lt groin pain 09/17/2021    Stool incontinence 02/17/2022    Hospital discharge follow-up 09/15/2022    Chest pain, unspecified type 11/21/2022    Generalized weakness 01/28/2023    Iron deficiency anemia secondary to inadequate dietary iron intake 05/08/2023     Resolved Ambulatory Problems     Diagnosis Date Noted    Syncope and collapse 08/28/2017    Chest pain 08/28/2017    Pneumonia due to infectious organism 08/28/2017    Sepsis 02/02/2018    Hospital discharge follow-up 07/16/2020    Acute nasopharyngitis 07/16/2020    Hand injury 11/24/2020    Acute kidney injury 11/24/2020    Traumatic hematoma of right knee 11/27/2020    Aspiration pneumonia 09/15/2022    COVID-19 virus infection 01/29/2023     Past Medical History:   Diagnosis Date    AAA (abdominal aortic aneurysm) without rupture     Aneurysm of thoracic aorta     Basal cell carcinoma     BPH (benign prostatic hyperplasia)     Chronic lumbar pain     Degenerative arthritis of lumbar spine     Degenerative cervical disc     Disease of thyroid gland     Elevated rheumatoid factor     History of atrial fibrillation     Hyperlipidemia     Hypertension     Muscle strain of left upper back     On anticoagulant therapy     Pulmonary nodule     Scoliosis          REVIEW OF SYSTEMS  All systems reviewed and negative except for those discussed in HPI.       PAST SURGICAL HISTORY  Past Surgical History:   Procedure Laterality Date    ANKLE FUSION Right     CARDIAC CATHETERIZATION      CARDIAC CATHETERIZATION N/A 7/2/2021    Procedure: Left Heart Cath;  Surgeon: Harlan Downing MD;  Location: [facility] CATH INVASIVE LOCATION;  Service: Cardiology;   Laterality: N/A;    CARDIAC CATHETERIZATION N/A 7/2/2021    Procedure: Coronary angiography;  Surgeon: Harlan Downing MD;  Location[facility] C[facility]AS[facility] LOC[facility];  Service: Cardiology;  Laterality: N/A;    CARDIAC CATHETERIZATION N/A 7/2/2021    Procedure: Left ventriculography;  Surgeon: Harlan Downing MD;  [facility];  Service: Cardiology;  Laterality: N/A;    CARDIAC CATHETERIZATION N/A 12/30/2022    Procedure: Left Heart Cath check inr;  Surgeon: Harlan Downing MD;  [facility];  Service: Cardiology;  Laterality: N/A;    CARDIAC CATHETERIZATION N/A 12/30/2022    Procedure: Coronary angiography;  Surgeon: Harlan Downing MD;  Location: [facility] CATH INVASIVE LOCATION;  Service: Cardiology;  Laterality: N/A;    CARDIAC CATHETERIZATION N/A 12/30/2022    Procedure: Left ventriculography;  Surgeon: Harlan Downing MD;  Location: [facility] CATH INVASIVE LOCATION;  Service: Cardiology;  Laterality: N/A;    HOBBS TUBE INSERTION Right 10/19/2021    Procedure: RIGHT SECOND STAGE CAST TAKEDOWN RECONSTRUCTION;  Surgeon: Brian Bhatt MD;  [place] [facility] [place] [facility] [place]  Service: Ophthalmology;  Laterality: Right;    ENTROPIAN REPAIR Right 9/21/2021    Procedure: RIGHT LOWER LID EXCISION OF BASAL CELL CARCINOMA WITH FROZEN SECTION RIGHT LOWER LID RECONSTRUCTION WITH CAST FLAP, REPAIR OF CANULICULIS, AND FULL THICKNESS SKIN GRAFT;  Surgeon: Brian Bhatt MD;  [place] [facility] [place] [facility] [place]  Service: Ophthalmology;  Laterality: Right;    INGUINAL HERNIA REPAIR Right     REPLACEMENT TOTAL KNEE Bilateral 2000    ROTATOR CUFF REPAIR Left     ROTATOR CUFF REPAIR Right     SKIN BIOPSY      VASECTOMY           FAMILY HISTORY  Family History   Problem Relation Age of Onset    Hypertension Father     Heart attack Father     Heart attack Brother     Alcohol abuse Brother     Hypertension Brother     Hypertension Paternal  Grandmother     Hypertension Paternal Grandfather     Anemia Mother     Arthritis Mother     Hypertension Mother     Hypertension Maternal Grandmother     Heart disease Other         FH in males before age 55    Malig Hyperthermia Neg Hx          SOCIAL HISTORY  Social History     Socioeconomic History    Marital status:    Tobacco Use    Smoking status: Never    Smokeless tobacco: Never   Vaping Use    Vaping Use: Never used   Substance and Sexual Activity    Alcohol use: No    Drug use: No    Sexual activity: Defer         ALLERGIES  Patient has no known allergies.      PHYSICAL EXAM  ED Triage Vitals [10/02/23 1908]   Temp Heart Rate Resp BP SpO2   97.6 øF (36.4 øC) 59 18 (!) 190/94 98 %      Temp src Heart Rate Source Patient Position BP Location FiO2 (%)   -- -- -- -- --       Physical Exam      GENERAL: 83-year-old male in mild distress  HENT: NCAT: nares patent: Neck supple  EYES: no scleral icterus  CV: regular rhythm, normal rate: The patient has tenderness over xiphoid process  RESPIRATORY: normal effort  ABDOMEN: soft, epigastric tenderness with guarding but no rebound and bowel sounds positive  MUSCULOSKELETAL: no deformity  NEURO: alert with nonfocal neuro exam  PSYCH:  calm, cooperative  SKIN: warm, dry    Vital signs and nursing notes reviewed.      LAB RESULTS  Recent Results (from the past 24 hour(s))   ECG 12 Lead Chest Pain    Collection Time: 10/02/23  7:04 PM   Result Value Ref Range    QT Interval 438 ms    QTC Interval 434 ms   Comprehensive Metabolic Panel    Collection Time: 10/02/23  7:13 PM    Specimen: Blood   Result Value Ref Range    Glucose 112 (H) 65 - 99 mg/dL    BUN 19 8 - 23 mg/dL    Creatinine 1.22 0.76 - 1.27 mg/dL    Sodium 141 136 - 145 mmol/L    Potassium 3.6 3.5 - 5.2 mmol/L    Chloride 104 98 - 107 mmol/L    CO2 24.9 22.0 - 29.0 mmol/L    Calcium 9.2 8.6 - 10.5 mg/dL    Total Protein 6.4 6.0 - 8.5 g/dL    Albumin 3.8 3.5 - 5.2 g/dL    ALT (SGPT) 15 1 - 41 U/L    AST  (SGOT) 17 1 - 40 U/L    Alkaline Phosphatase 63 39 - 117 U/L    Total Bilirubin 0.3 0.0 - 1.2 mg/dL    Globulin 2.6 gm/dL    A/G Ratio 1.5 g/dL    BUN/Creatinine Ratio 15.6 7.0 - 25.0    Anion Gap 12.1 5.0 - 15.0 mmol/L    eGFR 59.2 (L) >60.0 mL/min/1.73   High Sensitivity Troponin T    Collection Time: 10/02/23  7:13 PM    Specimen: Blood   Result Value Ref Range    HS Troponin T 28 (H) <15 ng/L   Protime-INR    Collection Time: 10/02/23  7:13 PM    Specimen: Blood   Result Value Ref Range    Protime 22.9 (H) 11.7 - 14.2 Seconds    INR 1.99 (H) 0.90 - 1.10   Green Top (Gel)    Collection Time: 10/02/23  7:13 PM   Result Value Ref Range    Extra Tube Hold for add-ons.    Lavender Top    Collection Time: 10/02/23  7:13 PM   Result Value Ref Range    Extra Tube hold for add-on    Gold Top - SST    Collection Time: 10/02/23  7:13 PM   Result Value Ref Range    Extra Tube Hold for add-ons.    Light Blue Top    Collection Time: 10/02/23  7:13 PM   Result Value Ref Range    Extra Tube Hold for add-ons.    CBC Auto Differential    Collection Time: 10/02/23  7:13 PM    Specimen: Blood   Result Value Ref Range    WBC 9.35 3.40 - 10.80 10*3/mm3    RBC 3.86 (L) 4.14 - 5.80 10*6/mm3    Hemoglobin 12.7 (L) 13.0 - 17.7 g/dL    Hematocrit 38.5 37.5 - 51.0 %    MCV 99.7 (H) 79.0 - 97.0 fL    MCH 32.9 26.6 - 33.0 pg    MCHC 33.0 31.5 - 35.7 g/dL    RDW 12.2 (L) 12.3 - 15.4 %    RDW-SD 44.4 37.0 - 54.0 fl    MPV 11.7 6.0 - 12.0 fL    Platelets 165 140 - 450 10*3/mm3    Neutrophil % 78.6 (H) 42.7 - 76.0 %    Lymphocyte % 13.8 (L) 19.6 - 45.3 %    Monocyte % 5.8 5.0 - 12.0 %    Eosinophil % 1.1 0.3 - 6.2 %    Basophil % 0.3 0.0 - 1.5 %    Immature Grans % 0.4 0.0 - 0.5 %    Neutrophils, Absolute 7.35 (H) 1.70 - 7.00 10*3/mm3    Lymphocytes, Absolute 1.29 0.70 - 3.10 10*3/mm3    Monocytes, Absolute 0.54 0.10 - 0.90 10*3/mm3    Eosinophils, Absolute 0.10 0.00 - 0.40 10*3/mm3    Basophils, Absolute 0.03 0.00 - 0.20 10*3/mm3    Immature  Grans, Absolute 0.04 0.00 - 0.05 10*3/mm3    nRBC 0.0 0.0 - 0.2 /100 WBC   Lipase    Collection Time: 10/02/23  7:13 PM    Specimen: Blood   Result Value Ref Range    Lipase 20 13 - 60 U/L   High Sensitivity Troponin T    Collection Time: 10/02/23 11:59 PM    Specimen: Blood   Result Value Ref Range    HS Troponin T 33 (H) <15 ng/L       Ordered the above labs and reviewed the results.        RADIOLOGY  XR Chest 1 View    Result Date: 10/2/2023  XR CHEST 1 VW-  HISTORY: 83-year-old male with chest pain. Known thoracic aortic aneurysm.  FINDINGS: There is no significant change in the contour of the mediastinum since previous radiograph 01/28/2023. There is no evidence for CHF and no convincing evidence for pneumonia. There is a small to moderate-sized hiatal hernia which has been seen on previous imaging.      CT Angiogram Chest, CT Angiogram Abdomen Pelvis    Result Date: 10/2/2023  CT ANGIOGRAM OF THE CHEST; CT ANGIOGRAM OF THE ABDOMEN AND PELVIS   HISTORY: Epigastric pain  COMPARISON: None available.  TECHNIQUE: Axial CT imaging was obtained from the thoracic inlet through the symphysis pubis. IV contrast was administered. Three-D reformatted images were obtained.  FINDINGS: CT OF THE CHEST: No obvious acute pulmonary thromboembolus is seen. There is dilatation of the ascending thoracic aorta, measuring up to 4.8 cm. This is stable when compared to prior imaging. The remainder of the thoracic aorta is normal in caliber. I see no evidence of dissection. There are coronary artery calcifications. The aorta is tortuous in its course. Low-attenuation lesion is noted within the left lobe of the thyroid gland, measuring 5 mm. It is unchanged when compared to prior imaging. The trachea is within normal limits. The patient has a moderate hiatal hernia. This is similar to the prior study. There is dependent atelectasis and scarring. No definite acute infiltrates are seen. Mediastinal lymph nodes do not appear pathologically  enlarged. No acute osseous abnormalities are identified.  CT ANGIOGRAM OF THE ABDOMEN AND PELVIS: Dilatation of the celiac axis, measuring up to 1.5 cm, is stable when compared to prior imaging. The superior mesenteric artery is patent. Main renal arteries appear to be widely patent. There is apparent narrowing at the origin of the accessory left renal artery, poorly assessed due to gating. Inferior mesenteric artery is patent. Abdominal aorta is normal in caliber. The common, internal, and external iliac arteries are patent, as are the common femoral and visualized profunda femoris and superficial femoral arteries. This examination is severely limited for assessment of organs, due to the gating. The patient does have cholelithiasis. Gallbladder does appear somewhat distended. The duodenum, adrenal glands, spleen, and pancreas are grossly unremarkable. No hydronephrosis is seen on either side. Prostate gland is enlarged and protrudes into the base of the bladder. There is no bowel obstruction. I don't see any evidence of appendicitis. No acute osseous abnormalities are seen.       1. This is a severely technically limited examination due to gating. 2. Stable aneurysmal dilatation of the ascending thoracic aorta. No dissection is seen. 3. The abdominal aorta is normal in caliber. No obvious dissection is identified. 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 4. Gallbladder does appear distended, and there is cholelithiasis. Full assessment is limited due to significant artifact. Gallbladder ultrasound could be considered for further assessment.  Radiation dose reduction techniques were utilized, including automated exposure control and exposure modulation based on body size.   This report was finalized on 10/2/2023 10:50 PM by Dr. Jamaica Tilley M.D.       Ordered the above noted radiological studies. Reviewed by me in PACS.            PROCEDURES  Procedures          MEDICATIONS GIVEN IN ER  Medications    sodium chloride 0.9 % flush 10 mL (has no administration in time range)   aspirin tablet 325 mg (325 mg Oral Not Given 10/2/23 2050)   sodium chloride 0.9 % flush 10 mL (has no administration in time range)   sodium chloride 0.9 % flush 10 mL (has no administration in time range)   sodium chloride 0.9 % infusion 40 mL (has no administration in time range)   sennosides-docusate (PERICOLACE) 8.6-50 MG per tablet 2 tablet (has no administration in time range)     And   polyethylene glycol (MIRALAX) packet 17 g (has no administration in time range)     And   bisacodyl (DULCOLAX) EC tablet 5 mg (has no administration in time range)     And   bisacodyl (DULCOLAX) suppository 10 mg (has no administration in time range)   nitroglycerin (NITROSTAT) SL tablet 0.4 mg (has no administration in time range)   ondansetron (ZOFRAN) tablet 4 mg (has no administration in time range)     Or   ondansetron (ZOFRAN) injection 4 mg (has no administration in time range)   sodium chloride 0.9 % bolus 500 mL (0 mL Intravenous Stopped 10/2/23 2350)   HYDROmorphone (DILAUDID) injection 0.25 mg (0.25 mg Intravenous Given 10/2/23 2112)   ondansetron (ZOFRAN) injection 4 mg (4 mg Intravenous Given 10/2/23 2112)   famotidine (PEPCID) injection 20 mg (20 mg Intravenous Given 10/2/23 2110)   iopamidol (ISOVUE-370) 76 % injection 95 mL (95 mL Intravenous Given 10/2/23 2135)   ondansetron (ZOFRAN) injection 4 mg (4 mg Intravenous Given 10/2/23 2348)   HYDROmorphone (DILAUDID) injection 0.5 mg (0.5 mg Intravenous Given 10/2/23 2348)             MEDICAL DECISION MAKING, PROGRESS, and CONSULTS    All labs have been independently reviewed by me.  All radiology studies have been reviewed by me and I have also reviewed the radiology report.   EKG's independently viewed and interpreted by me.  Discussion below represents my analysis of pertinent findings related to patient's condition, differential diagnosis, treatment plan and final  disposition.      Additional sources:  - Discussed/ obtained information from independent historians: The patient's family is here and states that he has had repetitive episodes like this over the past year    - External (non-ED) record review: I reviewed the patient's last admission from 1/28 through 1/30 of this year when he was admitted for COVID, A-fib and GERD.    - Chronic or social conditions impacting care: Patient lives at home    - Shared decision making: After shared decision-making discussion to myself, the patient and his family we agree he needs to be admitted to the hospital for further evaluation and care      Orders placed during this visit:  Orders Placed This Encounter   Procedures    XR Chest 1 View    CT Angiogram Chest    CT Angiogram Abdomen Pelvis    US Abdomen Limited    Reading Draw    Comprehensive Metabolic Panel    High Sensitivity Troponin T    Protime-INR    CBC Auto Differential    High Sensitivity Troponin T 2Hr    Lipase    High Sensitivity Troponin T    CBC (No Diff)    Basic Metabolic Panel    NPO Diet NPO Type: Strict NPO    Undress & Gown    Vital Signs    Intake & Output    Weigh Patient    Oral Care    Telemetry - Maintain IV Access    Telemetry - Place Orders & Notify Provider of Results When Patient Experiences Acute Chest Pain, Dysrhythmia or Respiratory Distress    May Be Off Telemetry for Tests    Pulse Oximetry, Continuous    Up With Assistance    Daily Weights    Neurovascular Checks    Code Status and Medical Interventions:    [facility] (on-call MD unless specified) Details    Oxygen Therapy- Nasal Cannula; Titrate 1-6 LPM Per SpO2; 90 - 95%    Incentive Spirometry    Oxygen Therapy- Nasal Cannula; Titrate 1-6 LPM Per SpO2; 90 - 95%    ECG 12 Lead Chest Pain    ECG 12 Lead ED Triage Standing Order; Chest Pain    Insert Peripheral IV    Insert Peripheral IV    Initiate Observation Status    Initiate Observation Status    CBC & Differential    Green Top (Gel)    Lavender Top     Gold Top - SST    Light Blue Top         Differential diagnosis:  My differential diagnosis includes but is not limited to myocardial infarction, acute coronary syndrome, pericarditis, chest wall pain, pneumonia, pulmonary embolism, pneumothorax, or esophageal spasm.      Independent interpretation of labs, radiology studies, and discussions with consultants:  ED Course as of 10/03/23 0039   Mon Oct 02, 2023   2037 I will treat the patient with pain medicine and obtain an EKG, labs and CTA chest/abdomen/pelvis for further evaluation. [GP]   2037 EKG    EKG time: 1904  Rhythm/Rate: Normal sinus rhythm 59  IVCD  No Acute Ischemia  Non-Specific ST-T changes    Similar compared to prior on 1/28/2023    Interpreted Contemporaneously by me.  Independently viewed by me     [GP]   2313 The patient's CTA chest/abdomen/pelvis shows a stable thoracic aneurysm without dissection.  The patient does have a moderate size hiatal hernia and a distended gallbladder with gallstones. [GP]   Tue Oct 03, 2023   0005 I discussed the case with Serene Flores from [facility].  She is aware of the patient's chest/epigastric pain, EKG and CTA's.  She will admit the patient to a telemetry bed for cardiac rule out and to evaluate the patient's hiatal hernia and gallstones. [GP]      ED Course User Index  [GP] Mervin Cox MD               DIAGNOSIS  Final diagnoses:   Chest pain, unspecified type   Hiatal hernia   Gallstones         DISPOSITION  ADMISSION    Discussed treatment plan and reason for admission with pt/family and admitting physician.  Pt/family voiced understanding of the plan for admission for further testing/treatment as needed.            Latest Documented Vital Signs:  As of 00:39 EDT  BP- 154/71 HR- 75 Temp- 97.6 øF (36.4 øC) O2 sat- 92%--      --------------------  Please note that portions of this were completed with a voice recognition program.       Note Disclaimer: At [facility], we believe that sharing information  builds trust and better relationships. You are receiving this note because you are receiving care at [facility] or recently visited. It is possible you will see health information before a provider has talked with you about it. This kind of information can be easy to misunderstand. To help you fully understand what it means for your health, we urge you to discuss this note with your provider.             Mervin Cox MD  10/03/23 0040

## 2023-10-03 NOTE — ED NOTES
Nursing report ED to floor  David KHAN Age Sr.  83 y.o.  male    HPI :   Chief Complaint   Patient presents with    Chest Pain       Admitting doctor:   Jia Joseph MD    Admitting diagnosis:   The primary encounter diagnosis was Chest pain, unspecified type. Diagnoses of Hiatal hernia and Gallstones were also pertinent to this visit.    Code status:   Current Code Status       Date Active Code Status Order ID Comments User Context       10/3/2023 0010 CPR (Attempt to Resuscitate) [id number]  Shanel Flores APRN ED        Question Answer    Code Status (Patient has no pulse and is not breathing) CPR (Attempt to Resuscitate)    Medical Interventions (Patient has pulse or is breathing) Full Support                    Allergies:   Patient has no known allergies.    Isolation:   No active isolations    Intake and Output    Intake/Output Summary (Last 24 hours) at 10/3/2023 0743  Last data filed at 10/2/2023 2350  Gross per 24 hour   Intake 500 ml   Output --   Net 500 ml       Weight:       10/02/23  1908   Weight: 93 kg (205 lb)       Most recent vitals:   Vitals:    10/03/23 0351 10/03/23 0521 10/03/23 0606 10/03/23 0736   BP: 104/65 97/61 105/64 96/58   Patient Position:    Lying   Pulse: 83 65 84 85   Resp: 18 18 16 18   Temp:       SpO2: 94% 95% 94% 94%   Weight:       Height:           Active LDAs/IV Access:   Lines, Drains & Airways       Active LDAs       Name Placement date Placement time Site Days    Peripheral IV 10/02/23 2108 Anterior;Right Forearm 10/02/23  2108  Forearm  less than 1                    Labs (abnormal labs have a star):   Labs Reviewed   COMPREHENSIVE METABOLIC PANEL - Abnormal; Notable for the following components:       Result Value    Glucose 112 (*)     eGFR 59.2 (*)     All other components within normal limits    Narrative:     GFR Normal >60  Chronic Kidney Disease <60  Kidney Failure <15    The GFR formula is only valid for adults with stable renal function between ages 18  and 70.   TROPONIN - Abnormal; Notable for the following components:    HS Troponin T 28 (*)     All other components within normal limits    Narrative:     High Sensitive Troponin T Reference Range:  <10.0 ng/L- Negative Female for AMI  <15.0 ng/L- Negative Male for AMI  >=10 - Abnormal Female indicating possible myocardial injury.  >=15 - Abnormal Male indicating possible myocardial injury.   Clinicians would have to utilize clinical acumen, EKG, Troponin, and serial changes to determine if it is an Acute Myocardial Infarction or myocardial injury due to an underlying chronic condition.        PROTIME-INR - Abnormal; Notable for the following components:    Protime 22.9 (*)     INR 1.99 (*)     All other components within normal limits   CBC WITH AUTO DIFFERENTIAL - Abnormal; Notable for the following components:    RBC 3.86 (*)     Hemoglobin 12.7 (*)     MCV 99.7 (*)     RDW 12.2 (*)     Neutrophil % 78.6 (*)     Lymphocyte % 13.8 (*)     Neutrophils, Absolute 7.35 (*)     All other components within normal limits   TROPONIN - Abnormal; Notable for the following components:    HS Troponin T 33 (*)     All other components within normal limits    Narrative:     High Sensitive Troponin T Reference Range:  <10.0 ng/L- Negative Female for AMI  <15.0 ng/L- Negative Male for AMI  >=10 - Abnormal Female indicating possible myocardial injury.  >=15 - Abnormal Male indicating possible myocardial injury.   Clinicians would have to utilize clinical acumen, EKG, Troponin, and serial changes to determine if it is an Acute Myocardial Infarction or myocardial injury due to an underlying chronic condition.        CBC (NO DIFF) - Abnormal; Notable for the following components:    WBC 15.71 (*)     RBC 3.71 (*)     Hemoglobin 12.3 (*)     Hematocrit 36.4 (*)     MCV 98.1 (*)     MCH 33.2 (*)     RDW 11.8 (*)     All other components within normal limits   BASIC METABOLIC PANEL - Abnormal; Notable for the following components:     Glucose 111 (*)     Potassium 3.4 (*)     Calcium 8.5 (*)     All other components within normal limits    Narrative:     GFR Normal >60  Chronic Kidney Disease <60  Kidney Failure <15    The GFR formula is only valid for adults with stable renal function between ages 18 and 70.   LIPASE - Normal   RAINBOW DRAW    Narrative:     The following orders were created for panel order [place] Draw.  Procedure                               Abnormality         Status                     ---------                               -----------         ------                     Green Top (Gel)[[id number]]                                  Final result               Lavender Top[[id number]]                                     Final result               Gold Top - SST[[id number]]                                   Final result               Light Blue Top[[id number]]                                   Final result                 Please view results for these tests on the individual orders.   CBC AND DIFFERENTIAL    Narrative:     The following orders were created for panel order CBC & Differential.  Procedure                               Abnormality         Status                     ---------                               -----------         ------                     CBC Auto Differential[[id number]]        Abnormal            Final result                 Please view results for these tests on the individual orders.   GREEN TOP   LAVENDER TOP   GOLD TOP - SST   LIGHT BLUE TOP       EKG:   ECG 12 Lead Chest Pain   Preliminary Result   HEART RATE= 59  bpm   RR Interval= 1017  ms   DE Interval= 180  ms   P Horizontal Axis= 35  deg   P Front Axis= -2  deg   QRSD Interval= 123  ms   QT Interval= 438  ms   QTcB= 434  ms   QRS Axis= -20  deg   T Wave Axis= 29  deg   - ABNORMAL ECG -   Sinus rhythm   Atrial premature complex   IVCD, consider atypical RBBB   Electronically Signed By:    Date and Time of Study: 2023-10-02 19:04:45          Meds  given in ED:   Medications   sodium chloride 0.9 % flush 10 mL (has no administration in time range)   aspirin tablet 325 mg (325 mg Oral Not Given 10/2/23 2050)   sodium chloride 0.9 % flush 10 mL (10 mL Intravenous Given 10/3/23 0109)   sodium chloride 0.9 % flush 10 mL (has no administration in time range)   sodium chloride 0.9 % infusion 40 mL (has no administration in time range)   sennosides-docusate (PERICOLACE) 8.6-50 MG per tablet 2 tablet (2 tablets Oral Not Given 10/3/23 0109)     And   polyethylene glycol (MIRALAX) packet 17 g (has no administration in time range)     And   bisacodyl (DULCOLAX) EC tablet 5 mg (has no administration in time range)     And   bisacodyl (DULCOLAX) suppository 10 mg (has no administration in time range)   nitroglycerin (NITROSTAT) SL tablet 0.4 mg (has no administration in time range)   ondansetron (ZOFRAN) tablet 4 mg (has no administration in time range)     Or   ondansetron (ZOFRAN) injection 4 mg (has no administration in time range)   sodium chloride 0.9 % bolus 500 mL (0 mL Intravenous Stopped 10/2/23 2350)   HYDROmorphone (DILAUDID) injection 0.25 mg (0.25 mg Intravenous Given 10/2/23 2112)   ondansetron (ZOFRAN) injection 4 mg (4 mg Intravenous Given 10/2/23 2112)   famotidine (PEPCID) injection 20 mg (20 mg Intravenous Given 10/2/23 2110)   iopamidol (ISOVUE-370) 76 % injection 95 mL (95 mL Intravenous Given 10/2/23 2135)   ondansetron (ZOFRAN) injection 4 mg (4 mg Intravenous Given 10/2/23 2348)   HYDROmorphone (DILAUDID) injection 0.5 mg (0.5 mg Intravenous Given 10/2/23 2348)       Imaging results:  CT Angiogram Abdomen Pelvis    Result Date: 10/2/2023   1. This is a severely technically limited examination due to gating. 2. Stable aneurysmal dilatation of the ascending thoracic aorta. No dissection is seen. 3. The abdominal aorta is normal in caliber. No obvious dissection is identified. 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 4. Gallbladder does appear  distended, and there is cholelithiasis. Full assessment is limited due to significant artifact. Gallbladder ultrasound could be considered for further assessment.  Radiation dose reduction techniques were utilized, including automated exposure control and exposure modulation based on body size.   This report was finalized on 10/2/2023 10:50 PM by Dr. Jamaica Tilley M.D.      US Abdomen Limited    Result Date: 10/3/2023  Distended gallbladder with cholelithiasis and gallbladder wall edema and thickening, as well as trace pericholecystic fluid. Appearance is concerning for acute cholecystitis.  This report was finalized on 10/3/2023 6:03 AM by Dr. Jamaica Tilley M.D.      CT Angiogram Chest    Result Date: 10/2/2023   1. This is a severely technically limited examination due to gating. 2. Stable aneurysmal dilatation of the ascending thoracic aorta. No dissection is seen. 3. The abdominal aorta is normal in caliber. No obvious dissection is identified. 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 4. Gallbladder does appear distended, and there is cholelithiasis. Full assessment is limited due to significant artifact. Gallbladder ultrasound could be considered for further assessment.  Radiation dose reduction techniques were utilized, including automated exposure control and exposure modulation based on body size.   This report was finalized on 10/2/2023 10:50 PM by Dr. Jamaica Tilley M.D.       Ambulatory status:   - up ad sanjiv     Social issues:   Social History     Socioeconomic History    Marital status:    Tobacco Use    Smoking status: Never    Smokeless tobacco: Never   Vaping Use    Vaping Use: Never used   Substance and Sexual Activity    Alcohol use: No    Drug use: No    Sexual activity: Defer       NIH Stroke Scale:       Rufina Bliss RN  10/03/23 07:43 EDT

## 2023-10-03 NOTE — PROGRESS NOTES
[facility] Clinical Pharmacy Services: Piperacillin-Tazobactam Consult    Pt Name: David Comer Sr.   : 1940    Indication: Intra-Abdominal Infection    Relevant clinical data and objective history reviewed:    Vitals:    10/03/23 0521 10/03/23 0606 10/03/23 0736 10/03/23 0820   BP: 97/61 105/64 96/58 126/76   BP Location:    Left arm   Pulse: 65 84 85 86   Resp: 18 16 18 18   Temp:    98.8 øF (37.1 øC)   TempSrc:    Oral   SpO2: 95% 94% 94% 93%      Creatinine   Date Value Ref Range Status   10/03/2023 0.86 0.76 - 1.27 mg/dL Final   10/02/2023 1.22 0.76 - 1.27 mg/dL Final     BUN   Date Value Ref Range Status   10/03/2023 15 8 - 23 mg/dL Final     Estimated Creatinine Clearance: 87.1 mL/min (by C-G formula based on SCr of 0.86 mg/dL).    Lab Results   Component Value Date    WBC 15.71 (H) 10/03/2023     Temp Readings from Last 3 Encounters:   10/03/23 98.8 øF (37.1 øC) (Oral)      Assessment/Plan  Estimated CrCl >20 mL/min at this time; BMI 27.80 kg/m2  Will start piperacillin-tazobactam 3.375 g IV every 8 hours     Pharmacy will continue to follow daily while on piperacillin-tazobactam and adjust as needed. Thank you for this consult.    Tami Wu, Pharm.D., [facility]   Clinical Pharmacist

## 2023-10-03 NOTE — CONSULTS
[facility]  Cardiology Consult Note    Patient Identification:  Name: David Comer Sr.  Age: 83 y.o.  Sex: male  :  1940  MRN: [id number]             Requesting Physician: Dr. Portillo    Reason for Consultation / Chief Complaint: pw chest pain and has cholecystitis     History of Present Illness:     This is an 83-year-old male who is well-known to our service.  He has a history of PAF on Coumadin, chronic CAD, hyperlipidemia, hypertension, vitamin D deficiency, amiodarone therapy history, and thoracic aortic aneurysm aneurysm. He presents [facility] with epigastric pain/chest pain.    Chest x-ray on admission shows known thoracic aortic aneurysm, no significant change in the contour of the mediastinum since previous study.  No evidence of CHF.  See full report as below.  CT shows stable aneurysmal dilation of the ascending thoracic aorta with no dissection, see full report as below.  EKG showed sinus rhythm with heart rate of 59 with nonspecific T wave changes.  Ultrasound of his right upper quadrant showed cholecystitis.  Surgery has been consulted.  Labs revealed troponin flat 28, 33.        Echo in  revealed EF 61 to 65%, LV diastolic function was normal.  Positive stress test in 2022 revealed a small size, mildly severe area of ischemia located in the inferior wall.  Cardiac cath 2022 revealed early atherosclerotic plaque otherwise normal coronary arteries.    Past Medical History:  Past Medical History:   Diagnosis Date    AAA (abdominal aortic aneurysm) without rupture     Aneurysm of thoracic aorta     CT CHEST 2021 IN [facility]     Arthritis     Basal cell carcinoma     RIGHT LOWER LID     BPH (benign prostatic hyperplasia)     Chronic lumbar pain     Degenerative arthritis of lumbar spine     Degenerative cervical disc     Disease of thyroid gland     Elevated rheumatoid factor     History of atrial fibrillation     Hyperlipidemia      Hypertension     Hypogonadism in male     Muscle strain of left upper back     PRESCRIBED PREDNISONE DOSE PACK     On anticoagulant therapy     Pulmonary nodule     6 MM - REPEAT CT SCAN IN ONE YEAR, WATCHING     Refused influenza vaccine     Scoliosis     Vitamin D deficiency      Past Surgical History:  Past Surgical History:   Procedure Laterality Date    ANKLE FUSION Right     CARDIAC CATHETERIZATION      CARDIAC CATHETERIZATION N/A 7/2/2021    Procedure: Left Heart Cath;  Surgeon: Harlan Downing MD;  [facility] LOC[facility];  Service: Cardiology;  Laterality: N/A;    CARDIAC CATHETERIZATION N/A 7/2/2021    Procedure: Coronary angiography;  Surgeon: Harlan Downing MD;  [facility];  Service: Cardiology;  Laterality: N/A;    CARDIAC CATHETERIZATION N/A 7/2/2021    Procedure: Left ventriculography;  Surgeon: Harlan Downing MD;  [facility];  Service: Cardiology;  Laterality: N/A;    CARDIAC CATHETERIZATION N/A 12/30/2022    Procedure: Left Heart Cath check inr;  Surgeon: Harlan Downing MD;  [facility];  Service: Cardiology;  Laterality: N/A;    CARDIAC CATHETERIZATION N/A 12/30/2022    Procedure: Coronary angiography;  Surgeon: Harlan Downing MD;  Location: [facility] C[facility] LOC[facility];  Service: Cardiology;  Laterality: N/A;    CARDIAC CATHETERIZATION N/A 12/30/2022    Procedure: Left ventriculography;  Surgeon: Harlan Downing MD;  Location: [facility] C[facility] LOC[facility];  Service: Cardiology;  Laterality: N/A;    HOBBS TUBE INSERTION Right 10/19/2021    Procedure: RIGHT SECOND STAGE CAST TAKEDOWN RECONSTRUCTION;  Surgeon: Brian Bhatt MD;  [place] [facility] [place] [facility] [place]  Service: Ophthalmology;  Laterality: Right;    ENTROPIAN REPAIR Right 9/21/2021    Procedure: RIGHT LOWER LID EXCISION OF BASAL CELL CARCINOMA WITH FROZEN SECTION RIGHT LOWER LID  RECONSTRUCTION WITH CAST FLAP, REPAIR OF CANULICULIS, AND FULL THICKNESS SKIN GRAFT;  Surgeon: Brian Bhatt MD;  [place] [facility] [place] [facility] [place]  Service: Ophthalmology;  Laterality: Right;    INGUINAL HERNIA REPAIR Right     REPLACEMENT TOTAL KNEE Bilateral 2000    ROTATOR CUFF REPAIR Left     ROTATOR CUFF REPAIR Right     SKIN BIOPSY      VASECTOMY        Allergies:  No Known Allergies  Home Meds:  Medications Prior to Admission   Medication Sig Dispense Refill Last Dose    acetaminophen (TYLENOL) 325 MG tablet Take 2 tablets by mouth Every 4 (Four) Hours As Needed for Mild Pain .       amiodarone (PACERONE) 200 MG tablet Take 1/2 (one-half) tablet by mouth once daily 45 tablet 2     aspirin 81 MG EC tablet Take 1 tablet by mouth Daily.       doxazosin (CARDURA) 2 MG tablet TAKE 1 TABLET BY MOUTH ONCE DAILY AT NIGHT 90 tablet 0     esomeprazole (nexIUM) 20 MG capsule Take 1 capsule by mouth Every Morning Before Breakfast.       hydrALAZINE (APRESOLINE) 25 MG tablet Take 1 tablet by mouth 3 (Three) Times a Day. 270 tablet 1     isosorbide mononitrate (IMDUR) 30 MG 24 hr tablet Take 1 tablet by mouth once daily 90 tablet 2     levothyroxine (SYNTHROID, LEVOTHROID) 112 MCG tablet Take 1 tablet by mouth once daily 90 tablet 1     nebivolol (BYSTOLIC) 10 MG tablet Take 0.5 tablets by mouth Daily.       olmesartan-hydrochlorothiazide (BENICAR HCT) 40-25 MG per tablet TAKE ONE TABLET BY MOUTH DAILY 90 tablet 0     rosuvastatin (CRESTOR) 5 MG tablet Take 1 tablet by mouth once daily 90 tablet 1     warfarin (COUMADIN) 5 MG tablet Take 1 tablet by mouth once daily 45 tablet 1     warfarin (COUMADIN) 7.5 MG tablet Take 1 tablet by mouth Every Night. 90 tablet 1      Current Meds:   [unfilled]  Social History:   Social History     Tobacco Use    Smoking status: Never    Smokeless tobacco: Never   Substance Use Topics    Alcohol use: No      Family History:  Family History   Problem Relation Age of Onset    Hypertension  Father     Heart attack Father     Heart attack Brother     Alcohol abuse Brother     Hypertension Brother     Hypertension Paternal Grandmother     Hypertension Paternal Grandfather     Anemia Mother     Arthritis Mother     Hypertension Mother     Hypertension Maternal Grandmother     Heart disease Other         FH in males before age 55    Malig Hyperthermia Neg Hx         Review of Systems    Constitutional: No weakness,fatigue, fever, rigors, chills   Eyes: No vision changes, eye pain   ENT/oropharynx: No difficulty swallowing, sore throat, epistaxis, changes in hearing   Cardiovascular: No chest pain, chest tightness, palpitations, paroxysmal nocturnal dyspnea, orthopnea, diaphoresis, dizziness / syncopal episode   Respiratory: No shortness of breath, dyspnea on exertion, cough, wheezing hemoptysis   Gastrointestinal: No abdominal pain, nausea, vomiting, diarrhea, bloody stools   Genitourinary: No hematuria, dysuria   Neurological: No headache, tremors, numbness,  one-sided weakness    Musculoskeletal: No cramps, myalgias,  joint pain, joint swelling   Integument: No rash, edema           Constitutional:  Temp:  [97.6 øF (36.4 øC)-99.9 øF (37.7 øC)] 99.9 øF (37.7 øC)  Heart Rate:  [59-94] 94  Resp:  [16-18] 18  BP: ()/(58-97) 121/70    Physical Exam   General:  Appears in no acute distress  Eyes: PERTL,  HEENT:  No JVD. Thyroid not visibly enlarged. No mucosal pallor or cyanosis  Respiratory: Respirations regular and unlabored at rest. BBS with good air entry in all fields. No crackles, rubs or wheezes auscultated  Cardiovascular: S1S2 Regular rate and rhythm. No murmur, rub or gallop auscultated. No carotid bruits. DP/PT pulses    . No pretibial pitting edema  Gastrointestinal: Abdomen soft, flat, non tender. Bowel sounds present. No hepatosplenomegaly. No ascites  Musculoskeletal: TORRES x4. No abnormal movements  Extremities: No digital clubbing or cyanosis  Skin: Color pink. Skin warm and dry to touch.  No rashes  No xanthoma  Neuro: AAO x3 CN II-XII grossly intact              Cardiographics  ECG:                  Echocardiogram:    Interpretation Summary    Post cardioversion the patient displayed a sinus rhythm.  The cardioversion was successful.     December 30, 2022    David KHAN Age Sr.  1940  82 y.o.  male  [id number]    Procedures Performed     Left heart catheterization  Selective coronary angiography  Left ventriculography        :   Harlan Downing MD    Vascular Access Site: Right radial artery    Indication for procedure: Positive stress test    Referring Physician:            Pertinent History  @PMH@  @PSH@    Procedure Note    After discussing the risks, benefits, and alternatives of the procedure, informed consent was obtained.  The vascular access site was prepped and draped in the usual sterile fashion.  2% lidocaine was used for local anesthesia. Appropriate landmarks were assessed.  A 5 Wolof short sheath was inserted in the artery using the modified Seldinger technique.      Selective coronary angiography was performed with JL4 and JR4 diagnostic catheters. Left ventriculogram  was performed with an angled pigtail catheter.  All exchanges were performed over the wire.  There were no apparent acute or early complications.  The patient tolerated the procedure well and was transferred to the recovery area in stable condition.      Artery hemostasis was achieved with manual pressure.     Closure device: TR band device was deployed successfully after right iliofemoral angiogram was performed. Good hemostasis was achieved.    Contrast:    60 cc  Complications:  None  Blood Loss: minimal      Hemodynamics    Pressures    Ao:    130/80 mmHg     LV:    130/10 mmHg  End-diastolic pressure:  10  mmHg  No significant aortic valvular gradient on pullback              Coronary Angiography    Left Main:  The left main originates in the left coronary cusp.  It bifurcates and gives rise to the  LAD and circumflex system.  Normal left main    Left Anterior Descending: Normal left anterior descending artery with early atherosclerotic plaque including the diagonal and  branches    Left Circumflex: Nondominant and normal    Right Coronary Artery:  The right coronary artery originates in the right coronary cusp.  Dominant with early atherosclerotic plaque    Left Ventriculography:     Estimated Ejection Fraction: 60 %   Wall motion abnormalities:  None   Mitral Regurgitation:  None     Impression:    Early atherosclerotic plaque otherwise normal coronary artery  Normal LV gram    Recommendations:    Medical management        I sincerely appreciate the opportunity to participate in your patient's care. Please feel free to contact me anytime if I can be of assistance in this or any other way.     Harlan Downing MD  12/30/2022  13:36 EST   Interpretation Summary         Findings consistent with a normal ECG stress test.    Left ventricular ejection fraction is normal (Calculated EF = 60%).    Myocardial perfusion imaging indicates a small-sized, mildly severe area of ischemia located in the inferior wall.    Impressions are consistent with an intermediate risk study.Interpretation Summary         Left ventricular ejection fraction appears to be 61 - 65%.    Left ventricular diastolic function was normal.    The right atrial cavity is borderline dilated.     Imaging  Chest X-ray:   Narrative & Impression   CT ANGIOGRAM OF THE CHEST; CT ANGIOGRAM OF THE ABDOMEN AND PELVIS        HISTORY: Epigastric pain     COMPARISON: None available.     TECHNIQUE: Axial CT imaging was obtained from the thoracic inlet through  the symphysis pubis. IV contrast was administered. Three-D reformatted  images were obtained.     FINDINGS:  CT OF THE CHEST: No obvious acute pulmonary thromboembolus is seen.  There is dilatation of the ascending thoracic aorta, measuring up to 4.8  cm. This is stable when compared to prior  imaging. The remainder of the  thoracic aorta is normal in caliber. I see no evidence of dissection.  There are coronary artery calcifications. The aorta is tortuous in its  course. Low-attenuation lesion is noted within the left lobe of the  thyroid gland, measuring 5 mm. It is unchanged when compared to prior  imaging. The trachea is within normal limits. The patient has a moderate  hiatal hernia. This is similar to the prior study. There is dependent  atelectasis and scarring. No definite acute infiltrates are seen.  Mediastinal lymph nodes do not appear pathologically enlarged. No acute  osseous abnormalities are identified.     CT ANGIOGRAM OF THE ABDOMEN AND PELVIS: Dilatation of the celiac axis,  measuring up to 1.5 cm, is stable when compared to prior imaging. The  superior mesenteric artery is patent. Main renal arteries appear to be  widely patent. There is apparent narrowing at the origin of the  accessory left renal artery, poorly assessed due to gating. Inferior  mesenteric artery is patent. Abdominal aorta is normal in caliber. The  common, internal, and external iliac arteries are patent, as are the  common femoral and visualized profunda femoris and superficial femoral  arteries. This examination is severely limited for assessment of organs,  due to the gating. The patient does have cholelithiasis. Gallbladder  does appear somewhat distended. The duodenum, adrenal glands, spleen,  and pancreas are grossly unremarkable. No hydronephrosis is seen on  either side. Prostate gland is enlarged and protrudes into the base of  the bladder. There is no bowel obstruction. I don't see any evidence of  appendicitis. No acute osseous abnormalities are seen.     IMPRESSION:     1. This is a severely technically limited examination due to gating.  2. Stable aneurysmal dilatation of the ascending thoracic aorta. No  dissection is seen.  3. The abdominal aorta is normal in caliber. No obvious dissection  is  identified. 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  4. Gallbladder does appear distended, and there is cholelithiasis. Full  assessment is limited due to significant artifact. Gallbladder  ultrasound could be considered for further assessment.     Radiation dose reduction techniques were utilized, including automated  exposure control and exposure modulation based on body size.        This report was finalized on 10/2/2023 10:50 PM by Dr. Jamaica Tilley M.D.   Study Result    Narrative & Impression   XR CHEST 1 VW-     HISTORY: 83-year-old male with chest pain. Known thoracic aortic  aneurysm.     FINDINGS: There is no significant change in the contour of the  mediastinum since previous radiograph 01/28/2023. There is no evidence  for CHF and no convincing evidence for pneumonia. There is a small to  moderate size hiatal hernia which has been seen on previous imaging.     This report was finalized on 10/3/2023 8:25 AM by Dr. Marilyn Bullock M.D.        Lab Review   Results from last 7 days   Lab Units 10/02/23  2359 10/02/23  1913   HSTROP T ng/L 33* 28*         Results from last 7 days   Lab Units 10/03/23  0547   SODIUM mmol/L 138   POTASSIUM mmol/L 3.4*   BUN mg/dL 15   CREATININE mg/dL 0.86   CALCIUM mg/dL 8.5*     @[id number]@  Results from last 7 days   Lab Units 10/03/23  0547 10/02/23  1913   WBC 10*3/mm3 15.71* 9.35   HEMOGLOBIN g/dL 12.3* 12.7*   HEMATOCRIT % 36.4* 38.5   PLATELETS 10*3/mm3 164 165     Results from last 7 days   Lab Units 10/02/23  1913   INR  1.99*         Assessment:    Epigastric pain    Chronic coronary artery disease    Ascending aortic aneurysm    Paroxysmal atrial fibrillation    Essential hypertension    Acquired hypothyroidism    Long term (current) use of anticoagulants    BPH without obstruction/lower urinary tract symptoms    Chest pain    Acute cholecystitis       Recommendations / Plan:   This is an 83 year old male who is well known to our service. He has a  history of chronic cad, paf on coumadin. He presented to [facility] for epigastric pain/chest pain. Previous cardiac cath in 2022 early atherosclerotic plaque otherwise normal coronary arteries. Echo with EF 61-65 %, see full report as above. ECG showed SR with nonspecific t waves. Noted coumadin on hold for possible. CT showed  Dilatation of the ascending thoracic aorta, measuring up to 4.8. Surgery consulted for cholecystitis.        Labs/tests ordered for am: CTS to evaluate ascending thoracic aorta, echo, ecg, and troponin in am.      Angeles Delvalle, APRN  10/3/2023, 13:02 EDT  83-year-old male admitted with abdominal pain had heart catheterization done in January with no significant stenosis CT scan now shows increasing in thoracic aneurysm to 4.8 cm    Patient also has significant cardiac risk factors with coronary artery disease hyperlipidemia atrial fibrillation on anticoagulation    We will have the CT surgeon evaluate    Continue to watch cardiac status    Cardiovascular remained stable  MD CHANTAL Jara Dragon/Transcription:   Dictated utilizing Dragon dictation

## 2023-10-03 NOTE — PLAN OF CARE
Problem: Hypertension Comorbidity  Goal: Blood Pressure in Desired Range  Outcome: Ongoing, Progressing     Problem: Adult Inpatient Plan of Care  Goal: Plan of Care Review  Outcome: Ongoing, Progressing  Flowsheets (Taken 10/3/2023 1800)  Progress: no change  Plan of Care Reviewed With: patient  Outcome Evaluation: VSS, room air, up ad sanjiv, IV abx started, clear liquid diet, ECHO ordered, plan for surgery when INR lower.  Goal: Patient-Specific Goal (Individualized)  Outcome: Ongoing, Progressing  Goal: Absence of Hospital-Acquired Illness or Injury  Outcome: Ongoing, Progressing  Intervention: Identify and Manage Fall Risk  Recent Flowsheet Documentation  Taken 10/3/2023 1757 by Venus Dee RN  Safety Promotion/Fall Prevention:   assistive device/personal items within reach   clutter free environment maintained   nonskid shoes/slippers when out of bed   room organization consistent   safety round/check completed  Taken 10/3/2023 1558 by Venus Dee RN  Safety Promotion/Fall Prevention:   assistive device/personal items within reach   clutter free environment maintained   nonskid shoes/slippers when out of bed   room organization consistent   safety round/check completed  Taken 10/3/2023 1356 by Venus Dee RN  Safety Promotion/Fall Prevention:   clutter free environment maintained   assistive device/personal items within reach   activity supervised   fall prevention program maintained   nonskid shoes/slippers when out of bed   safety round/check completed   room organization consistent  Taken 10/3/2023 1146 by Venus Dee RN  Safety Promotion/Fall Prevention:   activity supervised   assistive device/personal items within reach   clutter free environment maintained   fall prevention program maintained   nonskid shoes/slippers when out of bed   safety round/check completed   room organization consistent  Taken 10/3/2023 0957 by Venus Dee RN  Safety Promotion/Fall  Prevention:   assistive device/personal items within reach   clutter free environment maintained   nonskid shoes/slippers when out of bed   room organization consistent   safety round/check completed  Taken 10/3/2023 0822 by Venus Dee RN  Safety Promotion/Fall Prevention:   clutter free environment maintained   assistive device/personal items within reach   nonskid shoes/slippers when out of bed   safety round/check completed   room organization consistent  Intervention: Prevent Skin Injury  Recent Flowsheet Documentation  Taken 10/3/2023 1757 by Venus Dee RN  Body Position: supine  Taken 10/3/2023 1558 by Venus Dee RN  Body Position:   side-lying   left  Taken 10/3/2023 1356 by Venus Dee RN  Body Position: supine  Taken 10/3/2023 1146 by Venus Dee RN  Body Position: supine  Taken 10/3/2023 0957 by Venus Dee RN  Body Position:   supine   position changed independently  Taken 10/3/2023 0822 by Venus Dee RN  Body Position: supine  Intervention: Prevent and Manage VTE (Venous Thromboembolism) Risk  Recent Flowsheet Documentation  Taken 10/3/2023 1509 by Venus Dee RN  Activity Management: up in chair  Taken 10/3/2023 1356 by Venus Dee RN  Activity Management: ambulated to bathroom  Taken 10/3/2023 0957 by Venus Dee RN  Activity Management: activity encouraged  Taken 10/3/2023 0822 by Venus Dee RN  Activity Management: up ad sanjiv  Goal: Optimal Comfort and Wellbeing  Outcome: Ongoing, Progressing  Goal: Readiness for Transition of Care  Outcome: Ongoing, Progressing  Intervention: Mutually Develop Transition Plan  Recent Flowsheet Documentation  Taken 10/3/2023 0823 by Venus Dee RN  Equipment Currently Used at Home: none  Transportation Anticipated: family or friend will provide  Patient/Family Anticipated Services at Transition:   Patient/Family Anticipates Transition to: home with family    Goal Outcome Evaluation:  Plan of Care Reviewed With: patient        Progress: no change  Outcome Evaluation: VSS, room air, up ad sanjiv, IV abx started, clear liquid diet, ECHO ordered, plan for surgery when INR lower.

## 2023-10-03 NOTE — H&P
Name: David Comer Sr. ADMIT: 10/2/2023   : 1940  PCP: Chacha Pineda MD    MRN: [id number] LOS: 0 days   AGE/SEX: 83 y.o. male  ROOM: [facility]     Chief Complaint   Patient presents with    Chest Pain       Subjective   Patient is a 83 y.o. male who presents to [facility] with the above chief complaint.  He has noticed that he has had some chest pain and epigastric pain for a while.  He noticed it most when he was lifting gas cans from the back of his car.  He talked to his heart doctor and other doctors and they recommended heat and supportive care and the pain got better.  Around 3:00 in the afternoon on Monday he noticed that he had significant epigastric pain and chest pain.  He had a plate of fried chicken for lunch and then the pain developed.  He has a history of coronary artery disease paroxysmal A-fib and is on Coumadin.  He also has a history of an abdominal aortic aneurysm.  In the emergency room he had CT scans of the chest abdomen and pelvis was not noted to have any blood clots and the aneurysm is stable.  It did show that his gallbladder was a little bit distended and he did have gallstones present.  Right upper quadrant ultrasound was then done and does confirm that he has cholecystitis.  He denies any fevers or chills but feels a little worse this morning.  He has not experienced any vomiting but did have some nausea but denies diarrhea changes in his stools fevers chills or shortness of breath.      History of Present Illness    Past Medical History:   Diagnosis Date    AAA (abdominal aortic aneurysm) without rupture     Aneurysm of thoracic aorta     CT CHEST 2021 IN [facility]     Arthritis     Basal cell carcinoma     RIGHT LOWER LID     BPH (benign prostatic hyperplasia)     Chronic lumbar pain     Degenerative arthritis of lumbar spine     Degenerative cervical disc     Disease of thyroid gland     Elevated rheumatoid factor     History of atrial fibrillation      Hyperlipidemia     Hypertension     Hypogonadism in male     Muscle strain of left upper back     PRESCRIBED PREDNISONE DOSE PACK     On anticoagulant therapy     Pulmonary nodule     6 MM - REPEAT CT SCAN IN ONE YEAR, WATCHING     Refused influenza vaccine     Scoliosis     Vitamin D deficiency      Past Surgical History:   Procedure Laterality Date    ANKLE FUSION Right     CARDIAC CATHETERIZATION      CARDIAC CATHETERIZATION N/A 7/2/2021    Procedure: Left Heart Cath;  Surgeon: Harlan Downing MD;  Location: [facility] CATH INVASIVE LOCATION;  Service: Cardiology;  Laterality: N/A;    CARDIAC CATHETERIZATION N/A 7/2/2021    Procedure: Coronary angiography;  Surgeon: Harlan Downing MD;  Location: [facility] CATH INVASIVE LOCATION;  Service: Cardiology;  Laterality: N/A;    CARDIAC CATHETERIZATION N/A 7/2/2021    Procedure: Left ventriculography;  Surgeon: Harlan Downing MD;  Location: [facility] CATH INVASIVE LOCATION;  Service: Cardiology;  Laterality: N/A;    CARDIAC CATHETERIZATION N/A 12/30/2022    Procedure: Left Heart Cath check inr;  Surgeon: Harlan Downing MD;  Location: [facility] CATH INVASIVE LOCATION;  Service: Cardiology;  Laterality: N/A;    CARDIAC CATHETERIZATION N/A 12/30/2022    Procedure: Coronary angiography;  Surgeon: Harlan Downing MD;  Location: [facility] CATH [facility]VASIVE LOCATION;  Service: Cardiology;  Laterality: N/A;    CARDIAC CATHETERIZATION N/A 12/30/2022    Procedure: Left ventriculography;  Surgeon: Harlan Downing MD;  Location: [facility] CATH INVASIVE LOCATION;  Service: Cardiology;  Laterality: N/A;    HOBBS TUBE INSERTION Right 10/19/2021    Procedure: RIGHT SECOND STAGE CAST TAKEDOWN RECONSTRUCTION;  Surgeon: Brian Bhatt MD;  [place] [facility] [place] [facility] [place]  Service: Ophthalmology;  Laterality: Right;    ENTROPIAN REPAIR Right 9/21/2021    Procedure: RIGHT LOWER LID EXCISION OF BASAL CELL CARCINOMA WITH FROZEN SECTION RIGHT LOWER LID  RECONSTRUCTION WITH CAST FLAP, REPAIR OF CANULICULIS, AND FULL THICKNESS SKIN GRAFT;  Surgeon: Brian Bhatt MD;  [place] [facility] [place] [facility] [place]  Service: Ophthalmology;  Laterality: Right;    INGUINAL HERNIA REPAIR Right     REPLACEMENT TOTAL KNEE Bilateral 2000    ROTATOR CUFF REPAIR Left     ROTATOR CUFF REPAIR Right     SKIN BIOPSY      VASECTOMY       Family History   Problem Relation Age of Onset    Hypertension Father     Heart attack Father     Heart attack Brother     Alcohol abuse Brother     Hypertension Brother     Hypertension Paternal Grandmother     Hypertension Paternal Grandfather     Anemia Mother     Arthritis Mother     Hypertension Mother     Hypertension Maternal Grandmother     Heart disease Other         FH in males before age 55    Malig Hyperthermia Neg Hx      Social History     Tobacco Use    Smoking status: Never    Smokeless tobacco: Never   Vaping Use    Vaping Use: Never used   Substance Use Topics    Alcohol use: No    Drug use: No     Medications Prior to Admission   Medication Sig Dispense Refill Last Dose    acetaminophen (TYLENOL) 325 MG tablet Take 2 tablets by mouth Every 4 (Four) Hours As Needed for Mild Pain .       amiodarone (PACERONE) 200 MG tablet Take 1/2 (one-half) tablet by mouth once daily 45 tablet 2     aspirin 81 MG EC tablet Take 1 tablet by mouth Daily.       doxazosin (CARDURA) 2 MG tablet TAKE 1 TABLET BY MOUTH ONCE DAILY AT NIGHT 90 tablet 0     esomeprazole (nexIUM) 20 MG capsule Take 1 capsule by mouth Every Morning Before Breakfast.       hydrALAZINE (APRESOLINE) 25 MG tablet Take 1 tablet by mouth 3 (Three) Times a Day. 270 tablet 1     isosorbide mononitrate (IMDUR) 30 MG 24 hr tablet Take 1 tablet by mouth once daily 90 tablet 2     levothyroxine (SYNTHROID, LEVOTHROID) 112 MCG tablet Take 1 tablet by mouth once daily 90 tablet 1     metoprolol succinate XL (TOPROL-XL) 50 MG 24 hr tablet Take 1 tablet by mouth once daily 90 tablet 1     nebivolol  (BYSTOLIC) 10 MG tablet Take 0.5 tablets by mouth Daily.       olmesartan-hydrochlorothiazide (BENICAR HCT) 40-25 MG per tablet TAKE ONE TABLET BY MOUTH DAILY 90 tablet 0     rosuvastatin (CRESTOR) 5 MG tablet Take 1 tablet by mouth once daily 90 tablet 1     warfarin (COUMADIN) 5 MG tablet Take 1 tablet by mouth once daily 45 tablet 1     warfarin (COUMADIN) 7.5 MG tablet Take 1 tablet by mouth Every Night. 90 tablet 1      Allergies:  Patient has no known allergies.    Review of Systems     Objective    Vital Signs  Temp:  [97.6 øF (36.4 øC)-98.8 øF (37.1 øC)] 98.8 øF (37.1 øC)  Heart Rate:  [59-86] 86  Resp:  [16-18] 18  BP: ()/(58-97) 126/76  SpO2:  [92 %-98 %] 93 %  on  Flow (L/min):  [2-3] 2;   Device (Oxygen Therapy): room air  Body mass index is 27.8 kg/mý.    Physical Exam  Vitals and nursing note reviewed.   Constitutional:       General: He is not in acute distress.     Appearance: He is ill-appearing.   Cardiovascular:      Rate and Rhythm: Normal rate and regular rhythm.   Pulmonary:      Effort: No respiratory distress.      Breath sounds: Normal breath sounds. No wheezing.   Abdominal:      General: Bowel sounds are normal.      Palpations: Abdomen is soft.      Tenderness: There is abdominal tenderness.      Comments: Noted ventral hernia on exam, also noted to have significant right upper quadrant tenderness with a positive Harper sign.   Skin:     General: Skin is warm and dry.   Neurological:      Mental Status: He is alert and oriented to person, place, and time. Mental status is at baseline.       Results Review:   I reviewed the patient's new clinical results.  Results from last 7 days   Lab Units 10/03/23  0547 10/02/23  1913   WBC 10*3/mm3 15.71* 9.35   HEMOGLOBIN g/dL 12.3* 12.7*   PLATELETS 10*3/mm3 164 165     Results from last 7 days   Lab Units 10/03/23  0547 10/02/23  1913   SODIUM mmol/L 138 141   POTASSIUM mmol/L 3.4* 3.6   CHLORIDE mmol/L 104 104   CO2 mmol/L 24.8 24.9   BUN  mg/dL 15 19   CREATININE mg/dL 0.86 1.22   GLUCOSE mg/dL 111* 112*   ALBUMIN g/dL  --  3.8   BILIRUBIN mg/dL  --  0.3   ALK PHOS U/L  --  63   AST (SGOT) U/L  --  17   ALT (SGPT) U/L  --  15   Estimated Creatinine Clearance: 87.1 mL/min (by C-G formula based on SCr of 0.86 mg/dL).  Results from last 7 days   Lab Units 10/02/23  2359 10/02/23  1913   INR   --  1.99*   HSTROP T ng/L 33* 28*         Invalid input(s): LDLCALC    US Abdomen Limited   Final Result   Distended gallbladder with cholelithiasis and gallbladder wall edema and   thickening, as well as trace pericholecystic fluid. Appearance is   concerning for acute cholecystitis.       This report was finalized on 10/3/2023 6:03 AM by Dr. Jamaica Tilley M.D.          CT Angiogram Chest   Final Result       1. This is a severely technically limited examination due to gating.   2. Stable aneurysmal dilatation of the ascending thoracic aorta. No   dissection is seen.   3. The abdominal aorta is normal in caliber. No obvious dissection is   identified. 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   4. Gallbladder does appear distended, and there is cholelithiasis. Full   assessment is limited due to significant artifact. Gallbladder   ultrasound could be considered for further assessment.       Radiation dose reduction techniques were utilized, including automated   exposure control and exposure modulation based on body size.           This report was finalized on 10/2/2023 10:50 PM by Dr. Jamaica Tilley M.D.          CT Angiogram Abdomen Pelvis   Final Result       1. This is a severely technically limited examination due to gating.   2. Stable aneurysmal dilatation of the ascending thoracic aorta. No   dissection is seen.   3. The abdominal aorta is normal in caliber. No obvious dissection is   identified. 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   4. Gallbladder does appear distended, and there is cholelithiasis. Full   assessment is limited  due to significant artifact. Gallbladder   ultrasound could be considered for further assessment.       Radiation dose reduction techniques were utilized, including automated   exposure control and exposure modulation based on body size.           This report was finalized on 10/2/2023 10:50 PM by Dr. Jamaica Tilley M.D.          XR Chest 1 View   Final Result        Assessment & Plan       Epigastric pain    Chronic coronary artery disease    Abdominal aortic aneurysm    Paroxysmal atrial fibrillation    Essential hypertension    Acquired hypothyroidism    Long term (current) use of anticoagulants    BPH without obstruction/lower urinary tract symptoms    Chest pain    Acute cholecystitis      Assessment & Plan  This is a 83-year-old gentleman with a history of abdominal aortic aneurysm, paroxysmal A-fib on Coumadin, hypertension, hypothyroidism, BPH without lower urinary tract symptoms who presents to the hospital with chest pain epigastric pain and right upper quadrant tenderness and is found to have acute cholecystitis.  -I do feel like this represents acute cholecystitis.  His white blood cell count is increasing and plan to start on IV antibiotics today.  General surgery has been consulted and likely will need to undergo cholecystectomy especially given concerns of biliary colic in the past.  -We will ask his cardiologist to evaluate especially given the chest tightness that he experienced with exertion lifting the gas cans but I think this is probably musculoskeletal.  We will also get their opinion on fitness for surgery and assistance with anticoagulation on Coumadin.  -Home meds reviewed and conitnue most but paln to hold HCTZ/ARB and warfarin  -replace K  -mechanical DVT prophylaxis   -full code  I discussed the patients findings and my recommendations with patient, family, and nursing staff.          Carter Portillo MD  [facility]  10/03/23  09:12 EDT

## 2023-10-03 NOTE — ED NOTES
Nursing report ED to floor  David KHAN Age Sr.  83 y.o.  male    HPI :   Chief Complaint   Patient presents with    Chest Pain       Admitting doctor:   Jia Joseph MD    Admitting diagnosis:   The primary encounter diagnosis was Chest pain, unspecified type. Diagnoses of Hiatal hernia and Gallstones were also pertinent to this visit.    Code status:   Current Code Status       Date Active Code Status Order ID Comments User Context       10/3/2023 0010 CPR (Attempt to Resuscitate) [id number]  Shanel Flores APRN ED        Question Answer    Code Status (Patient has no pulse and is not breathing) CPR (Attempt to Resuscitate)    Medical Interventions (Patient has pulse or is breathing) Full Support                    Allergies:   Patient has no known allergies.    Isolation:   No active isolations    Intake and Output    Intake/Output Summary (Last 24 hours) at 10/3/2023 0019  Last data filed at 10/2/2023 2350  Gross per 24 hour   Intake 500 ml   Output --   Net 500 ml       Weight:       10/02/23  1908   Weight: 93 kg (205 lb)       Most recent vitals:   Vitals:    10/02/23 2030 10/02/23 2128 10/02/23 2223 10/02/23 2322   BP:       Pulse: 61 59 65 67   Resp:       Temp:       SpO2:  97% 95% 92%   Weight:       Height:           Active LDAs/IV Access:   Lines, Drains & Airways       Active LDAs       Name Placement date Placement time Site Days    Peripheral IV 10/02/23 2108 Anterior;Right Forearm 10/02/23  2108  Forearm  less than 1                    Labs (abnormal labs have a star):   Labs Reviewed   COMPREHENSIVE METABOLIC PANEL - Abnormal; Notable for the following components:       Result Value    Glucose 112 (*)     eGFR 59.2 (*)     All other components within normal limits    Narrative:     GFR Normal >60  Chronic Kidney Disease <60  Kidney Failure <15    The GFR formula is only valid for adults with stable renal function between ages 18 and 70.   TROPONIN - Abnormal; Notable for the following  components:    HS Troponin T 28 (*)     All other components within normal limits    Narrative:     High Sensitive Troponin T Reference Range:  <10.0 ng/L- Negative Female for AMI  <15.0 ng/L- Negative Male for AMI  >=10 - Abnormal Female indicating possible myocardial injury.  >=15 - Abnormal Male indicating possible myocardial injury.   Clinicians would have to utilize clinical acumen, EKG, Troponin, and serial changes to determine if it is an Acute Myocardial Infarction or myocardial injury due to an underlying chronic condition.        PROTIME-INR - Abnormal; Notable for the following components:    Protime 22.9 (*)     INR 1.99 (*)     All other components within normal limits   CBC WITH AUTO DIFFERENTIAL - Abnormal; Notable for the following components:    RBC 3.86 (*)     Hemoglobin 12.7 (*)     MCV 99.7 (*)     RDW 12.2 (*)     Neutrophil % 78.6 (*)     Lymphocyte % 13.8 (*)     Neutrophils, Absolute 7.35 (*)     All other components within normal limits   LIPASE - Normal   RAINBOW DRAW    Narrative:     The following orders were created for panel order [place] Draw.  Procedure                               Abnormality         Status                     ---------                               -----------         ------                     Green Top (Gel)[[id number]]                                  Final result               Lavender Top[[id number]]                                     Final result               Gold Top - SST[[id number]]                                   Final result               Light Blue Top[[id number]]                                   Final result                 Please view results for these tests on the individual orders.   HIGH SENSITIVITIY TROPONIN T 2HR   TROPONIN   CBC (NO DIFF)   BASIC METABOLIC PANEL   CBC AND DIFFERENTIAL    Narrative:     The following orders were created for panel order CBC & Differential.  Procedure                               Abnormality         Status                      ---------                               -----------         ------                     CBC Auto Differential[[id number]]        Abnormal            Final result                 Please view results for these tests on the individual orders.   GREEN TOP   LAVENDER TOP   GOLD TOP - SST   LIGHT BLUE TOP       EKG:   ECG 12 Lead Chest Pain   Preliminary Result   HEART RATE= 59  bpm   RR Interval= 1017  ms   HI Interval= 180  ms   P Horizontal Axis= 35  deg   P Front Axis= -2  deg   QRSD Interval= 123  ms   QT Interval= 438  ms   QTcB= 434  ms   QRS Axis= -20  deg   T Wave Axis= 29  deg   - ABNORMAL ECG -   Sinus rhythm   Atrial premature complex   IVCD, consider atypical RBBB   Electronically Signed By:    Date and Time of Study: 2023-10-02 19:04:45          Meds given in ED:   Medications   sodium chloride 0.9 % flush 10 mL (has no administration in time range)   aspirin tablet 325 mg (325 mg Oral Not Given 10/2/23 2050)   sodium chloride 0.9 % flush 10 mL (has no administration in time range)   sodium chloride 0.9 % flush 10 mL (has no administration in time range)   sodium chloride 0.9 % infusion 40 mL (has no administration in time range)   sennosides-docusate (PERICOLACE) 8.6-50 MG per tablet 2 tablet (has no administration in time range)     And   polyethylene glycol (MIRALAX) packet 17 g (has no administration in time range)     And   bisacodyl (DULCOLAX) EC tablet 5 mg (has no administration in time range)     And   bisacodyl (DULCOLAX) suppository 10 mg (has no administration in time range)   nitroglycerin (NITROSTAT) SL tablet 0.4 mg (has no administration in time range)   ondansetron (ZOFRAN) tablet 4 mg (has no administration in time range)     Or   ondansetron (ZOFRAN) injection 4 mg (has no administration in time range)   sodium chloride 0.9 % bolus 500 mL (0 mL Intravenous Stopped 10/2/23 2350)   HYDROmorphone (DILAUDID) injection 0.25 mg (0.25 mg Intravenous Given 10/2/23 2112)   ondansetron  (ZOFRAN) injection 4 mg (4 mg Intravenous Given 10/2/23 2112)   famotidine (PEPCID) injection 20 mg (20 mg Intravenous Given 10/2/23 2110)   iopamidol (ISOVUE-370) 76 % injection 95 mL (95 mL Intravenous Given 10/2/23 2135)   ondansetron (ZOFRAN) injection 4 mg (4 mg Intravenous Given 10/2/23 2348)   HYDROmorphone (DILAUDID) injection 0.5 mg (0.5 mg Intravenous Given 10/2/23 2348)       Imaging results:  CT Angiogram Abdomen Pelvis    Result Date: 10/2/2023   1. This is a severely technically limited examination due to gating. 2. Stable aneurysmal dilatation of the ascending thoracic aorta. No dissection is seen. 3. The abdominal aorta is normal in caliber. No obvious dissection is identified. 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 4. Gallbladder does appear distended, and there is cholelithiasis. Full assessment is limited due to significant artifact. Gallbladder ultrasound could be considered for further assessment.  Radiation dose reduction techniques were utilized, including automated exposure control and exposure modulation based on body size.   This report was finalized on 10/2/2023 10:50 PM by Dr. Jamaica Tilley M.D.      CT Angiogram Chest    Result Date: 10/2/2023   1. This is a severely technically limited examination due to gating. 2. Stable aneurysmal dilatation of the ascending thoracic aorta. No dissection is seen. 3. The abdominal aorta is normal in caliber. No obvious dissection is identified. 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 4. Gallbladder does appear distended, and there is cholelithiasis. Full assessment is limited due to significant artifact. Gallbladder ultrasound could be considered for further assessment.  Radiation dose reduction techniques were utilized, including automated exposure control and exposure modulation based on body size.   This report was finalized on 10/2/2023 10:50 PM by Dr. Jamaica Tilley M.D.       Ambulatory status:   - x1 assist    Social  issues:   Social History     Socioeconomic History    Marital status:    Tobacco Use    Smoking status: Never    Smokeless tobacco: Never   Vaping Use    Vaping Use: Never used   Substance and Sexual Activity    Alcohol use: No    Drug use: No    Sexual activity: Defer       NIH Stroke Scale:       Shyanne Brito RN  10/03/23 00:19 EDT

## 2023-10-03 NOTE — CONSULTS
Colorectal & General Surgery  Consultation    Patient: David Comer Sr.  YOB: 1940  MRN: [id number]      Assessment  David Comer Sr. is a 83 y.o. male with acute cholecystitis in the setting of warfarin induced coagulopathy.  I discussed with him the role of laparoscopic cholecystectomy for source control of his cholecystitis, including the risk, benefits, alternatives, including risk of common bile duct injury and biloma.  He is amenable and wishes to proceed.  We will have to wait until his INR normalizes.  If he requires bridging, okay to utilize Lovenox from my standpoint.  Agree with intravenous antibiotics.  Okay for clear liquid diet.  Anticipate this will likely occur on Thursday or Friday, but will depend on his INR.      History of Present Illness   David Comer Sr. is a 83 y.o. male who I am seeing in consultation regarding cholecystitis at the request of Bam Portillo.  He developed acute right upper quadrant abdominal pain that radiated to his left side and around his back 2 days ago.  It was unrelenting and not relieved by any medications.  He subsequently presented to the hospital.  He did have some nausea but no emesis.  He continues to pass gas.  He says that this pain has happened multiple times in his life and that he has always been concerned it was cardiac in origin and was surprised to find out that it was not he has his gallbladder.  No prior surgical history on the abdomen.    He does take warfarin for atrial fibrillation and his last dose was yesterday.    Past Medical History   Past Medical History:   Diagnosis Date    AAA (abdominal aortic aneurysm) without rupture     Aneurysm of thoracic aorta     CT CHEST 8/2021 [facility]     Arthritis     Basal cell carcinoma     RIGHT LOWER LID     BPH (benign prostatic hyperplasia)     Chronic lumbar pain     Degenerative arthritis of lumbar spine     Degenerative cervical disc     Disease of thyroid gland     Elevated rheumatoid  factor     History of atrial fibrillation     Hyperlipidemia     Hypertension     Hypogonadism in male     Muscle strain of left upper back     PRESCRIBED PREDNISONE DOSE PACK     On anticoagulant therapy     Pulmonary nodule     6 MM - REPEAT CT SCAN IN ONE YEAR, WATCHING     Refused influenza vaccine     Scoliosis     Vitamin D deficiency         Past Surgical History   Past Surgical History:   Procedure Laterality Date    ANKLE FUSION Right     CARDIAC CATHETERIZATION      CARDIAC CATHETERIZATION N/A 7/2/2021    Procedure: Left Heart Cath;  Surgeon: Harlan Downing MD;  Location: [facility] CATH INVASIVE LOCATION;  Service: Cardiology;  Laterality: N/A;    CARDIAC CATHETERIZATION N/A 7/2/2021    Procedure: Coronary angiography;  Surgeon: Harlan Downing MD;  [facility];  Service: Cardiology;  Laterality: N/A;    CARDIAC CATHETERIZATION N/A 7/2/2021    Procedure: Left ventriculography;  Surgeon: Harlan Downing MD;  Location: [facility] CATH INVASIVE LOCATION;  Service: Cardiology;  Laterality: N/A;    CARDIAC CATHETERIZATION N/A 12/30/2022    Procedure: Left Heart Cath check inr;  Surgeon: Harlan Downing MD;  Location: [facility] CATH INVASIVE LOCATION;  Service: Cardiology;  Laterality: N/A;    CARDIAC CATHETERIZATION N/A 12/30/2022    Procedure: Coronary angiography;  Surgeon: Harlan Downing MD;  Location: [facility] CATH INVASIVE LOCATION;  Service: Cardiology;  Laterality: N/A;    CARDIAC CATHETERIZATION N/A 12/30/2022    Procedure: Left ventriculography;  Surgeon: Harlan Downing MD;  Location: [facility] CATH INVASIVE LOCATION;  Service: Cardiology;  Laterality: N/A;    HOBBS TUBE INSERTION Right 10/19/2021    Procedure: RIGHT SECOND STAGE CAST TAKEDOWN RECONSTRUCTION;  Surgeon: Brian Bhatt MD;  Location: [facility] OR [facility];  Service: Ophthalmology;  Laterality: Right;    ENTROPIAN REPAIR Right 9/21/2021    Procedure: RIGHT LOWER LID EXCISION  OF BASAL CELL CARCINOMA WITH FROZEN SECTION RIGHT LOWER LID RECONSTRUCTION WITH CAST FLAP, REPAIR OF CANULICULIS, AND FULL THICKNESS SKIN GRAFT;  Surgeon: Brian Bhatt MD;  Location: [facility] OR [facility];  Service: Ophthalmology;  Laterality: Right;    INGUINAL HERNIA REPAIR Right     REPLACEMENT TOTAL KNEE Bilateral 2000    ROTATOR CUFF REPAIR Left     ROTATOR CUFF REPAIR Right     SKIN BIOPSY      VASECTOMY         Social History  Social History     Socioeconomic History    Marital status:    Tobacco Use    Smoking status: Never    Smokeless tobacco: Never   Vaping Use    Vaping Use: Never used   Substance and Sexual Activity    Alcohol use: No    Drug use: No    Sexual activity: Defer       Family History  Family History   Problem Relation Age of Onset    Hypertension Father     Heart attack Father     Heart attack Brother     Alcohol abuse Brother     Hypertension Brother     Hypertension Paternal Grandmother     Hypertension Paternal Grandfather     Anemia Mother     Arthritis Mother     Hypertension Mother     Hypertension Maternal Grandmother     Heart disease Other         FH in males before age 55    Malig Hyperthermia Neg Hx        Review of Systems  Negative except as documented in the HPI.     Allergies  No Known Allergies    Medications    Current Facility-Administered Medications:     acetaminophen (TYLENOL) tablet 650 mg, 650 mg, Oral, Q6H PRN, Carter Portillo MD    amiodarone (PACERONE) tablet 100 mg, 100 mg, Oral, Daily, Carter Portillo MD, 100 mg at 10/03/23 1258    aspirin EC tablet 81 mg, 81 mg, Oral, Daily, Carter Portillo MD, 81 mg at 10/03/23 1258    sennosides-docusate (PERICOLACE) 8.6-50 MG per tablet 2 tablet, 2 tablet, Oral, BID **AND** polyethylene glycol (MIRALAX) packet 17 g, 17 g, Oral, Daily PRN **AND** bisacodyl (DULCOLAX) EC tablet 5 mg, 5 mg, Oral, Daily PRN **AND** bisacodyl (DULCOLAX) suppository 10 mg, 10 mg, Rectal, Daily PRN, Carter Portillo  MD    hydrALAZINE (APRESOLINE) tablet 25 mg, 25 mg, Oral, TID, Carter Portillo MD, 25 mg at 10/03/23 1258    isosorbide mononitrate (IMDUR) 24 hr tablet 30 mg, 30 mg, Oral, Daily, Carter Portillo MD, 30 mg at 10/03/23 1258    levothyroxine (SYNTHROID, LEVOTHROID) tablet 112 mcg, 112 mcg, Oral, Daily, Carter Portillo MD, 112 mcg at 10/03/23 1258    nebivolol (BYSTOLIC) tablet 5 mg, 5 mg, Oral, Daily, Carter Portillo MD, 5 mg at 10/03/23 1258    nitroglycerin (NITROSTAT) SL tablet 0.4 mg, 0.4 mg, Sublingual, Q5 Min PRN, Carter Portillo MD    ondansetron (ZOFRAN) tablet 4 mg, 4 mg, Oral, Q6H PRN **OR** ondansetron (ZOFRAN) injection 4 mg, 4 mg, Intravenous, Q6H PRN, Carter Portillo MD    pantoprazole (PROTONIX) EC tablet 40 mg, 40 mg, Oral, Q AM, Carter Portillo MD, 40 mg at 10/03/23 1258    piperacillin-tazobactam (ZOSYN) 3.375 g in iso-osmotic dextrose 50 ml (premix), 3.375 g, Intravenous, Q8H, Carter Portillo MD    rosuvastatin (CRESTOR) tablet 5 mg, 5 mg, Oral, Daily, Carter Portillo MD, 5 mg at 10/03/23 1259    sodium chloride 0.9 % flush 10 mL, 10 mL, Intravenous, Q12H, Carter Portillo MD, 10 mL at 10/03/23 0840    sodium chloride 0.9 % flush 10 mL, 10 mL, Intravenous, PRN, Carter Portillo MD    sodium chloride 0.9 % infusion 40 mL, 40 mL, Intravenous, PRN, Carter Portillo MD    terazosin (HYTRIN) capsule 2 mg, 2 mg, Oral, Nightly, Carter Portillo MD    Vital Signs  Vitals:    10/03/23 1257   BP: 121/70   Pulse: 94   Resp:    Temp: 99.9 øF (37.7 øC)   SpO2: 92%          Physical Exam  Constitutional: Resting comfortably, no acute distress  Neck: Supple, trachea midline  Respiratory: No increased work of breathing, Symmetric excursion  Cardiovascular: Well pefursed, no jugular venous distention evident   Abdominal: Slightly tender to palpation in the right upper quadrant, soft, nondistended.   Lymphatics: No cervical or suprascapular  adenopathy  Skin: Warm, dry, no rash on visualized skin surfaces  Musculoskeletal: Symmetric strength, no obvious gross abnormalities  Psychiatric: Alert and oriented x3, normal affect     Laboratory Results  I have personally reviewed CBC with WBC 15, hemoglobin 12, platelets 164.  Lipase 20.  CMP with creatinine 1.22, AST 17 ALT 15, total bilirubin 0.3, albumin 2.6, alkaline phosphatase 63.  INR 1.99 yesterday.    Radiology  I have personally reviewed CT scan of the abdomen pelvis demonstrates distended gallbladder with some radiopaque stones.  Consistent with acute calculus cholecystitis    I was also personally reviewed the right upper quadrant ultrasound which demonstrates a distended gallbladder and cholelithiasis consistent with acute cholecystitis.         Omar Quintanilla MD  Colorectal & General Surgery  [facility]    [address], [address]  [place], [place], [address]  P: [phone]  F: [phone]

## 2023-10-04 ENCOUNTER — APPOINTMENT (OUTPATIENT)
Dept: CARDIOLOGY | Facility: HOSPITAL | Age: 83
DRG: 444 | End: 2023-10-04
Payer: MEDICARE

## 2023-10-04 LAB
ALBUMIN SERPL-MCNC: 3.2 G/DL (ref 3.5–5.2)
ALBUMIN/GLOB SERPL: 1.4 G/DL
ALP SERPL-CCNC: 43 U/L (ref 39–117)
ALT SERPL W P-5'-P-CCNC: 11 U/L (ref 1–41)
ANION GAP SERPL CALCULATED.3IONS-SCNC: 10 MMOL/L (ref 5–15)
AORTIC DIMENSIONLESS INDEX: 0.7 (DI)
AST SERPL-CCNC: 11 U/L (ref 1–40)
BASOPHILS # BLD AUTO: 0.03 10*3/MM3 (ref 0–0.2)
BASOPHILS NFR BLD AUTO: 0.2 % (ref 0–1.5)
BH CV ECHO MEAS - ACS: 1.73 CM
BH CV ECHO MEAS - AO MAX PG: 22.1 MMHG
BH CV ECHO MEAS - AO MEAN PG: 11.2 MMHG
BH CV ECHO MEAS - AO ROOT DIAM: 3.5 CM
BH CV ECHO MEAS - AO V2 MAX: 234.8 CM/SEC
BH CV ECHO MEAS - AO V2 VTI: 42.7 CM
BH CV ECHO MEAS - AVA(I,D): 1.98 CM2
BH CV ECHO MEAS - EDV(CUBED): 79.7 ML
BH CV ECHO MEAS - EDV(MOD-SP2): 81 ML
BH CV ECHO MEAS - EDV(MOD-SP4): 88 ML
BH CV ECHO MEAS - EF(MOD-BP): 55 %
BH CV ECHO MEAS - EF(MOD-SP2): 59.3 %
BH CV ECHO MEAS - EF(MOD-SP4): 58 %
BH CV ECHO MEAS - ESV(CUBED): 24.3 ML
BH CV ECHO MEAS - ESV(MOD-SP2): 33 ML
BH CV ECHO MEAS - ESV(MOD-SP4): 37 ML
BH CV ECHO MEAS - FS: 32.7 %
BH CV ECHO MEAS - IVS/LVPW: 1.07 CM
BH CV ECHO MEAS - IVSD: 1.02 CM
BH CV ECHO MEAS - LAT PEAK E' VEL: 14.6 CM/SEC
BH CV ECHO MEAS - LV DIASTOLIC VOL/BSA (35-75): 40.5 CM2
BH CV ECHO MEAS - LV MASS(C)D: 139.5 GRAMS
BH CV ECHO MEAS - LV MAX PG: 7.5 MMHG
BH CV ECHO MEAS - LV MEAN PG: 4 MMHG
BH CV ECHO MEAS - LV SYSTOLIC VOL/BSA (12-30): 17 CM2
BH CV ECHO MEAS - LV V1 MAX: 137 CM/SEC
BH CV ECHO MEAS - LV V1 VTI: 22.2 CM
BH CV ECHO MEAS - LVIDD: 4.3 CM
BH CV ECHO MEAS - LVIDS: 2.9 CM
BH CV ECHO MEAS - LVOT AREA: 3.8 CM2
BH CV ECHO MEAS - LVOT DIAM: 2.2 CM
BH CV ECHO MEAS - LVPWD: 0.95 CM
BH CV ECHO MEAS - MED PEAK E' VEL: 6.4 CM/SEC
BH CV ECHO MEAS - MR MAX PG: 28.9 MMHG
BH CV ECHO MEAS - MR MAX VEL: 269 CM/SEC
BH CV ECHO MEAS - MV A DUR: 0.1 SEC
BH CV ECHO MEAS - MV A MAX VEL: 85.2 CM/SEC
BH CV ECHO MEAS - MV DEC SLOPE: 308.4 CM/SEC2
BH CV ECHO MEAS - MV DEC TIME: 0.18 SEC
BH CV ECHO MEAS - MV E MAX VEL: 68.3 CM/SEC
BH CV ECHO MEAS - MV E/A: 0.8
BH CV ECHO MEAS - MV MAX PG: 4.6 MMHG
BH CV ECHO MEAS - MV MEAN PG: 1.51 MMHG
BH CV ECHO MEAS - MV P1/2T: 68.1 MSEC
BH CV ECHO MEAS - MV V2 VTI: 21.2 CM
BH CV ECHO MEAS - MVA(P1/2T): 3.2 CM2
BH CV ECHO MEAS - MVA(VTI): 4 CM2
BH CV ECHO MEAS - PA ACC TIME: 0.07 SEC
BH CV ECHO MEAS - PA V2 MAX: 137.1 CM/SEC
BH CV ECHO MEAS - RAP SYSTOLE: 3 MMHG
BH CV ECHO MEAS - RV MAX PG: 4.1 MMHG
BH CV ECHO MEAS - RV V1 MAX: 100.3 CM/SEC
BH CV ECHO MEAS - RV V1 VTI: 16.4 CM
BH CV ECHO MEAS - RVSP: 36 MMHG
BH CV ECHO MEAS - SI(MOD-SP2): 22.1 ML/M2
BH CV ECHO MEAS - SI(MOD-SP4): 23.4 ML/M2
BH CV ECHO MEAS - SV(LVOT): 84.6 ML
BH CV ECHO MEAS - SV(MOD-SP2): 48 ML
BH CV ECHO MEAS - SV(MOD-SP4): 51 ML
BH CV ECHO MEAS - TR MAX PG: 33 MMHG
BH CV ECHO MEAS - TR MAX VEL: 287.4 CM/SEC
BH CV ECHO MEASUREMENTS AVERAGE E/E' RATIO: 6.5
BH CV ECHO SHUNT ASSESSMENT PERFORMED (HIDDEN SCRIPTING): 1
BH CV XLRA - RV BASE: 4.1 CM
BH CV XLRA - RV LENGTH: 8.8 CM
BH CV XLRA - RV MID: 3.3 CM
BH CV XLRA - TDI S': 14 CM/SEC
BILIRUB SERPL-MCNC: 1.1 MG/DL (ref 0–1.2)
BUN SERPL-MCNC: 23 MG/DL (ref 8–23)
BUN/CREAT SERPL: 19.5 (ref 7–25)
CALCIUM SPEC-SCNC: 8.4 MG/DL (ref 8.6–10.5)
CHLORIDE SERPL-SCNC: 101 MMOL/L (ref 98–107)
CO2 SERPL-SCNC: 26 MMOL/L (ref 22–29)
CREAT SERPL-MCNC: 1.18 MG/DL (ref 0.76–1.27)
DEPRECATED RDW RBC AUTO: 42.9 FL (ref 37–54)
EGFRCR SERPLBLD CKD-EPI 2021: 61.6 ML/MIN/1.73
EOSINOPHIL # BLD AUTO: 0 10*3/MM3 (ref 0–0.4)
EOSINOPHIL NFR BLD AUTO: 0 % (ref 0.3–6.2)
ERYTHROCYTE [DISTWIDTH] IN BLOOD BY AUTOMATED COUNT: 11.8 % (ref 12.3–15.4)
GEN 5 2HR TROPONIN T REFLEX: 28 NG/L
GLOBULIN UR ELPH-MCNC: 2.3 GM/DL
GLUCOSE SERPL-MCNC: 101 MG/DL (ref 65–99)
HCT VFR BLD AUTO: 38.5 % (ref 37.5–51)
HGB BLD-MCNC: 12.7 G/DL (ref 13–17.7)
IMM GRANULOCYTES # BLD AUTO: 0.12 10*3/MM3 (ref 0–0.05)
IMM GRANULOCYTES NFR BLD AUTO: 0.7 % (ref 0–0.5)
INR PPP: 2.04 (ref 0.9–1.1)
LEFT ATRIUM VOLUME INDEX: 16.7 ML/M2
LYMPHOCYTES # BLD AUTO: 0.72 10*3/MM3 (ref 0.7–3.1)
LYMPHOCYTES NFR BLD AUTO: 4 % (ref 19.6–45.3)
MCH RBC QN AUTO: 32.6 PG (ref 26.6–33)
MCHC RBC AUTO-ENTMCNC: 33 G/DL (ref 31.5–35.7)
MCV RBC AUTO: 98.7 FL (ref 79–97)
MONOCYTES # BLD AUTO: 0.86 10*3/MM3 (ref 0.1–0.9)
MONOCYTES NFR BLD AUTO: 4.8 % (ref 5–12)
NEUTROPHILS NFR BLD AUTO: 16.19 10*3/MM3 (ref 1.7–7)
NEUTROPHILS NFR BLD AUTO: 90.3 % (ref 42.7–76)
NRBC BLD AUTO-RTO: 0 /100 WBC (ref 0–0.2)
PLATELET # BLD AUTO: 162 10*3/MM3 (ref 140–450)
PMV BLD AUTO: 12.1 FL (ref 6–12)
POTASSIUM SERPL-SCNC: 3.1 MMOL/L (ref 3.5–5.2)
PROT SERPL-MCNC: 5.5 G/DL (ref 6–8.5)
PROTHROMBIN TIME: 23.5 SECONDS (ref 11.7–14.2)
RBC # BLD AUTO: 3.9 10*6/MM3 (ref 4.14–5.8)
SINUS: 3.2 CM
SODIUM SERPL-SCNC: 137 MMOL/L (ref 136–145)
TROPONIN T DELTA: -2 NG/L
TROPONIN T SERPL HS-MCNC: 30 NG/L
WBC NRBC COR # BLD: 17.92 10*3/MM3 (ref 3.4–10.8)

## 2023-10-04 PROCEDURE — 99231 SBSQ HOSP IP/OBS SF/LOW 25: CPT | Performed by: SURGERY

## 2023-10-04 PROCEDURE — 85025 COMPLETE CBC W/AUTO DIFF WBC: CPT | Performed by: HOSPITALIST

## 2023-10-04 PROCEDURE — 93010 ELECTROCARDIOGRAM REPORT: CPT | Performed by: INTERNAL MEDICINE

## 2023-10-04 PROCEDURE — 93306 TTE W/DOPPLER COMPLETE: CPT | Performed by: INTERNAL MEDICINE

## 2023-10-04 PROCEDURE — 25010000002 MORPHINE PER 10 MG: Performed by: HOSPITALIST

## 2023-10-04 PROCEDURE — 93005 ELECTROCARDIOGRAM TRACING: CPT | Performed by: NURSE PRACTITIONER

## 2023-10-04 PROCEDURE — 93306 TTE W/DOPPLER COMPLETE: CPT

## 2023-10-04 PROCEDURE — 84484 ASSAY OF TROPONIN QUANT: CPT | Performed by: NURSE PRACTITIONER

## 2023-10-04 PROCEDURE — 84484 ASSAY OF TROPONIN QUANT: CPT | Performed by: HOSPITALIST

## 2023-10-04 PROCEDURE — 25010000002 PIPERACILLIN SOD-TAZOBACTAM PER 1 G: Performed by: HOSPITALIST

## 2023-10-04 PROCEDURE — 80053 COMPREHEN METABOLIC PANEL: CPT | Performed by: HOSPITALIST

## 2023-10-04 PROCEDURE — 85610 PROTHROMBIN TIME: CPT | Performed by: HOSPITALIST

## 2023-10-04 PROCEDURE — 99232 SBSQ HOSP IP/OBS MODERATE 35: CPT

## 2023-10-04 RX ORDER — MORPHINE SULFATE 2 MG/ML
2 INJECTION, SOLUTION INTRAMUSCULAR; INTRAVENOUS
Status: DISCONTINUED | OUTPATIENT
Start: 2023-10-04 | End: 2023-10-05

## 2023-10-04 RX ORDER — HYDROCODONE BITARTRATE AND ACETAMINOPHEN 5; 325 MG/1; MG/1
1 TABLET ORAL EVERY 6 HOURS PRN
Status: DISCONTINUED | OUTPATIENT
Start: 2023-10-04 | End: 2023-10-10 | Stop reason: HOSPADM

## 2023-10-04 RX ADMIN — MORPHINE SULFATE 2 MG: 2 INJECTION, SOLUTION INTRAMUSCULAR; INTRAVENOUS at 20:06

## 2023-10-04 RX ADMIN — Medication 10 ML: at 20:07

## 2023-10-04 RX ADMIN — HYDRALAZINE HYDROCHLORIDE 25 MG: 10 TABLET, FILM COATED ORAL at 20:06

## 2023-10-04 RX ADMIN — TERAZOSIN 2 MG: 2 CAPSULE ORAL at 21:08

## 2023-10-04 RX ADMIN — AMIODARONE HYDROCHLORIDE 100 MG: 200 TABLET ORAL at 09:11

## 2023-10-04 RX ADMIN — Medication 10 ML: at 09:14

## 2023-10-04 RX ADMIN — PIPERACILLIN SODIUM AND TAZOBACTAM SODIUM 3.38 G: 3; .375 INJECTION, SOLUTION INTRAVENOUS at 23:50

## 2023-10-04 RX ADMIN — ROSUVASTATIN CALCIUM 5 MG: 5 TABLET, FILM COATED ORAL at 09:13

## 2023-10-04 RX ADMIN — PIPERACILLIN SODIUM AND TAZOBACTAM SODIUM 3.38 G: 3; .375 INJECTION, SOLUTION INTRAVENOUS at 09:14

## 2023-10-04 RX ADMIN — SENNOSIDES AND DOCUSATE SODIUM 2 TABLET: 50; 8.6 TABLET ORAL at 09:13

## 2023-10-04 RX ADMIN — MORPHINE SULFATE 2 MG: 2 INJECTION, SOLUTION INTRAMUSCULAR; INTRAVENOUS at 12:10

## 2023-10-04 RX ADMIN — HYDRALAZINE HYDROCHLORIDE 25 MG: 10 TABLET, FILM COATED ORAL at 16:32

## 2023-10-04 RX ADMIN — NEBIVOLOL 5 MG: 5 TABLET ORAL at 09:13

## 2023-10-04 RX ADMIN — ISOSORBIDE MONONITRATE 30 MG: 30 TABLET, EXTENDED RELEASE ORAL at 09:13

## 2023-10-04 RX ADMIN — ASPIRIN 81 MG: 81 TABLET, COATED ORAL at 09:12

## 2023-10-04 RX ADMIN — PANTOPRAZOLE SODIUM 40 MG: 40 TABLET, DELAYED RELEASE ORAL at 05:25

## 2023-10-04 RX ADMIN — PIPERACILLIN SODIUM AND TAZOBACTAM SODIUM 3.38 G: 3; .375 INJECTION, SOLUTION INTRAVENOUS at 16:32

## 2023-10-04 RX ADMIN — ACETAMINOPHEN 650 MG: 325 TABLET, FILM COATED ORAL at 00:35

## 2023-10-04 RX ADMIN — LEVOTHYROXINE SODIUM 112 MCG: 0.11 TABLET ORAL at 09:12

## 2023-10-04 NOTE — PROGRESS NOTES
Colorectal & General Surgery  Progress Note    Patient: David Comer Sr.  YOB: 1940  MRN: [id number]      Assessment  David Comer Sr. is a 83 y.o. male with acute cholecystitis in the setting of warfarin induced coagulopathy.  His INR is still elevated at 2 today.  We will plan to continue holding his Coumadin and plan for laparoscopic cholecystectomy with intraoperative cholangiogram once his INR is at an appropriate level.    Plan  Continue antibiotics  Okay for liquid diet  Continue to trend INR      Subjective  Complains of right upper quadrant abdominal pain, although improved.  Also complains of early satiety.  Walked around the unit yesterday.    Objective    Vitals:    10/04/23 0837   BP: 131/71   Pulse: 81   Resp: 16   Temp: 97.5 øF (36.4 øC)   SpO2: 99%       Physical Exam  Constitutional: Well-developed well-nourished, no acute distress  Neck: Supple, trachea midline  Respiratory: No increased work of breathing, Symmetric excursion  Cardiovascular: Well pefursed, no jugular venous distention evident   Abdominal: Tender palpation of the right upper quadrant, soft, nondistended.    Skin: Warm, dry, no rash on visualized skin surfaces  Psychiatric: Alert and oriented x3, normal affect     Laboratory Results  I have personally reviewed CBC with WBC 17, hemoglobin 12, platelets 162.  INR 2.0.  CMP with creatinine 1.18, AST 11, ALT 11, total bilirubin 1.1, albumin 3.2.           Omar Quintanilla MD  Colorectal & General Surgery  [facility]    [address], [address]  [place], [place], [address]  P: [phone]  F: [phone]

## 2023-10-04 NOTE — CASE MANAGEMENT/SOCIAL WORK
Discharge Planning Assessment  [facility]     Patient Name: David KHAN Age Sr.  MRN: [id number]  Today's Date: 10/4/2023    Admit Date: 10/2/2023        Discharge Needs Assessment       Row Name 10/04/23 1425       Living Environment    People in Home child(angie), adult    Name(s) of People in Home son David Galvez or daughter Cora    Current Living Arrangements home    Potentially Unsafe Housing Conditions none    Primary Care Provided by self    Provides Primary Care For no one    Family Caregiver if Needed child(angie), adult    Quality of Family Relationships helpful;supportive    Able to Return to Prior Arrangements yes       Resource/Environmental Concerns    Resource/Environmental Concerns none       Food Insecurity    Within the past 12 months, you worried that your food would run out before you got the money to buy more. Never true    Within the past 12 months, the food you bought just didn't last and you didn't have money to get more. Never true       Transition Planning    Patient/Family Anticipates Transition to home with family    Patient/Family Anticipated Services at Transition none    Transportation Anticipated family or friend will provide       Discharge Needs Assessment    Equipment Currently Used at Home none    Concerns to be Addressed denies needs/concerns at this time    Anticipated Changes Related to Illness none    Equipment Needed After Discharge none    Provided Post Acute Provider List? Yes    Delivered To Patient    Method of Delivery In person                   Discharge Plan       Row Name 10/04/23 1421       Plan    Plan Comments Entered room, introduced self and explained role to patient; verified information on facesheet; Patient is retired, [place]; Pt still drives, is very active and independent w/ADL's; Denies DME- Verified PCP listed on facesheet; RX are filled at [facility] in [place]- pt is not interested in meds to beds  at this time; Pt presented to the ER w/chest pain- he currently reports feeling weak and decreased appetite; Pt denies any needs at this time and advises daughter will pick him up upon d/c. Provided RTR for review if needed.                  Continued Care and Services - Admitted Since 10/2/2023    Coordination has not been started for this encounter.          Demographic Summary       Row Name 10/04/23 1424       General Information    Admission Type inpatient    Arrived From home    Required Notices Provided Important Message from Medicare    Reason for Consult discharge planning;decision-making    Preferred Language English       Contact Information    Permission Granted to Share Info With                    Functional Status       Row Name 10/04/23 1425       Functional Status    Usual Activity Tolerance excellent    Current Activity Tolerance moderate       Assessment of Health Literacy    How often do you have someone help you read hospital materials? Never    How often do you have problems learning about your medical condition because of difficulty understanding written information? Never    How often do you have a problem understanding what is told to you about your medical condition? Never    How confident are you filling out medical forms by yourself? Extremely    Health Literacy Good       Functional Status, IADL    Medications independent    Meal Preparation independent    Housekeeping independent    Laundry independent    Shopping independent       Mental Status    General Appearance WDL WDL       Mental Status Summary    Recent Changes in Mental Status/Cognitive Functioning no changes       Employment/    Employment Status retired                   Psychosocial    No documentation.                  Abuse/Neglect    No documentation.                  Legal    No documentation.                  Substance Abuse    No documentation.                  Patient Forms    No  documentation.                     Delicia Verma RN

## 2023-10-04 NOTE — PLAN OF CARE
Goal Outcome Evaluation: Patient still waiting for INR to become therapeutic prior to surgery.  Pain increased throughout the day which was relieved with morphine.  ECHO performed.  IVABX given.  Vital signs have remained stable.  Will continue to monitor.

## 2023-10-04 NOTE — PLAN OF CARE
Alert, vss. Tylenol given x 1 for abdominal pain. Eventual gallbladder removal when INR is stable.       Problem: Adult Inpatient Plan of Care  Goal: Absence of Hospital-Acquired Illness or Injury  Intervention: Prevent Skin Injury  Recent Flowsheet Documentation  Taken 10/4/2023 0600 by Flora Walter RN  Body Position: position changed independently  Taken 10/4/2023 0400 by Flora Walter RN  Body Position: sitting up in bed  Taken 10/4/2023 0149 by Flora Walter RN  Body Position: sitting up in bed  Taken 10/4/2023 0046 by Flora Walter RN  Body Position: (chair) --  Taken 10/3/2023 2342 by Flora Walter RN  Body Position: sitting up in bed  Skin Protection: adhesive use limited  Taken 10/3/2023 2150 by Flora Walter RN  Body Position: position changed independently  Taken 10/3/2023 2019 by Flora Walter RN  Body Position: sitting up in bed  Skin Protection: adhesive use limited   Goal Outcome Evaluation:

## 2023-10-04 NOTE — PROGRESS NOTES
[facility]  Cardiology Progress Note    Patient Identification:  Name: David Comer Sr.  Age: 83 y.o.  Sex: male  :  1940  MRN: [id number]                 Follow Up / Chief Complaint: Follow-up for chest pain and cholecystitis    Interval History: Resting in bed, some abdominal pain earlier but better.  No chest pain or shortness of breath.  Tentatively planned for laparoscopic cholecystectomy once INR lower-possibly Thursday/Friday       Subjective: No chest pain      Objective:    Past Medical History:  Past Medical History:   Diagnosis Date    AAA (abdominal aortic aneurysm) without rupture     Aneurysm of thoracic aorta     CT CHEST 2021 IN [facility]     Arthritis     Basal cell carcinoma     RIGHT LOWER LID     BPH (benign prostatic hyperplasia)     Chronic lumbar pain     Degenerative arthritis of lumbar spine     Degenerative cervical disc     Disease of thyroid gland     Elevated rheumatoid factor     History of atrial fibrillation     Hyperlipidemia     Hypertension     Hypogonadism in male     Muscle strain of left upper back     PRESCRIBED PREDNISONE DOSE PACK     On anticoagulant therapy     Pulmonary nodule     6 MM - REPEAT CT SCAN IN ONE YEAR, WATCHING     Refused influenza vaccine     Scoliosis     Vitamin D deficiency      Past Surgical History:  Past Surgical History:   Procedure Laterality Date    ANKLE FUSION Right     CARDIAC CATHETERIZATION      CARDIAC CATHETERIZATION N/A 2021    Procedure: Left Heart Cath;  Surgeon: Harlan Downing MD;  Location:  [facility] CATH INVASIVE LOCATION;  Service: Cardiology;  Laterality: N/A;    CARDIAC CATHETERIZATION N/A 2021    Procedure: Coronary angiography;  Surgeon: Harlan Downing MD;  Location:  [facility] CATH INVASIVE LOCATION;  Service: Cardiology;  Laterality: N/A;    CARDIAC CATHETERIZATION N/A 2021    Procedure: Left ventriculography;  Surgeon: Harlan Downing MD;  Location:  [facility] CATH INVASIVE  LOCATION;  Service: Cardiology;  Laterality: N/A;    CARDIAC CATHETERIZATION N/A 12/30/2022    Procedure: Left Heart Cath check inr;  Surgeon: Harlan Downing MD;  [facility];  Service: Cardiology;  Laterality: N/A;    CARDIAC CATHETERIZATION N/A 12/30/2022    Procedure: Coronary angiography;  Surgeon: Harlan Downing MD;  [facility];  Service: Cardiology;  Laterality: N/A;    CARDIAC CATHETERIZATION N/A 12/30/2022    Procedure: Left ventriculography;  Surgeon: Harlan Downing MD;  [facility];  Service: Cardiology;  Laterality: N/A;    HOBBS TUBE INSERTION Right 10/19/2021    Procedure: RIGHT SECOND STAGE CAST TAKEDOWN RECONSTRUCTION;  Surgeon: Brian Bhatt MD;  Location: [facility];  Service: Ophthalmology;  Laterality: Right;    ENTROPIAN REPAIR Right 9/21/2021    Procedure: RIGHT LOWER LID EXCISION OF BASAL CELL CARCINOMA WITH FROZEN SECTION RIGHT LOWER LID RECONSTRUCTION WITH CAST FLAP, REPAIR OF CANULICULIS, AND FULL THICKNESS SKIN GRAFT;  Surgeon: Brian Bhatt MD;  Location: [facility];  Service: Ophthalmology;  Laterality: Right;    INGUINAL HERNIA REPAIR Right     REPLACEMENT TOTAL KNEE Bilateral 2000    ROTATOR CUFF REPAIR Left     ROTATOR CUFF REPAIR Right     SKIN BIOPSY      VASECTOMY          Social History:   Social History     Tobacco Use    Smoking status: Never    Smokeless tobacco: Never   Substance Use Topics    Alcohol use: No      Family History:  Family History   Problem Relation Age of Onset    Hypertension Father     Heart attack Father     Heart attack Brother     Alcohol abuse Brother     Hypertension Brother     Hypertension Paternal Grandmother     Hypertension Paternal Grandfather     Anemia Mother     Arthritis Mother     Hypertension Mother     Hypertension Maternal Grandmother     Heart disease Other         FH in males before age 55    Shubham  Hyperthermia Neg Hx           Allergies:  No Known Allergies  Scheduled Meds:  amiodarone, 100 mg, Daily  aspirin, 81 mg, Daily  hydrALAZINE, 25 mg, TID  isosorbide mononitrate, 30 mg, Daily  levothyroxine, 112 mcg, Daily  nebivolol, 5 mg, Daily  pantoprazole, 40 mg, Q AM  piperacillin-tazobactam, 3.375 g, Q8H  rosuvastatin, 5 mg, Daily  senna-docusate sodium, 2 tablet, BID  sodium chloride, 10 mL, Q12H  terazosin, 2 mg, Nightly            INTAKE AND OUTPUT:    Intake/Output Summary (Last 24 hours) at 10/4/2023 1228  Last data filed at 10/4/2023 0325  Gross per 24 hour   Intake 1770 ml   Output --   Net 1770 ml       Review of Systems:   GI: no n/v + abd pain  Cardiac: no chest pain or palpitations  Pulmonary: no shortness of breath or cough      Constitutional:  Temp:  [97.5 øF (36.4 øC)-99.9 øF (37.7 øC)] 97.5 øF (36.4 øC)  Heart Rate:  [74-94] 81  Resp:  [16-20] 16  BP: (102-131)/(66-71) 131/71    Physical Exam:  General:  Appears in no acute distress  Eyes: PERTL,  HEENT:  No JVD. Thyroid not visibly enlarged. No mucosal pallor or cyanosis  Respiratory: Respirations regular and unlabored at rest. BBS with good air entry in all fields. No crackles, rubs or wheezes auscultated  Cardiovascular: S1S2 Regular rate and rhythm. No murmur, rub or gallop. No carotid bruits. DP/PT pulses     . No pretibial pitting edema  Gastrointestinal: Abdomen soft, flat, non tender. Bowel sounds present. No hepatosplenomegaly. No ascites  Musculoskeletal: TORRES x4. No abnormal movements  Extremities: No digital clubbing or cyanosis  Skin: Color pink. Skin warm and dry to touch. No rashes    Neuro: AAO x3 CN II-XII grossly intact  Psych: Mood and affect normal, pleasant and cooperative          Cardiographics  ECG:     Echocardiogram: pending    Lab Review   Results from last 7 days   Lab Units 10/04/23  0802 10/04/23  0338 10/02/23  5849   HSTROP T ng/L 28* 30* 33*         Results from last 7 days   Lab Units 10/04/23  0338   SODIUM  "mmol/L 137   POTASSIUM mmol/L 3.1*   BUN mg/dL 23   CREATININE mg/dL 1.18   CALCIUM mg/dL 8.4*     @[id number]@  Results from last 7 days   Lab Units 10/04/23  0338 10/03/23  0547 10/02/23  1913   WBC 10*3/mm3 17.92* 15.71* 9.35   HEMOGLOBIN g/dL 12.7* 12.3* 12.7*   HEMATOCRIT % 38.5 36.4* 38.5   PLATELETS 10*3/mm3 162 164 165     Results from last 7 days   Lab Units 10/04/23  0338 10/03/23  1417 10/02/23  1913   INR  2.04* 2.05* 1.99*         Assessment:  Epigastric pain  Acute cholecystitis  Coronary artery disease  Ascending aortic aneurysm: 4.8 cm per CT-CTS consulted  Paroxysmal atrial fibrillation  Chronic anticoagulation on warfarin: Currently on hold for pending surgery.  INR 2.04      Plan:  Echo today-further recommendations pending read  CTS consulted for evaluation of thoracic aneurysm  Anticoagulation remains on hold, INR 2.04 today.  BP and heart rate stable  No chest pain    Clear for surgery from cardiac perspective with non-modifiable risk factors.     )10/4/2023  ALVA Luo      EMR Dragon/Transcription:   \"Dictated utilizing Dragon dictation\".     "

## 2023-10-04 NOTE — PROGRESS NOTES
[facility]    [phone]   LOS: 1 day     Name: David Comer Sr.  Age/Sex: 83 y.o. male  :  1940        PCP: Chacha Pineda MD  Chief Complaint   Patient presents with    Chest Pain      Subjective   He is complaining of a lot of abdominal pain.  Tylenol is not working for his pain.  He has been fairly uncomfortable this morning.  Denies new issues or complaints right now no nausea or vomiting.  Taken for testing this morning.  General: No Fever or Chills, Cardiac: No Chest Pain or Palpitations, Resp: No Cough or SOA, GI: No Nausea, Vomiting, or Diarrhea, and Other: No bleeding    amiodarone, 100 mg, Oral, Daily  aspirin, 81 mg, Oral, Daily  hydrALAZINE, 25 mg, Oral, TID  isosorbide mononitrate, 30 mg, Oral, Daily  levothyroxine, 112 mcg, Oral, Daily  nebivolol, 5 mg, Oral, Daily  pantoprazole, 40 mg, Oral, Q AM  piperacillin-tazobactam, 3.375 g, Intravenous, Q8H  rosuvastatin, 5 mg, Oral, Daily  senna-docusate sodium, 2 tablet, Oral, BID  sodium chloride, 10 mL, Intravenous, Q12H  terazosin, 2 mg, Oral, Nightly           Objective   Vital Signs  Temp:  [97.5 øF (36.4 øC)-99.9 øF (37.7 øC)] 97.5 øF (36.4 øC)  Heart Rate:  [74-94] 81  Resp:  [16-20] 16  BP: (102-131)/(66-71) 131/71  Body mass index is 28.48 kg/mý.    Intake/Output Summary (Last 24 hours) at 10/4/2023 1233  Last data filed at 10/4/2023 0325  Gross per 24 hour   Intake 1770 ml   Output --   Net 1770 ml       Physical Exam  Vitals and nursing note reviewed.   Constitutional:       General: He is not in acute distress.     Appearance: He is ill-appearing.   Cardiovascular:      Rate and Rhythm: Normal rate and regular rhythm.   Abdominal:      General: Bowel sounds are normal. There is distension.      Palpations: Abdomen is soft.      Tenderness: There is abdominal tenderness.   Neurological:      General: No focal deficit present.      Mental Status: He is alert. Mental status is at baseline.         Results Review:        I reviewed the patient's new clinical results.  Results from last 7 days   Lab Units 10/04/23  0338 10/03/23  0547 10/02/23  1913   WBC 10*3/mm3 17.92* 15.71* 9.35   HEMOGLOBIN g/dL 12.7* 12.3* 12.7*   PLATELETS 10*3/mm3 162 164 165     Results from last 7 days   Lab Units 10/04/23  0338 10/03/23  0547 10/02/23  1913   SODIUM mmol/L 137 138 141   POTASSIUM mmol/L 3.1* 3.4* 3.6   CHLORIDE mmol/L 101 104 104   CO2 mmol/L 26.0 24.8 24.9   BUN mg/dL 23 15 19   CREATININE mg/dL 1.18 0.86 1.22   CALCIUM mg/dL 8.4* 8.5* 9.2   Estimated Creatinine Clearance: 57.8 mL/min (by C-G formula based on SCr of 1.18 mg/dL).  Results from last 7 days   Lab Units 10/04/23  0338 10/03/23  1417 10/02/23  1913   INR  2.04* 2.05* 1.99*         Assessment & Plan   Active Hospital Problems    Diagnosis  POA    **Epigastric pain [R10.13]  Yes    Chest pain [R07.9]  Yes    Acute cholecystitis [K81.0]  Unknown    BPH without obstruction/lower urinary tract symptoms [N40.0]  Yes    Long term (current) use of anticoagulants [Z79.01]  Not Applicable    Acquired hypothyroidism [E03.9]  Yes    Abdominal aortic aneurysm [I71.40]  Yes    Chronic coronary artery disease [I25.10]  Yes    Essential hypertension [I10]  Yes    Paroxysmal atrial fibrillation [I48.0]  Yes      Resolved Hospital Problems   No resolved problems to display.       PLAN  This is a 83-year-old gentleman with a history of abdominal aortic aneurysm, paroxysmal A-fib on Coumadin, hypertension, hypothyroidism, BPH without lower urinary tract symptoms who presents to the hospital with chest pain epigastric pain and right upper quadrant tenderness and is found to have acute cholecystitis.   -Plan for lap shaan once his INR is better  -Cardiology and cardiothoracic surgery are evaluating the patient.  His thoracic aneurysm is slightly larger on imaging on this admission.  -Pain medication adjusted.  -Remains on IV antibiotics White blood cell count continues to trend up.  He remains  afebrile  -Replace electrolytes per protocol  -Blood pressure currently stable continue to hold HCTZ and ARB.  -Mechanical DVT prophylaxis although he is therapeutic on Coumadin at this time  -Full code    Disposition  Expected discharge date/ time has not been documented.       Carter Portillo MD  [facility]  10/04/23  12:33 EDT

## 2023-10-05 ENCOUNTER — APPOINTMENT (OUTPATIENT)
Dept: CT IMAGING | Facility: HOSPITAL | Age: 83
DRG: 444 | End: 2023-10-05
Payer: MEDICARE

## 2023-10-05 ENCOUNTER — APPOINTMENT (OUTPATIENT)
Dept: GENERAL RADIOLOGY | Facility: HOSPITAL | Age: 83
DRG: 444 | End: 2023-10-05
Payer: MEDICARE

## 2023-10-05 ENCOUNTER — TELEPHONE (OUTPATIENT)
Dept: SURGERY | Facility: CLINIC | Age: 83
End: 2023-10-05
Payer: MEDICARE

## 2023-10-05 LAB
ABO GROUP BLD: NORMAL
ANION GAP SERPL CALCULATED.3IONS-SCNC: 11 MMOL/L (ref 5–15)
BLD GP AB SCN SERPL QL: NEGATIVE
BUN SERPL-MCNC: 31 MG/DL (ref 8–23)
BUN/CREAT SERPL: 25 (ref 7–25)
CALCIUM SPEC-SCNC: 8.4 MG/DL (ref 8.6–10.5)
CHLORIDE SERPL-SCNC: 100 MMOL/L (ref 98–107)
CO2 SERPL-SCNC: 24 MMOL/L (ref 22–29)
CREAT SERPL-MCNC: 1.24 MG/DL (ref 0.76–1.27)
D-LACTATE SERPL-SCNC: 1.9 MMOL/L (ref 0.5–2)
DEPRECATED RDW RBC AUTO: 40.7 FL (ref 37–54)
DEPRECATED RDW RBC AUTO: 42.5 FL (ref 37–54)
EGFRCR SERPLBLD CKD-EPI 2021: 58 ML/MIN/1.73
ERYTHROCYTE [DISTWIDTH] IN BLOOD BY AUTOMATED COUNT: 11.6 % (ref 12.3–15.4)
ERYTHROCYTE [DISTWIDTH] IN BLOOD BY AUTOMATED COUNT: 11.7 % (ref 12.3–15.4)
GLUCOSE SERPL-MCNC: 114 MG/DL (ref 65–99)
HCT VFR BLD AUTO: 32.2 % (ref 37.5–51)
HCT VFR BLD AUTO: 36.3 % (ref 37.5–51)
HGB BLD-MCNC: 10.8 G/DL (ref 13–17.7)
HGB BLD-MCNC: 12.2 G/DL (ref 13–17.7)
INR PPP: 1.62 (ref 0.9–1.1)
MAGNESIUM SERPL-MCNC: 1.5 MG/DL (ref 1.6–2.4)
MAGNESIUM SERPL-MCNC: 2.8 MG/DL (ref 1.6–2.4)
MCH RBC QN AUTO: 32.6 PG (ref 26.6–33)
MCH RBC QN AUTO: 33.2 PG (ref 26.6–33)
MCHC RBC AUTO-ENTMCNC: 33.5 G/DL (ref 31.5–35.7)
MCHC RBC AUTO-ENTMCNC: 33.6 G/DL (ref 31.5–35.7)
MCV RBC AUTO: 97.3 FL (ref 79–97)
MCV RBC AUTO: 98.6 FL (ref 79–97)
PHOSPHATE SERPL-MCNC: 1.6 MG/DL (ref 2.5–4.5)
PHOSPHATE SERPL-MCNC: 1.7 MG/DL (ref 2.5–4.5)
PHOSPHATE SERPL-MCNC: 2.3 MG/DL (ref 2.5–4.5)
PLATELET # BLD AUTO: 157 10*3/MM3 (ref 140–450)
PLATELET # BLD AUTO: 188 10*3/MM3 (ref 140–450)
PMV BLD AUTO: 11.5 FL (ref 6–12)
PMV BLD AUTO: 12.1 FL (ref 6–12)
POTASSIUM SERPL-SCNC: 3 MMOL/L (ref 3.5–5.2)
POTASSIUM SERPL-SCNC: 3.2 MMOL/L (ref 3.5–5.2)
POTASSIUM SERPL-SCNC: 3.2 MMOL/L (ref 3.5–5.2)
POTASSIUM SERPL-SCNC: 3.7 MMOL/L (ref 3.5–5.2)
PROCALCITONIN SERPL-MCNC: 1.38 NG/ML (ref 0–0.25)
PROTHROMBIN TIME: 19.5 SECONDS (ref 11.7–14.2)
QT INTERVAL: 460 MS
QT INTERVAL: 465 MS
QTC INTERVAL: 484 MS
QTC INTERVAL: 547 MS
RBC # BLD AUTO: 3.31 10*6/MM3 (ref 4.14–5.8)
RBC # BLD AUTO: 3.68 10*6/MM3 (ref 4.14–5.8)
RH BLD: NEGATIVE
SODIUM SERPL-SCNC: 135 MMOL/L (ref 136–145)
T&S EXPIRATION DATE: NORMAL
TROPONIN T SERPL HS-MCNC: 27 NG/L
WBC NRBC COR # BLD: 10.89 10*3/MM3 (ref 3.4–10.8)
WBC NRBC COR # BLD: 19.37 10*3/MM3 (ref 3.4–10.8)

## 2023-10-05 PROCEDURE — 0F9430Z DRAINAGE OF GALLBLADDER WITH DRAINAGE DEVICE, PERCUTANEOUS APPROACH: ICD-10-PCS | Performed by: RADIOLOGY

## 2023-10-05 PROCEDURE — 25010000002 PIPERACILLIN SOD-TAZOBACTAM PER 1 G: Performed by: INTERNAL MEDICINE

## 2023-10-05 PROCEDURE — 82948 REAGENT STRIP/BLOOD GLUCOSE: CPT

## 2023-10-05 PROCEDURE — 87040 BLOOD CULTURE FOR BACTERIA: CPT | Performed by: INTERNAL MEDICINE

## 2023-10-05 PROCEDURE — 85610 PROTHROMBIN TIME: CPT | Performed by: HOSPITALIST

## 2023-10-05 PROCEDURE — 83735 ASSAY OF MAGNESIUM: CPT | Performed by: HOSPITALIST

## 2023-10-05 PROCEDURE — 94799 UNLISTED PULMONARY SVC/PX: CPT

## 2023-10-05 PROCEDURE — 71250 CT THORAX DX C-: CPT

## 2023-10-05 PROCEDURE — 87186 SC STD MICRODIL/AGAR DIL: CPT | Performed by: SURGERY

## 2023-10-05 PROCEDURE — 87205 SMEAR GRAM STAIN: CPT | Performed by: SURGERY

## 2023-10-05 PROCEDURE — 84132 ASSAY OF SERUM POTASSIUM: CPT | Performed by: SURGERY

## 2023-10-05 PROCEDURE — 86900 BLOOD TYPING SEROLOGIC ABO: CPT | Performed by: SURGERY

## 2023-10-05 PROCEDURE — 71045 X-RAY EXAM CHEST 1 VIEW: CPT

## 2023-10-05 PROCEDURE — 49406 IMAGE CATH FLUID PERI/RETRO: CPT

## 2023-10-05 PROCEDURE — 84100 ASSAY OF PHOSPHORUS: CPT | Performed by: SURGERY

## 2023-10-05 PROCEDURE — C1729 CATH, DRAINAGE: HCPCS

## 2023-10-05 PROCEDURE — 93005 ELECTROCARDIOGRAM TRACING: CPT | Performed by: INTERNAL MEDICINE

## 2023-10-05 PROCEDURE — 87075 CULTR BACTERIA EXCEPT BLOOD: CPT | Performed by: SURGERY

## 2023-10-05 PROCEDURE — 25810000003 LACTATED RINGERS PER 1000 ML: Performed by: INTERNAL MEDICINE

## 2023-10-05 PROCEDURE — 25810000003 SODIUM CHLORIDE 0.9 % SOLUTION: Performed by: INTERNAL MEDICINE

## 2023-10-05 PROCEDURE — 80048 BASIC METABOLIC PNL TOTAL CA: CPT | Performed by: HOSPITALIST

## 2023-10-05 PROCEDURE — 99232 SBSQ HOSP IP/OBS MODERATE 35: CPT | Performed by: INTERNAL MEDICINE

## 2023-10-05 PROCEDURE — 25810000003 LACTATED RINGERS SOLUTION: Performed by: NURSE PRACTITIONER

## 2023-10-05 PROCEDURE — 25010000002 ONDANSETRON PER 1 MG: Performed by: HOSPITALIST

## 2023-10-05 PROCEDURE — 0 LIDOCAINE 1 % SOLUTION: Performed by: RADIOLOGY

## 2023-10-05 PROCEDURE — 87070 CULTURE OTHR SPECIMN AEROBIC: CPT | Performed by: SURGERY

## 2023-10-05 PROCEDURE — 93010 ELECTROCARDIOGRAM REPORT: CPT | Performed by: INTERNAL MEDICINE

## 2023-10-05 PROCEDURE — 84484 ASSAY OF TROPONIN QUANT: CPT | Performed by: INTERNAL MEDICINE

## 2023-10-05 PROCEDURE — 25010000002 MAGNESIUM SULFATE 2 GM/50ML SOLUTION: Performed by: HOSPITALIST

## 2023-10-05 PROCEDURE — 0 POTASSIUM CHLORIDE PER 2 MEQ: Performed by: SURGERY

## 2023-10-05 PROCEDURE — 84100 ASSAY OF PHOSPHORUS: CPT | Performed by: HOSPITALIST

## 2023-10-05 PROCEDURE — 93005 ELECTROCARDIOGRAM TRACING: CPT | Performed by: HOSPITALIST

## 2023-10-05 PROCEDURE — 86850 RBC ANTIBODY SCREEN: CPT | Performed by: SURGERY

## 2023-10-05 PROCEDURE — 02HV33Z INSERTION OF INFUSION DEVICE INTO SUPERIOR VENA CAVA, PERCUTANEOUS APPROACH: ICD-10-PCS | Performed by: INTERNAL MEDICINE

## 2023-10-05 PROCEDURE — 25010000002 MORPHINE PER 10 MG: Performed by: HOSPITALIST

## 2023-10-05 PROCEDURE — 86901 BLOOD TYPING SEROLOGIC RH(D): CPT | Performed by: SURGERY

## 2023-10-05 PROCEDURE — 25810000003 LACTATED RINGERS SOLUTION: Performed by: INTERNAL MEDICINE

## 2023-10-05 PROCEDURE — 99232 SBSQ HOSP IP/OBS MODERATE 35: CPT | Performed by: SURGERY

## 2023-10-05 PROCEDURE — 83605 ASSAY OF LACTIC ACID: CPT | Performed by: NURSE PRACTITIONER

## 2023-10-05 PROCEDURE — 85027 COMPLETE CBC AUTOMATED: CPT | Performed by: INTERNAL MEDICINE

## 2023-10-05 PROCEDURE — 74176 CT ABD & PELVIS W/O CONTRAST: CPT

## 2023-10-05 PROCEDURE — 99223 1ST HOSP IP/OBS HIGH 75: CPT | Performed by: INTERNAL MEDICINE

## 2023-10-05 PROCEDURE — 25010000002 MORPHINE PER 10 MG: Performed by: RADIOLOGY

## 2023-10-05 PROCEDURE — 87076 CULTURE ANAEROBE IDENT EACH: CPT | Performed by: SURGERY

## 2023-10-05 PROCEDURE — 84145 PROCALCITONIN (PCT): CPT | Performed by: HOSPITALIST

## 2023-10-05 PROCEDURE — 84132 ASSAY OF SERUM POTASSIUM: CPT | Performed by: HOSPITALIST

## 2023-10-05 PROCEDURE — 25010000002 PHENYLEPHRINE 10 MG/ML SOLUTION: Performed by: INTERNAL MEDICINE

## 2023-10-05 RX ORDER — MORPHINE SULFATE 2 MG/ML
2 INJECTION, SOLUTION INTRAMUSCULAR; INTRAVENOUS ONCE
Status: COMPLETED | OUTPATIENT
Start: 2023-10-05 | End: 2023-10-05

## 2023-10-05 RX ORDER — SODIUM CHLORIDE, SODIUM LACTATE, POTASSIUM CHLORIDE, CALCIUM CHLORIDE 600; 310; 30; 20 MG/100ML; MG/100ML; MG/100ML; MG/100ML
125 INJECTION, SOLUTION INTRAVENOUS CONTINUOUS
Status: ACTIVE | OUTPATIENT
Start: 2023-10-05 | End: 2023-10-06

## 2023-10-05 RX ORDER — PHENYLEPHRINE HCL IN 0.9% NACL 0.5 MG/5ML
.5-3 SYRINGE (ML) INTRAVENOUS
Status: DISCONTINUED | OUTPATIENT
Start: 2023-10-05 | End: 2023-10-07

## 2023-10-05 RX ORDER — FENTANYL/ROPIVACAINE/NS/PF 2-625MCG/1
15 PLASTIC BAG, INJECTION (ML) EPIDURAL ONCE
Status: COMPLETED | OUTPATIENT
Start: 2023-10-05 | End: 2023-10-05

## 2023-10-05 RX ORDER — PANTOPRAZOLE SODIUM 40 MG/1
40 TABLET, DELAYED RELEASE ORAL NIGHTLY
Status: DISCONTINUED | OUTPATIENT
Start: 2023-10-05 | End: 2023-10-10 | Stop reason: HOSPADM

## 2023-10-05 RX ORDER — MAGNESIUM SULFATE HEPTAHYDRATE 40 MG/ML
2 INJECTION, SOLUTION INTRAVENOUS
Status: COMPLETED | OUTPATIENT
Start: 2023-10-05 | End: 2023-10-05

## 2023-10-05 RX ORDER — FENTANYL CITRATE 50 UG/ML
50 INJECTION, SOLUTION INTRAMUSCULAR; INTRAVENOUS
Status: DISCONTINUED | OUTPATIENT
Start: 2023-10-05 | End: 2023-10-10 | Stop reason: HOSPADM

## 2023-10-05 RX ORDER — LIDOCAINE HYDROCHLORIDE 10 MG/ML
20 INJECTION, SOLUTION INFILTRATION; PERINEURAL ONCE
Status: COMPLETED | OUTPATIENT
Start: 2023-10-05 | End: 2023-10-05

## 2023-10-05 RX ORDER — POTASSIUM CHLORIDE 750 MG/1
40 TABLET, FILM COATED, EXTENDED RELEASE ORAL EVERY 4 HOURS
Status: DISCONTINUED | OUTPATIENT
Start: 2023-10-05 | End: 2023-10-05

## 2023-10-05 RX ORDER — POTASSIUM CHLORIDE 29.8 MG/ML
20 INJECTION INTRAVENOUS
Status: COMPLETED | OUTPATIENT
Start: 2023-10-05 | End: 2023-10-05

## 2023-10-05 RX ORDER — PANTOPRAZOLE SODIUM 40 MG/10ML
40 INJECTION, POWDER, LYOPHILIZED, FOR SOLUTION INTRAVENOUS NIGHTLY
Status: DISCONTINUED | OUTPATIENT
Start: 2023-10-05 | End: 2023-10-10 | Stop reason: HOSPADM

## 2023-10-05 RX ORDER — NOREPINEPHRINE BITARTRATE 0.03 MG/ML
.02-.3 INJECTION, SOLUTION INTRAVENOUS
Status: DISCONTINUED | OUTPATIENT
Start: 2023-10-05 | End: 2023-10-07

## 2023-10-05 RX ADMIN — HYDROCODONE BITARTRATE AND ACETAMINOPHEN 1 TABLET: 5; 325 TABLET ORAL at 12:46

## 2023-10-05 RX ADMIN — POTASSIUM CHLORIDE 20 MEQ: 29.8 INJECTION, SOLUTION INTRAVENOUS at 12:46

## 2023-10-05 RX ADMIN — PHENYLEPHRINE HYDROCHLORIDE 1 MCG/KG/MIN: 10 INJECTION INTRAVENOUS at 05:41

## 2023-10-05 RX ADMIN — POTASSIUM CHLORIDE 40 MEQ: 750 TABLET, EXTENDED RELEASE ORAL at 03:07

## 2023-10-05 RX ADMIN — SODIUM CHLORIDE, POTASSIUM CHLORIDE, SODIUM LACTATE AND CALCIUM CHLORIDE 125 ML/HR: 600; 310; 30; 20 INJECTION, SOLUTION INTRAVENOUS at 16:40

## 2023-10-05 RX ADMIN — MAGNESIUM SULFATE HEPTAHYDRATE 2 G: 2 INJECTION, SOLUTION INTRAVENOUS at 05:48

## 2023-10-05 RX ADMIN — ASPIRIN 81 MG: 81 TABLET, COATED ORAL at 08:54

## 2023-10-05 RX ADMIN — PHENYLEPHRINE HYDROCHLORIDE 3 MCG/KG/MIN: 10 INJECTION INTRAVENOUS at 16:39

## 2023-10-05 RX ADMIN — SODIUM CHLORIDE, POTASSIUM CHLORIDE, SODIUM LACTATE AND CALCIUM CHLORIDE 500 ML: 600; 310; 30; 20 INJECTION, SOLUTION INTRAVENOUS at 03:48

## 2023-10-05 RX ADMIN — AMIODARONE HYDROCHLORIDE 100 MG: 200 TABLET ORAL at 08:54

## 2023-10-05 RX ADMIN — Medication 10 ML: at 20:11

## 2023-10-05 RX ADMIN — SODIUM CHLORIDE 1000 ML: 9 INJECTION, SOLUTION INTRAVENOUS at 15:12

## 2023-10-05 RX ADMIN — MAGNESIUM SULFATE HEPTAHYDRATE 2 G: 2 INJECTION, SOLUTION INTRAVENOUS at 03:09

## 2023-10-05 RX ADMIN — LIDOCAINE HYDROCHLORIDE 20 ML: 10 INJECTION, SOLUTION INFILTRATION; PERINEURAL at 11:23

## 2023-10-05 RX ADMIN — PIPERACILLIN SODIUM AND TAZOBACTAM SODIUM 4.5 G: 4; .5 INJECTION, SOLUTION INTRAVENOUS at 23:31

## 2023-10-05 RX ADMIN — PANTOPRAZOLE SODIUM 40 MG: 40 INJECTION, POWDER, FOR SOLUTION INTRAVENOUS at 20:04

## 2023-10-05 RX ADMIN — Medication 10 ML: at 09:25

## 2023-10-05 RX ADMIN — POTASSIUM CHLORIDE 20 MEQ: 29.8 INJECTION, SOLUTION INTRAVENOUS at 08:38

## 2023-10-05 RX ADMIN — POTASSIUM CHLORIDE 20 MEQ: 29.8 INJECTION, SOLUTION INTRAVENOUS at 10:06

## 2023-10-05 RX ADMIN — PIPERACILLIN SODIUM AND TAZOBACTAM SODIUM 4.5 G: 4; .5 INJECTION, SOLUTION INTRAVENOUS at 16:37

## 2023-10-05 RX ADMIN — MORPHINE SULFATE 2 MG: 2 INJECTION, SOLUTION INTRAMUSCULAR; INTRAVENOUS at 12:46

## 2023-10-05 RX ADMIN — ONDANSETRON 4 MG: 2 INJECTION INTRAMUSCULAR; INTRAVENOUS at 01:32

## 2023-10-05 RX ADMIN — PHENYLEPHRINE HYDROCHLORIDE 3 MCG/KG/MIN: 10 INJECTION INTRAVENOUS at 09:24

## 2023-10-05 RX ADMIN — ACETAMINOPHEN 650 MG: 325 TABLET, FILM COATED ORAL at 03:15

## 2023-10-05 RX ADMIN — MORPHINE SULFATE 2 MG: 2 INJECTION, SOLUTION INTRAMUSCULAR; INTRAVENOUS at 10:46

## 2023-10-05 RX ADMIN — SODIUM CHLORIDE, POTASSIUM CHLORIDE, SODIUM LACTATE AND CALCIUM CHLORIDE 1000 ML: 600; 310; 30; 20 INJECTION, SOLUTION INTRAVENOUS at 07:25

## 2023-10-05 RX ADMIN — PIPERACILLIN SODIUM AND TAZOBACTAM SODIUM 4.5 G: 4; .5 INJECTION, SOLUTION INTRAVENOUS at 08:36

## 2023-10-05 RX ADMIN — MAGNESIUM SULFATE HEPTAHYDRATE 2 G: 2 INJECTION, SOLUTION INTRAVENOUS at 08:12

## 2023-10-05 RX ADMIN — LEVOTHYROXINE SODIUM 112 MCG: 0.11 TABLET ORAL at 08:54

## 2023-10-05 RX ADMIN — MORPHINE SULFATE 2 MG: 2 INJECTION, SOLUTION INTRAMUSCULAR; INTRAVENOUS at 01:26

## 2023-10-05 RX ADMIN — PHENYLEPHRINE HYDROCHLORIDE 3 MCG/KG/MIN: 10 INJECTION INTRAVENOUS at 13:11

## 2023-10-05 RX ADMIN — PHENYLEPHRINE HYDROCHLORIDE 2 MCG/KG/MIN: 10 INJECTION INTRAVENOUS at 20:04

## 2023-10-05 RX ADMIN — MORPHINE SULFATE 2 MG: 2 INJECTION, SOLUTION INTRAMUSCULAR; INTRAVENOUS at 11:55

## 2023-10-05 RX ADMIN — SODIUM CHLORIDE, POTASSIUM CHLORIDE, SODIUM LACTATE AND CALCIUM CHLORIDE 125 ML/HR: 600; 310; 30; 20 INJECTION, SOLUTION INTRAVENOUS at 06:33

## 2023-10-05 RX ADMIN — SODIUM CHLORIDE, POTASSIUM CHLORIDE, SODIUM LACTATE AND CALCIUM CHLORIDE 1000 ML: 600; 310; 30; 20 INJECTION, SOLUTION INTRAVENOUS at 05:40

## 2023-10-05 RX ADMIN — SODIUM CHLORIDE, POTASSIUM CHLORIDE, SODIUM LACTATE AND CALCIUM CHLORIDE 125 ML/HR: 600; 310; 30; 20 INJECTION, SOLUTION INTRAVENOUS at 07:25

## 2023-10-05 RX ADMIN — MORPHINE SULFATE 2 MG: 2 INJECTION, SOLUTION INTRAMUSCULAR; INTRAVENOUS at 11:39

## 2023-10-05 RX ADMIN — POTASSIUM PHOSPHATE, MONOBASIC POTASSIUM PHOSPHATE, DIBASIC 15 MMOL: 224; 236 INJECTION, SOLUTION, CONCENTRATE INTRAVENOUS at 13:09

## 2023-10-05 RX ADMIN — ROSUVASTATIN CALCIUM 5 MG: 5 TABLET, FILM COATED ORAL at 13:09

## 2023-10-05 RX ADMIN — HYDROCODONE BITARTRATE AND ACETAMINOPHEN 1 TABLET: 5; 325 TABLET ORAL at 21:57

## 2023-10-05 NOTE — CONSULTS
CONSULT NOTE    Patient Identification:  David KHAN Age Sr.  83 y.o.  male  1940  [id number]            Requesting physician: Dr. Carter Portillo    Reason for Consultation: Acute hypotension    CC: Abdominal pain/acute cholecystitis    History of Present Illness:  Patient is a very nice 83-year-old with history of aortic aneurysm atrial fibrillation hypertension hyperlipidemia coronary artery disease who presented to the emergency room with worsening abdominal pain.  He was found to have concern for acute cholecystitis antibiotics were started and patient was being planned for surgery awaiting for his INR to normalize off of Coumadin.  His blood pressures have been relatively stable on 3 times daily hydralazine as well as Bystolic     He says he has been in pain all day today given given Tylenol however was not helping.  About an hour ago they gave him 2 mg of morphine.  He was then noted to have a significant fever and hypotension.  With blood pressure of 65/47 and developed a temperature of 39.6 I was called by the rapid response team regarding this patient who is was emergently transferred to the ICU.    I saw the patient upon arrival to the ICU. Patient however is able to talk to me he denies any pain in his chest worsening pain in his abdomen.  Denies any light headedness.  Carries on conversation without dyspnea.  Denies any worsening shortness of breath.  Able to give a reliable history no lethargy no confusion.    Review of Systems:  As per HPI otherwise a12 system review of systems performed and all else negative    Past Medical History:   Diagnosis Date    AAA (abdominal aortic aneurysm) without rupture     Aneurysm of thoracic aorta     CT CHEST 8/2021 IN [facility]     Arthritis     Basal cell carcinoma     RIGHT LOWER LID     BPH (benign prostatic hyperplasia)     Chronic lumbar pain     Degenerative arthritis of lumbar spine     Degenerative cervical disc     Disease of thyroid gland      Elevated rheumatoid factor     History of atrial fibrillation     Hyperlipidemia     Hypertension     Hypogonadism in male     Muscle strain of left upper back     PRESCRIBED PREDNISONE DOSE PACK     On anticoagulant therapy     Pulmonary nodule     6 MM - REPEAT CT SCAN IN ONE YEAR, WATCHING     Refused influenza vaccine     Scoliosis     Vitamin D deficiency        Past Surgical History:   Procedure Laterality Date    ANKLE FUSION Right     CARDIAC CATHETERIZATION      CARDIAC CATHETERIZATION N/A 7/2/2021    Procedure: Left Heart Cath;  Surgeon: Harlan Downing MD;  Location: [facility] CATH INVASIVE LOCATION;  Service: Cardiology;  Laterality: N/A;    CARDIAC CATHETERIZATION N/A 7/2/2021    Procedure: Coronary angiography;  Surgeon: Harlan Downing MD;  Location: [facility] CATH INVASIVE LOCATION;  Service: Cardiology;  Laterality: N/A;    CARDIAC CATHETERIZATION N/A 7/2/2021    Procedure: Left ventriculography;  Surgeon: Harlan Downing MD;  Location: [facility] CATH INVASIVE LOCATION;  Service: Cardiology;  Laterality: N/A;    CARDIAC CATHETERIZATION N/A 12/30/2022    Procedure: Left Heart Cath check inr;  Surgeon: Harlan Downing MD;  Location: [facility] CATH INVASIVE LOCATION;  Service: Cardiology;  Laterality: N/A;    CARDIAC CATHETERIZATION N/A 12/30/2022    Procedure: Coronary angiography;  Surgeon: Harlan Downing MD;  Location: [facility] CATH INVASIVE LOCATION;  Service: Cardiology;  Laterality: N/A;    CARDIAC CATHETERIZATION N/A 12/30/2022    Procedure: Left ventriculography;  Surgeon: Harlan Downing MD;  Location: [facility] CATH INVASIVE LOCATION;  Service: Cardiology;  Laterality: N/A;    HOBBS TUBE INSERTION Right 10/19/2021    Procedure: RIGHT SECOND STAGE CAST TAKEDOWN RECONSTRUCTION;  Surgeon: Brian Bhatt MD;  Location: [facility] OR [facility];  Service: Ophthalmology;  Laterality: Right;    ENTROPIAN REPAIR Right 9/21/2021    Procedure: RIGHT LOWER LID EXCISION OF  BASAL CELL CARCINOMA WITH FROZEN SECTION RIGHT LOWER LID RECONSTRUCTION WITH CAST FLAP, REPAIR OF CANULICULIS, AND FULL THICKNESS SKIN GRAFT;  Surgeon: Brian Bhatt MD;  [place] [facility] [place] [facility]  Service: Ophthalmology;  Laterality: Right;    INGUINAL HERNIA REPAIR Right     REPLACEMENT TOTAL KNEE Bilateral 2000    ROTATOR CUFF REPAIR Left     ROTATOR CUFF REPAIR Right     SKIN BIOPSY      VASECTOMY          Medications Prior to Admission   Medication Sig Dispense Refill Last Dose    acetaminophen (TYLENOL) 325 MG tablet Take 2 tablets by mouth Every 4 (Four) Hours As Needed for Mild Pain .       amiodarone (PACERONE) 200 MG tablet Take 1/2 (one-half) tablet by mouth once daily 45 tablet 2     aspirin 81 MG EC tablet Take 1 tablet by mouth Daily.       doxazosin (CARDURA) 2 MG tablet TAKE 1 TABLET BY MOUTH ONCE DAILY AT NIGHT 90 tablet 0     esomeprazole (nexIUM) 20 MG capsule Take 1 capsule by mouth Every Morning Before Breakfast.       hydrALAZINE (APRESOLINE) 25 MG tablet Take 1 tablet by mouth 3 (Three) Times a Day. 270 tablet 1     isosorbide mononitrate (IMDUR) 30 MG 24 hr tablet Take 1 tablet by mouth once daily 90 tablet 2     levothyroxine (SYNTHROID, LEVOTHROID) 112 MCG tablet Take 1 tablet by mouth once daily 90 tablet 1     nebivolol (BYSTOLIC) 10 MG tablet Take 0.5 tablets by mouth Daily.       olmesartan-hydrochlorothiazide (BENICAR HCT) 40-25 MG per tablet TAKE ONE TABLET BY MOUTH DAILY 90 tablet 0     rosuvastatin (CRESTOR) 5 MG tablet Take 1 tablet by mouth once daily 90 tablet 1     warfarin (COUMADIN) 5 MG tablet Take 1 tablet by mouth once daily 45 tablet 1     warfarin (COUMADIN) 7.5 MG tablet Take 1 tablet by mouth Every Night. 90 tablet 1        No Known Allergies    Social History     Socioeconomic History    Marital status:    Tobacco Use    Smoking status: Never    Smokeless tobacco: Never   Vaping Use    Vaping Use: Never used   Substance and Sexual Activity    Alcohol  "use: No    Drug use: No    Sexual activity: Defer       Family History   Problem Relation Age of Onset    Hypertension Father     Heart attack Father     Heart attack Brother     Alcohol abuse Brother     Hypertension Brother     Hypertension Paternal Grandmother     Hypertension Paternal Grandfather     Anemia Mother     Arthritis Mother     Hypertension Mother     Hypertension Maternal Grandmother     Heart disease Other         FH in males before age 55    Malig Hyperthermia Neg Hx        Physical Exam:  BP (!) 65/47   Pulse 87   Temp (!) 103.2 øF (39.6 øC) (Rectal)   Resp 19   Ht 182.9 cm (72\")   Wt 95.3 kg (210 lb)   SpO2 96%   BMI 28.48 kg/mý   Body mass index is 28.48 kg/mý.   General appearance: Nontoxic, conversant   Eyes: Anicteric sclerae, moist conjunctivae; no lid lag;  HENT: Atraumatic; oropharynx clear with moist mucous membranes and no mucosal ulcerations; normal hard and soft palate  Neck: Trachea midline; supple no JVD  Lungs: Bilateral air entry, with normal respiratory effort and no intercostal retractions  CV: Irregular no tachycardia no rub no murmur +distant heart sounds  Abdomen: Soft, nonrigid no pulsatile mass mild tenderness in right upper quadrant;   Extremities: No peripheral edema or extremity lymphadenopathy  Skin: Normal temperature, turgor and texture; no rash, ulcers or subcutaneous nodules  Psych: Appropriate affect, alert  Neuro speech intact cranial 2 through 12 grossly intact moves all extremities    LABS:  Results from last 7 days   Lab Units 10/04/23  0338 10/03/23  0547 10/02/23  1913   WBC 10*3/mm3 17.92* 15.71* 9.35   HEMOGLOBIN g/dL 12.7* 12.3* 12.7*   PLATELETS 10*3/mm3 162 164 165     Results from last 7 days   Lab Units 10/05/23  0224 10/04/23  0338 10/03/23  0547 10/02/23  1913   SODIUM mmol/L 135* 137 138 141   POTASSIUM mmol/L 3.2*  3.2* 3.1* 3.4* 3.6   CHLORIDE mmol/L 100 101 104 104   CO2 mmol/L 24.0 26.0 24.8 24.9   BUN mg/dL 31* 23 15 19   CREATININE " mg/dL 1.24 1.18 0.86 1.22   GLUCOSE mg/dL 114* 101* 111* 112*   CALCIUM mg/dL 8.4* 8.4* 8.5* 9.2   MAGNESIUM mg/dL 1.5*  --   --   --    PHOSPHORUS mg/dL 1.6*  --   --   --    Estimated Creatinine Clearance: 55 mL/min (by C-G formula based on SCr of 1.24 mg/dL).    Imaging: I personally visualized the images of scans/x-rays performed within last 3 days.    EKG no acute ischemic changes      Assessment / Recommendations:  Sepsis (no tachycardia given BB on board) with hypotension  Paroxysmal atrial fibrillation  Coronary artery disease  Acute cholecystitis  Aortic aneurysm  GERD  Anticoagulation on hold now for upcoming surgery  BPH  Hypothyroidism  History of hypertension    White count increasing despite antibiotics -we will ask infectious disease Bergamini and increase his Zosyn dose will still repeat CBC is this morning.  Pro-Alber elevated at 1  Complete a 2liters LR bolus phenylephrine if needed if needed would make it septic shock.  Will use phenylephrine given his history of atrial fibrillation if vasopressors needed  Continue LR at 100  General surgery following for source control    Echocardiogram reviewed there is no pericardial effusion no significant valvular disease  EF is preserved  EKG reviewed no acute ischemic changes    Zosyn increased dose  Stat ct abd pelvis given his history of aneurysm we will go ahead and scan the chest at the same time however patient has no pain  Stat cbc recheck - pending result may benefit from ID consultation.  Lactic acid  Obtain blood cultures  Dc hydralazine bystolic imdur at present given acute change    Therapeutic inr until jut today and still elevated at 1.6 making vte less likely s well no chest pain.    We will hold off on any more morphine however got 2 doses prior to this morning without significant?  Effect on blood pressure ?    Dw rr team, icu charge RN, CICU charge rn bedside ccu rn.    Total critical care time was 58 minutes so far today from 4:25am-5:23am ,  excluding any separately billable procedure time.  Time did not overlap with any other provider.      Electronically signed by Josep Iriwn MD, 10/05/23, 5:23 AM EDT.

## 2023-10-05 NOTE — NURSING NOTE
Obtained morning vitals. Current temp 100.1 oral, pt diaphoretic. BP 83/58. Applied 2L NC. No complaints of pain. Had a dose of morphine IV 0126. Slept well after. Remains alert and appropriate. Electrolytes abnormal. Replacing K, Phos and Mg. Spoke with A regarding BP. New orders for Fluid bolus obtained.

## 2023-10-05 NOTE — CONSULTS
Referring Provider: Jia Joseph MD      Subjective   History of present illness: Very nice 83-year-old gentleman admitted on October 2, 2023 with right upper quadrant abdominal pain for about 48 hours.  He was diagnosed with acute cholecystitis and has been on Zosyn.  He became hypotensive and febrile requiring initiation of pressors and transferred to the ICU.  He is requiring phenylephrine and 4 L    Past Medical History:   Diagnosis Date    AAA (abdominal aortic aneurysm) without rupture     Aneurysm of thoracic aorta     CT CHEST 8/2021 IN [facility]     Arthritis     Basal cell carcinoma     RIGHT LOWER LID     BPH (benign prostatic hyperplasia)     Chronic lumbar pain     Degenerative arthritis of lumbar spine     Degenerative cervical disc     Disease of thyroid gland     Elevated rheumatoid factor     History of atrial fibrillation     Hyperlipidemia     Hypertension     Hypogonadism in male     Muscle strain of left upper back     PRESCRIBED PREDNISONE DOSE PACK     On anticoagulant therapy     Pulmonary nodule     6 MM - REPEAT CT SCAN IN ONE YEAR, WATCHING     Refused influenza vaccine     Scoliosis     Vitamin D deficiency        Past Surgical History:   Procedure Laterality Date    ANKLE FUSION Right     CARDIAC CATHETERIZATION      CARDIAC CATHETERIZATION N/A 7/2/2021    Procedure: Left Heart Cath;  Surgeon: Harlan Downing MD;  Location:  [facility] CATH INVASIVE LOCATION;  Service: Cardiology;  Laterality: N/A;    CARDIAC CATHETERIZATION N/A 7/2/2021    Procedure: Coronary angiography;  Surgeon: Harlan Downing MD;  Location:  [facility] CATH INVASIVE LOCATION;  Service: Cardiology;  Laterality: N/A;    CARDIAC CATHETERIZATION N/A 7/2/2021    Procedure: Left ventriculography;  Surgeon: Harlan Downing MD;  Location:  [facility] CATH INVASIVE LOCATION;  Service: Cardiology;  Laterality: N/A;    CARDIAC CATHETERIZATION N/A 12/30/2022    Procedure: Left Heart Cath check inr;  Surgeon:  Harlan Downing MD;  Location: [facility] CATH INVASIVE LOCATION;  Service: Cardiology;  Laterality: N/A;    CARDIAC CATHETERIZATION N/A 12/30/2022    Procedure: Coronary angiography;  Surgeon: Harlan Downing MD;  [facility];  Service: Cardiology;  Laterality: N/A;    CARDIAC CATHETERIZATION N/A 12/30/2022    Procedure: Left ventriculography;  Surgeon: Harlan Downing MD;  [facility];  Service: Cardiology;  Laterality: N/A;    HOBBS TUBE INSERTION Right 10/19/2021    Procedure: RIGHT SECOND STAGE CAST TAKEDOWN RECONSTRUCTION;  Surgeon: Brian Bhatt MD;  [facility];  Service: Ophthalmology;  Laterality: Right;    ENTROPIAN REPAIR Right 9/21/2021    Procedure: RIGHT LOWER LID EXCISION OF BASAL CELL CARCINOMA WITH FROZEN SECTION RIGHT LOWER LID RECONSTRUCTION WITH CAST FLAP, REPAIR OF CANULICULIS, AND FULL THICKNESS SKIN GRAFT;  Surgeon: Brian Bhatt MD;  [facility] [place] [facility];  Service: Ophthalmology;  Laterality: Right;    INGUINAL HERNIA REPAIR Right     REPLACEMENT TOTAL KNEE Bilateral 2000    ROTATOR CUFF REPAIR Left     ROTATOR CUFF REPAIR Right     SKIN BIOPSY      VASECTOMY             No Known Allergies    Medication:  Antibiotics:  Anti-Infectives (From admission, onward)      Ordered     Dose/Rate Route Frequency Start Stop    10/05/23 0438  piperacillin-tazobactam (ZOSYN) 4.5 g in iso-osmotic dextrose 100 mL IVPB (premix)        Ordering Provider: Josep rIwin MD    4.5 g  over 4 Hours Intravenous Every 8 Hours 10/05/23 0830 10/10/23 1629    10/03/23 0920  piperacillin-tazobactam (ZOSYN) 3.375 g in iso-osmotic dextrose 50 ml (premix)        Ordering Provider: Carter Portillo MD    3.375 g  over 30 Minutes Intravenous Once 10/03/23 1015 10/03/23 1131                Physical Exam:   Vital Signs   Temp:  [97.5 øF (36.4 øC)-103.2 øF (39.6 øC)] 101.7 øF (38.7 øC)  Heart Rate:   [] 59  Resp:  [16-22] 16  BP: ()/(47-71) 97/52    GENERAL: Awake and alert, sickly  HEENT: Oropharynx is clear. Hearing is grossly normal.   EYES: . No conjunctival injection. No lid lag.   LUNGS:normal respiratory effort.   SKIN: no cutaneous eruptions in exposed areas  Tender to palpation right upper quadrant  PSYCHIATRIC: Appropriate mood, affect, insight, and judgment.     Results Review:  White count 19.37, hemoglobin 10.8, platelets 157  Creatinine 1.24  Liver function test normal  10/5 blood cultures pending  10/5 CT chest abdomen pelvis shows dependent consolidation at the lung bases with pleural effusions thought to represent atelectasis with worsening pericholecystic stranding concerning for acute cholecystitis  Gallbladder ultrasound shows distended gallbladder with cholelithiasis and gallbladder wall edema  Chest x-ray dependently interpreted: No acute pneumonia      A/p  1.  Septic shock secondary #2  2.  Acute cholecystitis    He is on Zosyn which provides broad-spectrum activity against biliary stacy.  I think he should improve with source control and agree with plans for cholecystostomy tube today.  We can culture the fluid that is drawn off from IR and tailor antibiotics accordingly.    Thank you for this consult.  We will continue to follow along and tailor antibiotics as the patient's clinical course evolves.

## 2023-10-05 NOTE — PROCEDURES
Ultrasound Guided Central Venous Catheter Insertion Procedure Note      Indications:  vascular access    Procedure Details   Informed consent was obtained for the procedure, including sedation.  Risks of lung perforation, hemorrhage, arrhythmia, and adverse drug reaction were discussed.     Maximum sterile technique was used including usual patient drapes, antiseptics and physician sterile garments.    Under sterile conditions the skin above the on the right internal jugular vein was prepped with chlorhexidine and covered with a sterile drape. Local anesthesia was applied to the skin and subcutaneous tissues with lidocaine 1%. Using ultrasound guidance, an 18-gauge needle was then inserted into the vein. A guide wire was then passed easily through the catheter. A triple-lumen was then inserted into the vessel over the guide wire. The catheter was sutured into place and dressed following sterile protocol with Biopatch placed.    Findings:  There were no changes to vital signs. All catheter ports were flushed with saline. Patient did tolerate procedure well.    Recommendations:  CXR ordered to verify placement.     Neftali Dale MD  10/5/2023

## 2023-10-05 NOTE — CODE DOCUMENTATION
Primary RN s/w Dr Perrin-updated on pt condition  This RN s/w Dr RICKIE Irwin and informed of pt need to transfer to CCU for BP support-see orders

## 2023-10-05 NOTE — NURSING NOTE
"RRT called. Pt blood pressure not responding to fluid bolus. BP steadily decreasing, 77/53. Pt also having episodes of bradycardia, HR dropping as low as 20-30 for a few seconds, radial pulse also checked. Pulse thready. Stat EKG ordered. No current complaints of abdominal or chest pain. Says he \" feels comfortable\".   "

## 2023-10-05 NOTE — PROCEDURES
Ultrasound Guided Central Venous Catheter Insertion Procedure Note      Indications:  vascular access    Procedure Details   Informed consent was obtained for the procedure, including sedation.  Risks of lung perforation, hemorrhage, arrhythmia, and adverse drug reaction were discussed.     Maximum sterile technique was used including usual patient drapes, antiseptics and physician sterile garments.    Under sterile conditions the skin above the on the left internal jugular vein was prepped with chlorhexidine and covered with a sterile drape. Local anesthesia was applied to the skin and subcutaneous tissues with lidocaine 1%. Using ultrasound guidance, an 18-gauge needle was then inserted into the vein. A guide wire was then passed easily through the catheter. A triple-lumen was then inserted into the vessel over the guide wire. The catheter was sutured into place and dressed following sterile protocol with Biopatch placed.    Findings:  There were no changes to vital signs. All catheter ports were flushed with saline. Patient did tolerate procedure well.    Recommendations:  CXR ordered to verify placement.     Neftali Dale MD  10/5/2023

## 2023-10-05 NOTE — PROGRESS NOTES
[facility] Clinical Pharmacy Services: Medication Reconciliation     David Comer Sr. is a 83 y.o. male presenting to [facility] for Hiatal hernia [K44.9]  Epigastric pain [R10.13]  Chest pain [R07.9]  Gallstones [K80.20]  Chest pain, unspecified type [R07.9]       He  has a past medical history of AAA (abdominal aortic aneurysm) without rupture, Aneurysm of thoracic aorta, Arthritis, Basal cell carcinoma, BPH (benign prostatic hyperplasia), Chronic lumbar pain, Degenerative arthritis of lumbar spine, Degenerative cervical disc, Disease of thyroid gland, Elevated rheumatoid factor, History of atrial fibrillation, Hyperlipidemia, Hypertension, Hypogonadism in male, Muscle strain of left upper back, On anticoagulant therapy, Pulmonary nodule, Refused influenza vaccine, Scoliosis, and Vitamin D deficiency.       Allergies as of 10/02/2023    (No Known Allergies)          Medication information was obtained from: Patient      Preferred Pharmacy:  [facility] [address] [place] [address] - [phone] [facility] [phone] [place] [id number]  Phone: [phone] Fax: [phone]    [facility]  [address] [place] [address]  Phone: [phone] Fax: [phone]    [facility] [id number] [address] & ([place]) - [phone]  - [phone] [address]  [address] [place] [address]  Phone: [phone] Fax: [phone]       Prior to Admission Medications       Prescriptions Last Dose Informant Patient Reported? Taking?    acetaminophen (TYLENOL) 325 MG tablet  Self No Yes    Take 2 tablets by mouth Every 4 (Four) Hours As Needed for Mild Pain .    amiodarone (PACERONE) 200 MG tablet  Self No Yes    Take 1/2 (one-half) tablet by mouth once daily    Patient taking differently:  0.5 tablets Daily.    aspirin 81 MG EC tablet  Self Yes Yes    Take 1 tablet by mouth Daily.     doxazosin (CARDURA) 2 MG tablet  Self No Yes    TAKE 1 TABLET BY MOUTH ONCE DAILY AT NIGHT    Patient taking differently:  Take 1 tablet by mouth Every Night.    esomeprazole (nexIUM) 20 MG capsule  Self Yes Yes    Take 1 capsule by mouth Every Morning Before Breakfast.    hydrALAZINE (APRESOLINE) 25 MG tablet  Self No Yes    Take 1 tablet by mouth 3 (Three) Times a Day.    isosorbide mononitrate (IMDUR) 30 MG 24 hr tablet  Self No Yes    Take 1 tablet by mouth once daily    Patient taking differently:  Take 1 tablet by mouth Daily.    levothyroxine (SYNTHROID, LEVOTHROID) 112 MCG tablet  Self No Yes    Take 1 tablet by mouth once daily    Patient taking differently:  Take 1 tablet by mouth Daily.    nebivolol (BYSTOLIC) 10 MG tablet  Self Yes Yes    Take 0.5 tablets by mouth Daily.    olmesartan-hydrochlorothiazide (BENICAR HCT) 40-25 MG per tablet  Self No Yes    TAKE ONE TABLET BY MOUTH DAILY    rosuvastatin (CRESTOR) 5 MG tablet  Self No Yes    Take 1 tablet by mouth once daily    warfarin (COUMADIN) 5 MG tablet  Self No Yes    Take 1 tablet by mouth once daily    Patient taking differently:  Take 1 tablet by mouth See Admin Instructions. 5mg: Sunday, Monday, Friday    warfarin (COUMADIN) 7.5 MG tablet  Self No Yes    Take 1 tablet by mouth Every Night.    Patient taking differently:  Take 1 tablet by mouth See Admin Instructions. 7.5mg Tuesday, Wednesday, Thursday, Saturday           Medication notes: Dr. Downing manages anticoagulation outpatient       This medication list is complete to the best of my knowledge as of 10/5/2023       Please call if questions.     Alexa Orellana, [facility]  Clinical Pharmacist

## 2023-10-05 NOTE — PLAN OF CARE
Goal Outcome Evaluation:                      Pt admitted from the Observation unit this am , In Septic shock from Cholecystitis was transferred to CCU with elevated temp , hypotension. Bolused with 3 liters LR , started on Phenylephrine , Central line placed at beside per DR Lanier, will go for CT guided Abscess drain in IR, consent signed by pT Daughter, Type and screened . Hypotension resolving this am. Treating electrolytes

## 2023-10-05 NOTE — PROGRESS NOTES
[facility] INPATIENT PROGRESS NOTE         [facility] CORONARY CARE    10/5/2023      PATIENT IDENTIFICATION:  Name: David Comer Sr. ADMIT: 10/2/2023   : 1940  PCP: Chacha Pineda MD    MRN: [id number] LOS: 2 days   AGE/SEX: 83 y.o. male  ROOM: [facility]                     LOS 2    Reason for visit: sepsis with shock      SUBJECTIVE:      On multiple pressors.  Complains of abdominal discomfort.  No nausea, vomiting or chest pain    Objective   OBJECTIVE:    Vital Sign Min/Max for last 24 hours  Temp  Min: 97.5 øF (36.4 øC)  Max: 103.2 øF (39.6 øC)   BP  Min: 65/47  Max: 141/117   Pulse  Min: 56  Max: 105   Resp  Min: 16  Max: 22   SpO2  Min: 91 %  Max: 98 %   No data recorded   Weight  Min: 93.6 kg (206 lb 5.6 oz)  Max: 93.6 kg (206 lb 5.6 oz)                         Body mass index is 27.99 kg/mý.    Intake/Output Summary (Last 24 hours) at 10/5/2023 1542  Last data filed at 10/5/2023 1315  Gross per 24 hour   Intake 320 ml   Output 90 ml   Net 230 ml         Exam:  GEN:  No distress, appears stated age  EYES:   PERRL, anicteric sclerae  ENT:    External ears/nose normal, OP clear  NECK:  No adenopathy, midline trachea  LUNGS: Normal chest on inspection, palpation and diminished breath sounds bilaterally on auscultation  CV:  Normal S1S2, without murmur  ABD:  Tender to palpation right upper quadrant, no hepatosplenomegaly, +BS  EXT:  No edema.  No cyanosis or clubbing.  No mottling and normal cap refill.    Assessment     Scheduled meds:  amiodarone, 100 mg, Oral, Daily  aspirin, 81 mg, Oral, Daily  levothyroxine, 112 mcg, Oral, Daily  pantoprazole, 40 mg, Oral, Nightly   Or  pantoprazole, 40 mg, Intravenous, Nightly  piperacillin-tazobactam, 4.5 g, Intravenous, Q8H  rosuvastatin, 5 mg, Oral, Daily  senna-docusate sodium, 2 tablet, Oral, BID  sodium chloride, 10 mL, Intravenous, Q12H      IV meds:                      lactated ringers, 125 mL/hr, Last Rate: 125 mL/hr (10/05/23  0725)  norepinephrine, 0.02-0.3 mcg/kg/min  phenylephrine, 0.5-3 mcg/kg/min, Last Rate: 3 mcg/kg/min (10/05/23 1311)      Data Review:  Results from last 7 days   Lab Units 10/05/23  0624 10/05/23  0224 10/04/23  0338 10/03/23  0547 10/02/23  1913   SODIUM mmol/L  --  135* 137 138 141   POTASSIUM mmol/L 3.0* 3.2*  3.2* 3.1* 3.4* 3.6   CHLORIDE mmol/L  --  100 101 104 104   CO2 mmol/L  --  24.0 26.0 24.8 24.9   BUN mg/dL  --  31* 23 15 19   CREATININE mg/dL  --  1.24 1.18 0.86 1.22   GLUCOSE mg/dL  --  114* 101* 111* 112*   CALCIUM mg/dL  --  8.4* 8.4* 8.5* 9.2         Estimated Creatinine Clearance: 54.6 mL/min (by C-G formula based on SCr of 1.24 mg/dL).  Results from last 7 days   Lab Units 10/05/23  1406 10/05/23  0624 10/04/23  0338 10/03/23  0547 10/02/23  1913   WBC 10*3/mm3 10.89* 19.37* 17.92* 15.71* 9.35   HEMOGLOBIN g/dL 12.2* 10.8* 12.7* 12.3* 12.7*   PLATELETS 10*3/mm3 188 157 162 164 165     Results from last 7 days   Lab Units 10/05/23  0224 10/04/23  0338 10/03/23  1417 10/02/23  1913   INR  1.62* 2.04* 2.05* 1.99*     Results from last 7 days   Lab Units 10/04/23  0338 10/02/23  1913   ALT (SGPT) U/L 11 15   AST (SGOT) U/L 11 17         Results from last 7 days   Lab Units 10/05/23  0423 10/05/23  0224   PROCALCITONIN ng/mL  --  1.38*   LACTATE mmol/L 1.9  --          No results found for: HGBA1C, POCGLU            Active Hospital Problems    Diagnosis  POA    **Epigastric pain [R10.13]  Yes    Chest pain [R07.9]  Yes    Acute cholecystitis [K81.0]  Unknown    BPH without obstruction/lower urinary tract symptoms [N40.0]  Yes    Long term (current) use of anticoagulants [Z79.01]  Not Applicable    Acquired hypothyroidism [E03.9]  Yes    Abdominal aortic aneurysm [I71.40]  Yes    Chronic coronary artery disease [I25.10]  Yes    Essential hypertension [I10]  Yes    Paroxysmal atrial fibrillation [I48.0]  Yes      Resolved Hospital Problems   No resolved problems to display.         ASSESSMENT:  Sepsis  (no tachycardia given BB on board) with hypotension  Paroxysmal atrial fibrillation  Coronary artery disease  Acute cholecystitis  Aortic aneurysm  GERD  Anticoagulation on hold now for upcoming surgery  BPH  Hypothyroidism  History of hypertension      PLAN:  Central line access was placed with some difficulty on the right and chest x-ray showed that the tube deviates to the right mid subclavian vein.  Left monitor will be placed and then right line will be removed.  Discussed with surgery and given instability sent for percutaneous drainage.  Continue antibiotics.  Continue IV fluid resuscitation and looks like he can tolerate additional volume.  Continue pressors with map goal greater than 65.  Control glucose.  DVT prophylaxis.  Discussed with multiple family members at bedside and questions answered to their satisfaction.      CCT: 90 min    Neftali Dale MD  Pulmonary and Critical Care Medicine  [facility], [facility]  10/5/2023    15:42 EDT

## 2023-10-05 NOTE — PROGRESS NOTES
Colorectal & General Surgery  Progress Note    Patient: David Comer Sr.  YOB: 1940  MRN: [id number]      Assessment  David Comer Sr. is a 83 y.o. male with septic shock secondary to acute calculus cholecystitis.  His condition dramatically worsened overnight and he is now on vasopressors and in the intensive care unit.  Given this hemodynamic change, I do not believe that he will tolerate laparoscopic cholecystectomy due to the insufflation of the abdomen decreasing his preload.  Subsequently, I have placed a stat consult to interventional radiology for percutaneous cholecystostomy tube and will discuss with them directly.      Subjective  Severe hypotension overnight resulting in transfer to the intensive care unit and initiation of vasopressors.    Objective    Vitals:    10/05/23 0700   BP: 107/61   Pulse: 56   Resp:    Temp:    SpO2: 98%       Physical Exam  Constitutional: Well-developed well-nourished, no acute distress  Neck: Supple, trachea midline  Respiratory: No increased work of breathing, Symmetric excursion  Cardiovascular: Poorly pefursed, no jugular venous distention evident   Abdominal: Soft, tender in the right upper quadrant, nondistended  Skin: Warm, dry, no rash on visualized skin surfaces  Psychiatric: Alert and oriented x3, normal affect     Laboratory Results  I have personally reviewed WBC 19, hemoglobin 10, platelets 157.  BMP with creatinine 1.24, potassium 3.2, phosphorus 1.6.  Lactate 1.9.  Procalcitonin 1.3.  INR 1.62.    Radiology  I have personally reviewed CT scan of the abdomen and pelvis performed overnight demonstrates persistent signs of acute cholecystitis with surrounding edema.         Omar Quintanilla MD  Colorectal & General Surgery  [facility]    [address] [place] [address], [address]  [place], [place], [facility]  P: [phone]  F: [phone]

## 2023-10-05 NOTE — CODE DOCUMENTATION
Patient Name:  David Comer Sr.  YOB: 1940  MRN:  [id number]  Admit Date:  10/2/2023    Visit Diagnoses:     ICD-10-CM ICD-9-CM   1. Chest pain, unspecified type  R07.9 786.50   2. Hiatal hernia  K44.9 553.3   3. Gallstones  K80.20 574.20   4. Acute cholecystitis  K81.0 575.0       Reason For Rapid:   Low BP  RN Communicated With:  Rapid team, primary RN, patient, patient family at bedside, Dr Perrin, Dr RICKIE Irwin    Rapid Outcome:  Transfer to CCU  Communication From Rapid Team:   na    Most Recent Vital Signs  Temp:  [97.5 øF (36.4 øC)-103.2 øF (39.6 øC)] 101.7 øF (38.7 øC)  Heart Rate:  [] 72  Resp:  [16-22] 22  BP: ()/(47-71) 87/50  SpO2:  [91 %-99 %] 96 %  on  Flow (L/min):  [2] 2;   Device (Oxygen Therapy): nasal cannula    Labs:      No results found for: POCGLU  No results found for: SITE, ALLENTEST, PHART, UUB3UBI, PO2ART, MXT9YJJ, BASEEXCESS, L9HSAXIS, HGBBG, HCTABG, OXYHEMOGLOBI, METHHGBN, CARBOXYHGB, CO2CT, BAROMETRIC, MODALITY, FIO2  Results from last 7 days   Lab Units 10/04/23  0338 10/03/23  0547 10/02/23  1913   WBC 10*3/mm3 17.92* 15.71* 9.35   HEMOGLOBIN g/dL 12.7* 12.3* 12.7*   PLATELETS 10*3/mm3 162 164 165     Results from last 7 days   Lab Units 10/05/23  0224 10/04/23  0338 10/03/23  0547 10/02/23  1913   SODIUM mmol/L 135* 137 138 141   POTASSIUM mmol/L 3.2*  3.2* 3.1* 3.4* 3.6   CHLORIDE mmol/L 100 101 104 104   CO2 mmol/L 24.0 26.0 24.8 24.9   BUN mg/dL 31* 23 15 19   CREATININE mg/dL 1.24 1.18 0.86 1.22   GLUCOSE mg/dL 114* 101* 111* 112*   ALBUMIN g/dL  --  3.2*  --  3.8   BILIRUBIN mg/dL  --  1.1  --  0.3   ALK PHOS U/L  --  43  --  63   AST (SGOT) U/L  --  11  --  17   ALT (SGPT) U/L  --  11  --  15   Estimated Creatinine Clearance: 54.6 mL/min (by C-G formula based on SCr of 1.24 mg/dL).  Results from last 7 days   Lab Units 10/05/23  0224 10/04/23  0802 10/04/23  0338 10/02/23  1597 10/02/23  1913   HSTROP T ng/L 27* 28* 30* 33* 28*     Results from last 7  days   Lab Units 10/05/23  0423 10/05/23  0224   PROCALCITONIN ng/mL  --  1.38*   LACTATE mmol/L 1.9  --      No results found for: STREPPNEUAG, LEGANTIGENUR        NIH Stroke Scale:                                                         Please refer to full rapid documentation on summary page under Index / Code Timeline

## 2023-10-05 NOTE — PROGRESS NOTES
[facility]  Cardiology Progress Note    Patient Identification:  Name: David Comer Sr.  Age: 83 y.o.  Sex: male  :  1940  MRN: [id number]                 Follow Up / Chief Complaint: Follow-up for chest pain and cholecystitis    Interval History: Resting in bed no complaints.  Remains on phenylephrine drip.  Transferred to CCU overnight for hypotension and fever.       Subjective: No chest pain      Objective:    Past Medical History:  Past Medical History:   Diagnosis Date    AAA (abdominal aortic aneurysm) without rupture     Aneurysm of thoracic aorta     CT CHEST 2021 IN [facility]     Arthritis     Basal cell carcinoma     RIGHT LOWER LID     BPH (benign prostatic hyperplasia)     Chronic lumbar pain     Degenerative arthritis of lumbar spine     Degenerative cervical disc     Disease of thyroid gland     Elevated rheumatoid factor     History of atrial fibrillation     Hyperlipidemia     Hypertension     Hypogonadism in male     Muscle strain of left upper back     PRESCRIBED PREDNISONE DOSE PACK     On anticoagulant therapy     Pulmonary nodule     6 MM - REPEAT CT SCAN IN ONE YEAR, WATCHING     Refused influenza vaccine     Scoliosis     Vitamin D deficiency      Past Surgical History:  Past Surgical History:   Procedure Laterality Date    ANKLE FUSION Right     CARDIAC CATHETERIZATION      CARDIAC CATHETERIZATION N/A 2021    Procedure: Left Heart Cath;  Surgeon: Harlan Downing MD;  Location:  [facility];  Service: Cardiology;  Laterality: N/A;    CARDIAC CATHETERIZATION N/A 2021    Procedure: Coronary angiography;  Surgeon: Harlan Downing MD;  Location:  [facility];  Service: Cardiology;  Laterality: N/A;    CARDIAC CATHETERIZATION N/A 2021    Procedure: Left ventriculography;  Surgeon: Harlan Downing MD;  Location:  [facility];  Service: Cardiology;  Laterality: N/A;    CARDIAC  CATHETERIZATION N/A 12/30/2022    Procedure: Left Heart Cath check inr;  Surgeon: Harlan Downing MD;  Location: [facility] CATH INVASIVE LOCATION;  Service: Cardiology;  Laterality: N/A;    CARDIAC CATHETERIZATION N/A 12/30/2022    Procedure: Coronary angiography;  Surgeon: Harlan Downing MD;  [facility];  Service: Cardiology;  Laterality: N/A;    CARDIAC CATHETERIZATION N/A 12/30/2022    Procedure: Left ventriculography;  Surgeon: Harlan Downing MD;  [facility];  Service: Cardiology;  Laterality: N/A;    HOBBS TUBE INSERTION Right 10/19/2021    Procedure: RIGHT SECOND STAGE CAST TAKEDOWN RECONSTRUCTION;  Surgeon: Brian Bhatt MD;  Location: [facility] OR [facility];  Service: Ophthalmology;  Laterality: Right;    ENTROPIAN REPAIR Right 9/21/2021    Procedure: RIGHT LOWER LID EXCISION OF BASAL CELL CARCINOMA WITH FROZEN SECTION RIGHT LOWER LID RECONSTRUCTION WITH CAST FLAP, REPAIR OF CANULICULIS, AND FULL THICKNESS SKIN GRAFT;  Surgeon: Brian Bhatt MD;  Location: [facility] OR [facility];  Service: Ophthalmology;  Laterality: Right;    INGUINAL HERNIA REPAIR Right     REPLACEMENT TOTAL KNEE Bilateral 2000    ROTATOR CUFF REPAIR Left     ROTATOR CUFF REPAIR Right     SKIN BIOPSY      VASECTOMY          Social History:   Social History     Tobacco Use    Smoking status: Never    Smokeless tobacco: Never   Substance Use Topics    Alcohol use: No      Family History:  Family History   Problem Relation Age of Onset    Hypertension Father     Heart attack Father     Heart attack Brother     Alcohol abuse Brother     Hypertension Brother     Hypertension Paternal Grandmother     Hypertension Paternal Grandfather     Anemia Mother     Arthritis Mother     Hypertension Mother     Hypertension Maternal Grandmother     Heart disease Other         FH in males before age 55    Malig Hyperthermia Neg Hx           Allergies:  No Known  "Allergies  Scheduled Meds:  amiodarone, 100 mg, Daily  aspirin, 81 mg, Daily  levothyroxine, 112 mcg, Daily  magnesium sulfate, 2 g, Q2H  pantoprazole, 40 mg, Nightly   Or  pantoprazole, 40 mg, Nightly  piperacillin-tazobactam, 4.5 g, Q8H  potassium chloride, 20 mEq, Q1H  potassium phosphate, 15 mmol, Once  rosuvastatin, 5 mg, Daily  senna-docusate sodium, 2 tablet, BID  sodium chloride, 10 mL, Q12H            INTAKE AND OUTPUT:    Intake/Output Summary (Last 24 hours) at 10/5/2023 0949  Last data filed at 10/4/2023 2152  Gross per 24 hour   Intake 320 ml   Output --   Net 320 ml     Review of Systems:   GI: no n/v or abd pain  Cardiac: no chest pain or palpitations  Pulmonary: no shortness of breath or cough      /58   Pulse 66   Temp 97.9 øF (36.6 øC) (Oral)   Resp 18   Ht 182.9 cm (72\")   Wt 93.6 kg (206 lb 5.6 oz)   SpO2 95%   BMI 27.99 kg/mý   General appearance: No acute changes   Neck: Trachea midline; NECK, supple, no thyromegaly or lymphadenopathy   Lungs: Normal size and shape, normal breath sounds, equal distribution of air, no rales and rhonchi   CV: S1-S2 regular, no murmurs, no rub, no gallop   Abdomen: Soft, nontender; no masses , no abnormal abdominal sounds   Extremities: No deformity , normal color , no peripheral edema   Skin: Normal temperature, turgor and texture; no rash, ulcers                Cardiographics  SR    ECG:   Sinus rhythm with PAC unchanged from previous other than PAC present    Echocardiogram:     Interpretation Summary         Left ventricular ejection fraction appears to be 51 - 55%.    Mild aortic valve stenosis is present. Aortic valve area is 2 cm2.    Peak velocity of the flow distal to the aortic valve is 234.8 cm/s. Aortic valve maximum pressure gradient is 22 mmHg. Aortic valve mean pressure gradient is 11 mmHg. Aortic valve dimensionless index is 0.7 .    Estimated right ventricular systolic pressure from tricuspid regurgitation is mildly elevated (35-45 " "[facility]  Lab Review   Results from last 7 days   Lab Units 10/05/23  0224 10/04/23  0802 10/04/23  0338   HSTROP T ng/L 27* 28* 30*     Results from last 7 days   Lab Units 10/05/23  0224   MAGNESIUM mg/dL 1.5*     Results from last 7 days   Lab Units 10/05/23  0624 10/05/23  0224   SODIUM mmol/L  --  135*   POTASSIUM mmol/L 3.0* 3.2*  3.2*   BUN mg/dL  --  31*   CREATININE mg/dL  --  1.24   CALCIUM mg/dL  --  8.4*     @[id number]@  Results from last 7 days   Lab Units 10/05/23  0624 10/04/23  0338 10/03/23  0547   WBC 10*3/mm3 19.37* 17.92* 15.71*   HEMOGLOBIN g/dL 10.8* 12.7* 12.3*   HEMATOCRIT % 32.2* 38.5 36.4*   PLATELETS 10*3/mm3 157 162 164     Results from last 7 days   Lab Units 10/05/23  0224 10/04/23  0338 10/03/23  1417   INR  1.62* 2.04* 2.05*         Assessment:  Epigastric pain  Acute cholecystitis  Coronary artery disease  Ascending aortic aneurysm: 4.8 cm per CT-CTS recommends follow-up with their aneurysm clinic with yearly monitoring  Paroxysmal atrial fibrillation  Chronic anticoagulation on warfarin: Anticoagulation on hold.  INR 1.62      Plan:  Transferred to CCU overnight for hypotension and elevated temp.  BP soft -remains on phenylephrine drip  Telemetry reviewed sinus rhythm to sinus bradycardia  Plans for CT-guided abscess drain in IR today  No chest pain or shortness of breath  Echo 10/4/2023 EF 51 to 55%, mild AV stenosis, FAROOQ 2.  Mean pressure gradient 11 mmHg.  Mildly elevated RVSP.  Discussed with patient, family at bedside, RN       )10/5/2023  Harlan Downing MD      EMR Dragon/Transcription:   \"Dictated utilizing Dragon dictation\".     "

## 2023-10-05 NOTE — PROGRESS NOTES
Ct abd pelvis reviewed  Worsening inflammatory changes but no perforations.  Complete 2.5 liter fluid bolus then phenylephrine prn.  Consult to ID placed ? Merrem? Vanco?  Source control needed.    Patient seen by [facility] night coverage. Care will be assumed by another physician in the group in the morning. Please call our answering service at [phone] with any concerns.    Electronically signed by Josep Irwin MD, 10/05/23, 6:24 AM EDT.

## 2023-10-06 LAB
ALBUMIN SERPL-MCNC: 2.5 G/DL (ref 3.5–5.2)
ALBUMIN/GLOB SERPL: 1.2 G/DL
ALP SERPL-CCNC: 47 U/L (ref 39–117)
ALT SERPL W P-5'-P-CCNC: 12 U/L (ref 1–41)
ANION GAP SERPL CALCULATED.3IONS-SCNC: 6 MMOL/L (ref 5–15)
AST SERPL-CCNC: 16 U/L (ref 1–40)
BASOPHILS # BLD AUTO: 0.02 10*3/MM3 (ref 0–0.2)
BASOPHILS NFR BLD AUTO: 0.2 % (ref 0–1.5)
BILIRUB SERPL-MCNC: 0.4 MG/DL (ref 0–1.2)
BUN SERPL-MCNC: 24 MG/DL (ref 8–23)
BUN/CREAT SERPL: 28.6 (ref 7–25)
CALCIUM SPEC-SCNC: 7.9 MG/DL (ref 8.6–10.5)
CHLORIDE SERPL-SCNC: 110 MMOL/L (ref 98–107)
CO2 SERPL-SCNC: 25 MMOL/L (ref 22–29)
CREAT SERPL-MCNC: 0.84 MG/DL (ref 0.76–1.27)
DEPRECATED RDW RBC AUTO: 41.7 FL (ref 37–54)
EGFRCR SERPLBLD CKD-EPI 2021: 87.1 ML/MIN/1.73
EOSINOPHIL # BLD AUTO: 0.02 10*3/MM3 (ref 0–0.4)
EOSINOPHIL NFR BLD AUTO: 0.2 % (ref 0.3–6.2)
ERYTHROCYTE [DISTWIDTH] IN BLOOD BY AUTOMATED COUNT: 11.8 % (ref 12.3–15.4)
GLOBULIN UR ELPH-MCNC: 2.1 GM/DL
GLUCOSE BLDC GLUCOMTR-MCNC: 114 MG/DL (ref 70–130)
GLUCOSE SERPL-MCNC: 94 MG/DL (ref 65–99)
HCT VFR BLD AUTO: 28.5 % (ref 37.5–51)
HGB BLD-MCNC: 9.7 G/DL (ref 13–17.7)
INR PPP: 1.89 (ref 0.9–1.1)
INR PPP: 2.01 (ref 0.9–1.1)
LYMPHOCYTES # BLD AUTO: 0.41 10*3/MM3 (ref 0.7–3.1)
LYMPHOCYTES NFR BLD AUTO: 4.2 % (ref 19.6–45.3)
MAGNESIUM SERPL-MCNC: 2.5 MG/DL (ref 1.6–2.4)
MCH RBC QN AUTO: 33.1 PG (ref 26.6–33)
MCHC RBC AUTO-ENTMCNC: 34 G/DL (ref 31.5–35.7)
MCV RBC AUTO: 97.3 FL (ref 79–97)
MONOCYTES # BLD AUTO: 0.58 10*3/MM3 (ref 0.1–0.9)
MONOCYTES NFR BLD AUTO: 5.9 % (ref 5–12)
NEUTROPHILS NFR BLD AUTO: 8.74 10*3/MM3 (ref 1.7–7)
NEUTROPHILS NFR BLD AUTO: 88.7 % (ref 42.7–76)
PHOSPHATE SERPL-MCNC: 1.3 MG/DL (ref 2.5–4.5)
PHOSPHATE SERPL-MCNC: 2.1 MG/DL (ref 2.5–4.5)
PLATELET # BLD AUTO: 148 10*3/MM3 (ref 140–450)
PMV BLD AUTO: 11.2 FL (ref 6–12)
POTASSIUM SERPL-SCNC: 3.2 MMOL/L (ref 3.5–5.2)
POTASSIUM SERPL-SCNC: 3.5 MMOL/L (ref 3.5–5.2)
PROT SERPL-MCNC: 4.6 G/DL (ref 6–8.5)
PROTHROMBIN TIME: 22.1 SECONDS (ref 11.7–14.2)
PROTHROMBIN TIME: 23.1 SECONDS (ref 11.7–14.2)
QT INTERVAL: 540 MS
QTC INTERVAL: 517 MS
RBC # BLD AUTO: 2.93 10*6/MM3 (ref 4.14–5.8)
SODIUM SERPL-SCNC: 141 MMOL/L (ref 136–145)
WBC NRBC COR # BLD: 9.85 10*3/MM3 (ref 3.4–10.8)

## 2023-10-06 PROCEDURE — 84132 ASSAY OF SERUM POTASSIUM: CPT | Performed by: INTERNAL MEDICINE

## 2023-10-06 PROCEDURE — 84100 ASSAY OF PHOSPHORUS: CPT | Performed by: HOSPITALIST

## 2023-10-06 PROCEDURE — 25010000002 PIPERACILLIN SOD-TAZOBACTAM PER 1 G: Performed by: INTERNAL MEDICINE

## 2023-10-06 PROCEDURE — 84100 ASSAY OF PHOSPHORUS: CPT | Performed by: INTERNAL MEDICINE

## 2023-10-06 PROCEDURE — 99231 SBSQ HOSP IP/OBS SF/LOW 25: CPT | Performed by: SURGERY

## 2023-10-06 PROCEDURE — 80053 COMPREHEN METABOLIC PANEL: CPT | Performed by: INTERNAL MEDICINE

## 2023-10-06 PROCEDURE — 93010 ELECTROCARDIOGRAM REPORT: CPT | Performed by: INTERNAL MEDICINE

## 2023-10-06 PROCEDURE — 99233 SBSQ HOSP IP/OBS HIGH 50: CPT | Performed by: INTERNAL MEDICINE

## 2023-10-06 PROCEDURE — 93005 ELECTROCARDIOGRAM TRACING: CPT | Performed by: INTERNAL MEDICINE

## 2023-10-06 PROCEDURE — 25810000003 LACTATED RINGERS PER 1000 ML: Performed by: INTERNAL MEDICINE

## 2023-10-06 PROCEDURE — 85610 PROTHROMBIN TIME: CPT | Performed by: HOSPITALIST

## 2023-10-06 PROCEDURE — 85025 COMPLETE CBC W/AUTO DIFF WBC: CPT | Performed by: INTERNAL MEDICINE

## 2023-10-06 PROCEDURE — 25810000003 SODIUM CHLORIDE 0.9 % SOLUTION: Performed by: INTERNAL MEDICINE

## 2023-10-06 PROCEDURE — 99232 SBSQ HOSP IP/OBS MODERATE 35: CPT | Performed by: INTERNAL MEDICINE

## 2023-10-06 PROCEDURE — 25010000002 PHENYLEPHRINE 10 MG/ML SOLUTION: Performed by: INTERNAL MEDICINE

## 2023-10-06 PROCEDURE — 85610 PROTHROMBIN TIME: CPT | Performed by: NURSE PRACTITIONER

## 2023-10-06 PROCEDURE — 83735 ASSAY OF MAGNESIUM: CPT | Performed by: HOSPITALIST

## 2023-10-06 RX ORDER — POTASSIUM CHLORIDE 750 MG/1
40 TABLET, FILM COATED, EXTENDED RELEASE ORAL EVERY 4 HOURS
Status: COMPLETED | OUTPATIENT
Start: 2023-10-06 | End: 2023-10-06

## 2023-10-06 RX ORDER — FENTANYL/ROPIVACAINE/NS/PF 2-625MCG/1
15 PLASTIC BAG, INJECTION (ML) EPIDURAL ONCE
Status: COMPLETED | OUTPATIENT
Start: 2023-10-06 | End: 2023-10-06

## 2023-10-06 RX ADMIN — HYDROCODONE BITARTRATE AND ACETAMINOPHEN 1 TABLET: 5; 325 TABLET ORAL at 11:36

## 2023-10-06 RX ADMIN — LEVOTHYROXINE SODIUM 112 MCG: 0.11 TABLET ORAL at 08:26

## 2023-10-06 RX ADMIN — POTASSIUM CHLORIDE 40 MEQ: 750 TABLET, EXTENDED RELEASE ORAL at 18:30

## 2023-10-06 RX ADMIN — SODIUM CHLORIDE, POTASSIUM CHLORIDE, SODIUM LACTATE AND CALCIUM CHLORIDE 125 ML/HR: 600; 310; 30; 20 INJECTION, SOLUTION INTRAVENOUS at 01:33

## 2023-10-06 RX ADMIN — HYDROCODONE BITARTRATE AND ACETAMINOPHEN 1 TABLET: 5; 325 TABLET ORAL at 20:27

## 2023-10-06 RX ADMIN — POTASSIUM CHLORIDE 40 MEQ: 750 TABLET, EXTENDED RELEASE ORAL at 08:36

## 2023-10-06 RX ADMIN — ROSUVASTATIN CALCIUM 5 MG: 5 TABLET, FILM COATED ORAL at 08:26

## 2023-10-06 RX ADMIN — Medication 10 ML: at 20:28

## 2023-10-06 RX ADMIN — AMIODARONE HYDROCHLORIDE 100 MG: 200 TABLET ORAL at 08:26

## 2023-10-06 RX ADMIN — ASPIRIN 81 MG: 81 TABLET, COATED ORAL at 08:26

## 2023-10-06 RX ADMIN — POTASSIUM CHLORIDE 40 MEQ: 750 TABLET, EXTENDED RELEASE ORAL at 16:37

## 2023-10-06 RX ADMIN — PANTOPRAZOLE SODIUM 40 MG: 40 TABLET, DELAYED RELEASE ORAL at 20:27

## 2023-10-06 RX ADMIN — PIPERACILLIN SODIUM AND TAZOBACTAM SODIUM 4.5 G: 4; .5 INJECTION, SOLUTION INTRAVENOUS at 16:37

## 2023-10-06 RX ADMIN — POTASSIUM PHOSPHATE, MONOBASIC POTASSIUM PHOSPHATE, DIBASIC 15 MMOL: 224; 236 INJECTION, SOLUTION, CONCENTRATE INTRAVENOUS at 05:45

## 2023-10-06 RX ADMIN — PHENYLEPHRINE HYDROCHLORIDE 0.8 MCG/KG/MIN: 10 INJECTION INTRAVENOUS at 03:03

## 2023-10-06 RX ADMIN — PIPERACILLIN SODIUM AND TAZOBACTAM SODIUM 4.5 G: 4; .5 INJECTION, SOLUTION INTRAVENOUS at 08:27

## 2023-10-06 RX ADMIN — POTASSIUM CHLORIDE 40 MEQ: 750 TABLET, EXTENDED RELEASE ORAL at 05:03

## 2023-10-06 RX ADMIN — Medication 2 PACKET: at 20:26

## 2023-10-06 RX ADMIN — Medication 10 ML: at 08:28

## 2023-10-06 NOTE — PLAN OF CARE
Goal Outcome Evaluation:   Remain in CICU on pressors slowly weaning off. Patient keep having Bigeminy. Stat EKG was obtained and electrolyte replaced per protocol. 280 output on KIAN drain. Vitals stable will continued to monitor.

## 2023-10-06 NOTE — PROGRESS NOTES
ID note for sepsis  S: AF. Pressors just turned off. On 2l  Says he is feeling much better after drain palcement      Physical Exam:   Vital Signs   Temp:  [97.5 øF (36.4 øC)-98.1 øF (36.7 øC)] 97.5 øF (36.4 øC)  Heart Rate:  [52-95] 65  Resp:  [18] 18  BP: ()/() 119/72    GENERAL: Awake and alert, sickly  HEENT: Oropharynx is clear. Hearing is grossly normal.   EYES: . No conjunctival injection. No lid lag.   LUNGS:normal respiratory effort.   SKIN: no cutaneous eruptions in exposed areas  Tender to palpation right upper quadrant  PSYCHIATRIC: Appropriate mood, affect, insight, and judgment.     Results Review:  White count 9.85, hemoglobin 9.7, platelets 148  Creatinine 0.84   CXR, independently interpreted: patchy infiltrates in keeping with edema with effusion     10/5 blood cultures ngtd  10/5 IR biliary cx: GNR    A/p  1.  Septic shock secondary #2  2.  Acute cholecystitis    IR drain placed 10/5  Pressors weaning  Cont zosyn  Cx with GNR, will tailor  Likely 4-7d course after drain placement, hopefully to po soon       Thank you for this consult.  We will continue to follow along and tailor antibiotics as the patient's clinical course evolves.

## 2023-10-06 NOTE — PLAN OF CARE
Problem: Adult Inpatient Plan of Care  Goal: Plan of Care Review  Outcome: Ongoing, Progressing  Flowsheets (Taken 10/6/2023 7515)  Progress: improving  Plan of Care Reviewed With: patient  Outcome Evaluation: In CICU. Vance off. On room air. OOB to chair. Tolerating clear liquid diet. Possibly restarting anticoagulation tomorrow per surgery. Multiple BMs.

## 2023-10-06 NOTE — PROGRESS NOTES
Colorectal & General Surgery  Progress Note    Patient: David Comer Sr.  YOB: 1940  MRN: [id number]      Assessment  David Comer Sr. is a 83 y.o. male with acute calculus cholecystitis and associated septic shock.  He looks much better today after percutaneous cholecystostomy tube placement yesterday.  We will plan for interval cholecystectomy.  In the meantime, his cholecystostomy tube will remain in place until the time of his cholecystectomy.    Plan  Defer antibiotic coverage and duration to ID.  Agree with at least 4-day course.  Cholecystostomy tube to bulb suction  Okay for liquid diet, advance as tolerated tomorrow      Subjective  Feels much better today.  Got some rest.  Pain is much better.    Objective    Vitals:    10/06/23 1245   BP: 95/64   Pulse: 69   Resp:    Temp:    SpO2: 95%       Physical Exam  Constitutional: Well-developed well-nourished, no acute distress  Neck: Supple, trachea midline  Respiratory: No increased work of breathing, Symmetric excursion  Cardiovascular: Well pefursed, no jugular venous distention evident   Abdominal: Soft, nontender, nondistended, bilious output from cholecystostomy tube   Skin: Warm, dry, no rash on visualized skin surfaces  Psychiatric: Alert and oriented x3, normal affect     Laboratory Results  I have personally reviewed CBC with WBC 9, hemoglobin 9, platelets 148.  INR 1.89.  CMP with creatinine 0.84, albumin 2.5, AST 16, ALT 12, total bilirubin 0.4.         Omar Quintanilla MD  Colorectal & General Surgery  [facility]    [address], [address]  [place], [place], [facility]  P: [phone]  F: [phone]

## 2023-10-06 NOTE — PROGRESS NOTES
[facility] INPATIENT PROGRESS NOTE         [facility] CARDIAC INTENSIVE CARE    10/6/2023      PATIENT IDENTIFICATION:  Name: David Comer Sr. ADMIT: 10/2/2023   : 1940  PCP: Chacha Pineda MD    MRN: [id number] LOS: 3 days   AGE/SEX: 83 y.o. male  ROOM: [facility]                     LOS 3    Reason for visit: sepsis with shock      SUBJECTIVE:      Weaning pressors and down to just phenylephrine drip.  No productive cough or chest pain.  Abdominal discomfort improving.    Objective   OBJECTIVE:    Vital Sign Min/Max for last 24 hours  Temp  Min: 97.5 øF (36.4 øC)  Max: 98.1 øF (36.7 øC)   BP  Min: 78/52  Max: 143/82   Pulse  Min: 52  Max: 95   Resp  Min: 18  Max: 18   SpO2  Min: 92 %  Max: 100 %   No data recorded   Weight  Min: 94.6 kg (208 lb 8.9 oz)  Max: 94.6 kg (208 lb 8.9 oz)                         Body mass index is 28.29 kg/mý.    Intake/Output Summary (Last 24 hours) at 10/6/2023 0933  Last data filed at 10/6/2023 0700  Gross per 24 hour   Intake 5000 ml   Output 551 ml   Net 4449 ml         Exam:  GEN:  No distress, appears stated age  EYES:   PERRL, anicteric sclerae  ENT:    External ears/nose normal, OP clear  NECK:  No adenopathy, midline trachea  LUNGS: Normal chest on inspection, palpation and diminished breath sounds bilaterally on auscultation  CV:  Normal S1S2, without murmur  ABD:  Tender to palpation right upper quadrant, no hepatosplenomegaly, +BS.  Drain intact  EXT:  No edema.  No cyanosis or clubbing.  No mottling and normal cap refill.    Assessment     Scheduled meds:  amiodarone, 100 mg, Oral, Daily  aspirin, 81 mg, Oral, Daily  levothyroxine, 112 mcg, Oral, Daily  pantoprazole, 40 mg, Oral, Nightly   Or  pantoprazole, 40 mg, Intravenous, Nightly  piperacillin-tazobactam, 4.5 g, Intravenous, Q8H  rosuvastatin, 5 mg, Oral, Daily  senna-docusate sodium, 2 tablet, Oral, BID  sodium chloride, 10 mL, Intravenous, Q12H      IV meds:                      norepinephrine,  0.02-0.3 mcg/kg/min  phenylephrine, 0.5-3 mcg/kg/min, Last Rate: 0.7 mcg/kg/min (10/06/23 0851)      Data Review:  Results from last 7 days   Lab Units 10/06/23  0334 10/05/23  1737 10/05/23  0624 10/05/23  0224 10/04/23  0338 10/03/23  0547 10/02/23  1913   SODIUM mmol/L 141  --   --  135* 137 138 141   POTASSIUM mmol/L 3.2* 3.7 3.0* 3.2*  3.2* 3.1* 3.4* 3.6   CHLORIDE mmol/L 110*  --   --  100 101 104 104   CO2 mmol/L 25.0  --   --  24.0 26.0 24.8 24.9   BUN mg/dL 24*  --   --  31* 23 15 19   CREATININE mg/dL 0.84  --   --  1.24 1.18 0.86 1.22   GLUCOSE mg/dL 94  --   --  114* 101* 111* 112*   CALCIUM mg/dL 7.9*  --   --  8.4* 8.4* 8.5* 9.2         Estimated Creatinine Clearance: 80.9 mL/min (by C-G formula based on SCr of 0.84 mg/dL).  Results from last 7 days   Lab Units 10/06/23  0334 10/05/23  1406 10/05/23  0624 10/04/23  0338 10/03/23  0547   WBC 10*3/mm3 9.85 10.89* 19.37* 17.92* 15.71*   HEMOGLOBIN g/dL 9.7* 12.2* 10.8* 12.7* 12.3*   PLATELETS 10*3/mm3 148 188 157 162 164     Results from last 7 days   Lab Units 10/06/23  0334 10/05/23  0224 10/04/23  0338 10/03/23  1417 10/02/23  1913   INR  2.01* 1.62* 2.04* 2.05* 1.99*     Results from last 7 days   Lab Units 10/06/23  0334 10/04/23  0338 10/02/23  1913   ALT (SGPT) U/L 12 11 15   AST (SGOT) U/L 16 11 17         Results from last 7 days   Lab Units 10/05/23  0423 10/05/23  0224   PROCALCITONIN ng/mL  --  1.38*   LACTATE mmol/L 1.9  --          No results found for: HGBA1C, POCGLU    10/5 CT-guided abscess drain imaging reviewed    Chest x-ray 10/5 reviewed        Microbiology reviewed  Microbiology Results (last 21 days)    Collected Updated Procedure Result Status    10/05/2023 1234 10/05/2023 1234 Anaerobic Culture - Drainage, Peritoneum [[id number]]   Drainage from Peritoneum    In process Component Value   No component results             10/05/2023 1234 10/05/2023 1856 Body Fluid Culture - Body Fluid, Peritoneum [[id number]]   Body Fluid from  Peritoneum    Preliminary result Component Value   Body Fluid Culture Abnormal Stain P   Gram Stain No WBCs seen P    Occasional Gram positive bacilli P             10/05/2023 0624 10/06/2023 0645 Blood Culture - Blood, Arm, Left [[id number]]   Blood from Arm, Left    Preliminary result Component Value   Blood Culture No growth at 24 hours P             10/05/2023 0624 10/06/2023 0645 Blood Culture - Blood, Arm, Left [[id number]]   Blood from Arm, Left    Preliminary result Component Value   Blood Culture No growth at 24 hours P                    Active Hospital Problems    Diagnosis  POA    **Epigastric pain [R10.13]  Yes    Chest pain [R07.9]  Yes    Acute cholecystitis [K81.0]  Unknown    BPH without obstruction/lower urinary tract symptoms [N40.0]  Yes    Long term (current) use of anticoagulants [Z79.01]  Not Applicable    Acquired hypothyroidism [E03.9]  Yes    Abdominal aortic aneurysm [I71.40]  Yes    Chronic coronary artery disease [I25.10]  Yes    Essential hypertension [I10]  Yes    Paroxysmal atrial fibrillation [I48.0]  Yes      Resolved Hospital Problems   No resolved problems to display.         ASSESSMENT:  Sepsis (no tachycardia given BB on board) with hypotension  Paroxysmal atrial fibrillation  Coronary artery disease  Acute cholecystitis  Aortic aneurysm  GERD  Anticoagulation on hold now for upcoming surgery  BPH  Hypothyroidism  History of hypertension      PLAN:  Source control with CT-guided drain placement of cholecystostomy tube 10/5.  Continue antibiotics.  Continue IV fluid resuscitation.  Continue pressors with map goal greater than 65.  Control glucose.  DVT prophylaxis.    Discussed with multidisciplinary ICU team on rounds this morning.        CCT: 34 min    Neftali Dale MD  Pulmonary and Critical Care Medicine  [facility]  10/6/2023    09:33 EDT

## 2023-10-06 NOTE — PROGRESS NOTES
[facility]  Cardiology Progress Note    Patient Identification:  Name: David Comer Sr.  Age: 83 y.o.  Sex: male  :  1940  MRN: [id number]                 Follow Up / Chief Complaint: Follow-up for chest pain and cholecystitis    Interval History: Resting in bed with family at bedside.  He denies any shortness of breath or chest pain.  Blood pressure low normal but stable.  Discussed with nurse and she is weaning Vance.      Objective:    Past Medical History:  Past Medical History:   Diagnosis Date    AAA (abdominal aortic aneurysm) without rupture     Aneurysm of thoracic aorta     CT CHEST 2021 IN [facility]     Arthritis     Basal cell carcinoma     RIGHT LOWER LID     BPH (benign prostatic hyperplasia)     Chronic lumbar pain     Degenerative arthritis of lumbar spine     Degenerative cervical disc     Disease of thyroid gland     Elevated rheumatoid factor     History of atrial fibrillation     Hyperlipidemia     Hypertension     Hypogonadism in male     Muscle strain of left upper back     PRESCRIBED PREDNISONE DOSE PACK     On anticoagulant therapy     Pulmonary nodule     6 MM - REPEAT CT SCAN IN ONE YEAR, WATCHING     Refused influenza vaccine     Scoliosis     Vitamin D deficiency      Past Surgical History:  Past Surgical History:   Procedure Laterality Date    ANKLE FUSION Right     CARDIAC CATHETERIZATION      CARDIAC CATHETERIZATION N/A 2021    Procedure: Left Heart Cath;  Surgeon: Harlan Downing MD;  Location:  [facility];  Service: Cardiology;  Laterality: N/A;    CARDIAC CATHETERIZATION N/A 2021    Procedure: Coronary angiography;  Surgeon: Harlan Downing MD;  Location:  [facility];  Service: Cardiology;  Laterality: N/A;    CARDIAC CATHETERIZATION N/A 2021    Procedure: Left ventriculography;  Surgeon: Harlan Downing MD;  Location:  [facility];  Service: Cardiology;  Laterality: N/A;     CARDIAC CATHETERIZATION N/A 12/30/2022    Procedure: Left Heart Cath check inr;  Surgeon: Harlan Downing MD;  Location: [facility] CATH INVASIVE LOCATION;  Service: Cardiology;  Laterality: N/A;    CARDIAC CATHETERIZATION N/A 12/30/2022    Procedure: Coronary angiography;  Surgeon: Harlan Downing MD;  Location: [facility] CATH INVASIVE LOCATION;  Service: Cardiology;  Laterality: N/A;    CARDIAC CATHETERIZATION N/A 12/30/2022    Procedure: Left ventriculography;  Surgeon: Harlan Downing MD;  Location: [facility] CATH [facility]VASIVE LOC[facility];  Service: Cardiology;  Laterality: N/A;    HOBBS TUBE INSERTION Right 10/19/2021    Procedure: RIGHT SECOND STAGE CAST TAKEDOWN RECONSTRUCTION;  Surgeon: Brian Bhatt MD;  [facility] [place] [facility];  Service: Ophthalmology;  Laterality: Right;    ENTROPIAN REPAIR Right 9/21/2021    Procedure: RIGHT LOWER LID EXCISION OF BASAL CELL CARCINOMA WITH FROZEN SECTION RIGHT LOWER LID RECONSTRUCTION WITH CAST FLAP, REPAIR OF CANULICULIS, AND FULL THICKNESS SKIN GRAFT;  Surgeon: Brian Bhatt MD;  Location: [facility] [place] [facility];  Service: Ophthalmology;  Laterality: Right;    INGUINAL HERNIA REPAIR Right     REPLACEMENT TOTAL KNEE Bilateral 2000    ROTATOR CUFF REPAIR Left     ROTATOR CUFF REPAIR Right     SKIN BIOPSY      VASECTOMY          Social History:   Social History     Tobacco Use    Smoking status: Never    Smokeless tobacco: Never   Substance Use Topics    Alcohol use: No      Family History:  Family History   Problem Relation Age of Onset    Hypertension Father     Heart attack Father     Heart attack Brother     Alcohol abuse Brother     Hypertension Brother     Hypertension Paternal Grandmother     Hypertension Paternal Grandfather     Anemia Mother     Arthritis Mother     Hypertension Mother     Hypertension Maternal Grandmother     Heart disease Other         FH in males before age 55    Malig Hyperthermia Neg Hx           Allergies:  No Known  "Allergies  Scheduled Meds:  amiodarone, 100 mg, Daily  aspirin, 81 mg, Daily  levothyroxine, 112 mcg, Daily  pantoprazole, 40 mg, Nightly   Or  pantoprazole, 40 mg, Nightly  piperacillin-tazobactam, 4.5 g, Q8H  rosuvastatin, 5 mg, Daily  senna-docusate sodium, 2 tablet, BID  sodium chloride, 10 mL, Q12H            INTAKE AND OUTPUT:    Intake/Output Summary (Last 24 hours) at 10/6/2023 1012  Last data filed at 10/6/2023 0952  Gross per 24 hour   Intake 5240 ml   Output 641 ml   Net 4599 ml     Review of Systems:   GI: no n/v or abd pain  Cardiac: no chest pain or palpitations  Pulmonary: no shortness of breath or cough    /82   Pulse 75   Temp 98.4 øF (36.9 øC)   Resp 18   Ht 182.9 cm (72\")   Wt 94.6 kg (208 lb 8.9 oz)   SpO2 97%   BMI 28.29 kg/mý   General appearance: No acute changes   Neck: Trachea midline; NECK, supple, no thyromegaly or lymphadenopathy   Lungs: Normal size and shape, normal breath sounds, equal distribution of air, no rales and rhonchi   CV: S1-S2 regular, no murmurs, no rub, no gallop   Abdomen: Soft, nontender; no masses , no abnormal abdominal sounds   Extremities: No deformity , normal color , no peripheral edema   Skin: Normal temperature, turgor and texture; no rash, ulcers            I reviewed the patient's new clinical results, and personally reviewed and interpreted the patient's ECG and telemetry data from the last 24 hours          Cardiographics        ECG:   Sinus rhythm with PAC unchanged from previous other than PAC present    Echocardiogram:     Interpretation Summary         Left ventricular ejection fraction appears to be 51 - 55%.    Mild aortic valve stenosis is present. Aortic valve area is 2 cm2.    Peak velocity of the flow distal to the aortic valve is 234.8 cm/s. Aortic valve maximum pressure gradient is 22 mmHg. Aortic valve mean pressure gradient is 11 mmHg. Aortic valve dimensionless index is 0.7 .    Estimated right ventricular systolic pressure from " "tricuspid regurgitation is mildly elevated (35-45 mmH  Lab Review   Results from last 7 days   Lab Units 10/05/23  0224 10/04/23  0802 10/04/23  0338   HSTROP T ng/L 27* 28* 30*     Results from last 7 days   Lab Units 10/06/23  0334   MAGNESIUM mg/dL 2.5*     Results from last 7 days   Lab Units 10/06/23  0334   SODIUM mmol/L 141   POTASSIUM mmol/L 3.2*   BUN mg/dL 24*   CREATININE mg/dL 0.84   CALCIUM mg/dL 7.9*     @[id number]@  Results from last 7 days   Lab Units 10/06/23  0334 10/05/23  1406 10/05/23  0624   WBC 10*3/mm3 9.85 10.89* 19.37*   HEMOGLOBIN g/dL 9.7* 12.2* 10.8*   HEMATOCRIT % 28.5* 36.3* 32.2*   PLATELETS 10*3/mm3 148 188 157     Results from last 7 days   Lab Units 10/06/23  0334 10/05/23  0224 10/04/23  0338   INR  2.01* 1.62* 2.04*         Assessment:  Epigastric pain  Acute cholecystitis  Coronary artery disease  Ascending aortic aneurysm: 4.8 cm per CT-[facility] recommends follow-up with their aneurysm clinic with yearly monitoring  Paroxysmal atrial fibrillation  Chronic anticoagulation on warfarin: Anticoagulation on hold.  INR 2.01  Hypokalemia: being replaced    Plan:  Echo on 10/4/2023 revealed EF 51 to 55%, mild AV stenosis, FAROOQ 2.  Mean pressure gradient 11 mmHg.  Mildly elevated RVSP.  SR on the monitor.  Afebrile this morning.  Blood pressure stable, monitor  Discussed with nurse and weaning reid-.  No chest pain or shortness of breath.     Please resume a/c once ok with all.    )10/6/2023  MD CHANTAL Jaraon/Transcription:   \"Dictated utilizing Dragon dictation\".     "

## 2023-10-07 LAB
ALBUMIN SERPL-MCNC: 2.2 G/DL (ref 3.5–5.2)
ALBUMIN/GLOB SERPL: 0.8 G/DL
ALP SERPL-CCNC: 51 U/L (ref 39–117)
ALT SERPL W P-5'-P-CCNC: 12 U/L (ref 1–41)
ANION GAP SERPL CALCULATED.3IONS-SCNC: 7 MMOL/L (ref 5–15)
AST SERPL-CCNC: 11 U/L (ref 1–40)
BACTERIA FLD CULT: ABNORMAL
BASOPHILS # BLD AUTO: 0.03 10*3/MM3 (ref 0–0.2)
BASOPHILS NFR BLD AUTO: 0.3 % (ref 0–1.5)
BILIRUB SERPL-MCNC: 0.5 MG/DL (ref 0–1.2)
BUN SERPL-MCNC: 17 MG/DL (ref 8–23)
BUN/CREAT SERPL: 22.1 (ref 7–25)
CALCIUM SPEC-SCNC: 7.9 MG/DL (ref 8.6–10.5)
CHLORIDE SERPL-SCNC: 109 MMOL/L (ref 98–107)
CO2 SERPL-SCNC: 23 MMOL/L (ref 22–29)
CREAT SERPL-MCNC: 0.77 MG/DL (ref 0.76–1.27)
DEPRECATED RDW RBC AUTO: 41.2 FL (ref 37–54)
EGFRCR SERPLBLD CKD-EPI 2021: 89.4 ML/MIN/1.73
EOSINOPHIL # BLD AUTO: 0.12 10*3/MM3 (ref 0–0.4)
EOSINOPHIL NFR BLD AUTO: 1.2 % (ref 0.3–6.2)
ERYTHROCYTE [DISTWIDTH] IN BLOOD BY AUTOMATED COUNT: 11.6 % (ref 12.3–15.4)
GLOBULIN UR ELPH-MCNC: 2.8 GM/DL
GLUCOSE SERPL-MCNC: 76 MG/DL (ref 65–99)
GRAM STN SPEC: ABNORMAL
GRAM STN SPEC: ABNORMAL
HCT VFR BLD AUTO: 31.4 % (ref 37.5–51)
HGB BLD-MCNC: 10.4 G/DL (ref 13–17.7)
IMM GRANULOCYTES # BLD AUTO: 0.05 10*3/MM3 (ref 0–0.05)
IMM GRANULOCYTES NFR BLD AUTO: 0.5 % (ref 0–0.5)
INR PPP: 1.65 (ref 0.9–1.1)
LYMPHOCYTES # BLD AUTO: 0.64 10*3/MM3 (ref 0.7–3.1)
LYMPHOCYTES NFR BLD AUTO: 6.4 % (ref 19.6–45.3)
MAGNESIUM SERPL-MCNC: 1.8 MG/DL (ref 1.6–2.4)
MCH RBC QN AUTO: 32.3 PG (ref 26.6–33)
MCHC RBC AUTO-ENTMCNC: 33.1 G/DL (ref 31.5–35.7)
MCV RBC AUTO: 97.5 FL (ref 79–97)
MONOCYTES # BLD AUTO: 0.68 10*3/MM3 (ref 0.1–0.9)
MONOCYTES NFR BLD AUTO: 6.8 % (ref 5–12)
NEUTROPHILS NFR BLD AUTO: 8.51 10*3/MM3 (ref 1.7–7)
NEUTROPHILS NFR BLD AUTO: 84.8 % (ref 42.7–76)
NRBC BLD AUTO-RTO: 0 /100 WBC (ref 0–0.2)
PHOSPHATE SERPL-MCNC: 1.5 MG/DL (ref 2.5–4.5)
PHOSPHATE SERPL-MCNC: 2.2 MG/DL (ref 2.5–4.5)
PLATELET # BLD AUTO: 150 10*3/MM3 (ref 140–450)
PMV BLD AUTO: 11.8 FL (ref 6–12)
POTASSIUM SERPL-SCNC: 3.9 MMOL/L (ref 3.5–5.2)
POTASSIUM SERPL-SCNC: 4 MMOL/L (ref 3.5–5.2)
PROT SERPL-MCNC: 5 G/DL (ref 6–8.5)
PROTHROMBIN TIME: 19.8 SECONDS (ref 11.7–14.2)
RBC # BLD AUTO: 3.22 10*6/MM3 (ref 4.14–5.8)
SODIUM SERPL-SCNC: 139 MMOL/L (ref 136–145)
WBC NRBC COR # BLD: 10.03 10*3/MM3 (ref 3.4–10.8)

## 2023-10-07 PROCEDURE — 84100 ASSAY OF PHOSPHORUS: CPT

## 2023-10-07 PROCEDURE — 99231 SBSQ HOSP IP/OBS SF/LOW 25: CPT | Performed by: SURGERY

## 2023-10-07 PROCEDURE — 84100 ASSAY OF PHOSPHORUS: CPT | Performed by: INTERNAL MEDICINE

## 2023-10-07 PROCEDURE — 83735 ASSAY OF MAGNESIUM: CPT | Performed by: HOSPITALIST

## 2023-10-07 PROCEDURE — 25010000002 FENTANYL CITRATE (PF) 50 MCG/ML SOLUTION: Performed by: INTERNAL MEDICINE

## 2023-10-07 PROCEDURE — 85025 COMPLETE CBC W/AUTO DIFF WBC: CPT | Performed by: INTERNAL MEDICINE

## 2023-10-07 PROCEDURE — 99232 SBSQ HOSP IP/OBS MODERATE 35: CPT | Performed by: INTERNAL MEDICINE

## 2023-10-07 PROCEDURE — 25010000002 CEFTRIAXONE PER 250 MG: Performed by: INTERNAL MEDICINE

## 2023-10-07 PROCEDURE — 80053 COMPREHEN METABOLIC PANEL: CPT | Performed by: INTERNAL MEDICINE

## 2023-10-07 PROCEDURE — 99233 SBSQ HOSP IP/OBS HIGH 50: CPT | Performed by: INTERNAL MEDICINE

## 2023-10-07 PROCEDURE — 84132 ASSAY OF SERUM POTASSIUM: CPT | Performed by: INTERNAL MEDICINE

## 2023-10-07 PROCEDURE — 85610 PROTHROMBIN TIME: CPT | Performed by: HOSPITALIST

## 2023-10-07 PROCEDURE — 25010000002 PIPERACILLIN SOD-TAZOBACTAM PER 1 G: Performed by: INTERNAL MEDICINE

## 2023-10-07 PROCEDURE — 25010000002 ENOXAPARIN PER 10 MG: Performed by: INTERNAL MEDICINE

## 2023-10-07 RX ORDER — FENTANYL/ROPIVACAINE/NS/PF 2-625MCG/1
15 PLASTIC BAG, INJECTION (ML) EPIDURAL ONCE
Status: COMPLETED | OUTPATIENT
Start: 2023-10-07 | End: 2023-10-07

## 2023-10-07 RX ORDER — L.ACID,PARA/B.BIFIDUM/S.THERM 8B CELL
1 CAPSULE ORAL DAILY
Status: DISCONTINUED | OUTPATIENT
Start: 2023-10-07 | End: 2023-10-10 | Stop reason: HOSPADM

## 2023-10-07 RX ORDER — FENTANYL/ROPIVACAINE/NS/PF 2-625MCG/1
15 PLASTIC BAG, INJECTION (ML) EPIDURAL
Status: COMPLETED | OUTPATIENT
Start: 2023-10-07 | End: 2023-10-07

## 2023-10-07 RX ORDER — LOPERAMIDE HYDROCHLORIDE 2 MG/1
2 CAPSULE ORAL ONCE
Status: COMPLETED | OUTPATIENT
Start: 2023-10-07 | End: 2023-10-07

## 2023-10-07 RX ORDER — METRONIDAZOLE 500 MG/1
500 TABLET ORAL EVERY 8 HOURS SCHEDULED
Status: DISPENSED | OUTPATIENT
Start: 2023-10-07 | End: 2023-10-10

## 2023-10-07 RX ORDER — ENOXAPARIN SODIUM 100 MG/ML
40 INJECTION SUBCUTANEOUS EVERY 24 HOURS
Status: DISCONTINUED | OUTPATIENT
Start: 2023-10-07 | End: 2023-10-08

## 2023-10-07 RX ADMIN — PIPERACILLIN SODIUM AND TAZOBACTAM SODIUM 4.5 G: 4; .5 INJECTION, SOLUTION INTRAVENOUS at 08:08

## 2023-10-07 RX ADMIN — Medication 10 ML: at 08:09

## 2023-10-07 RX ADMIN — POTASSIUM PHOSPHATE, MONOBASIC POTASSIUM PHOSPHATE, DIBASIC 15 MMOL: 224; 236 INJECTION, SOLUTION, CONCENTRATE INTRAVENOUS at 20:13

## 2023-10-07 RX ADMIN — HYDROCODONE BITARTRATE AND ACETAMINOPHEN 1 TABLET: 5; 325 TABLET ORAL at 22:49

## 2023-10-07 RX ADMIN — LOPERAMIDE HYDROCHLORIDE 2 MG: 2 CAPSULE ORAL at 03:33

## 2023-10-07 RX ADMIN — PANTOPRAZOLE SODIUM 40 MG: 40 TABLET, DELAYED RELEASE ORAL at 20:14

## 2023-10-07 RX ADMIN — CEFTRIAXONE 2000 MG: 2 INJECTION, POWDER, FOR SOLUTION INTRAMUSCULAR; INTRAVENOUS at 12:19

## 2023-10-07 RX ADMIN — Medication 1 CAPSULE: at 13:51

## 2023-10-07 RX ADMIN — ROSUVASTATIN CALCIUM 5 MG: 5 TABLET, FILM COATED ORAL at 08:08

## 2023-10-07 RX ADMIN — Medication 10 ML: at 20:14

## 2023-10-07 RX ADMIN — PIPERACILLIN SODIUM AND TAZOBACTAM SODIUM 4.5 G: 4; .5 INJECTION, SOLUTION INTRAVENOUS at 00:00

## 2023-10-07 RX ADMIN — AMIODARONE HYDROCHLORIDE 100 MG: 200 TABLET ORAL at 08:08

## 2023-10-07 RX ADMIN — HYDROCODONE BITARTRATE AND ACETAMINOPHEN 1 TABLET: 5; 325 TABLET ORAL at 09:55

## 2023-10-07 RX ADMIN — POTASSIUM PHOSPHATE, MONOBASIC POTASSIUM PHOSPHATE, DIBASIC 15 MMOL: 224; 236 INJECTION, SOLUTION, CONCENTRATE INTRAVENOUS at 06:44

## 2023-10-07 RX ADMIN — FENTANYL CITRATE 50 MCG: 50 INJECTION, SOLUTION INTRAMUSCULAR; INTRAVENOUS at 03:33

## 2023-10-07 RX ADMIN — ASPIRIN 81 MG: 81 TABLET, COATED ORAL at 08:08

## 2023-10-07 RX ADMIN — POTASSIUM PHOSPHATE, MONOBASIC POTASSIUM PHOSPHATE, DIBASIC 15 MMOL: 224; 236 INJECTION, SOLUTION, CONCENTRATE INTRAVENOUS at 05:41

## 2023-10-07 RX ADMIN — ENOXAPARIN SODIUM 40 MG: 100 INJECTION SUBCUTANEOUS at 20:13

## 2023-10-07 RX ADMIN — METRONIDAZOLE 500 MG: 500 TABLET, FILM COATED ORAL at 22:47

## 2023-10-07 RX ADMIN — METRONIDAZOLE 500 MG: 500 TABLET, FILM COATED ORAL at 13:51

## 2023-10-07 RX ADMIN — LEVOTHYROXINE SODIUM 112 MCG: 0.11 TABLET ORAL at 06:02

## 2023-10-07 NOTE — PROGRESS NOTES
Colorectal & General Surgery  Progress Note    Patient: David Comer Sr.  YOB: 1940  MRN: [id number]      Assessment  David Comer Sr. is a 83 y.o. male with acute calculus cholecystitis and septic shock now status post percutaneous cholecystostomy tube.  He looks great today.  My recommendations are as follows:    Plan  Advance diet as tolerated  Continue percutaneous cholecystostomy tube  We will plan for elective outpatient laparoscopic cholecystectomy in 6-8 weeks.  We will keep the percutaneous cholecystostomy tube in until that time  Defer antibiotics to ID.  Appreciate their assistance.  Okay to restart anticoagulation from my standpoint      Subjective  No acute events.  Feels well this morning.  Denies pain.    Objective    Vitals:    10/07/23 1126   BP:    Pulse:    Resp:    Temp: 99.6 øF (37.6 øC)   SpO2:        Physical Exam  Constitutional: Well-developed well-nourished, no acute distress  Neck: Supple, trachea midline  Respiratory: No increased work of breathing, Symmetric excursion  Cardiovascular: Well pefursed, no jugular venous distention evident   Abdominal: Percutaneous cholecystostomy tube is bilious.  Soft, non-tender, non-distended  Skin: Warm, dry, no rash on visualized skin surfaces  Psychiatric: Alert and oriented x3, normal affect     Laboratory Results  I have personally reviewed CBC with WBC 10, hemoglobin 10, platelets 150.  INR 1.65.  CMP with creatinine 0.77, total bilirubin 0.5, AST 11, ALT 12, alkaline phosphatase 51.         Omar Quintanilla MD  Colorectal & General Surgery  [facility]    [address], [address]  [place], [place], [facility]  P: [phone]  F: [phone]

## 2023-10-07 NOTE — PROGRESS NOTES
ID note for sepsis  S: AF.  He is having quite a bit of pain around 2      Physical Exam:   Vital Signs   Temp:  [98.3 øF (36.8 øC)-99.4 øF (37.4 øC)] 99.4 øF (37.4 øC)  Heart Rate:  [66-77] 75  BP: ()/(54-89) 130/71    GENERAL: Awake and alert, sickly  HEENT: Oropharynx is clear. Hearing is grossly normal.   EYES: . No conjunctival injection. No lid lag.   LUNGS:normal respiratory effort.   SKIN: no cutaneous eruptions in exposed areas  Tender to palpation right upper quadrant  PSYCHIATRIC: Appropriate mood, affect, insight, and judgment.     Results Review:  White count 10.03, hemoglobin 10.4, platelets 150  Creatinine 0.77        10/5 blood cultures ngtd  10/5 IR biliary cx: E. coli resistant to ampicillin and ampicillin sulbactam    A/p  1.  Septic shock secondary #2 (shock resolved)  2.  Acute cholecystitis    IR drain placed 10/5  Culture with E. coli.  We will change to Rocephin 2 g IV every 24 hours and metronidazole 500 mg p.o. every 8 hours x3 more days which we can always extend depending on how he is doing  Discussed with Dr. Cordero appreciate his help

## 2023-10-07 NOTE — PROGRESS NOTES
Name: David KHAN Age Sr. ADMIT: 10/2/2023   : 1940  PCP: Chacha Pineda MD    MRN: [id number] LOS: 4 days   AGE/SEX: 83 y.o. male  ROOM: [facility]     Subjective   Subjective   Patient is lying on the bed and is in no major distress.  Denies nausea, vomiting, abdominal pain, chest pain.       Objective   Objective   Vital Signs  Temp:  [98.2 øF (36.8 øC)-99.6 øF (37.6 øC)] 98.2 øF (36.8 øC)  Heart Rate:  [66-84] 66  Resp:  [16] 16  BP: ()/() 154/94  SpO2:  [90 %-100 %] 97 %  on   ;   Device (Oxygen Therapy): room air  Body mass index is 29.9 kg/mý.  Physical Exam  HEENT: PERRLA, extraocular movements intact, Scleras no icterus  Neck: Supple, no JVD  Cardiovascular: Regular rate and rhythm with normal S1 and S2  GI: Soft, nontender, bowel sounds are present, mildly tender in the right upper quadrant and has a drain tube in place  Extremities: No edema, palpable pulses  Neurologic: Grossly nonfocal, no facial asymmetry    Results Review     I reviewed the patient's new clinical results.  Results from last 7 days   Lab Units 10/07/23  0420 10/06/23  0334 10/05/23  1406 10/05/23  0624   WBC 10*3/mm3 10.03 9.85 10.89* 19.37*   HEMOGLOBIN g/dL 10.4* 9.7* 12.2* 10.8*   PLATELETS 10*3/mm3 150 148 188 157     Results from last 7 days   Lab Units 10/07/23  0420 10/07/23  0006 10/06/23  1310 10/06/23  0334 10/05/23  0624 10/05/23  0224 10/04/23  0338   SODIUM mmol/L 139  --   --  141  --  135* 137   POTASSIUM mmol/L 3.9 4.0 3.5 3.2*   < > 3.2*  3.2* 3.1*   CHLORIDE mmol/L 109*  --   --  110*  --  100 101   CO2 mmol/L 23.0  --   --  25.0  --  24.0 26.0   BUN mg/dL 17  --   --  24*  --  31* 23   CREATININE mg/dL 0.77  --   --  0.84  --  1.24 1.18   GLUCOSE mg/dL 76  --   --  94  --  114* 101*   EGFR mL/min/1.73 89.4  --   --  87.1  --  58.0* 61.6    < > = values in this interval not displayed.     Results from last 7 days   Lab Units 10/07/23  0420 10/06/23  0334 10/04/23  0338 10/02/23  1913   ALBUMIN g/dL  2.2* 2.5* 3.2* 3.8   BILIRUBIN mg/dL 0.5 0.4 1.1 0.3   ALK PHOS U/L 51 47 43 63   AST (SGOT) U/L 11 16 11 17   ALT (SGPT) U/L 12 12 11 15     Results from last 7 days   Lab Units 10/07/23  1223 10/07/23  0420 10/07/23  0006 10/06/23  1310 10/06/23  0334 10/05/23  1737 10/05/23  0624 10/05/23  0224 10/04/23  0338 10/03/23  0547 10/02/23  1913   CALCIUM mg/dL  --  7.9*  --   --  7.9*  --   --  8.4* 8.4*   < > 9.2   ALBUMIN g/dL  --  2.2*  --   --  2.5*  --   --   --  3.2*  --  3.8   MAGNESIUM mg/dL  --  1.8  --   --  2.5* 2.8*  --  1.5*  --   --   --    PHOSPHORUS mg/dL 2.2*  --  1.5* 1.3* 2.1* 2.3*   < > 1.6*  --   --   --     < > = values in this interval not displayed.     Results from last 7 days   Lab Units 10/05/23  0423 10/05/23  0224   PROCALCITONIN ng/mL  --  1.38*   LACTATE mmol/L 1.9  --      Glucose   Date/Time Value Ref Range Status   10/05/2023 0408 114 70 - 130 mg/dL Final       No radiology results for the last day    I have personally reviewed all medications:  Scheduled Medications  amiodarone, 100 mg, Oral, Daily  aspirin, 81 mg, Oral, Daily  cefTRIAXone, 2,000 mg, Intravenous, Q24H  enoxaparin, 40 mg, Subcutaneous, Q24H  lactobacillus acidophilus, 1 capsule, Oral, Daily  levothyroxine, 112 mcg, Oral, Daily  metroNIDAZOLE, 500 mg, Oral, Q8H  pantoprazole, 40 mg, Oral, Nightly   Or  pantoprazole, 40 mg, Intravenous, Nightly  potassium phosphate, 15 mmol, Intravenous, Once  rosuvastatin, 5 mg, Oral, Daily  senna-docusate sodium, 2 tablet, Oral, BID  sodium chloride, 10 mL, Intravenous, Q12H    Infusions   Diet  Diet: Liquid Diets; Full Liquid; Fluid Consistency: Thin (IDDSI 0)    I have personally reviewed:  [x]  Laboratory   [x]  Microbiology   [x]  Radiology   [x]  EKG/Telemetry  [x]  Cardiology/Vascular   []  Pathology    []  Records       Assessment/Plan     Active Hospital Problems    Diagnosis  POA    **Epigastric pain [R10.13]  Yes    Chest pain [R07.9]  Yes    Acute cholecystitis [K81.0]   Unknown    BPH without obstruction/lower urinary tract symptoms [N40.0]  Yes    Long term (current) use of anticoagulants [Z79.01]  Not Applicable    Acquired hypothyroidism [E03.9]  Yes    Abdominal aortic aneurysm [I71.40]  Yes    Chronic coronary artery disease [I25.10]  Yes    Essential hypertension [I10]  Yes    Paroxysmal atrial fibrillation [I48.0]  Yes      Resolved Hospital Problems   No resolved problems to display.       83 y.o. male admitted with Epigastric pain.    Severe sepsis with hypotension, this is now resolved.  2.  Acute cholecystitis, CT-guided drain tube placement on 10/5.  Cultures grew E. coli and patient is currently on Rocephin and Flagyl which will be continued.  Diet is being advanced to soft diet today.  Appreciate surgical input.  3.  Bilateral pulmonary infiltrates most likely secondary atelectasis.  Currently not hypoxic and does not have any pleuritic chest pain.  4.  Diarrhea, has been improving.  5.  History of coronary artery disease, continue with aspirin, statins.  6.  Hypothyroidism, on Synthroid.  7.  Hypophosphatemia, replaced.  8.  History of atrial fibrillation, on aspirin and amiodarone.  Resume home dose warfarin once cleared by surgical team.  9.  On SCDs for DVT prophylaxis.  10.  CODE STATUS is full code.      Magan Pardo MD  [facility]  10/07/23  16:08 EDT

## 2023-10-07 NOTE — PROGRESS NOTES
LOS: 4 days   Patient Care Team:  Chacha Pineda MD as PCP - General (Family Medicine)  Harlan Downing MD as Consulting Physician (Cardiology)  Elliott Chawla MD as Consulting Physician (Otolaryngology)    Chief Complaint:     Follow-up CAD    Interval History:     He denies chest pain or difficulty breathing.  He has no heart racing or skipping.  He does have a lot of tenderness in his abdomen still.  He has no dizziness or nausea.    Objective   Vital Signs  Temp:  [97.5 øF (36.4 øC)-99.4 øF (37.4 øC)] 99.4 øF (37.4 øC)  Heart Rate:  [61-77] 69  BP: ()/(54-89) 121/72    Intake/Output Summary (Last 24 hours) at 10/7/2023 0824  Last data filed at 10/7/2023 0810  Gross per 24 hour   Intake 2296 ml   Output 690 ml   Net 1606 ml       Comfortable NAD  Neck supple, no JVD or thyromegaly appreciated  S1/S2 RRR, no m/r/g  Lungs CTA B, normal effort  Abdomen S/NT/ND (+) BS, no HSM appreciated  Extremities warm, no clubbing, cyanosis, or edema  No visible or palpable skin lesions  A/Ox4, mood and affect appropriate    Results Review:      Results from last 7 days   Lab Units 10/07/23  0420 10/07/23  0006 10/06/23  1310 10/06/23  0334 10/05/23  0624 10/05/23  0224   SODIUM mmol/L 139  --   --  141  --  135*   POTASSIUM mmol/L 3.9 4.0 3.5 3.2*   < > 3.2*  3.2*   CHLORIDE mmol/L 109*  --   --  110*  --  100   CO2 mmol/L 23.0  --   --  25.0  --  24.0   BUN mg/dL 17  --   --  24*  --  31*   CREATININE mg/dL 0.77  --   --  0.84  --  1.24   GLUCOSE mg/dL 76  --   --  94  --  114*   CALCIUM mg/dL 7.9*  --   --  7.9*  --  8.4*    < > = values in this interval not displayed.     Results from last 7 days   Lab Units 10/05/23  0224 10/04/23  0802 10/04/23  0338   HSTROP T ng/L 27* 28* 30*     Results from last 7 days   Lab Units 10/07/23  0420 10/06/23  0334 10/05/23  1406   WBC 10*3/mm3 10.03 9.85 10.89*   HEMOGLOBIN g/dL 10.4* 9.7* 12.2*   HEMATOCRIT % 31.4* 28.5* 36.3*   PLATELETS 10*3/mm3 150 148 188      Results from last 7 days   Lab Units 10/07/23  0420 10/06/23  1044 10/06/23  0334   INR  1.65* 1.89* 2.01*         Results from last 7 days   Lab Units 10/07/23  0420   MAGNESIUM mg/dL 1.8           I reviewed the patient's new clinical results.  I personally viewed and interpreted the patient's EKG/Telemetry data        Medication Review:   amiodarone, 100 mg, Oral, Daily  aspirin, 81 mg, Oral, Daily  levothyroxine, 112 mcg, Oral, Daily  pantoprazole, 40 mg, Oral, Nightly   Or  pantoprazole, 40 mg, Intravenous, Nightly  piperacillin-tazobactam, 4.5 g, Intravenous, Q8H  rosuvastatin, 5 mg, Oral, Daily  senna-docusate sodium, 2 tablet, Oral, BID  sodium chloride, 10 mL, Intravenous, Q12H        norepinephrine, 0.02-0.3 mcg/kg/min  phenylephrine, 0.5-3 mcg/kg/min, Last Rate: Stopped (10/06/23 1035)        Assessment & Plan       Epigastric pain    Chronic coronary artery disease    Abdominal aortic aneurysm    Paroxysmal atrial fibrillation    Essential hypertension    Acquired hypothyroidism    Long term (current) use of anticoagulants    BPH without obstruction/lower urinary tract symptoms    Chest pain    Acute cholecystitis    Acute calculus cholecystitis with septic shock, status post percutaneous cholecystostomy tube done 10/5/2023.  Plan for near future cholecystectomy after stabilization from shock-surgery following.  Chest pain, minimally elevated high-sensitivity troponin with negative delta.  Stress nuclear perfusion study done 11/2022 showed a small area of ischemia inferior wall.Cardiac catheterization done 12/30/2022 showed no obstructive coronary artery disease.  ECG showed no acute ST abnormalities.Echocardiogram done on 10/4/2023 showed ejection action 51 to 55% with mild aortic stenosis, mildly elevated RVSP.  No further work-up needed.  PAF-anticoagulation has been held  Ascending aortic aneurysm noted on CT, CT surgery recommends annual follow-up to evaluate the aneurysm.    Vital stable, labs  otherwise stable - see monday    Kelley Yuen MD  10/07/23  08:24 EDT

## 2023-10-07 NOTE — PLAN OF CARE
Goal Outcome Evaluation:   Remain in CICU AXO4. Patient complain of intermittent  right abdomen pain PRN pain management given MD aware. Patient also had frequent loose stool MD aware and order received. Electrolyte replaced. Vitals stable will continued to monitor.

## 2023-10-07 NOTE — PROGRESS NOTES
"                                              LOS: 4 days   Patient Care Team:  Chacha Pineda MD as PCP - General (Family Medicine)  Harlan Downing MD as Consulting Physician (Cardiology)  Elliott Chawla MD as Consulting Physician (Otolaryngology)    Chief Complaint:  F/up sepsis, cholecystitis    Subjective   Interval History  I reviewed the admission note, progress notes, PMH, PSH, Family hx, social history, imagings and prior records on this admission, summarized the finding in my note and formulated a transition of care plan.      Off pressors since yesterday.  Reported abdominal pain.  Has diarrhea and abdominal pain at the site of the drain placement.    REVIEW OF SYSTEMS:   CARDIOVASCULAR: No chest pain, chest pressure or chest discomfort. No palpitations or edema.   RESPIRATORY: On RA.  Denies dyspnea or cough.  CONSTITUTIONAL: No fever or chills.     Ventilator/Non-Invasive Ventilation Settings (From admission, onward)      None                  Physical Exam:     Vital Signs  Temp:  [98.3 øF (36.8 øC)-99.4 øF (37.4 øC)] 99.4 øF (37.4 øC)  Heart Rate:  [66-77] 75  BP: ()/(54-89) 130/71    Intake/Output Summary (Last 24 hours) at 10/7/2023 1033  Last data filed at 10/7/2023 0900  Gross per 24 hour   Intake 1956 ml   Output 700 ml   Net 1256 ml     Flowsheet Rows      Flowsheet Row First Filed Value   Admission Height 182.9 cm (72\") Documented at 10/02/2023 1908   Admission Weight 93 kg (205 lb) Documented at 10/02/2023 1908            PPE used per hospital policy    General Appearance:   Alert, cooperative, in no acute distress   ENMT:  Mallampati score 3, moist mucous membrane   Eyes:  Pupils equal and reactive to light. EOMI   Neck:   Large. Trachea midline. No thyromegaly.   Lungs:   Minimal crackles on anterior auscultation at the bases.    Heart:   Regular rhythm and normal rate, normal S1 and S2, no         murmur   Skin:   No rash or ecchymosis   Abdomen:   Cholecystostomy " drain noted.  Soft. No tenderness. No HSM.   Neuro/psych:  Conscious, alert, oriented x3. Strength 5/5 in upper and lower  ext.  Appropriate mood and affect   Extremities:  No cyanosis, clubbing or edema.  Warm extremities and well-perfused          Results Review:        Results from last 7 days   Lab Units 10/07/23  0420 10/07/23  0006 10/06/23  1310 10/06/23  0334 10/05/23  0624 10/05/23  0224   SODIUM mmol/L 139  --   --  141  --  135*   POTASSIUM mmol/L 3.9 4.0 3.5 3.2*   < > 3.2*  3.2*   CHLORIDE mmol/L 109*  --   --  110*  --  100   CO2 mmol/L 23.0  --   --  25.0  --  24.0   BUN mg/dL 17  --   --  24*  --  31*   CREATININE mg/dL 0.77  --   --  0.84  --  1.24   GLUCOSE mg/dL 76  --   --  94  --  114*   CALCIUM mg/dL 7.9*  --   --  7.9*  --  8.4*    < > = values in this interval not displayed.     Results from last 7 days   Lab Units 10/05/23  0224 10/04/23  0802 10/04/23  0338   HSTROP T ng/L 27* 28* 30*     Results from last 7 days   Lab Units 10/07/23  0420 10/06/23  0334 10/05/23  1406   WBC 10*3/mm3 10.03 9.85 10.89*   HEMOGLOBIN g/dL 10.4* 9.7* 12.2*   HEMATOCRIT % 31.4* 28.5* 36.3*   PLATELETS 10*3/mm3 150 148 188     Results from last 7 days   Lab Units 10/07/23  0420 10/06/23  1044 10/06/23  0334   INR  1.65* 1.89* 2.01*                               I reviewed the patient's new clinical results.        Medication Review:   amiodarone, 100 mg, Oral, Daily  aspirin, 81 mg, Oral, Daily  cefTRIAXone, 2,000 mg, Intravenous, Q24H  levothyroxine, 112 mcg, Oral, Daily  metroNIDAZOLE, 500 mg, Oral, Q8H  pantoprazole, 40 mg, Oral, Nightly   Or  pantoprazole, 40 mg, Intravenous, Nightly  rosuvastatin, 5 mg, Oral, Daily  senna-docusate sodium, 2 tablet, Oral, BID  sodium chloride, 10 mL, Intravenous, Q12H        norepinephrine, 0.02-0.3 mcg/kg/min  phenylephrine, 0.5-3 mcg/kg/min, Last Rate: Stopped (10/06/23 1035)        Diagnostic imaging:  I personally and independently reviewed the following images:  CT  abdomen and chest dated 10/5/2023: Agree with radiologist interpretation.  Findings as below in assessment.    Assessment     Severe sepsis with hypotension, sepsis status resolved  Acute cholecystitis, s/p CT-guided drain placement 10/5, culture consistent with E. Coli  Pulmonary infiltrates on CXR 10/6  Bilateral lower lobe dependent consolidations, likely atelectasis.  Is trace bilateral pleural effusion  Electrolytes disturbance: Hypophosphatemia  Diarrhea, secondary to antibiotics and bile diversion      HTN  PAF  Hypothyroidism        All problems new to me    Plan     Antibiotics with Rocephin and metronidazole.  ID following  Continue holding BP meds.  Initiate Lovenox for DVT prophylaxis.  Later if tolerated and no plans for surgeries then could place on therapeutic anticoagulation  DC left IJ central line  Continue amiodarone  Upgrade diet as tolerated.  Replace phosphorus  Probiotic for diarrhea  Incentive spirometry    Dispo: Transfer to floor.    Discussed with Dr. Colindres.  Appreciate assistance.    Marilu Cordero MD  10/07/23  10:33 EDT            This note was dictated utilizing Dragon dictation

## 2023-10-07 NOTE — PROGRESS NOTES
"[facility] Clinical Pharmacy Services: Enoxaparin Consult    David KHAN Nahomi Raymundo has a pharmacy consult to dose prophylactic enoxaparin per Dr. Cordero's request.     Indication: VTE Prophylaxis  Home Anticoagulation: warfarin for AF - Per MD \"Initiate Lovenox for DVT prophylaxis.  Later if tolerated and no plans for surgeries then could place on therapeutic anticoagulation \"     Relevant clinical data and objective history reviewed:  83 y.o. male 182.9 cm (72\") 100 kg (220 lb 7.4 oz)   Body mass index is 29.9 kg/mý.   Results from last 7 days   Lab Units 10/07/23  0420   PLATELETS 10*3/mm3 150     Estimated Creatinine Clearance: 90.6 mL/min (by C-G formula based on SCr of 0.77 mg/dL).    Assessment/Plan    Will start patient on 40mg subcutaneous every 24 hours, adjusted for renal function. Consult order will be discontinued but pharmacy will continue to follow.     Meeta Bragg, PharmD  Clinical Pharmacist    "

## 2023-10-08 LAB
ALBUMIN SERPL-MCNC: 2.2 G/DL (ref 3.5–5.2)
ALBUMIN/GLOB SERPL: 0.9 G/DL
ALP SERPL-CCNC: 53 U/L (ref 39–117)
ALT SERPL W P-5'-P-CCNC: 10 U/L (ref 1–41)
ANION GAP SERPL CALCULATED.3IONS-SCNC: 7.3 MMOL/L (ref 5–15)
AST SERPL-CCNC: 11 U/L (ref 1–40)
BASOPHILS # BLD AUTO: 0.02 10*3/MM3 (ref 0–0.2)
BASOPHILS NFR BLD AUTO: 0.3 % (ref 0–1.5)
BILIRUB SERPL-MCNC: 0.3 MG/DL (ref 0–1.2)
BUN SERPL-MCNC: 10 MG/DL (ref 8–23)
BUN/CREAT SERPL: 15.9 (ref 7–25)
CALCIUM SPEC-SCNC: 7.9 MG/DL (ref 8.6–10.5)
CHLORIDE SERPL-SCNC: 108 MMOL/L (ref 98–107)
CO2 SERPL-SCNC: 23.7 MMOL/L (ref 22–29)
CREAT SERPL-MCNC: 0.63 MG/DL (ref 0.76–1.27)
DEPRECATED RDW RBC AUTO: 42.5 FL (ref 37–54)
EGFRCR SERPLBLD CKD-EPI 2021: 95 ML/MIN/1.73
EOSINOPHIL # BLD AUTO: 0.15 10*3/MM3 (ref 0–0.4)
EOSINOPHIL NFR BLD AUTO: 1.9 % (ref 0.3–6.2)
ERYTHROCYTE [DISTWIDTH] IN BLOOD BY AUTOMATED COUNT: 11.8 % (ref 12.3–15.4)
GLOBULIN UR ELPH-MCNC: 2.5 GM/DL
GLUCOSE SERPL-MCNC: 93 MG/DL (ref 65–99)
HCT VFR BLD AUTO: 30.6 % (ref 37.5–51)
HGB BLD-MCNC: 10.3 G/DL (ref 13–17.7)
IMM GRANULOCYTES # BLD AUTO: 0.04 10*3/MM3 (ref 0–0.05)
IMM GRANULOCYTES NFR BLD AUTO: 0.5 % (ref 0–0.5)
INR PPP: 1.44 (ref 0.9–1.1)
INR PPP: 1.59 (ref 0.9–1.1)
LYMPHOCYTES # BLD AUTO: 0.7 10*3/MM3 (ref 0.7–3.1)
LYMPHOCYTES NFR BLD AUTO: 8.8 % (ref 19.6–45.3)
MCH RBC QN AUTO: 33.4 PG (ref 26.6–33)
MCHC RBC AUTO-ENTMCNC: 33.7 G/DL (ref 31.5–35.7)
MCV RBC AUTO: 99.4 FL (ref 79–97)
MONOCYTES # BLD AUTO: 0.57 10*3/MM3 (ref 0.1–0.9)
MONOCYTES NFR BLD AUTO: 7.2 % (ref 5–12)
NEUTROPHILS NFR BLD AUTO: 6.47 10*3/MM3 (ref 1.7–7)
NEUTROPHILS NFR BLD AUTO: 81.3 % (ref 42.7–76)
NRBC BLD AUTO-RTO: 0 /100 WBC (ref 0–0.2)
PHOSPHATE SERPL-MCNC: 2.3 MG/DL (ref 2.5–4.5)
PLATELET # BLD AUTO: 170 10*3/MM3 (ref 140–450)
PMV BLD AUTO: 11.2 FL (ref 6–12)
POTASSIUM SERPL-SCNC: 3.5 MMOL/L (ref 3.5–5.2)
POTASSIUM SERPL-SCNC: 4 MMOL/L (ref 3.5–5.2)
PROT SERPL-MCNC: 4.7 G/DL (ref 6–8.5)
PROTHROMBIN TIME: 17.7 SECONDS (ref 11.7–14.2)
PROTHROMBIN TIME: 19.2 SECONDS (ref 11.7–14.2)
RBC # BLD AUTO: 3.08 10*6/MM3 (ref 4.14–5.8)
SODIUM SERPL-SCNC: 139 MMOL/L (ref 136–145)
WBC NRBC COR # BLD: 7.95 10*3/MM3 (ref 3.4–10.8)

## 2023-10-08 PROCEDURE — 84132 ASSAY OF SERUM POTASSIUM: CPT | Performed by: INTERNAL MEDICINE

## 2023-10-08 PROCEDURE — 85610 PROTHROMBIN TIME: CPT | Performed by: INTERNAL MEDICINE

## 2023-10-08 PROCEDURE — 85025 COMPLETE CBC W/AUTO DIFF WBC: CPT | Performed by: INTERNAL MEDICINE

## 2023-10-08 PROCEDURE — 99232 SBSQ HOSP IP/OBS MODERATE 35: CPT | Performed by: INTERNAL MEDICINE

## 2023-10-08 PROCEDURE — 25010000002 CEFTRIAXONE PER 250 MG: Performed by: INTERNAL MEDICINE

## 2023-10-08 PROCEDURE — 80053 COMPREHEN METABOLIC PANEL: CPT | Performed by: INTERNAL MEDICINE

## 2023-10-08 PROCEDURE — 99231 SBSQ HOSP IP/OBS SF/LOW 25: CPT | Performed by: SURGERY

## 2023-10-08 PROCEDURE — 25010000002 ENOXAPARIN PER 10 MG: Performed by: INTERNAL MEDICINE

## 2023-10-08 PROCEDURE — 84100 ASSAY OF PHOSPHORUS: CPT | Performed by: INTERNAL MEDICINE

## 2023-10-08 RX ORDER — HYDRALAZINE HYDROCHLORIDE 25 MG/1
25 TABLET, FILM COATED ORAL EVERY 8 HOURS SCHEDULED
Status: DISCONTINUED | OUTPATIENT
Start: 2023-10-08 | End: 2023-10-10 | Stop reason: HOSPADM

## 2023-10-08 RX ORDER — ENOXAPARIN SODIUM 100 MG/ML
1 INJECTION SUBCUTANEOUS EVERY 12 HOURS
Status: DISCONTINUED | OUTPATIENT
Start: 2023-10-08 | End: 2023-10-08

## 2023-10-08 RX ORDER — WARFARIN SODIUM 7.5 MG/1
7.5 TABLET ORAL
Status: COMPLETED | OUTPATIENT
Start: 2023-10-08 | End: 2023-10-08

## 2023-10-08 RX ORDER — ENOXAPARIN SODIUM 100 MG/ML
1 INJECTION SUBCUTANEOUS EVERY 12 HOURS
Status: DISCONTINUED | OUTPATIENT
Start: 2023-10-08 | End: 2023-10-10 | Stop reason: HOSPADM

## 2023-10-08 RX ORDER — POTASSIUM CHLORIDE 750 MG/1
40 TABLET, FILM COATED, EXTENDED RELEASE ORAL EVERY 4 HOURS
Status: COMPLETED | OUTPATIENT
Start: 2023-10-08 | End: 2023-10-08

## 2023-10-08 RX ADMIN — POTASSIUM CHLORIDE 40 MEQ: 750 TABLET, EXTENDED RELEASE ORAL at 07:55

## 2023-10-08 RX ADMIN — CEFTRIAXONE 2000 MG: 2 INJECTION, POWDER, FOR SOLUTION INTRAMUSCULAR; INTRAVENOUS at 11:41

## 2023-10-08 RX ADMIN — ASPIRIN 81 MG: 81 TABLET, COATED ORAL at 08:49

## 2023-10-08 RX ADMIN — HYDRALAZINE HYDROCHLORIDE 25 MG: 25 TABLET, FILM COATED ORAL at 21:30

## 2023-10-08 RX ADMIN — METRONIDAZOLE 500 MG: 500 TABLET, FILM COATED ORAL at 21:30

## 2023-10-08 RX ADMIN — Medication 1 CAPSULE: at 08:49

## 2023-10-08 RX ADMIN — AMIODARONE HYDROCHLORIDE 100 MG: 200 TABLET ORAL at 08:49

## 2023-10-08 RX ADMIN — POTASSIUM CHLORIDE 40 MEQ: 750 TABLET, EXTENDED RELEASE ORAL at 11:41

## 2023-10-08 RX ADMIN — LEVOTHYROXINE SODIUM 112 MCG: 0.11 TABLET ORAL at 05:29

## 2023-10-08 RX ADMIN — Medication 10 ML: at 08:49

## 2023-10-08 RX ADMIN — ENOXAPARIN SODIUM 100 MG: 100 INJECTION SUBCUTANEOUS at 16:21

## 2023-10-08 RX ADMIN — ROSUVASTATIN CALCIUM 5 MG: 5 TABLET, FILM COATED ORAL at 08:49

## 2023-10-08 RX ADMIN — METRONIDAZOLE 500 MG: 500 TABLET, FILM COATED ORAL at 13:44

## 2023-10-08 RX ADMIN — WARFARIN 7.5 MG: 7.5 TABLET ORAL at 22:51

## 2023-10-08 RX ADMIN — HYDROCODONE BITARTRATE AND ACETAMINOPHEN 1 TABLET: 5; 325 TABLET ORAL at 05:29

## 2023-10-08 RX ADMIN — HYDRALAZINE HYDROCHLORIDE 25 MG: 25 TABLET, FILM COATED ORAL at 16:20

## 2023-10-08 RX ADMIN — HYDROCODONE BITARTRATE AND ACETAMINOPHEN 1 TABLET: 5; 325 TABLET ORAL at 21:31

## 2023-10-08 RX ADMIN — METRONIDAZOLE 500 MG: 500 TABLET, FILM COATED ORAL at 05:29

## 2023-10-08 RX ADMIN — PANTOPRAZOLE SODIUM 40 MG: 40 TABLET, DELAYED RELEASE ORAL at 21:31

## 2023-10-08 RX ADMIN — Medication 10 ML: at 21:33

## 2023-10-08 NOTE — PROGRESS NOTES
"[facility] Clinical Pharmacy Services: Enoxaparin Consult    David KHAN Nahomi Sharp. has a pharmacy consult to dose full-dose enoxaparin per Pardo's request.     Indication: A Fib - requiring full anticoagulation  Home Anticoagulation:      Relevant clinical data and objective history reviewed:  83 y.o. male 182.9 cm (72\") 101 kg (223 lb 1.7 oz)   Body mass index is 30.26 kg/mý.   Results from last 7 days   Lab Units 10/08/23  0441   PLATELETS 10*3/mm3 170     Estimated Creatinine Clearance: 111.2 mL/min (A) (by C-G formula based on SCr of 0.63 mg/dL (L)).    Assessment/Plan    Will start patient on  100 mg (1mg/kg) subcutaneous every 12 hours, adjusted for renal function. Consult order will be discontinued but pharmacy will continue to follow.     Joon Carroll [facility]  Clinical Pharmacist    "

## 2023-10-08 NOTE — PROGRESS NOTES
ID note for sepsis  S: AF.  He is having quite a bit of pain around 2      Physical Exam:   Vital Signs   Temp:  [97.9 øF (36.6 øC)-99.6 øF (37.6 øC)] 97.9 øF (36.6 øC)  Heart Rate:  [66-95] 76  Resp:  [16-18] 18  BP: (110-156)/() 141/79    GENERAL: Awake and alert, sickly  HEENT: Oropharynx is clear. Hearing is grossly normal.   EYES: . No conjunctival injection. No lid lag.   LUNGS:normal respiratory effort.   SKIN: no cutaneous eruptions in exposed areas  Tender to palpation right upper quadrant  PSYCHIATRIC: Appropriate mood, affect, insight, and judgment.     Results Review:  White count 7.95, hemoglobin 10, platelets 170  Creatinine 0.63        10/5 blood cultures ngtd  10/5 IR biliary cx: E. coli resistant to ampicillin and ampicillin sulbactam    A/p  1.  Septic shock secondary #2 (shock resolved)  2.  Acute cholecystitis    IR drain placed 10/5  Culture with E. coli.  Continue Rocephin 2 g IV every 24 hours and metronidazole 500 mg p.o. every 8 hours x2more days.  Can ceftriaxone to Omnicef 300 mg p.o. twice daily if discharged desired today  Surgery plans delayed cholecystectomy in 6 to 8 weeks.

## 2023-10-08 NOTE — PROGRESS NOTES
Colorectal & General Surgery  Progress Note    Patient: David Comer Sr.  YOB: 1940  MRN: [id number]      Assessment  David Comer Sr. is a 83 y.o. male with acute calculus cholecystitis complicated by septic shock now status post percutaneous cholecystostomy tube.  He continues to improve.  My recommendations are as follows:    Plan  Advance diet to regular food today  Continue cholecystostomy tube to bulb suction  We will plan for outpatient elective laparoscopic cholecystectomy in 6 to 8 weeks.  Cholecystostomy tube will remain in place until that time.  Okay for antiplatelet and/or anticoagulant today      Subjective  No acute events overnight.  Tolerating full liquid diet well.  Pain seems well controlled except for some soreness around the drain site.    Objective    Vitals:    10/08/23 0758   BP: 141/79   Pulse:    Resp: 18   Temp: 97.9 øF (36.6 øC)   SpO2:        Physical Exam  Constitutional: Well-developed well-nourished, no acute distress  Neck: Supple, trachea midline  Respiratory: No increased work of breathing, Symmetric excursion  Cardiovascular: Well pefursed, no jugular venous distention evident   Abdominal: Soft, appropriately tender, nondistended, percutaneous cholecystostomy tube is bilious   Skin: Warm, dry,no rash on visualized skin surfaces  Psychiatric: Alert and oriented x3, normal affect     Laboratory Results  I have personally reviewed CBC with WBC 7, hemoglobin 10, platelets 170.  CMP with total bilirubin 0.3, AST 11, ALT 10, creatinine 0.63.    Radiology  None         Omar Quintanilla MD  Colorectal & General Surgery  [facility]    [address], [address]  [place], [place], [address]  P: [phone]  F: [phone]

## 2023-10-08 NOTE — PLAN OF CARE
Goal Outcome Evaluation:  Plan of Care Reviewed With: patient        Progress: improving  Outcome Evaluation: VSS, minimal complaints of pain.  Pain treated with PRN pain medication.  Pt alert and oriented.  Up with assist x1 to the bathroom.  Phos replaced.  Pt has slept well this evening.

## 2023-10-08 NOTE — PROGRESS NOTES
"                                              LOS: 5 days   Patient Care Team:  Chacha Pineda MD as PCP - General (Family Medicine)  Harlan Downing MD as Consulting Physician (Cardiology)  Elliott Chawla MD as Consulting Physician (Otolaryngology)    Chief Complaint:  F/up sepsis, cholecystitis    Subjective   Interval History  On RA.  Denies dyspnea or cough.  Continues to have mild loose stool and pain at the site of the cholecystostomy tube.  BP is elevated.    REVIEW OF SYSTEMS:   CARDIOVASCULAR: No chest pain, chest pressure or chest discomfort. No palpitations or edema.   RESPIRATORY: On RA.  Denies dyspnea or cough.  CONSTITUTIONAL: No fever or chills.     Ventilator/Non-Invasive Ventilation Settings (From admission, onward)      None                  Physical Exam:     Vital Signs  Temp:  [97.9 øF (36.6 øC)-98.2 øF (36.8 øC)] 98.1 øF (36.7 øC)  Heart Rate:  [76-95] 76  Resp:  [16-18] 16  BP: (136-156)/() 149/100    Intake/Output Summary (Last 24 hours) at 10/8/2023 1409  Last data filed at 10/8/2023 1300  Gross per 24 hour   Intake 900 ml   Output 255 ml   Net 645 ml     Flowsheet Rows      Flowsheet Row First Filed Value   Admission Height 182.9 cm (72\") Documented at 10/02/2023 1908   Admission Weight 93 kg (205 lb) Documented at 10/02/2023 1908            PPE used per hospital policy    General Appearance:   Alert, cooperative, in no acute distress   ENMT:  Mallampati score 3, moist mucous membrane   Eyes:  Pupils equal and reactive to light. EOMI   Neck:   Large. Trachea midline. No thyromegaly.   Lungs:   Clear on anterior auscultation with no crackles or wheezing.    Heart:   Regular rhythm and normal rate, normal S1 and S2, no         murmur   Skin:   No rash or ecchymosis   Abdomen:   Cholecystostomy drain noted.  Soft. No tenderness. No HSM.   Neuro/psych:  Conscious, alert, oriented x3. Strength 5/5 in upper and lower  ext.  Appropriate mood and affect   Extremities:  No " cyanosis, clubbing or edema.  Warm extremities and well-perfused          Results Review:        Results from last 7 days   Lab Units 10/08/23  0441 10/07/23  0420 10/07/23  0006 10/06/23  1310 10/06/23  0334   SODIUM mmol/L 139 139  --   --  141   POTASSIUM mmol/L 3.5 3.9 4.0   < > 3.2*   CHLORIDE mmol/L 108* 109*  --   --  110*   CO2 mmol/L 23.7 23.0  --   --  25.0   BUN mg/dL 10 17  --   --  24*   CREATININE mg/dL 0.63* 0.77  --   --  0.84   GLUCOSE mg/dL 93 76  --   --  94   CALCIUM mg/dL 7.9* 7.9*  --   --  7.9*    < > = values in this interval not displayed.     Results from last 7 days   Lab Units 10/05/23  0224 10/04/23  0802 10/04/23  0338   HSTROP T ng/L 27* 28* 30*     Results from last 7 days   Lab Units 10/08/23  0441 10/07/23  0420 10/06/23  0334   WBC 10*3/mm3 7.95 10.03 9.85   HEMOGLOBIN g/dL 10.3* 10.4* 9.7*   HEMATOCRIT % 30.6* 31.4* 28.5*   PLATELETS 10*3/mm3 170 150 148     Results from last 7 days   Lab Units 10/08/23  0441 10/07/23  0420 10/06/23  1044   INR  1.59* 1.65* 1.89*                               I reviewed the patient's new clinical results.        Medication Review:   amiodarone, 100 mg, Oral, Daily  aspirin, 81 mg, Oral, Daily  cefTRIAXone, 2,000 mg, Intravenous, Q24H  enoxaparin, 40 mg, Subcutaneous, Q24H  lactobacillus acidophilus, 1 capsule, Oral, Daily  levothyroxine, 112 mcg, Oral, Daily  metroNIDAZOLE, 500 mg, Oral, Q8H  pantoprazole, 40 mg, Oral, Nightly   Or  pantoprazole, 40 mg, Intravenous, Nightly  rosuvastatin, 5 mg, Oral, Daily  senna-docusate sodium, 2 tablet, Oral, BID  sodium chloride, 10 mL, Intravenous, Q12H               Diagnostic imaging:  I personally and independently reviewed the following images:  CT abdomen and chest dated 10/5/2023: Agree with radiologist interpretation.  Findings as below in assessment.    Assessment     Severe sepsis with hypotension, sepsis status resolved  Acute cholecystitis, s/p CT-guided drain placement 10/5, culture consistent  with E. Coli  Pulmonary infiltrates on CXR 10/6  Bilateral lower lobe dependent consolidations, likely atelectasis.  Is trace bilateral pleural effusion  Electrolytes disturbance: Hypophosphatemia  Diarrhea, secondary to antibiotics and bile diversion      HTN  PAF  Hypothyroidism          Plan     Antibiotics with Rocephin and metronidazole.  ID following  Resumed hydralazine 25 mg 3 times daily as blood pressures currently increased.  He has 2 other blood pressure medications which can be resumed 1 at that time if blood pressure continues to be elevated  Lovenox for DVT prophylaxis but will change to therapeutic dose since the anticoagulation was okayed by surgery.    Continue amiodarone  Advance diet to regular  Probiotic for diarrhea  Incentive spirometry    As medicine team is taking over then we will see the patient only on an as needed basis.    Marilu Cordero MD  10/08/23  14:09 EDT            This note was dictated utilizing Dragon dictation

## 2023-10-08 NOTE — PROGRESS NOTES
Name: David KHAN Age Sr. ADMIT: 10/2/2023   : 1940  PCP: Chacha Pineda MD    MRN: [id number] LOS: 5 days   AGE/SEX: 83 y.o. male  ROOM: [facility]     Subjective   Subjective   Patient is lying on the bed and is in no major distress.  Denies nausea, vomiting, abdominal pain, chest pain.       Objective   Objective   Vital Signs  Temp:  [97.9 øF (36.6 øC)-98.2 øF (36.8 øC)] 98.1 øF (36.7 øC)  Heart Rate:  [76-95] 76  Resp:  [16-18] 16  BP: (136-156)/() 149/100  SpO2:  [97 %] 97 %  on   ;   Device (Oxygen Therapy): room air  Body mass index is 30.26 kg/mý.  Physical Exam  HEENT: PERRLA, extraocular movements intact, Scleras no icterus  Neck: Supple, no JVD  Cardiovascular: Regular rate and rhythm with normal S1 and S2  GI: Soft, nontender, bowel sounds are present, mildly tender in the right upper quadrant and has a drain tube in place  Extremities: No edema, palpable pulses  Neurologic: Grossly nonfocal, no facial asymmetry    Results Review     I reviewed the patient's new clinical results.  Results from last 7 days   Lab Units 10/08/23  0441 10/07/23  0420 10/06/23  0334 10/05/23  1406   WBC 10*3/mm3 7.95 10.03 9.85 10.89*   HEMOGLOBIN g/dL 10.3* 10.4* 9.7* 12.2*   PLATELETS 10*3/mm3 170 150 148 188     Results from last 7 days   Lab Units 10/08/23  1459 10/08/23  0441 10/07/23  0420 10/07/23  0006 10/06/23  1310 10/06/23  0334 10/05/23  0624 10/05/23  0224   SODIUM mmol/L  --  139 139  --   --  141  --  135*   POTASSIUM mmol/L 4.0 3.5 3.9 4.0   < > 3.2*   < > 3.2*  3.2*   CHLORIDE mmol/L  --  108* 109*  --   --  110*  --  100   CO2 mmol/L  --  23.7 23.0  --   --  25.0  --  24.0   BUN mg/dL  --  10 17  --   --  24*  --  31*   CREATININE mg/dL  --  0.63* 0.77  --   --  0.84  --  1.24   GLUCOSE mg/dL  --  93 76  --   --  94  --  114*   EGFR mL/min/1.73  --  95.0 89.4  --   --  87.1  --  58.0*    < > = values in this interval not displayed.     Results from last 7 days   Lab Units 10/08/23  8362  "10/07/23  0420 10/06/23  0334 10/04/23  0338   ALBUMIN g/dL 2.2* 2.2* 2.5* 3.2*   BILIRUBIN mg/dL 0.3 0.5 0.4 1.1   ALK PHOS U/L 53 51 47 43   AST (SGOT) U/L 11 11 16 11   ALT (SGPT) U/L 10 12 12 11     Results from last 7 days   Lab Units 10/08/23  0441 10/07/23  1223 10/07/23  0420 10/07/23  0006 10/06/23  1310 10/06/23  0334 10/05/23  1737 10/05/23  0624 10/05/23  0224 10/04/23  0338   CALCIUM mg/dL 7.9*  --  7.9*  --   --  7.9*  --   --  8.4* 8.4*   ALBUMIN g/dL 2.2*  --  2.2*  --   --  2.5*  --   --   --  3.2*   MAGNESIUM mg/dL  --   --  1.8  --   --  2.5* 2.8*  --  1.5*  --    PHOSPHORUS mg/dL 2.3* 2.2*  --  1.5* 1.3* 2.1* 2.3*   < > 1.6*  --     < > = values in this interval not displayed.     Results from last 7 days   Lab Units 10/05/23  0423 10/05/23  0224   PROCALCITONIN ng/mL  --  1.38*   LACTATE mmol/L 1.9  --      No results found for: \"HGBA1C\", \"POCGLU\"      No radiology results for the last day    I have personally reviewed all medications:  Scheduled Medications  amiodarone, 100 mg, Oral, Daily  aspirin, 81 mg, Oral, Daily  cefTRIAXone, 2,000 mg, Intravenous, Q24H  enoxaparin, 1 mg/kg, Subcutaneous, Q12H  hydrALAZINE, 25 mg, Oral, Q8H  lactobacillus acidophilus, 1 capsule, Oral, Daily  levothyroxine, 112 mcg, Oral, Daily  metroNIDAZOLE, 500 mg, Oral, Q8H  pantoprazole, 40 mg, Oral, Nightly   Or  pantoprazole, 40 mg, Intravenous, Nightly  rosuvastatin, 5 mg, Oral, Daily  senna-docusate sodium, 2 tablet, Oral, BID  sodium chloride, 10 mL, Intravenous, Q12H    Infusions   Diet  Diet: Regular/House Diet; Fluid Consistency: Thin (IDDSI 0)    I have personally reviewed:  [x]  Laboratory   [x]  Microbiology   [x]  Radiology   [x]  EKG/Telemetry  [x]  Cardiology/Vascular   []  Pathology    []  Records       Assessment/Plan     Active Hospital Problems    Diagnosis  POA    **Epigastric pain [R10.13]  Yes    Chest pain [R07.9]  Yes    Acute cholecystitis [K81.0]  Unknown    BPH without obstruction/lower " urinary tract symptoms [N40.0]  Yes    Long term (current) use of anticoagulants [Z79.01]  Not Applicable    Acquired hypothyroidism [E03.9]  Yes    Abdominal aortic aneurysm [I71.40]  Yes    Chronic coronary artery disease [I25.10]  Yes    Essential hypertension [I10]  Yes    Paroxysmal atrial fibrillation [I48.0]  Yes      Resolved Hospital Problems   No resolved problems to display.       83 y.o. male admitted with Epigastric pain.    Severe sepsis with hypotension, this is now resolved.    2.  Acute cholecystitis, CT-guided drain tube placement on 10/5.  Cultures grew E. coli and patient is currently on Rocephin and Flagyl which will be continued.  Tolerated soft diet therefore has been advanced to regular.  Surgical team plans on outpatient elective laparoscopic cholecystectomy in 6 to 8 weeks.  Cholecystostomy tube to remain in place until then.    3.  Bilateral pulmonary infiltrates most likely secondary atelectasis.  Currently not hypoxic and does not have any pleuritic chest pain.    4.  Diarrhea, has been improving.    5.  History of coronary artery disease, continue with aspirin, statins.    6.  Hypothyroidism, on Synthroid.    7.  Hypophosphatemia, replaced.    8.  History of atrial fibrillation, on aspirin and amiodarone.  Surgical team has cleared to resume warfarin and will request pharmacy to dose.    9.  On SCDs for DVT prophylaxis.    10.  CODE STATUS is full code.    11.  Estimated discharge date, next 1 to 2 days.  Rehab versus home based on physical therapy's assessment.    Copied text on this note has been reviewed by me on 10/8/2023      Magan Pardo MD  [facility]  10/08/23  17:43 EDT

## 2023-10-09 LAB
ALBUMIN SERPL-MCNC: 2.3 G/DL (ref 3.5–5.2)
ALBUMIN/GLOB SERPL: 0.9 G/DL
ALP SERPL-CCNC: 50 U/L (ref 39–117)
ALT SERPL W P-5'-P-CCNC: 10 U/L (ref 1–41)
ANION GAP SERPL CALCULATED.3IONS-SCNC: 7.6 MMOL/L (ref 5–15)
AST SERPL-CCNC: 11 U/L (ref 1–40)
BASOPHILS # BLD AUTO: 0.03 10*3/MM3 (ref 0–0.2)
BASOPHILS NFR BLD AUTO: 0.5 % (ref 0–1.5)
BILIRUB SERPL-MCNC: 0.2 MG/DL (ref 0–1.2)
BUN SERPL-MCNC: 8 MG/DL (ref 8–23)
BUN/CREAT SERPL: 13.1 (ref 7–25)
CALCIUM SPEC-SCNC: 8.3 MG/DL (ref 8.6–10.5)
CHLORIDE SERPL-SCNC: 104 MMOL/L (ref 98–107)
CO2 SERPL-SCNC: 23.4 MMOL/L (ref 22–29)
CREAT SERPL-MCNC: 0.61 MG/DL (ref 0.76–1.27)
DEPRECATED RDW RBC AUTO: 41.3 FL (ref 37–54)
EGFRCR SERPLBLD CKD-EPI 2021: 95.9 ML/MIN/1.73
EOSINOPHIL # BLD AUTO: 0.21 10*3/MM3 (ref 0–0.4)
EOSINOPHIL NFR BLD AUTO: 3.3 % (ref 0.3–6.2)
ERYTHROCYTE [DISTWIDTH] IN BLOOD BY AUTOMATED COUNT: 11.7 % (ref 12.3–15.4)
GLOBULIN UR ELPH-MCNC: 2.7 GM/DL
GLUCOSE SERPL-MCNC: 88 MG/DL (ref 65–99)
HCT VFR BLD AUTO: 32.2 % (ref 37.5–51)
HGB BLD-MCNC: 10.7 G/DL (ref 13–17.7)
IMM GRANULOCYTES # BLD AUTO: 0.06 10*3/MM3 (ref 0–0.05)
IMM GRANULOCYTES NFR BLD AUTO: 0.9 % (ref 0–0.5)
INR PPP: 1.55 (ref 0.9–1.1)
LYMPHOCYTES # BLD AUTO: 0.84 10*3/MM3 (ref 0.7–3.1)
LYMPHOCYTES NFR BLD AUTO: 13.2 % (ref 19.6–45.3)
MAGNESIUM SERPL-MCNC: 1.6 MG/DL (ref 1.6–2.4)
MCH RBC QN AUTO: 32.6 PG (ref 26.6–33)
MCHC RBC AUTO-ENTMCNC: 33.2 G/DL (ref 31.5–35.7)
MCV RBC AUTO: 98.2 FL (ref 79–97)
MONOCYTES # BLD AUTO: 0.62 10*3/MM3 (ref 0.1–0.9)
MONOCYTES NFR BLD AUTO: 9.7 % (ref 5–12)
NEUTROPHILS NFR BLD AUTO: 4.61 10*3/MM3 (ref 1.7–7)
NEUTROPHILS NFR BLD AUTO: 72.4 % (ref 42.7–76)
NRBC BLD AUTO-RTO: 0 /100 WBC (ref 0–0.2)
PLATELET # BLD AUTO: 210 10*3/MM3 (ref 140–450)
PMV BLD AUTO: 11 FL (ref 6–12)
POTASSIUM SERPL-SCNC: 3.6 MMOL/L (ref 3.5–5.2)
POTASSIUM SERPL-SCNC: 4.1 MMOL/L (ref 3.5–5.2)
PROT SERPL-MCNC: 5 G/DL (ref 6–8.5)
PROTHROMBIN TIME: 18.9 SECONDS (ref 11.7–14.2)
RBC # BLD AUTO: 3.28 10*6/MM3 (ref 4.14–5.8)
SODIUM SERPL-SCNC: 135 MMOL/L (ref 136–145)
WBC NRBC COR # BLD: 6.37 10*3/MM3 (ref 3.4–10.8)

## 2023-10-09 PROCEDURE — 25010000002 CEFTRIAXONE PER 250 MG: Performed by: INTERNAL MEDICINE

## 2023-10-09 PROCEDURE — 99232 SBSQ HOSP IP/OBS MODERATE 35: CPT | Performed by: INTERNAL MEDICINE

## 2023-10-09 PROCEDURE — 99231 SBSQ HOSP IP/OBS SF/LOW 25: CPT | Performed by: SURGERY

## 2023-10-09 PROCEDURE — 25010000002 ENOXAPARIN PER 10 MG: Performed by: INTERNAL MEDICINE

## 2023-10-09 PROCEDURE — 80053 COMPREHEN METABOLIC PANEL: CPT | Performed by: INTERNAL MEDICINE

## 2023-10-09 PROCEDURE — 85610 PROTHROMBIN TIME: CPT | Performed by: INTERNAL MEDICINE

## 2023-10-09 PROCEDURE — 25010000002 FUROSEMIDE PER 20 MG: Performed by: INTERNAL MEDICINE

## 2023-10-09 PROCEDURE — 83735 ASSAY OF MAGNESIUM: CPT | Performed by: INTERNAL MEDICINE

## 2023-10-09 PROCEDURE — 97162 PT EVAL MOD COMPLEX 30 MIN: CPT

## 2023-10-09 PROCEDURE — 84132 ASSAY OF SERUM POTASSIUM: CPT | Performed by: INTERNAL MEDICINE

## 2023-10-09 PROCEDURE — 85025 COMPLETE CBC W/AUTO DIFF WBC: CPT | Performed by: INTERNAL MEDICINE

## 2023-10-09 PROCEDURE — 97530 THERAPEUTIC ACTIVITIES: CPT

## 2023-10-09 RX ORDER — POTASSIUM CHLORIDE 750 MG/1
40 TABLET, FILM COATED, EXTENDED RELEASE ORAL EVERY 4 HOURS
Status: COMPLETED | OUTPATIENT
Start: 2023-10-09 | End: 2023-10-09

## 2023-10-09 RX ORDER — METOPROLOL SUCCINATE 25 MG/1
12.5 TABLET, EXTENDED RELEASE ORAL
Status: DISCONTINUED | OUTPATIENT
Start: 2023-10-09 | End: 2023-10-10 | Stop reason: HOSPADM

## 2023-10-09 RX ORDER — WARFARIN SODIUM 7.5 MG/1
7.5 TABLET ORAL
Status: COMPLETED | OUTPATIENT
Start: 2023-10-09 | End: 2023-10-09

## 2023-10-09 RX ORDER — FUROSEMIDE 10 MG/ML
40 INJECTION INTRAMUSCULAR; INTRAVENOUS EVERY 12 HOURS
Status: DISCONTINUED | OUTPATIENT
Start: 2023-10-09 | End: 2023-10-10

## 2023-10-09 RX ADMIN — POTASSIUM CHLORIDE 40 MEQ: 750 TABLET, EXTENDED RELEASE ORAL at 18:36

## 2023-10-09 RX ADMIN — FUROSEMIDE 40 MG: 10 INJECTION, SOLUTION INTRAMUSCULAR; INTRAVENOUS at 14:50

## 2023-10-09 RX ADMIN — Medication 1 CAPSULE: at 10:46

## 2023-10-09 RX ADMIN — AMIODARONE HYDROCHLORIDE 100 MG: 200 TABLET ORAL at 10:45

## 2023-10-09 RX ADMIN — Medication 10 ML: at 21:30

## 2023-10-09 RX ADMIN — ENOXAPARIN SODIUM 100 MG: 100 INJECTION SUBCUTANEOUS at 03:33

## 2023-10-09 RX ADMIN — Medication 10 ML: at 14:54

## 2023-10-09 RX ADMIN — HYDRALAZINE HYDROCHLORIDE 25 MG: 25 TABLET, FILM COATED ORAL at 05:48

## 2023-10-09 RX ADMIN — ENOXAPARIN SODIUM 100 MG: 100 INJECTION SUBCUTANEOUS at 14:50

## 2023-10-09 RX ADMIN — METRONIDAZOLE 500 MG: 500 TABLET, FILM COATED ORAL at 14:50

## 2023-10-09 RX ADMIN — HYDRALAZINE HYDROCHLORIDE 25 MG: 25 TABLET, FILM COATED ORAL at 14:50

## 2023-10-09 RX ADMIN — LEVOTHYROXINE SODIUM 112 MCG: 0.11 TABLET ORAL at 05:48

## 2023-10-09 RX ADMIN — PANTOPRAZOLE SODIUM 40 MG: 40 TABLET, DELAYED RELEASE ORAL at 21:30

## 2023-10-09 RX ADMIN — METOPROLOL SUCCINATE 12.5 MG: 25 TABLET, EXTENDED RELEASE ORAL at 18:36

## 2023-10-09 RX ADMIN — POTASSIUM CHLORIDE 40 MEQ: 750 TABLET, EXTENDED RELEASE ORAL at 14:49

## 2023-10-09 RX ADMIN — ROSUVASTATIN CALCIUM 5 MG: 5 TABLET, FILM COATED ORAL at 10:46

## 2023-10-09 RX ADMIN — METRONIDAZOLE 500 MG: 500 TABLET, FILM COATED ORAL at 05:48

## 2023-10-09 RX ADMIN — WARFARIN 7.5 MG: 7.5 TABLET ORAL at 18:36

## 2023-10-09 RX ADMIN — HYDRALAZINE HYDROCHLORIDE 25 MG: 25 TABLET, FILM COATED ORAL at 21:30

## 2023-10-09 RX ADMIN — ASPIRIN 81 MG: 81 TABLET, COATED ORAL at 10:44

## 2023-10-09 RX ADMIN — METRONIDAZOLE 500 MG: 500 TABLET, FILM COATED ORAL at 21:30

## 2023-10-09 RX ADMIN — CEFTRIAXONE 2000 MG: 2 INJECTION, POWDER, FOR SOLUTION INTRAMUSCULAR; INTRAVENOUS at 10:46

## 2023-10-09 NOTE — PROGRESS NOTES
Colorectal & General Surgery  Progress Note    Patient: David Comer Sr.  YOB: 1940  MRN: [id number]      Assessment  David Comer Sr. is a 83 y.o. male with acute calculus cholecystitis with associated septic shock now status post percutaneous cholecystostomy tube placement.  Continues to do well.  Okay for discharge from surgical perspective.  I will have him follow-up in my office in 2 to 3 weeks and we will plan for laparoscopic cholecystectomy and cholecystostomy tube removal in 6 to 8 weeks.      He should continue to drain the cholecystostomy tube.    Antibiotics per infectious disease.      Recommend home health.    Subjective  Feels well.  Tolerating diet.  Walked around the nursing station today.  Denies significant pain.    Objective    Vitals:    10/09/23 1246   BP: 168/98   Pulse: 88   Resp: 16   Temp: 98.4 øF (36.9 øC)   SpO2: 99%       Physical Exam  Constitutional: Well-developed well-nourished, no acute distress  Neck: Supple, trachea midline  Respiratory: No increased work of breathing, Symmetric excursion  Cardiovascular: Well pefursed, no jugular venous distention evident   Abdominal: Cholecystostomy tube is bilious.  Soft, non-tender, non-distended  Skin: Warm, dry, no rash on visualized skin surfaces  Psychiatric: Alert and oriented x3, normal affect     Laboratory Results  I have personally reviewed CBC with WBC 6, hemoglobin 10, platelets 210.  INR 1.55.  Creatinine 0.61, potassium 3.6, total bilirubin 0.2, AST 11, ALT 10, alkaline phosphatase 50.         Omar Quintanilla MD  Colorectal & General Surgery  [facility]    [address], [address]  [place], [place], [facility]  P: [phone]  F: [phone]

## 2023-10-09 NOTE — THERAPY EVALUATION
Patient Name: David KHAN Age Sr.  : 1940    MRN: [id number]                              Today's Date: 10/9/2023       Admit Date: 10/2/2023    Visit Dx:     ICD-10-CM ICD-9-CM   1. Chest pain, unspecified type  R07.9 786.50   2. Hiatal hernia  K44.9 553.3   3. Gallstones  K80.20 574.20   4. Acute cholecystitis  K81.0 575.0     Patient Active Problem List   Diagnosis    Chronic coronary artery disease    Abdominal aortic aneurysm    Paroxysmal atrial fibrillation    Gastroesophageal reflux disease    Essential hypertension    Mixed hyperlipidemia    Hypogonadism in male    Acquired hypothyroidism    Osteoarthritis of spine    Vitamin D deficiency    Long term (current) use of anticoagulants    Atrial fibrillation, rapid    Statin intolerance    Gross hematuria    Coumadin toxicity    H/O amiodarone therapy    Dehydration    Renal insufficiency    Thoracic aortic aneurysm without rupture    Refused influenza vaccine    Medicare annual wellness visit, subsequent    BPH without obstruction/lower urinary tract symptoms    Thoracic aortic aneurysm    Kidney stone    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    Arthritis    SOB (shortness of breath)    Fatigue    Abnormal nuclear stress test    Acute left-sided low back pain without sciatica    Lt groin pain    Stool incontinence    Hospital discharge follow-up    Chest pain, unspecified type    Generalized weakness    Iron deficiency anemia secondary to inadequate dietary iron intake    Epigastric pain    Chest pain    Acute cholecystitis     Past Medical History:   Diagnosis Date    AAA (abdominal aortic aneurysm) without rupture     Aneurysm of thoracic aorta     CT CHEST 2021 [facility]     Arthritis     Basal cell carcinoma     RIGHT LOWER LID     BPH (benign prostatic hyperplasia)     Chronic lumbar pain     Degenerative arthritis of lumbar spine     Degenerative cervical disc     Disease of thyroid gland     Elevated rheumatoid factor      History of atrial fibrillation     Hyperlipidemia     Hypertension     Hypogonadism in male     Muscle strain of left upper back     PRESCRIBED PREDNISONE DOSE PACK     On anticoagulant therapy     Pulmonary nodule     6 MM - REPEAT CT SCAN IN ONE YEAR, WATCHING     Refused influenza vaccine     Scoliosis     Vitamin D deficiency      Past Surgical History:   Procedure Laterality Date    ANKLE FUSION Right     CARDIAC CATHETERIZATION      CARDIAC CATHETERIZATION N/A 7/2/2021    Procedure: Left Heart Cath;  Surgeon: Harlan Downing MD;  Location: [facility] CATH INVASIVE LOCATION;  Service: Cardiology;  Laterality: N/A;    CARDIAC CATHETERIZATION N/A 7/2/2021    Procedure: Coronary angiography;  Surgeon: Harlan Downing MD;  Location: [facility] CATH INVASIVE LOCATION;  Service: Cardiology;  Laterality: N/A;    CARDIAC CATHETERIZATION N/A 7/2/2021    Procedure: Left ventriculography;  Surgeon: Harlan Downing MD;  Location: [facility] CATH INVASIVE LOCATION;  Service: Cardiology;  Laterality: N/A;    CARDIAC CATHETERIZATION N/A 12/30/2022    Procedure: Left Heart Cath check inr;  Surgeon: Harlan Downing MD;  Location: [facility] CATH INVASIVE LOCATION;  Service: Cardiology;  Laterality: N/A;    CARDIAC CATHETERIZATION N/A 12/30/2022    Procedure: Coronary angiography;  Surgeon: Harlan Downing MD;  Location: [facility] CATH INVASIVE LOCATION;  Service: Cardiology;  Laterality: N/A;    CARDIAC CATHETERIZATION N/A 12/30/2022    Procedure: Left ventriculography;  Surgeon: Harlan Downing MD;  Location: [facility] CATH INVASIVE LOCATION;  Service: Cardiology;  Laterality: N/A;    HOBBS TUBE INSERTION Right 10/19/2021    Procedure: RIGHT SECOND STAGE CAST TAKEDOWN RECONSTRUCTION;  Surgeon: Brian Bhatt MD;  Location: [facility] OR [facility] [place];  Service: Ophthalmology;  Laterality: Right;    ENTROPIAN REPAIR Right 9/21/2021    Procedure: RIGHT LOWER LID EXCISION OF BASAL CELL CARCINOMA WITH FROZEN  SECTION RIGHT LOWER LID RECONSTRUCTION WITH CAST FLAP, REPAIR OF CANULICULIS, AND FULL THICKNESS SKIN GRAFT;  Surgeon: Brian Bhatt MD;  Location: [facility] [place] [facility] [place];  Service: Ophthalmology;  Laterality: Right;    INGUINAL HERNIA REPAIR Right     REPLACEMENT TOTAL KNEE Bilateral 2000    ROTATOR CUFF REPAIR Left     ROTATOR CUFF REPAIR Right     SKIN BIOPSY      VASECTOMY        General Information       Row Name 10/09/23 1006          Physical Therapy Time and Intention    Document Type evaluation  -CB     Mode of Treatment individual therapy;physical therapy  -CB       Row Name 10/09/23 1006          General Information    Patient Profile Reviewed yes  -CB     Prior Level of Function independent:;transfer;gait;ADL's;bed mobility  -CB     Existing Precautions/Restrictions fall  -CB     Barriers to Rehab none identified  -CB       Row Name 10/09/23 1006          Living Environment    People in Home child(angie), adult  -CB       Row Name 10/09/23 1006          Home Main Entrance    Number of Stairs, Main Entrance none  -CB       Row Name 10/09/23 1006          Stairs Within Home, Primary    Number of Stairs, Within Home, Primary none  -CB       Row Name 10/09/23 1006          Cognition    Orientation Status (Cognition) oriented x 3  -CB       Row Name 10/09/23 1006          Safety Issues, Functional Mobility    Impairments Affecting Function (Mobility) endurance/activity tolerance;strength  -CB               User Key  (r) = Recorded By, (t) = Taken By, (c) = Cosigned By      Initials Name Provider Type    CB Kathy Pérez PT Physical Therapist                   Mobility       Row Name 10/09/23 1010          Bed Mobility    Bed Mobility supine-sit;sit-supine  -CB     Supine-Sit [place] (Bed Mobility) standby assist  -CB     Sit-Supine [place] (Bed Mobility) standby assist  -CB     Assistive Device (Bed Mobility) head of bed elevated  -CB       Row Name 10/09/23 1010          Sit-Stand Transfer     Sit-Stand [place] (Transfers) contact guard;minimum assist (75% patient effort);verbal cues  -CB     Assistive Device (Sit-Stand Transfers) walker, front-wheeled  -CB     Comment, (Sit-Stand Transfer) x2 STS reps  -CB       Row Name 10/09/23 1010          Gait/Stairs (Locomotion)    [place] Level (Gait) contact guard;verbal cues  -CB     Assistive Device (Gait) walker, front-wheeled  -CB     Distance in Feet (Gait) 150ft  -CB     Deviations/Abnormal Patterns (Gait) gait speed decreased;stride length decreased  -CB     Bilateral Gait Deviations forward flexed posture  -CB     Comment, (Gait/Stairs) cues for upright posture and no LOB noted  -CB               User Key  (r) = Recorded By, (t) = Taken By, (c) = Cosigned By      Initials Name Provider Type    Kathy Mena, PT Physical Therapist                   Obj/Interventions       Row Name 10/09/23 1013          Range of Motion Comprehensive    General Range of Motion bilateral lower extremity ROM WFL  -CB       Row Name 10/09/23 1013          Strength Comprehensive (MMT)    General Manual Muscle Testing (MMT) Assessment lower extremity strength deficits identified  -CB     Comment, General Manual Muscle Testing (MMT) Assessment generalized LE weakness  -CB       Row Name 10/09/23 1013          Balance    Balance Assessment standing static balance;standing dynamic balance  -CB     Static Standing Balance contact guard  -CB     Dynamic Standing Balance contact guard  -CB     Position/Device Used, Standing Balance supported;walker, front-wheeled  -CB       Row Name 10/09/23 1013          Sensory Assessment (Somatosensory)    Sensory Assessment (Somatosensory) sensation intact  -CB               User Key  (r) = Recorded By, (t) = Taken By, (c) = Cosigned By      Initials Name Provider Type    Kathy Mena, PT Physical Therapist                   Goals/Plan       Row Name 10/09/23 1015          Bed Mobility Goal 1 (PT)    Activity/Assistive Device  (Bed Mobility Goal 1, PT) bed mobility activities, all  -CB     [place] Level/Cues Needed (Bed Mobility Goal 1, PT) modified independence  -CB     Time Frame (Bed Mobility Goal 1, PT) long term goal (LTG);1 week  -CB       Row Name 10/09/23 1015          Transfer Goal 1 (PT)    Activity/Assistive Device (Transfer Goal 1, PT) sit-to-stand/stand-to-sit;bed-to-chair/chair-to-bed  -CB     [place] Level/Cues Needed (Transfer Goal 1, PT) standby assist  -CB     Time Frame (Transfer Goal 1, PT) long term goal (LTG);1 week  -CB       Row Name 10/09/23 1015          Gait Training Goal 1 (PT)    Activity/Assistive Device (Gait Training Goal 1, PT) gait (walking locomotion);assistive device use;improve balance and speed;increase endurance/gait distance;decrease fall risk;diminish gait deviation  -CB     [place] Level (Gait Training Goal 1, PT) standby assist  -CB     Distance (Gait Training Goal 1, PT) 200ft  -CB     Time Frame (Gait Training Goal 1, PT) long term goal (LTG);1 week  -CB       Row Name 10/09/23 1015          Therapy Assessment/Plan (PT)    Planned Therapy Interventions (PT) balance training;bed mobility training;gait training;home exercise program;patient/family education;transfer training;strengthening  -CB               User Key  (r) = Recorded By, (t) = Taken By, (c) = Cosigned By      Initials Name Provider Type    CB Kathy Pérez, PT Physical Therapist                   Clinical Impression       Row Name 10/09/23 1014          Pain    Pretreatment Pain Rating 6/10  -CB     Posttreatment Pain Rating 6/10  -CB     Pain Location - Side/Orientation Right  -CB     Pain Location - flank  -CB     Pain Intervention(s) Repositioned;Rest  -CB       Row Name 10/09/23 1014          Plan of Care Review    Plan of Care Reviewed With patient  -CB     Outcome Evaluation Patient is an 83 y.o. male admitted to [facility] for chest pain on 10/2/2023. Pt with  acute calculus cholecystitis and septic shock now status  post percutaneous cholecystostomy tube. PMHx includes CAD, afib, and AAA. Patient is ind at baseline without use of AD and lives with his daughter. Today, patient performed bed mobility with SBA, required CGA/[place] for transfers, and ambulated 150ft using rwx requiring CGA. No LOB noted and cues for upright posture. Pt has rwx to use at dc. Activity tolerance and strength deficits noted. Patient may benefit from skilled PT services to address functional deficits and improve level of independence prior to discharge. Anticipate home with daughter upon DC.  -CB       Row Name 10/09/23 1014          Therapy Assessment/Plan (PT)    Rehab Potential (PT) good, to achieve stated therapy goals  -CB     Criteria for Skilled Interventions Met (PT) yes  -CB     Therapy Frequency (PT) 5 times/wk  -CB       Row Name 10/09/23 1014          Positioning and Restraints    Pre-Treatment Position in bed  -CB     Post Treatment Position bed  -CB     In Bed notified nsg;fowlers;call light within reach;encouraged to call for assist;exit alarm on;side rails up x2  -CB               User Key  (r) = Recorded By, (t) = Taken By, (c) = Cosigned By      Initials Name Provider Type    Kathy Mena, PT Physical Therapist                   Outcome Measures       Row Name 10/09/23 1016          How much help from another person do you currently need...    Turning from your back to your side while in flat bed without using bedrails? 3  -CB     Moving from lying on back to sitting on the side of a flat bed without bedrails? 3  -CB     Moving to and from a bed to a chair (including a wheelchair)? 3  -CB     Standing up from a chair using your arms (e.g., wheelchair, bedside chair)? 3  -CB     Climbing 3-5 steps with a railing? 3  -CB     To walk in hospital room? 3  -CB     AM-PAC 6 Clicks Score (PT) 18  -CB     Highest level of mobility 6 --> Walked 10 steps or more  -CB       Row Name 10/09/23 1016          Functional Assessment    Outcome  Measure Options AM-PAC 6 Clicks Basic Mobility (PT)  -CB               User Key  (r) = Recorded By, (t) = Taken By, (c) = Cosigned By      Initials Name Provider Type    CB Kathy Pérez PT Physical Therapist                                 Physical Therapy Education       Title: PT OT SLP Therapies (In Progress)       Topic: Physical Therapy (In Progress)       Point: Mobility training (Done)       Learning Progress Summary             Patient Acceptance, E,TB, VU,NR by  at 10/9/2023 1017                         Point: Home exercise program (Not Started)       Learner Progress:  Not documented in this visit.              Point: Body mechanics (Done)       Learning Progress Summary             Patient Acceptance, E,TB, VU,NR by  at 10/9/2023 1017                         Point: Precautions (Not Started)       Learner Progress:  Not documented in this visit.                              User Key       Initials Effective Dates Name Provider Type Discipline     10/22/21 -  Kathy Pérez PT Physical Therapist PT                  PT Recommendation and Plan  Planned Therapy Interventions (PT): balance training, bed mobility training, gait training, home exercise program, patient/family education, transfer training, strengthening  Plan of Care Reviewed With: patient  Outcome Evaluation: Patient is an 83 y.o. male admitted to [facility] for chest pain on 10/2/2023. Pt with  acute calculus cholecystitis and septic shock now status post percutaneous cholecystostomy tube. PMHx includes CAD, afib, and AAA. Patient is ind at baseline without use of AD and lives with his daughter. Today, patient performed bed mobility with SBA, required CGA/[place] for transfers, and ambulated 150ft using rwx requiring CGA. No LOB noted and cues for upright posture. Pt has rwx to use at [place]. Activity tolerance and strength deficits noted. Patient may benefit from skilled PT services to address functional deficits and improve level of independence  prior to discharge. Anticipate home with daughter upon DC.     Time Calculation:         PT Charges       Row Name 10/09/23 1022             Time Calculation    Start Time 0816  -CB      Stop Time 0833  -CB      Time Calculation (min) 17 min  -CB      PT Received On 10/09/23  -CB      PT - Next Appointment 10/10/23  -CB      PT Goal Re-Cert Due Date 10/16/23  -CB         Time Calculation- PT    Total Timed Code Minutes- PT 8 minute(s)  -CB         Timed Charges    69696 - PT Therapeutic Activity Minutes 8  -CB         Total Minutes    Timed Charges Total Minutes 8  -CB       Total Minutes 8  -CB                User Key  (r) = Recorded By, (t) = Taken By, (c) = Cosigned By      Initials Name Provider Type    CB Kathy Pérez, PT Physical Therapist                  Therapy Charges for Today       Code Description Service Date Service Provider Modifiers Qty    13617744746 HC PT THERAPEUTIC ACT EA 15 MIN 10/9/2023 Kathy Pérez, PT GP 1    42135823847 HC PT EVAL MOD COMPLEXITY 3 10/9/2023 Kathy Pérez, PT GP 1            PT G-Codes  Outcome Measure Options: AM-PAC 6 Clicks Basic Mobility (PT)  AM-PAC 6 Clicks Score (PT): 18  PT Discharge Summary  Anticipated Discharge Disposition (PT): home with assist, home with home health    Kathy Pérez PT  10/9/2023

## 2023-10-09 NOTE — PROGRESS NOTES
ID note for sepsis  S: AF.  He says his pain from the tube is quite improved.      Physical Exam:   Vital Signs   Temp:  [97.9 øF (36.6 øC)-98.1 øF (36.7 øC)] 98.1 øF (36.7 øC)  Heart Rate:  [73-77] 73  Resp:  [16] 16  BP: (129-160)/() 160/91    GENERAL: Awake and alert, sickly  HEENT: Oropharynx is clear. Hearing is grossly normal.   EYES: . No conjunctival injection. No lid lag.   LUNGS:normal respiratory effort.   SKIN: no cutaneous eruptions in exposed areas  Tender to palpation right upper quadrant  PSYCHIATRIC: Appropriate mood, affect, insight, and judgment.     Results Review:  White count 6.37, hemoglobin 10.7, platelets 210  Creatinine 0.61        10/5 blood cultures ngtd  10/5 IR biliary cx: E. coli resistant to ampicillin and ampicillin sulbactam    A/p  1.  Septic shock secondary #2 (shock resolved)  2.  Acute cholecystitis    IR drain placed 10/5  Culture with E. coli.  Continue Rocephin 2 g IV every 24 hours and metronidazole 500 mg p.o. every 8 hours through today then stop.  This will complete a week of therapy.    Surgery plans delayed cholecystectomy in 6 to 8 weeks.  Nothing more to add from my perspective, we will be happy to reevaluate should infectious concerns evolve

## 2023-10-09 NOTE — PLAN OF CARE
Goal Outcome Evaluation:               Alert and oriented x4.  Up with ax1 to the bathroom.  Voiding frequently due to lasix.  K replaced.  Minimal PVC's noted. 150mL from KIAN at the time of writing this note.  Urine voided several times.

## 2023-10-09 NOTE — PLAN OF CARE
Goal Outcome Evaluation:  Plan of Care Reviewed With: patient              Patient is an 83 y.o. male admitted to [facility] for chest pain on 10/2/2023. Pt with  acute calculus cholecystitis and septic shock now status post percutaneous cholecystostomy tube. PMHx includes CAD, afib, and AAA. Patient is ind at baseline without use of AD and lives with his daughter. Today, patient performed bed mobility with SBA, required CGA/Lorelei for transfers, and ambulated 150ft using rwx requiring CGA. No LOB noted and cues for upright posture. Pt has rwx to use at dc. Activity tolerance and strength deficits noted. Patient may benefit from skilled PT services to address functional deficits and improve level of independence prior to discharge. Anticipate home with daughter upon DC.      Anticipated Discharge Disposition (PT): home with assist, home with home health

## 2023-10-09 NOTE — PROGRESS NOTES
[facility]  Cardiology Progress Note    Patient Identification:  Name: David Comer Sr.  Age: 83 y.o.  Sex: male  :  1940  MRN: [id number]                 Follow Up / Chief Complaint: Follow-up for chest pain and cholecystitis    Interval History: On IV Lasix 40 mg twice daily.  Full dose anticoagulation and bridging with Coumadin.      Objective:    Past Medical History:  Past Medical History:   Diagnosis Date    AAA (abdominal aortic aneurysm) without rupture     Aneurysm of thoracic aorta     CT CHEST 2021 IN [facility]     Arthritis     Basal cell carcinoma     RIGHT LOWER LID     BPH (benign prostatic hyperplasia)     Chronic lumbar pain     Degenerative arthritis of lumbar spine     Degenerative cervical disc     Disease of thyroid gland     Elevated rheumatoid factor     History of atrial fibrillation     Hyperlipidemia     Hypertension     Hypogonadism in male     Muscle strain of left upper back     PRESCRIBED PREDNISONE DOSE PACK     On anticoagulant therapy     Pulmonary nodule     6 MM - REPEAT CT SCAN IN ONE YEAR, WATCHING     Refused influenza vaccine     Scoliosis     Vitamin D deficiency      Past Surgical History:  Past Surgical History:   Procedure Laterality Date    ANKLE FUSION Right     CARDIAC CATHETERIZATION      CARDIAC CATHETERIZATION N/A 2021    Procedure: Left Heart Cath;  Surgeon: Harlan Downing MD;  Location:  [facility]VASIVE LOC[facility];  Service: Cardiology;  Laterality: N/A;    CARDIAC CATHETERIZATION N/A 2021    Procedure: Coronary angiography;  Surgeon: Harlan Downing MD;  Location:  [facility];  Service: Cardiology;  Laterality: N/A;    CARDIAC CATHETERIZATION N/A 2021    Procedure: Left ventriculography;  Surgeon: Harlan Downing MD;  Location:  [facility];  Service: Cardiology;  Laterality: N/A;    CARDIAC CATHETERIZATION N/A 2022    Procedure: Left Heart Cath check inr;   Surgeon: Harlan Downing MD;  Location: [facility] CATH INVASIVE LOCATION;  Service: Cardiology;  Laterality: N/A;    CARDIAC CATHETERIZATION N/A 12/30/2022    Procedure: Coronary angiography;  Surgeon: Harlan Downing MD;  Location: [facility] CATH INVASIVE LOCATION;  Service: Cardiology;  Laterality: N/A;    CARDIAC CATHETERIZATION N/A 12/30/2022    Procedure: Left ventriculography;  Surgeon: Harlan Downing MD;  [facility];  Service: Cardiology;  Laterality: N/A;    HOBBS TUBE INSERTION Right 10/19/2021    Procedure: RIGHT SECOND STAGE CAST TAKEDOWN RECONSTRUCTION;  Surgeon: Brian Bhatt MD;  Location: [facility] OR [facility];  Service: Ophthalmology;  Laterality: Right;    ENTROPIAN REPAIR Right 9/21/2021    Procedure: RIGHT LOWER LID EXCISION OF BASAL CELL CARCINOMA WITH FROZEN SECTION RIGHT LOWER LID RECONSTRUCTION WITH CAST FLAP, REPAIR OF CANULICULIS, AND FULL THICKNESS SKIN GRAFT;  Surgeon: Brian Bhatt MD;  Location: [facility];  Service: Ophthalmology;  Laterality: Right;    INGUINAL HERNIA REPAIR Right     REPLACEMENT TOTAL KNEE Bilateral 2000    ROTATOR CUFF REPAIR Left     ROTATOR CUFF REPAIR Right     SKIN BIOPSY      VASECTOMY          Social History:   Social History     Tobacco Use    Smoking status: Never    Smokeless tobacco: Never   Substance Use Topics    Alcohol use: No      Family History:  Family History   Problem Relation Age of Onset    Hypertension Father     Heart attack Father     Heart attack Brother     Alcohol abuse Brother     Hypertension Brother     Hypertension Paternal Grandmother     Hypertension Paternal Grandfather     Anemia Mother     Arthritis Mother     Hypertension Mother     Hypertension Maternal Grandmother     Heart disease Other         FH in males before age 55    Malig Hyperthermia Neg Hx           Allergies:  No Known Allergies  Scheduled Meds:  amiodarone, 100 mg, Daily  aspirin, 81 mg,  "Daily  enoxaparin, 1 mg/kg, Q12H  furosemide, 40 mg, Q12H  hydrALAZINE, 25 mg, Q8H  lactobacillus acidophilus, 1 capsule, Daily  levothyroxine, 112 mcg, Daily  metroNIDAZOLE, 500 mg, Q8H  pantoprazole, 40 mg, Nightly   Or  pantoprazole, 40 mg, Nightly  potassium chloride ER, 40 mEq, Q4H  rosuvastatin, 5 mg, Daily  senna-docusate sodium, 2 tablet, BID  sodium chloride, 10 mL, Q12H  warfarin, 7.5 mg, Once            INTAKE AND OUTPUT:    Intake/Output Summary (Last 24 hours) at 10/9/2023 1510  Last data filed at 10/9/2023 1231  Gross per 24 hour   Intake 1470 ml   Output 200 ml   Net 1270 ml     ROS  Constitutional: Awake and alert, no fever. No nosebleeds  Abdomen           no abdominal pain   Cardiac              no chest pain  Pulmonary          no shortness of breath      BP (!) 152/109   Pulse 114   Temp 98.4 øF (36.9 øC) (Oral)   Resp 16   Ht 182.9 cm (72\")   Wt 100 kg (221 lb 3.2 oz)   SpO2 93%   BMI 30.00 kg/mý   General appearance: No acute changes   Neck: Trachea midline; NECK, supple, no thyromegaly or lymphadenopathy   Lungs: Normal size and shape, normal breath sounds, equal distribution of air, no rales or rhonchi   CV: S1-S2 regular, no murmurs, no rub, no gallop   Abdomen: Soft, nontender; no masses , no abnormal abdominal sounds   Extremities: No deformity , normal color , no peripheral edema   Skin: Normal temperature, turgor and texture; no rash, ulcers            I reviewed the patient's new clinical results, and personally reviewed and interpreted the patient's ECG and telemetry data from the last 24 hours          Cardiographics        ECG:   Sinus rhythm with PAC unchanged from previous other than PAC present    Echocardiogram:     Interpretation Summary         Left ventricular ejection fraction appears to be 51 - 55%.    Mild aortic valve stenosis is present. Aortic valve area is 2 cm2.    Peak velocity of the flow distal to the aortic valve is 234.8 cm/s. Aortic valve maximum pressure " "gradient is 22 mmHg. Aortic valve mean pressure gradient is 11 mmHg. Aortic valve dimensionless index is 0.7 .    Estimated right ventricular systolic pressure from tricuspid regurgitation is mildly elevated (35-45 mmH  Lab Review   Results from last 7 days   Lab Units 10/05/23  0224 10/04/23  0802 10/04/23  0338   HSTROP T ng/L 27* 28* 30*     Results from last 7 days   Lab Units 10/09/23  0606   MAGNESIUM mg/dL 1.6     Results from last 7 days   Lab Units 10/09/23  0606   SODIUM mmol/L 135*   POTASSIUM mmol/L 3.6   BUN mg/dL 8   CREATININE mg/dL 0.61*   CALCIUM mg/dL 8.3*     @[id number]@  Results from last 7 days   Lab Units 10/09/23  0606 10/08/23  0441 10/07/23  0420   WBC 10*3/mm3 6.37 7.95 10.03   HEMOGLOBIN g/dL 10.7* 10.3* 10.4*   HEMATOCRIT % 32.2* 30.6* 31.4*   PLATELETS 10*3/mm3 210 170 150     Results from last 7 days   Lab Units 10/09/23  0606 10/08/23  2027 10/08/23  0441   INR  1.55* 1.44* 1.59*       Intake/Output Summary (Last 24 hours) at 10/9/2023 1512  Last data filed at 10/9/2023 1231  Gross per 24 hour   Intake 1470 ml   Output 200 ml   Net 1270 ml         Assessment:  Epigastric pain  Acute cholecystitis  Coronary artery disease: Echo on 10/4/2023 revealed EF 51 to 55%, mild AV stenosis, FAROOQ 2.  Mean pressure gradient 11 mmHg.  Mildly elevated RVSP.  Ascending aortic aneurysm: 4.8 cm per CT-CTS recommends follow-up with their aneurysm clinic with yearly monitoring  Paroxysmal atrial fibrillation  Chronic anticoagulation on warfarin: Anticoagulation on hold.  INR 2.01  Hypokalemia: being replaced    Plan:  SR/ST on the monitor  Blood pressure elevated. Add metoprolol succinate 12.5 mg daily.   On IV Lasix, continue.       )10/9/2023  MD CHANTAL Jara Dragon/Transcription:   \"Dictated utilizing Dragon dictation\".     "

## 2023-10-09 NOTE — PROGRESS NOTES
"                                              LOS: 6 days   Patient Care Team:  Chacha Pineda MD as PCP - General (Family Medicine)  Harlan Downing MD as Consulting Physician (Cardiology)  Elliott Chawla MD as Consulting Physician (Otolaryngology)    Chief Complaint:  F/up sepsis, cholecystitis    Subjective   Interval History  On RA.  Denies dyspnea or cough.  No more diarrhea.    REVIEW OF SYSTEMS:      RESPIRATORY: On RA.  Denies dyspnea or cough.  CONSTITUTIONAL: No fever or chills.     Ventilator/Non-Invasive Ventilation Settings (From admission, onward)      None                  Physical Exam:     Vital Signs  Temp:  [97.9 øF (36.6 øC)-98.1 øF (36.7 øC)] 98.1 øF (36.7 øC)  Heart Rate:  [73-78] 78  Resp:  [16] 16  BP: (129-160)/() 152/95    Intake/Output Summary (Last 24 hours) at 10/9/2023 1201  Last data filed at 10/9/2023 1041  Gross per 24 hour   Intake 1680 ml   Output 175 ml   Net 1505 ml     Flowsheet Rows      Flowsheet Row First Filed Value   Admission Height 182.9 cm (72\") Documented at 10/02/2023 1908   Admission Weight 93 kg (205 lb) Documented at 10/02/2023 1908            PPE used per hospital policy    General Appearance:   Alert, cooperative, in no acute distress   ENMT:  Mallampati score 3, moist mucous membrane   Eyes:  Pupils equal and reactive to light. EOMI   Neck:   Large. Trachea midline. No thyromegaly.   Lungs:   Equal but diminished air entry throughout.  Normal crackles or wheezing.  Nonlabored breathing    Heart:   Regular rhythm and normal rate, normal S1 and S2, no         murmur   Skin:   No rash or ecchymosis   Abdomen:   Cholecystostomy drain noted.  Soft. No tenderness. No HSM.   Neuro/psych:  Conscious, alert, oriented x3. Strength 5/5 in upper and lower  ext.  Appropriate mood and affect   Extremities:  No cyanosis, clubbing or edema.  Warm extremities and well-perfused          Results Review:        Results from last 7 days   Lab Units " 10/09/23  0606 10/08/23  1459 10/08/23  0441 10/07/23  0420   SODIUM mmol/L 135*  --  139 139   POTASSIUM mmol/L 3.6 4.0 3.5 3.9   CHLORIDE mmol/L 104  --  108* 109*   CO2 mmol/L 23.4  --  23.7 23.0   BUN mg/dL 8  --  10 17   CREATININE mg/dL 0.61*  --  0.63* 0.77   GLUCOSE mg/dL 88  --  93 76   CALCIUM mg/dL 8.3*  --  7.9* 7.9*     Results from last 7 days   Lab Units 10/05/23  0224 10/04/23  0802 10/04/23  0338   HSTROP T ng/L 27* 28* 30*     Results from last 7 days   Lab Units 10/09/23  0606 10/08/23  0441 10/07/23  0420   WBC 10*3/mm3 6.37 7.95 10.03   HEMOGLOBIN g/dL 10.7* 10.3* 10.4*   HEMATOCRIT % 32.2* 30.6* 31.4*   PLATELETS 10*3/mm3 210 170 150     Results from last 7 days   Lab Units 10/09/23  0606 10/08/23  2027 10/08/23  0441   INR  1.55* 1.44* 1.59*                               I reviewed the patient's new clinical results.        Medication Review:   amiodarone, 100 mg, Oral, Daily  aspirin, 81 mg, Oral, Daily  enoxaparin, 1 mg/kg, Subcutaneous, Q12H  hydrALAZINE, 25 mg, Oral, Q8H  lactobacillus acidophilus, 1 capsule, Oral, Daily  levothyroxine, 112 mcg, Oral, Daily  metroNIDAZOLE, 500 mg, Oral, Q8H  pantoprazole, 40 mg, Oral, Nightly   Or  pantoprazole, 40 mg, Intravenous, Nightly  rosuvastatin, 5 mg, Oral, Daily  senna-docusate sodium, 2 tablet, Oral, BID  sodium chloride, 10 mL, Intravenous, Q12H        Pharmacy to dose warfarin,           Diagnostic imaging:  I personally and independently reviewed the following images:  CT abdomen and chest dated 10/5/2023: Agree with radiologist interpretation.  Findings as below in assessment.    Assessment     Severe sepsis with hypotension, sepsis status resolved  Acute cholecystitis, s/p CT-guided drain placement 10/5, culture consistent with E. Coli  Pulmonary infiltrates on CXR 10/6  Bilateral lower lobe dependent consolidations, likely atelectasis.  Is trace bilateral pleural effusion  Electrolytes disturbance: Hypophosphatemia  Diarrhea, secondary to  antibiotics and bile diversion      HTN  PAF  Hypothyroidism          Plan     Antibiotics with Rocephin and metronidazole.  ID following  Hydralazine for HTN.  Resume doxazosin and nebivolol.  Warfarin resumed along with Lovenox therapeutic dose for bridging    Continue amiodarone  Advance diet to regular  Probiotic for diarrhea  Incentive spirometry  Noted plan for cholecystectomy in 6-8 weeks    Cleared to discharge from pulmonary perspective.    Marilu Cordero MD  10/09/23  12:01 EDT            This note was dictated utilizing Dragon dictation

## 2023-10-09 NOTE — PROGRESS NOTES
[facility] Clinical Pharmacy Services: Warfarin Dosing/Monitoring Consult    David Comer Sr. is a 83 y.o. male, estimated creatinine clearance is 114.4 mL/min (A) (by C-G formula based on SCr of 0.61 mg/dL (L)). weighing 100 kg (221 lb 3.2 oz).    Results from last 7 days   Lab Units 10/09/23  0606 10/08/23  2027 10/08/23  0441 10/07/23  0420 10/06/23  1044 10/06/23  0334 10/05/23  1406   INR  1.55* 1.44* 1.59* 1.65* 1.89* 2.01*  --    HEMOGLOBIN g/dL 10.7*  --  10.3* 10.4*  --  9.7* 12.2*   HEMATOCRIT % 32.2*  --  30.6* 31.4*  --  28.5* 36.3*   PLATELETS 10*3/mm3 210  --  170 150  --  148 188     Prior to admission anticoagulation:   Warfarin  5 mg daily on Monday, Friday, Sunday  Warfarin 7.5 mg daily on all other days   Managed by [facility] cardiology     Hospital Anticoagulation:  Consulting provider: Dr. Pardo  Start date: 10/8  Indication: A Fib - requiring full anticoagulation  Target INR: 2 - 3  Expected duration: Lifelong    Bridge Therapy: with therapeutic Lovenox     Potential food or drug interactions:   Amiodarone, Rocephin, Flagyl will enhanced the anticoagulation effects of warfarin    Education complete?/Date: No; plan for follow up TBD    Assessment/Plan:  Abx complete today, consider boost dose tomorrow if INR doesn't rise by 0.2-0.3  Warfarin 7.5 mg once   Monitor for any signs or symptoms of bleeding  Follow up daily INRs and dose adjustments    Pharmacy will continue to follow until discharge or discontinuation of warfarin.     Starla Canseco [facility]  Clinical Pharmacist

## 2023-10-09 NOTE — PROGRESS NOTES
[facility] Clinical Pharmacy Services: Warfarin Dosing/Monitoring Consult    David Comer Sr. is a 83 y.o. male, estimated creatinine clearance is 111.2 mL/min (A) (by C-G formula based on SCr of 0.63 mg/dL (L)). weighing 101 kg (223 lb 1.7 oz).    Results from last 7 days   Lab Units 10/08/23  2027 10/08/23  0441 10/07/23  0420 10/06/23  1044 10/06/23  0334 10/05/23  1406 10/05/23  0624   INR  1.44* 1.59* 1.65* 1.89* 2.01*  --   --    HEMOGLOBIN g/dL  --  10.3* 10.4*  --  9.7* 12.2* 10.8*   HEMATOCRIT %  --  30.6* 31.4*  --  28.5* 36.3* 32.2*   PLATELETS 10*3/mm3  --  170 150  --  148 188 157     Prior to admission anticoagulation:   Warfarin  5 mg daily on Monday, Friday, Sunday  Warfarin 7.5 mg daily on all other days     Hospital Anticoagulation:  Consulting provider: Dr. Pardo  Start date: 10/8  Indication: A Fib - requiring full anticoagulation  Target INR: 2 - 3  Expected duration: Lifelong    Bridge Therapy: with therapeutic Lovenox     Potential food or drug interactions:   Amiodarone, Rocephin, Flagyl will enhanced the anticoagulation effects of warfarin    Education complete?/Date: No; plan for follow up TBD    Assessment/Plan:  INR 1.44 subtherapeutic, will give a dose of warfarin 7.5 mg tonight   Monitor for any signs or symptoms of bleeding  Follow up daily INRs and dose adjustments    Pharmacy will continue to follow until discharge or discontinuation of warfarin.     Elia Walker [facility]  Clinical Pharmacist

## 2023-10-09 NOTE — PROGRESS NOTES
Name: David KHAN Age Sr. ADMIT: 10/2/2023   : 1940  PCP: Chacha Pineda MD    MRN: [id number] LOS: 6 days   AGE/SEX: 83 y.o. male  ROOM: [facility]     Subjective   Subjective   Patient is lying on the bed and is in no major distress.  Denies nausea, vomiting, abdominal pain, chest pain.  Complaining of swelling in arms and legs.       Objective   Objective   Vital Signs  Temp:  [97.9 øF (36.6 øC)-98.4 øF (36.9 øC)] 98.4 øF (36.9 øC)  Heart Rate:  [67-91] 88  Resp:  [16] 16  BP: (129-168)/(78-98) 168/98  SpO2:  [93 %-99 %] 99 %  on  Flow (L/min):  [2] 2;   Device (Oxygen Therapy): room air  Body mass index is 30 kg/mý.  Physical Exam  HEENT: PERRLA, extraocular movements intact, Scleras no icterus  Neck: Supple, no JVD  Cardiovascular: Regular rate and rhythm with normal S1 and S2  GI: Soft, nontender, bowel sounds are present, mildly tender in the right upper quadrant and has a drain tube in place  Extremities: No edema, palpable pulses  Neurologic: Grossly nonfocal, no facial asymmetry    Results Review     I reviewed the patient's new clinical results.  Results from last 7 days   Lab Units 10/09/23  0606 10/08/23  0441 10/07/23  0420 10/06/23  0334   WBC 10*3/mm3 6.37 7.95 10.03 9.85   HEMOGLOBIN g/dL 10.7* 10.3* 10.4* 9.7*   PLATELETS 10*3/mm3 210 170 150 148     Results from last 7 days   Lab Units 10/09/23  0606 10/08/23  1459 10/08/23  0441 10/07/23  0420 10/06/23  1310 10/06/23  0334   SODIUM mmol/L 135*  --  139 139  --  141   POTASSIUM mmol/L 3.6 4.0 3.5 3.9   < > 3.2*   CHLORIDE mmol/L 104  --  108* 109*  --  110*   CO2 mmol/L 23.4  --  23.7 23.0  --  25.0   BUN mg/dL 8  --  10 17  --  24*   CREATININE mg/dL 0.61*  --  0.63* 0.77  --  0.84   GLUCOSE mg/dL 88  --  93 76  --  94   EGFR mL/min/1.73 95.9  --  95.0 89.4  --  87.1    < > = values in this interval not displayed.     Results from last 7 days   Lab Units 10/09/23  0606 10/08/23  0441 10/07/23  0420 10/06/23  0334   ALBUMIN g/dL 2.3* 2.2*  "2.2* 2.5*   BILIRUBIN mg/dL 0.2 0.3 0.5 0.4   ALK PHOS U/L 50 53 51 47   AST (SGOT) U/L 11 11 11 16   ALT (SGPT) U/L 10 10 12 12     Results from last 7 days   Lab Units 10/09/23  0606 10/08/23  0441 10/07/23  1223 10/07/23  0420 10/07/23  0006 10/06/23  1310 10/06/23  0334 10/05/23  1737   CALCIUM mg/dL 8.3* 7.9*  --  7.9*  --   --  7.9*  --    ALBUMIN g/dL 2.3* 2.2*  --  2.2*  --   --  2.5*  --    MAGNESIUM mg/dL 1.6  --   --  1.8  --   --  2.5* 2.8*   PHOSPHORUS mg/dL  --  2.3* 2.2*  --  1.5* 1.3* 2.1* 2.3*     Results from last 7 days   Lab Units 10/05/23  0423 10/05/23  0224   PROCALCITONIN ng/mL  --  1.38*   LACTATE mmol/L 1.9  --      No results found for: \"HGBA1C\", \"POCGLU\"      No radiology results for the last day    I have personally reviewed all medications:  Scheduled Medications  amiodarone, 100 mg, Oral, Daily  aspirin, 81 mg, Oral, Daily  enoxaparin, 1 mg/kg, Subcutaneous, Q12H  furosemide, 40 mg, Intravenous, Q12H  hydrALAZINE, 25 mg, Oral, Q8H  lactobacillus acidophilus, 1 capsule, Oral, Daily  levothyroxine, 112 mcg, Oral, Daily  metroNIDAZOLE, 500 mg, Oral, Q8H  pantoprazole, 40 mg, Oral, Nightly   Or  pantoprazole, 40 mg, Intravenous, Nightly  potassium chloride ER, 40 mEq, Oral, Q4H  rosuvastatin, 5 mg, Oral, Daily  senna-docusate sodium, 2 tablet, Oral, BID  sodium chloride, 10 mL, Intravenous, Q12H    Infusions  Pharmacy to dose warfarin,     Diet  Diet: Regular/House Diet; Fluid Consistency: Thin (IDDSI 0)    I have personally reviewed:  [x]  Laboratory   [x]  Microbiology   [x]  Radiology   [x]  EKG/Telemetry  [x]  Cardiology/Vascular   []  Pathology    []  Records       Assessment/Plan     Active Hospital Problems    Diagnosis  POA    **Epigastric pain [R10.13]  Yes    Chest pain [R07.9]  Yes    Acute cholecystitis [K81.0]  Unknown    BPH without obstruction/lower urinary tract symptoms [N40.0]  Yes    Long term (current) use of anticoagulants [Z79.01]  Not Applicable    Acquired " hypothyroidism [E03.9]  Yes    Abdominal aortic aneurysm [I71.40]  Yes    Chronic coronary artery disease [I25.10]  Yes    Essential hypertension [I10]  Yes    Paroxysmal atrial fibrillation [I48.0]  Yes      Resolved Hospital Problems   No resolved problems to display.       83 y.o. male admitted with Epigastric pain.    Severe sepsis with hypotension, this is now resolved.    2.  Acute cholecystitis, CT-guided drain tube placement on 10/5.  Cultures grew E. coli and patient is currently on Rocephin and Flagyl which will be continued.  Tolerated soft diet therefore has been advanced to regular.  Surgical team plans on outpatient elective laparoscopic cholecystectomy in 6 to 8 weeks.  Cholecystostomy tube to remain in place until then.  No need for antibiotics upon discharge.    3.  Bilateral pulmonary infiltrates most likely secondary atelectasis.  Currently not hypoxic and does not have any pleuritic chest pain.    4.  Diarrhea, has been improving.    5.  History of coronary artery disease, continue with aspirin, statins.    6.  Hypothyroidism, on Synthroid.    7.  Hypophosphatemia, replaced.    8.  History of atrial fibrillation, on aspirin and amiodarone.  Surgical team has cleared to resume warfarin and has been initiated on 10/8/2023.    9.  Volume overload, will be diuresed and IV Lasix has been initiated today.    10.  On SCDs for DVT prophylaxis.    11.  CODE STATUS is full code.    12.  Estimated discharge date, tomorrow.    Copied text on this note has been reviewed by me on 10/9/2023      Magan Pardo MD  [facility]  10/09/23  14:09 EDT

## 2023-10-10 ENCOUNTER — HOME HEALTH ADMISSION (OUTPATIENT)
Dept: HOME HEALTH SERVICES | Facility: HOME HEALTHCARE | Age: 83
End: 2023-10-10
Payer: MEDICARE

## 2023-10-10 ENCOUNTER — READMISSION MANAGEMENT (OUTPATIENT)
Dept: CALL CENTER | Facility: HOSPITAL | Age: 83
End: 2023-10-10
Payer: MEDICARE

## 2023-10-10 VITALS
TEMPERATURE: 97.9 F | RESPIRATION RATE: 18 BRPM | HEIGHT: 72 IN | WEIGHT: 213.4 LBS | SYSTOLIC BLOOD PRESSURE: 131 MMHG | HEART RATE: 76 BPM | OXYGEN SATURATION: 96 % | DIASTOLIC BLOOD PRESSURE: 97 MMHG | BODY MASS INDEX: 28.91 KG/M2

## 2023-10-10 LAB
ALBUMIN SERPL-MCNC: 2.9 G/DL (ref 3.5–5.2)
ALBUMIN/GLOB SERPL: 0.9 G/DL
ALP SERPL-CCNC: 64 U/L (ref 39–117)
ALT SERPL W P-5'-P-CCNC: 13 U/L (ref 1–41)
ANION GAP SERPL CALCULATED.3IONS-SCNC: 15.1 MMOL/L (ref 5–15)
AST SERPL-CCNC: 18 U/L (ref 1–40)
BACTERIA SPEC AEROBE CULT: NORMAL
BACTERIA SPEC AEROBE CULT: NORMAL
BASOPHILS # BLD AUTO: 0.04 10*3/MM3 (ref 0–0.2)
BASOPHILS NFR BLD AUTO: 0.4 % (ref 0–1.5)
BILIRUB SERPL-MCNC: 0.3 MG/DL (ref 0–1.2)
BUN SERPL-MCNC: 9 MG/DL (ref 8–23)
BUN/CREAT SERPL: 9.8 (ref 7–25)
CALCIUM SPEC-SCNC: 9.2 MG/DL (ref 8.6–10.5)
CHLORIDE SERPL-SCNC: 104 MMOL/L (ref 98–107)
CO2 SERPL-SCNC: 24.9 MMOL/L (ref 22–29)
CREAT SERPL-MCNC: 0.92 MG/DL (ref 0.76–1.27)
DEPRECATED RDW RBC AUTO: 41.9 FL (ref 37–54)
EGFRCR SERPLBLD CKD-EPI 2021: 83.1 ML/MIN/1.73
EOSINOPHIL # BLD AUTO: 0.25 10*3/MM3 (ref 0–0.4)
EOSINOPHIL NFR BLD AUTO: 2.7 % (ref 0.3–6.2)
ERYTHROCYTE [DISTWIDTH] IN BLOOD BY AUTOMATED COUNT: 11.8 % (ref 12.3–15.4)
GLOBULIN UR ELPH-MCNC: 3.3 GM/DL
GLUCOSE SERPL-MCNC: 92 MG/DL (ref 65–99)
HCT VFR BLD AUTO: 39.3 % (ref 37.5–51)
HGB BLD-MCNC: 13.1 G/DL (ref 13–17.7)
IMM GRANULOCYTES # BLD AUTO: 0.13 10*3/MM3 (ref 0–0.05)
IMM GRANULOCYTES NFR BLD AUTO: 1.4 % (ref 0–0.5)
INR PPP: 1.76 (ref 0.9–1.1)
LYMPHOCYTES # BLD AUTO: 1.09 10*3/MM3 (ref 0.7–3.1)
LYMPHOCYTES NFR BLD AUTO: 11.6 % (ref 19.6–45.3)
MCH RBC QN AUTO: 32.5 PG (ref 26.6–33)
MCHC RBC AUTO-ENTMCNC: 33.3 G/DL (ref 31.5–35.7)
MCV RBC AUTO: 97.5 FL (ref 79–97)
MONOCYTES # BLD AUTO: 0.59 10*3/MM3 (ref 0.1–0.9)
MONOCYTES NFR BLD AUTO: 6.3 % (ref 5–12)
NEUTROPHILS NFR BLD AUTO: 7.32 10*3/MM3 (ref 1.7–7)
NEUTROPHILS NFR BLD AUTO: 77.6 % (ref 42.7–76)
NRBC BLD AUTO-RTO: 0 /100 WBC (ref 0–0.2)
PLATELET # BLD AUTO: 326 10*3/MM3 (ref 140–450)
PMV BLD AUTO: 10.5 FL (ref 6–12)
POTASSIUM SERPL-SCNC: 3.9 MMOL/L (ref 3.5–5.2)
PROT SERPL-MCNC: 6.2 G/DL (ref 6–8.5)
PROTHROMBIN TIME: 20.9 SECONDS (ref 11.7–14.2)
RBC # BLD AUTO: 4.03 10*6/MM3 (ref 4.14–5.8)
SODIUM SERPL-SCNC: 144 MMOL/L (ref 136–145)
WBC NRBC COR # BLD: 9.42 10*3/MM3 (ref 3.4–10.8)

## 2023-10-10 PROCEDURE — 99231 SBSQ HOSP IP/OBS SF/LOW 25: CPT | Performed by: SURGERY

## 2023-10-10 PROCEDURE — 85610 PROTHROMBIN TIME: CPT | Performed by: INTERNAL MEDICINE

## 2023-10-10 PROCEDURE — 85025 COMPLETE CBC W/AUTO DIFF WBC: CPT | Performed by: INTERNAL MEDICINE

## 2023-10-10 PROCEDURE — 25010000002 ENOXAPARIN PER 10 MG: Performed by: INTERNAL MEDICINE

## 2023-10-10 PROCEDURE — 25010000002 FUROSEMIDE PER 20 MG: Performed by: INTERNAL MEDICINE

## 2023-10-10 PROCEDURE — 80053 COMPREHEN METABOLIC PANEL: CPT | Performed by: INTERNAL MEDICINE

## 2023-10-10 RX ORDER — WARFARIN SODIUM 7.5 MG/1
7.5 TABLET ORAL SEE ADMIN INSTRUCTIONS
Start: 2023-10-10

## 2023-10-10 RX ORDER — WARFARIN SODIUM 5 MG/1
5 TABLET ORAL SEE ADMIN INSTRUCTIONS
Start: 2023-10-10

## 2023-10-10 RX ORDER — WARFARIN SODIUM 7.5 MG/1
7.5 TABLET ORAL
Status: DISCONTINUED | OUTPATIENT
Start: 2023-10-10 | End: 2023-10-10 | Stop reason: HOSPADM

## 2023-10-10 RX ADMIN — ASPIRIN 81 MG: 81 TABLET, COATED ORAL at 08:37

## 2023-10-10 RX ADMIN — LEVOTHYROXINE SODIUM 112 MCG: 0.11 TABLET ORAL at 06:36

## 2023-10-10 RX ADMIN — Medication 10 ML: at 08:40

## 2023-10-10 RX ADMIN — Medication 1 CAPSULE: at 08:37

## 2023-10-10 RX ADMIN — HYDRALAZINE HYDROCHLORIDE 25 MG: 25 TABLET, FILM COATED ORAL at 06:36

## 2023-10-10 RX ADMIN — ENOXAPARIN SODIUM 100 MG: 100 INJECTION SUBCUTANEOUS at 03:55

## 2023-10-10 RX ADMIN — AMIODARONE HYDROCHLORIDE 100 MG: 200 TABLET ORAL at 08:37

## 2023-10-10 RX ADMIN — METOPROLOL SUCCINATE 12.5 MG: 25 TABLET, EXTENDED RELEASE ORAL at 08:37

## 2023-10-10 RX ADMIN — FUROSEMIDE 40 MG: 10 INJECTION, SOLUTION INTRAMUSCULAR; INTRAVENOUS at 03:55

## 2023-10-10 RX ADMIN — ROSUVASTATIN CALCIUM 5 MG: 5 TABLET, FILM COATED ORAL at 08:37

## 2023-10-10 NOTE — DISCHARGE PLACEMENT REQUEST
"Sharon Comer Sr. (83 y.o. Male)       Date of Birth   1940    Social Security Number       Address   [address] [place] [address]    Home Phone   [phone]    MRN   [id number]       Yarsanism   None    Marital Status                               Admission Date   10/2/23    Admission Type   Emergency    Admitting Provider   Jia Joseph MD    Attending Provider   Magan Pardo MD    Department, Room/Bed   [address], [address]       Discharge Date       Discharge Disposition   Home or Self Care    Discharge Destination                                 Attending Provider: Magan Pardo MD    Allergies: No Known Allergies    Isolation: None   Infection: None   Code Status: CPR    Ht: 182.9 cm (72\")   Wt: 96.8 kg (213 lb 6.4 oz)    Admission Cmt: None   Principal Problem: Epigastric pain [R10.13]                   Active Insurance as of 10/2/2023       Primary Coverage       Payor Plan Insurance Group Employer/Plan Group    MEDICARE MEDICARE A & B        Payor Plan Address Payor Plan Phone Number Payor Plan Fax Number Effective Dates    [address] [phone]  3/1/2006 - None Entered    [facility] [id number]         Subscriber Name Subscriber Birth Date Member ID       SHARON COMER SR. 1940 [id number]               Secondary Coverage       Payor Plan Insurance Group Employer/Plan Group    [facility] SUPP [id number]       Payor Plan Address Payor Plan Phone Number Payor Plan Fax Number Effective Dates    [address]   12/1/2016 - None Entered    [facility] [id number]         Subscriber Name Subscriber Birth Date Member ID       SHARON COMER SR. 1940 [id number]                     Emergency Contacts        (Rel.) Home Phone Work Phone Mobile Phone    Cora Vaz (Daughter) -- -- [phone]    Sharon Comer Jr (Son) [phone] -- --                "

## 2023-10-10 NOTE — PROGRESS NOTES
Case Management Discharge Note      Final Note: Patient will dc to his [facility] in [place], [place]. DC address is [address] [place] [address] [place], [address].[facility] does not service [employer] Patient is agreeable with referral to [facility]. S/w Kayley Talley will accept. DC with daughter/private auto.    Provided Post Acute Provider List?: Yes  Delivered To: Patient  Method of Delivery: In person    Selected Continued Care - Admitted Since 10/2/2023       Destination    No services have been selected for the patient.                Durable Medical Equipment    No services have been selected for the patient.                Dialysis/Infusion    No services have been selected for the patient.                Home Medical Care    No services have been selected for the patient.                Therapy    No services have been selected for the patient.                Community Resources    No services have been selected for the patient.                Community & DME    No services have been selected for the patient.                         Final Discharge Disposition Code: 06 - home with home health care

## 2023-10-10 NOTE — PROGRESS NOTES
"                                              LOS: 7 days   Patient Care Team:  Chacha Pineda MD as PCP - General (Family Medicine)  Harlan Downing MD as Consulting Physician (Cardiology)  Elliott Chawla MD as Consulting Physician (Otolaryngology)    Chief Complaint:  F/up sepsis, cholecystitis    Subjective   Interval History  On RA.  Noted shallow breathing and snoring when he was asleep.  Denies dyspnea or cough.  No more diarrhea.    REVIEW OF SYSTEMS:      RESPIRATORY: On RA.  Denies dyspnea or cough.  CONSTITUTIONAL: No fever or chills.     Ventilator/Non-Invasive Ventilation Settings (From admission, onward)      None                  Physical Exam:     Vital Signs  Temp:  [97.9 øF (36.6 øC)] 97.9 øF (36.6 øC)  Heart Rate:  [] 76  Resp:  [16-18] 18  BP: (128-152)/() 131/97    Intake/Output Summary (Last 24 hours) at 10/10/2023 1447  Last data filed at 10/10/2023 0752  Gross per 24 hour   Intake 360 ml   Output 725 ml   Net -365 ml     Flowsheet Rows      Flowsheet Row First Filed Value   Admission Height 182.9 cm (72\") Documented at 10/02/2023 1908   Admission Weight 93 kg (205 lb) Documented at 10/02/2023 1908            PPE used per hospital policy    General Appearance:   Alert, cooperative, in no acute distress   ENMT:  Mallampati score 3, moist mucous membrane   Eyes:  Pupils equal and reactive to light. EOMI   Neck:   Large. Trachea midline. No thyromegaly.   Lungs:   Equal but diminished air entry throughout.  Normal crackles or wheezing.  Nonlabored breathing    Heart:   Regular rhythm and normal rate, normal S1 and S2, no         murmur   Skin:   No rash or ecchymosis   Abdomen:   Cholecystostomy drain noted.  Soft. No tenderness. No HSM.   Neuro/psych:  Conscious, alert, oriented x3. Strength 5/5 in upper and lower  ext.  Appropriate mood and affect   Extremities:  No cyanosis, clubbing or edema.  Warm extremities and well-perfused          Results Review:  "       Results from last 7 days   Lab Units 10/10/23  0609 10/09/23  2135 10/09/23  0606 10/08/23  1459 10/08/23  0441   SODIUM mmol/L 144  --  135*  --  139   POTASSIUM mmol/L 3.9 4.1 3.6   < > 3.5   CHLORIDE mmol/L 104  --  104  --  108*   CO2 mmol/L 24.9  --  23.4  --  23.7   BUN mg/dL 9  --  8  --  10   CREATININE mg/dL 0.92  --  0.61*  --  0.63*   GLUCOSE mg/dL 92  --  88  --  93   CALCIUM mg/dL 9.2  --  8.3*  --  7.9*    < > = values in this interval not displayed.     Results from last 7 days   Lab Units 10/05/23  0224 10/04/23  0802 10/04/23  0338   HSTROP T ng/L 27* 28* 30*     Results from last 7 days   Lab Units 10/10/23  0609 10/09/23  0606 10/08/23  0441   WBC 10*3/mm3 9.42 6.37 7.95   HEMOGLOBIN g/dL 13.1 10.7* 10.3*   HEMATOCRIT % 39.3 32.2* 30.6*   PLATELETS 10*3/mm3 326 210 170     Results from last 7 days   Lab Units 10/10/23  0609 10/09/23  0606 10/08/23  2027   INR  1.76* 1.55* 1.44*                               I reviewed the patient's new clinical results.        Medication Review:   amiodarone, 100 mg, Oral, Daily  aspirin, 81 mg, Oral, Daily  enoxaparin, 1 mg/kg, Subcutaneous, Q12H  hydrALAZINE, 25 mg, Oral, Q8H  lactobacillus acidophilus, 1 capsule, Oral, Daily  levothyroxine, 112 mcg, Oral, Daily  metoprolol succinate XL, 12.5 mg, Oral, Q24H  pantoprazole, 40 mg, Oral, Nightly   Or  pantoprazole, 40 mg, Intravenous, Nightly  rosuvastatin, 5 mg, Oral, Daily  senna-docusate sodium, 2 tablet, Oral, BID  sodium chloride, 10 mL, Intravenous, Q12H  warfarin, 7.5 mg, Oral, Once        Pharmacy to dose warfarin,           Diagnostic imaging:  I personally and independently reviewed the following images:  CT abdomen and chest dated 10/5/2023: Agree with radiologist interpretation.  Findings as below in assessment.    Assessment     Severe sepsis with hypotension, sepsis status resolved  Acute cholecystitis, s/p CT-guided drain placement 10/5, culture consistent with E. Coli  Pulmonary infiltrates on  CXR 10/6  Bilateral lower lobe dependent consolidations, likely atelectasis.  Trace bilateral pleural effusion  Electrolytes disturbance: Hypophosphatemia  Diarrhea, secondary to antibiotics and bile diversion      HTN  PAF  Hypothyroidism          Plan     Antibiotics with Rocephin and metronidazole.  ID following finishing antibiotics with Rocephin and metronidazole today.  Hydralazine for HTN.  Resume doxazosin and nebivolol.  Warfarin resumed along with Lovenox therapeutic dose for bridging.  Follow-up INR.    Continue amiodarone  Advance diet to regular  Probiotic for diarrhea  Incentive spirometry  Noted plan for cholecystectomy in 6-8 weeks  Levothyroxine 112 mcg daily    Cleared to discharge from pulmonary perspective.    Marilu Cordero MD  10/10/23  14:47 EDT            This note was dictated utilizing Dragon dictation

## 2023-10-10 NOTE — DISCHARGE SUMMARY
Patient Name: David Comer Sr.  : 1940  MRN: [id number]    Date of Admission: 10/2/2023  Date of Discharge:  10/10/2023  Primary Care Physician: Chacha Pineda MD      Chief Complaint:   Chest Pain      Discharge Diagnoses     Active Hospital Problems    Diagnosis  POA    **Epigastric pain [R10.13]  Yes    Chest pain [R07.9]  Yes    Acute cholecystitis [K81.0]  Unknown    BPH without obstruction/lower urinary tract symptoms [N40.0]  Yes    Long term (current) use of anticoagulants [Z79.01]  Not Applicable    Acquired hypothyroidism [E03.9]  Yes    Abdominal aortic aneurysm [I71.40]  Yes    Chronic coronary artery disease [I25.10]  Yes    Essential hypertension [I10]  Yes    Paroxysmal atrial fibrillation [I48.0]  Yes      Resolved Hospital Problems   No resolved problems to display.        Hospital Course   Patient is a 83 y.o. male who presents to [facility] with the above chief complaint.  He has noticed that he has had some chest pain and epigastric pain for a while.  He noticed it most when he was lifting gas cans from the back of his car.  He talked to his heart doctor and other doctors and they recommended heat and supportive care and the pain got better.  Around 3:00 in the afternoon on Monday he noticed that he had significant epigastric pain and chest pain.  He had a plate of fried chicken for lunch and then the pain developed.  He has a history of coronary artery disease paroxysmal A-fib and is on Coumadin.  He also has a history of an abdominal aortic aneurysm.  In the emergency room he had CT scans of the chest abdomen and pelvis was not noted to have any blood clots and the aneurysm is stable.  It did show that his gallbladder was a little bit distended and he did have gallstones present.  Right upper quadrant ultrasound was then done and does confirm that he has cholecystitis.  He denies any fevers or chills but feels a little worse this morning.  He has not experienced any  vomiting but did have some nausea but denies diarrhea changes in his stools fevers chills or shortness of breath.     1.Severe sepsis secondary to acute cholecystitis with hypotension required ICU stay and this is now resolved.  WBC is normal and patient is afebrile.     2.  Acute cholecystitis, CT-guided drain tube placement on 10/5.  Cultures grew E. coli and patient was treated with Rocephin and Flagyl and completed a course per ID recommendations.  Tolerated soft diet therefore has been advanced to regular.  Surgical team plans on outpatient elective laparoscopic cholecystectomy in 6 to 8 weeks.  Cholecystostomy tube to remain in place until then.  No need for antibiotics upon discharge.     3.  Bilateral pulmonary infiltrates most likely secondary atelectasis.  Currently not hypoxic and does not have any pleuritic chest pain.     4.  Diarrhea, has been improving.     5.  History of coronary artery disease, continue with aspirin, statins.     6.  Hypothyroidism, on Synthroid.     7.  Hypophosphatemia, replaced.     8.  History of atrial fibrillation, on aspirin and amiodarone.  Surgical team has cleared to resume warfarin and has been initiated on 10/8/2023.  INR today is 1.7 and will continue home warfarin upon discharge and no need to bridge with Lovenox after discussing risks and benefits with patient and patient in agreement with the plan and aware of small risk of thromboembolic events over the next 2 to 3 days.     9.  Volume overload, was diuresed with IV Lasix on 10/9/2023 and is now euvolemic.    Copied text on this note has been reviewed by me on 10/10/2023    Day of Discharge         Physical Exam:  Temp:  [97.9 øF (36.6 øC)-98.4 øF (36.9 øC)] 97.9 øF (36.6 øC)  Heart Rate:  [] 76  Resp:  [16-18] 18  BP: (128-168)/() 131/97  Body mass index is 28.94 kg/mý.  Physical Exam  HEENT: PERRLA, extraocular movements intact, Scleras no icterus  Neck: Supple, no JVD  Cardiovascular: Regular rate and  rhythm with normal S1 and S2  GI: Soft, nontender, bowel sounds are present and has a drain tube in place  Extremities: No edema, palpable pulses  Neurologic: Grossly nonfocal, no facial asymmetry      Consultants     Consult Orders (all) (From admission, onward)       Start     Ordered    10/06/23 1005  Inpatient Pulmonology Consult  Once        Specialty:  Pulmonary Disease  Provider:  Neftali Dale MD    10/06/23 1004    10/05/23 0637  Inpatient Infectious Diseases Consult  Once        Specialty:  Infectious Diseases  Provider:  Bryan Mccarthy MD    10/05/23 0641    10/05/23 0615  Inpatient Infectious Diseases Consult  Once,   Status:  Canceled        Specialty:  Infectious Diseases  Provider:  Bryan Mccarthy MD    10/05/23 0619    10/03/23 1619  Inpatient Cardiothoracic Surgery Consult  Once        Specialty:  Cardiothoracic Surgery  Provider:  Jr Randall More MD    10/03/23 1621    10/03/23 1254  Inpatient Cardiology Consult  Once        Specialty:  Cardiology  Provider:  Harlan Downing MD    10/03/23 1253    10/03/23 0432  Inpatient General Surgery Consult  Once        Specialty:  General Surgery  Provider:  Carri Plummer MD    10/03/23 0431    10/03/23 0001  [facility] (on-call MD unless specified) Details  Once        Specialty:  Hospitalist  Provider:  (Not yet assigned)    10/03/23 0000                  Procedures     CHOLECYSTECTOMY LAPAROSCOPIC INTRAOPERATIVE CHOLANGIOGRAM    Imaging Results (All)       Procedure Component Value Units Date/Time    CT Guided Abscess Drain Subdiaphr Subphren [[id number]] Collected: 10/05/23 1339     Updated: 10/05/23 1642    Narrative:      CT-GUIDED PERCUTANEOUS CHOLECYSTOSTOMY (GALLBLADDER DRAINAGE) 10/05/2023     HISTORY: Sepsis. Cholecystitis.     After signed informed consent was obtained the patient was prepped and  draped in the left posterior oblique position. Lidocaine was used for  local anesthesia.     18-gauge needle  was introduced into the gallbladder using a transhepatic  approach. Bloody bile was visualized. 038 wire was passed and this was  followed by placement of 8-Turkish dilator and followed by placement of  an 8-Turkish pigtail catheter into the gallbladder.     Approximately 140 cc of somewhat bloody bile was removed.     The catheter was connected to suction bulb drainage and sutured to the  skin and was draining bloody bile.     Patient tolerated the procedure well with no complications.       Impression:      1. Successful CT-guided placement of percutaneous cholecystostomy tube.     Radiation dose reduction techniques were utilized, including automated  exposure control and exposure modulation based on body size.        This report was finalized on 10/5/2023 4:39 PM by Dr. Kj Rendon M.D on Workstation: [id number]       XR Chest Post CVA Port [[id number]] Collected: 10/05/23 1537     Updated: 10/05/23 1542    Narrative:      PORTABLE CHEST X-RAY     HISTORY: Central line placement.     TECHNIQUE: Portable chest x-ray from 3:04 p.m. is correlated with chest  x-ray from 8:19 a.m. this morning and chest x-ray October 2, 2023.     FINDINGS: The right IJ central line coursing laterally over the  subclavian region is unchanged. There is a new left IJ central line with  its tip to the right of midline near the expected confluence of the  brachiocephalic vein with the upper SVC. No pneumothorax.     Lung volumes are low. There appears to be heart failure or volume  overload with interstitial edema more pronounced than on the x-ray from  earlier today. Small pleural effusions posteriorly.       Impression:      New left IJ central line is positioned as expected. No  pneumothorax. Volume overload or heart failure with interstitial edema  and pleural effusions.     This report was finalized on 10/5/2023 3:39 PM by Dr. Robert Christiansen M.D on Workstation: [id number]       XR Chest 1 View [[id number]] Collected: 10/05/23 0906      Updated: 10/05/23 0921    Narrative:      XR CHEST 1 VW-     Clinical: Central line placement     COMPARISON examination 10/2/2023     FINDINGS: The right IJ catheter is deflected laterally and its tip  superimposes the subclavian/axillary junction. Exact position is  uncertain however this could be located within the subclavian vein  (correlating chest radiograph with coronal images from CT 10/5/2023).     There is ectasia of the thoracic aorta. Cardiac size within normal  limits. Shallow inspiratory effort with crowding of the bronchovascular  markings at both lung bases. Likely bibasilar dependent atelectasis. No  edema or effusion seen. Patient is rotated towards the right.     No pneumothorax.     FINDINGS discussed with the patient's nurse at 9:15 a.m., Dr. Dale to  be notified prior to use.           This report was finalized on 10/5/2023 9:18 AM by Dr. Jorge Rivers M.D  on Workstation: [id number]       CT Abdomen Pelvis Without Contrast [[id number]] Collected: 10/05/23 0545     Updated: 10/05/23 0601    Narrative:      CT OF THE CHEST WITHOUT CONTRAST; CT OF THE ABDOMEN AND PELVIS WITHOUT  CONTRAST     HISTORY: Hypotension     COMPARISON: October 2, 2023     TECHNIQUE: Axial CT imaging was obtained from the thoracic inlet through  the symphysis pubis. No IV contrast was administered.     FINDINGS:  CT OF THE CHEST: There is increasing dependent consolidation at the lung  bases bilaterally. This is favored to be atelectasis, although  correlation with any evidence of pneumonia is recommended. There are  also small bilateral pleural effusions. These are new when compared to  prior exam. Thyroid gland and trachea appear within normal limits.  Moderate hiatal hernia is again seen. Dilatation of the ascending  thoracic aorta is again noted. Descending thoracic aorta is normal in  caliber. No acute osseous abnormalities are seen.     CT OF THE ABDOMEN AND PELVIS: Patient's gallbladder is again noted to  be  distended and contains gallstones. Pericholecystic soft tissue stranding  has significantly worsened when compared to prior exam. The patient has  known acute cholecystitis. Dilatation of the celiac axis is present. It  is stable. Calcified granulomata are noted within the spleen. Pancreas  is mildly atrophic. No hydronephrosis is seen. However, there does  appear to be a stone within the distal left ureter. This measures 6 mm  in size. There is a simple appearing right renal cyst. No additional  follow-up is necessary. Abdominal aorta is normal in caliber. There are  aortic calcifications. Prostate gland is enlarged and protrudes into the  base of the bladder. Bladder diverticulum is noted. There is no evidence  of appendicitis. Sigmoid colon is redundant and distended with gas,  although there is no convincing evidence of volvulus. There is colonic  diverticulosis. Hepatic flexure of the colon does appear somewhat thick  walled, which is likely secondary to inflammation from the patient's  gallbladder. No acute osseous abnormalities are seen.          Impression:         1. Increasing dependent consolidation at the lung bases. There are also  small bilateral effusions. Appearance is favored to be secondary to  atelectasis, although correlation with any evidence of pneumonia is  recommended.  2. Worsening pericholecystic stranding when compared to prior exam.  There also appears to be some secondary inflammation of the hepatic  flexure of the colon.  3. Suspected nonobstructing stone within the distal left ureter. This  measures up to 6 mm.     Radiation dose reduction techniques were utilized, including automated  exposure control and exposure modulation based on body size.        This report was finalized on 10/5/2023 5:58 AM by Dr. Jamaica Tilley M.D on Workstation: [id number]       CT Chest Without Contrast Diagnostic [[id number]] Collected: 10/05/23 0545     Updated: 10/05/23 0601    Narrative:       CT OF THE CHEST WITHOUT CONTRAST; CT OF THE ABDOMEN AND PELVIS WITHOUT  CONTRAST     HISTORY: Hypotension     COMPARISON: October 2, 2023     TECHNIQUE: Axial CT imaging was obtained from the thoracic inlet through  the symphysis pubis. No IV contrast was administered.     FINDINGS:  CT OF THE CHEST: There is increasing dependent consolidation at the lung  bases bilaterally. This is favored to be atelectasis, although  correlation with any evidence of pneumonia is recommended. There are  also small bilateral pleural effusions. These are new when compared to  prior exam. Thyroid gland and trachea appear within normal limits.  Moderate hiatal hernia is again seen. Dilatation of the ascending  thoracic aorta is again noted. Descending thoracic aorta is normal in  caliber. No acute osseous abnormalities are seen.     CT OF THE ABDOMEN AND PELVIS: Patient's gallbladder is again noted to be  distended and contains gallstones. Pericholecystic soft tissue stranding  has significantly worsened when compared to prior exam. The patient has  known acute cholecystitis. Dilatation of the celiac axis is present. It  is stable. Calcified granulomata are noted within the spleen. Pancreas  is mildly atrophic. No hydronephrosis is seen. However, there does  appear to be a stone within the distal left ureter. This measures 6 mm  in size. There is a simple appearing right renal cyst. No additional  follow-up is necessary. Abdominal aorta is normal in caliber. There are  aortic calcifications. Prostate gland is enlarged and protrudes into the  base of the bladder. Bladder diverticulum is noted. There is no evidence  of appendicitis. Sigmoid colon is redundant and distended with gas,  although there is no convincing evidence of volvulus. There is colonic  diverticulosis. Hepatic flexure of the colon does appear somewhat thick  walled, which is likely secondary to inflammation from the patient's  gallbladder. No acute osseous abnormalities  are seen.          Impression:         1. Increasing dependent consolidation at the lung bases. There are also  small bilateral effusions. Appearance is favored to be secondary to  atelectasis, although correlation with any evidence of pneumonia is  recommended.  2. Worsening pericholecystic stranding when compared to prior exam.  There also appears to be some secondary inflammation of the hepatic  flexure of the colon.  3. Suspected nonobstructing stone within the distal left ureter. This  measures up to 6 mm.     Radiation dose reduction techniques were utilized, including automated  exposure control and exposure modulation based on body size.        This report was finalized on 10/5/2023 5:58 AM by Dr. Jamaica Tilley M.D on Workstation: [id number]       XR Chest 1 View [[id number]] Collected: 10/02/23 2124     Updated: 10/03/23 0828    Narrative:      XR CHEST 1 VW-     HISTORY: 83-year-old male with chest pain. Known thoracic aortic  aneurysm.     FINDINGS: There is no significant change in the contour of the  mediastinum since previous radiograph 01/28/2023. There is no evidence  for CHF and no convincing evidence for pneumonia. There is a small to  moderate size hiatal hernia which has been seen on previous imaging.     This report was finalized on 10/3/2023 8:25 AM by Dr. Marilyn Bullock M.D.        Abdomen Limited [[id number]] Collected: 10/03/23 0600     Updated: 10/03/23 0606    Narrative:      RIGHT UPPER QUADRANT ULTRASOUND     HISTORY: Abdominal pain     COMPARISON: October 2, 2023     TECHNIQUE: Grayscale and color Doppler sonographic images were obtained  through the right upper quadrant     FINDINGS:  The pancreas cannot be seen due to overlying bowel gas. It appears  grossly unremarkable on the technically limited CT. No focal hepatic  lesions are seen. Main portal vein is patent with hepatopetal flow.  There is no intra or extrahepatic biliary dilatation. Common bile duct  measures 5 mm. The  patient has cholelithiasis. The gallbladder is  distended, and there is gallbladder wall thickening and pericholecystic  fluid. Gallbladder wall measures up to 1.5 cm. There is also some  gallbladder wall edema. There is a trace amount of free fluid which is  noted within the right upper quadrant. Right kidney measures 11.8 x 4.7  x 5.4 cm. No hydronephrosis is seen. There is a simple appearing cyst  arising from the right kidney.          Impression:      Distended gallbladder with cholelithiasis and gallbladder wall edema and  thickening, as well as trace pericholecystic fluid. Appearance is  concerning for acute cholecystitis.     This report was finalized on 10/3/2023 6:03 AM by Dr. Jamaica Tilley M.D.       CT Angiogram Chest [[id number]] Collected: 10/02/23 2237     Updated: 10/02/23 2253    Narrative:      CT ANGIOGRAM OF THE CHEST; CT ANGIOGRAM OF THE ABDOMEN AND PELVIS        HISTORY: Epigastric pain     COMPARISON: None available.     TECHNIQUE: Axial CT imaging was obtained from the thoracic inlet through  the symphysis pubis. IV contrast was administered. Three-D reformatted  images were obtained.     FINDINGS:  CT OF THE CHEST: No obvious acute pulmonary thromboembolus is seen.  There is dilatation of the ascending thoracic aorta, measuring up to 4.8  cm. This is stable when compared to prior imaging. The remainder of the  thoracic aorta is normal in caliber. I see no evidence of dissection.  There are coronary artery calcifications. The aorta is tortuous in its  course. Low-attenuation lesion is noted within the left lobe of the  thyroid gland, measuring 5 mm. It is unchanged when compared to prior  imaging. The trachea is within normal limits. The patient has a moderate  hiatal hernia. This is similar to the prior study. There is dependent  atelectasis and scarring. No definite acute infiltrates are seen.  Mediastinal lymph nodes do not appear pathologically enlarged. No acute  osseous  abnormalities are identified.     CT ANGIOGRAM OF THE ABDOMEN AND PELVIS: Dilatation of the celiac axis,  measuring up to 1.5 cm, is stable when compared to prior imaging. The  superior mesenteric artery is patent. Main renal arteries appear to be  widely patent. There is apparent narrowing at the origin of the  accessory left renal artery, poorly assessed due to gating. Inferior  mesenteric artery is patent. Abdominal aorta is normal in caliber. The  common, internal, and external iliac arteries are patent, as are the  common femoral and visualized profunda femoris and superficial femoral  arteries. This examination is severely limited for assessment of organs,  due to the gating. The patient does have cholelithiasis. Gallbladder  does appear somewhat distended. The duodenum, adrenal glands, spleen,  and pancreas are grossly unremarkable. No hydronephrosis is seen on  either side. Prostate gland is enlarged and protrudes into the base of  the bladder. There is no bowel obstruction. I don't see any evidence of  appendicitis. No acute osseous abnormalities are seen.       Impression:         1. This is a severely technically limited examination due to gating.  2. Stable aneurysmal dilatation of the ascending thoracic aorta. No  dissection is seen.  3. The abdominal aorta is normal in caliber. No obvious dissection is  identified. 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  4. Gallbladder does appear distended, and there is cholelithiasis. Full  assessment is limited due to significant artifact. Gallbladder  ultrasound could be considered for further assessment.     Radiation dose reduction techniques were utilized, including automated  exposure control and exposure modulation based on body size.        This report was finalized on 10/2/2023 10:50 PM by Dr. Jamaica Tilley M.D.       CT Angiogram Abdomen Pelvis [[id number]] Collected: 10/02/23 2237     Updated: 10/02/23 2253    Narrative:      CT ANGIOGRAM OF  THE CHEST; CT ANGIOGRAM OF THE ABDOMEN AND PELVIS        HISTORY: Epigastric pain     COMPARISON: None available.     TECHNIQUE: Axial CT imaging was obtained from the thoracic inlet through  the symphysis pubis. IV contrast was administered. Three-D reformatted  images were obtained.     FINDINGS:  CT OF THE CHEST: No obvious acute pulmonary thromboembolus is seen.  There is dilatation of the ascending thoracic aorta, measuring up to 4.8  cm. This is stable when compared to prior imaging. The remainder of the  thoracic aorta is normal in caliber. I see no evidence of dissection.  There are coronary artery calcifications. The aorta is tortuous in its  course. Low-attenuation lesion is noted within the left lobe of the  thyroid gland, measuring 5 mm. It is unchanged when compared to prior  imaging. The trachea is within normal limits. The patient has a moderate  hiatal hernia. This is similar to the prior study. There is dependent  atelectasis and scarring. No definite acute infiltrates are seen.  Mediastinal lymph nodes do not appear pathologically enlarged. No acute  osseous abnormalities are identified.     CT ANGIOGRAM OF THE ABDOMEN AND PELVIS: Dilatation of the celiac axis,  measuring up to 1.5 cm, is stable when compared to prior imaging. The  superior mesenteric artery is patent. Main renal arteries appear to be  widely patent. There is apparent narrowing at the origin of the  accessory left renal artery, poorly assessed due to gating. Inferior  mesenteric artery is patent. Abdominal aorta is normal in caliber. The  common, internal, and external iliac arteries are patent, as are the  common femoral and visualized profunda femoris and superficial femoral  arteries. This examination is severely limited for assessment of organs,  due to the gating. The patient does have cholelithiasis. Gallbladder  does appear somewhat distended. The duodenum, adrenal glands, spleen,  and pancreas are grossly unremarkable. No  hydronephrosis is seen on  either side. Prostate gland is enlarged and protrudes into the base of  the bladder. There is no bowel obstruction. I don't see any evidence of  appendicitis. No acute osseous abnormalities are seen.       Impression:         1. This is a severely technically limited examination due to gating.  2. Stable aneurysmal dilatation of the ascending thoracic aorta. No  dissection is seen.  3. The abdominal aorta is normal in caliber. No obvious dissection is  identified. 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  4. Gallbladder does appear distended, and there is cholelithiasis. Full  assessment is limited due to significant artifact. Gallbladder  ultrasound could be considered for further assessment.     Radiation dose reduction techniques were utilized, including automated  exposure control and exposure modulation based on body size.        This report was finalized on 10/2/2023 10:50 PM by Dr. Jamaica Tilley M.D.               Results for orders placed during the hospital encounter of 10/02/23    Adult Transthoracic Echo Complete W/ Cont if Necessary Per Protocol    Interpretation Summary    Left ventricular ejection fraction appears to be 51 - 55%.    Mild aortic valve stenosis is present. Aortic valve area is 2 cm2.    Peak velocity of the flow distal to the aortic valve is 234.8 cm/s. Aortic valve maximum pressure gradient is 22 mmHg. Aortic valve mean pressure gradient is 11 mmHg. Aortic valve dimensionless index is 0.7 .    Estimated right ventricular systolic pressure from tricuspid regurgitation is mildly elevated (35-45 mmHg).    Pertinent Labs     Results from last 7 days   Lab Units 10/10/23  0609 10/09/23  0606 10/08/23  0441 10/07/23  0420   WBC 10*3/mm3 9.42 6.37 7.95 10.03   HEMOGLOBIN g/dL 13.1 10.7* 10.3* 10.4*   PLATELETS 10*3/mm3 326 210 170 150     Results from last 7 days   Lab Units 10/10/23  0609 10/09/23  2135 10/09/23  0606 10/08/23  1459 10/08/23  0441  "10/07/23  0420   SODIUM mmol/L 144  --  135*  --  139 139   POTASSIUM mmol/L 3.9 4.1 3.6 4.0 3.5 3.9   CHLORIDE mmol/L 104  --  104  --  108* 109*   CO2 mmol/L 24.9  --  23.4  --  23.7 23.0   BUN mg/dL 9  --  8  --  10 17   CREATININE mg/dL 0.92  --  0.61*  --  0.63* 0.77   GLUCOSE mg/dL 92  --  88  --  93 76   EGFR mL/min/1.73 83.1  --  95.9  --  95.0 89.4     Results from last 7 days   Lab Units 10/10/23  0609 10/09/23  0606 10/08/23  0441 10/07/23  0420   ALBUMIN g/dL 2.9* 2.3* 2.2* 2.2*   BILIRUBIN mg/dL 0.3 0.2 0.3 0.5   ALK PHOS U/L 64 50 53 51   AST (SGOT) U/L 18 11 11 11   ALT (SGPT) U/L 13 10 10 12     Results from last 7 days   Lab Units 10/10/23  0609 10/09/23  0606 10/08/23  0441 10/07/23  1223 10/07/23  0420 10/07/23  0006 10/06/23  1310 10/06/23  0334 10/05/23  1737   CALCIUM mg/dL 9.2 8.3* 7.9*  --  7.9*  --   --  7.9*  --    ALBUMIN g/dL 2.9* 2.3* 2.2*  --  2.2*  --   --  2.5*  --    MAGNESIUM mg/dL  --  1.6  --   --  1.8  --   --  2.5* 2.8*   PHOSPHORUS mg/dL  --   --  2.3* 2.2*  --  1.5* 1.3* 2.1* 2.3*       Results from last 7 days   Lab Units 10/05/23  0224 10/04/23  0802 10/04/23  0338   HSTROP T ng/L 27* 28* 30*           Invalid input(s): \"LDLCALC\"  Results from last 7 days   Lab Units 10/05/23  1234 10/05/23  0624   BLOODCX   --  No growth at 5 days  No growth at 5 days   BODYFLDCX  Heavy growth (4+) Escherichia coli*  --          Test Results Pending at Discharge     Pending Labs       Order Current Status    Anaerobic Culture - Drainage, Peritoneum Preliminary result            Discharge Details        Discharge Medications        Changes to Medications        Instructions Start Date   amiodarone 200 MG tablet  Commonly known as: PACERONE  What changed: how to take this   Take 1/2 (one-half) tablet by mouth once daily      warfarin 7.5 MG tablet  Commonly known as: COUMADIN  What changed:   when to take this  additional instructions   7.5 mg, Oral, Nightly      warfarin 5 MG " tablet  Commonly known as: COUMADIN  What changed:   when to take this  additional instructions   Take 1 tablet by mouth once daily             Continue These Medications        Instructions Start Date   acetaminophen 325 MG tablet  Commonly known as: TYLENOL   650 mg, Oral, Every 4 Hours PRN      aspirin 81 MG EC tablet   81 mg, Oral, Daily      doxazosin 2 MG tablet  Commonly known as: CARDURA   TAKE 1 TABLET BY MOUTH ONCE DAILY AT NIGHT      esomeprazole 20 MG capsule  Commonly known as: nexIUM   20 mg, Oral, Every Morning Before Breakfast      hydrALAZINE 25 MG tablet  Commonly known as: APRESOLINE   25 mg, Oral, 3 Times Daily      isosorbide mononitrate 30 MG 24 hr tablet  Commonly known as: IMDUR   Take 1 tablet by mouth once daily      levothyroxine 112 MCG tablet  Commonly known as: SYNTHROID, LEVOTHROID   Take 1 tablet by mouth once daily      nebivolol 10 MG tablet  Commonly known as: BYSTOLIC   5 mg, Oral, Daily      olmesartan-hydrochlorothiazide 40-25 MG per tablet  Commonly known as: BENICAR HCT   TAKE ONE TABLET BY MOUTH DAILY      rosuvastatin 5 MG tablet  Commonly known as: CRESTOR   Take 1 tablet by mouth once daily               No Known Allergies    Discharge Disposition:  Home or Self Care      Discharge Diet:  Diet Order   Procedures    Diet: Regular/House Diet; Fluid Consistency: Thin (IDDSI 0)       Discharge Activity:   Activity Instructions       Activity as Tolerated              CODE STATUS:    Code Status and Medical Interventions:   Ordered at: 10/03/23 0010     Code Status (Patient has no pulse and is not breathing):    CPR (Attempt to Resuscitate)     Medical Interventions (Patient has pulse or is breathing):    Full Support       Future Appointments   Date Time Provider Department Center   10/30/2023 11:00 AM Randall Quintanilla MD [facility]   12/5/2023 12:30 PM [facility] 3  [facility]   1/4/2024 11:45 AM [facility] Downing [facility]     Additional  Instructions for the Follow-ups that You Need to Schedule       Discharge Follow-up with PCP   As directed       Currently Documented PCP:    Chacha Pineda MD    PCP Phone Number:    [phone]     Follow Up Details: 1 week        Discharge Follow-up with Specified Provider: ; 1 Month   As directed      To:    Follow Up: 1 Month               Follow-up Information       Chacha Pineda MD .    Specialties: Family Medicine, Emergency Medicine, Urgent Care  Why: 1 week  Contact information:  [address] [address]  [phone]                             Additional Instructions for the Follow-ups that You Need to Schedule       Discharge Follow-up with PCP   As directed       Currently Documented PCP:    Chacha Pineda MD    PCP Phone Number:    [phone]     Follow Up Details: 1 week        Discharge Follow-up with Specified Provider: ; 1 Month   As directed      To:    Follow Up: 1 Month            Time Spent on Discharge:  Greater than 30 minutes      Magan Pardo MD  [facility]  10/10/23  11:57 EDT

## 2023-10-11 ENCOUNTER — TRANSITIONAL CARE MANAGEMENT TELEPHONE ENCOUNTER (OUTPATIENT)
Dept: CALL CENTER | Facility: HOSPITAL | Age: 83
End: 2023-10-11
Payer: MEDICARE

## 2023-10-11 LAB
QT INTERVAL: 419 MS
QTC INTERVAL: 481 MS

## 2023-10-11 NOTE — OUTREACH NOTE
Prep Survey      Flowsheet Row Responses   [facility] patient discharged from? [place]   Is LACE score < 7 ? No   Eligibility [facility]   Discharge Disposition Home-Health Care Svc   Discharge diagnosis Epigastric pain    Chronic coronary artery diseaseSevere sepsis secondary to acute cholecystitis with hypotension required ICU stay Acute cholecystitis,   Does the patient have one of the following disease processes/diagnoses(primary or secondary)? Sepsis   Does the patient have Home health ordered? Yes   What is the Home health agency?  [facility]   Is there a DME ordered? No   Prep survey completed? Yes            ELMA STARR - Registered Nurse

## 2023-10-11 NOTE — OUTREACH NOTE
Call Center TCM Note      Flowsheet Row Responses   [facility] patient discharged from? [place]   Does the patient have one of the following disease processes/diagnoses(primary or secondary)? Sepsis   TCM attempt successful? Yes   Call start time 1004   Call end time 1010   Discharge diagnosis Epigastric pain    Chronic coronary artery diseaseSevere sepsis secondary to acute cholecystitis with hypotension required ICU stay Acute cholecystitis,   Meds reviewed with patient/caregiver? Yes   Is the patient having any side effects they believe may be caused by any medication additions or changes? No   Does the patient have all medications related to this admission filled (includes all antibiotics, inhalers, nebulizers,steroids,etc.) Yes   Is the patient taking all medications as directed (includes completed medication regime)? Yes   Comments Pt states that it is 85 round trip to go to Dr. Pineda so he wants to make on Oct 30 when he follows up with surgeon.  Appt made for 10/30 @ 2:00.  This will be out of the TCM time frame.   Does the patient have an appointment with their PCP within 7-14 days of discharge? No   Nursing Interventions Assisted patient with making appointment per protocol   What is the Home health agency?  [facility]   Has home health visited the patient within 72 hours of discharge? Call prior to 72 hours   Home health comments Should be there today   Did the patient receive a copy of their discharge instructions? Yes   Nursing interventions Reviewed instructions with patient   What is the patient's perception of their health status since discharge? Improving   Nursing interventions Nurse provided patient education   Is the patient/caregiver able to teach back TIME? T emperature - higher or lower than normal, I nfection - may have signs and symptoms of an infection, M ental Decline - confused, sleepy, difficult to arouse, E xtremely Ill - severe pain,  discomfort, shortness of breath   Nursing interventions Nurse provided reassurance to patient   Is patient/caregiver able to teach back steps to recovery at home? Set small, achievable goals for return to baseline health, Rest and regain strength, Eat a balanced diet, Exercise as tolerated, Make a list of questions for PCP appoinment   Is the patient/caregiver able to teach back signs and symptoms of worsening condition: Fever, Hyperthermia, Rapid heart rate (>90), Shortness of breath/rapid respiratory rate, Altered mental status(confusion/coma)   If the patient is a current smoker, are they able to teach back resources for cessation? Not a smoker   Is the patient/caregiver able to teach back the hierarchy of who to call/visit for symptoms/problems? PCP, Specialist, Home health nurse, Urgent Care, ED, 911 Yes   Additional teach back comments States he is doing better.  He is scheduled to have surgery in 3-6 weeks.  States after he had his teeth implants they have had issues regulating his INR. he follows up on this closely with    TCM call completed? Yes   Wrap up additional comments Appt made with PCP.   Call end time 1010            Serene Mcelroy LPN    10/11/2023, 10:12 EDT

## 2023-10-12 LAB — BACTERIA SPEC ANAEROBE CULT: ABNORMAL

## 2023-10-19 ENCOUNTER — READMISSION MANAGEMENT (OUTPATIENT)
Dept: CALL CENTER | Facility: HOSPITAL | Age: 83
End: 2023-10-19
Payer: MEDICARE

## 2023-10-19 NOTE — OUTREACH NOTE
Sepsis Week 2 Survey      Flowsheet Row Responses   [facility] patient discharged from? [place]   Does the patient have one of the following disease processes/diagnoses(primary or secondary)? Sepsis   Week 2 attempt successful? Yes   Call start time 1242   Call end time 1248   Discharge diagnosis Epigastric pain    Chronic coronary artery diseaseSevere sepsis secondary to acute cholecystitis with hypotension required ICU stay Acute cholecystitis,   Person spoke with today (if not patient) and relationship Patient   Meds reviewed with patient/caregiver? Yes   Is the patient having any side effects they believe may be caused by any medication additions or changes? No   Does the patient have all medications related to this admission filled (includes all antibiotics, inhalers, nebulizers,steroids,etc.) Yes   Is the patient taking all medications as directed (includes completed medication regime)? Yes   Comments regarding appointments Surgeon f/u appt on 10/30/23.   Does the patient have a primary care provider?  Yes   Comments regarding PCP seeing PCP on 10/30/23.   Has the patient kept scheduled appointments due by today? N/A   What is the Home health agency?  [facility] comments PT saw pt today. SN is coming once a week, will be out again on Monday. OT dismissed patient from care.   Psychosocial issues? No   Comments pt is doing well, he states so well he is unsure if PT will be back out. no needs or concerns at this time. vital signs all wnl. pt sees surgeon on 10/30/23 for possible surgery to remove gallbladder.   Did the patient receive a copy of their discharge instructions? Yes   Nursing interventions Reviewed instructions with patient   What is the patient's perception of their health status since discharge? Improving   Nursing interventions Nurse provided patient education   Is the patient/caregiver able to teach back TIME? T emperature - higher or lower  than normal, I nfection - may have signs and symptoms of an infection, M ental Decline - confused, sleepy, difficult to arouse, E xtremely Ill - severe pain, discomfort, shortness of breath   Nursing interventions Nurse provided reassurance to patient   Is patient/caregiver able to teach back steps to recovery at home? Set small, achievable goals for return to baseline health, Rest and regain strength, Eat a balanced diet, Exercise as tolerated   Is the patient/caregiver able to teach back signs and symptoms of worsening condition: Fever, Rapid heart rate (>90), Altered mental status(confusion/coma), Shortness of breath/rapid respiratory rate   If the patient is a current smoker, are they able to teach back resources for cessation? Not a smoker   Is the patient/caregiver able to teach back the hierarchy of who to call/visit for symptoms/problems? PCP, Specialist, Home health nurse, Urgent Care, ED, 911 Yes   Week 2 call completed? Yes   Graduated Yes   Is the patient interested in additional calls from an ambulatory ? No   Would this patient benefit from a Referral to [facility] Social Work? No   Call end time 6936            Katie DELGADO - Registered Nurse

## 2023-10-21 RX ORDER — DOXAZOSIN 2 MG/1
TABLET ORAL
Qty: 90 TABLET | Refills: 3 | Status: SHIPPED | OUTPATIENT
Start: 2023-10-21

## 2023-10-30 ENCOUNTER — HOSPITAL ENCOUNTER (OUTPATIENT)
Dept: CARDIOLOGY | Facility: HOSPITAL | Age: 83
Discharge: HOME | End: 2023-10-30
Payer: MEDICARE

## 2023-10-30 ENCOUNTER — HOSPITAL ENCOUNTER (OUTPATIENT)
Facility: HOSPITAL | Age: 83
Discharge: HOME | End: 2023-10-30
Payer: MEDICARE

## 2023-10-30 ENCOUNTER — OFFICE VISIT (OUTPATIENT)
Dept: FAMILY MEDICINE CLINIC | Facility: CLINIC | Age: 83
End: 2023-10-30
Payer: MEDICARE

## 2023-10-30 ENCOUNTER — ANTICOAGULATION VISIT (OUTPATIENT)
Dept: CARDIOLOGY | Facility: CLINIC | Age: 83
End: 2023-10-30
Payer: MEDICARE

## 2023-10-30 ENCOUNTER — OFFICE VISIT (OUTPATIENT)
Dept: SURGERY | Facility: CLINIC | Age: 83
End: 2023-10-30
Payer: MEDICARE

## 2023-10-30 ENCOUNTER — TELEPHONE (OUTPATIENT)
Dept: SURGERY | Facility: CLINIC | Age: 83
End: 2023-10-30

## 2023-10-30 VITALS
DIASTOLIC BLOOD PRESSURE: 70 MMHG | BODY MASS INDEX: 27.3 KG/M2 | HEIGHT: 72 IN | SYSTOLIC BLOOD PRESSURE: 124 MMHG | WEIGHT: 201.6 LBS

## 2023-10-30 VITALS
HEART RATE: 76 BPM | OXYGEN SATURATION: 96 % | RESPIRATION RATE: 18 BRPM | TEMPERATURE: 98.3 F | WEIGHT: 201.6 LBS | DIASTOLIC BLOOD PRESSURE: 82 MMHG | SYSTOLIC BLOOD PRESSURE: 130 MMHG | BODY MASS INDEX: 27.3 KG/M2 | HEIGHT: 72 IN

## 2023-10-30 DIAGNOSIS — E78.2 MIXED HYPERLIPIDEMIA: ICD-10-CM

## 2023-10-30 DIAGNOSIS — K81.0 ACUTE CHOLECYSTITIS: Primary | ICD-10-CM

## 2023-10-30 DIAGNOSIS — I10 ESSENTIAL HYPERTENSION: ICD-10-CM

## 2023-10-30 LAB — INR PPP: 2.7

## 2023-10-30 PROCEDURE — 85610 PROTHROMBIN TIME: CPT | Performed by: INTERNAL MEDICINE

## 2023-10-30 PROCEDURE — 99214 OFFICE O/P EST MOD 30 MIN: CPT | Performed by: SURGERY

## 2023-10-30 PROCEDURE — 1159F MED LIST DOCD IN RCRD: CPT | Performed by: SURGERY

## 2023-10-30 PROCEDURE — 74176 CT ABD & PELVIS W/O CONTRAST: CPT

## 2023-10-30 PROCEDURE — 3078F DIAST BP <80 MM HG: CPT | Performed by: SURGERY

## 2023-10-30 PROCEDURE — 3074F SYST BP LT 130 MM HG: CPT | Performed by: SURGERY

## 2023-10-30 PROCEDURE — 1160F RVW MEDS BY RX/DR IN RCRD: CPT | Performed by: SURGERY

## 2023-10-30 RX ORDER — CEFAZOLIN SODIUM 2 G/100ML
2000 INJECTION, SOLUTION INTRAVENOUS ONCE
OUTPATIENT
Start: 2023-12-05 | End: 2023-10-30

## 2023-10-30 NOTE — LETTER
October 30, 2023       No Recipients    Patient: David Comer Sr.   YOB: 1940   Date of Visit: 10/30/2023     Dear Chacha Pineda MD:       Thank you for referring David Comer to me for evaluation. Below are the relevant portions of my assessment and plan of care.    If you have questions, please do not hesitate to call me. I look forward to following David along with you.         Sincerely,        Randall Quintanilla MD        CC:   No Recipients    Randall Quintanilla MD  10/30/23 1544  Sign when Signing Visit  Colorectal & General Surgery  Follow - Up    Patient: David Comer Sr.  YOB: 1940  MRN: [id number]      Assessment  David Comer Sr. is a 83 y.o. male with recent episode of acute cholecystitis complicated by septic shock requiring percutaneous cholecystostomy tube placement who presents in follow-up today.  He is recovered quite well.  He is tolerating a regular diet.  We discussed the role of interval laparoscopic cholecystectomy with intraoperative cholangiogram and simultaneous removal of his percutaneous cholecystostomy tube for permanent treatment of his acute cholecystitis.  We discussed the risk, benefits, alternatives, including the risk of bile duct injury, biloma, and bleeding.  He wishes to proceed to the operating for laparoscopic cholecystectomy with intraoperative cholangiogram.  Hold warfarin for 5 days prior to surgery.    His percutaneous cholecystostomy tube is not bilious and with minimal output.  I am suspicious that it has been dislodged from the gallbladder and is no longer in place.  I have ordered a stat CT scan of his abdomen and pelvis without contrast to confirm placement of the cholecystostomy tube.  Upon reviewing that film performed today, drain is appropriately positioned in the gallbladder.  We will plan to remove drain at time of cholecystectomy.    Plan  Plan for interval laparoscopic cholecystectomy with intraoperative cholangiogram    Chief  Complaint: Follow-up from hospital stay    History of Present Illness   David Comer Sr. is a 83 y.o. male who had recent episode of acute cholecystitis complicated by septic shock requiring percutaneous cholecystostomy tube.  He reports no pain.  He is back to his usual daily activities.  He has minimal drainage that is clear from his cholecystostomy tube.  He is tolerating a regular diet while avoiding some fatty foods.    Past Medical History   Past Medical History:   Diagnosis Date   • AAA (abdominal aortic aneurysm) without rupture    • Aneurysm of thoracic aorta     CT CHEST 8/2021 IN [facility]    • Arthritis    • Basal cell carcinoma     RIGHT LOWER LID    • BPH (benign prostatic hyperplasia)    • Chronic lumbar pain    • Degenerative arthritis of lumbar spine    • Degenerative cervical disc    • Disease of thyroid gland    • Elevated rheumatoid factor    • History of atrial fibrillation    • Hyperlipidemia    • Hypertension    • Hypogonadism in male    • Muscle strain of left upper back     PRESCRIBED PREDNISONE DOSE PACK    • On anticoagulant therapy    • Pulmonary nodule     6 MM - REPEAT CT SCAN IN ONE YEAR, WATCHING    • Refused influenza vaccine    • Scoliosis    • Vitamin D deficiency         Past Surgical History   Past Surgical History:   Procedure Laterality Date   • ANKLE FUSION Right    • CARDIAC CATHETERIZATION     • CARDIAC CATHETERIZATION N/A 7/2/2021    Procedure: Left Heart Cath;  Surgeon: Harlan Downing MD;  [facility];  Service: Cardiology;  Laterality: N/A;   • CARDIAC CATHETERIZATION N/A 7/2/2021    Procedure: Coronary angiography;  Surgeon: Harlan Downing MD;  Location:  [facility] CATH INVASIVE LOCATION;  Service: Cardiology;  Laterality: N/A;   • CARDIAC CATHETERIZATION N/A 7/2/2021    Procedure: Left ventriculography;  Surgeon: Harlan Downing MD;  Location:  [facility] CATH INVASIVE LOCATION;  Service: Cardiology;  Laterality: N/A;   •  CARDIAC CATHETERIZATION N/A 12/30/2022    Procedure: Left Heart Cath check inr;  Surgeon: Harlan Downing MD;  Location: [facility] CATH INVASIVE LOCATION;  Service: Cardiology;  Laterality: N/A;   • CARDIAC CATHETERIZATION N/A 12/30/2022    Procedure: Coronary angiography;  Surgeon: Harlan Downing MD;  [facility];  Service: Cardiology;  Laterality: N/A;   • CARDIAC CATHETERIZATION N/A 12/30/2022    Procedure: Left ventriculography;  Surgeon: Harlan Downing MD;  [facility];  Service: Cardiology;  Laterality: N/A;   • HOBBS TUBE INSERTION Right 10/19/2021    Procedure: RIGHT SECOND STAGE CAST TAKEDOWN RECONSTRUCTION;  Surgeon: Brian Bhatt MD;  [place] [facility] [place] [facility]  Service: Ophthalmology;  Laterality: Right;   • ENTROPIAN REPAIR Right 9/21/2021    Procedure: RIGHT LOWER LID EXCISION OF BASAL CELL CARCINOMA WITH FROZEN SECTION RIGHT LOWER LID RECONSTRUCTION WITH CAST FLAP, REPAIR OF CANULICULIS, AND FULL THICKNESS SKIN GRAFT;  Surgeon: Brian Bhatt MD;  [place] [facility] [place] [facility]  Service: Ophthalmology;  Laterality: Right;   • INGUINAL HERNIA REPAIR Right    • REPLACEMENT TOTAL KNEE Bilateral 2000   • ROTATOR CUFF REPAIR Left    • ROTATOR CUFF REPAIR Right    • SKIN BIOPSY     • VASECTOMY         Social History  Social History     Socioeconomic History   • Marital status:    Tobacco Use   • Smoking status: Never   • Smokeless tobacco: Never   Vaping Use   • Vaping Use: Never used   Substance and Sexual Activity   • Alcohol use: No   • Drug use: No   • Sexual activity: Defer       Family History  Family History   Problem Relation Age of Onset   • Hypertension Father    • Heart attack Father    • Heart attack Brother    • Alcohol abuse Brother    • Hypertension Brother    • Hypertension Paternal Grandmother    • Hypertension Paternal Grandfather    • Anemia Mother    • Arthritis Mother    • Hypertension  Mother    • Hypertension Maternal Grandmother    • Heart disease Other         FH in males before age 55   • Malig Hyperthermia Neg Hx        Colorectal cancer family history: None    Review of Systems  Negative except as documented in the HPI.     Allergies  No Known Allergies    Medications    Current Outpatient Medications:   •  acetaminophen (TYLENOL) 325 MG tablet, Take 2 tablets by mouth Every 4 (Four) Hours As Needed for Mild Pain ., Disp:  , Rfl:   •  amiodarone (PACERONE) 200 MG tablet, Take 1/2 (one-half) tablet by mouth once daily (Patient taking differently: 0.5 tablets Daily.), Disp: 45 tablet, Rfl: 2  •  aspirin 81 MG EC tablet, Take 1 tablet by mouth Daily., Disp: , Rfl:   •  doxazosin (CARDURA) 2 MG tablet, TAKE 1 TABLET BY MOUTH ONCE DAILY AT NIGHT, Disp: 90 tablet, Rfl: 3  •  esomeprazole (nexIUM) 20 MG capsule, Take 1 capsule by mouth Every Morning Before Breakfast., Disp: , Rfl:   •  hydrALAZINE (APRESOLINE) 25 MG tablet, Take 1 tablet by mouth 3 (Three) Times a Day., Disp: 270 tablet, Rfl: 1  •  isosorbide mononitrate (IMDUR) 30 MG 24 hr tablet, Take 1 tablet by mouth once daily (Patient taking differently: Take 1 tablet by mouth Daily.), Disp: 90 tablet, Rfl: 2  •  levothyroxine (SYNTHROID, LEVOTHROID) 112 MCG tablet, Take 1 tablet by mouth once daily (Patient taking differently: Take 1 tablet by mouth Daily.), Disp: 90 tablet, Rfl: 1  •  nebivolol (BYSTOLIC) 10 MG tablet, Take 0.5 tablets by mouth Daily., Disp: , Rfl:   •  olmesartan-hydrochlorothiazide (BENICAR HCT) 40-25 MG per tablet, TAKE ONE TABLET BY MOUTH DAILY, Disp: 90 tablet, Rfl: 0  •  rosuvastatin (CRESTOR) 5 MG tablet, Take 1 tablet by mouth once daily, Disp: 90 tablet, Rfl: 1  •  warfarin (COUMADIN) 5 MG tablet, Take 1 tablet by mouth See Admin Instructions. 5mg: Sunday, Monday, Friday, Disp: , Rfl:   •  warfarin (COUMADIN) 7.5 MG tablet, Take 1 tablet by mouth See Admin Instructions. Tue/Wed/Thur/Sat, Disp: , Rfl:     Vital  Signs  Vitals:    10/30/23 1106   BP: 124/70        Physical Exam  Constitutional: Resting comfortably, no acute distress  Neck: Supple, trachea midline  Respiratory: No increased work of breathing, Symmetric excursion  Cardiovascular: Well pefursed, no jugular venous distention evident   Abdominal: Cholecystostomy tube with serous output.  Soft, non-tender, non-distended  Lymphatics: No cervical or suprascapular adenopathy  Skin: Warm, dry, no rash on visualized skin surfaces  Musculoskeletal: Symmetric strength, no obvious gross abnormalities  Psychiatric: Alert and oriented ×3, normal affect     Laboratory Results  None to review    Radiology  CT scan of the abdomen and pelvis performed today demonstrates pigtail catheter within the gallbladder consistent with an appropriately positioned percutaneous cholecystostomy tube.    Endoscopy  None to review         Omar Quintanilla MD  Colorectal & General Surgery  [facility]    [address], [address]  [place], [place], [address]  P: [phone]  F: [phone]

## 2023-10-30 NOTE — PROGRESS NOTES
Chief Complaint  Hospital follow up.      Subjective        David KHAN Age Sr. presents to [facility] MEDICAL [facility] PRIMARY CARE  History of Present Illness  Patient presents today for hospital follow-up.  He was in [facility] from 10-23 till 10/10/2023 with a discharge diagnosis of epigastric pain and chest pain and acute cholecystitis.  He will be getting surgery for GB in early December,  he has bee doing well since leaving hospital.    HTN- No CP or HA  HLD on med and stable.      Hospital course:  Patient is a 83 y.o. male who presents to [facility] with the above chief complaint.  He has noticed that he has had some chest pain and epigastric pain for a while.  He noticed it most when he was lifting gas cans from the back of his car.  He talked to his heart doctor and other doctors and they recommended heat and supportive care and the pain got better.  Around 3:00 in the afternoon on Monday he noticed that he had significant epigastric pain and chest pain.  He had a plate of fried chicken for lunch and then the pain developed.  He has a history of coronary artery disease paroxysmal A-fib and is on Coumadin.  He also has a history of an abdominal aortic aneurysm.  In the emergency room he had CT scans of the chest abdomen and pelvis was not noted to have any blood clots and the aneurysm is stable.  It did show that his gallbladder was a little bit distended and he did have gallstones present.  Right upper quadrant ultrasound was then done and does confirm that he has cholecystitis.  He denies any fevers or chills but feels a little worse this morning.  He has not experienced any vomiting but did have some nausea but denies diarrhea changes in his stools fevers chills or shortness of breath.      1.Severe sepsis secondary to acute cholecystitis with hypotension required ICU stay and this is now resolved.  WBC is normal and patient is afebrile.     2.  Acute cholecystitis, CT-guided drain tube  "placement on 10/5.  Cultures grew E. coli and patient was treated with Rocephin and Flagyl and completed a course per ID recommendations.  Tolerated soft diet therefore has been advanced to regular.  Surgical team plans on outpatient elective laparoscopic cholecystectomy in 6 to 8 weeks.  Cholecystostomy tube to remain in place until then.  No need for antibiotics upon discharge.     3.  Bilateral pulmonary infiltrates most likely secondary atelectasis.  Currently not hypoxic and does not have any pleuritic chest pain.     4.  Diarrhea, has been improving.     5.  History of coronary artery disease, continue with aspirin, statins.     6.  Hypothyroidism, on Synthroid.     7.  Hypophosphatemia, replaced.     8.  History of atrial fibrillation, on aspirin and amiodarone.  Surgical team has cleared to resume warfarin and has been initiated on 10/8/2023.  INR today is 1.7 and will continue home warfarin upon discharge and no need to bridge with Lovenox after discussing risks and benefits with patient and patient in agreement with the plan and aware of small risk of thromboembolic events over the next 2 to 3 days.     9.  Volume overload, was diuresed with IV Lasix on 10/9/2023 and is now euvolemic.     Copied text on this note has been reviewed by me on 10/10/2023  Objective   Vital Signs:  /82 (BP Location: Left arm, Patient Position: Sitting, Cuff Size: Adult)   Pulse 76   Temp 98.3 °F (36.8 °C) (Tympanic)   Resp 18   Ht 182.9 cm (72.01\")   Wt 91.4 kg (201 lb 9.6 oz)   SpO2 96%   BMI 27.34 kg/m²   Estimated body mass index is 27.34 kg/m² as calculated from the following:    Height as of this encounter: 182.9 cm (72.01\").    Weight as of this encounter: 91.4 kg (201 lb 9.6 oz).               Physical Exam  Vitals and nursing note reviewed.   Constitutional:       Appearance: Normal appearance. He is well-developed.   Cardiovascular:      Rate and Rhythm: Normal rate and regular rhythm.      Heart sounds: " Normal heart sounds. No murmur heard.  Pulmonary:      Effort: Pulmonary effort is normal. No respiratory distress.      Breath sounds: Normal breath sounds. No stridor. No wheezing or rhonchi.   Abdominal:      General: There is no distension.      Palpations: There is no mass.      Tenderness: There is no abdominal tenderness. There is no guarding or rebound.      Hernia: No hernia is present.   Neurological:      General: No focal deficit present.      Mental Status: He is alert and oriented to person, place, and time. He is not disoriented.   Psychiatric:         Mood and Affect: Mood normal.         Behavior: Behavior normal.        Result Review :                   Assessment and Plan   Diagnoses and all orders for this visit:    1. Acute cholecystitis (Primary)    2. Mixed hyperlipidemia    3. Essential hypertension             Follow Up   Return in about 3 months (around 1/30/2024).  Patient was given instructions and counseling regarding his condition or for health maintenance advice. Please see specific information pulled into the AVS if appropriate.     I requested and reviewed all records, labs, and diagnostics from the hospital with patient and family.  Patient is to follow-up with specialists as discussed and directed. Refills.

## 2023-10-30 NOTE — PROGRESS NOTES
Colorectal & General Surgery  Follow - Up    Patient: David Comer Sr.  YOB: 1940  MRN: [id number]      Assessment  David Comer Sr. is a 83 y.o. male with recent episode of acute cholecystitis complicated by septic shock requiring percutaneous cholecystostomy tube placement who presents in follow-up today.  He is recovered quite well.  He is tolerating a regular diet.  We discussed the role of interval laparoscopic cholecystectomy with intraoperative cholangiogram and simultaneous removal of his percutaneous cholecystostomy tube for permanent treatment of his acute cholecystitis.  We discussed the risk, benefits, alternatives, including the risk of bile duct injury, biloma, and bleeding.  He wishes to proceed to the operating for laparoscopic cholecystectomy with intraoperative cholangiogram.  Hold warfarin for 5 days prior to surgery.    His percutaneous cholecystostomy tube is not bilious and with minimal output.  I am suspicious that it has been dislodged from the gallbladder and is no longer in place.  I have ordered a stat CT scan of his abdomen and pelvis without contrast to confirm placement of the cholecystostomy tube.  Upon reviewing that film performed today, drain is appropriately positioned in the gallbladder.  We will plan to remove drain at time of cholecystectomy.    Plan  Plan for interval laparoscopic cholecystectomy with intraoperative cholangiogram    Chief Complaint: Follow-up from hospital stay    History of Present Illness   David Comer Sr. is a 83 y.o. male who had recent episode of acute cholecystitis complicated by septic shock requiring percutaneous cholecystostomy tube.  He reports no pain.  He is back to his usual daily activities.  He has minimal drainage that is clear from his cholecystostomy tube.  He is tolerating a regular diet while avoiding some fatty foods.    Past Medical History   Past Medical History:   Diagnosis Date    AAA (abdominal aortic aneurysm) without  rupture     Aneurysm of thoracic aorta     CT CHEST 8/2021 [facility]     Arthritis     Basal cell carcinoma     RIGHT LOWER LID     BPH (benign prostatic hyperplasia)     Chronic lumbar pain     Degenerative arthritis of lumbar spine     Degenerative cervical disc     Disease of thyroid gland     Elevated rheumatoid factor     History of atrial fibrillation     Hyperlipidemia     Hypertension     Hypogonadism in male     Muscle strain of left upper back     PRESCRIBED PREDNISONE DOSE PACK     On anticoagulant therapy     Pulmonary nodule     6 MM - REPEAT CT SCAN IN ONE YEAR, WATCHING     Refused influenza vaccine     Scoliosis     Vitamin D deficiency         Past Surgical History   Past Surgical History:   Procedure Laterality Date    ANKLE FUSION Right     CARDIAC CATHETERIZATION      CARDIAC CATHETERIZATION N/A 7/2/2021    Procedure: Left Heart Cath;  Surgeon: Harlan Downing MD;  Location:  [facility]AS[facility];  Service: Cardiology;  Laterality: N/A;    CARDIAC CATHETERIZATION N/A 7/2/2021    Procedure: Coronary angiography;  Surgeon: Harlan Downing MD;  [facility]: [facility];  Service: Cardiology;  Laterality: N/A;    CARDIAC CATHETERIZATION N/A 7/2/2021    Procedure: Left ventriculography;  Surgeon: Harlan Downing MD;  [facility]: [facility];  Service: Cardiology;  Laterality: N/A;    CARDIAC CATHETERIZATION N/A 12/30/2022    Procedure: Left Heart Cath check inr;  Surgeon: Harlan Downing MD;  [facility]: [facility];  Service: Cardiology;  Laterality: N/A;    CARDIAC CATHETERIZATION N/A 12/30/2022    Procedure: Coronary angiography;  Surgeon: Harlan Downing MD;  [facility]: [facility];  Service: Cardiology;  Laterality: N/A;    CARDIAC CATHETERIZATION N/A 12/30/2022    Procedure: Left ventriculography;  Surgeon: Harlan Downing MD;  [facility]: [facility];  Service:  Cardiology;  Laterality: N/A;    HOBBS TUBE INSERTION Right 10/19/2021    Procedure: RIGHT SECOND STAGE CAST TAKEDOWN RECONSTRUCTION;  Surgeon: Brian Bhatt MD;  Location: [facility];  Service: Ophthalmology;  Laterality: Right;    ENTROPIAN REPAIR Right 9/21/2021    Procedure: RIGHT LOWER LID EXCISION OF BASAL CELL CARCINOMA WITH FROZEN SECTION RIGHT LOWER LID RECONSTRUCTION WITH CAST FLAP, REPAIR OF CANULICULIS, AND FULL THICKNESS SKIN GRAFT;  Surgeon: Brian Bhatt MD;  Location: [facility];  Service: Ophthalmology;  Laterality: Right;    INGUINAL HERNIA REPAIR Right     REPLACEMENT TOTAL KNEE Bilateral 2000    ROTATOR CUFF REPAIR Left     ROTATOR CUFF REPAIR Right     SKIN BIOPSY      VASECTOMY         Social History  Social History     Socioeconomic History    Marital status:    Tobacco Use    Smoking status: Never    Smokeless tobacco: Never   Vaping Use    Vaping Use: Never used   Substance and Sexual Activity    Alcohol use: No    Drug use: No    Sexual activity: Defer       Family History  Family History   Problem Relation Age of Onset    Hypertension Father     Heart attack Father     Heart attack Brother     Alcohol abuse Brother     Hypertension Brother     Hypertension Paternal Grandmother     Hypertension Paternal Grandfather     Anemia Mother     Arthritis Mother     Hypertension Mother     Hypertension Maternal Grandmother     Heart disease Other         FH in males before age 55    Malig Hyperthermia Neg Hx        Colorectal cancer family history: None    Review of Systems  Negative except as documented in the HPI.     Allergies  No Known Allergies    Medications    Current Outpatient Medications:     acetaminophen (TYLENOL) 325 MG tablet, Take 2 tablets by mouth Every 4 (Four) Hours As Needed for Mild Pain ., Disp:  , Rfl:     amiodarone (PACERONE) 200 MG tablet, Take 1/2 (one-half) tablet by mouth once daily (Patient taking differently: 0.5 tablets Daily.),  Disp: 45 tablet, Rfl: 2    aspirin 81 MG EC tablet, Take 1 tablet by mouth Daily., Disp: , Rfl:     doxazosin (CARDURA) 2 MG tablet, TAKE 1 TABLET BY MOUTH ONCE DAILY AT NIGHT, Disp: 90 tablet, Rfl: 3    esomeprazole (nexIUM) 20 MG capsule, Take 1 capsule by mouth Every Morning Before Breakfast., Disp: , Rfl:     hydrALAZINE (APRESOLINE) 25 MG tablet, Take 1 tablet by mouth 3 (Three) Times a Day., Disp: 270 tablet, Rfl: 1    isosorbide mononitrate (IMDUR) 30 MG 24 hr tablet, Take 1 tablet by mouth once daily (Patient taking differently: Take 1 tablet by mouth Daily.), Disp: 90 tablet, Rfl: 2    levothyroxine (SYNTHROID, LEVOTHROID) 112 MCG tablet, Take 1 tablet by mouth once daily (Patient taking differently: Take 1 tablet by mouth Daily.), Disp: 90 tablet, Rfl: 1    nebivolol (BYSTOLIC) 10 MG tablet, Take 0.5 tablets by mouth Daily., Disp: , Rfl:     olmesartan-hydrochlorothiazide (BENICAR HCT) 40-25 MG per tablet, TAKE ONE TABLET BY MOUTH DAILY, Disp: 90 tablet, Rfl: 0    rosuvastatin (CRESTOR) 5 MG tablet, Take 1 tablet by mouth once daily, Disp: 90 tablet, Rfl: 1    warfarin (COUMADIN) 5 MG tablet, Take 1 tablet by mouth See Admin Instructions. 5mg: Sunday, Monday, Friday, Disp: , Rfl:     warfarin (COUMADIN) 7.5 MG tablet, Take 1 tablet by mouth See Admin Instructions. Tue/Wed/Thur/Sat, Disp: , Rfl:     Vital Signs  Vitals:    10/30/23 1106   BP: 124/70        Physical Exam  Constitutional: Resting comfortably, no acute distress  Neck: Supple, trachea midline  Respiratory: No increased work of breathing, Symmetric excursion  Cardiovascular: Well pefursed, no jugular venous distention evident   Abdominal: Cholecystostomy tube with serous output.  Soft, non-tender, non-distended  Lymphatics: No cervical or suprascapular adenopathy  Skin: Warm, dry, no rash on visualized skin surfaces  Musculoskeletal: Symmetric strength, no obvious gross abnormalities  Psychiatric: Alert and oriented ×3, normal affect      Laboratory Results  None to review    Radiology  CT scan of the abdomen and pelvis performed today demonstrates pigtail catheter within the gallbladder consistent with an appropriately positioned percutaneous cholecystostomy tube.    Endoscopy  None to review         Omar Quintanilla MD  Colorectal & General Surgery  [facility]    [address], [address]  [place], [place], [address]  P: [phone]  F: [phone]

## 2023-10-30 NOTE — TELEPHONE ENCOUNTER
Pt calling about CT that was done this morning, wondering if you were going to be able to remove the tube?

## 2023-11-21 DIAGNOSIS — I10 ESSENTIAL HYPERTENSION: ICD-10-CM

## 2023-11-21 RX ORDER — OLMESARTAN MEDOXOMIL AND HYDROCHLOROTHIAZIDE 40/25 40; 25 MG/1; MG/1
1 TABLET ORAL DAILY
Qty: 90 TABLET | Refills: 0 | Status: SHIPPED | OUTPATIENT
Start: 2023-11-21

## 2023-11-27 ENCOUNTER — APPOINTMENT (OUTPATIENT)
Dept: CARDIOLOGY | Facility: HOSPITAL | Age: 83
End: 2023-11-27
Payer: MEDICARE

## 2023-11-27 ENCOUNTER — PRE-ADMISSION TESTING (OUTPATIENT)
Dept: PREADMISSION TESTING | Facility: HOSPITAL | Age: 83
End: 2023-11-27
Payer: MEDICARE

## 2023-11-27 ENCOUNTER — ANTICOAGULATION VISIT (OUTPATIENT)
Dept: CARDIOLOGY | Facility: CLINIC | Age: 83
End: 2023-11-27
Payer: MEDICARE

## 2023-11-27 VITALS
TEMPERATURE: 97.5 F | BODY MASS INDEX: 30.7 KG/M2 | RESPIRATION RATE: 16 BRPM | HEART RATE: 59 BPM | OXYGEN SATURATION: 98 % | HEIGHT: 69 IN | DIASTOLIC BLOOD PRESSURE: 86 MMHG | SYSTOLIC BLOOD PRESSURE: 127 MMHG | WEIGHT: 207.3 LBS

## 2023-11-27 DIAGNOSIS — K81.0 ACUTE CHOLECYSTITIS: ICD-10-CM

## 2023-11-27 LAB
ABO GROUP BLD: NORMAL
ALBUMIN SERPL-MCNC: 4.2 G/DL (ref 3.5–5.2)
ALBUMIN/GLOB SERPL: 1.9 G/DL
ALP SERPL-CCNC: 56 U/L (ref 39–117)
ALT SERPL W P-5'-P-CCNC: 16 U/L (ref 1–41)
ANION GAP SERPL CALCULATED.3IONS-SCNC: 10 MMOL/L (ref 5–15)
AST SERPL-CCNC: 16 U/L (ref 1–40)
BASOPHILS # BLD AUTO: 0.04 10*3/MM3 (ref 0–0.2)
BASOPHILS NFR BLD AUTO: 0.6 % (ref 0–1.5)
BILIRUB SERPL-MCNC: 0.6 MG/DL (ref 0–1.2)
BLD GP AB SCN SERPL QL: NEGATIVE
BUN SERPL-MCNC: 17 MG/DL (ref 8–23)
BUN/CREAT SERPL: 17.3 (ref 7–25)
CALCIUM SPEC-SCNC: 9.5 MG/DL (ref 8.6–10.5)
CHLORIDE SERPL-SCNC: 104 MMOL/L (ref 98–107)
CO2 SERPL-SCNC: 24 MMOL/L (ref 22–29)
CREAT SERPL-MCNC: 0.98 MG/DL (ref 0.76–1.27)
DEPRECATED RDW RBC AUTO: 44 FL (ref 37–54)
EGFRCR SERPLBLD CKD-EPI 2021: 77 ML/MIN/1.73
EOSINOPHIL # BLD AUTO: 0.15 10*3/MM3 (ref 0–0.4)
EOSINOPHIL NFR BLD AUTO: 2.3 % (ref 0.3–6.2)
ERYTHROCYTE [DISTWIDTH] IN BLOOD BY AUTOMATED COUNT: 12.4 % (ref 12.3–15.4)
GLOBULIN UR ELPH-MCNC: 2.2 GM/DL
GLUCOSE SERPL-MCNC: 96 MG/DL (ref 65–99)
HCT VFR BLD AUTO: 37.1 % (ref 37.5–51)
HGB BLD-MCNC: 12.2 G/DL (ref 13–17.7)
IMM GRANULOCYTES # BLD AUTO: 0.02 10*3/MM3 (ref 0–0.05)
IMM GRANULOCYTES NFR BLD AUTO: 0.3 % (ref 0–0.5)
INR PPP: 1.88 (ref 0.9–1.1)
LYMPHOCYTES # BLD AUTO: 1.38 10*3/MM3 (ref 0.7–3.1)
LYMPHOCYTES NFR BLD AUTO: 20.9 % (ref 19.6–45.3)
MCH RBC QN AUTO: 31.9 PG (ref 26.6–33)
MCHC RBC AUTO-ENTMCNC: 32.9 G/DL (ref 31.5–35.7)
MCV RBC AUTO: 97.1 FL (ref 79–97)
MONOCYTES # BLD AUTO: 0.52 10*3/MM3 (ref 0.1–0.9)
MONOCYTES NFR BLD AUTO: 7.9 % (ref 5–12)
NEUTROPHILS NFR BLD AUTO: 4.49 10*3/MM3 (ref 1.7–7)
NEUTROPHILS NFR BLD AUTO: 68 % (ref 42.7–76)
NRBC BLD AUTO-RTO: 0 /100 WBC (ref 0–0.2)
PLATELET # BLD AUTO: 156 10*3/MM3 (ref 140–450)
PMV BLD AUTO: 11.5 FL (ref 6–12)
POTASSIUM SERPL-SCNC: 4.2 MMOL/L (ref 3.5–5.2)
PROT SERPL-MCNC: 6.4 G/DL (ref 6–8.5)
PROTHROMBIN TIME: 21.9 SECONDS (ref 11.7–14.2)
RBC # BLD AUTO: 3.82 10*6/MM3 (ref 4.14–5.8)
RH BLD: NEGATIVE
SODIUM SERPL-SCNC: 138 MMOL/L (ref 136–145)
T&S EXPIRATION DATE: NORMAL
WBC NRBC COR # BLD AUTO: 6.6 10*3/MM3 (ref 3.4–10.8)

## 2023-11-27 PROCEDURE — 86900 BLOOD TYPING SEROLOGIC ABO: CPT | Performed by: SURGERY

## 2023-11-27 PROCEDURE — 80053 COMPREHEN METABOLIC PANEL: CPT

## 2023-11-27 PROCEDURE — 85610 PROTHROMBIN TIME: CPT | Performed by: SURGERY

## 2023-11-27 PROCEDURE — 36415 COLL VENOUS BLD VENIPUNCTURE: CPT

## 2023-11-27 PROCEDURE — 85025 COMPLETE CBC W/AUTO DIFF WBC: CPT

## 2023-11-27 PROCEDURE — 86850 RBC ANTIBODY SCREEN: CPT | Performed by: SURGERY

## 2023-11-27 PROCEDURE — 86901 BLOOD TYPING SEROLOGIC RH(D): CPT | Performed by: SURGERY

## 2023-11-27 RX ORDER — DIPHENOXYLATE HYDROCHLORIDE AND ATROPINE SULFATE 2.5; .025 MG/1; MG/1
TABLET ORAL DAILY
COMMUNITY

## 2023-11-27 RX ORDER — FERROUS SULFATE 325(65) MG
325 TABLET ORAL
COMMUNITY

## 2023-11-27 RX ORDER — METOPROLOL SUCCINATE 50 MG/1
50 TABLET, EXTENDED RELEASE ORAL NIGHTLY
COMMUNITY

## 2023-11-27 NOTE — DISCHARGE INSTRUCTIONS
Take the following medications the morning of surgery: AMIODARONE, HYDRALAZINE, ISOSORBIDE, LEVOTHYROXINE, METOPROLOL    ARRIVE TO [facility] AT 1:30 PM.      If you are on prescription narcotic pain medication to control your pain you may also take that medication the morning of surgery.    General Instructions:  Do not eat solid food after midnight the night before surgery.  You may drink clear liquids day of surgery but must stop at least one hour before your hospital arrival time.  It is beneficial for you to have a clear drink that contains carbohydrates the day of surgery.  We suggest a 12 to 20 ounce bottle of Gatorade or Powerade for non-diabetic patients or a 12 to 20 ounce bottle of G2 or Powerade Zero for diabetic patients. (Pediatric patients, are not advised to drink a 12 to 20 ounce carbohydrate drink)    Clear liquids are liquids you can see through.  Nothing red in color.     Plain water                               Sports drinks  Sodas                                   Gelatin (Jell-O)  Fruit juices without pulp such as white grape juice and apple juice  Popsicles that contain no fruit or yogurt  Tea or coffee (no cream or milk added)  Gatorade / Powerade  G2 / Powerade Zero    Infants may have breast milk up to four hours before surgery.  Infants drinking formula may drink formula up to six hours before surgery.   Patients who avoid smoking, chewing tobacco and alcohol for 4 weeks prior to surgery have a reduced risk of post-operative complications.  Quit smoking as many days before surgery as you can.  Do not smoke, use chewing tobacco or drink alcohol the day of surgery.   If applicable bring your C-PAP/ BI-PAP machine in with you to preop day of surgery.  Bring any papers given to you in the doctor’s office.  Wear clean comfortable clothes.  Do not wear contact lenses, false eyelashes or make-up.  Bring a case for your glasses.   Bring crutches or walker if applicable.  Remove all piercings.   Leave jewelry and any other valuables at home.  Hair extensions with metal clips must be removed prior to surgery.  The Pre-Admission Testing nurse will instruct you to bring medications if unable to obtain an accurate list in Pre-Admission Testing.        If you were given a blood bank ID arm band remember to bring it with you the day of surgery.    Preventing a Surgical Site Infection:  For 2 to 3 days before surgery, avoid shaving with a razor because the razor can irritate skin and make it easier to develop an infection.    Any areas of open skin can increase the risk of a post-operative wound infection by allowing bacteria to enter and travel throughout the body.  Notify your surgeon if you have any skin wounds / rashes even if it is not near the expected surgical site.  The area will need assessed to determine if surgery should be delayed until it is healed.  The night prior to surgery shower using a fresh bar of anti-bacterial soap (such as Dial) and clean washcloth.  Sleep in a clean bed with clean clothing.  Do not allow pets to sleep with you.  Shower on the morning of surgery using a fresh bar of anti-bacterial soap (such as Dial) and clean washcloth.  Dry with a clean towel and dress in clean clothing.    CHLORHEXIDINE CLOTH INSTRUCTIONS  The morning of surgery follow these instructions using the Chlorhexidine cloths you've been given.  These steps reduce bacteria on the body.  Do not use the cloths near your eyes, ears mouth, genitalia or on open wounds.  Throw the cloths away after use but do not try to flush them down a toilet.      Open and remove one cloth at a time from the package.    Leave the cloth unfolded and begin the bathing.  Massage the skin with the cloths using gentle pressure to remove bacteria.  Do not scrub harshly.   Follow the steps below with one 2% CHG cloth per area (6 total cloths).  One cloth for neck, shoulders and chest.  One cloth for both arms, hands, fingers and underarms  (do underarms last).  One cloth for the abdomen followed by groin.  One cloth for right leg and foot including between the toes.  One cloth for left leg and foot including between the toes.  The last cloth is to be used for the back of the neck, back and buttocks.    Allow the CHG to air dry 3 minutes on the skin which will give it time to work and decrease the chance of irritation.  The skin may feel sticky until it is dry.  Do not rinse with water or any other liquid or you will lose the beneficial effects of the CHG.  If mild skin irritation occurs, do rinse the skin to remove the CHG.  Report this to the nurse at time of admission.  Do not apply lotions, creams, ointments, deodorants or perfumes after using the cloths. Dress in clean clothes before coming to the hospital.  Ask your surgeon if you will be receiving antibiotics prior to surgery.  Make sure you, your family, and all healthcare providers clean their hands with soap and water or an alcohol based hand  before caring for you or your wound.    Day of surgery:  Your arrival time is approximately two hours before your scheduled surgery time.  Upon arrival, a Pre-op nurse and Anesthesiologist will review your health history, obtain vital signs, and answer questions you may have.  The only belongings needed at this time will be a list of your home medications and if applicable your C-PAP/BI-PAP machine.  A Pre-op nurse will start an IV and you may receive medication in preparation for surgery, including something to help you relax.     Please be aware that surgery does come with discomfort.  We want to make every effort to control your discomfort so please discuss any uncontrolled symptoms with your nurse.   Your doctor will most likely have prescribed pain medications.      If you are going home after surgery you will receive individualized written care instructions before being discharged.  A responsible adult must drive you to and from the  hospital on the day of your surgery and stay with you for 24 hours.  Discharge prescriptions can be filled by the hospital pharmacy during regular pharmacy hours.  If you are having surgery late in the day/evening your prescription may be e-prescribed to your pharmacy.  Please verify your pharmacy hours or chose a 24 hour pharmacy to avoid not having access to your prescription because your pharmacy has closed for the day.    If you are staying overnight following surgery, you will be transported to your hospital room following the recovery period.  [facility] has all private rooms.    If you have any questions please call Pre-Admission Testing at [phone].  Deductibles and co-payments are collected on the day of service. Please be prepared to pay the required co-pay, deductible or deposit on the day of service as defined by your plan.    Call your surgeon immediately if you experience any of the following symptoms:  Sore Throat  Shortness of Breath or difficulty breathing  Cough  Chills  Body soreness or muscle pain  Headache  Fever  New loss of taste or smell  Do not arrive for your surgery ill.  Your procedure will need to be rescheduled to another time.  You will need to call your physician before the day of surgery to avoid any unnecessary exposure to hospital staff as well as other patients.

## 2023-11-29 ENCOUNTER — TELEPHONE (OUTPATIENT)
Dept: SURGERY | Facility: CLINIC | Age: 83
End: 2023-11-29
Payer: MEDICARE

## 2023-11-29 DIAGNOSIS — K81.0 ACUTE CHOLECYSTITIS: Primary | ICD-10-CM

## 2023-11-30 ENCOUNTER — TELEPHONE (OUTPATIENT)
Dept: SURGERY | Facility: CLINIC | Age: 83
End: 2023-11-30
Payer: MEDICARE

## 2023-11-30 NOTE — TELEPHONE ENCOUNTER
Left message for patient informing him of new arrival time for his surgery on 12/5. Patient will now need to arrive at 10 am. I did ask the patient to call our office back to confirm he received our message. A OPPRTUNITY message was also sent to the patient.

## 2023-12-01 ENCOUNTER — OUTSIDE FACILITY SERVICE (OUTPATIENT)
Dept: FAMILY MEDICINE CLINIC | Facility: CLINIC | Age: 83
End: 2023-12-01
Payer: MEDICARE

## 2023-12-05 ENCOUNTER — HOSPITAL ENCOUNTER (OUTPATIENT)
Facility: HOSPITAL | Age: 83
Setting detail: HOSPITAL OUTPATIENT SURGERY
Discharge: HOME | End: 2023-12-05
Attending: SURGERY | Admitting: SURGERY
Payer: MEDICARE

## 2023-12-05 ENCOUNTER — ANESTHESIA (OUTPATIENT)
Dept: PERIOP | Facility: HOSPITAL | Age: 83
End: 2023-12-05
Payer: MEDICARE

## 2023-12-05 ENCOUNTER — ANESTHESIA EVENT (OUTPATIENT)
Dept: PERIOP | Facility: HOSPITAL | Age: 83
End: 2023-12-05
Payer: MEDICARE

## 2023-12-05 ENCOUNTER — APPOINTMENT (OUTPATIENT)
Dept: CT IMAGING | Facility: HOSPITAL | Age: 83
End: 2023-12-05
Payer: MEDICARE

## 2023-12-05 ENCOUNTER — APPOINTMENT (OUTPATIENT)
Dept: GENERAL RADIOLOGY | Facility: HOSPITAL | Age: 83
End: 2023-12-05
Payer: MEDICARE

## 2023-12-05 VITALS
WEIGHT: 205 LBS | RESPIRATION RATE: 18 BRPM | HEART RATE: 72 BPM | TEMPERATURE: 97.5 F | SYSTOLIC BLOOD PRESSURE: 123 MMHG | BODY MASS INDEX: 30.72 KG/M2 | OXYGEN SATURATION: 97 % | DIASTOLIC BLOOD PRESSURE: 74 MMHG

## 2023-12-05 DIAGNOSIS — K81.0 ACUTE CHOLECYSTITIS: ICD-10-CM

## 2023-12-05 LAB
INR PPP: 1.06 (ref 0.9–1.1)
PROTHROMBIN TIME: 13.9 SECONDS (ref 11.7–14.2)

## 2023-12-05 PROCEDURE — 25010000002 HYDROMORPHONE PER 4 MG: Performed by: ANESTHESIOLOGY

## 2023-12-05 PROCEDURE — 85610 PROTHROMBIN TIME: CPT | Performed by: SURGERY

## 2023-12-05 PROCEDURE — 25010000002 INDOCYANINE GREEN 25 MG RECONSTITUTED SOLUTION: Performed by: SURGERY

## 2023-12-05 PROCEDURE — 25010000002 CEFAZOLIN IN DEXTROSE 2-4 GM/100ML-% SOLUTION: Performed by: SURGERY

## 2023-12-05 PROCEDURE — 25010000002 PROPOFOL 10 MG/ML EMULSION: Performed by: ANESTHESIOLOGY

## 2023-12-05 PROCEDURE — C1758 CATHETER, URETERAL: HCPCS | Performed by: SURGERY

## 2023-12-05 PROCEDURE — 25010000002 CEFAZOLIN IN DEXTROSE 2-4 GM/100ML-% SOLUTION: Performed by: ANESTHESIOLOGY

## 2023-12-05 PROCEDURE — 25010000002 FENTANYL CITRATE (PF) 50 MCG/ML SOLUTION: Performed by: ANESTHESIOLOGY

## 2023-12-05 PROCEDURE — 88304 TISSUE EXAM BY PATHOLOGIST: CPT | Performed by: SURGERY

## 2023-12-05 PROCEDURE — 25810000003 SODIUM CHLORIDE PER 500 ML: Performed by: SURGERY

## 2023-12-05 PROCEDURE — 25010000002 SUGAMMADEX 200 MG/2ML SOLUTION: Performed by: ANESTHESIOLOGY

## 2023-12-05 PROCEDURE — 25810000003 LACTATED RINGERS PER 1000 ML: Performed by: ANESTHESIOLOGY

## 2023-12-05 PROCEDURE — 25010000002 ONDANSETRON PER 1 MG: Performed by: ANESTHESIOLOGY

## 2023-12-05 DEVICE — MEDIUM-LARGE LIGATION CLIPS 6 CLIPS/CART
Type: IMPLANTABLE DEVICE | Site: ABDOMEN | Status: FUNCTIONAL
Brand: VAS-Q-CLIP

## 2023-12-05 RX ORDER — PROPOFOL 10 MG/ML
VIAL (ML) INTRAVENOUS AS NEEDED
Status: DISCONTINUED | OUTPATIENT
Start: 2023-12-05 | End: 2023-12-05 | Stop reason: SURG

## 2023-12-05 RX ORDER — PHENYLEPHRINE HCL IN 0.9% NACL 1 MG/10 ML
SYRINGE (ML) INTRAVENOUS AS NEEDED
Status: DISCONTINUED | OUTPATIENT
Start: 2023-12-05 | End: 2023-12-05 | Stop reason: SURG

## 2023-12-05 RX ORDER — HYDROCODONE BITARTRATE AND ACETAMINOPHEN 7.5; 325 MG/1; MG/1
1 TABLET ORAL EVERY 4 HOURS PRN
Status: DISCONTINUED | OUTPATIENT
Start: 2023-12-05 | End: 2023-12-05 | Stop reason: HOSPADM

## 2023-12-05 RX ORDER — OXYCODONE HYDROCHLORIDE 5 MG/1
5 TABLET ORAL EVERY 4 HOURS PRN
Qty: 7 TABLET | Refills: 0 | Status: SHIPPED | OUTPATIENT
Start: 2023-12-05 | End: 2023-12-07 | Stop reason: SDUPTHER

## 2023-12-05 RX ORDER — EPHEDRINE SULFATE 50 MG/ML
INJECTION INTRAVENOUS AS NEEDED
Status: DISCONTINUED | OUTPATIENT
Start: 2023-12-05 | End: 2023-12-05 | Stop reason: SURG

## 2023-12-05 RX ORDER — ONDANSETRON 2 MG/ML
4 INJECTION INTRAMUSCULAR; INTRAVENOUS ONCE AS NEEDED
Status: DISCONTINUED | OUTPATIENT
Start: 2023-12-05 | End: 2023-12-05 | Stop reason: HOSPADM

## 2023-12-05 RX ORDER — DROPERIDOL 2.5 MG/ML
0.62 INJECTION, SOLUTION INTRAMUSCULAR; INTRAVENOUS
Status: DISCONTINUED | OUTPATIENT
Start: 2023-12-05 | End: 2023-12-05 | Stop reason: HOSPADM

## 2023-12-05 RX ORDER — IPRATROPIUM BROMIDE AND ALBUTEROL SULFATE 2.5; .5 MG/3ML; MG/3ML
3 SOLUTION RESPIRATORY (INHALATION) ONCE AS NEEDED
Status: DISCONTINUED | OUTPATIENT
Start: 2023-12-05 | End: 2023-12-05 | Stop reason: HOSPADM

## 2023-12-05 RX ORDER — HYDRALAZINE HYDROCHLORIDE 20 MG/ML
5 INJECTION INTRAMUSCULAR; INTRAVENOUS
Status: DISCONTINUED | OUTPATIENT
Start: 2023-12-05 | End: 2023-12-05 | Stop reason: HOSPADM

## 2023-12-05 RX ORDER — HYDROMORPHONE HYDROCHLORIDE 1 MG/ML
0.25 INJECTION, SOLUTION INTRAMUSCULAR; INTRAVENOUS; SUBCUTANEOUS
Status: DISCONTINUED | OUTPATIENT
Start: 2023-12-05 | End: 2023-12-05 | Stop reason: HOSPADM

## 2023-12-05 RX ORDER — PROMETHAZINE HYDROCHLORIDE 25 MG/1
25 SUPPOSITORY RECTAL ONCE AS NEEDED
Status: DISCONTINUED | OUTPATIENT
Start: 2023-12-05 | End: 2023-12-05 | Stop reason: HOSPADM

## 2023-12-05 RX ORDER — NALOXONE HCL 0.4 MG/ML
0.2 VIAL (ML) INJECTION AS NEEDED
Status: DISCONTINUED | OUTPATIENT
Start: 2023-12-05 | End: 2023-12-05 | Stop reason: HOSPADM

## 2023-12-05 RX ORDER — FENTANYL CITRATE 50 UG/ML
INJECTION, SOLUTION INTRAMUSCULAR; INTRAVENOUS AS NEEDED
Status: DISCONTINUED | OUTPATIENT
Start: 2023-12-05 | End: 2023-12-05 | Stop reason: SURG

## 2023-12-05 RX ORDER — EPHEDRINE SULFATE 50 MG/ML
5 INJECTION, SOLUTION INTRAVENOUS ONCE AS NEEDED
Status: DISCONTINUED | OUTPATIENT
Start: 2023-12-05 | End: 2023-12-05 | Stop reason: HOSPADM

## 2023-12-05 RX ORDER — SODIUM CHLORIDE, SODIUM LACTATE, POTASSIUM CHLORIDE, CALCIUM CHLORIDE 600; 310; 30; 20 MG/100ML; MG/100ML; MG/100ML; MG/100ML
9 INJECTION, SOLUTION INTRAVENOUS CONTINUOUS
Status: DISCONTINUED | OUTPATIENT
Start: 2023-12-05 | End: 2023-12-05 | Stop reason: HOSPADM

## 2023-12-05 RX ORDER — ROCURONIUM BROMIDE 10 MG/ML
INJECTION, SOLUTION INTRAVENOUS AS NEEDED
Status: DISCONTINUED | OUTPATIENT
Start: 2023-12-05 | End: 2023-12-05 | Stop reason: SURG

## 2023-12-05 RX ORDER — LIDOCAINE HYDROCHLORIDE 10 MG/ML
0.5 INJECTION, SOLUTION INFILTRATION; PERINEURAL ONCE AS NEEDED
Status: DISCONTINUED | OUTPATIENT
Start: 2023-12-05 | End: 2023-12-05 | Stop reason: HOSPADM

## 2023-12-05 RX ORDER — OXYCODONE HCL 5 MG/5 ML
5 SOLUTION, ORAL ORAL ONCE
Status: DISCONTINUED | OUTPATIENT
Start: 2023-12-05 | End: 2023-12-05

## 2023-12-05 RX ORDER — DIPHENHYDRAMINE HYDROCHLORIDE 50 MG/ML
12.5 INJECTION INTRAMUSCULAR; INTRAVENOUS
Status: DISCONTINUED | OUTPATIENT
Start: 2023-12-05 | End: 2023-12-05 | Stop reason: HOSPADM

## 2023-12-05 RX ORDER — CEFAZOLIN SODIUM 2 G/100ML
INJECTION, SOLUTION INTRAVENOUS AS NEEDED
Status: DISCONTINUED | OUTPATIENT
Start: 2023-12-05 | End: 2023-12-05 | Stop reason: SURG

## 2023-12-05 RX ORDER — BUPIVACAINE HYDROCHLORIDE AND EPINEPHRINE 5; 5 MG/ML; UG/ML
INJECTION, SOLUTION EPIDURAL; INTRACAUDAL; PERINEURAL AS NEEDED
Status: DISCONTINUED | OUTPATIENT
Start: 2023-12-05 | End: 2023-12-05 | Stop reason: HOSPADM

## 2023-12-05 RX ORDER — SODIUM CHLORIDE 0.9 % (FLUSH) 0.9 %
3 SYRINGE (ML) INJECTION EVERY 12 HOURS SCHEDULED
Status: DISCONTINUED | OUTPATIENT
Start: 2023-12-05 | End: 2023-12-05 | Stop reason: HOSPADM

## 2023-12-05 RX ORDER — SODIUM CHLORIDE 0.9 % (FLUSH) 0.9 %
3-10 SYRINGE (ML) INJECTION AS NEEDED
Status: DISCONTINUED | OUTPATIENT
Start: 2023-12-05 | End: 2023-12-05 | Stop reason: HOSPADM

## 2023-12-05 RX ORDER — SODIUM CHLORIDE 9 MG/ML
INJECTION, SOLUTION INTRAVENOUS AS NEEDED
Status: DISCONTINUED | OUTPATIENT
Start: 2023-12-05 | End: 2023-12-05 | Stop reason: HOSPADM

## 2023-12-05 RX ORDER — FENTANYL CITRATE 50 UG/ML
25 INJECTION, SOLUTION INTRAMUSCULAR; INTRAVENOUS
Status: DISCONTINUED | OUTPATIENT
Start: 2023-12-05 | End: 2023-12-05 | Stop reason: HOSPADM

## 2023-12-05 RX ORDER — HYDROCODONE BITARTRATE AND ACETAMINOPHEN 5; 325 MG/1; MG/1
1 TABLET ORAL ONCE AS NEEDED
Status: DISCONTINUED | OUTPATIENT
Start: 2023-12-05 | End: 2023-12-05 | Stop reason: HOSPADM

## 2023-12-05 RX ORDER — FLUMAZENIL 0.1 MG/ML
0.2 INJECTION INTRAVENOUS AS NEEDED
Status: DISCONTINUED | OUTPATIENT
Start: 2023-12-05 | End: 2023-12-05 | Stop reason: HOSPADM

## 2023-12-05 RX ORDER — CEFAZOLIN SODIUM 2 G/100ML
2000 INJECTION, SOLUTION INTRAVENOUS ONCE
Status: COMPLETED | OUTPATIENT
Start: 2023-12-05 | End: 2023-12-05

## 2023-12-05 RX ORDER — OXYCODONE HYDROCHLORIDE 5 MG/1
5 TABLET ORAL ONCE
Status: COMPLETED | OUTPATIENT
Start: 2023-12-05 | End: 2023-12-05

## 2023-12-05 RX ORDER — ONDANSETRON 2 MG/ML
INJECTION INTRAMUSCULAR; INTRAVENOUS AS NEEDED
Status: DISCONTINUED | OUTPATIENT
Start: 2023-12-05 | End: 2023-12-05 | Stop reason: SURG

## 2023-12-05 RX ORDER — SODIUM CHLORIDE, SODIUM LACTATE, POTASSIUM CHLORIDE, CALCIUM CHLORIDE 600; 310; 30; 20 MG/100ML; MG/100ML; MG/100ML; MG/100ML
INJECTION, SOLUTION INTRAVENOUS CONTINUOUS PRN
Status: DISCONTINUED | OUTPATIENT
Start: 2023-12-05 | End: 2023-12-05 | Stop reason: SURG

## 2023-12-05 RX ORDER — INDOCYANINE GREEN AND WATER 25 MG
5 KIT INJECTION ONCE
Status: COMPLETED | OUTPATIENT
Start: 2023-12-05 | End: 2023-12-05

## 2023-12-05 RX ORDER — LABETALOL HYDROCHLORIDE 5 MG/ML
5 INJECTION, SOLUTION INTRAVENOUS
Status: DISCONTINUED | OUTPATIENT
Start: 2023-12-05 | End: 2023-12-05 | Stop reason: HOSPADM

## 2023-12-05 RX ORDER — PROMETHAZINE HYDROCHLORIDE 25 MG/1
25 TABLET ORAL ONCE AS NEEDED
Status: DISCONTINUED | OUTPATIENT
Start: 2023-12-05 | End: 2023-12-05 | Stop reason: HOSPADM

## 2023-12-05 RX ORDER — LIDOCAINE HYDROCHLORIDE 20 MG/ML
INJECTION, SOLUTION INFILTRATION; PERINEURAL AS NEEDED
Status: DISCONTINUED | OUTPATIENT
Start: 2023-12-05 | End: 2023-12-05 | Stop reason: SURG

## 2023-12-05 RX ADMIN — SODIUM CHLORIDE, SODIUM LACTATE, POTASSIUM CHLORIDE, CALCIUM CHLORIDE: 600; 310; 30; 20 INJECTION, SOLUTION INTRAVENOUS at 12:21

## 2023-12-05 RX ADMIN — HYDROMORPHONE HYDROCHLORIDE 0.25 MG: 1 INJECTION, SOLUTION INTRAMUSCULAR; INTRAVENOUS; SUBCUTANEOUS at 15:26

## 2023-12-05 RX ADMIN — FENTANYL CITRATE 25 MCG: 50 INJECTION, SOLUTION INTRAMUSCULAR; INTRAVENOUS at 12:44

## 2023-12-05 RX ADMIN — LIDOCAINE HYDROCHLORIDE 100 MG: 20 INJECTION, SOLUTION INFILTRATION; PERINEURAL at 12:28

## 2023-12-05 RX ADMIN — Medication 100 MCG: at 13:41

## 2023-12-05 RX ADMIN — PROPOFOL 20 MG: 10 INJECTION, EMULSION INTRAVENOUS at 14:21

## 2023-12-05 RX ADMIN — Medication 100 MCG: at 13:35

## 2023-12-05 RX ADMIN — FENTANYL CITRATE 25 MCG: 50 INJECTION, SOLUTION INTRAMUSCULAR; INTRAVENOUS at 14:16

## 2023-12-05 RX ADMIN — PROPOFOL 100 MG: 10 INJECTION, EMULSION INTRAVENOUS at 12:28

## 2023-12-05 RX ADMIN — SODIUM CHLORIDE, POTASSIUM CHLORIDE, SODIUM LACTATE AND CALCIUM CHLORIDE 9 ML/HR: 600; 310; 30; 20 INJECTION, SOLUTION INTRAVENOUS at 12:13

## 2023-12-05 RX ADMIN — HYDROMORPHONE HYDROCHLORIDE 0.25 MG: 1 INJECTION, SOLUTION INTRAMUSCULAR; INTRAVENOUS; SUBCUTANEOUS at 14:51

## 2023-12-05 RX ADMIN — ROCURONIUM BROMIDE 20 MG: 10 INJECTION, SOLUTION INTRAVENOUS at 13:22

## 2023-12-05 RX ADMIN — Medication 100 MCG: at 13:52

## 2023-12-05 RX ADMIN — CEFAZOLIN SODIUM 2 G: 2 INJECTION, SOLUTION INTRAVENOUS at 12:37

## 2023-12-05 RX ADMIN — HYDROMORPHONE HYDROCHLORIDE 0.25 MG: 1 INJECTION, SOLUTION INTRAMUSCULAR; INTRAVENOUS; SUBCUTANEOUS at 15:12

## 2023-12-05 RX ADMIN — EPHEDRINE SULFATE 5 MG: 50 INJECTION INTRAVENOUS at 13:46

## 2023-12-05 RX ADMIN — PROPOFOL 20 MG: 10 INJECTION, EMULSION INTRAVENOUS at 14:24

## 2023-12-05 RX ADMIN — Medication 100 MCG: at 14:11

## 2023-12-05 RX ADMIN — ROCURONIUM BROMIDE 50 MG: 10 INJECTION, SOLUTION INTRAVENOUS at 12:28

## 2023-12-05 RX ADMIN — OXYCODONE HYDROCHLORIDE 5 MG: 5 TABLET ORAL at 15:04

## 2023-12-05 RX ADMIN — INDOCYANINE GREEN AND WATER 5 MG: KIT at 11:29

## 2023-12-05 RX ADMIN — FENTANYL CITRATE 25 MCG: 50 INJECTION, SOLUTION INTRAMUSCULAR; INTRAVENOUS at 14:01

## 2023-12-05 RX ADMIN — EPHEDRINE SULFATE 10 MG: 50 INJECTION INTRAVENOUS at 13:16

## 2023-12-05 RX ADMIN — ONDANSETRON 4 MG: 2 INJECTION INTRAMUSCULAR; INTRAVENOUS at 14:16

## 2023-12-05 RX ADMIN — EPHEDRINE SULFATE 5 MG: 50 INJECTION INTRAVENOUS at 14:11

## 2023-12-05 RX ADMIN — HYDROMORPHONE HYDROCHLORIDE 0.25 MG: 1 INJECTION, SOLUTION INTRAMUSCULAR; INTRAVENOUS; SUBCUTANEOUS at 14:58

## 2023-12-05 RX ADMIN — SODIUM CHLORIDE, POTASSIUM CHLORIDE, SODIUM LACTATE AND CALCIUM CHLORIDE: 600; 310; 30; 20 INJECTION, SOLUTION INTRAVENOUS at 12:21

## 2023-12-05 RX ADMIN — HYDROMORPHONE HYDROCHLORIDE 0.25 MG: 1 INJECTION, SOLUTION INTRAMUSCULAR; INTRAVENOUS; SUBCUTANEOUS at 15:34

## 2023-12-05 RX ADMIN — CEFAZOLIN SODIUM 2000 MG: 2 INJECTION, SOLUTION INTRAVENOUS at 12:13

## 2023-12-05 RX ADMIN — Medication 200 MCG: at 14:06

## 2023-12-05 RX ADMIN — Medication 200 MCG: at 13:26

## 2023-12-05 RX ADMIN — FENTANYL CITRATE 25 MCG: 50 INJECTION, SOLUTION INTRAMUSCULAR; INTRAVENOUS at 13:06

## 2023-12-05 RX ADMIN — HYDROMORPHONE HYDROCHLORIDE 0.25 MG: 1 INJECTION, SOLUTION INTRAMUSCULAR; INTRAVENOUS; SUBCUTANEOUS at 15:17

## 2023-12-05 RX ADMIN — EPHEDRINE SULFATE 5 MG: 50 INJECTION INTRAVENOUS at 14:06

## 2023-12-05 RX ADMIN — Medication 100 MCG: at 13:46

## 2023-12-05 RX ADMIN — SUGAMMADEX 200 MG: 100 INJECTION, SOLUTION INTRAVENOUS at 14:20

## 2023-12-05 NOTE — DISCHARGE INSTRUCTIONS
RESTART YOUR WARFARIN AT YOUR PRIOR DOSE TOMORROW.    POST OP RECOMMENDATIONS  Dr. Omar Quintanilla  [phone]  Discharge Instructions    Activity  You should get up and move about several times daily to reduce the risk of developing a blood clot in your legs.   No lifting more than 10 pounds for 6 weeks  Diet  Avoid raw fruits and vegetables  Avoid tough meats like steak and pork  If you have an ileostomy, avoid concentrated sugary drinks (Gatorade, soft drinks, milk products)  At your follow-up appointment, we can discuss returning to a normal diet  Dressings  The glue on your incisions will fall off on its own in 1-2 weeks.  You do not need to pack any of your incisions  Bathing  It's ok to shower anytime.   Do not submerge your incisions (bath, pool, lake, ocean, etc.) for 3 weeks.  You can wash your incisions with warm soapy water very gently and pat dry  Pain Management  Unless you have been told otherwise, it's ok to take Tylenol 1000 mg every 6 hours as needed.  I have provided you a prescription for a narcotic pain medication. Take this as needed.   Please call the office if you have intense pain that is not relieved.   Blood clot prevention  You should be up walking multiple times each day to prevent blood clots from forming.   Driving  Do not drive while taking narcotic pain medications.   Before driving, make sure that you are able to move and rotate appropriately to keep you and the rest of us safe on the road.   Follow up appointment  My office will call you with a follow up appointment if you do not have one already. It will be in 2-3 weeks.   If you do not hear from my office in 1 week, please call [phone] to ask about scheduling.   Remember to contact me for any of the following:   Fever > 101 degrees  Severe pain that cannot be controlled by taking your pain pills  Severe nausea or vomiting that cannot be controlled by taking your nausea pills  Significant bleeding of your incisions  Drainage  that has a bad smell or is yellow or green in appearance  Any other questions or concerns

## 2023-12-05 NOTE — ANESTHESIA PREPROCEDURE EVALUATION
Anesthesia Evaluation     Patient summary reviewed and Nursing notes reviewed   NPO Solid Status: > 8 hours  NPO Liquid Status: > 2 hours           Airway   Mallampati: II  TM distance: >3 FB  Neck ROM: full  no difficulty expected  Dental - normal exam     Pulmonary - normal exam   (+) ,shortness of breath  (-) COPD, asthma, sleep apnea, not a smoker, lung cancer  Cardiovascular - normal exam  Exercise tolerance: good (4-7 METS)    ECG reviewed  PT is on anticoagulation therapy  Patient on routine beta blocker and Beta blocker given within 24 hours of surgery  Rhythm: regular  Rate: normal    (+) hypertension well controlled 2 medications or greater, CAD, dysrhythmias Paroxysmal Atrial Fib, Atrial Fib, PVD, hyperlipidemia  (-) valvular problems/murmurs, past MI, cardiac stents, CABG    ROS comment: Known thoracic aortic aneurysm, followed w/ yearly chest CT by CTS    Neuro/Psych  (-) seizures, TIA, CVA  GI/Hepatic/Renal/Endo    (+) GERD, renal disease-, thyroid problem hypothyroidism  (-) hiatal hernia, PUD, hepatitis, liver disease, diabetes, GI bleed    Musculoskeletal     Abdominal  - normal exam   Substance History      OB/GYN          Other   arthritis,   history of cancer        Phys Exam Other: Upper permanent denture            Anesthesia Plan    ASA 3     general     intravenous induction     Anesthetic plan, risks, benefits, and alternatives have been provided, discussed and informed consent has been obtained with: patient and child.    CODE STATUS:

## 2023-12-05 NOTE — H&P
Colorectal & General Surgery  History and Physical    Patient: David Comer Sr.  YOB: 1940  MRN: [id number]      Assessment  David Comer Sr. is a 83 y.o. male with recent episode of acute cholecystitis requiring PCT here for robot lap shaan with IOC. Proceed to OR.     History of Present Illness   David Comer Sr. is a 83 y.o. male recent episode of acute cholecystitis s/p PCT.    Past Medical History   Past Medical History:   Diagnosis Date    AAA (abdominal aortic aneurysm) without rupture     Aneurysm of thoracic aorta     CT CHEST 8/2021 [facility]     Anticoagulated on warfarin     Arthritis     Basal cell carcinoma     RIGHT LOWER LID     BPH (benign prostatic hyperplasia)     Chronic lumbar pain     Degenerative arthritis of lumbar spine     Degenerative cervical disc     Disease of thyroid gland     Elevated rheumatoid factor     History of atrial fibrillation     Hyperlipidemia     Hypertension     Hypogonadism in male     Muscle strain of left upper back     PRESCRIBED PREDNISONE DOSE PACK     On anticoagulant therapy     Pulmonary nodule     6 MM - REPEAT CT SCAN IN ONE YEAR, WATCHING     Refused influenza vaccine     Scoliosis     Skin cancer     RIGHT HAND-SCHEDULED FOR EXCISION 12/4/23-STATES SURGEON AWARE.    Vitamin D deficiency         Past Surgical History   Past Surgical History:   Procedure Laterality Date    ANKLE FUSION Right     CARDIAC CATHETERIZATION      CARDIAC CATHETERIZATION N/A 7/2/2021    Procedure: Left Heart Cath;  Surgeon: Harlan Downing MD;  Location:  [facility];  Service: Cardiology;  Laterality: N/A;    CARDIAC CATHETERIZATION N/A 7/2/2021    Procedure: Coronary angiography;  Surgeon: Harlan Downing MD;  Location:  [facility];  Service: Cardiology;  Laterality: N/A;    CARDIAC CATHETERIZATION N/A 7/2/2021    Procedure: Left ventriculography;  Surgeon: Harlan Downing MD;  Location:  [facility]  [facility]ATION;  Service: Cardiology;  Laterality: N/A;    CARDIAC CATHETERIZATION N/A 12/30/2022    Procedure: Left Heart Cath check inr;  Surgeon: Harlan Downing MD;  [facility];  Service: Cardiology;  Laterality: N/A;    CARDIAC CATHETERIZATION N/A 12/30/2022    Procedure: Coronary angiography;  Surgeon: Harlan Downing MD;  [facility];  Service: Cardiology;  Laterality: N/A;    CARDIAC CATHETERIZATION N/A 12/30/2022    Procedure: Left ventriculography;  Surgeon: Harlan Downing MD;  [facility];  Service: Cardiology;  Laterality: N/A;    HOBBS TUBE INSERTION Right 10/19/2021    Procedure: RIGHT SECOND STAGE CAST TAKEDOWN RECONSTRUCTION;  Surgeon: Brian Bhatt MD;  [place] [facility] [place] [facility] [place]  Service: Ophthalmology;  Laterality: Right;    ENTROPIAN REPAIR Right 9/21/2021    Procedure: RIGHT LOWER LID EXCISION OF BASAL CELL CARCINOMA WITH FROZEN SECTION RIGHT LOWER LID RECONSTRUCTION WITH CAST FLAP, REPAIR OF CANULICULIS, AND FULL THICKNESS SKIN GRAFT;  Surgeon: Brian Bhatt MD;  [place] [facility] [place];  Service: Ophthalmology;  Laterality: Right;    INGUINAL HERNIA REPAIR Right     REPLACEMENT TOTAL KNEE Bilateral 2000    ROTATOR CUFF REPAIR Left     ROTATOR CUFF REPAIR Right     SKIN BIOPSY      VASECTOMY         Social History  Social History     Socioeconomic History    Marital status:    Tobacco Use    Smoking status: Never    Smokeless tobacco: Never   Vaping Use    Vaping Use: Never used   Substance and Sexual Activity    Alcohol use: No    Drug use: No    Sexual activity: Defer       Family History  Family History   Problem Relation Age of Onset    Hypertension Father     Heart attack Father     Heart attack Brother     Alcohol abuse Brother     Hypertension Brother     Hypertension Paternal Grandmother     Hypertension Paternal Grandfather     Anemia Mother     Arthritis  Mother     Hypertension Mother     Hypertension Maternal Grandmother     Heart disease Other         FH in males before age 55    Malig Hyperthermia Neg Hx        Review of Systems  Negative except as documented in the HPI.     Allergies  No Known Allergies    Medications    Current Facility-Administered Medications:     ceFAZolin in dextrose (ANCEF) IVPB solution 2,000 mg, 2,000 mg, Intravenous, Once, Randall Quintanilla MD    indocyanine green (IC-GREEN) injection 5 mg, 5 mg, Intravenous, Once, Randall Quintanilla MD    Vital Signs  Vitals:    12/05/23 1103   BP: 123/82   Pulse: 65   Resp: 18   Temp: 98.1 °F (36.7 °C)   SpO2: 97%          Physical Exam  Constitutional: Resting comfortably, no acute distress  Neck: Supple, trachea midline  Respiratory: No increased work of breathing, Symmetric excursion  Cardiovascular: Well pefursed, no jugular venous distention evident   Abdominal: PCT bililuos Soft, non-tender, non-distended  Lymphatics: No cervical or suprascapular adenopathy  Skin: Warm, dry, no rash on visualized skin surfaces  Musculoskeletal: Symmetric strength, no obvious gross abnormalities  Psychiatric: Alert and oriented ×3, normal affect     Omar Quintanilla MD  Colorectal & General Surgery  [facility]    [address], [address]  [place], [place], [address]  P: [phone]  F: [phone]

## 2023-12-05 NOTE — ANESTHESIA POSTPROCEDURE EVALUATION
Patient: David KHAN Age Sr.    Procedure Summary       Date: 12/05/23 Room / Location: [facility] OR [address] [place] [address] MAIN OR    Anesthesia Start: 1221 Anesthesia Stop: 1439    Procedure: CHOLECYSTECTOMY LAPAROSCOPIC WITH DAVINCI ROBOT (Abdomen) Diagnosis:       Acute cholecystitis      (Acute cholecystitis [K81.0])    Surgeons: Randall Quintanilla MD Provider: Vipin Neal MD    Anesthesia Type: general ASA Status: 3            Anesthesia Type: general    Vitals  Vitals Value Taken Time   /68 12/05/23 1545   Temp 36.4 °C (97.5 °F) 12/05/23 1435   Pulse 59 12/05/23 1603   Resp     SpO2 98 % 12/05/23 1603   Vitals shown include unfiled device data.        Post Anesthesia Care and Evaluation    Patient location during evaluation: bedside  Patient participation: complete - patient participated  Level of consciousness: awake and alert  Pain management: adequate    Airway patency: patent  Anesthetic complications: No anesthetic complications    Cardiovascular status: acceptable  Respiratory status: acceptable  Hydration status: acceptable    Comments: /74   Pulse 72   Temp 36.4 °C (97.5 °F) (Oral)   Resp 18   Wt 93 kg (205 lb)   SpO2 97%   BMI 30.72 kg/m²

## 2023-12-05 NOTE — BRIEF OP NOTE
CHOLECYSTECTOMY LAPAROSCOPIC INTRAOPERATIVE CHOLANGIOGRAM WITH DAVINCI ROBOT  Progress Note    David KHAN Age Sr.  12/5/2023    Pre-op Diagnosis:   Acute cholecystitis [K81.0]       Post-Op Diagnosis Codes:     * Acute cholecystitis [K81.0]    Procedure/CPT® Codes:        Procedure(s):  CHOLECYSTECTOMY LAPAROSCOPIC WITH DAVINCI ROBOT              Surgeon(s):  Randall Quintanilla MD    Anesthesia: General    Staff:   Circulator: Hina Saleem RN  Radiology Technologist: Yudi Sierra  Scrub Person: Sammi Barry  Vendor Representative: Tiana Turner  Assistant: Chiquita Dawn CSA  Assistant: Chiquita Dawn CSA      Estimated Blood Loss: minimal    Urine Voided: * No values recorded between 12/5/2023 12:18 PM and 12/5/2023  2:28 PM *    Specimens:                Specimens       ID Source Type Tests Collected By Collected At Frozen?    A Gallbladder Tissue TISSUE PATHOLOGY EXAM   Randall Quintanilla MD 12/5/23 1418 No    Description: GALLBLADDER                  Drains:   [REMOVED] Closed/Suction Drain 1 Lateral RLQ Pigtail 8 Fr. (Removed)   Dressing Status Clean;Dry;Intact 12/05/23 1116   Drainage Appearance Bile;Thin 12/05/23 1116   Status To bulb suction 12/05/23 1116   Output (mL) 75 mL 12/05/23 1116       Findings: Significant chronci cholecystitis with PCT in place.         Complications: None      Randall Quintanilla MD     Date: 12/5/2023  Time: 14:29 EST

## 2023-12-05 NOTE — OP NOTE
Colorectal & General Surgery  Operative Report    Patient: David Comer Sr.  YOB: 1940  MRN: [id number]  DATE OF PROCEDURE: 12/05/23     PREOPERATIVE DIAGNOSIS:  Chronic calculus cholecystitis  History of percutaneous cholecystomy tube    POSTOPERATIVE DIAGNOSIS:  Same    PROCEDURE:  Robot-assisted laparoscopic cholecystectomy    FINDINGS:  Critical view of safety was achieved.  Gallbladder removed with moderate spillage of bile.   the clips were in adequate position with no leakage of bile or blood.    SURGEON:  Omar Quintanilla MD    ASSISTANT:  Assistant: Chiquita Dawn CSA was responsible for performing the following activities: Closing, Placing Dressing, Held/Positioned Camera, and eventually bedside assist  and their skilled assistance was necessary for the success of this case.      ANESTHESIA:  General endotracheal    EBL:  10 mL    SPECIMEN:  Gallbladder    OPERATIVE DESCRIPTION:  The patient was brought to the operating room under the care of the nursing staff.  The patient was placed on the operating room table in the supine position where anesthesia was induced.  The patient was then prepped and positioned in the usual sterile fashion.  A standardized timeout was then performed.    The Veress needle was inserted into the right upper quadrant atraumatically.  The abdomen was insufflated to 15 mmHg.  Local anesthetic was infiltrated into all incision sites.  The abdomen was entered atraumatically with an 8 mm trocar using an optical technique. The Veress needle site was inspected.  3 additional 8 mm trocars were then placed in the abdomen under direct visualization.  The da Vaishnavi robot was docked.  The falciform ligament was draped quite inferiorly.  The ligament was taken down at the level of the anterior abdominal wall up to the level of the liver.  There were significant amount of adhesions between the liver and the anterior abdominal wall as well as the gallbladder and the anterior  abdominal wall that were taken down sharply.  The gallbladder was identified and any attachments to the omentum and duodenum were taken down sharply.  The peritoneum on either side of the gallbladder was incised.  Combination of blunt and sharp dissection was utilized to identify the cystic duct and cystic artery.  Critical view of safety was achieved with 2 and only 2 structures entering the gallbladder.  A clip was placed distally on the cystic duct and a ductotomy was created.  The cholangiogram catheter was then inserted into the duct and secured in place with a clip.  There was extravasation of saline flush at this point and the cholangiogram catheter became dislodged from the cystic duct.  I subsequently aborted the cholangiogram and placed additional clips on the cystic duct and the cystic artery, both of which were divided.  The gallbladder was then removed from the liver with the Bovie electrocautery.  The percutaneous cholecystostomy tube was then removed.  The gallbladder was placed in an Endo Catch bag.  Hemostasis was verified.  Clip placement was verified.  The gallbladder was then removed through the left subcostal port, which was closed with 0 Vicryl suture.  The remaining trocars were removed and the abdomen was desufflated.  All incisions were copiously irrigated.  The incisions were closed with Monocryl suture and Exofin.    All needle, sponge, and instrument counts were correct at the end of the case.    The patient tolerated the procedure well and was transferred to the postanesthesia care unit in stable condition.    Omar Quintanilla M.D.  Colorectal & General Surgery  [facility]    [address] [place] [address], [address]  [place], [place], [address]  P: [phone]  F: [phone]

## 2023-12-06 LAB
LAB AP CASE REPORT: NORMAL
PATH REPORT.FINAL DX SPEC: NORMAL
PATH REPORT.GROSS SPEC: NORMAL

## 2023-12-07 ENCOUNTER — TELEPHONE (OUTPATIENT)
Dept: SURGERY | Facility: CLINIC | Age: 83
End: 2023-12-07
Payer: MEDICARE

## 2023-12-07 DIAGNOSIS — K81.0 ACUTE CHOLECYSTITIS: Primary | ICD-10-CM

## 2023-12-07 RX ORDER — OXYCODONE HYDROCHLORIDE 5 MG/1
5 TABLET ORAL EVERY 6 HOURS PRN
Qty: 10 TABLET | Refills: 0 | Status: SHIPPED | OUTPATIENT
Start: 2023-12-07 | End: 2024-12-06

## 2023-12-07 NOTE — TELEPHONE ENCOUNTER
Patients daughter called and stated that the patient is still in a decent amount of pain. Patients daughter would like to know if he could get a few more days of pain meds. Please advise

## 2023-12-13 DIAGNOSIS — I10 ESSENTIAL HYPERTENSION: ICD-10-CM

## 2023-12-13 RX ORDER — OLMESARTAN MEDOXOMIL AND HYDROCHLOROTHIAZIDE 40/25 40; 25 MG/1; MG/1
1 TABLET ORAL DAILY
Qty: 90 TABLET | Refills: 1 | Status: SHIPPED | OUTPATIENT
Start: 2023-12-13

## 2023-12-15 RX ORDER — LEVOTHYROXINE SODIUM 112 UG/1
TABLET ORAL
Qty: 90 TABLET | Refills: 0 | Status: SHIPPED | OUTPATIENT
Start: 2023-12-15

## 2023-12-15 RX ORDER — METOPROLOL SUCCINATE 50 MG/1
50 TABLET, EXTENDED RELEASE ORAL DAILY
Qty: 90 TABLET | Refills: 0 | Status: SHIPPED | OUTPATIENT
Start: 2023-12-15

## 2024-01-05 RX ORDER — WARFARIN SODIUM 5 MG/1
5 TABLET ORAL DAILY
Qty: 45 TABLET | Refills: 0 | Status: SHIPPED | OUTPATIENT
Start: 2024-01-05

## 2024-01-05 RX ORDER — ROSUVASTATIN CALCIUM 5 MG/1
TABLET, COATED ORAL
Qty: 90 TABLET | Refills: 0 | Status: SHIPPED | OUTPATIENT
Start: 2024-01-05

## 2024-01-25 ENCOUNTER — TELEPHONE (OUTPATIENT)
Dept: CARDIOLOGY | Facility: CLINIC | Age: 84
End: 2024-01-25

## 2024-01-25 NOTE — TELEPHONE ENCOUNTER
Caller: David Comer Sr.    Relationship: Self    Best call back number: [phone]    What is the best time to reach you: ANYTIME    Who are you requesting to speak with (clinical staff, provider,  specific staff member): CLINICAL        What was the call regarding: PT STAYS IN [place] AT [facility]. COULD YOU FAX LAB ORDER FOR INR TO [facility] -[phone]? [facility] WILL SEND INR RESULTS TO [facility]    Is it okay if the provider responds through MyChart: NO

## 2024-01-26 DIAGNOSIS — Z79.01 LONG TERM (CURRENT) USE OF ANTICOAGULANTS: Primary | ICD-10-CM

## 2024-01-26 DIAGNOSIS — I48.0 PAROXYSMAL ATRIAL FIBRILLATION: ICD-10-CM

## 2024-01-30 LAB — INR PPP: 1.9

## 2024-01-31 ENCOUNTER — ANTICOAGULATION VISIT (OUTPATIENT)
Dept: CARDIOLOGY | Facility: CLINIC | Age: 84
End: 2024-01-31
Payer: MEDICARE

## 2024-02-07 DIAGNOSIS — I48.0 PAROXYSMAL ATRIAL FIBRILLATION: ICD-10-CM

## 2024-02-07 DIAGNOSIS — Z79.01 LONG TERM (CURRENT) USE OF ANTICOAGULANTS: Primary | ICD-10-CM

## 2024-02-07 LAB — INR PPP: 1.9

## 2024-02-08 ENCOUNTER — ANTICOAGULATION VISIT (OUTPATIENT)
Dept: CARDIOLOGY | Facility: CLINIC | Age: 84
End: 2024-02-08
Payer: MEDICARE

## 2024-03-06 RX ORDER — LEVOTHYROXINE SODIUM 112 UG/1
TABLET ORAL
Qty: 90 TABLET | Refills: 0 | Status: SHIPPED | OUTPATIENT
Start: 2024-03-06

## 2024-03-06 RX ORDER — METOPROLOL SUCCINATE 50 MG/1
50 TABLET, EXTENDED RELEASE ORAL DAILY
Qty: 90 TABLET | Refills: 0 | Status: SHIPPED | OUTPATIENT
Start: 2024-03-06

## 2024-03-12 ENCOUNTER — ANTICOAGULATION VISIT (OUTPATIENT)
Dept: CARDIOLOGY | Facility: CLINIC | Age: 84
End: 2024-03-12
Payer: MEDICARE

## 2024-03-12 ENCOUNTER — HOSPITAL ENCOUNTER (OUTPATIENT)
Dept: CARDIOLOGY | Facility: HOSPITAL | Age: 84
Discharge: HOME | End: 2024-03-12
Admitting: INTERNAL MEDICINE
Payer: MEDICARE

## 2024-03-12 LAB — INR PPP: 1.7

## 2024-03-12 PROCEDURE — 85610 PROTHROMBIN TIME: CPT | Performed by: INTERNAL MEDICINE

## 2024-04-25 ENCOUNTER — HOSPITAL ENCOUNTER (OUTPATIENT)
Dept: CARDIOLOGY | Facility: HOSPITAL | Age: 84
Discharge: HOME | End: 2024-04-25
Payer: MEDICARE

## 2024-04-25 ENCOUNTER — OFFICE VISIT (OUTPATIENT)
Dept: FAMILY MEDICINE CLINIC | Facility: CLINIC | Age: 84
End: 2024-04-25
Payer: MEDICARE

## 2024-04-25 ENCOUNTER — ANTICOAGULATION VISIT (OUTPATIENT)
Dept: CARDIOLOGY | Facility: CLINIC | Age: 84
End: 2024-04-25
Payer: MEDICARE

## 2024-04-25 VITALS
WEIGHT: 204 LBS | HEART RATE: 54 BPM | SYSTOLIC BLOOD PRESSURE: 126 MMHG | DIASTOLIC BLOOD PRESSURE: 84 MMHG | TEMPERATURE: 97.5 F | BODY MASS INDEX: 30.21 KG/M2 | HEIGHT: 69 IN | OXYGEN SATURATION: 96 % | RESPIRATION RATE: 18 BRPM

## 2024-04-25 DIAGNOSIS — N52.9 ERECTILE DYSFUNCTION, UNSPECIFIED ERECTILE DYSFUNCTION TYPE: ICD-10-CM

## 2024-04-25 DIAGNOSIS — I10 ESSENTIAL HYPERTENSION: Primary | ICD-10-CM

## 2024-04-25 DIAGNOSIS — E55.9 VITAMIN D DEFICIENCY: ICD-10-CM

## 2024-04-25 DIAGNOSIS — E03.9 ACQUIRED HYPOTHYROIDISM: ICD-10-CM

## 2024-04-25 DIAGNOSIS — E78.2 MIXED HYPERLIPIDEMIA: ICD-10-CM

## 2024-04-25 LAB — INR PPP: 2.2

## 2024-04-25 PROCEDURE — 1159F MED LIST DOCD IN RCRD: CPT | Performed by: FAMILY MEDICINE

## 2024-04-25 PROCEDURE — 3079F DIAST BP 80-89 MM HG: CPT | Performed by: FAMILY MEDICINE

## 2024-04-25 PROCEDURE — 1160F RVW MEDS BY RX/DR IN RCRD: CPT | Performed by: FAMILY MEDICINE

## 2024-04-25 PROCEDURE — G2211 COMPLEX E/M VISIT ADD ON: HCPCS | Performed by: FAMILY MEDICINE

## 2024-04-25 PROCEDURE — 3074F SYST BP LT 130 MM HG: CPT | Performed by: FAMILY MEDICINE

## 2024-04-25 PROCEDURE — 99214 OFFICE O/P EST MOD 30 MIN: CPT | Performed by: FAMILY MEDICINE

## 2024-04-25 RX ORDER — SILDENAFIL CITRATE 20 MG/1
TABLET ORAL
Qty: 60 TABLET | Refills: 5 | Status: SHIPPED | OUTPATIENT
Start: 2024-04-25

## 2024-04-26 LAB
25(OH)D3+25(OH)D2 SERPL-MCNC: 62.5 NG/ML (ref 30–100)
ALBUMIN SERPL-MCNC: 4.3 G/DL (ref 3.5–5.2)
ALBUMIN/GLOB SERPL: 1.9 G/DL
ALP SERPL-CCNC: 62 U/L (ref 39–117)
ALT SERPL-CCNC: 19 U/L (ref 1–41)
AST SERPL-CCNC: 19 U/L (ref 1–40)
BILIRUB SERPL-MCNC: 0.6 MG/DL (ref 0–1.2)
BUN SERPL-MCNC: 18 MG/DL (ref 8–23)
BUN/CREAT SERPL: 15.1 (ref 7–25)
CALCIUM SERPL-MCNC: 10.1 MG/DL (ref 8.6–10.5)
CHLORIDE SERPL-SCNC: 106 MMOL/L (ref 98–107)
CHOLEST SERPL-MCNC: 140 MG/DL (ref 0–200)
CO2 SERPL-SCNC: 27 MMOL/L (ref 22–29)
CREAT SERPL-MCNC: 1.19 MG/DL (ref 0.76–1.27)
EGFRCR SERPLBLD CKD-EPI 2021: 60.6 ML/MIN/1.73
GLOBULIN SER CALC-MCNC: 2.3 GM/DL
GLUCOSE SERPL-MCNC: 97 MG/DL (ref 65–99)
HDLC SERPL-MCNC: 52 MG/DL (ref 40–60)
LDLC SERPL CALC-MCNC: 68 MG/DL (ref 0–100)
POTASSIUM SERPL-SCNC: 5.1 MMOL/L (ref 3.5–5.2)
PROT SERPL-MCNC: 6.6 G/DL (ref 6–8.5)
SODIUM SERPL-SCNC: 148 MMOL/L (ref 136–145)
TRIGL SERPL-MCNC: 108 MG/DL (ref 0–150)
TSH SERPL DL<=0.005 MIU/L-ACNC: 2.31 UIU/ML (ref 0.27–4.2)
VLDLC SERPL CALC-MCNC: 20 MG/DL (ref 5–40)

## 2024-05-01 LAB
25(OH)D3+25(OH)D2 SERPL-MCNC: 62.5 NG/ML (ref 30–100)
ALBUMIN SERPL-MCNC: 4.3 G/DL (ref 3.5–5.2)
ALBUMIN/GLOB SERPL: 1.9 G/DL
ALP SERPL-CCNC: 62 U/L (ref 39–117)
ALT SERPL-CCNC: 19 U/L (ref 1–41)
AST SERPL-CCNC: 19 U/L (ref 1–40)
BILIRUB SERPL-MCNC: 0.6 MG/DL (ref 0–1.2)
BUN SERPL-MCNC: 18 MG/DL (ref 8–23)
BUN/CREAT SERPL: 16.5 (ref 7–25)
CALCIUM SERPL-MCNC: 10.1 MG/DL (ref 8.6–10.5)
CHLORIDE SERPL-SCNC: 106 MMOL/L (ref 98–107)
CHOLEST SERPL-MCNC: 140 MG/DL (ref 0–200)
CO2 SERPL-SCNC: 27 MMOL/L (ref 22–29)
CREAT SERPL-MCNC: 1.09 MG/DL (ref 0.76–1.27)
EGFRCR SERPLBLD CKD-EPI 2021: 67.3 ML/MIN/1.73
GLOBULIN SER CALC-MCNC: 2.3 GM/DL
GLUCOSE SERPL-MCNC: 97 MG/DL (ref 65–99)
HDLC SERPL-MCNC: 52 MG/DL (ref 40–60)
LDLC SERPL CALC-MCNC: 68 MG/DL (ref 0–100)
POTASSIUM SERPL-SCNC: 5.1 MMOL/L (ref 3.5–5.2)
PROT SERPL-MCNC: 6.6 G/DL (ref 6–8.5)
SODIUM SERPL-SCNC: 148 MMOL/L (ref 136–145)
TRIGL SERPL-MCNC: 108 MG/DL (ref 0–150)
TSH SERPL DL<=0.005 MIU/L-ACNC: 2.31 UIU/ML (ref 0.27–4.2)
VLDLC SERPL CALC-MCNC: 20 MG/DL (ref 5–40)

## 2024-05-09 ENCOUNTER — TELEMEDICINE (OUTPATIENT)
Dept: FAMILY MEDICINE CLINIC | Facility: CLINIC | Age: 84
End: 2024-05-09
Payer: MEDICARE

## 2024-05-09 DIAGNOSIS — Z00.00 MEDICARE ANNUAL WELLNESS VISIT, SUBSEQUENT: Primary | ICD-10-CM

## 2024-05-09 PROCEDURE — G0439 PPPS, SUBSEQ VISIT: HCPCS | Performed by: FAMILY MEDICINE

## 2024-05-09 PROCEDURE — 1125F AMNT PAIN NOTED PAIN PRSNT: CPT | Performed by: FAMILY MEDICINE

## 2024-05-09 PROCEDURE — 1160F RVW MEDS BY RX/DR IN RCRD: CPT | Performed by: FAMILY MEDICINE

## 2024-05-09 PROCEDURE — 1159F MED LIST DOCD IN RCRD: CPT | Performed by: FAMILY MEDICINE

## 2024-05-09 PROCEDURE — 1170F FXNL STATUS ASSESSED: CPT | Performed by: FAMILY MEDICINE

## 2024-05-09 RX ORDER — ROSUVASTATIN CALCIUM 5 MG/1
TABLET, COATED ORAL
Qty: 90 TABLET | Refills: 1 | Status: SHIPPED | OUTPATIENT
Start: 2024-05-09

## 2024-05-23 ENCOUNTER — ANTICOAGULATION VISIT (OUTPATIENT)
Dept: CARDIOLOGY | Facility: CLINIC | Age: 84
End: 2024-05-23

## 2024-05-23 ENCOUNTER — OFFICE VISIT (OUTPATIENT)
Dept: CARDIOLOGY | Facility: CLINIC | Age: 84
End: 2024-05-23
Payer: MEDICARE

## 2024-05-23 ENCOUNTER — HOSPITAL ENCOUNTER (OUTPATIENT)
Dept: CARDIOLOGY | Facility: HOSPITAL | Age: 84
Discharge: HOME | End: 2024-05-23
Admitting: INTERNAL MEDICINE
Payer: MEDICARE

## 2024-05-23 VITALS
BODY MASS INDEX: 29.62 KG/M2 | RESPIRATION RATE: 19 BRPM | HEIGHT: 69 IN | OXYGEN SATURATION: 97 % | HEART RATE: 68 BPM | SYSTOLIC BLOOD PRESSURE: 120 MMHG | WEIGHT: 200 LBS | DIASTOLIC BLOOD PRESSURE: 80 MMHG

## 2024-05-23 DIAGNOSIS — I48.0 PAROXYSMAL ATRIAL FIBRILLATION: ICD-10-CM

## 2024-05-23 DIAGNOSIS — Z79.899 ON AMIODARONE THERAPY: Primary | ICD-10-CM

## 2024-05-23 DIAGNOSIS — Z79.01 LONG TERM (CURRENT) USE OF ANTICOAGULANTS: ICD-10-CM

## 2024-05-23 LAB
INR PPP: 2.6
INR PPP: 2.6 (ref 0.9–1.1)
PROTHROMBIN TIME: 2.6 SECONDS

## 2024-05-23 PROCEDURE — 85610 PROTHROMBIN TIME: CPT | Performed by: INTERNAL MEDICINE

## 2024-05-23 NOTE — PROGRESS NOTES
HX AFIB   Subjective:        David KHAN Age Sr. is a 84 y.o. male who here for follow up    CC  Follow-up atrial fibrillation amiodarone anticoagulation  HPI  84-year-old male with atrial fibrillation amiodarone therapy here for the follow-up no chest pains or tightness in the chest     Problems Addressed this Visit          Cardiac and Vasculature    Paroxysmal atrial fibrillation    Relevant Orders    POC Protime / INR (Completed)    On amiodarone therapy - Primary    Relevant Orders    XR Chest 2 View    TSH    Comprehensive Metabolic Panel       Coag and Thromboembolic    Long term (current) use of anticoagulants    Relevant Orders    POC Protime / INR (Completed)     Diagnoses         Codes Comments    On amiodarone therapy    -  Primary ICD-10-CM: Z79.899  ICD-9-CM: V58.69     Long term (current) use of anticoagulants     ICD-10-CM: Z79.01  ICD-9-CM: V58.61     Paroxysmal atrial fibrillation     ICD-10-CM: I48.0  ICD-9-CM: 427.31           .    The following portions of the patient's history were reviewed and updated as appropriate: allergies, current medications, past family history, past medical history, past social history, past surgical history and problem list.    Past Medical History:   Diagnosis Date    AAA (abdominal aortic aneurysm) without rupture     Aneurysm of thoracic aorta     CT CHEST 8/2021 [facility]     Anticoagulated on warfarin     Arthritis     Basal cell carcinoma     RIGHT LOWER LID     BPH (benign prostatic hyperplasia)     Chronic lumbar pain     Degenerative arthritis of lumbar spine     Degenerative cervical disc     Disease of thyroid gland     Elevated rheumatoid factor     History of atrial fibrillation     Hyperlipidemia     Hypertension     Hypogonadism in male     Muscle strain of left upper back     PRESCRIBED PREDNISONE DOSE PACK     On anticoagulant therapy     Pulmonary nodule     6 MM - REPEAT CT SCAN IN ONE YEAR, WATCHING     Refused influenza vaccine     Scoliosis      "Skin cancer     RIGHT HAND-SCHEDULED FOR EXCISION 12/4/23-STATES SURGEON AWARE.    Vitamin D deficiency      reports that he has never smoked. He has never used smokeless tobacco. He reports that he does not drink alcohol and does not use drugs.   Family History   Problem Relation Age of Onset    Hypertension Father     Heart attack Father     Heart attack Brother     Alcohol abuse Brother     Hypertension Brother     Hypertension Paternal Grandmother     Hypertension Paternal Grandfather     Anemia Mother     Arthritis Mother     Hypertension Mother     Hypertension Maternal Grandmother     Heart disease Other         FH in males before age 55    Malig Hyperthermia Neg Hx        Review of Systems  Constitutional: No wt loss, fever, fatigue  Gastrointestinal: No nausea, abdominal pain  Behavioral/Psych: No insomnia or anxiety   Cardiovascular no chest pains or tightness in the chest  Objective:       Physical Exam  /80   Pulse 68   Resp 19   Ht 174 cm (68.5\")   Wt 90.7 kg (200 lb)   SpO2 97%   BMI 29.97 kg/m²   General appearance: No acute changes   Neck: Trachea midline; NECK, supple, no thyromegaly or lymphadenopathy   Lungs: Normal size and shape, normal breath sounds, equal distribution of air, no rales and rhonchi   CV: S1-S2 regular, no murmurs, no rub, no gallop   Abdomen: Soft, nontender; no masses , no abnormal abdominal sounds   Extremities: No deformity , normal color , no peripheral edema   Skin: Normal temperature, turgor and texture; no rash, ulcers          Procedures      Echocardiogram:    Results for orders placed during the hospital encounter of 10/02/23    Adult Transthoracic Echo Complete W/ Cont if Necessary Per Protocol    Interpretation Summary    Left ventricular ejection fraction appears to be 51 - 55%.    Mild aortic valve stenosis is present. Aortic valve area is 2 cm2.    Peak velocity of the flow distal to the aortic valve is 234.8 cm/s. Aortic valve maximum pressure " gradient is 22 mmHg. Aortic valve mean pressure gradient is 11 mmHg. Aortic valve dimensionless index is 0.7 .    Estimated right ventricular systolic pressure from tricuspid regurgitation is mildly elevated (35-45 mmHg).          Current Outpatient Medications:     acetaminophen (TYLENOL) 325 MG tablet, Take 2 tablets by mouth Every 4 (Four) Hours As Needed for Mild Pain ., Disp:  , Rfl:     amiodarone (PACERONE) 200 MG tablet, Take 1/2 (one-half) tablet by mouth once daily (Patient taking differently: 0.5 tablets Daily.), Disp: 45 tablet, Rfl: 2    aspirin 81 MG EC tablet, Take 1 tablet by mouth Daily. HOLD FOR SURGERY PER MD INSTRUCTIONS, Disp: , Rfl:     doxazosin (CARDURA) 2 MG tablet, TAKE 1 TABLET BY MOUTH ONCE DAILY AT NIGHT, Disp: 90 tablet, Rfl: 3    esomeprazole (nexIUM) 20 MG capsule, Take 1 capsule by mouth Every Morning Before Breakfast., Disp: , Rfl:     hydrALAZINE (APRESOLINE) 25 MG tablet, Take 1 tablet by mouth 3 (Three) Times a Day., Disp: 270 tablet, Rfl: 1    isosorbide mononitrate (IMDUR) 30 MG 24 hr tablet, Take 1 tablet by mouth once daily (Patient taking differently: Take 1 tablet by mouth Daily.), Disp: 90 tablet, Rfl: 2    levothyroxine (SYNTHROID, LEVOTHROID) 112 MCG tablet, Take 1 tablet by mouth once daily, Disp: 90 tablet, Rfl: 0    metoprolol succinate XL (TOPROL-XL) 50 MG 24 hr tablet, Take 1 tablet by mouth once daily, Disp: 90 tablet, Rfl: 0    olmesartan-hydrochlorothiazide (BENICAR HCT) 40-25 MG per tablet, Take 1 tablet by mouth Daily., Disp: 90 tablet, Rfl: 1    oxyCODONE (Roxicodone) 5 MG immediate release tablet, Take 1 tablet by mouth Every 6 (Six) Hours As Needed for Moderate Pain., Disp: 10 tablet, Rfl: 0    rosuvastatin (CRESTOR) 5 MG tablet, Take 1 tablet by mouth once daily, Disp: 90 tablet, Rfl: 1    sildenafil (REVATIO) 20 MG tablet, Take one to five tablets one hour prior to event, Disp: 60 tablet, Rfl: 5    warfarin (COUMADIN) 5 MG tablet, Take 1 tablet by mouth  once daily, Disp: 45 tablet, Rfl: 0    warfarin (COUMADIN) 7.5 MG tablet, Take 1 tablet by mouth See Admin Instructions. Tue/Wed/Thur/Sat, Disp: , Rfl:     ferrous sulfate 325 (65 FE) MG tablet, Take 1 tablet by mouth Daily With Breakfast. (Patient not taking: Reported on 5/23/2024), Disp: , Rfl:     multivitamin (MULTIVITAMIN PO), Take  by mouth Daily. HOLD FOR SURGERY (Patient not taking: Reported on 4/25/2024), Disp: , Rfl:    Assessment:                Plan:          ICD-10-CM ICD-9-CM   1. On amiodarone therapy  Z79.899 V58.69   2. Long term (current) use of anticoagulants  Z79.01 V58.61   3. Paroxysmal atrial fibrillation  I48.0 427.31     1. Long term (current) use of anticoagulants  Continue current treatment  - POC Protime / INR    2. Paroxysmal atrial fibrillation  Continue current treatment  - POC Protime / INR    3. On amiodarone therapy  David Comer Sr. IS ON AMIODARONE,   Significant side effects associated with this medication has been explained     Pros and cons of the medications has been discussed, decision has been to continue the medication   6 months to yearly checkup for eye examination, thyroid function test, chest x-ray and liver function test has been advised    Patient understands well    - XR Chest 2 View; Future  - TSH; Future  - Comprehensive Metabolic Panel; Future  6 months with amio check  COUNSELING:    David KHAN Age Sr.Counseling was given to patient for the following topics: diagnostic results, risk factor reductions, impressions, risks and benefits of treatment options and importance of treatment compliance .       SMOKING COUNSELING:        Dictated using Dragon dictation

## 2024-05-23 NOTE — PROGRESS NOTES
Capillary Blood Specimen Collection  Capillary blood collection performed in clinic by Hitesh Paz MA. Patient tolerated the procedure well without complications.   05/23/24   Hitesh Paz MA

## 2024-05-29 PROBLEM — Z79.899 ON AMIODARONE THERAPY: Status: ACTIVE | Noted: 2024-05-29

## 2024-06-06 DIAGNOSIS — I10 ESSENTIAL HYPERTENSION: ICD-10-CM

## 2024-06-06 RX ORDER — OLMESARTAN MEDOXOMIL AND HYDROCHLOROTHIAZIDE 40/25 40; 25 MG/1; MG/1
1 TABLET ORAL DAILY
Qty: 90 TABLET | Refills: 1 | Status: SHIPPED | OUTPATIENT
Start: 2024-06-06

## 2024-06-06 RX ORDER — METOPROLOL SUCCINATE 50 MG/1
50 TABLET, EXTENDED RELEASE ORAL DAILY
Qty: 90 TABLET | Refills: 3 | Status: SHIPPED | OUTPATIENT
Start: 2024-06-06

## 2024-06-06 RX ORDER — LEVOTHYROXINE SODIUM 112 UG/1
TABLET ORAL
Qty: 90 TABLET | Refills: 3 | Status: SHIPPED | OUTPATIENT
Start: 2024-06-06

## 2024-06-06 RX ORDER — ISOSORBIDE MONONITRATE 30 MG/1
TABLET, EXTENDED RELEASE ORAL
Qty: 90 TABLET | Refills: 2 | Status: SHIPPED | OUTPATIENT
Start: 2024-06-06

## 2024-06-06 RX ORDER — AMIODARONE HYDROCHLORIDE 200 MG/1
TABLET ORAL
Qty: 45 TABLET | Refills: 2 | Status: SHIPPED | OUTPATIENT
Start: 2024-06-06

## 2024-06-25 RX ORDER — WARFARIN SODIUM 7.5 MG/1
7.5 TABLET ORAL NIGHTLY
Qty: 90 TABLET | Refills: 1 | Status: SHIPPED | OUTPATIENT
Start: 2024-06-25

## 2024-08-22 ENCOUNTER — TELEPHONE (OUTPATIENT)
Dept: FAMILY MEDICINE CLINIC | Facility: CLINIC | Age: 84
End: 2024-08-22
Payer: MEDICARE

## 2024-08-22 NOTE — TELEPHONE ENCOUNTER
Your appointment has been switched to a video visit but the next appointment needs to be in person as Dr. Pineda will want to do labs.

## 2024-08-22 NOTE — TELEPHONE ENCOUNTER
Caller: David Comer Sr.    Relationship: Self    Best call back number: [phone]     What is the best time to reach you: ANY    Who are you requesting to speak with (clinical staff, provider,  specific staff member):     What was the call regarding: PATIENT WOULD LIKE TO KNOW IF HIS APPT ON 8/26/22 CAN BE SWITCHED TO A VIRTUAL VISIT.

## 2024-08-26 ENCOUNTER — APPOINTMENT (OUTPATIENT)
Dept: GENERAL RADIOLOGY | Facility: HOSPITAL | Age: 84
End: 2024-08-26
Payer: MEDICARE

## 2024-08-26 ENCOUNTER — HOSPITAL ENCOUNTER (EMERGENCY)
Facility: HOSPITAL | Age: 84
Discharge: HOME | End: 2024-08-26
Attending: EMERGENCY MEDICINE | Admitting: EMERGENCY MEDICINE
Payer: MEDICARE

## 2024-08-26 ENCOUNTER — TELEMEDICINE (OUTPATIENT)
Dept: FAMILY MEDICINE CLINIC | Facility: CLINIC | Age: 84
End: 2024-08-26
Payer: MEDICARE

## 2024-08-26 VITALS
HEIGHT: 69 IN | DIASTOLIC BLOOD PRESSURE: 90 MMHG | TEMPERATURE: 98.4 F | RESPIRATION RATE: 16 BRPM | BODY MASS INDEX: 29.71 KG/M2 | HEART RATE: 76 BPM | OXYGEN SATURATION: 96 % | SYSTOLIC BLOOD PRESSURE: 110 MMHG | WEIGHT: 200.62 LBS

## 2024-08-26 DIAGNOSIS — I71.20 THORACIC AORTIC ANEURYSM WITHOUT RUPTURE, UNSPECIFIED PART: Primary | ICD-10-CM

## 2024-08-26 DIAGNOSIS — M19.011 OSTEOARTHRITIS OF RIGHT SHOULDER, UNSPECIFIED OSTEOARTHRITIS TYPE: ICD-10-CM

## 2024-08-26 DIAGNOSIS — I10 ESSENTIAL HYPERTENSION: Primary | ICD-10-CM

## 2024-08-26 DIAGNOSIS — E03.9 ACQUIRED HYPOTHYROIDISM: ICD-10-CM

## 2024-08-26 DIAGNOSIS — E55.9 VITAMIN D DEFICIENCY: ICD-10-CM

## 2024-08-26 DIAGNOSIS — E78.2 MIXED HYPERLIPIDEMIA: ICD-10-CM

## 2024-08-26 DIAGNOSIS — S43.401A SPRAIN OF RIGHT SHOULDER, UNSPECIFIED SHOULDER SPRAIN TYPE, INITIAL ENCOUNTER: Primary | ICD-10-CM

## 2024-08-26 PROCEDURE — G2211 COMPLEX E/M VISIT ADD ON: HCPCS | Performed by: FAMILY MEDICINE

## 2024-08-26 PROCEDURE — 1159F MED LIST DOCD IN RCRD: CPT | Performed by: FAMILY MEDICINE

## 2024-08-26 PROCEDURE — 99283 EMERGENCY DEPT VISIT LOW MDM: CPT

## 2024-08-26 PROCEDURE — 1160F RVW MEDS BY RX/DR IN RCRD: CPT | Performed by: FAMILY MEDICINE

## 2024-08-26 PROCEDURE — 99214 OFFICE O/P EST MOD 30 MIN: CPT | Performed by: FAMILY MEDICINE

## 2024-08-26 PROCEDURE — 73060 X-RAY EXAM OF HUMERUS: CPT

## 2024-08-26 PROCEDURE — 1126F AMNT PAIN NOTED NONE PRSNT: CPT | Performed by: FAMILY MEDICINE

## 2024-08-26 PROCEDURE — 73030 X-RAY EXAM OF SHOULDER: CPT

## 2024-08-26 PROCEDURE — 73090 X-RAY EXAM OF FOREARM: CPT

## 2024-08-26 RX ORDER — HYDROCODONE BITARTRATE AND ACETAMINOPHEN 5; 325 MG/1; MG/1
1 TABLET ORAL EVERY 8 HOURS PRN
Qty: 12 TABLET | Refills: 0 | Status: SHIPPED | OUTPATIENT
Start: 2024-08-26

## 2024-08-26 NOTE — PROGRESS NOTES
CHIEF COMPLAINT:  HLD    Subjective   David L Age Sr. is a 84 y.o. male.     History of Present Illness   Patient presents during the Covid-19 pandemic/federally declared state of public health emergency.  This service was conducted via video visit  PT is at home and I am at my desk at my office.    Pt presents today for refills, labs and  HTN- no CP or HA  HLD - on med and stable  Hypothyroidism- stable with med   Vit D deficiency- stable.      The following portions of the patient's history were reviewed and updated as appropriate: allergies, current medications, past family history, past medical history, past social history, past surgical history and problem list.    Review of Systems    Patient Active Problem List   Diagnosis    Chronic coronary artery disease    Abdominal aortic aneurysm    Paroxysmal atrial fibrillation    Gastroesophageal reflux disease    Essential hypertension    Mixed hyperlipidemia    Hypogonadism in male    Acquired hypothyroidism    Osteoarthritis of spine    Vitamin D deficiency    Long term (current) use of anticoagulants    Atrial fibrillation, rapid    Statin intolerance    Gross hematuria    Coumadin toxicity    H/O amiodarone therapy    Dehydration    Renal insufficiency    Thoracic aortic aneurysm without rupture    Refused influenza vaccine    Medicare annual wellness visit, subsequent    BPH without obstruction/lower urinary tract symptoms    Thoracic aortic aneurysm    Kidney stone    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    Arthritis    SOB (shortness of breath)    Fatigue    Abnormal nuclear stress test    Acute left-sided low back pain without sciatica    Lt groin pain    Stool incontinence    Hospital discharge follow-up    Chest pain, unspecified type    Generalized weakness    Iron deficiency anemia secondary to inadequate dietary iron intake    Epigastric pain    Chest pain    Acute cholecystitis    On amiodarone therapy       No Known  Allergies      Current Outpatient Medications:     acetaminophen (TYLENOL) 325 MG tablet, Take 2 tablets by mouth Every 4 (Four) Hours As Needed for Mild Pain ., Disp:  , Rfl:     amiodarone (PACERONE) 200 MG tablet, Take 1/2 (one-half) tablet by mouth once daily, Disp: 45 tablet, Rfl: 2    aspirin 81 MG EC tablet, Take 1 tablet by mouth Daily. HOLD FOR SURGERY PER MD INSTRUCTIONS, Disp: , Rfl:     doxazosin (CARDURA) 2 MG tablet, TAKE 1 TABLET BY MOUTH ONCE DAILY AT NIGHT, Disp: 90 tablet, Rfl: 3    esomeprazole (nexIUM) 20 MG capsule, Take 1 capsule by mouth Every Morning Before Breakfast., Disp: , Rfl:     ferrous sulfate 325 (65 FE) MG tablet, Take 1 tablet by mouth Daily With Breakfast. (Patient not taking: Reported on 5/23/2024), Disp: , Rfl:     hydrALAZINE (APRESOLINE) 25 MG tablet, Take 1 tablet by mouth 3 (Three) Times a Day., Disp: 270 tablet, Rfl: 1    HYDROcodone-acetaminophen (NORCO) 5-325 MG per tablet, Take 1 tablet by mouth Every 8 (Eight) Hours As Needed for Moderate Pain., Disp: 12 tablet, Rfl: 0    isosorbide mononitrate (IMDUR) 30 MG 24 hr tablet, Take 1 tablet by mouth once daily, Disp: 90 tablet, Rfl: 2    levothyroxine (SYNTHROID, LEVOTHROID) 112 MCG tablet, Take 1 tablet by mouth once daily, Disp: 90 tablet, Rfl: 3    metoprolol succinate XL (TOPROL-XL) 50 MG 24 hr tablet, Take 1 tablet by mouth once daily, Disp: 90 tablet, Rfl: 3    multivitamin (MULTIVITAMIN PO), Take  by mouth Daily. HOLD FOR SURGERY (Patient not taking: Reported on 4/25/2024), Disp: , Rfl:     olmesartan-hydrochlorothiazide (BENICAR HCT) 40-25 MG per tablet, Take 1 tablet by mouth once daily, Disp: 90 tablet, Rfl: 1    oxyCODONE (Roxicodone) 5 MG immediate release tablet, Take 1 tablet by mouth Every 6 (Six) Hours As Needed for Moderate Pain., Disp: 10 tablet, Rfl: 0    rosuvastatin (CRESTOR) 5 MG tablet, Take 1 tablet by mouth once daily, Disp: 90 tablet, Rfl: 1    sildenafil (REVATIO) 20 MG tablet, Take one to five  tablets one hour prior to event, Disp: 60 tablet, Rfl: 5    warfarin (COUMADIN) 5 MG tablet, Take 1 tablet by mouth once daily, Disp: 45 tablet, Rfl: 0    warfarin (COUMADIN) 7.5 MG tablet, TAKE 1 TABLET BY MOUTH ONCE DAILY AT NIGHT, Disp: 90 tablet, Rfl: 1    Past Medical History:   Diagnosis Date    AAA (abdominal aortic aneurysm) without rupture     Aneurysm of thoracic aorta     CT CHEST 8/2021 [facility]     Anticoagulated on warfarin     Arthritis     Basal cell carcinoma     RIGHT LOWER LID     BPH (benign prostatic hyperplasia)     Chronic lumbar pain     Degenerative arthritis of lumbar spine     Degenerative cervical disc     Disease of thyroid gland     Elevated rheumatoid factor     History of atrial fibrillation     Hyperlipidemia     Hypertension     Hypogonadism in male     Muscle strain of left upper back     PRESCRIBED PREDNISONE DOSE PACK     On anticoagulant therapy     Pulmonary nodule     6 MM - REPEAT CT SCAN IN ONE YEAR, WATCHING     Refused influenza vaccine     Scoliosis     Skin cancer     RIGHT HAND-SCHEDULED FOR EXCISION 12/4/23-STATES SURGEON AWARE.    Vitamin D deficiency        Past Surgical History:   Procedure Laterality Date    ANKLE FUSION Right     CARDIAC CATHETERIZATION      CARDIAC CATHETERIZATION N/A 7/2/2021    Procedure: Left Heart Cath;  Surgeon: Harlan Downing MD;  Location: [facility] CATH INVASIVE LOCATION;  Service: Cardiology;  Laterality: N/A;    CARDIAC CATHETERIZATION N/A 7/2/2021    Procedure: Coronary angiography;  Surgeon: Harlan Downing MD;  Location: [facility] CATH INVASIVE LOCATION;  Service: Cardiology;  Laterality: N/A;    CARDIAC CATHETERIZATION N/A 7/2/2021    Procedure: Left ventriculography;  Surgeon: Harlan Downing MD;  Location: [facility] CATH INVASIVE LOCATION;  Service: Cardiology;  Laterality: N/A;    CARDIAC CATHETERIZATION N/A 12/30/2022    Procedure: Left Heart Cath check inr;  Surgeon: Harlan Downing MD;  Location:   [facility] CATH INVASIVE LOCATION;  Service: Cardiology;  Laterality: N/A;    CARDIAC CATHETERIZATION N/A 12/30/2022    Procedure: Coronary angiography;  Surgeon: Harlan Downing MD;  Location:  [facility] CATH INVASIVE LOCATION;  Service: Cardiology;  Laterality: N/A;    CARDIAC CATHETERIZATION N/A 12/30/2022    Procedure: Left ventriculography;  Surgeon: Harlan Downing MD;  Location:  [facility] CATH INVASIVE LOCATION;  Service: Cardiology;  Laterality: N/A;    CHOLECYSTECTOMY WITH INTRAOPERATIVE CHOLANGIOGRAM N/A 12/5/2023    Procedure: CHOLECYSTECTOMY LAPAROSCOPIC WITH DAVINCI ROBOT;  Surgeon: Randall Quintanilla MD;  Location: [facility] MAIN OR;  Service: Robotics - DaVinci;  Laterality: N/A;    HOBBS TUBE INSERTION Right 10/19/2021    Procedure: RIGHT SECOND STAGE CAST TAKEDOWN RECONSTRUCTION;  Surgeon: Brian Bhatt MD;  Location: [facility] OR [facility] [place];  Service: Ophthalmology;  Laterality: Right;    ENTROPIAN REPAIR Right 9/21/2021    Procedure: RIGHT LOWER LID EXCISION OF BASAL CELL CARCINOMA WITH FROZEN SECTION RIGHT LOWER LID RECONSTRUCTION WITH CAST FLAP, REPAIR OF CANULICULIS, AND FULL THICKNESS SKIN GRAFT;  Surgeon: Brian Bhatt MD;  Location: [facility] OR [facility] [place];  Service: Ophthalmology;  Laterality: Right;    INGUINAL HERNIA REPAIR Right     REPLACEMENT TOTAL KNEE Bilateral 2000    ROTATOR CUFF REPAIR Left     ROTATOR CUFF REPAIR Right     SKIN BIOPSY      VASECTOMY         Family History   Problem Relation Age of Onset    Hypertension Father     Heart attack Father     Heart attack Brother     Alcohol abuse Brother     Hypertension Brother     Hypertension Paternal Grandmother     Hypertension Paternal Grandfather     Anemia Mother     Arthritis Mother     Hypertension Mother     Hypertension Maternal Grandmother     Heart disease Other         FH in males before age 55    Malig Hyperthermia Neg Hx        Social History     Tobacco Use    Smoking status: Never    Smokeless tobacco:  Never   Substance Use Topics    Alcohol use: No            Objective     There were no vitals taken for this visit. Video Visit    Physical Exam  NAD  AAOx3  No labored breathing.      Lab Results   Component Value Date    GLUCOSE 97 04/25/2024    BUN 18 04/25/2024    CREATININE 1.09 04/25/2024    EGFRIFNONA 76 10/13/2021    EGFRIFAFRI 76 09/24/2019    BCR 16.5 04/25/2024    K 5.1 04/25/2024    CO2 27.0 04/25/2024    CALCIUM 10.1 04/25/2024    PROTENTOTREF 6.6 04/25/2024    ALBUMIN 4.3 04/25/2024    LABIL2 1.9 04/25/2024    AST 19 04/25/2024    ALT 19 04/25/2024       WBC   Date Value Ref Range Status   11/27/2023 6.60 3.40 - 10.80 10*3/mm3 Final     RBC   Date Value Ref Range Status   11/27/2023 3.82 (L) 4.14 - 5.80 10*6/mm3 Final     Hemoglobin   Date Value Ref Range Status   11/27/2023 12.2 (L) 13.0 - 17.7 g/dL Final     Hematocrit   Date Value Ref Range Status   11/27/2023 37.1 (L) 37.5 - 51.0 % Final     MCV   Date Value Ref Range Status   11/27/2023 97.1 (H) 79.0 - 97.0 fL Final     MCH   Date Value Ref Range Status   11/27/2023 31.9 26.6 - 33.0 pg Final     MCHC   Date Value Ref Range Status   11/27/2023 32.9 31.5 - 35.7 g/dL Final     RDW   Date Value Ref Range Status   11/27/2023 12.4 12.3 - 15.4 % Final     RDW-SD   Date Value Ref Range Status   11/27/2023 44.0 37.0 - 54.0 fl Final     MPV   Date Value Ref Range Status   11/27/2023 11.5 6.0 - 12.0 fL Final     Platelets   Date Value Ref Range Status   11/27/2023 156 140 - 450 10*3/mm3 Final     Neutrophil %   Date Value Ref Range Status   11/27/2023 68.0 42.7 - 76.0 % Final     Lymphocyte %   Date Value Ref Range Status   11/27/2023 20.9 19.6 - 45.3 % Final     Monocyte %   Date Value Ref Range Status   11/27/2023 7.9 5.0 - 12.0 % Final     Eosinophil %   Date Value Ref Range Status   11/27/2023 2.3 0.3 - 6.2 % Final     Basophil %   Date Value Ref Range Status   11/27/2023 0.6 0.0 - 1.5 % Final     Immature Grans %   Date Value Ref Range Status  "  11/27/2023 0.3 0.0 - 0.5 % Final     Neutrophils Absolute   Date Value Ref Range Status   05/05/2022 4.20 1.70 - 6.00 x10(3)/ul Final     Neutrophils, Absolute   Date Value Ref Range Status   11/27/2023 4.49 1.70 - 7.00 10*3/mm3 Final     Lymphocytes, Absolute   Date Value Ref Range Status   11/27/2023 1.38 0.70 - 3.10 10*3/mm3 Final     Monocytes, Absolute   Date Value Ref Range Status   11/27/2023 0.52 0.10 - 0.90 10*3/mm3 Final     Eosinophils Absolute   Date Value Ref Range Status   05/05/2022 0.2 0.0 - 0.6 x10(3)/ul Final     Eosinophils, Absolute   Date Value Ref Range Status   11/27/2023 0.15 0.00 - 0.40 10*3/mm3 Final     Basophils Absolute   Date Value Ref Range Status   05/05/2022 0.1 0.0 - 0.3 x10(3)/ul Final     Basophils, Absolute   Date Value Ref Range Status   11/27/2023 0.04 0.00 - 0.20 10*3/mm3 Final     Immature Grans, Absolute   Date Value Ref Range Status   11/27/2023 0.02 0.00 - 0.05 10*3/mm3 Final     nRBC   Date Value Ref Range Status   11/27/2023 0.0 0.0 - 0.2 /100 WBC Final       No results found for: \"HGBA1C\"    No results found for: \"[id number]\"    TSH   Date Value Ref Range Status   04/25/2024 2.310 0.270 - 4.200 uIU/mL Final   01/28/2023 2.690 0.270 - 4.200 uIU/mL Final       Lab Results   Component Value Date    CHOL 157 12/29/2022     Lab Results   Component Value Date    TRIG 108 04/25/2024     Lab Results   Component Value Date    HDL 52 04/25/2024     Lab Results   Component Value Date    LDL 68 04/25/2024     Lab Results   Component Value Date    VLDL 20 04/25/2024     Lab Results   Component Value Date    LDLHDL 1.48 12/29/2022         Procedures    Assessment & Plan   Problems Addressed this Visit       Vitamin D deficiency    Mixed hyperlipidemia    Essential hypertension - Primary    Acquired hypothyroidism     Diagnoses         Codes Comments    Essential hypertension    -  Primary ICD-10-CM: I10  ICD-9-CM: 401.9     Mixed hyperlipidemia     ICD-10-CM: E78.2  ICD-9-CM: 272.2 "     Acquired hypothyroidism     ICD-10-CM: E03.9  ICD-9-CM: 244.9     Vitamin D deficiency     ICD-10-CM: E55.9  ICD-9-CM: 268.9             No orders of the defined types were placed in this encounter.      Current Outpatient Medications   Medication Sig Dispense Refill    acetaminophen (TYLENOL) 325 MG tablet Take 2 tablets by mouth Every 4 (Four) Hours As Needed for Mild Pain .      amiodarone (PACERONE) 200 MG tablet Take 1/2 (one-half) tablet by mouth once daily 45 tablet 2    aspirin 81 MG EC tablet Take 1 tablet by mouth Daily. HOLD FOR SURGERY PER MD INSTRUCTIONS      doxazosin (CARDURA) 2 MG tablet TAKE 1 TABLET BY MOUTH ONCE DAILY AT NIGHT 90 tablet 3    esomeprazole (nexIUM) 20 MG capsule Take 1 capsule by mouth Every Morning Before Breakfast.      ferrous sulfate 325 (65 FE) MG tablet Take 1 tablet by mouth Daily With Breakfast. (Patient not taking: Reported on 5/23/2024)      hydrALAZINE (APRESOLINE) 25 MG tablet Take 1 tablet by mouth 3 (Three) Times a Day. 270 tablet 1    HYDROcodone-acetaminophen (NORCO) 5-325 MG per tablet Take 1 tablet by mouth Every 8 (Eight) Hours As Needed for Moderate Pain. 12 tablet 0    isosorbide mononitrate (IMDUR) 30 MG 24 hr tablet Take 1 tablet by mouth once daily 90 tablet 2    levothyroxine (SYNTHROID, LEVOTHROID) 112 MCG tablet Take 1 tablet by mouth once daily 90 tablet 3    metoprolol succinate XL (TOPROL-XL) 50 MG 24 hr tablet Take 1 tablet by mouth once daily 90 tablet 3    multivitamin (MULTIVITAMIN PO) Take  by mouth Daily. HOLD FOR SURGERY (Patient not taking: Reported on 4/25/2024)      olmesartan-hydrochlorothiazide (BENICAR HCT) 40-25 MG per tablet Take 1 tablet by mouth once daily 90 tablet 1    oxyCODONE (Roxicodone) 5 MG immediate release tablet Take 1 tablet by mouth Every 6 (Six) Hours As Needed for Moderate Pain. 10 tablet 0    rosuvastatin (CRESTOR) 5 MG tablet Take 1 tablet by mouth once daily 90 tablet 1    sildenafil (REVATIO) 20 MG tablet Take one  to five tablets one hour prior to event 60 tablet 5    warfarin (COUMADIN) 5 MG tablet Take 1 tablet by mouth once daily 45 tablet 0    warfarin (COUMADIN) 7.5 MG tablet TAKE 1 TABLET BY MOUTH ONCE DAILY AT NIGHT 90 tablet 1     No current facility-administered medications for this visit.       Return in about 4 months (around 12/26/2024).    There are no Patient Instructions on file for this visit.         Time spent on visit:  15   minutes.  This patient has consented to a telehealth visit via video. The visit was scheduled as a video visit to comply with patient safety concerns in accordance with CDC recommendations.  All vitals recorded within this visit are reported by the patient.      Refills and work on diet and exercise.

## 2024-08-26 NOTE — ED PROVIDER NOTES
Time: 9:55 AM EDT  Date of encounter:  8/26/2024  Independent Historian/Clinical History and Information was obtained by:   Patient    History is limited by: N/A    Chief Complaint: Arm pain      History of Present Illness:  Patient is a 84 y.o. year old male who presents to the emergency department for evaluation of arm pain status post fall.  Patient reports he tripped and fell and caught himself on the banister injuring his right shoulder.  He does report a history of rotator cuff tear.    HPI    Patient Care Team  Primary Care Provider: Chacha Pineda MD    Past Medical History:     No Known Allergies  Past Medical History:   Diagnosis Date    AAA (abdominal aortic aneurysm) without rupture     Aneurysm of thoracic aorta     CT CHEST 8/2021 [facility]     Anticoagulated on warfarin     Arthritis     Basal cell carcinoma     RIGHT LOWER LID     BPH (benign prostatic hyperplasia)     Chronic lumbar pain     Degenerative arthritis of lumbar spine     Degenerative cervical disc     Disease of thyroid gland     Elevated rheumatoid factor     History of atrial fibrillation     Hyperlipidemia     Hypertension     Hypogonadism in male     Muscle strain of left upper back     PRESCRIBED PREDNISONE DOSE PACK     On anticoagulant therapy     Pulmonary nodule     6 MM - REPEAT CT SCAN IN ONE YEAR, WATCHING     Refused influenza vaccine     Scoliosis     Skin cancer     RIGHT HAND-SCHEDULED FOR EXCISION 12/4/23-STATES SURGEON AWARE.    Vitamin D deficiency      Past Surgical History:   Procedure Laterality Date    ANKLE FUSION Right     CARDIAC CATHETERIZATION      CARDIAC CATHETERIZATION N/A 7/2/2021    Procedure: Left Heart Cath;  Surgeon: Harlan Downing MD;  Location:  [facility];  Service: Cardiology;  Laterality: N/A;    CARDIAC CATHETERIZATION N/A 7/2/2021    Procedure: Coronary angiography;  Surgeon: Harlan Downing MD;  Location:  [facility];  Service:  Cardiology;  Laterality: N/A;    CARDIAC CATHETERIZATION N/A 7/2/2021    Procedure: Left ventriculography;  Surgeon: Harlan Downing MD;  Location: [facility] CATH INVASIVE LOCATION;  Service: Cardiology;  Laterality: N/A;    CARDIAC CATHETERIZATION N/A 12/30/2022    Procedure: Left Heart Cath check inr;  Surgeon: Harlan Downing MD;  Location: [facility] CATH INVASIVE LOCATION;  Service: Cardiology;  Laterality: N/A;    CARDIAC CATHETERIZATION N/A 12/30/2022    Procedure: Coronary angiography;  Surgeon: Harlan Downing MD;  Location: [facility] CATH INVASIVE LOCATION;  Service: Cardiology;  Laterality: N/A;    CARDIAC CATHETERIZATION N/A 12/30/2022    Procedure: Left ventriculography;  Surgeon: Harlan Downing MD;  Location: [facility] CATH INVASIVE LOCATION;  Service: Cardiology;  Laterality: N/A;    CHOLECYSTECTOMY WITH INTRAOPERATIVE CHOLANGIOGRAM N/A 12/5/2023    Procedure: CHOLECYSTECTOMY LAPAROSCOPIC WITH DAVINCI ROBOT;  Surgeon: Randall Quintanilla MD;  Location: [facility] OR;  Service: Robotics - DaVinci;  Laterality: N/A;    HOBBS TUBE INSERTION Right 10/19/2021    Procedure: RIGHT SECOND STAGE CAST TAKEDOWN RECONSTRUCTION;  Surgeon: Brian Bhatt MD;  Location: [facility];  Service: Ophthalmology;  Laterality: Right;    ENTROPIAN REPAIR Right 9/21/2021    Procedure: RIGHT LOWER LID EXCISION OF BASAL CELL CARCINOMA WITH FROZEN SECTION RIGHT LOWER LID RECONSTRUCTION WITH CAST FLAP, REPAIR OF CANULICULIS, AND FULL THICKNESS SKIN GRAFT;  Surgeon: Brian Bhatt MD;  Location: [facility];  Service: Ophthalmology;  Laterality: Right;    INGUINAL HERNIA REPAIR Right     REPLACEMENT TOTAL KNEE Bilateral 2000    ROTATOR CUFF REPAIR Left     ROTATOR CUFF REPAIR Right     SKIN BIOPSY      VASECTOMY       Family History   Problem Relation Age of Onset    Hypertension Father     Heart attack Father     Heart attack Brother     Alcohol abuse Brother     Hypertension  Brother     Hypertension Paternal Grandmother     Hypertension Paternal Grandfather     Anemia Mother     Arthritis Mother     Hypertension Mother     Hypertension Maternal Grandmother     Heart disease Other         FH in males before age 55    Malig Hyperthermia Neg Hx        Home Medications:  Prior to Admission medications    Medication Sig Start Date End Date Taking? Authorizing Provider   acetaminophen (TYLENOL) 325 MG tablet Take 2 tablets by mouth Every 4 (Four) Hours As Needed for Mild Pain . 11/27/20   Alejandro Mina MD   amiodarone (PACERONE) 200 MG tablet Take 1/2 (one-half) tablet by mouth once daily 6/6/24   Harlan Downing MD   aspirin 81 MG EC tablet Take 1 tablet by mouth Daily. HOLD FOR SURGERY PER MD INSTRUCTIONS    Solis White MD   doxazosin (CARDURA) 2 MG tablet TAKE 1 TABLET BY MOUTH ONCE DAILY AT NIGHT 10/21/23   Colten Harden MD   esomeprazole (nexIUM) 20 MG capsule Take 1 capsule by mouth Every Morning Before Breakfast.    Solis White MD   ferrous sulfate 325 (65 FE) MG tablet Take 1 tablet by mouth Daily With Breakfast.  Patient not taking: Reported on 5/23/2024    Solis White MD   hydrALAZINE (APRESOLINE) 25 MG tablet Take 1 tablet by mouth 3 (Three) Times a Day. 9/14/23   Harlan Downing MD   HYDROcodone-acetaminophen (NORCO) 5-325 MG per tablet Take 1 tablet by mouth Every 8 (Eight) Hours As Needed for Moderate Pain. 8/26/24   Gonzalez Escobedo MD   isosorbide mononitrate (IMDUR) 30 MG 24 hr tablet Take 1 tablet by mouth once daily 6/6/24   Harlan Downing MD   levothyroxine (SYNTHROID, LEVOTHROID) 112 MCG tablet Take 1 tablet by mouth once daily 6/6/24   Colten Harden MD   metoprolol succinate XL (TOPROL-XL) 50 MG 24 hr tablet Take 1 tablet by mouth once daily 6/6/24   Colten Harden MD   multivitamin (MULTIVITAMIN PO) Take  by mouth Daily. HOLD FOR SURGERY  Patient not taking: Reported on 4/25/2024    Cindy  "MD [facility]   olmesartan-hydrochlorothiazide (BENICAR HCT) 40-25 MG per tablet Take 1 tablet by mouth once daily 6/6/24   Colten Harden MD   oxyCODONE (Roxicodone) 5 MG immediate release tablet Take 1 tablet by mouth Every 6 (Six) Hours As Needed for Moderate Pain. 12/7/23 12/6/24  Randall Quintanilla MD   rosuvastatin (CRESTOR) 5 MG tablet Take 1 tablet by mouth once daily 5/9/24   Harlan Downing MD   sildenafil (REVATIO) 20 MG tablet Take one to five tablets one hour prior to event 4/25/24   Chacha Pineda MD   warfarin (COUMADIN) 5 MG tablet Take 1 tablet by mouth once daily 1/5/24   Harlan Downing MD   warfarin (COUMADIN) 7.5 MG tablet TAKE 1 TABLET BY MOUTH ONCE DAILY AT NIGHT 6/25/24   Harlan Downing MD        Social History:   Social History     Tobacco Use    Smoking status: Never    Smokeless tobacco: Never   Vaping Use    Vaping status: Never Used   Substance Use Topics    Alcohol use: No    Drug use: No         Review of Systems:  Review of Systems   Constitutional:  Negative for chills and fever.   HENT:  Negative for congestion, rhinorrhea and sore throat.    Eyes:  Negative for pain and visual disturbance.   Respiratory:  Negative for apnea, cough, chest tightness and shortness of breath.    Cardiovascular:  Negative for chest pain and palpitations.   Gastrointestinal:  Negative for abdominal pain, diarrhea, nausea and vomiting.   Genitourinary:  Negative for difficulty urinating and dysuria.   Musculoskeletal:  Negative for joint swelling and myalgias.   Skin:  Negative for color change.   Neurological:  Negative for seizures and headaches.   Psychiatric/Behavioral: Negative.     All other systems reviewed and are negative.       Physical Exam:  /90   Pulse 76   Temp 98.4 °F (36.9 °C) (Oral)   Resp 16   Ht 174 cm (68.5\")   Wt 91 kg (200 lb 9.9 oz)   SpO2 96%   BMI 30.06 kg/m²     Physical Exam  Vitals and nursing note reviewed.   Constitutional:  "      General: He is not in acute distress.     Appearance: Normal appearance. He is not toxic-appearing.   HENT:      Head: Normocephalic and atraumatic.      Jaw: There is normal jaw occlusion.   Eyes:      General: Lids are normal.      Extraocular Movements: Extraocular movements intact.      Conjunctiva/sclera: Conjunctivae normal.      Pupils: Pupils are equal, round, and reactive to light.   Cardiovascular:      Rate and Rhythm: Normal rate and regular rhythm.      Pulses: Normal pulses.      Heart sounds: Normal heart sounds.   Pulmonary:      Effort: Pulmonary effort is normal. No respiratory distress.      Breath sounds: Normal breath sounds. No wheezing or rhonchi.   Abdominal:      General: Abdomen is flat.      Palpations: Abdomen is soft.      Tenderness: There is no abdominal tenderness. There is no guarding or rebound.   Musculoskeletal:      Cervical back: Normal range of motion and neck supple.      Right lower leg: No edema.      Left lower leg: No edema.      Comments: Right upper extremity painful range of motion of the shoulder, no obvious deformity neurovascular intact distally.   Skin:     General: Skin is warm and dry.   Neurological:      Mental Status: He is alert and oriented to person, place, and time. Mental status is at baseline.   Psychiatric:         Mood and Affect: Mood normal.                  Procedures:  Procedures      Medical Decision Making:      Comorbidities that affect care:    CAD, A-fib    External Notes reviewed:    Previous ED Note: Emergency medicine outside hospital visit for pneumonia management      The following orders were placed and all results were independently analyzed by me:  Orders Placed This Encounter   Procedures    [facility] 03.  Sling & Swathe    XR Shoulder 2+ View Right    XR Forearm 2 View Right    XR Humerus Right       Medications Given in the Emergency Department:  Medications - No data to display     ED Course:         Labs:    Lab  Results (last 24 hours)       ** No results found for the last 24 hours. **             Imaging:    XR Forearm 2 View Right    Result Date: 8/26/2024  XR HUMERUS RIGHT, XR FOREARM 2 VW RIGHT Date of Exam: 8/26/2024 9:05 AM EDT Indication: fall Comparison: None available. Findings: Right humerus: 2 views. The humeral shaft is intact. Degenerative changes of the shoulder were described on a separate shoulder x-ray report. Right forearm: 2 views. There is no evidence of a fracture or dislocation of the radius or ulna. There is arterial vascular calcification.     Impression: No acute process. Electronically Signed: Dari Milligan MD  8/26/2024 9:23 AM EDT  Workstation ID: [id number]    XR Humerus Right    Result Date: 8/26/2024  XR HUMERUS RIGHT, XR FOREARM 2 VW RIGHT Date of Exam: 8/26/2024 9:05 AM EDT Indication: fall Comparison: None available. Findings: Right humerus: 2 views. The humeral shaft is intact. Degenerative changes of the shoulder were described on a separate shoulder x-ray report. Right forearm: 2 views. There is no evidence of a fracture or dislocation of the radius or ulna. There is arterial vascular calcification.     Impression: No acute process. Electronically Signed: Dari Milligan MD  8/26/2024 9:23 AM EDT  Workstation ID: [id number]    XR Shoulder 2+ View Right    Result Date: 8/26/2024  XR SHOULDER 2+ VW RIGHT Date of Exam: 8/26/2024 9:05 AM EDT Indication: fall Comparison: None available. Findings: 3 films. Humeral head is moderately subluxed cephalad in relation to the glenoid and abuts the undersurface of the acromion. The supraspinatus outlet is largely obliterated. There is severe narrowing of the glenohumeral joint. There is moderate spurring from the lesser and greater tuberosities. There is no evidence of a fracture or dislocation. There is some soft tissue ossification or calcification in the region of the supraspinatus muscle and tendon.    Impression: Degenerative osteoarthritis with  severe outlet narrowing. No acute process. Electronically Signed: Dari Milligan MD  8/26/2024 9:22 AM EDT  Workstation ID: [id number]       Differential Diagnosis and Discussion:    Extremity Pain: Differential diagnosis includes but is not limited to soft tissue sprain, tendonitis, tendon injury, dislocation, fracture, deep vein thrombosis, arterial insufficiency, osteoarthritis, bursitis, and ligamentous damage.    All X-rays impressions were independently interpreted by me.    MDM  Number of Diagnoses or Management Options  Osteoarthritis of right shoulder, unspecified osteoarthritis type  Sprain of right shoulder, unspecified shoulder sprain type, initial encounter  Diagnosis management comments: In summary this is an 84-year-old male patient who presents to the emerged department for evaluation of right shoulder pain status post fall catching himself on the banister.  X-ray of the right shoulder, right humerus and right forearm were obtained and reviewed by me and are unremarkable and negative for acute pathology but does show significant osteoarthritis of the right shoulder.  Patient's been placed in a sling and will be given pain medication for home use.  Very strict return to ER and follow-up instructions have been provided to the patient.                   Patient Care Considerations:    LABS: I considered ordering labs, however no signs of metabolic derangement      Consultants/Shared Management Plan:    None    Social Determinants of Health:    Patient is independent, reliable, and has access to care.       Disposition and Care Coordination:    Discharged: The patient is suitable and stable for discharge with no need for consideration of admission.    I have explained the patient´s condition, diagnoses and treatment plan based on the information available to me at this time. I have answered questions and addressed any concerns. The patient has a good  understanding of the patient´s diagnosis, condition,  and treatment plan as can be expected at this point. The vital signs have been stable. The patient´s condition is stable and appropriate for discharge from the emergency department.      The patient will pursue further outpatient evaluation with the primary care physician or other designated or consulting physician as outlined in the discharge instructions. They are agreeable to this plan of care and follow-up instructions have been explained in detail. The patient has received these instructions in written format and has expressed an understanding of the discharge instructions. The patient is aware that any significant change in condition or worsening of symptoms should prompt an immediate return to this or the closest emergency department or call to [facility].  I have explained discharge medications and the need for follow up with the patient/caretakers. This was also printed in the discharge instructions. Patient was discharged with the following medications and follow up:      Medication List        New Prescriptions      HYDROcodone-acetaminophen 5-325 MG per tablet  Commonly known as: NORCO  Take 1 tablet by mouth Every 8 (Eight) Hours As Needed for Moderate Pain.               Where to Get Your Medications        These medications were sent to [facility] [address] [place] [address] [phone] [facility] [phone] [place] [id number]      Phone: [phone]   HYDROcodone-acetaminophen 5-325 MG per tablet      Alireza Grey MD  [address] [place], [place] [id number] [phone]    In 1 week         Final diagnoses:   Sprain of right shoulder, unspecified shoulder sprain type, initial encounter   Osteoarthritis of right shoulder, unspecified osteoarthritis type        ED Disposition       ED Disposition   Discharge    Condition   Stable    Comment   Patient discharged.                This medical record created using voice recognition software.              Gonzalez Escobedo MD  08/26/24 [id number]

## 2024-09-26 RX ORDER — WARFARIN SODIUM 5 MG/1
5 TABLET ORAL DAILY
Qty: 45 TABLET | Refills: 1 | Status: SHIPPED | OUTPATIENT
Start: 2024-09-26

## 2024-09-26 RX ORDER — HYDRALAZINE HYDROCHLORIDE 25 MG/1
25 TABLET, FILM COATED ORAL 3 TIMES DAILY
Qty: 270 TABLET | Refills: 1 | Status: SHIPPED | OUTPATIENT
Start: 2024-09-26

## 2024-10-07 RX ORDER — WARFARIN SODIUM 7.5 MG/1
7.5 TABLET ORAL NIGHTLY
Qty: 90 TABLET | Refills: 0 | Status: SHIPPED | OUTPATIENT
Start: 2024-10-07

## 2024-11-03 RX ORDER — DOXAZOSIN 2 MG/1
TABLET ORAL
Qty: 90 TABLET | Refills: 0 | Status: SHIPPED | OUTPATIENT
Start: 2024-11-03

## 2024-11-04 RX ORDER — ROSUVASTATIN CALCIUM 5 MG/1
TABLET, COATED ORAL
Qty: 90 TABLET | Refills: 2 | Status: SHIPPED | OUTPATIENT
Start: 2024-11-04

## 2024-11-14 ENCOUNTER — HOSPITAL ENCOUNTER (OUTPATIENT)
Dept: CARDIOLOGY | Facility: HOSPITAL | Age: 84
Discharge: HOME | End: 2024-11-14
Admitting: INTERNAL MEDICINE
Payer: MEDICARE

## 2024-11-14 ENCOUNTER — OFFICE VISIT (OUTPATIENT)
Dept: CARDIOLOGY | Facility: CLINIC | Age: 84
End: 2024-11-14
Payer: MEDICARE

## 2024-11-14 ENCOUNTER — ANTICOAGULATION VISIT (OUTPATIENT)
Dept: CARDIOLOGY | Facility: CLINIC | Age: 84
End: 2024-11-14
Payer: MEDICARE

## 2024-11-14 VITALS
HEART RATE: 60 BPM | WEIGHT: 213.4 LBS | BODY MASS INDEX: 31.97 KG/M2 | DIASTOLIC BLOOD PRESSURE: 88 MMHG | SYSTOLIC BLOOD PRESSURE: 133 MMHG | OXYGEN SATURATION: 96 %

## 2024-11-14 DIAGNOSIS — Z79.01 LONG TERM (CURRENT) USE OF ANTICOAGULANTS: ICD-10-CM

## 2024-11-14 DIAGNOSIS — Z79.899 ON AMIODARONE THERAPY: Primary | ICD-10-CM

## 2024-11-14 DIAGNOSIS — I71.40 ABDOMINAL AORTIC ANEURYSM (AAA) WITHOUT RUPTURE, UNSPECIFIED PART: ICD-10-CM

## 2024-11-14 DIAGNOSIS — I48.0 PAROXYSMAL ATRIAL FIBRILLATION: ICD-10-CM

## 2024-11-14 LAB — INR PPP: 2.2

## 2024-11-14 PROCEDURE — 85610 PROTHROMBIN TIME: CPT | Performed by: INTERNAL MEDICINE

## 2024-11-14 NOTE — PROGRESS NOTES
Subjective:        David Comer Sr. is a 84 y.o. male who here for follow up    CC  Follow-up atrial fibrillation amiodarone therapy anticoagulation  HPI  84-year-old male with paroxysmal atrial fibrillation amiodarone therapy and anticoagulation here for the follow-up denies any chest pains or tightness in the chest     Problems Addressed this Visit          Cardiac and Vasculature    Abdominal aortic aneurysm    Relevant Orders    CT Chest With Contrast    Paroxysmal atrial fibrillation    On amiodarone therapy - Primary    Relevant Orders    XR Chest 2 View    TSH    Comprehensive Metabolic Panel       Coag and Thromboembolic    Long term (current) use of anticoagulants     Diagnoses         Codes Comments    On amiodarone therapy    -  Primary ICD-10-CM: Z79.899  ICD-9-CM: V58.69     Abdominal aortic aneurysm (AAA) without rupture, unspecified part     ICD-10-CM: I71.40  ICD-9-CM: 441.4     Paroxysmal atrial fibrillation     ICD-10-CM: I48.0  ICD-9-CM: 427.31     Long term (current) use of anticoagulants     ICD-10-CM: Z79.01  ICD-9-CM: V58.61           .    The following portions of the patient's history were reviewed and updated as appropriate: allergies, current medications, past family history, past medical history, past social history, past surgical history and problem list.    Past Medical History:   Diagnosis Date    AAA (abdominal aortic aneurysm) without rupture     Aneurysm of thoracic aorta     CT CHEST 8/2021 [facility]     Anticoagulated on warfarin     Arthritis     Basal cell carcinoma     RIGHT LOWER LID     BPH (benign prostatic hyperplasia)     Chronic lumbar pain     Degenerative arthritis of lumbar spine     Degenerative cervical disc     Disease of thyroid gland     Elevated rheumatoid factor     History of atrial fibrillation     Hyperlipidemia     Hypertension     Hypogonadism in male     Muscle strain of left upper back     PRESCRIBED PREDNISONE DOSE PACK     On anticoagulant therapy      Pulmonary nodule     6 MM - REPEAT CT SCAN IN ONE YEAR, WATCHING     Refused influenza vaccine     Scoliosis     Skin cancer     RIGHT HAND-SCHEDULED FOR EXCISION 12/4/23-STATES SURGEON AWARE.    Vitamin D deficiency      reports that he has never smoked. He has never used smokeless tobacco. He reports that he does not drink alcohol and does not use drugs.   Family History   Problem Relation Age of Onset    Hypertension Father     Heart attack Father     Heart attack Brother     Alcohol abuse Brother     Hypertension Brother     Hypertension Paternal Grandmother     Hypertension Paternal Grandfather     Anemia Mother     Arthritis Mother     Hypertension Mother     Hypertension Maternal Grandmother     Heart disease Other         FH in males before age 55    Malig Hyperthermia Neg Hx        Review of Systems  Constitutional: No wt loss, fever, fatigue  Gastrointestinal: No nausea, abdominal pain  Behavioral/Psych: No insomnia or anxiety   Cardiovascular no chest pains or tightness in the chest  Objective:       Physical Exam  /88 (BP Location: Left arm, Patient Position: Sitting, Cuff Size: Large Adult)   Pulse 60   Wt 96.8 kg (213 lb 6.4 oz)   SpO2 96%   BMI 31.97 kg/m²   General appearance: No acute changes   Neck: Trachea midline; NECK, supple, no thyromegaly or lymphadenopathy   Lungs: Normal size and shape, normal breath sounds, equal distribution of air, no rales and rhonchi   CV: S1-S2 regular, no murmurs, no rub, no gallop   Abdomen: Soft, nontender; no masses , no abnormal abdominal sounds   Extremities: No deformity , normal color , no peripheral edema   Skin: Normal temperature, turgor and texture; no rash, ulcers            ECG 12 Lead    Date/Time: 11/14/2024 12:14 PM  Performed by: Harlan Downing MD    Authorized by: Harlan Downing MD  Comparison: compared with previous ECG   Similar to previous ECG  Rhythm: sinus rhythm    Clinical impression: non-specific  ECG            Echocardiogram:    Results for orders placed during the hospital encounter of 10/02/23    Adult Transthoracic Echo Complete W/ Cont if Necessary Per Protocol    Interpretation Summary    Left ventricular ejection fraction appears to be 51 - 55%.    Mild aortic valve stenosis is present. Aortic valve area is 2 cm2.    Peak velocity of the flow distal to the aortic valve is 234.8 cm/s. Aortic valve maximum pressure gradient is 22 mmHg. Aortic valve mean pressure gradient is 11 mmHg. Aortic valve dimensionless index is 0.7 .    Estimated right ventricular systolic pressure from tricuspid regurgitation is mildly elevated (35-45 mmHg).          Current Outpatient Medications:     acetaminophen (TYLENOL) 325 MG tablet, Take 2 tablets by mouth Every 4 (Four) Hours As Needed for Mild Pain ., Disp:  , Rfl:     amiodarone (PACERONE) 200 MG tablet, Take 1/2 (one-half) tablet by mouth once daily, Disp: 45 tablet, Rfl: 2    aspirin 81 MG EC tablet, Take 1 tablet by mouth Daily. HOLD FOR SURGERY PER MD INSTRUCTIONS, Disp: , Rfl:     doxazosin (CARDURA) 2 MG tablet, TAKE 1 TABLET BY MOUTH ONCE DAILY AT NIGHT, Disp: 90 tablet, Rfl: 0    esomeprazole (nexIUM) 20 MG capsule, Take 1 capsule by mouth Every Morning Before Breakfast., Disp: , Rfl:     ferrous sulfate 325 (65 FE) MG tablet, Take 1 tablet by mouth Daily With Breakfast., Disp: , Rfl:     hydrALAZINE (APRESOLINE) 25 MG tablet, TAKE 1 TABLET BY MOUTH THREE TIMES DAILY, Disp: 270 tablet, Rfl: 1    HYDROcodone-acetaminophen (NORCO) 5-325 MG per tablet, Take 1 tablet by mouth Every 8 (Eight) Hours As Needed for Moderate Pain., Disp: 12 tablet, Rfl: 0    isosorbide mononitrate (IMDUR) 30 MG 24 hr tablet, Take 1 tablet by mouth once daily, Disp: 90 tablet, Rfl: 2    levothyroxine (SYNTHROID, LEVOTHROID) 112 MCG tablet, Take 1 tablet by mouth once daily, Disp: 90 tablet, Rfl: 3    metoprolol succinate XL (TOPROL-XL) 50 MG 24 hr tablet, Take 1 tablet by mouth once  daily, Disp: 90 tablet, Rfl: 3    multivitamin (MULTIVITAMIN PO), Take  by mouth Daily. HOLD FOR SURGERY, Disp: , Rfl:     olmesartan-hydrochlorothiazide (BENICAR HCT) 40-25 MG per tablet, Take 1 tablet by mouth once daily, Disp: 90 tablet, Rfl: 1    oxyCODONE (Roxicodone) 5 MG immediate release tablet, Take 1 tablet by mouth Every 6 (Six) Hours As Needed for Moderate Pain., Disp: 10 tablet, Rfl: 0    rosuvastatin (CRESTOR) 5 MG tablet, Take 1 tablet by mouth once daily, Disp: 90 tablet, Rfl: 2    sildenafil (REVATIO) 20 MG tablet, Take one to five tablets one hour prior to event, Disp: 60 tablet, Rfl: 5    warfarin (COUMADIN) 5 MG tablet, Take 1 tablet by mouth once daily, Disp: 45 tablet, Rfl: 1    warfarin (COUMADIN) 7.5 MG tablet, TAKE 1 TABLET BY MOUTH ONCE DAILY AT NIGHT, Disp: 90 tablet, Rfl: 0   Assessment:                Plan:          ICD-10-CM ICD-9-CM   1. On amiodarone therapy  Z79.899 V58.69   2. Abdominal aortic aneurysm (AAA) without rupture, unspecified part  I71.40 441.4   3. Paroxysmal atrial fibrillation  I48.0 427.31   4. Long term (current) use of anticoagulants  Z79.01 V58.61     1. Abdominal aortic aneurysm (AAA) without rupture, unspecified part    - CT Chest With Contrast; Future    2. On amiodarone therapy  David Comer Sr. IS ON AMIODARONE,   Significant side effects associated with this medication has been explained     Pros and cons of the medications has been discussed, decision has been to continue the medication   6 months to yearly checkup for eye examination, thyroid function test, chest x-ray and liver function test has been advised    Patient understands well    - XR Chest 2 View; Future  - TSH; Future  - Comprehensive Metabolic Panel; Future    3. Paroxysmal atrial fibrillation  Under control    4. Long term (current) use of anticoagulants  Continue current treatment      6 months with ct of chest, amio check  COUNSELING:    David KHAN Age Sr.Counseling was given to patient for the  following topics: diagnostic results, risk factor reductions, impressions, risks and benefits of treatment options and importance of treatment compliance .       SMOKING COUNSELING:        Dictated using Dragon dictation

## 2024-12-19 DIAGNOSIS — I10 ESSENTIAL HYPERTENSION: ICD-10-CM

## 2024-12-19 RX ORDER — OLMESARTAN MEDOXOMIL AND HYDROCHLOROTHIAZIDE 40/25 40; 25 MG/1; MG/1
1 TABLET ORAL DAILY
Qty: 90 TABLET | Refills: 0 | Status: SHIPPED | OUTPATIENT
Start: 2024-12-19

## 2024-12-19 NOTE — TELEPHONE ENCOUNTER
PATIENT CALLED FOR MEDICATION REFILL OF   olmesartan-hydrochlorothiazide (BENICAR HCT) 40-25 MG per tablet   HE HAS ONE TABLET LEFT    HE HAS AN APPOINTMENT ON 1/6/25 REQUESTING A REFILL TO HOLD HIM OVER UNTIL APPOINTMENT     [facility] [address] [place] [address] - [phone]  - [phone]  [phone]     CALL BACK NUMBER [phone]

## 2024-12-30 RX ORDER — WARFARIN SODIUM 5 MG/1
5 TABLET ORAL DAILY
Qty: 45 TABLET | Refills: 0 | Status: SHIPPED | OUTPATIENT
Start: 2024-12-30

## 2025-01-06 ENCOUNTER — TELEMEDICINE (OUTPATIENT)
Dept: FAMILY MEDICINE CLINIC | Facility: CLINIC | Age: 85
End: 2025-01-06
Payer: MEDICARE

## 2025-01-06 DIAGNOSIS — I48.91 ATRIAL FIBRILLATION, RAPID: ICD-10-CM

## 2025-01-06 DIAGNOSIS — E55.9 VITAMIN D DEFICIENCY: ICD-10-CM

## 2025-01-06 DIAGNOSIS — E03.9 ACQUIRED HYPOTHYROIDISM: ICD-10-CM

## 2025-01-06 DIAGNOSIS — E78.2 MIXED HYPERLIPIDEMIA: ICD-10-CM

## 2025-01-06 DIAGNOSIS — N28.9 RENAL INSUFFICIENCY: ICD-10-CM

## 2025-01-06 DIAGNOSIS — D50.8 IRON DEFICIENCY ANEMIA SECONDARY TO INADEQUATE DIETARY IRON INTAKE: ICD-10-CM

## 2025-01-06 DIAGNOSIS — I10 ESSENTIAL HYPERTENSION: Primary | ICD-10-CM

## 2025-01-06 PROCEDURE — 1159F MED LIST DOCD IN RCRD: CPT | Performed by: FAMILY MEDICINE

## 2025-01-06 PROCEDURE — 99214 OFFICE O/P EST MOD 30 MIN: CPT | Performed by: FAMILY MEDICINE

## 2025-01-06 PROCEDURE — G2211 COMPLEX E/M VISIT ADD ON: HCPCS | Performed by: FAMILY MEDICINE

## 2025-01-06 PROCEDURE — 1160F RVW MEDS BY RX/DR IN RCRD: CPT | Performed by: FAMILY MEDICINE

## 2025-01-06 PROCEDURE — 1126F AMNT PAIN NOTED NONE PRSNT: CPT | Performed by: FAMILY MEDICINE

## 2025-01-06 NOTE — PROGRESS NOTES
CHIEF COMPLAINT:HTN    Subjective   David L Age Sr. is a 84 y.o. male.     History of Present Illness   Patient presents during the Covid-19 pandemic/federally declared state of public health emergency.  This service was conducted via video visit  PT is at home and I am at my home due to weather.    Pt presents today for refills, labs and  HTN- no CP or HA  HLD - on med and stable and trying to stay active.    Hypothyroidism- stable with med ;  needs labs.    Vit D deficiency- stable.    A fib stable and sees cardio  Anemia- on med?  RI- stable.      The following portions of the patient's history were reviewed and updated as appropriate: allergies, current medications, past family history, past medical history, past social history, past surgical history and problem list.    Review of Systems    Patient Active Problem List   Diagnosis    Chronic coronary artery disease    Abdominal aortic aneurysm    Paroxysmal atrial fibrillation    Gastroesophageal reflux disease    Essential hypertension    Mixed hyperlipidemia    Hypogonadism in male    Acquired hypothyroidism    Osteoarthritis of spine    Vitamin D deficiency    Long term (current) use of anticoagulants    Atrial fibrillation, rapid    Statin intolerance    Gross hematuria    Coumadin toxicity    H/O amiodarone therapy    Dehydration    Renal insufficiency    Thoracic aortic aneurysm without rupture    Refused influenza vaccine    Medicare annual wellness visit, subsequent    BPH without obstruction/lower urinary tract symptoms    Thoracic aortic aneurysm    Kidney stone    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    Arthritis    SOB (shortness of breath)    Fatigue    Abnormal nuclear stress test    Acute left-sided low back pain without sciatica    Lt groin pain    Stool incontinence    Hospital discharge follow-up    Chest pain, unspecified type    Generalized weakness    Iron deficiency anemia secondary to inadequate dietary iron intake     Epigastric pain    Chest pain    Acute cholecystitis    On amiodarone therapy       No Known Allergies      Current Outpatient Medications:     acetaminophen (TYLENOL) 325 MG tablet, Take 2 tablets by mouth Every 4 (Four) Hours As Needed for Mild Pain ., Disp:  , Rfl:     amiodarone (PACERONE) 200 MG tablet, Take 1/2 (one-half) tablet by mouth once daily, Disp: 45 tablet, Rfl: 2    aspirin 81 MG EC tablet, Take 1 tablet by mouth Daily. HOLD FOR SURGERY PER MD INSTRUCTIONS, Disp: , Rfl:     doxazosin (CARDURA) 2 MG tablet, TAKE 1 TABLET BY MOUTH ONCE DAILY AT NIGHT, Disp: 90 tablet, Rfl: 0    esomeprazole (nexIUM) 20 MG capsule, Take 1 capsule by mouth Every Morning Before Breakfast., Disp: , Rfl:     ferrous sulfate 325 (65 FE) MG tablet, Take 1 tablet by mouth Daily With Breakfast., Disp: , Rfl:     hydrALAZINE (APRESOLINE) 25 MG tablet, TAKE 1 TABLET BY MOUTH THREE TIMES DAILY, Disp: 270 tablet, Rfl: 1    HYDROcodone-acetaminophen (NORCO) 5-325 MG per tablet, Take 1 tablet by mouth Every 8 (Eight) Hours As Needed for Moderate Pain., Disp: 12 tablet, Rfl: 0    isosorbide mononitrate (IMDUR) 30 MG 24 hr tablet, Take 1 tablet by mouth once daily, Disp: 90 tablet, Rfl: 2    levothyroxine (SYNTHROID, LEVOTHROID) 112 MCG tablet, Take 1 tablet by mouth once daily, Disp: 90 tablet, Rfl: 3    metoprolol succinate XL (TOPROL-XL) 50 MG 24 hr tablet, Take 1 tablet by mouth once daily, Disp: 90 tablet, Rfl: 3    multivitamin (MULTIVITAMIN PO), Take  by mouth Daily. HOLD FOR SURGERY, Disp: , Rfl:     olmesartan-hydrochlorothiazide (BENICAR HCT) 40-25 MG per tablet, Take 1 tablet by mouth Daily., Disp: 90 tablet, Rfl: 0    rosuvastatin (CRESTOR) 5 MG tablet, Take 1 tablet by mouth once daily, Disp: 90 tablet, Rfl: 2    sildenafil (REVATIO) 20 MG tablet, Take one to five tablets one hour prior to event, Disp: 60 tablet, Rfl: 5    warfarin (COUMADIN) 5 MG tablet, Take 1 tablet by mouth once daily, Disp: 45 tablet, Rfl: 0     warfarin (COUMADIN) 7.5 MG tablet, TAKE 1 TABLET BY MOUTH ONCE DAILY AT NIGHT, Disp: 90 tablet, Rfl: 0    Past Medical History:   Diagnosis Date    AAA (abdominal aortic aneurysm) without rupture     Aneurysm of thoracic aorta     CT CHEST 8/2021 [facility]     Anticoagulated on warfarin     Arthritis     Basal cell carcinoma     RIGHT LOWER LID     BPH (benign prostatic hyperplasia)     Chronic lumbar pain     Degenerative arthritis of lumbar spine     Degenerative cervical disc     Disease of thyroid gland     Elevated rheumatoid factor     History of atrial fibrillation     Hyperlipidemia     Hypertension     Hypogonadism in male     Muscle strain of left upper back     PRESCRIBED PREDNISONE DOSE PACK     On anticoagulant therapy     Pulmonary nodule     6 MM - REPEAT CT SCAN IN ONE YEAR, WATCHING     Refused influenza vaccine     Scoliosis     Skin cancer     RIGHT HAND-SCHEDULED FOR EXCISION 12/4/23-STATES SURGEON AWARE.    Vitamin D deficiency        Past Surgical History:   Procedure Laterality Date    ANKLE FUSION Right     CARDIAC CATHETERIZATION      CARDIAC CATHETERIZATION N/A 7/2/2021    Procedure: Left Heart Cath;  Surgeon: Harlan Downing MD;  Location:  [facility];  Service: Cardiology;  Laterality: N/A;    CARDIAC CATHETERIZATION N/A 7/2/2021    Procedure: Coronary angiography;  Surgeon: Harlan Downing MD;  Location:  [facility];  Service: Cardiology;  Laterality: N/A;    CARDIAC CATHETERIZATION N/A 7/2/2021    Procedure: Left ventriculography;  Surgeon: Harlan Downing MD;  Location:  [facility];  Service: Cardiology;  Laterality: N/A;    CARDIAC CATHETERIZATION N/A 12/30/2022    Procedure: Left Heart Cath check inr;  Surgeon: Harlan Downing MD;  Location:  [facility];  Service: Cardiology;  Laterality: N/A;    CARDIAC CATHETERIZATION N/A 12/30/2022    Procedure: Coronary angiography;  Surgeon:  Harlan Downing MD;  Location: [facility] CATH INVASIVE LOCATION;  Service: Cardiology;  Laterality: N/A;    CARDIAC CATHETERIZATION N/A 12/30/2022    Procedure: Left ventriculography;  Surgeon: Harlan Downing MD;  Location: [facility] CATH INVASIVE LOCATION;  Service: Cardiology;  Laterality: N/A;    CHOLECYSTECTOMY WITH INTRAOPERATIVE CHOLANGIOGRAM N/A 12/5/2023    Procedure: CHOLECYSTECTOMY LAPAROSCOPIC WITH DAVINCI ROBOT;  Surgeon: Randall Quintanilla MD;  Location: [facility] MAIN OR;  Service: Robotics - DaVinci;  Laterality: N/A;    HOBBS TUBE INSERTION Right 10/19/2021    Procedure: RIGHT SECOND STAGE CAST TAKEDOWN RECONSTRUCTION;  Surgeon: Brian Bhatt MD;  [facility] [facility] [place] [facility];  Service: Ophthalmology;  Laterality: Right;    ENTROPIAN REPAIR Right 9/21/2021    Procedure: RIGHT LOWER LID EXCISION OF BASAL CELL CARCINOMA WITH FROZEN SECTION RIGHT LOWER LID RECONSTRUCTION WITH CAST FLAP, REPAIR OF CANULICULIS, AND FULL THICKNESS SKIN GRAFT;  Surgeon: Brian Bhatt MD;  Location[place] [facility] [place] [facility];  Service: Ophthalmology;  Laterality: Right;    INGUINAL HERNIA REPAIR Right     REPLACEMENT TOTAL KNEE Bilateral 2000    ROTATOR CUFF REPAIR Left     ROTATOR CUFF REPAIR Right     SKIN BIOPSY      VASECTOMY         Family History   Problem Relation Age of Onset    Hypertension Father     Heart attack Father     Heart attack Brother     Alcohol abuse Brother     Hypertension Brother     Hypertension Paternal Grandmother     Hypertension Paternal Grandfather     Anemia Mother     Arthritis Mother     Hypertension Mother     Hypertension Maternal Grandmother     Heart disease Other         FH in males before age 55    Malig Hyperthermia Neg Hx        Social History     Tobacco Use    Smoking status: Never    Smokeless tobacco: Never   Substance Use Topics    Alcohol use: No            Objective     There were no vitals taken for this visit. Video Visit    Physical Exam  NAD   AAOx3  No labored breathing.    Lab Results   Component Value Date    GLUCOSE 97 04/25/2024    BUN 18 04/25/2024    CREATININE 1.09 04/25/2024    EGFRIFNONA 76 10/13/2021    EGFRIFAFRI 76 09/24/2019    BCR 16.5 04/25/2024    K 5.1 04/25/2024    CO2 27.0 04/25/2024    CALCIUM 10.1 04/25/2024    PROTENTOTREF 6.6 04/25/2024    ALBUMIN 4.3 04/25/2024    LABIL2 1.9 04/25/2024    AST 19 04/25/2024    ALT 19 04/25/2024       WBC   Date Value Ref Range Status   11/27/2023 6.60 3.40 - 10.80 10*3/mm3 Final     RBC   Date Value Ref Range Status   11/27/2023 3.82 (L) 4.14 - 5.80 10*6/mm3 Final     Hemoglobin   Date Value Ref Range Status   11/27/2023 12.2 (L) 13.0 - 17.7 g/dL Final     Hematocrit   Date Value Ref Range Status   11/27/2023 37.1 (L) 37.5 - 51.0 % Final     MCV   Date Value Ref Range Status   11/27/2023 97.1 (H) 79.0 - 97.0 fL Final     MCH   Date Value Ref Range Status   11/27/2023 31.9 26.6 - 33.0 pg Final     MCHC   Date Value Ref Range Status   11/27/2023 32.9 31.5 - 35.7 g/dL Final     RDW   Date Value Ref Range Status   11/27/2023 12.4 12.3 - 15.4 % Final     RDW-SD   Date Value Ref Range Status   11/27/2023 44.0 37.0 - 54.0 fl Final     MPV   Date Value Ref Range Status   11/27/2023 11.5 6.0 - 12.0 fL Final     Platelets   Date Value Ref Range Status   11/27/2023 156 140 - 450 10*3/mm3 Final     Neutrophil %   Date Value Ref Range Status   11/27/2023 68.0 42.7 - 76.0 % Final     Lymphocyte %   Date Value Ref Range Status   11/27/2023 20.9 19.6 - 45.3 % Final     Monocyte %   Date Value Ref Range Status   11/27/2023 7.9 5.0 - 12.0 % Final     Eosinophil %   Date Value Ref Range Status   11/27/2023 2.3 0.3 - 6.2 % Final     Basophil %   Date Value Ref Range Status   11/27/2023 0.6 0.0 - 1.5 % Final     Immature Grans %   Date Value Ref Range Status   11/27/2023 0.3 0.0 - 0.5 % Final     Neutrophils Absolute   Date Value Ref Range Status   05/05/2022 4.20 1.70 - 6.00 x10(3)/ul Final     Neutrophils, Absolute  "  Date Value Ref Range Status   11/27/2023 4.49 1.70 - 7.00 10*3/mm3 Final     Lymphocytes, Absolute   Date Value Ref Range Status   11/27/2023 1.38 0.70 - 3.10 10*3/mm3 Final     Monocytes, Absolute   Date Value Ref Range Status   11/27/2023 0.52 0.10 - 0.90 10*3/mm3 Final     Eosinophils Absolute   Date Value Ref Range Status   05/05/2022 0.2 0.0 - 0.6 x10(3)/ul Final     Eosinophils, Absolute   Date Value Ref Range Status   11/27/2023 0.15 0.00 - 0.40 10*3/mm3 Final     Basophils Absolute   Date Value Ref Range Status   05/05/2022 0.1 0.0 - 0.3 x10(3)/ul Final     Basophils, Absolute   Date Value Ref Range Status   11/27/2023 0.04 0.00 - 0.20 10*3/mm3 Final     Immature Grans, Absolute   Date Value Ref Range Status   11/27/2023 0.02 0.00 - 0.05 10*3/mm3 Final     nRBC   Date Value Ref Range Status   11/27/2023 0.0 0.0 - 0.2 /100 WBC Final       No results found for: \"HGBA1C\"    No results found for: \"KZGBRUVF22\"    TSH   Date Value Ref Range Status   04/25/2024 2.310 0.270 - 4.200 uIU/mL Final   01/28/2023 2.690 0.270 - 4.200 uIU/mL Final       Lab Results   Component Value Date    CHOL 157 12/29/2022     Lab Results   Component Value Date    TRIG 108 04/25/2024     Lab Results   Component Value Date    HDL 52 04/25/2024     Lab Results   Component Value Date    LDL 68 04/25/2024     Lab Results   Component Value Date    VLDL 20 04/25/2024     Lab Results   Component Value Date    LDLHDL 1.48 12/29/2022         Procedures    Assessment & Plan   Problems Addressed this Visit       Atrial fibrillation, rapid    Vitamin D deficiency    Relevant Orders    Vitamin D,25-Hydroxy    Renal insufficiency    Relevant Orders    Comprehensive Metabolic Panel    Mixed hyperlipidemia    Relevant Orders    Lipid Panel    Iron deficiency anemia secondary to inadequate dietary iron intake    Relevant Orders    CBC & Differential    Essential hypertension - Primary    Relevant Orders    Comprehensive Metabolic Panel    Acquired " hypothyroidism    Relevant Orders    TSH     Diagnoses         Codes Comments    Essential hypertension    -  Primary ICD-10-CM: I10  ICD-9-CM: 401.9     Mixed hyperlipidemia     ICD-10-CM: E78.2  ICD-9-CM: 272.2     Atrial fibrillation, rapid     ICD-10-CM: I48.91  ICD-9-CM: 427.31     Acquired hypothyroidism     ICD-10-CM: E03.9  ICD-9-CM: 244.9     Vitamin D deficiency     ICD-10-CM: E55.9  ICD-9-CM: 268.9     Iron deficiency anemia secondary to inadequate dietary iron intake     ICD-10-CM: D50.8  ICD-9-CM: 280.1     Renal insufficiency     ICD-10-CM: N28.9  ICD-9-CM: 593.9             Orders Placed This Encounter   Procedures    Vitamin D,25-Hydroxy     Standing Status:   Future     Standing Expiration Date:   1/6/2026     Order Specific Question:   Release to patient     Answer:   Routine Release [[id number]]    Comprehensive Metabolic Panel     Standing Status:   Future     Standing Expiration Date:   1/6/2026     Order Specific Question:   Release to patient     Answer:   Routine Release [[id number]]    Lipid Panel     Standing Status:   Future     Standing Expiration Date:   1/6/2026     Order Specific Question:   Release to patient     Answer:   Routine Release [[id number]]    TSH     Standing Status:   Future     Standing Expiration Date:   1/6/2026     Order Specific Question:   Release to patient     Answer:   Routine Release [[id number]]    CBC & Differential     Standing Status:   Future     Standing Expiration Date:   1/6/2026     Order Specific Question:   Manual Differential     Answer:   No     Order Specific Question:   Release to patient     Answer:   Routine Release [[id number]]       Current Outpatient Medications   Medication Sig Dispense Refill    acetaminophen (TYLENOL) 325 MG tablet Take 2 tablets by mouth Every 4 (Four) Hours As Needed for Mild Pain .      amiodarone (PACERONE) 200 MG tablet Take 1/2 (one-half) tablet by mouth once daily 45 tablet 2    aspirin 81 MG EC tablet Take 1 tablet  by mouth Daily. HOLD FOR SURGERY PER MD INSTRUCTIONS      doxazosin (CARDURA) 2 MG tablet TAKE 1 TABLET BY MOUTH ONCE DAILY AT NIGHT 90 tablet 0    esomeprazole (nexIUM) 20 MG capsule Take 1 capsule by mouth Every Morning Before Breakfast.      ferrous sulfate 325 (65 FE) MG tablet Take 1 tablet by mouth Daily With Breakfast.      hydrALAZINE (APRESOLINE) 25 MG tablet TAKE 1 TABLET BY MOUTH THREE TIMES DAILY 270 tablet 1    HYDROcodone-acetaminophen (NORCO) 5-325 MG per tablet Take 1 tablet by mouth Every 8 (Eight) Hours As Needed for Moderate Pain. 12 tablet 0    isosorbide mononitrate (IMDUR) 30 MG 24 hr tablet Take 1 tablet by mouth once daily 90 tablet 2    levothyroxine (SYNTHROID, LEVOTHROID) 112 MCG tablet Take 1 tablet by mouth once daily 90 tablet 3    metoprolol succinate XL (TOPROL-XL) 50 MG 24 hr tablet Take 1 tablet by mouth once daily 90 tablet 3    multivitamin (MULTIVITAMIN PO) Take  by mouth Daily. HOLD FOR SURGERY      olmesartan-hydrochlorothiazide (BENICAR HCT) 40-25 MG per tablet Take 1 tablet by mouth Daily. 90 tablet 0    rosuvastatin (CRESTOR) 5 MG tablet Take 1 tablet by mouth once daily 90 tablet 2    sildenafil (REVATIO) 20 MG tablet Take one to five tablets one hour prior to event 60 tablet 5    warfarin (COUMADIN) 5 MG tablet Take 1 tablet by mouth once daily 45 tablet 0    warfarin (COUMADIN) 7.5 MG tablet TAKE 1 TABLET BY MOUTH ONCE DAILY AT NIGHT 90 tablet 0     No current facility-administered medications for this visit.       No follow-ups on file.    There are no Patient Instructions on file for this visit.           Time spent on visit:  15   minutes.  This patient has consented to a telehealth visit via video. The visit was scheduled as a video visit to comply with patient safety concerns in accordance with CDC recommendations.  All vitals recorded within this visit are reported by the patient.    Labs-rto for this and refills. Work on diet and exercise.

## 2025-02-03 RX ORDER — DOXAZOSIN 2 MG/1
TABLET ORAL
Qty: 90 TABLET | Refills: 0 | Status: SHIPPED | OUTPATIENT
Start: 2025-02-03

## 2025-02-03 NOTE — TELEPHONE ENCOUNTER
LOV                  1/6/2025                    NOV                  Visit date not found  LAST REFILL     11/3/2024  PROTOCOL       Met

## 2025-02-11 DIAGNOSIS — I10 ESSENTIAL HYPERTENSION: ICD-10-CM

## 2025-02-11 RX ORDER — OLMESARTAN MEDOXOMIL AND HYDROCHLOROTHIAZIDE 40/25 40; 25 MG/1; MG/1
1 TABLET ORAL DAILY
Qty: 90 TABLET | Refills: 0 | Status: SHIPPED | OUTPATIENT
Start: 2025-02-11

## 2025-02-11 NOTE — TELEPHONE ENCOUNTER
Caller: Nahomi David BILL Raymundo    Relationship: Self    Best call back number:[phone]      Requested Prescriptions:   Requested Prescriptions     Pending Prescriptions Disp Refills    olmesartan-hydrochlorothiazide (BENICAR HCT) 40-25 MG per tablet 90 tablet 0     Sig: Take 1 tablet by mouth Daily.        Pharmacy where request should be sent: [facility] [address] [place] [address] [phone] [facility] [phone] FX     Last office visit with prescribing clinician: 4/25/2024   Last telemedicine visit with prescribing clinician: 1/6/2025   Next office visit with prescribing clinician: Visit date not found     Additional details provided by patient: OUT OF MEDICATION.  GOING OUT OF TOWN THURSDAY AT 4AM. NEEDS SOON    Does the patient have less than a 3 day supply:  [x] Yes  [] No    Would you like a call back once the refill request has been completed: [x] Yes [] No    If the office needs to give you a call back, can they leave a voicemail: [x] Yes [] No    Daniel Wen Rep   02/11/25 12:55 EST

## 2025-02-12 RX ORDER — WARFARIN SODIUM 5 MG/1
5 TABLET ORAL DAILY
Qty: 45 TABLET | Refills: 1 | Status: SHIPPED | OUTPATIENT
Start: 2025-02-12

## 2025-03-13 RX ORDER — ISOSORBIDE MONONITRATE 30 MG/1
30 TABLET, EXTENDED RELEASE ORAL DAILY
Qty: 90 TABLET | Refills: 0 | Status: SHIPPED | OUTPATIENT
Start: 2025-03-13

## 2025-03-13 RX ORDER — AMIODARONE HYDROCHLORIDE 200 MG/1
100 TABLET ORAL DAILY
Qty: 45 TABLET | Refills: 0 | Status: SHIPPED | OUTPATIENT
Start: 2025-03-13

## 2025-03-25 ENCOUNTER — TELEPHONE (OUTPATIENT)
Dept: FAMILY MEDICINE CLINIC | Facility: CLINIC | Age: 85
End: 2025-03-25
Payer: MEDICARE

## 2025-03-25 NOTE — TELEPHONE ENCOUNTER
LOV                  1/6/2025                    NOV                  Visit date not found  LAST REFILL     never been filled here   PROTOCOL

## 2025-03-25 NOTE — TELEPHONE ENCOUNTER
Caller: NahomiDavid Sr.    Relationship: Self    Best call back number: [phone]     Requested Prescriptions: NEXIUM  Requested Prescriptions      No prescriptions requested or ordered in this encounter        Pharmacy where request should be sent:           [address] [phone] [facility] [phone]  [phone]     Last office visit with prescribing clinician: 4/25/2024   Last telemedicine visit with prescribing clinician: 1/6/2025   Next office visit with prescribing clinician: Visit date not found     Additional details provided by patient: PATIENT IS REQUESTING A PRESCRIPTION FOR THE ABOVE MEDICATION.  HE STATES HE THINKS HIS INSURANCE WILL COVER.    Does the patient have less than a 3 day supply:  [x] Yes  [] No    Would you like a call back once the refill request has been completed: [x] Yes [] No    If the office needs to give you a call back, can they leave a voicemail: [x] Yes [] No    Daniel Cao Rep   03/25/25 12:01 EDT     PLEASE ADVISE.

## 2025-03-31 RX ORDER — HYDRALAZINE HYDROCHLORIDE 25 MG/1
25 TABLET, FILM COATED ORAL 3 TIMES DAILY
Qty: 270 TABLET | Refills: 1 | Status: SHIPPED | OUTPATIENT
Start: 2025-03-31

## 2025-05-01 RX ORDER — DOXAZOSIN 2 MG/1
2 TABLET ORAL NIGHTLY
Qty: 90 TABLET | Refills: 0 | Status: SHIPPED | OUTPATIENT
Start: 2025-05-01

## 2025-05-01 NOTE — TELEPHONE ENCOUNTER
LOV                  1/6/25  NOV                 no fu  Last refill          2/3/25     Protocol            met

## 2025-05-01 NOTE — TELEPHONE ENCOUNTER
LOV                  1/6/2025                   NOV                   LAST REFILL    2/3/2025   PROTOCOL       Met

## 2025-05-08 RX ORDER — LEVOTHYROXINE SODIUM 112 UG/1
112 TABLET ORAL DAILY
Qty: 90 TABLET | Refills: 1 | Status: SHIPPED | OUTPATIENT
Start: 2025-05-08

## 2025-05-08 RX ORDER — METOPROLOL SUCCINATE 50 MG/1
50 TABLET, EXTENDED RELEASE ORAL DAILY
Qty: 90 TABLET | Refills: 1 | Status: SHIPPED | OUTPATIENT
Start: 2025-05-08 | End: 2025-05-12

## 2025-05-09 ENCOUNTER — HOSPITAL ENCOUNTER (OUTPATIENT)
Dept: CT IMAGING | Facility: HOSPITAL | Age: 85
Discharge: HOME | End: 2025-05-09
Payer: MEDICARE

## 2025-05-09 ENCOUNTER — HOSPITAL ENCOUNTER (OUTPATIENT)
Dept: GENERAL RADIOLOGY | Facility: HOSPITAL | Age: 85
Discharge: HOME | End: 2025-05-09
Payer: MEDICARE

## 2025-05-09 ENCOUNTER — LAB (OUTPATIENT)
Dept: LAB | Facility: HOSPITAL | Age: 85
End: 2025-05-09
Payer: MEDICARE

## 2025-05-09 DIAGNOSIS — I71.40 ABDOMINAL AORTIC ANEURYSM (AAA) WITHOUT RUPTURE, UNSPECIFIED PART: ICD-10-CM

## 2025-05-09 DIAGNOSIS — Z79.899 ON AMIODARONE THERAPY: ICD-10-CM

## 2025-05-09 LAB
ALBUMIN SERPL-MCNC: 4.2 G/DL (ref 3.5–5.2)
ALBUMIN/GLOB SERPL: 1.4 G/DL
ALP SERPL-CCNC: 66 U/L (ref 39–117)
ALT SERPL W P-5'-P-CCNC: 20 U/L (ref 1–41)
ANION GAP SERPL CALCULATED.3IONS-SCNC: 11.5 MMOL/L (ref 5–15)
AST SERPL-CCNC: 25 U/L (ref 1–40)
BILIRUB SERPL-MCNC: 0.4 MG/DL (ref 0–1.2)
BUN SERPL-MCNC: 14 MG/DL (ref 8–23)
BUN/CREAT SERPL: 14.9 (ref 7–25)
CALCIUM SPEC-SCNC: 9.6 MG/DL (ref 8.6–10.5)
CHLORIDE SERPL-SCNC: 102 MMOL/L (ref 98–107)
CO2 SERPL-SCNC: 25.5 MMOL/L (ref 22–29)
CREAT SERPL-MCNC: 0.94 MG/DL (ref 0.76–1.27)
EGFRCR SERPLBLD CKD-EPI 2021: 79.9 ML/MIN/1.73
GLOBULIN UR ELPH-MCNC: 3 GM/DL
GLUCOSE SERPL-MCNC: 96 MG/DL (ref 65–99)
POTASSIUM SERPL-SCNC: 4 MMOL/L (ref 3.5–5.2)
PROT SERPL-MCNC: 7.2 G/DL (ref 6–8.5)
SODIUM SERPL-SCNC: 139 MMOL/L (ref 136–145)
TSH SERPL DL<=0.05 MIU/L-ACNC: 3.14 UIU/ML (ref 0.27–4.2)

## 2025-05-09 PROCEDURE — 84443 ASSAY THYROID STIM HORMONE: CPT

## 2025-05-09 PROCEDURE — 71046 X-RAY EXAM CHEST 2 VIEWS: CPT

## 2025-05-09 PROCEDURE — 36415 COLL VENOUS BLD VENIPUNCTURE: CPT

## 2025-05-09 PROCEDURE — 80053 COMPREHEN METABOLIC PANEL: CPT

## 2025-05-09 PROCEDURE — 71260 CT THORAX DX C+: CPT

## 2025-05-09 PROCEDURE — 25510000001 IOPAMIDOL 61 % SOLUTION: Performed by: INTERNAL MEDICINE

## 2025-05-09 RX ORDER — IOPAMIDOL 612 MG/ML
100 INJECTION, SOLUTION INTRAVASCULAR
Status: COMPLETED | OUTPATIENT
Start: 2025-05-09 | End: 2025-05-09

## 2025-05-09 RX ADMIN — IOPAMIDOL 75 ML: 612 INJECTION, SOLUTION INTRAVENOUS at 17:15

## 2025-05-12 ENCOUNTER — OFFICE VISIT (OUTPATIENT)
Dept: CARDIOLOGY | Facility: CLINIC | Age: 85
End: 2025-05-12
Payer: MEDICARE

## 2025-05-12 ENCOUNTER — OFFICE VISIT (OUTPATIENT)
Dept: FAMILY MEDICINE CLINIC | Facility: CLINIC | Age: 85
End: 2025-05-12
Payer: MEDICARE

## 2025-05-12 ENCOUNTER — ANTICOAGULATION VISIT (OUTPATIENT)
Dept: CARDIOLOGY | Facility: CLINIC | Age: 85
End: 2025-05-12

## 2025-05-12 ENCOUNTER — HOSPITAL ENCOUNTER (OUTPATIENT)
Dept: CARDIOLOGY | Facility: HOSPITAL | Age: 85
Discharge: HOME | End: 2025-05-12
Payer: MEDICARE

## 2025-05-12 VITALS
RESPIRATION RATE: 14 BRPM | SYSTOLIC BLOOD PRESSURE: 117 MMHG | DIASTOLIC BLOOD PRESSURE: 78 MMHG | OXYGEN SATURATION: 97 % | WEIGHT: 210.4 LBS | HEART RATE: 56 BPM | TEMPERATURE: 97.9 F | BODY MASS INDEX: 29.46 KG/M2 | HEIGHT: 71 IN

## 2025-05-12 VITALS
BODY MASS INDEX: 29.54 KG/M2 | WEIGHT: 211 LBS | HEART RATE: 57 BPM | HEIGHT: 71 IN | DIASTOLIC BLOOD PRESSURE: 83 MMHG | SYSTOLIC BLOOD PRESSURE: 119 MMHG

## 2025-05-12 DIAGNOSIS — E55.9 VITAMIN D DEFICIENCY: ICD-10-CM

## 2025-05-12 DIAGNOSIS — I48.0 PAROXYSMAL ATRIAL FIBRILLATION: ICD-10-CM

## 2025-05-12 DIAGNOSIS — I48.91 ATRIAL FIBRILLATION, RAPID: Primary | ICD-10-CM

## 2025-05-12 DIAGNOSIS — Z79.01 LONG TERM (CURRENT) USE OF ANTICOAGULANTS: ICD-10-CM

## 2025-05-12 DIAGNOSIS — Z79.899 ON AMIODARONE THERAPY: Primary | ICD-10-CM

## 2025-05-12 DIAGNOSIS — E03.9 ACQUIRED HYPOTHYROIDISM: ICD-10-CM

## 2025-05-12 DIAGNOSIS — I10 ESSENTIAL HYPERTENSION: ICD-10-CM

## 2025-05-12 DIAGNOSIS — E78.2 MIXED HYPERLIPIDEMIA: ICD-10-CM

## 2025-05-12 LAB — INR PPP: 3.7 (ref 0.9–1.1)

## 2025-05-12 PROCEDURE — G2211 COMPLEX E/M VISIT ADD ON: HCPCS | Performed by: FAMILY MEDICINE

## 2025-05-12 PROCEDURE — 3078F DIAST BP <80 MM HG: CPT | Performed by: FAMILY MEDICINE

## 2025-05-12 PROCEDURE — 1126F AMNT PAIN NOTED NONE PRSNT: CPT | Performed by: FAMILY MEDICINE

## 2025-05-12 PROCEDURE — 99214 OFFICE O/P EST MOD 30 MIN: CPT | Performed by: FAMILY MEDICINE

## 2025-05-12 PROCEDURE — 3074F SYST BP LT 130 MM HG: CPT | Performed by: FAMILY MEDICINE

## 2025-05-12 PROCEDURE — 1159F MED LIST DOCD IN RCRD: CPT | Performed by: FAMILY MEDICINE

## 2025-05-12 PROCEDURE — 1160F RVW MEDS BY RX/DR IN RCRD: CPT | Performed by: FAMILY MEDICINE

## 2025-05-12 RX ORDER — NEBIVOLOL 5 MG/1
5 TABLET ORAL DAILY
COMMUNITY

## 2025-05-12 NOTE — PROGRESS NOTES
6 MO FOLLOW UP    Subjective:        David Comer Sr. is a 85 y.o. male who here for follow up    CC  Follow-up atrial fibrillation anticoagulation amiodarone therapy  HPI  85-year-old male with paroxysmal atrial fibrillation amiodarone therapy anticoagulation here for the follow-up denies any chest pains or tightness in the chest     Problems Addressed this Visit          Cardiac and Vasculature    Paroxysmal atrial fibrillation    Relevant Medications    nebivolol (BYSTOLIC) 5 MG tablet    On amiodarone therapy - Primary    Relevant Orders    ECG 12 Lead    XR Chest 2 View    TSH    Comprehensive Metabolic Panel       Coag and Thromboembolic    Long term (current) use of anticoagulants     Diagnoses         Codes Comments      On amiodarone therapy    -  Primary ICD-10-CM: Z79.899  ICD-9-CM: V58.69       Paroxysmal atrial fibrillation     ICD-10-CM: I48.0  ICD-9-CM: 427.31       Long term (current) use of anticoagulants     ICD-10-CM: Z79.01  ICD-9-CM: V58.61           .    The following portions of the patient's history were reviewed and updated as appropriate: allergies, current medications, past family history, past medical history, past social history, past surgical history and problem list.    Past Medical History:   Diagnosis Date    AAA (abdominal aortic aneurysm) without rupture     Aneurysm of thoracic aorta     CT CHEST 8/2021 [facility]     Anticoagulated on warfarin     Arthritis     Basal cell carcinoma     RIGHT LOWER LID     BPH (benign prostatic hyperplasia)     Chronic lumbar pain     Degenerative arthritis of lumbar spine     Degenerative cervical disc     Disease of thyroid gland     Elevated rheumatoid factor     History of atrial fibrillation     Hyperlipidemia     Hypertension     Hypogonadism in male     Muscle strain of left upper back     PRESCRIBED PREDNISONE DOSE PACK     On anticoagulant therapy     Pulmonary nodule     6 MM - REPEAT CT SCAN IN ONE YEAR, WATCHING     Refused  "influenza vaccine     Scoliosis     Skin cancer     RIGHT HAND-SCHEDULED FOR EXCISION 12/4/23-STATES SURGEON AWARE.    Vitamin D deficiency      reports that he has never smoked. He has never used smokeless tobacco. He reports that he does not drink alcohol and does not use drugs.   Family History   Problem Relation Age of Onset    Hypertension Father     Heart attack Father     Heart attack Brother     Alcohol abuse Brother     Hypertension Brother     Hypertension Paternal Grandmother     Hypertension Paternal Grandfather     Anemia Mother     Arthritis Mother     Hypertension Mother     Hypertension Maternal Grandmother     Heart disease Other         FH in males before age 55    Malig Hyperthermia Neg Hx        Review of Systems  Constitutional: No wt loss, fever, fatigue  Gastrointestinal: No nausea, abdominal pain  Behavioral/Psych: No insomnia or anxiety   Cardiovascular no chest pains or tightness in the chest  Objective:       Physical Exam  /83   Pulse 57   Ht 180.3 cm (71\")   Wt 95.7 kg (211 lb)   BMI 29.43 kg/m²   General appearance: No acute changes   Neck: Trachea midline; NECK, supple, no thyromegaly or lymphadenopathy   Lungs: Normal size and shape, normal breath sounds, equal distribution of air, no rales and rhonchi   CV: S1-S2 regular, no murmurs, no rub, no gallop   Abdomen: Soft, nontender; no masses , no abnormal abdominal sounds   Extremities: No deformity , normal color , no peripheral edema   Skin: Normal temperature, turgor and texture; no rash, ulcers            ECG 12 Lead    Date/Time: 5/12/2025 11:31 AM  Performed by: Harlan Downing MD    Authorized by: Harlan Downing MD  Comparison: compared with previous ECG   Similar to previous ECG  Rhythm: sinus rhythm    Clinical impression: non-specific ECG            Echocardiogram:    Results for orders placed during the hospital encounter of 10/02/23    Adult Transthoracic Echo Complete W/ Cont if Necessary Per " Protocol    Interpretation Summary    Left ventricular ejection fraction appears to be 51 - 55%.    Mild aortic valve stenosis is present. Aortic valve area is 2 cm2.    Peak velocity of the flow distal to the aortic valve is 234.8 cm/s. Aortic valve maximum pressure gradient is 22 mmHg. Aortic valve mean pressure gradient is 11 mmHg. Aortic valve dimensionless index is 0.7 .    Estimated right ventricular systolic pressure from tricuspid regurgitation is mildly elevated (35-45 mmHg).          Current Outpatient Medications:     acetaminophen (TYLENOL) 325 MG tablet, Take 2 tablets by mouth Every 4 (Four) Hours As Needed for Mild Pain ., Disp:  , Rfl:     amiodarone (PACERONE) 200 MG tablet, Take 1/2 (one-half) tablet by mouth once daily, Disp: 45 tablet, Rfl: 0    aspirin 81 MG EC tablet, Take 1 tablet by mouth Daily. HOLD FOR SURGERY PER MD INSTRUCTIONS, Disp: , Rfl:     doxazosin (CARDURA) 2 MG tablet, TAKE 1 TABLET BY MOUTH ONCE DAILY AT NIGHT, Disp: 90 tablet, Rfl: 0    esomeprazole (nexIUM) 20 MG capsule, Take 1 capsule by mouth Every Morning Before Breakfast., Disp: 90 capsule, Rfl: 1    hydrALAZINE (APRESOLINE) 25 MG tablet, TAKE 1 TABLET BY MOUTH THREE TIMES DAILY, Disp: 270 tablet, Rfl: 1    isosorbide mononitrate (IMDUR) 30 MG 24 hr tablet, Take 1 tablet by mouth once daily, Disp: 90 tablet, Rfl: 0    levothyroxine (SYNTHROID, LEVOTHROID) 112 MCG tablet, Take 1 tablet by mouth once daily, Disp: 90 tablet, Rfl: 1    multivitamin (MULTIVITAMIN PO), Take  by mouth Daily. HOLD FOR SURGERY, Disp: , Rfl:     nebivolol (BYSTOLIC) 5 MG tablet, Take 1 tablet by mouth Daily., Disp: , Rfl:     olmesartan-hydrochlorothiazide (BENICAR HCT) 40-25 MG per tablet, Take 1 tablet by mouth Daily., Disp: 90 tablet, Rfl: 0    rosuvastatin (CRESTOR) 5 MG tablet, Take 1 tablet by mouth once daily, Disp: 90 tablet, Rfl: 2    warfarin (COUMADIN) 5 MG tablet, Take 1 tablet by mouth once daily, Disp: 45 tablet, Rfl: 1    warfarin  (COUMADIN) 7.5 MG tablet, TAKE 1 TABLET BY MOUTH ONCE DAILY AT NIGHT, Disp: 90 tablet, Rfl: 0   Assessment:                Plan:          ICD-10-CM ICD-9-CM   1. On amiodarone therapy  Z79.899 V58.69   2. Paroxysmal atrial fibrillation  I48.0 427.31   3. Long term (current) use of anticoagulants  Z79.01 V58.61     1. On amiodarone therapy  David KHAN Age Sr. IS ON AMIODARONE,   Significant side effects associated with this medication has been explained     Pros and cons of the medications has been discussed, decision has been to continue the medication   6 months to yearly checkup for eye examination, thyroid function test, chest x-ray and liver function test has been advised    Patient understands well    - ECG 12 Lead  - XR Chest 2 View; Future  - TSH; Future  - Comprehensive Metabolic Panel; Future    2. Paroxysmal atrial fibrillation  Under control    3. Long term (current) use of anticoagulants  Continue current treatment      1 YR WITH AMIO CHECK  COUNSELING:    David KHAN Age Sr.Counseling was given to patient for the following topics: diagnostic results, risk factor reductions, impressions, risks and benefits of treatment options and importance of treatment compliance .       SMOKING COUNSELING:        Dictated using Dragon dictation

## 2025-05-12 NOTE — PROGRESS NOTES
"Chief Complaint  Hypertension    Subjective        David L Age Sr. presents to [facility]  Hypertension    Pt presents today for refills, labs and  HTN- no CP or HA  HLD - on med and stable and trying to stay active.    Hypothyroidism- stable with med ;   Vit D deficiency- stable.  No med.    A fib stable and sees cardio  Anemia- not currently on med.    RI- stable.        Objective   Vital Signs:  /78 (BP Location: Right arm, Patient Position: Sitting)   Pulse 56   Temp 97.9 °F (36.6 °C)   Resp 14   Ht 180.3 cm (71\")   Wt 95.4 kg (210 lb 6.4 oz)   SpO2 97%   BMI 29.34 kg/m²   Estimated body mass index is 29.34 kg/m² as calculated from the following:    Height as of this encounter: 180.3 cm (71\").    Weight as of this encounter: 95.4 kg (210 lb 6.4 oz).          Physical Exam  Vitals and nursing note reviewed.   Constitutional:       Appearance: Normal appearance. He is well-developed.   Neck:      Thyroid: No thyroid mass or thyromegaly.      Trachea: Trachea normal. No tracheal tenderness or tracheal deviation.   Cardiovascular:      Rate and Rhythm: Normal rate and regular rhythm.      Heart sounds: Normal heart sounds. No murmur heard.  Pulmonary:      Effort: Pulmonary effort is normal. No respiratory distress.      Breath sounds: Normal breath sounds. No stridor. No wheezing or rhonchi.   Neurological:      General: No focal deficit present.      Mental Status: He is alert and oriented to person, place, and time. He is not disoriented.   Psychiatric:         Mood and Affect: Mood normal.         Behavior: Behavior normal.        Result Review :                Assessment and Plan   Diagnoses and all orders for this visit:    1. Atrial fibrillation, rapid (Primary)    2. Vitamin D deficiency    3. Acquired hypothyroidism    4. Mixed hyperlipidemia    5. Essential hypertension             Follow Up   Return for mwe next few days mychart and regular 4 mo follow " up.  Patient was given instructions and counseling regarding his condition or for health maintenance advice. Please see specific information pulled into the AVS if appropriate.       Refills-BP meds and  work on diet and exercise.

## 2025-05-13 ENCOUNTER — TELEMEDICINE (OUTPATIENT)
Dept: FAMILY MEDICINE CLINIC | Facility: CLINIC | Age: 85
End: 2025-05-13
Payer: MEDICARE

## 2025-05-13 DIAGNOSIS — Z00.00 MEDICARE ANNUAL WELLNESS VISIT, SUBSEQUENT: Primary | ICD-10-CM

## 2025-05-13 NOTE — PROGRESS NOTES
Subjective   The ABCs of the Annual Wellness Visit  Medicare Wellness Visit    Patient has consented to a MyChart visit.  Patient is at home and I am at my desk at [facility].      David Comer Sr. is a 85 y.o. patient who presents for a Medicare Wellness Visit.    The following portions of the patient's history were reviewed and   updated as appropriate: allergies, current medications, past family history, past medical history, past social history, past surgical history, and problem list.    Compared to one year ago, the patient's physical   health is the same.  Compared to one year ago, the patient's mental   health is the same.    Recent Hospitalizations:  He was not admitted to the hospital during the last year.     Current Medical Providers:  Patient Care Team:  Chacha Pineda MD as PCP - General (Family Medicine)  Harlan Downing MD as Consulting Physician (Cardiology)  Elliott Chawla MD as Consulting Physician (Otolaryngology)    Outpatient Medications Prior to Visit   Medication Sig Dispense Refill    acetaminophen (TYLENOL) 325 MG tablet Take 2 tablets by mouth Every 4 (Four) Hours As Needed for Mild Pain .      amiodarone (PACERONE) 200 MG tablet Take 1/2 (one-half) tablet by mouth once daily 45 tablet 0    aspirin 81 MG EC tablet Take 1 tablet by mouth Daily. HOLD FOR SURGERY PER MD INSTRUCTIONS      doxazosin (CARDURA) 2 MG tablet TAKE 1 TABLET BY MOUTH ONCE DAILY AT NIGHT 90 tablet 0    esomeprazole (nexIUM) 20 MG capsule Take 1 capsule by mouth Every Morning Before Breakfast. 90 capsule 1    hydrALAZINE (APRESOLINE) 25 MG tablet TAKE 1 TABLET BY MOUTH THREE TIMES DAILY 270 tablet 1    isosorbide mononitrate (IMDUR) 30 MG 24 hr tablet Take 1 tablet by mouth once daily 90 tablet 0    levothyroxine (SYNTHROID, LEVOTHROID) 112 MCG tablet Take 1 tablet by mouth once daily 90 tablet 1    multivitamin (MULTIVITAMIN PO) Take  by mouth Daily. HOLD FOR SURGERY      nebivolol (BYSTOLIC) 5 MG tablet  Take 1 tablet by mouth Daily.      olmesartan-hydrochlorothiazide (BENICAR HCT) 40-25 MG per tablet Take 1 tablet by mouth Daily. 90 tablet 0    rosuvastatin (CRESTOR) 5 MG tablet Take 1 tablet by mouth once daily 90 tablet 2    warfarin (COUMADIN) 5 MG tablet Take 1 tablet by mouth once daily 45 tablet 1    warfarin (COUMADIN) 7.5 MG tablet TAKE 1 TABLET BY MOUTH ONCE DAILY AT NIGHT 90 tablet 0     No facility-administered medications prior to visit.     No opioid medication identified on active medication list. I have reviewed chart for other potential  high risk medication/s and harmful drug interactions in the elderly.      Aspirin is on active medication list. Aspirin use is indicated based on review of current medical condition/s. Pros and cons of this therapy have been discussed today. Benefits of this medication outweigh potential harm.  Patient has been encouraged to continue taking this medication.  .      Patient Active Problem List   Diagnosis    Chronic coronary artery disease    Abdominal aortic aneurysm    Paroxysmal atrial fibrillation    Gastroesophageal reflux disease    Essential hypertension    Mixed hyperlipidemia    Hypogonadism in male    Acquired hypothyroidism    Osteoarthritis of spine    Vitamin D deficiency    Long term (current) use of anticoagulants    Atrial fibrillation, rapid    Statin intolerance    Gross hematuria    Coumadin toxicity    H/O amiodarone therapy    Dehydration    Renal insufficiency    Thoracic aortic aneurysm without rupture    Refused influenza vaccine    Medicare annual wellness visit, subsequent    BPH without obstruction/lower urinary tract symptoms    Thoracic aortic aneurysm    Kidney stone    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  ACP discussion was held with the patient during this visit. Patient has an advance directive (not in EMR), copy requested.            Objective   There were no vitals filed for this visit.    Estimated body mass index is 29.34 kg/m² as calculated from the following:    Height as of 25: 180.3 cm (71\").    Weight as of 25: 95.4 kg (210 lb 6.4 oz).    BMI is >= 25 and <30. (Overweight) The following options were offered after discussion;: weight loss educational material (shared in after visit summary)           Does the patient have evidence of cognitive impairment? No                                                                                               Health  Risk Assessment    Smoking Status:  Social History     Tobacco Use   Smoking Status Never   Smokeless Tobacco Never     Alcohol Consumption:  Social History     Substance and Sexual Activity   Alcohol Use No       Fall Risk Screen  STEADI Fall Risk Assessment was completed, and patient is at LOW risk for falls.Assessment completed on:2025    Depression Screening   The PHQ has not been completed during this encounter.     Health Habits and Functional and Cognitive Screenin/13/2025    12:00 PM   Functional & Cognitive Status   Do you have difficulty preparing food and eating? No   Do you have difficulty bathing yourself, getting dressed or grooming yourself? No   Do you have difficulty using the toilet? No   Do you have difficulty moving around from place to place? No   Do you have trouble with steps or getting out of a bed or a chair? No   Current Diet Well Balanced Diet   Dental Exam Up to date   Eye Exam Up to date   Exercise (times per week) 5 times per week   Do you need " help using the phone?  No   Are you deaf or do you have serious difficulty hearing?  No   Do you need help to go to places out of walking distance? No   Do you need help shopping? No   Do you need help preparing meals?  No   Do you need help with housework?  No   Do you need help with laundry? No   Do you need help taking your medications? No   Do you need help managing money? No   Do you ever drive or ride in a car without wearing a seat belt? No   Have you felt unusual stress, anger or loneliness in the last month? No   Who do you live with? Child   If you need help, do you have trouble finding someone available to you? No   Do you have difficulty concentrating, remembering or making decisions? No           Age-appropriate Screening Schedule:  Refer to the list below for future screening recommendations based on patient's age, sex and/or medical conditions. Orders for these recommended tests are listed in the plan section. The patient has been provided with a written plan.    Health Maintenance List  Health Maintenance   Topic Date Due    TDAP/TD VACCINES (1 - Tdap) Never done    COLORECTAL CANCER SCREENING  Never done    ZOSTER VACCINE (1 of 2) Never done    Pneumococcal Vaccine 50+ (2 of 2 - PCV) 09/04/2014    RSV Vaccine - Adults (1 - 1-dose 75+ series) Never done    COVID-19 Vaccine (4 - 2024-25 season) 09/01/2024    LIPID PANEL  02/10/2026    ANNUAL WELLNESS VISIT  05/13/2026    INFLUENZA VACCINE  Discontinued                                                                                                                                                CMS Preventative Services Quick Reference  Risk Factors Identified During Encounter  None Identified    The above risks/problems have been discussed with the patient.  Pertinent information has been shared with the patient in the After Visit Summary.  An After Visit Summary and PPPS were made available to the patient.    Follow Up:   Next Medicare Wellness visit  to be scheduled in 1 year.     Assessment & Plan  Medicare annual wellness visit, subsequent              Follow Up:   No follow-ups on file.    15 min   Preventive Counseling for MWE:  Work on diet and exercise .    Consider prevnar 20, RSV, shingrix, and covid vaccine.

## 2025-05-19 RX ORDER — WARFARIN SODIUM 7.5 MG/1
7.5 TABLET ORAL NIGHTLY
Qty: 90 TABLET | Refills: 0 | Status: SHIPPED | OUTPATIENT
Start: 2025-05-19

## 2025-05-19 RX ORDER — WARFARIN SODIUM 7.5 MG/1
7.5 TABLET ORAL NIGHTLY
Qty: 90 TABLET | Refills: 0 | OUTPATIENT
Start: 2025-05-19

## 2025-06-04 DIAGNOSIS — I10 ESSENTIAL HYPERTENSION: ICD-10-CM

## 2025-06-04 NOTE — TELEPHONE ENCOUNTER
Caller: David Comer Sr.    Relationship: Self    Requested Prescriptions:   Requested Prescriptions     Pending Prescriptions Disp Refills    olmesartan-hydrochlorothiazide (BENICAR HCT) 40-25 MG per tablet 90 tablet 0     Sig: Take 1 tablet by mouth Daily.      Pharmacy where request should be sent: [facility] [address] [place] [address] [phone] [facility] [phone]      Last office visit with prescribing clinician: 5/12/2025   Last telemedicine visit with prescribing clinician: 5/13/2025   Next office visit with prescribing clinician: 9/8/2025     Additional details provided by patient: PATIENT IS WANTING A 90 DAY QUANTITY    Daniel Almanzar Rep   06/04/25 15:07 EDT

## 2025-06-05 RX ORDER — OLMESARTAN MEDOXOMIL AND HYDROCHLOROTHIAZIDE 40/25 40; 25 MG/1; MG/1
1 TABLET ORAL DAILY
Qty: 90 TABLET | Refills: 0 | Status: SHIPPED | OUTPATIENT
Start: 2025-06-05

## 2025-06-05 NOTE — TELEPHONE ENCOUNTER
LOV                  5/12/2025                    NOV                  9/8/2025  LAST REFILL     2/11/2025  PROTOCOL       Met

## 2025-06-19 RX ORDER — ISOSORBIDE MONONITRATE 30 MG/1
30 TABLET, EXTENDED RELEASE ORAL DAILY
Qty: 90 TABLET | Refills: 4 | Status: SHIPPED | OUTPATIENT
Start: 2025-06-19

## 2025-06-19 RX ORDER — AMIODARONE HYDROCHLORIDE 200 MG/1
100 TABLET ORAL DAILY
Qty: 45 TABLET | Refills: 4 | Status: SHIPPED | OUTPATIENT
Start: 2025-06-19

## 2025-06-24 RX ORDER — WARFARIN SODIUM 5 MG/1
5 TABLET ORAL DAILY
Qty: 45 TABLET | Refills: 1 | Status: SHIPPED | OUTPATIENT
Start: 2025-06-24

## 2025-06-24 RX ORDER — WARFARIN SODIUM 5 MG/1
5 TABLET ORAL DAILY
Qty: 45 TABLET | Refills: 0 | OUTPATIENT
Start: 2025-06-24

## 2025-07-31 RX ORDER — ROSUVASTATIN CALCIUM 5 MG/1
5 TABLET, COATED ORAL DAILY
Qty: 90 TABLET | Refills: 4 | Status: SHIPPED | OUTPATIENT
Start: 2025-07-31

## 2025-07-31 RX ORDER — DOXAZOSIN 2 MG/1
2 TABLET ORAL NIGHTLY
Qty: 90 TABLET | Refills: 0 | Status: SHIPPED | OUTPATIENT
Start: 2025-07-31

## 2025-07-31 NOTE — TELEPHONE ENCOUNTER
LOV                  5/12/2025                    NOV                  9/8/2025  LAST REFILL     5/1/2025  PROTOCOL       Met

## 2025-08-21 RX ORDER — WARFARIN SODIUM 7.5 MG/1
7.5 TABLET ORAL NIGHTLY
Qty: 90 TABLET | Refills: 0 | OUTPATIENT
Start: 2025-08-21

## 2025-08-28 ENCOUNTER — HOSPITAL ENCOUNTER (OUTPATIENT)
Dept: CARDIOLOGY | Facility: HOSPITAL | Age: 85
Discharge: HOME | End: 2025-08-28
Payer: MEDICARE

## 2025-08-28 ENCOUNTER — ANTICOAGULATION VISIT (OUTPATIENT)
Dept: CARDIOLOGY | Facility: CLINIC | Age: 85
End: 2025-08-28
Payer: MEDICARE

## 2025-08-28 ENCOUNTER — OFFICE VISIT (OUTPATIENT)
Dept: FAMILY MEDICINE CLINIC | Facility: CLINIC | Age: 85
End: 2025-08-28
Payer: MEDICARE

## 2025-08-28 VITALS
SYSTOLIC BLOOD PRESSURE: 100 MMHG | HEART RATE: 100 BPM | TEMPERATURE: 98 F | OXYGEN SATURATION: 97 % | HEIGHT: 72 IN | DIASTOLIC BLOOD PRESSURE: 60 MMHG | RESPIRATION RATE: 14 BRPM | BODY MASS INDEX: 28.06 KG/M2 | WEIGHT: 207.2 LBS

## 2025-08-28 DIAGNOSIS — Z79.01 LONG TERM (CURRENT) USE OF ANTICOAGULANTS: ICD-10-CM

## 2025-08-28 DIAGNOSIS — Z79.01 LONG TERM (CURRENT) USE OF ANTICOAGULANTS: Primary | ICD-10-CM

## 2025-08-28 DIAGNOSIS — I10 ESSENTIAL HYPERTENSION: Primary | ICD-10-CM

## 2025-08-28 DIAGNOSIS — I48.0 PAROXYSMAL ATRIAL FIBRILLATION: ICD-10-CM

## 2025-08-28 DIAGNOSIS — R53.83 OTHER FATIGUE: ICD-10-CM

## 2025-08-28 DIAGNOSIS — D50.8 IRON DEFICIENCY ANEMIA SECONDARY TO INADEQUATE DIETARY IRON INTAKE: ICD-10-CM

## 2025-08-28 DIAGNOSIS — E78.2 MIXED HYPERLIPIDEMIA: ICD-10-CM

## 2025-08-28 LAB — INR PPP: 2.3

## 2025-08-29 ENCOUNTER — RESULTS FOLLOW-UP (OUTPATIENT)
Dept: FAMILY MEDICINE CLINIC | Facility: CLINIC | Age: 85
End: 2025-08-29
Payer: MEDICARE

## 2025-08-29 LAB
BASOPHILS # BLD AUTO: 0.1 X10E3/UL (ref 0–0.2)
BASOPHILS NFR BLD AUTO: 1 %
EOSINOPHIL # BLD AUTO: 0.1 X10E3/UL (ref 0–0.4)
EOSINOPHIL NFR BLD AUTO: 1 %
ERYTHROCYTE [DISTWIDTH] IN BLOOD BY AUTOMATED COUNT: 14.9 % (ref 11.6–15.4)
HCT VFR BLD AUTO: 39.1 % (ref 37.5–51)
HGB BLD-MCNC: 12 G/DL (ref 13–17.7)
IMM GRANULOCYTES # BLD AUTO: 0 X10E3/UL (ref 0–0.1)
IMM GRANULOCYTES NFR BLD AUTO: 0 %
LYMPHOCYTES # BLD AUTO: 1.4 X10E3/UL (ref 0.7–3.1)
LYMPHOCYTES NFR BLD AUTO: 14 %
MCH RBC QN AUTO: 30.8 PG (ref 26.6–33)
MCHC RBC AUTO-ENTMCNC: 30.7 G/DL (ref 31.5–35.7)
MCV RBC AUTO: 101 FL (ref 79–97)
MONOCYTES # BLD AUTO: 0.7 X10E3/UL (ref 0.1–0.9)
MONOCYTES NFR BLD AUTO: 7 %
NEUTROPHILS # BLD AUTO: 8 X10E3/UL (ref 1.4–7)
NEUTROPHILS NFR BLD AUTO: 77 %
PLATELET # BLD AUTO: 430 X10E3/UL (ref 150–450)
RBC # BLD AUTO: 3.89 X10E6/UL (ref 4.14–5.8)
WBC # BLD AUTO: 10.3 X10E3/UL (ref 3.4–10.8)

## (undated) DEVICE — HDRST POSITIONING FM RND 2X9IN

## (undated) DEVICE — SUT GUT PLN FAST ABS 5/0 PC1 18IN 1915G

## (undated) DEVICE — DRSNG TELFA PAD NONADH STR 1S 3X4IN

## (undated) DEVICE — STERILE COTTON TIP 6IN 10PK: Brand: MEDLINE

## (undated) DEVICE — CATH DIAG IMPULSE ARMOD 5F 100CM

## (undated) DEVICE — GLV SURG BIOGEL LTX PF 6 1/2

## (undated) DEVICE — GOWN,NON-REINFORCED,SIRUS,SET IN SLV,XL: Brand: MEDLINE

## (undated) DEVICE — CATH DIAG IMPULSE FR4 5F 100CM

## (undated) DEVICE — SWABSTK SKINPREP PVPI LF PK/3

## (undated) DEVICE — CATH IV INSYTE AUTOGARD 14G 1 1/2IN ORNG

## (undated) DEVICE — CATH DIAG IMPULSE AL2 6F 100CM

## (undated) DEVICE — INTRO SHEATH ART/FEM ENGAGE .035 6F12CM

## (undated) DEVICE — PK CATH CARD 40

## (undated) DEVICE — CATH DIAG IMPULSE FL3.5 5F 100CM

## (undated) DEVICE — 3M™ STERI-STRIP™ COMPOUND BENZOIN TINCTURE 40 BAGS/CARTON 4 CARTONS/CASE C1544: Brand: 3M™ STERI-STRIP™

## (undated) DEVICE — STPCK 3WY D201 DISCOFIX

## (undated) DEVICE — BLADELESS OBTURATOR: Brand: WECK VISTA

## (undated) DEVICE — GLV SURG BIOGEL SENSR LTX PF SZ7.5

## (undated) DEVICE — Device

## (undated) DEVICE — DRSNG WND BORDR/ADHS NONADHR/GZ LF 2X2IN STRL

## (undated) DEVICE — DRP C/ARM 41X74IN

## (undated) DEVICE — STRIP,CLOSURE,WOUND,MEDI-STRIP,1/2X4: Brand: MEDLINE

## (undated) DEVICE — WIPE INST MEROCEL

## (undated) DEVICE — CATH DIAG IMPULSE FL5 5F 100CM

## (undated) DEVICE — KT MANIFLD CARDIAC

## (undated) DEVICE — ARM DRAPE

## (undated) DEVICE — PATIENT RETURN ELECTRODE, SINGLE-USE, CONTACT QUALITY MONITORING, ADULT, WITH 9FT CORD, FOR PATIENTS WEIGING OVER 33LBS. (15KG): Brand: MEGADYNE

## (undated) DEVICE — CATH DIAG IMPULSE FL4 5F 100CM

## (undated) DEVICE — PENCL E/S ULTRAVAC TELESCP NOSE HOLSTR 10FT

## (undated) DEVICE — LAPAROVUE VISIBILITY SYSTEM LAPAROSCOPIC SOLUTIONS: Brand: LAPAROVUE

## (undated) DEVICE — GLIDESHEATH SLENDER STAINLESS STEEL KIT: Brand: GLIDESHEATH SLENDER

## (undated) DEVICE — ADHS SKIN SURG TISS VISC PREMIERPRO EXOFIN HI/VISC FAST/DRY

## (undated) DEVICE — CATH URETRL SOF/FLEX OPN/END 4F 70CM

## (undated) DEVICE — CATH DIAG IMPULSE AR2 5F 100CM

## (undated) DEVICE — PK ENT 40

## (undated) DEVICE — COLUMN DRAPE

## (undated) DEVICE — LACRIMAL INTUBATION SET CRAWFORD
Type: IMPLANTABLE DEVICE | Site: LACRIMAL DUCT | Status: NON-FUNCTIONAL
Brand: CRAWFORD
Removed: 2021-09-21

## (undated) DEVICE — CODMAN® SURGICAL PATTIES 1/2" X 3" (1.27CM X 7.62CM): Brand: CODMAN®

## (undated) DEVICE — THE STERILE LIGHT HANDLE COVER IS USED WITH STERIS SURGICAL LIGHTING AND VISUALIZATION SYSTEMS.

## (undated) DEVICE — UNDYED BRAIDED (POLYGLACTIN 910), SYNTHETIC ABSORBABLE SUTURE: Brand: COATED VICRYL

## (undated) DEVICE — DRAPE,REIN 53X77,STERILE: Brand: MEDLINE

## (undated) DEVICE — CATH4F INF PIG 145Â° MOD 110CM: Brand: INFINITI

## (undated) DEVICE — GW XCHG AMPLTZ XSTIF PTFE CRV .035 3X260

## (undated) DEVICE — SEAL

## (undated) DEVICE — 2, DISPOSABLE SUCTION/IRRIGATOR WITH DISPOSABLE TIP: Brand: STRYKEFLOW

## (undated) DEVICE — RADIFOCUS GLIDEWIRE: Brand: GLIDEWIRE

## (undated) DEVICE — CONTAINER,SPECIMEN,OR STERILE,4OZ: Brand: MEDLINE

## (undated) DEVICE — NDL HYPO PRECISIONGLIDE REG 25G 1 1/2

## (undated) DEVICE — TRAP FLD MINIVAC MEGADYNE 100ML

## (undated) DEVICE — GW EMR FIX EXCHG J STD .035 3MM 260CM

## (undated) DEVICE — ELECTRD NDL EZ CLN MOD 2.75IN

## (undated) DEVICE — SYR LUERLOK 30CC

## (undated) DEVICE — GLV SURG PREMIERPRO ORTHO LTX PF SZ7 BRN

## (undated) DEVICE — APPL CHLORAPREP HI/LITE 26ML ORNG

## (undated) DEVICE — CROUCH CORNEAL PROTECTOR: Brand: BAUSCH + LOMB

## (undated) DEVICE — INTENDED FOR TISSUE SEPARATION, AND OTHER PROCEDURES THAT REQUIRE A SHARP SURGICAL BLADE TO PUNCTURE OR CUT.: Brand: BARD-PARKER ® CARBON RIB-BACK BLADES

## (undated) DEVICE — GAUZE,SPONGE,4"X4",16PLY,XRAY,STRL,LF: Brand: MEDLINE

## (undated) DEVICE — CATH DIAG IMPULSE AL2 5F 100CM

## (undated) DEVICE — VESSEL SEALER EXTEND: Brand: ENDOWRIST

## (undated) DEVICE — ENDOPATH PNEUMONEEDLE INSUFFLATION NEEDLES WITH LUER LOCK CONNECTORS 120MM: Brand: ENDOPATH

## (undated) DEVICE — SUCTION IRRIGATOR: Brand: ENDOWRIST

## (undated) DEVICE — LOU LAP CHOLE: Brand: MEDLINE INDUSTRIES, INC.

## (undated) DEVICE — SUT VIC 5/0 P3 18IN J493G

## (undated) DEVICE — TROC BLADLES AIRSEAL/OPTI THRD 8X120MM 1P/U

## (undated) DEVICE — SUT VIC 6/0 S14 18IN DYED J570G

## (undated) DEVICE — EXTENSION SET, MALE LUER LOCK ADAPTER WITH RETRACTABLE COLLAR

## (undated) DEVICE — ST TBG AIRSEAL BIF FLTR W/ACT/CHARCOAL/FLTR

## (undated) DEVICE — ANGIO-SEAL VIP VASCULAR CLOSURE DEVICE: Brand: ANGIO-SEAL

## (undated) DEVICE — TISSUE RETRIEVAL SYSTEM: Brand: INZII RETRIEVAL SYSTEM

## (undated) DEVICE — SUT SILK 5/0 P3 18IN 640G

## (undated) DEVICE — SUT MNCRYL PLS ANTIB UD 4/0 PS2 18IN